# Patient Record
Sex: MALE | Race: WHITE | NOT HISPANIC OR LATINO | Employment: OTHER | URBAN - METROPOLITAN AREA
[De-identification: names, ages, dates, MRNs, and addresses within clinical notes are randomized per-mention and may not be internally consistent; named-entity substitution may affect disease eponyms.]

---

## 2018-03-19 ENCOUNTER — APPOINTMENT (INPATIENT)
Dept: ULTRASOUND IMAGING | Facility: HOSPITAL | Age: 53
DRG: 440 | End: 2018-03-19
Payer: COMMERCIAL

## 2018-03-19 ENCOUNTER — APPOINTMENT (EMERGENCY)
Dept: CT IMAGING | Facility: HOSPITAL | Age: 53
DRG: 440 | End: 2018-03-19
Payer: COMMERCIAL

## 2018-03-19 ENCOUNTER — HOSPITAL ENCOUNTER (INPATIENT)
Facility: HOSPITAL | Age: 53
LOS: 4 days | Discharge: HOME | DRG: 440 | End: 2018-03-23
Attending: EMERGENCY MEDICINE | Admitting: HOSPITALIST
Payer: COMMERCIAL

## 2018-03-19 DIAGNOSIS — Z72.0 TOBACCO ABUSE: ICD-10-CM

## 2018-03-19 DIAGNOSIS — K85.90 ACUTE PANCREATITIS: Primary | ICD-10-CM

## 2018-03-19 LAB
ALBUMIN SERPL BCP-MCNC: 3.8 G/DL (ref 3.5–5)
ALP SERPL-CCNC: 172 U/L (ref 46–116)
ALT SERPL W P-5'-P-CCNC: 106 U/L (ref 12–78)
ANION GAP SERPL CALCULATED.3IONS-SCNC: 12 MMOL/L (ref 4–13)
AST SERPL W P-5'-P-CCNC: 40 U/L (ref 5–45)
BACTERIA UR QL AUTO: ABNORMAL /HPF
BASOPHILS # BLD AUTO: 0.07 THOUSANDS/ΜL (ref 0–0.1)
BASOPHILS NFR BLD AUTO: 1 % (ref 0–1)
BILIRUB SERPL-MCNC: 1 MG/DL (ref 0.2–1)
BILIRUB UR QL STRIP: ABNORMAL
BUN SERPL-MCNC: 12 MG/DL (ref 5–25)
CALCIUM SERPL-MCNC: 10 MG/DL (ref 8.3–10.1)
CHLORIDE SERPL-SCNC: 98 MMOL/L (ref 100–108)
CHOLEST SERPL-MCNC: 203 MG/DL (ref 50–200)
CLARITY UR: CLEAR
CO2 SERPL-SCNC: 26 MMOL/L (ref 21–32)
COLOR UR: ABNORMAL
CREAT SERPL-MCNC: 0.97 MG/DL (ref 0.6–1.3)
EOSINOPHIL # BLD AUTO: 0.11 THOUSAND/ΜL (ref 0–0.61)
EOSINOPHIL NFR BLD AUTO: 1 % (ref 0–6)
ERYTHROCYTE [DISTWIDTH] IN BLOOD BY AUTOMATED COUNT: 11.6 % (ref 11.6–15.1)
GFR SERPL CREATININE-BSD FRML MDRD: 88 ML/MIN/1.73SQ M
GLUCOSE SERPL-MCNC: 255 MG/DL (ref 65–140)
GLUCOSE UR STRIP-MCNC: ABNORMAL MG/DL
HCT VFR BLD AUTO: 46.7 % (ref 36.5–49.3)
HDLC SERPL-MCNC: 46 MG/DL (ref 40–60)
HGB BLD-MCNC: 16.7 G/DL (ref 12–17)
HGB UR QL STRIP.AUTO: ABNORMAL
HYALINE CASTS #/AREA URNS LPF: ABNORMAL /LPF
KETONES UR STRIP-MCNC: ABNORMAL MG/DL
LDLC SERPL CALC-MCNC: 116 MG/DL (ref 0–100)
LEUKOCYTE ESTERASE UR QL STRIP: NEGATIVE
LIPASE SERPL-CCNC: 678 U/L (ref 73–393)
LYMPHOCYTES # BLD AUTO: 1.62 THOUSANDS/ΜL (ref 0.6–4.47)
LYMPHOCYTES NFR BLD AUTO: 14 % (ref 14–44)
MCH RBC QN AUTO: 31.6 PG (ref 26.8–34.3)
MCHC RBC AUTO-ENTMCNC: 35.8 G/DL (ref 31.4–37.4)
MCV RBC AUTO: 88 FL (ref 82–98)
MONOCYTES # BLD AUTO: 0.89 THOUSAND/ΜL (ref 0.17–1.22)
MONOCYTES NFR BLD AUTO: 8 % (ref 4–12)
NEUTROPHILS # BLD AUTO: 8.99 THOUSANDS/ΜL (ref 1.85–7.62)
NEUTS SEG NFR BLD AUTO: 76 % (ref 43–75)
NITRITE UR QL STRIP: NEGATIVE
NON-SQ EPI CELLS URNS QL MICRO: ABNORMAL /HPF
PH UR STRIP.AUTO: 6 [PH] (ref 4.5–8)
PLATELET # BLD AUTO: 243 THOUSANDS/UL (ref 149–390)
PMV BLD AUTO: 9.8 FL (ref 8.9–12.7)
POTASSIUM SERPL-SCNC: 4 MMOL/L (ref 3.5–5.3)
PROT SERPL-MCNC: 8.2 G/DL (ref 6.4–8.2)
PROT UR STRIP-MCNC: ABNORMAL MG/DL
RBC # BLD AUTO: 5.29 MILLION/UL (ref 3.88–5.62)
RBC #/AREA URNS AUTO: ABNORMAL /HPF
SODIUM SERPL-SCNC: 136 MMOL/L (ref 136–145)
SP GR UR STRIP.AUTO: >=1.03 (ref 1–1.03)
TRIGL SERPL-MCNC: 205 MG/DL
TROPONIN I SERPL-MCNC: <0.02 NG/ML
UROBILINOGEN UR QL STRIP.AUTO: 1 E.U./DL
WBC # BLD AUTO: 11.68 THOUSAND/UL (ref 4.31–10.16)
WBC #/AREA URNS AUTO: ABNORMAL /HPF

## 2018-03-19 PROCEDURE — 96361 HYDRATE IV INFUSION ADD-ON: CPT

## 2018-03-19 PROCEDURE — 80053 COMPREHEN METABOLIC PANEL: CPT | Performed by: EMERGENCY MEDICINE

## 2018-03-19 PROCEDURE — 36415 COLL VENOUS BLD VENIPUNCTURE: CPT | Performed by: EMERGENCY MEDICINE

## 2018-03-19 PROCEDURE — 83690 ASSAY OF LIPASE: CPT | Performed by: EMERGENCY MEDICINE

## 2018-03-19 PROCEDURE — 96374 THER/PROPH/DIAG INJ IV PUSH: CPT

## 2018-03-19 PROCEDURE — 81001 URINALYSIS AUTO W/SCOPE: CPT

## 2018-03-19 PROCEDURE — 96375 TX/PRO/DX INJ NEW DRUG ADDON: CPT

## 2018-03-19 PROCEDURE — 80061 LIPID PANEL: CPT | Performed by: PHYSICIAN ASSISTANT

## 2018-03-19 PROCEDURE — 84484 ASSAY OF TROPONIN QUANT: CPT | Performed by: EMERGENCY MEDICINE

## 2018-03-19 PROCEDURE — 71275 CT ANGIOGRAPHY CHEST: CPT

## 2018-03-19 PROCEDURE — 93005 ELECTROCARDIOGRAM TRACING: CPT

## 2018-03-19 PROCEDURE — 85025 COMPLETE CBC W/AUTO DIFF WBC: CPT | Performed by: EMERGENCY MEDICINE

## 2018-03-19 PROCEDURE — 99285 EMERGENCY DEPT VISIT HI MDM: CPT

## 2018-03-19 PROCEDURE — 99222 1ST HOSP IP/OBS MODERATE 55: CPT | Performed by: PHYSICIAN ASSISTANT

## 2018-03-19 PROCEDURE — 76705 ECHO EXAM OF ABDOMEN: CPT

## 2018-03-19 PROCEDURE — 74177 CT ABD & PELVIS W/CONTRAST: CPT

## 2018-03-19 RX ORDER — ONDANSETRON 2 MG/ML
4 INJECTION INTRAMUSCULAR; INTRAVENOUS ONCE
Status: COMPLETED | OUTPATIENT
Start: 2018-03-19 | End: 2018-03-19

## 2018-03-19 RX ORDER — CALCIUM CARBONATE 200(500)MG
1000 TABLET,CHEWABLE ORAL DAILY PRN
Status: DISCONTINUED | OUTPATIENT
Start: 2018-03-19 | End: 2018-03-23 | Stop reason: HOSPADM

## 2018-03-19 RX ORDER — KETOROLAC TROMETHAMINE 30 MG/ML
15 INJECTION, SOLUTION INTRAMUSCULAR; INTRAVENOUS EVERY 6 HOURS SCHEDULED
Status: COMPLETED | OUTPATIENT
Start: 2018-03-20 | End: 2018-03-22

## 2018-03-19 RX ORDER — KETOROLAC TROMETHAMINE 30 MG/ML
15 INJECTION, SOLUTION INTRAMUSCULAR; INTRAVENOUS ONCE
Status: COMPLETED | OUTPATIENT
Start: 2018-03-19 | End: 2018-03-19

## 2018-03-19 RX ORDER — ONDANSETRON 2 MG/ML
4 INJECTION INTRAMUSCULAR; INTRAVENOUS EVERY 6 HOURS PRN
Status: DISCONTINUED | OUTPATIENT
Start: 2018-03-19 | End: 2018-03-23 | Stop reason: HOSPADM

## 2018-03-19 RX ORDER — SENNOSIDES 8.6 MG
1 TABLET ORAL DAILY
Status: DISCONTINUED | OUTPATIENT
Start: 2018-03-20 | End: 2018-03-23 | Stop reason: HOSPADM

## 2018-03-19 RX ORDER — ONDANSETRON 2 MG/ML
4 INJECTION INTRAMUSCULAR; INTRAVENOUS EVERY 6 HOURS PRN
Status: DISCONTINUED | OUTPATIENT
Start: 2018-03-19 | End: 2018-03-19

## 2018-03-19 RX ORDER — KETOROLAC TROMETHAMINE 30 MG/ML
15 INJECTION, SOLUTION INTRAMUSCULAR; INTRAVENOUS EVERY 6 HOURS SCHEDULED
Status: DISPENSED | OUTPATIENT
Start: 2018-03-19 | End: 2018-03-20

## 2018-03-19 RX ORDER — SODIUM CHLORIDE 9 MG/ML
100 INJECTION, SOLUTION INTRAVENOUS CONTINUOUS
Status: DISCONTINUED | OUTPATIENT
Start: 2018-03-19 | End: 2018-03-20

## 2018-03-19 RX ORDER — NICOTINE 21 MG/24HR
1 PATCH, TRANSDERMAL 24 HOURS TRANSDERMAL DAILY
Status: DISCONTINUED | OUTPATIENT
Start: 2018-03-19 | End: 2018-03-23 | Stop reason: HOSPADM

## 2018-03-19 RX ADMIN — NICOTINE 1 PATCH: 21 PATCH, EXTENDED RELEASE TRANSDERMAL at 21:16

## 2018-03-19 RX ADMIN — IOHEXOL 100 ML: 350 INJECTION, SOLUTION INTRAVENOUS at 19:03

## 2018-03-19 RX ADMIN — KETOROLAC TROMETHAMINE 15 MG: 30 INJECTION, SOLUTION INTRAMUSCULAR at 18:05

## 2018-03-19 RX ADMIN — ONDANSETRON 4 MG: 2 INJECTION INTRAMUSCULAR; INTRAVENOUS at 18:01

## 2018-03-19 RX ADMIN — SODIUM CHLORIDE 1000 ML: 0.9 INJECTION, SOLUTION INTRAVENOUS at 18:00

## 2018-03-19 RX ADMIN — HYDROMORPHONE HYDROCHLORIDE 0.5 MG: 1 INJECTION, SOLUTION INTRAMUSCULAR; INTRAVENOUS; SUBCUTANEOUS at 23:45

## 2018-03-19 RX ADMIN — HYDROMORPHONE HYDROCHLORIDE 1 MG: 1 INJECTION, SOLUTION INTRAMUSCULAR; INTRAVENOUS; SUBCUTANEOUS at 20:04

## 2018-03-19 RX ADMIN — SODIUM CHLORIDE 100 ML/HR: 0.9 INJECTION, SOLUTION INTRAVENOUS at 21:11

## 2018-03-19 RX ADMIN — KETOROLAC TROMETHAMINE 15 MG: 30 INJECTION, SOLUTION INTRAMUSCULAR at 21:16

## 2018-03-19 NOTE — ED PROVIDER NOTES
History  Chief Complaint   Patient presents with    Epigastric Pain     Pt c/o epigastric pain and mid back pain starting a week ago, worsening today  Denies SOB/N/V/D       47-year-old gentleman comes in complaining of abdominal pain  Patient states he has midepigastric pain that radiates directly into his back  Patient states his pain started a week ago however has gotten increasingly worse over the last 24 hours  Patient has some nausea but no vomiting  Patient also denies chest pain shortness of breath  Patient also did previous episodes of the same  Patient denies being a drinker but admits to smoking marijuana  Patient has a history of hypertension and hyperlipidemia  History provided by:  Patient   used: No    Abdominal Pain   Pain location:  Epigastric  Pain quality: gnawing, sharp and stabbing    Pain radiates to:  Back  Pain severity:  Severe  Onset quality:  Gradual  Duration:  1 week  Timing:  Constant  Progression:  Worsening  Chronicity:  New  Context: not alcohol use, not previous surgeries and not trauma    Ineffective treatments:  None tried  Associated symptoms: anorexia    Associated symptoms: no chest pain, no cough, no fever, no hematuria and no vomiting    Risk factors: no alcohol abuse, no NSAID use and no recent hospitalization        Prior to Admission Medications   Prescriptions Last Dose Informant Patient Reported? Taking?   hydrochlorothiazide (HYDRODIURIL) 25 mg tablet   Yes Yes   Sig: Take 25 mg by mouth daily   ramipril (ALTACE) 10 MG capsule   Yes Yes   Sig: Take 10 mg by mouth daily   sertraline (ZOLOFT) 100 mg tablet   Yes No   Sig: Take 150 mg by mouth daily   simvastatin (ZOCOR) 20 mg tablet   Yes Yes   Sig: Take 20 mg by mouth daily      Facility-Administered Medications: None       History reviewed  No pertinent past medical history  Past Surgical History:   Procedure Laterality Date    LEG SURGERY         History reviewed   No pertinent family history  I have reviewed and agree with the history as documented  Social History   Substance Use Topics    Smoking status: Current Every Day Smoker    Smokeless tobacco: Not on file    Alcohol use Yes      Comment: socially         Review of Systems   Constitutional: Negative for activity change, appetite change and fever  HENT: Negative for congestion and facial swelling  Eyes: Negative for discharge and redness  Respiratory: Negative for cough and wheezing  Cardiovascular: Negative for chest pain and leg swelling  Gastrointestinal: Positive for abdominal pain and anorexia  Negative for abdominal distention, blood in stool and vomiting  Endocrine: Negative for cold intolerance and polydipsia  Genitourinary: Negative for difficulty urinating and hematuria  Musculoskeletal: Negative for arthralgias and gait problem  Skin: Negative for color change and rash  Allergic/Immunologic: Negative for food allergies and immunocompromised state  Neurological: Negative for dizziness, seizures and headaches  Hematological: Negative for adenopathy  Does not bruise/bleed easily  Psychiatric/Behavioral: Negative for agitation, confusion and decreased concentration  All other systems reviewed and are negative  Physical Exam  ED Triage Vitals [03/19/18 1644]   Temperature Pulse Respirations Blood Pressure SpO2   97 8 °F (36 6 °C) (!) 118 20 142/77 97 %      Temp Source Heart Rate Source Patient Position - Orthostatic VS BP Location FiO2 (%)   Oral Monitor Sitting Right arm --      Pain Score       Worst Possible Pain           Orthostatic Vital Signs  Vitals:    03/19/18 2015 03/19/18 2057 03/19/18 2100 03/20/18 0700   BP: 106/69 120/64 122/73 143/80   Pulse: 92 94 82 84   Patient Position - Orthostatic VS:  Lying Lying Lying       Physical Exam   Constitutional: He is oriented to person, place, and time  He appears well-developed and well-nourished  Non-toxic appearance   He appears distressed  HENT:   Head: Normocephalic and atraumatic  Right Ear: Tympanic membrane normal    Left Ear: Tympanic membrane normal    Nose: Nose normal    Mouth/Throat: Oropharynx is clear and moist    Eyes: Conjunctivae, EOM and lids are normal  Pupils are equal, round, and reactive to light  Neck: Trachea normal and normal range of motion  Neck supple  No JVD present  Carotid bruit is not present  Cardiovascular: Normal rate, regular rhythm, normal heart sounds and intact distal pulses  No extrasystoles are present  Pulmonary/Chest: Effort normal  He has no decreased breath sounds  He has no wheezes  He has no rhonchi  He has no rales  He exhibits no tenderness and no deformity  Abdominal: Soft  Bowel sounds are normal  There is tenderness in the epigastric area  There is no rebound, no guarding and no CVA tenderness  Musculoskeletal:        Right shoulder: He exhibits normal range of motion, no tenderness, no swelling and no deformity  Cervical back: Normal  He exhibits normal range of motion, no tenderness, no bony tenderness and no deformity  Lymphadenopathy:     He has no cervical adenopathy  He has no axillary adenopathy  Neurological: He is alert and oriented to person, place, and time  He has normal strength and normal reflexes  No cranial nerve deficit or sensory deficit  He displays a negative Romberg sign  Skin: Skin is warm and dry  Psychiatric: He has a normal mood and affect  His speech is normal and behavior is normal  Judgment and thought content normal  Cognition and memory are normal    Nursing note and vitals reviewed        ED Medications  Medications   ondansetron (ZOFRAN) injection 4 mg (not administered)   ketorolac (TORADOL) injection 15 mg (15 mg Intravenous Not Given 3/20/18 0131)   senna (SENOKOT) tablet 8 6 mg (8 6 mg Oral Given 3/20/18 0909)   calcium carbonate (TUMS) chewable tablet 1,000 mg (not administered)   nicotine (NICODERM CQ) 21 mg/24 hr TD 24 hr patch 1 patch (1 patch Transdermal Medication Applied 3/20/18 0910)   enoxaparin (LOVENOX) subcutaneous injection 40 mg (40 mg Subcutaneous Given 3/20/18 0909)   ketorolac (TORADOL) injection 15 mg (15 mg Intravenous Given 3/20/18 1244)   lactated ringers infusion (200 mL/hr Intravenous New Bag 3/20/18 1244)   pantoprazole (PROTONIX) EC tablet 40 mg (40 mg Oral Given 3/20/18 0909)   HYDROmorphone (DILAUDID) injection 1 mg (1 mg Intravenous Given 3/20/18 1114)   sertraline (ZOLOFT) tablet 150 mg (150 mg Oral Given 3/20/18 1353)   ondansetron (ZOFRAN) injection 4 mg (4 mg Intravenous Given 3/19/18 1801)   ketorolac (TORADOL) injection 15 mg (15 mg Intravenous Given 3/19/18 1805)   sodium chloride 0 9 % bolus 1,000 mL (0 mL Intravenous Stopped 3/19/18 2000)   iohexol (OMNIPAQUE) 350 MG/ML injection (MULTI-DOSE) 100 mL (100 mL Intravenous Given 3/19/18 1903)   HYDROmorphone (DILAUDID) injection 1 mg (1 mg Intravenous Given 3/19/18 2004)       Diagnostic Studies  Results Reviewed     Procedure Component Value Units Date/Time    Lipid Panel with Direct LDL reflex [[id number]]  (Abnormal) Collected:  03/19/18 1806    Lab Status:  Final result Specimen:  Blood from Arm, Right Updated:  03/19/18 2103     Cholesterol 203 (H) mg/dL      Triglycerides 205 (H) mg/dL      HDL, Direct 46 mg/dL      LDL Calculated 116 (H) mg/dL     Narrative:         Triglyceride:        Normal               <150 mg/dl        Borderline High     150-199 mg/dl        High               200-499 mg/dl        Very High           >499 mg/dl      Urine Microscopic [[id number]]  (Abnormal) Collected:  03/19/18 1834    Lab Status:  Final result Specimen:  Urine from Urine, Clean Catch Updated:  03/19/18 1858     RBC, UA 0-1 (A) /hpf      WBC, UA 2-4 (A) /hpf      Epithelial Cells Occasional /hpf      Bacteria, UA Occasional /hpf      Hyaline Casts, UA 20-30 (A) /lpf     ED Urine Macroscopic [[id number]]  (Abnormal) Collected:  03/19/18 7098    Lab Status: Final result Specimen:  Urine Updated:  03/19/18 1835     Color, UA Radha     Clarity, UA Clear     pH, UA 6 0     Leukocytes, UA Negative     Nitrite, UA Negative     Protein,  (2+) (A) mg/dl      Glucose,  (1/4%) (A) mg/dl      Ketones, UA Trace (A) mg/dl      Urobilinogen, UA 1 0 E U /dl      Bilirubin, UA Interference- unable to analyze (A)     Blood, UA Trace (A)     Specific Gravity, UA >=1 030    Narrative:       CLINITEK RESULT    Troponin I [[id number]]  (Normal) Collected:  03/19/18 1806    Lab Status:  Final result Specimen:  Blood from Arm, Right Updated:  03/19/18 1832     Troponin I <0 02 ng/mL     Narrative:         [employer] Chemistry analyzer 99% cutoff is > 0 04 ng/mL in network labs    o cTnI 99% cutoff is useful only when applied to patients in the clinical setting of myocardial ischemia  o cTnI 99% cutoff should be interpreted in the context of clinical history, ECG findings and possibly cardiac imaging to establish correct diagnosis  o cTnI 99% cutoff may be suggestive but clearly not indicative of a coronary event without the clinical setting of myocardial ischemia      Lipase [[id number]]  (Abnormal) Collected:  03/19/18 1806    Lab Status:  Final result Specimen:  Blood from Arm, Right Updated:  03/19/18 1830     Lipase 678 (H) u/L     Comprehensive metabolic panel [[id number]]  (Abnormal) Collected:  03/19/18 1806    Lab Status:  Final result Specimen:  Blood from Arm, Right Updated:  03/19/18 1830     Sodium 136 mmol/L      Potassium 4 0 mmol/L      Chloride 98 (L) mmol/L      CO2 26 mmol/L      Anion Gap 12 mmol/L      BUN 12 mg/dL      Creatinine 0 97 mg/dL      Glucose 255 (H) mg/dL      Calcium 10 0 mg/dL      AST 40 U/L       (H) U/L      Alkaline Phosphatase 172 (H) U/L      Total Protein 8 2 g/dL      Albumin 3 8 g/dL      Total Bilirubin 1 00 mg/dL      eGFR 88 ml/min/1 73sq m     Narrative:         National Kidney Disease Education Program recommendations are as follows:  GFR calculation is accurate only with a steady state creatinine  Chronic Kidney disease less than 60 ml/min/1 73 sq  meters  Kidney failure less than 15 ml/min/1 73 sq  meters  CBC and differential [[id number]]  (Abnormal) Collected:  03/19/18 1806    Lab Status:  Final result Specimen:  Blood from Arm, Right Updated:  03/19/18 1814     WBC 11 68 (H) Thousand/uL      RBC 5 29 Million/uL      Hemoglobin 16 7 g/dL      Hematocrit 46 7 %      MCV 88 fL      MCH 31 6 pg      MCHC 35 8 g/dL      RDW 11 6 %      MPV 9 8 fL      Platelets 904 Thousands/uL      Neutrophils Relative 76 (H) %      Lymphocytes Relative 14 %      Monocytes Relative 8 %      Eosinophils Relative 1 %      Basophils Relative 1 %      Neutrophils Absolute 8 99 (H) Thousands/µL      Lymphocytes Absolute 1 62 Thousands/µL      Monocytes Absolute 0 89 Thousand/µL      Eosinophils Absolute 0 11 Thousand/µL      Basophils Absolute 0 07 Thousands/µL                  US abdomen limited   Final Result by Enis Boxer, MD (03/20 0383)      Moderate hepatomegaly with severe hepatic steatosis as seen on CT  No gallstones  Workstation performed: [id number]         PE Study with CT Abdomen and Pelvis with contrast   Final Result by Braulio Holley MD (03/19 1947)         1  No acute pulmonary embolism  2   Acute pancreatitis  3   Hepatic steatosis  4   Indeterminate lesion arising from the lower pole the left kidney  This could possibly represent a proteinaceous/hemorrhagic cyst   Consider initial evaluation with nonemergent renal ultrasound  If this is inconclusive, further evaluation with MRI    recommended           Workstation performed: [id number]                    Procedures  ECG 12 Lead Documentation  Date/Time: 3/19/2018 6:25 PM  Performed by: Sami Pacheco by: Rolando HUBBARD     Patient location:  ED  Rate:     ECG rate:  90  Rhythm:     Rhythm: sinus rhythm    Ectopy:     Ectopy: none Phone Contacts  ED Phone Contact    ED Course  ED Course                                MDM  Number of Diagnoses or Management Options  Acute pancreatitis: new and requires workup     Amount and/or Complexity of Data Reviewed  Clinical lab tests: ordered and reviewed  Tests in the radiology section of CPT®: ordered and reviewed  Tests in the medicine section of CPT®: reviewed and ordered  Discuss the patient with other providers: yes    Patient Progress  Patient progress: improved    CritCare Time    Disposition  Final diagnoses:   Acute pancreatitis     Time reflects when diagnosis was documented in both MDM as applicable and the Disposition within this note     Time User Action Codes Description Comment    3/19/2018  7:58 PM Alexander Fagan Add [K85 90] Acute pancreatitis       ED Disposition     ED Disposition Condition Comment    Admit  Case was discussed with dr Alexander Fagan and the patient's admission status was agreed to be Admission Status: inpatient status to the service of Dr Alexander Fagan    Follow-up Information    None       Current Discharge Medication List      CONTINUE these medications which have NOT CHANGED    Details   hydrochlorothiazide (HYDRODIURIL) 25 mg tablet Take 25 mg by mouth daily      ramipril (ALTACE) 10 MG capsule Take 10 mg by mouth daily      simvastatin (ZOCOR) 20 mg tablet Take 20 mg by mouth daily      sertraline (ZOLOFT) 100 mg tablet Take 150 mg by mouth daily           No discharge procedures on file      ED Provider  Electronically Signed by           Jhonny Barraza DO  03/20/18 2408

## 2018-03-20 LAB
ALBUMIN SERPL BCP-MCNC: 3.4 G/DL (ref 3.5–5)
ALP SERPL-CCNC: 146 U/L (ref 46–116)
ALT SERPL W P-5'-P-CCNC: 87 U/L (ref 12–78)
ANION GAP SERPL CALCULATED.3IONS-SCNC: 10 MMOL/L (ref 4–13)
AST SERPL W P-5'-P-CCNC: 31 U/L (ref 5–45)
ATRIAL RATE: 93 BPM
BILIRUB SERPL-MCNC: 0.8 MG/DL (ref 0.2–1)
BUN SERPL-MCNC: 15 MG/DL (ref 5–25)
CALCIUM SERPL-MCNC: 9.2 MG/DL (ref 8.3–10.1)
CHLORIDE SERPL-SCNC: 101 MMOL/L (ref 100–108)
CO2 SERPL-SCNC: 27 MMOL/L (ref 21–32)
CREAT SERPL-MCNC: 0.86 MG/DL (ref 0.6–1.3)
ERYTHROCYTE [DISTWIDTH] IN BLOOD BY AUTOMATED COUNT: 11.8 % (ref 11.6–15.1)
GFR SERPL CREATININE-BSD FRML MDRD: 98 ML/MIN/1.73SQ M
GLUCOSE SERPL-MCNC: 156 MG/DL (ref 65–140)
HCT VFR BLD AUTO: 42.5 % (ref 36.5–49.3)
HGB BLD-MCNC: 14.7 G/DL (ref 12–17)
MCH RBC QN AUTO: 31.3 PG (ref 26.8–34.3)
MCHC RBC AUTO-ENTMCNC: 34.6 G/DL (ref 31.4–37.4)
MCV RBC AUTO: 91 FL (ref 82–98)
P AXIS: 39 DEGREES
PLATELET # BLD AUTO: 213 THOUSANDS/UL (ref 149–390)
PMV BLD AUTO: 9.8 FL (ref 8.9–12.7)
POTASSIUM SERPL-SCNC: 3.7 MMOL/L (ref 3.5–5.3)
PR INTERVAL: 186 MS
PROT SERPL-MCNC: 7.5 G/DL (ref 6.4–8.2)
QRS AXIS: 19 DEGREES
QRSD INTERVAL: 88 MS
QT INTERVAL: 338 MS
QTC INTERVAL: 420 MS
RBC # BLD AUTO: 4.69 MILLION/UL (ref 3.88–5.62)
SODIUM SERPL-SCNC: 138 MMOL/L (ref 136–145)
T WAVE AXIS: 38 DEGREES
VENTRICULAR RATE: 93 BPM
WBC # BLD AUTO: 11.47 THOUSAND/UL (ref 4.31–10.16)

## 2018-03-20 PROCEDURE — 99222 1ST HOSP IP/OBS MODERATE 55: CPT | Performed by: PHYSICIAN ASSISTANT

## 2018-03-20 PROCEDURE — 93010 ELECTROCARDIOGRAM REPORT: CPT | Performed by: INTERNAL MEDICINE

## 2018-03-20 PROCEDURE — 80053 COMPREHEN METABOLIC PANEL: CPT | Performed by: PHYSICIAN ASSISTANT

## 2018-03-20 PROCEDURE — 85027 COMPLETE CBC AUTOMATED: CPT | Performed by: PHYSICIAN ASSISTANT

## 2018-03-20 PROCEDURE — 99232 SBSQ HOSP IP/OBS MODERATE 35: CPT | Performed by: INTERNAL MEDICINE

## 2018-03-20 RX ORDER — RAMIPRIL 10 MG/1
10 CAPSULE ORAL DAILY
COMMUNITY
End: 2020-10-14 | Stop reason: ALTCHOICE

## 2018-03-20 RX ORDER — HYDROCHLOROTHIAZIDE 25 MG/1
25 TABLET ORAL DAILY
COMMUNITY
End: 2020-10-14 | Stop reason: ALTCHOICE

## 2018-03-20 RX ORDER — PANTOPRAZOLE SODIUM 40 MG/1
40 TABLET, DELAYED RELEASE ORAL
Status: DISCONTINUED | OUTPATIENT
Start: 2018-03-20 | End: 2018-03-23 | Stop reason: HOSPADM

## 2018-03-20 RX ORDER — SODIUM CHLORIDE, SODIUM LACTATE, POTASSIUM CHLORIDE, CALCIUM CHLORIDE 600; 310; 30; 20 MG/100ML; MG/100ML; MG/100ML; MG/100ML
150 INJECTION, SOLUTION INTRAVENOUS CONTINUOUS
Status: DISCONTINUED | OUTPATIENT
Start: 2018-03-20 | End: 2018-03-23 | Stop reason: HOSPADM

## 2018-03-20 RX ORDER — SIMVASTATIN 20 MG
20 TABLET ORAL DAILY
COMMUNITY
End: 2020-08-19

## 2018-03-20 RX ORDER — SERTRALINE HYDROCHLORIDE 100 MG/1
150 TABLET, FILM COATED ORAL DAILY
COMMUNITY
End: 2020-05-26

## 2018-03-20 RX ADMIN — ENOXAPARIN SODIUM 40 MG: 40 INJECTION SUBCUTANEOUS at 09:09

## 2018-03-20 RX ADMIN — KETOROLAC TROMETHAMINE 15 MG: 30 INJECTION, SOLUTION INTRAMUSCULAR at 23:26

## 2018-03-20 RX ADMIN — SODIUM CHLORIDE, POTASSIUM CHLORIDE, SODIUM LACTATE AND CALCIUM CHLORIDE 200 ML/HR: 600; 310; 30; 20 INJECTION, SOLUTION INTRAVENOUS at 12:44

## 2018-03-20 RX ADMIN — HYDROMORPHONE HYDROCHLORIDE 1 MG: 1 INJECTION, SOLUTION INTRAMUSCULAR; INTRAVENOUS; SUBCUTANEOUS at 15:55

## 2018-03-20 RX ADMIN — PANTOPRAZOLE SODIUM 40 MG: 40 TABLET, DELAYED RELEASE ORAL at 09:09

## 2018-03-20 RX ADMIN — HYDROMORPHONE HYDROCHLORIDE 1 MG: 1 INJECTION, SOLUTION INTRAMUSCULAR; INTRAVENOUS; SUBCUTANEOUS at 09:08

## 2018-03-20 RX ADMIN — HYDROMORPHONE HYDROCHLORIDE 1 MG: 1 INJECTION, SOLUTION INTRAMUSCULAR; INTRAVENOUS; SUBCUTANEOUS at 11:14

## 2018-03-20 RX ADMIN — KETOROLAC TROMETHAMINE 15 MG: 30 INJECTION, SOLUTION INTRAMUSCULAR at 12:44

## 2018-03-20 RX ADMIN — HYDROMORPHONE HYDROCHLORIDE 1 MG: 1 INJECTION, SOLUTION INTRAMUSCULAR; INTRAVENOUS; SUBCUTANEOUS at 20:07

## 2018-03-20 RX ADMIN — NICOTINE 1 PATCH: 21 PATCH, EXTENDED RELEASE TRANSDERMAL at 09:10

## 2018-03-20 RX ADMIN — SODIUM CHLORIDE, POTASSIUM CHLORIDE, SODIUM LACTATE AND CALCIUM CHLORIDE 200 ML/HR: 600; 310; 30; 20 INJECTION, SOLUTION INTRAVENOUS at 09:10

## 2018-03-20 RX ADMIN — HYDROMORPHONE HYDROCHLORIDE 0.5 MG: 1 INJECTION, SOLUTION INTRAMUSCULAR; INTRAVENOUS; SUBCUTANEOUS at 04:20

## 2018-03-20 RX ADMIN — SODIUM CHLORIDE 100 ML/HR: 0.9 INJECTION, SOLUTION INTRAVENOUS at 07:39

## 2018-03-20 RX ADMIN — SERTRALINE HYDROCHLORIDE 150 MG: 100 TABLET ORAL at 13:53

## 2018-03-20 RX ADMIN — SENNOSIDES 8.6 MG: 8.6 TABLET, FILM COATED ORAL at 09:09

## 2018-03-20 RX ADMIN — KETOROLAC TROMETHAMINE 15 MG: 30 INJECTION, SOLUTION INTRAMUSCULAR at 17:25

## 2018-03-20 RX ADMIN — KETOROLAC TROMETHAMINE 15 MG: 30 INJECTION, SOLUTION INTRAMUSCULAR at 07:00

## 2018-03-20 NOTE — ASSESSMENT & PLAN NOTE
· Present on admission, lipase 678, CT abdomen shows peripancreatic stranding within large lymph node consistent with pancreatitis  · Suspect alcohol-induced  Patient reports that he drinks heavily-- 12 pack of beer per night  Denies drinking in the last 3 weeks    No signs of withdrawal   · Check right upper quadrant ultrasound, lipids  · Trend lipase, clear liquid diet, pain control  · GI consult, appreciate input

## 2018-03-20 NOTE — CONSULTS
Consultation - [facility]  Don Briones 45 y o  male MRN: [id number]  Unit/Bed#: -01 Encounter: [id number]        Inpatient consult to gastroenterology  Consult performed by: Timothy Velarde ordered by: Osorio Ma          Reason for Consult / Principal Problem: acute pancreatitis    ASSESSMENT and PLAN:    Principal Problem:    Acute pancreatitis    #1  Acute pancreatitis, likely alcohol induced: Lipase 678 however symptoms have been occurring for a few weeks so it was likely higher previously  CT scan shows evidence of pancreatitis with peripancreatic enlarged lymph node  WBC 11 47, afebrile  US negative for gallstones or biliary dilation    -Continue to monitor abdominal exam, lipase, WBC count  -Consider repeat imaging if any clinical worsening  -Lactated ringers 200 cc/hr  -Clear liquid diet  -Complete alcohol cessation  -Antiemetics and pain control as needed    #2  Severe hepatic steatosis with elevated transaminases, likely alcohol induced: no signs of cirrhosis  Platelets normal  Bilirubin normal  ALT 87, alk phos 144    -Continue to monitor LFTs closely  -Discussed with patient in regards to complete alcohol cessation  -Avoid hepatotoxic meds  -Exercise, lo fat diet, weight loss    #3  Acid reflux: on Nexium at home daily  Never had EGD  -Consider outpatient EGD to rule out Tellez's due to GERD and alcohol use    #4  Colon cancer screening: never had a colonoscopy  -Outpatient colonoscopy    -------------------------------------------------------------------------------------------------------------------    HPI:  This a 26-year-old male with history of alcohol abuse who presented to the emergency room due to worsening epigastric pain that radiates to his back  The patient reports that he started to have symptoms a few weeks ago  Over the past few weeks his pain has progressively gotten worse    He states that his pain was the worst over the last 5 days prompting him to come to the emergency room  He reports that his last alcoholic beverage was several weeks ago prior to getting sick  He reports that he has a history in the past of drinking significantly  He reports being depressed and drinking a lot about 8 years ago  Currently he reports that he only drinks about once a week at which time he will have a few drinks  He denies any alcohol use since feeling ill  Denies any nausea or vomiting  He denies any unexpected weight loss  He reports that his last bowel movement was on Sunday  He is passing gas  He reports that his last bowel movement on Sunday was dark in color but he had taken Pepto-Bismol the day prior  He denies any other dark stools  He denies any bright red blood in the stool  On a regular basis he denies any constipation or diarrhea  He reports he has never had an upper endoscopy or colonoscopy in the past   He does take Nexium once a day for acid reflux  He also uses tobacco daily  REVIEW OF SYSTEMS:    CONSTITUTIONAL: Denies any fever, chills, or rigors  Decreased appetite, and no recent weight loss  HEENT: No earache or tinnitus  Denies hearing loss or visual disturbances  CARDIOVASCULAR: No chest pain or palpitations  RESPIRATORY: Denies any cough, hemoptysis, shortness of breath or dyspnea on exertion  GASTROINTESTINAL: As noted in the History of Present Illness  GENITOURINARY: No problems with urination  Denies any hematuria or dysuria  NEUROLOGIC: No dizziness or vertigo, denies headaches  MUSCULOSKELETAL: Denies any muscle or joint pain  SKIN: Denies skin rashes or itching  ENDOCRINE: Denies excessive thirst  Denies intolerance to heat or cold  PSYCHOSOCIAL: Denies depression or anxiety  Denies any recent memory loss  Historical Information   History reviewed  No pertinent past medical history    Past Surgical History:   Procedure Laterality Date    LEG SURGERY       Social History   History   Alcohol Use  Yes     Comment: socially      History   Drug Use    Types: Marijuana     Comment: daily use     History   Smoking Status    Current Every Day Smoker   Smokeless Tobacco    Not on file     History reviewed  No pertinent family history  Meds/Allergies     No prescriptions prior to admission  Current Facility-Administered Medications   Medication Dose Route Frequency    calcium carbonate (TUMS) chewable tablet 1,000 mg  1,000 mg Oral Daily PRN    enoxaparin (LOVENOX) subcutaneous injection 40 mg  40 mg Subcutaneous Daily    HYDROmorphone (DILAUDID) injection 1 mg  1 mg Intravenous Q3H PRN    ketorolac (TORADOL) injection 15 mg  15 mg Intravenous Q6H [facility]    lactated ringers infusion  200 mL/hr Intravenous Continuous    nicotine (NICODERM CQ) 21 mg/24 hr TD 24 hr patch 1 patch  1 patch Transdermal Daily    ondansetron (ZOFRAN) injection 4 mg  4 mg Intravenous Q6H PRN    pantoprazole (PROTONIX) EC tablet 40 mg  40 mg Oral Early Morning    senna (SENOKOT) tablet 8 6 mg  1 tablet Oral Daily       Allergies   Allergen Reactions    Amoxicillin Swelling           Objective     Blood pressure 143/80, pulse 84, temperature 98 3 °F (36 8 °C), temperature source Oral, resp  rate 18, height 5' 10" (1 778 m), weight 102 kg (225 lb), SpO2 94 %        Intake/Output Summary (Last 24 hours) at 03/20/18 1114  Last data filed at 03/20/18 0739   Gross per 24 hour   Intake             1970 ml   Output                0 ml   Net             1970 ml         PHYSICAL EXAM:      General Appearance:   Alert, cooperative, no distress, appears stated age    HEENT:   Normocephalic, atraumatic, anicteric, no oropharyngeal thrush present      Neck:  Supple, symmetrical, trachea midline, no adenopathy;    thyroid: no enlargement/tenderness/nodules; no carotid  bruit or JVD    Lungs:   Clear to auscultation bilaterally; no rales, rhonchi or wheezing; respirations unlabored    Heart[de-identified]   S1 and S2 normal; regular rate and rhythm; no murmur, rub, or gallop  Abdomen:   Soft, epigastric tenderness to palpation, non-distended; normal bowel sounds; no masses, no organomegaly    Genitalia:   Deferred    Rectal:   Deferred    Extremities:  No cyanosis, clubbing or edema    Pulses:  2+ and symmetric all extremities    Skin:  Skin color, texture, turgor normal, no rashes or lesions    Lymph nodes:  No palpable cervical, axillary or inguinal lymphadenopathy        Lab Results:     Results from last 7 days  Lab Units 03/20/18  0443 03/19/18  1806   WBC Thousand/uL 11 47* 11 68*   HEMOGLOBIN g/dL 14 7 16 7   HEMATOCRIT % 42 5 46 7   PLATELETS Thousands/uL 213 243   NEUTROS PCT %  --  76*   LYMPHS PCT %  --  14   MONOS PCT %  --  8   EOS PCT %  --  1       Results from last 7 days  Lab Units 03/20/18  0443   SODIUM mmol/L 138   POTASSIUM mmol/L 3 7   CHLORIDE mmol/L 101   CO2 mmol/L 27   BUN mg/dL 15   CREATININE mg/dL 0 86   CALCIUM mg/dL 9 2   TOTAL PROTEIN g/dL 7 5   BILIRUBIN TOTAL mg/dL 0 80   ALK PHOS U/L 146*   ALT U/L 87*   AST U/L 31   GLUCOSE RANDOM mg/dL 156*           Results from last 7 days  Lab Units 03/19/18  1806   LIPASE u/L 678*       Imaging Studies: I have personally reviewed pertinent imaging studies  Pe Study With Ct Abdomen And Pelvis With Contrast    Result Date: 3/19/2018  Impression: 1  No acute pulmonary embolism  2   Acute pancreatitis  3   Hepatic steatosis  4   Indeterminate lesion arising from the lower pole the left kidney  This could possibly represent a proteinaceous/hemorrhagic cyst   Consider initial evaluation with nonemergent renal ultrasound  If this is inconclusive, further evaluation with MRI recommended  Workstation performed: [id number]     Us Abdomen Limited    Result Date: 3/20/2018  Impression: Moderate hepatomegaly with severe hepatic steatosis as seen on CT  No gallstones  Workstation performed: [id number]           Patient was seen and examined by Dr Leidy Cyr   All petersen medical decisions were made by Dr José Luis Mendoza  Thank you for allowing us to participate in the care of this present patient  We will follow-up with you closely

## 2018-03-20 NOTE — H&P
H&P- Jyoti Burnett 1965, 45 y o  male MRN: [id number]    Unit/Bed#: -01 Encounter: [id number]    Primary Care Provider: Manasa Guaman MD   Date and time admitted to hospital: 3/19/2018  4:53 PM    * Acute pancreatitis   Assessment & Plan    · Present on admission, lipase 678, CT abdomen shows peripancreatic stranding within large lymph node consistent with pancreatitis  · Suspect alcohol-induced  Patient reports that he drinks heavily-- 12 pack of beer per night  Denies drinking in the last 3 weeks  No signs of withdrawal   · Check right upper quadrant ultrasound, lipids  · Trend lipase, clear liquid diet, pain control  · GI consult, appreciate input            VTE Prophylaxis: Enoxaparin (Lovenox)  / sequential compression device   Code Status: Level 1-- Full Code  POLST: There is no POLST form on file for this patient (pre-hospital)  Discussion with family: family not available    Anticipated Length of Stay:  Patient will be admitted on an Inpatient basis with an anticipated length of stay of  > 2 midnights  Justification for Hospital Stay: pain control, diagnose cause of pancreatitis    Total Time for Visit, including Counseling / Coordination of Care: 30 minutes  Greater than 50% of this total time spent on direct patient counseling and coordination of care  Chief Complaint:   Epigastric pain    History of Present Illness:    Jyoti Burnett is a 45 y o  male with no significant past medical history presents the ED for evaluation of epigastric abdominal pain x1 week  Patient reports pain started spontaneously about 1 week ago  Reports pain was initially located in the center is abdomen, over the last few days it radiate up to the epigastric region and straight through to the back  Denies any associated nausea, vomiting, diarrhea  Does report poor appetite  Denies any fevers or chills    Has not had this pain in the past     Review of Systems:    Review of Systems   Constitutional: Positive for appetite change  HENT: Negative  Eyes: Negative  Respiratory: Negative  Cardiovascular: Negative  Gastrointestinal: Positive for abdominal pain  Genitourinary: Negative  Musculoskeletal: Negative  Skin: Negative  Neurological: Negative  Hematological: Negative  Psychiatric/Behavioral: Negative  Past Medical and Surgical History:     History reviewed  No pertinent past medical history  Past Surgical History:   Procedure Laterality Date    LEG SURGERY         Meds/Allergies:    Prior to Admission medications    Not on File     I have reviewed home medications with patient personally  Allergies: Allergies   Allergen Reactions    Amoxicillin Swelling       Social History:     Marital Status: /Civil Union   Occupation: unemployed  Patient Pre-hospital Living Situation: independent  Patient Pre-hospital Level of Mobility: independent  Patient Pre-hospital Diet Restrictions: none  Substance Use History:   History   Alcohol Use    Yes     Comment: socially      History   Smoking Status    Current Every Day Smoker   Smokeless Tobacco    Not on file     History   Drug Use    Types: Marijuana     Comment: daily use       Family History:    non-contributory    Physical Exam:     Vitals:   Blood Pressure: 122/73 (03/19/18 2100)  Pulse: 82 (03/19/18 2100)  Temperature: 98 1 °F (36 7 °C) (03/19/18 2100)  Temp Source: Oral (03/19/18 2100)  Respirations: 18 (03/19/18 2100)  Height: 5' 10" (177 8 cm) (03/19/18 2057)  Weight - Scale: 102 kg (225 lb) (03/19/18 2057)  SpO2: 92 % (03/19/18 2100)    Physical Exam   Constitutional: He is oriented to person, place, and time  He appears well-developed and well-nourished  No distress  HENT:   Head: Normocephalic and atraumatic  Mouth/Throat: No oropharyngeal exudate  Eyes: Conjunctivae and EOM are normal  Pupils are equal, round, and reactive to light  No scleral icterus  Neck: No JVD present     Cardiovascular: Normal rate and regular rhythm  Exam reveals no gallop and no friction rub  No murmur heard  Pulmonary/Chest: Effort normal and breath sounds normal  No respiratory distress  He has no wheezes  He has no rales  Abdominal: Soft  Bowel sounds are normal  He exhibits no distension  There is tenderness (mild mid epigastrum)  There is no rebound and no guarding  Musculoskeletal: He exhibits no edema or tenderness  Neurological: He is alert and oriented to person, place, and time  He displays normal reflexes  No cranial nerve deficit  He exhibits normal muscle tone  Skin: Skin is warm and dry  No rash noted  He is not diaphoretic  No erythema  Psychiatric: He has a normal mood and affect  His behavior is normal      Additional Data:     Lab Results: I have personally reviewed pertinent reports  Results from last 7 days  Lab Units 03/19/18  1806   WBC Thousand/uL 11 68*   HEMOGLOBIN g/dL 16 7   HEMATOCRIT % 46 7   PLATELETS Thousands/uL 243   NEUTROS PCT % 76*   LYMPHS PCT % 14   MONOS PCT % 8   EOS PCT % 1       Results from last 7 days  Lab Units 03/19/18  1806   SODIUM mmol/L 136   POTASSIUM mmol/L 4 0   CHLORIDE mmol/L 98*   CO2 mmol/L 26   BUN mg/dL 12   CREATININE mg/dL 0 97   CALCIUM mg/dL 10 0   TOTAL PROTEIN g/dL 8 2   BILIRUBIN TOTAL mg/dL 1 00   ALK PHOS U/L 172*   ALT U/L 106*   AST U/L 40   GLUCOSE RANDOM mg/dL 255*           Imaging: I have personally reviewed pertinent reports  PE Study with CT Abdomen and Pelvis with contrast   Final Result by Jeet Martínez MD (03/19 1947)         1  No acute pulmonary embolism  2   Acute pancreatitis  3   Hepatic steatosis  4   Indeterminate lesion arising from the lower pole the left kidney  This could possibly represent a proteinaceous/hemorrhagic cyst   Consider initial evaluation with nonemergent renal ultrasound  If this is inconclusive, further evaluation with MRI    recommended           Workstation performed: [id number]         US abdomen limited    (Results Pending)       EKG, Pathology, and Other Studies Reviewed on Admission:   · EKG: NSR 90    Allscripts / Epic Records Reviewed: No     ** Please Note: This note has been constructed using a voice recognition system   **

## 2018-03-20 NOTE — PLAN OF CARE
INFECTION - ADULT     Absence or prevention of progression during hospitalization Progressing     Absence of fever/infection during neutropenic period Progressing        PAIN - ADULT     Verbalizes/displays adequate comfort level or baseline comfort level Progressing        Potential for Falls     Patient will remain free of falls Progressing        SAFETY ADULT     Patient will remain free of falls Progressing     Maintain or return to baseline ADL function Progressing     Maintain or return mobility status to optimal level Progressing

## 2018-03-20 NOTE — CASE MANAGEMENT
[facility] in the [facility] by Harshal Peña for 2017  Network Utilization Review Department  Phone: [phone]; Fax [phone]  ATTENTION: The Network Utilization Review Department is now centralized for our 7 Facilities  Please call with any questions or concerns to [phone] and carefully follow the prompts so that you are directed to the right person  All voicemails are confidential  Fax any determinations, approvals, denials, and requests for initial or continue stay review clinical to [phone]  Due to HIGH CALL volume, it would be easier if you could please send faxed requests to expedite your requests and in part, help us provide discharge notifications faster   /////////////////////////////////////////////////////////////////////////////////////////////////////////////////      Initial Clinical Review    Admission: Date/Time/Statement: 3/19/18 @ [facility] This Encounter   Procedures    Inpatient Admission (expected length of stay for this patient is greater than two midnights)     Standing Status:   Standing     Number of Occurrences:   1     Order Specific Question:   Admitting Physician     Answer:   Nhi Luque [[id number]]     Order Specific Question:   Level of Care     Answer:   Med Surg [16]     Order Specific Question:   Estimated length of stay     Answer:   More than 2 Midnights     Order Specific Question:   Certification     Answer:   I certify that inpatient services are medically necessary for this patient for a duration of greater than two midnights  See H&P and MD Progress Notes for additional information about the patient's course of treatment           ED: Date/Time/Mode of Arrival:   ED Arrival Information     Expected Arrival Acuity Means of Arrival Escorted By Service Admission Type    - 3/19/2018 16:30 Emergent Walk-In Self General Medicine Emergency    Arrival Complaint    Abdominal Pain          Chief Complaint: Chief Complaint   Patient presents with    Epigastric Pain     Pt c/o epigastric pain and mid back pain starting a week ago, worsening today  Denies SOB/N/V/D  History of Illness:   45 y o  male with no significant past medical history presents the ED for evaluation of epigastric abdominal pain x1 week  Patient reports pain started spontaneously about 1 week ago  Reports pain was initially located in the center is abdomen, over the last few days it radiate up to the epigastric region and straight through to the back  Denies any associated nausea, vomiting, diarrhea  Does report poor appetite      03/19/18 2015 -- 92 -- 106/69 90 % -- LK   03/19/18 2007 -- 95 18 106/69 96 % -- AG   03/19/18 2000 -- 90 -- -- 96 % -- LK   03/19/18 1830 -- 96 20 116/56 92 % -- AG   03/19/18 1827 -- 95 20 116/56 95 % -- SIS   03/19/18 1644 97 8 °F (36 6 °C)  118 20 142/77 97 % 104 kg (230 lb) KAG     Abnormal Labs/Diagnostic Test Results:  Lipase 678   Glucose  255   156  Alt  106   87  ALK PHOS  172   146  WBC  11 68   11 47    Ua:  sg >=1 030,  250 glucose,  Trace ketones/blood,  occ bacteria,  20 - 30 hyaline casts  CT scan shows evidence of pancreatitis with peripancreatic enlarged lymph node  WBC 11 47  US negative for gallstones or biliary dilation       ED Treatment:   Medication Administration from 03/19/2018 1630 to 03/19/2018 2042       Date/Time Order Dose Route Action Action by Comments     03/19/2018 1801 ondansetron (ZOFRAN) injection 4 mg 4 mg Intravenous Given April LASHONDA Rodriguez, MEI      03/19/2018 1805 ketorolac (TORADOL) injection 15 mg 15 mg Intravenous Given April LASHONDA Rodriguez, MEI                 03/19/2018 1800 sodium chloride 0 9 % bolus 1,000 mL 1,000 mL Intravenous New Bag Valerie Mcduffie RN                 03/19/2018 2004 HYDROmorphone (DILAUDID) injection 1 mg 1 mg Intravenous Given Valerie Rodriguez RN           IV dilaudid given 2345    Admitting Diagnosis: Acute pancreatitis [K85 90]  Abdominal pain [R10 9]    Assessment/Plan:  ATTENDING NOTE:  Likely alcoholic pancreatitis  Supportive care and IV fluids/pain control/clear liquids  GI follow-up      * Acute pancreatitis   Assessment & Plan     · Present on admission, lipase 678, CT abdomen shows peripancreatic stranding within large lymph node consistent with pancreatitis  · Suspect alcohol-induced  Patient reports that he drinks heavily-- 12 pack of beer per night  Denies drinking in the last 3 weeks  No signs of withdrawal   · Check right upper quadrant ultrasound, lipids  · Trend lipase, clear liquid diet, pain control  · GI consult, appreciate input             VTE Prophylaxis: Enoxaparin (Lovenox)  / sequential compression device    Anticipated Length of Stay:  Patient will be admitted on an Inpatient basis with an anticipated length of stay of  > 2 midnights  Justification for Hospital Stay: pain control, diagnose cause of pancreatitis      Admission Orders:  Admit medical  Clear liquids  Out of bed as tolerated  IVF @ 200 cc/hr    Scheduled Meds:   Current Facility-Administered Medications:  calcium carbonate 1,000 mg Oral Daily PRN Elidia Alexis PA-C    enoxaparin 40 mg Subcutaneous Daily Elidia Alexis PA-C    HYDROmorphone 1 mg Intravenous Q3H PRN Patrice Madison MD    ketorolac 15 mg Intravenous Q6H [address] Elidia Alexis PA-C    lactated ringers 200 mL/hr Intravenous Continuous Alexandra Rudolph PA-C Last Rate: 200 mL/hr (03/20/18 1244)   nicotine 1 patch Transdermal Daily Elidia Alexis PA-C    ondansetron 4 mg Intravenous Q6H PRN Elidia Alexis PA-C    pantoprazole 40 mg Oral Early Morning Alexandra Rudolph PA-C    senna 1 tablet Oral Daily Elidia Alexis PA-C    sertraline 150 mg Oral Daily Patrice Madison MD      Continuous Infusions:   lactated ringers 200 mL/hr Last Rate: 200 mL/hr (03/20/18 1244)     3/20/2018  IV dilaudid given @ 0420, 0908, 1114          GI CONSULT  #1   Acute pancreatitis, likely alcohol induced: Lipase 678 however symptoms have been occurring for a few weeks so it was likely higher previously  CT scan shows evidence of pancreatitis with peripancreatic enlarged lymph node  WBC 11 47, afebrile  US negative for gallstones or biliary dilation    -Continue to monitor abdominal exam, lipase, WBC count  -Consider repeat imaging if any clinical worsening  -Lactated ringers 200 cc/hr  -Clear liquid diet  -Complete alcohol cessation  -Antiemetics and pain control as needed     #2  Severe hepatic steatosis with elevated transaminases, likely alcohol induced: no signs of cirrhosis  Platelets normal  Bilirubin normal  ALT 87, alk phos 144    -Continue to monitor LFTs closely  -Discussed with patient in regards to complete alcohol cessation  -Avoid hepatotoxic meds  -Exercise, lo fat diet, weight loss     #3  Acid reflux: on Nexium at home daily  Never had EGD  -Consider outpatient EGD to rule out Tellez's due to GERD and alcohol use     #4   Colon cancer screening: never had a colonoscopy  -Outpatient colonoscopy

## 2018-03-21 LAB
ALBUMIN SERPL BCP-MCNC: 3 G/DL (ref 3.5–5)
ALP SERPL-CCNC: 118 U/L (ref 46–116)
ALT SERPL W P-5'-P-CCNC: 66 U/L (ref 12–78)
ANION GAP SERPL CALCULATED.3IONS-SCNC: 8 MMOL/L (ref 4–13)
AST SERPL W P-5'-P-CCNC: 32 U/L (ref 5–45)
BILIRUB DIRECT SERPL-MCNC: 0.3 MG/DL (ref 0–0.2)
BILIRUB SERPL-MCNC: 0.8 MG/DL (ref 0.2–1)
BUN SERPL-MCNC: 11 MG/DL (ref 5–25)
CALCIUM SERPL-MCNC: 9 MG/DL (ref 8.3–10.1)
CHLORIDE SERPL-SCNC: 102 MMOL/L (ref 100–108)
CO2 SERPL-SCNC: 28 MMOL/L (ref 21–32)
CREAT SERPL-MCNC: 0.75 MG/DL (ref 0.6–1.3)
ERYTHROCYTE [DISTWIDTH] IN BLOOD BY AUTOMATED COUNT: 11.6 % (ref 11.6–15.1)
GFR SERPL CREATININE-BSD FRML MDRD: 104 ML/MIN/1.73SQ M
GLUCOSE SERPL-MCNC: 112 MG/DL (ref 65–140)
HCT VFR BLD AUTO: 37.4 % (ref 36.5–49.3)
HGB BLD-MCNC: 13 G/DL (ref 12–17)
LIPASE SERPL-CCNC: 289 U/L (ref 73–393)
MCH RBC QN AUTO: 31.4 PG (ref 26.8–34.3)
MCHC RBC AUTO-ENTMCNC: 34.8 G/DL (ref 31.4–37.4)
MCV RBC AUTO: 90 FL (ref 82–98)
PLATELET # BLD AUTO: 185 THOUSANDS/UL (ref 149–390)
PMV BLD AUTO: 9.3 FL (ref 8.9–12.7)
POTASSIUM SERPL-SCNC: 3.8 MMOL/L (ref 3.5–5.3)
PROT SERPL-MCNC: 6.4 G/DL (ref 6.4–8.2)
RBC # BLD AUTO: 4.14 MILLION/UL (ref 3.88–5.62)
SODIUM SERPL-SCNC: 138 MMOL/L (ref 136–145)
WBC # BLD AUTO: 5.64 THOUSAND/UL (ref 4.31–10.16)

## 2018-03-21 PROCEDURE — 99232 SBSQ HOSP IP/OBS MODERATE 35: CPT | Performed by: INTERNAL MEDICINE

## 2018-03-21 PROCEDURE — 83690 ASSAY OF LIPASE: CPT | Performed by: PHYSICIAN ASSISTANT

## 2018-03-21 PROCEDURE — 80076 HEPATIC FUNCTION PANEL: CPT | Performed by: PHYSICIAN ASSISTANT

## 2018-03-21 PROCEDURE — 80048 BASIC METABOLIC PNL TOTAL CA: CPT | Performed by: INTERNAL MEDICINE

## 2018-03-21 PROCEDURE — 85027 COMPLETE CBC AUTOMATED: CPT | Performed by: INTERNAL MEDICINE

## 2018-03-21 RX ORDER — POLYVINYL ALCOHOL 14 MG/ML
1 SOLUTION/ DROPS OPHTHALMIC
Status: DISCONTINUED | OUTPATIENT
Start: 2018-03-21 | End: 2018-03-23 | Stop reason: HOSPADM

## 2018-03-21 RX ADMIN — KETOROLAC TROMETHAMINE 15 MG: 30 INJECTION, SOLUTION INTRAMUSCULAR at 05:31

## 2018-03-21 RX ADMIN — HYDROMORPHONE HYDROCHLORIDE 1 MG: 1 INJECTION, SOLUTION INTRAMUSCULAR; INTRAVENOUS; SUBCUTANEOUS at 21:46

## 2018-03-21 RX ADMIN — HYDROMORPHONE HYDROCHLORIDE 1 MG: 1 INJECTION, SOLUTION INTRAMUSCULAR; INTRAVENOUS; SUBCUTANEOUS at 00:39

## 2018-03-21 RX ADMIN — SODIUM CHLORIDE, POTASSIUM CHLORIDE, SODIUM LACTATE AND CALCIUM CHLORIDE 200 ML/HR: 600; 310; 30; 20 INJECTION, SOLUTION INTRAVENOUS at 03:51

## 2018-03-21 RX ADMIN — KETOROLAC TROMETHAMINE 15 MG: 30 INJECTION, SOLUTION INTRAMUSCULAR at 11:23

## 2018-03-21 RX ADMIN — HYDROMORPHONE HYDROCHLORIDE 1 MG: 1 INJECTION, SOLUTION INTRAMUSCULAR; INTRAVENOUS; SUBCUTANEOUS at 14:13

## 2018-03-21 RX ADMIN — PANTOPRAZOLE SODIUM 40 MG: 40 TABLET, DELAYED RELEASE ORAL at 05:31

## 2018-03-21 RX ADMIN — ANTACID TABLETS 1000 MG: 500 TABLET, CHEWABLE ORAL at 14:13

## 2018-03-21 RX ADMIN — HYDROMORPHONE HYDROCHLORIDE 1 MG: 1 INJECTION, SOLUTION INTRAMUSCULAR; INTRAVENOUS; SUBCUTANEOUS at 18:42

## 2018-03-21 RX ADMIN — ENOXAPARIN SODIUM 40 MG: 40 INJECTION SUBCUTANEOUS at 08:28

## 2018-03-21 RX ADMIN — POLYVINYL ALCOHOL 1 DROP: 14 SOLUTION/ DROPS OPHTHALMIC at 17:23

## 2018-03-21 RX ADMIN — NICOTINE 1 PATCH: 21 PATCH, EXTENDED RELEASE TRANSDERMAL at 18:51

## 2018-03-21 RX ADMIN — HYDROMORPHONE HYDROCHLORIDE 1 MG: 1 INJECTION, SOLUTION INTRAMUSCULAR; INTRAVENOUS; SUBCUTANEOUS at 03:51

## 2018-03-21 RX ADMIN — SODIUM CHLORIDE, POTASSIUM CHLORIDE, SODIUM LACTATE AND CALCIUM CHLORIDE 200 ML/HR: 600; 310; 30; 20 INJECTION, SOLUTION INTRAVENOUS at 08:47

## 2018-03-21 RX ADMIN — SENNOSIDES 8.6 MG: 8.6 TABLET, FILM COATED ORAL at 08:27

## 2018-03-21 RX ADMIN — HYDROMORPHONE HYDROCHLORIDE 1 MG: 1 INJECTION, SOLUTION INTRAMUSCULAR; INTRAVENOUS; SUBCUTANEOUS at 08:27

## 2018-03-21 RX ADMIN — POLYVINYL ALCOHOL 1 DROP: 14 SOLUTION/ DROPS OPHTHALMIC at 11:30

## 2018-03-21 RX ADMIN — POLYVINYL ALCOHOL 1 DROP: 14 SOLUTION/ DROPS OPHTHALMIC at 21:52

## 2018-03-21 RX ADMIN — SERTRALINE HYDROCHLORIDE 150 MG: 100 TABLET ORAL at 08:27

## 2018-03-21 RX ADMIN — NICOTINE 1 PATCH: 21 PATCH, EXTENDED RELEASE TRANSDERMAL at 08:30

## 2018-03-21 RX ADMIN — KETOROLAC TROMETHAMINE 15 MG: 30 INJECTION, SOLUTION INTRAMUSCULAR at 17:20

## 2018-03-21 NOTE — PROGRESS NOTES
Progress Note - Mayra Hathaway 1965, 45 y o  male MRN: [id number]    Unit/Bed#: -01 Encounter: [id number]    Primary Care Provider: Moni Mario MD   Date and time admitted to hospital: 3/19/2018  4:53 PM        * Acute pancreatitis   Assessment & Plan    · Present on admission, lipase 678, CT abdomen shows peripancreatic stranding within large lymph node consistent with pancreatitis  · Suspect alcohol-induced  Patient reports that he drinks heavily-- 12 pack of beer per night  Denies drinking in the last 3 weeks  No signs of withdrawal   · No gallstones on ultrasound   · Diet advanced  · Outpatient GI follow-up          VTE Pharmacologic Prophylaxis:   Pharmacologic: Enoxaparin (Lovenox)  Mechanical VTE Prophylaxis in Place: Yes    Patient Centered Rounds: I have performed bedside rounds with nursing staff today  Discussions with Specialists or Other Care Team Provider:     Education and Discussions with Family / Patient:  Patient    Time Spent for Care: 20 minutes  More than 50% of total time spent on counseling and coordination of care as described above  Current Length of Stay: 2 day(s)    Current Patient Status: Inpatient   Certification Statement: The patient will continue to require additional inpatient hospital stay due to Acute pancreatitis    Discharge Plan / Estimated Discharge Date:  Likely discharge tomorrow      Code Status: Level 1 - Full Code      Subjective:   Reports pain epigastric region, which is improved compared to yesterday  No nausea or vomiting    Objective:     Vitals:   Temp (24hrs), Av 2 °F (36 8 °C), Min:98 1 °F (36 7 °C), Max:98 2 °F (36 8 °C)    HR:  [75-79] 75  Resp:  [18-20] 20  BP: (147-155)/(83-91) 155/85  SpO2:  [91 %-93 %] 91 %  Body mass index is 32 28 kg/m²  Input and Output Summary (last 24 hours):        Intake/Output Summary (Last 24 hours) at 18 1149  Last data filed at 18 0847   Gross per 24 hour   Intake              490 ml Output                0 ml   Net              490 ml       Physical Exam:     Physical Exam    Gen -Patient comfortable in bed  Neck- Supple  No thyromegaly or lymphadenopathy  Lungs-Clear bilaterally without any wheeze or rales   Heart S1-S2, regular rate and rhythm, no murmurs  Abdomen-mild epigastric tenderness, no organomegaly  Bowel sounds present  Extremities-no cyanosi,  clubbing or edema  Skin- no rash  Neuro-nonfocal       Additional Data:     Labs:      Results from last 7 days  Lab Units 03/21/18  0440  03/19/18  1806   WBC Thousand/uL 5 64  < > 11 68*   HEMOGLOBIN g/dL 13 0  < > 16 7   HEMATOCRIT % 37 4  < > 46 7   PLATELETS Thousands/uL 185  < > 243   NEUTROS PCT %  --   --  76*   LYMPHS PCT %  --   --  14   MONOS PCT %  --   --  8   EOS PCT %  --   --  1   < > = values in this interval not displayed  Results from last 7 days  Lab Units 03/21/18  0440   SODIUM mmol/L 138   POTASSIUM mmol/L 3 8   CHLORIDE mmol/L 102   CO2 mmol/L 28   BUN mg/dL 11   CREATININE mg/dL 0 75   CALCIUM mg/dL 9 0   TOTAL PROTEIN g/dL 6 4   BILIRUBIN TOTAL mg/dL 0 80   ALK PHOS U/L 118*   ALT U/L 66   AST U/L 32   GLUCOSE RANDOM mg/dL 112           * I Have Reviewed All Lab Data Listed Above  * Additional Pertinent Lab Tests Reviewed:  All Labs Within Last 24 Hours Reviewed    Imaging:    Imaging Reports Reviewed Today Include:   Imaging Personally Reviewed by Myself Includes:        Recent Cultures (last 7 days):           Last 24 Hours Medication List:     Current Facility-Administered Medications:  calcium carbonate 1,000 mg Oral Daily PRN Eldiia Alexis PA-C    enoxaparin 40 mg Subcutaneous Daily Elidia Alexis PA-C    HYDROmorphone 1 mg Intravenous Q3H PRN Patrice Madison MD    ketorolac 15 mg Intravenous Q6H [address] Elidia Alexis PA-C    lactated ringers 150 mL/hr Intravenous Continuous Alexandra Rudolph PA-C Last Rate: 150 mL/hr (03/21/18 0953)   nicotine 1 patch Transdermal Daily Elidia Alexis ALIN    ondansetron 4 mg Intravenous Q6H PRN Elidia Alexis PA-C    pantoprazole 40 mg Oral Early Morning Alexandra Rudolph PA-C    polyvinyl alcohol 1 drop Both Eyes Q3H PRN Patrice Madison MD    senna 1 tablet Oral Daily Elidia Alexis PA-C    sertraline 150 mg Oral Daily Patrice Madison MD         Today, Patient Was Seen By: Iman Kitchen MD    ** Please Note: Dragon 360 Dictation voice to text software may have been used in the creation of this document   **

## 2018-03-21 NOTE — PROGRESS NOTES
Progress Note - Patrick Culver 45 y o  male MRN: [id number]    Unit/Bed#: -01 Encounter: [id number]    Assessment and Plan:   Principal Problem:    Acute pancreatitis    #1  Acute pancreatitis, likely alcohol induced:  could also be secondary to passed gallstone however no stones or sludge on ultrasound  Lipase has improved  Abdominal pain is improving   -Continue to monitor abdominal exam, lipase, WBC count  -Lactated ringers 150 cc/hr  -advance to low-fat diet  -Complete alcohol cessation  -Antiemetics and pain control as needed  -outpatient follow-up in about 4 weeks  At that time likely need an MRI/MRCP due to the junito pancreatic enlarged lymph nodes     #2  Severe hepatic steatosis with elevated transaminases, likely alcohol induced  -Continue to monitor LFTs closely  -complete alcohol cessation  -Avoid hepatotoxic meds  -check chronic hepatitis panel  -Exercise, lo fat diet, weight loss  -outpatient follow-up     #3  Acid reflux  -continue PPI daily  -outpatient EGD to rule out Tellez's due to GERD and alcohol use     #4  Colon cancer screening: never had a colonoscopy  -Outpatient colonoscopy    ----------------------------------------------------------------------------------------------------------------    Subjective:     Patient reports he slightly improved today  Pain is improving but he is still taking IV pain meds  Denies any nausea or vomiting  He is passing gas but has not had a bowel movement  Objective:     Vitals: Blood pressure 155/85, pulse 75, temperature 98 2 °F (36 8 °C), resp  rate 20, height 5' 10" (1 778 m), weight 102 kg (225 lb), SpO2 91 %  ,Body mass index is 32 28 kg/m²        Intake/Output Summary (Last 24 hours) at 03/21/18 0925  Last data filed at 03/21/18 0847   Gross per 24 hour   Intake              490 ml   Output                0 ml   Net              490 ml       Physical Exam:     General Appearance: Alert, appears stated age and cooperative  Lungs: Clear to auscultation bilaterally, no rales or rhonchi, no labored breathing/accessory muscle use  Heart: Regular rate and rhythm, S1, S2 normal, no murmur, click, rub or gallop  Abdomen: Soft, non-tender, non-distended; bowel sounds normal; no masses or no organomegaly  Extremities: No cyanosis, clubbing, or edema    Invasive Devices     Peripheral Intravenous Line            Peripheral IV 03/19/18 Right Antecubital 1 day                Lab Results:    Results from last 7 days  Lab Units 03/21/18  0440  03/19/18  1806   WBC Thousand/uL 5 64  < > 11 68*   HEMOGLOBIN g/dL 13 0  < > 16 7   HEMATOCRIT % 37 4  < > 46 7   PLATELETS Thousands/uL 185  < > 243   NEUTROS PCT %  --   --  76*   LYMPHS PCT %  --   --  14   MONOS PCT %  --   --  8   EOS PCT %  --   --  1   < > = values in this interval not displayed  Results from last 7 days  Lab Units 03/21/18  0440   SODIUM mmol/L 138   POTASSIUM mmol/L 3 8   CHLORIDE mmol/L 102   CO2 mmol/L 28   BUN mg/dL 11   CREATININE mg/dL 0 75   CALCIUM mg/dL 9 0   TOTAL PROTEIN g/dL 6 4   BILIRUBIN TOTAL mg/dL 0 80   ALK PHOS U/L 118*   ALT U/L 66   AST U/L 32   GLUCOSE RANDOM mg/dL 112           Results from last 7 days  Lab Units 03/21/18  0440   LIPASE u/L 289       Imaging Studies: I have personally reviewed pertinent imaging studies  Pe Study With Ct Abdomen And Pelvis With Contrast    Result Date: 3/19/2018  Impression: 1  No acute pulmonary embolism  2   Acute pancreatitis  3   Hepatic steatosis  4   Indeterminate lesion arising from the lower pole the left kidney  This could possibly represent a proteinaceous/hemorrhagic cyst   Consider initial evaluation with nonemergent renal ultrasound  If this is inconclusive, further evaluation with MRI recommended  Workstation performed: [id number]     Us Abdomen Limited    Result Date: 3/20/2018  Impression: Moderate hepatomegaly with severe hepatic steatosis as seen on CT  No gallstones   Workstation performed: [id number]

## 2018-03-21 NOTE — ASSESSMENT & PLAN NOTE
· Present on admission, lipase 678, CT abdomen shows peripancreatic stranding within large lymph node consistent with pancreatitis  · Suspect alcohol-induced  Patient reports that he drinks heavily-- 12 pack of beer per night  Denies drinking in the last 3 weeks    No signs of withdrawal   · No gallstones on ultrasound   · Diet advanced  · Outpatient GI follow-up

## 2018-03-21 NOTE — PLAN OF CARE
Problem: PAIN - ADULT  Goal: Verbalizes/displays adequate comfort level or baseline comfort level  Interventions:  - Encourage patient to monitor pain and request assistance  - Assess pain using appropriate pain scale  - Administer analgesics based on type and severity of pain and evaluate response  - Implement non-pharmacological measures as appropriate and evaluate response  - Consider cultural and social influences on pain and pain management  - Notify physician/advanced practitioner if interventions unsuccessful or patient reports new pain   Outcome: Progressing      Problem: INFECTION - ADULT  Goal: Absence or prevention of progression during hospitalization  INTERVENTIONS:  - Assess and monitor for signs and symptoms of infection  - Monitor lab/diagnostic results  - Monitor all insertion sites, i e  indwelling lines, tubes, and drains  - Monitor endotracheal (as able) and nasal secretions for changes in amount and color  - [place] appropriate cooling/warming therapies per order  - Administer medications as ordered  - Instruct and encourage patient and family to use good hand hygiene technique  - Identify and instruct in appropriate isolation precautions for identified infection/condition   Outcome: Progressing    Goal: Absence of fever/infection during neutropenic period  INTERVENTIONS:  - Monitor WBC  - Implement neutropenic guidelines   Outcome: Progressing      Problem: SAFETY ADULT  Goal: Patient will remain free of falls  INTERVENTIONS:  - Assess patient frequently for physical needs  -  Identify cognitive and physical deficits and behaviors that affect risk of falls    -  [place] fall precautions as indicated by assessment   - Educate patient/family on patient safety including physical limitations  - Instruct patient to call for assistance with activity based on assessment  - Modify environment to reduce risk of injury  - Consider OT/PT consult to assist with strengthening/mobility    Outcome: Progressing    Goal: Maintain or return to baseline ADL function  INTERVENTIONS:  -  Assess patient's ability to carry out ADLs; assess patient's baseline for ADL function and identify physical deficits which impact ability to perform ADLs (bathing, care of mouth/teeth, toileting, grooming, dressing, etc )  - Assess/evaluate cause of self-care deficits   - Assess range of motion  - Assess patient's mobility; develop plan if impaired  - Assess patient's need for assistive devices and provide as appropriate  - Encourage maximum independence but intervene and supervise when necessary  ¯ Involve family in performance of ADLs  ¯ Assess for home care needs following discharge   ¯ Request OT consult to assist with ADL evaluation and planning for discharge  ¯ Provide patient education as appropriate   Outcome: Progressing    Goal: Maintain or return mobility status to optimal level  INTERVENTIONS:  - Assess patient's baseline mobility status (ambulation, transfers, stairs, etc )    - Identify cognitive and physical deficits and behaviors that affect mobility  - Identify mobility aids required to assist with transfers and/or ambulation (gait belt, sit-to-stand, lift, walker, cane, etc )  - [place] fall precautions as indicated by assessment  - Record patient progress and toleration of activity level on Mobility SBAR; progress patient to next Phase/Stage  - Instruct patient to call for assistance with activity based on assessment  - Request Rehabilitation consult to assist with strengthening/weightbearing, etc    Outcome: Progressing      Problem: Potential for Falls  Goal: Patient will remain free of falls  INTERVENTIONS:  - Assess patient frequently for physical needs  -  Identify cognitive and physical deficits and behaviors that affect risk of falls    -  [place] fall precautions as indicated by assessment   - Educate patient/family on patient safety including physical limitations  - Instruct patient to call for assistance with activity based on assessment  - Modify environment to reduce risk of injury  - Consider OT/PT consult to assist with strengthening/mobility    Outcome: Progressing

## 2018-03-22 PROBLEM — K76.0 HEPATIC STEATOSIS: Status: ACTIVE | Noted: 2018-03-22

## 2018-03-22 PROBLEM — N28.89 LEFT KIDNEY MASS: Status: ACTIVE | Noted: 2018-03-22

## 2018-03-22 PROBLEM — E78.5 HLD (HYPERLIPIDEMIA): Status: ACTIVE | Noted: 2018-03-22

## 2018-03-22 PROBLEM — N28.9 LESION OF LEFT NATIVE KIDNEY: Status: ACTIVE | Noted: 2018-03-22

## 2018-03-22 PROBLEM — I10 HTN (HYPERTENSION), BENIGN: Status: ACTIVE | Noted: 2018-03-22

## 2018-03-22 LAB
ALBUMIN SERPL BCP-MCNC: 3.2 G/DL (ref 3.5–5)
ALP SERPL-CCNC: 141 U/L (ref 46–116)
ALT SERPL W P-5'-P-CCNC: 81 U/L (ref 12–78)
ANION GAP SERPL CALCULATED.3IONS-SCNC: 11 MMOL/L (ref 4–13)
AST SERPL W P-5'-P-CCNC: 59 U/L (ref 5–45)
BILIRUB SERPL-MCNC: 1.1 MG/DL (ref 0.2–1)
BUN SERPL-MCNC: 9 MG/DL (ref 5–25)
CALCIUM SERPL-MCNC: 8.7 MG/DL (ref 8.3–10.1)
CHLORIDE SERPL-SCNC: 103 MMOL/L (ref 100–108)
CO2 SERPL-SCNC: 26 MMOL/L (ref 21–32)
CREAT SERPL-MCNC: 0.76 MG/DL (ref 0.6–1.3)
ERYTHROCYTE [DISTWIDTH] IN BLOOD BY AUTOMATED COUNT: 11.5 % (ref 11.6–15.1)
GFR SERPL CREATININE-BSD FRML MDRD: 103 ML/MIN/1.73SQ M
GLUCOSE SERPL-MCNC: 106 MG/DL (ref 65–140)
HBV CORE AB SER QL: NORMAL
HBV CORE IGM SER QL: NORMAL
HBV SURFACE AG SER QL: NORMAL
HCT VFR BLD AUTO: 37.5 % (ref 36.5–49.3)
HCV AB SER QL: NORMAL
HGB BLD-MCNC: 13.4 G/DL (ref 12–17)
MAGNESIUM SERPL-MCNC: 1.6 MG/DL (ref 1.6–2.6)
MCH RBC QN AUTO: 31.8 PG (ref 26.8–34.3)
MCHC RBC AUTO-ENTMCNC: 35.7 G/DL (ref 31.4–37.4)
MCV RBC AUTO: 89 FL (ref 82–98)
PLATELET # BLD AUTO: 206 THOUSANDS/UL (ref 149–390)
PMV BLD AUTO: 9.4 FL (ref 8.9–12.7)
POTASSIUM SERPL-SCNC: 3.7 MMOL/L (ref 3.5–5.3)
PROT SERPL-MCNC: 6.8 G/DL (ref 6.4–8.2)
RBC # BLD AUTO: 4.22 MILLION/UL (ref 3.88–5.62)
SODIUM SERPL-SCNC: 140 MMOL/L (ref 136–145)
WBC # BLD AUTO: 5.78 THOUSAND/UL (ref 4.31–10.16)

## 2018-03-22 PROCEDURE — 83735 ASSAY OF MAGNESIUM: CPT | Performed by: PHYSICIAN ASSISTANT

## 2018-03-22 PROCEDURE — 99232 SBSQ HOSP IP/OBS MODERATE 35: CPT | Performed by: INTERNAL MEDICINE

## 2018-03-22 PROCEDURE — 99232 SBSQ HOSP IP/OBS MODERATE 35: CPT | Performed by: PHYSICIAN ASSISTANT

## 2018-03-22 PROCEDURE — 87340 HEPATITIS B SURFACE AG IA: CPT | Performed by: PHYSICIAN ASSISTANT

## 2018-03-22 PROCEDURE — 85027 COMPLETE CBC AUTOMATED: CPT | Performed by: PHYSICIAN ASSISTANT

## 2018-03-22 PROCEDURE — 86705 HEP B CORE ANTIBODY IGM: CPT | Performed by: PHYSICIAN ASSISTANT

## 2018-03-22 PROCEDURE — 86803 HEPATITIS C AB TEST: CPT | Performed by: PHYSICIAN ASSISTANT

## 2018-03-22 PROCEDURE — 86704 HEP B CORE ANTIBODY TOTAL: CPT | Performed by: PHYSICIAN ASSISTANT

## 2018-03-22 PROCEDURE — 80053 COMPREHEN METABOLIC PANEL: CPT | Performed by: PHYSICIAN ASSISTANT

## 2018-03-22 RX ORDER — OXYCODONE HYDROCHLORIDE 10 MG/1
10 TABLET ORAL EVERY 6 HOURS PRN
Status: DISCONTINUED | OUTPATIENT
Start: 2018-03-22 | End: 2018-03-23 | Stop reason: HOSPADM

## 2018-03-22 RX ORDER — RAMIPRIL 5 MG/1
10 CAPSULE ORAL DAILY
Status: DISCONTINUED | OUTPATIENT
Start: 2018-03-22 | End: 2018-03-23 | Stop reason: HOSPADM

## 2018-03-22 RX ORDER — OMEPRAZOLE 40 MG/1
40 CAPSULE, DELAYED RELEASE ORAL DAILY
COMMUNITY
End: 2020-05-20

## 2018-03-22 RX ORDER — PRAVASTATIN SODIUM 40 MG
40 TABLET ORAL
Status: DISCONTINUED | OUTPATIENT
Start: 2018-03-22 | End: 2018-03-23 | Stop reason: HOSPADM

## 2018-03-22 RX ORDER — OXYCODONE HYDROCHLORIDE 5 MG/1
5 TABLET ORAL EVERY 6 HOURS PRN
Status: DISCONTINUED | OUTPATIENT
Start: 2018-03-22 | End: 2018-03-23 | Stop reason: HOSPADM

## 2018-03-22 RX ADMIN — RAMIPRIL 10 MG: 5 CAPSULE ORAL at 17:22

## 2018-03-22 RX ADMIN — PRAVASTATIN SODIUM 40 MG: 40 TABLET ORAL at 17:22

## 2018-03-22 RX ADMIN — PANTOPRAZOLE SODIUM 40 MG: 40 TABLET, DELAYED RELEASE ORAL at 04:53

## 2018-03-22 RX ADMIN — KETOROLAC TROMETHAMINE 15 MG: 30 INJECTION, SOLUTION INTRAMUSCULAR at 00:17

## 2018-03-22 RX ADMIN — NICOTINE 1 PATCH: 21 PATCH, EXTENDED RELEASE TRANSDERMAL at 08:38

## 2018-03-22 RX ADMIN — HYDROMORPHONE HYDROCHLORIDE 1 MG: 1 INJECTION, SOLUTION INTRAMUSCULAR; INTRAVENOUS; SUBCUTANEOUS at 02:10

## 2018-03-22 RX ADMIN — OXYCODONE HYDROCHLORIDE 5 MG: 5 TABLET ORAL at 13:59

## 2018-03-22 RX ADMIN — ENOXAPARIN SODIUM 40 MG: 40 INJECTION SUBCUTANEOUS at 08:37

## 2018-03-22 RX ADMIN — HYDROMORPHONE HYDROCHLORIDE 1 MG: 1 INJECTION, SOLUTION INTRAMUSCULAR; INTRAVENOUS; SUBCUTANEOUS at 09:54

## 2018-03-22 RX ADMIN — SERTRALINE HYDROCHLORIDE 150 MG: 100 TABLET ORAL at 08:36

## 2018-03-22 RX ADMIN — SODIUM CHLORIDE, POTASSIUM CHLORIDE, SODIUM LACTATE AND CALCIUM CHLORIDE 150 ML/HR: 600; 310; 30; 20 INJECTION, SOLUTION INTRAVENOUS at 11:57

## 2018-03-22 RX ADMIN — SENNOSIDES 8.6 MG: 8.6 TABLET, FILM COATED ORAL at 08:36

## 2018-03-22 RX ADMIN — ANTACID TABLETS 1000 MG: 500 TABLET, CHEWABLE ORAL at 09:53

## 2018-03-22 RX ADMIN — HYDROMORPHONE HYDROCHLORIDE 1 MG: 1 INJECTION, SOLUTION INTRAMUSCULAR; INTRAVENOUS; SUBCUTANEOUS at 06:19

## 2018-03-22 NOTE — PROGRESS NOTES
Patient watching tv comfortably in bed  VSS and no complaints of pain at this time  Will continue to monitor

## 2018-03-22 NOTE — PLAN OF CARE
Problem: PAIN - ADULT  Goal: Verbalizes/displays adequate comfort level or baseline comfort level  Interventions:  - Encourage patient to monitor pain and request assistance  - Assess pain using appropriate pain scale  - Administer analgesics based on type and severity of pain and evaluate response  - Implement non-pharmacological measures as appropriate and evaluate response  - Consider cultural and social influences on pain and pain management  - Notify physician/advanced practitioner if interventions unsuccessful or patient reports new pain   Outcome: Progressing      Problem: INFECTION - ADULT  Goal: Absence or prevention of progression during hospitalization  INTERVENTIONS:  - Assess and monitor for signs and symptoms of infection  - Monitor lab/diagnostic results  - Monitor all insertion sites, i e  indwelling lines, tubes, and drains  - Monitor endotracheal (as able) and nasal secretions for changes in amount and color  - [place] appropriate cooling/warming therapies per order  - Administer medications as ordered  - Instruct and encourage patient and family to use good hand hygiene technique  - Identify and instruct in appropriate isolation precautions for identified infection/condition   Outcome: Progressing    Goal: Absence of fever/infection during neutropenic period  INTERVENTIONS:  - Monitor WBC  - Implement neutropenic guidelines   Outcome: Progressing      Problem: SAFETY ADULT  Goal: Patient will remain free of falls  INTERVENTIONS:  - Assess patient frequently for physical needs  -  Identify cognitive and physical deficits and behaviors that affect risk of falls    -  [place] fall precautions as indicated by assessment   - Educate patient/family on patient safety including physical limitations  - Instruct patient to call for assistance with activity based on assessment  - Modify environment to reduce risk of injury  - Consider OT/PT consult to assist with strengthening/mobility    Outcome: Progressing    Goal: Maintain or return to baseline ADL function  INTERVENTIONS:  -  Assess patient's ability to carry out ADLs; assess patient's baseline for ADL function and identify physical deficits which impact ability to perform ADLs (bathing, care of mouth/teeth, toileting, grooming, dressing, etc )  - Assess/evaluate cause of self-care deficits   - Assess range of motion  - Assess patient's mobility; develop plan if impaired  - Assess patient's need for assistive devices and provide as appropriate  - Encourage maximum independence but intervene and supervise when necessary  ¯ Involve family in performance of ADLs  ¯ Assess for home care needs following discharge   ¯ Request OT consult to assist with ADL evaluation and planning for discharge  ¯ Provide patient education as appropriate   Outcome: Progressing    Goal: Maintain or return mobility status to optimal level  INTERVENTIONS:  - Assess patient's baseline mobility status (ambulation, transfers, stairs, etc )    - Identify cognitive and physical deficits and behaviors that affect mobility  - Identify mobility aids required to assist with transfers and/or ambulation (gait belt, sit-to-stand, lift, walker, cane, etc )  - [place] fall precautions as indicated by assessment  - Record patient progress and toleration of activity level on Mobility SBAR; progress patient to next Phase/Stage  - Instruct patient to call for assistance with activity based on assessment  - Request Rehabilitation consult to assist with strengthening/weightbearing, etc    Outcome: Progressing      Problem: Potential for Falls  Goal: Patient will remain free of falls  INTERVENTIONS:  - Assess patient frequently for physical needs  -  Identify cognitive and physical deficits and behaviors that affect risk of falls    -  [place] fall precautions as indicated by assessment   - Educate patient/family on patient safety including physical limitations  - Instruct patient to call for assistance with activity based on assessment  - Modify environment to reduce risk of injury  - Consider OT/PT consult to assist with strengthening/mobility    Outcome: Progressing

## 2018-03-22 NOTE — PROGRESS NOTES
[facility]  Progress Note - Brian Blair 1965, 45 y o  male MRN: [id number]  Unit/Bed#: -01 Encounter: [id number]  Primary Care Provider: Liset Deleon MD   Date and time admitted to hospital: 3/19/2018  4:53 PM    * Acute pancreatitis   Assessment & Plan    · Present on admission, lipase 678, CT abdomen shows peripancreatic stranding with large lymph node consistent with pancreatitis  · Suspect alcohol-induced however was not drinking recently  No intake x 3 weeks  · No gallstones on ultrasound   · Triglycerides 205  · Diet advanced to full liquid- failed advancement to low fat yesterday  Advance diet at dinner and if tolerates, d/c in AM  · Outpatient GI follow-up  · Decreased Dilaudid frequency, add oral oxycodone for pain control        Hepatic steatosis   Assessment & Plan    · Chronic hepatitis panel negative  · Complete alcohol cessation  · Continue recommending low-fat diet        Lesion of left native kidney   Assessment & Plan    · Not visualized on ultrasound given was right upper quadrant  · Obtain outpatient renal ultrasound        HTN (hypertension), benign   Assessment & Plan    · Resume Ace        HLD (hyperlipidemia)   Assessment & Plan    · Resume statin          VTE Pharmacologic Prophylaxis:   Pharmacologic: Enoxaparin (Lovenox)  Mechanical VTE Prophylaxis in Place: Yes    Patient Centered Rounds: I have performed bedside rounds with nursing staff today  RN    Discussions with Specialists or Other Care Team Provider: Gi    Education and Discussions with Family / Patient: patient, wife    Time Spent for Care: 30 minutes  More than 50% of total time spent on counseling and coordination of care as described above      Current Length of Stay: 3 day(s)    Current Patient Status: Inpatient   Certification Statement: The patient will continue to require additional inpatient hospital stay due to Advancing diet monitoring response    Discharge Plan:  Pain control, advance diet and monitor response  Likely discharge tomorrow morning    Code Status: Level 1 - Full Code      Subjective:   Patient reports that yesterday after he tibia soup and grilled cheese his pain was noted to significantly worsen  The patient is currently tolerating full liquid diet and wants to trial additional oral intake  He has no abdominal discomfort at this time however does report that intermittently he developed epigastric pain that radiates to the back  He has no shortness of breath  Objective:     Vitals:   Temp (24hrs), Av °F (36 7 °C), Min:97 7 °F (36 5 °C), Max:98 3 °F (36 8 °C)    HR:  [72-79] 76  Resp:  [18-20] 20  BP: (146-150)/(65-93) 146/93  SpO2:  [92 %-94 %] 92 %  Body mass index is 32 28 kg/m²  Input and Output Summary (last 24 hours):     No intake or output data in the 24 hours ending 18 1556    Physical Exam:     Physical Exam   Constitutional: No distress  HENT:   Head: Normocephalic and atraumatic  Eyes: Conjunctivae are normal    Neck: No JVD present  Cardiovascular: Normal rate, regular rhythm, normal heart sounds and intact distal pulses  No murmur heard  Pulmonary/Chest: Effort normal and breath sounds normal  No respiratory distress  He has no wheezes  He has no rales  Abdominal: Soft  Bowel sounds are normal  He exhibits no distension  There is tenderness (Epigastric)  There is no rebound and no guarding  Obese   Musculoskeletal: He exhibits no edema  Neurological: He is alert  Skin: Skin is warm and dry  No rash noted  He is not diaphoretic  No erythema  Psychiatric: He has a normal mood and affect  His behavior is normal    Nursing note and vitals reviewed      Additional Data:     Labs:      Results from last 7 days  Lab Units 18  1003  18  1806   WBC Thousand/uL 5 78  < > 11 68*   HEMOGLOBIN g/dL 13 4  < > 16 7   HEMATOCRIT % 37 5  < > 46 7   PLATELETS Thousands/uL 206  < > 243   NEUTROS PCT %  --   --  76*   LYMPHS PCT % --   --  14   MONOS PCT %  --   --  8   EOS PCT %  --   --  1   < > = values in this interval not displayed  Results from last 7 days  Lab Units 03/22/18  1003   SODIUM mmol/L 140   POTASSIUM mmol/L 3 7   CHLORIDE mmol/L 103   CO2 mmol/L 26   BUN mg/dL 9   CREATININE mg/dL 0 76   CALCIUM mg/dL 8 7   TOTAL PROTEIN g/dL 6 8   BILIRUBIN TOTAL mg/dL 1 10*   ALK PHOS U/L 141*   ALT U/L 81*   AST U/L 59*   GLUCOSE RANDOM mg/dL 106           * I Have Reviewed All Lab Data Listed Above  * Additional Pertinent Lab Tests Reviewed: All Labs Within Last 24 Hours Reviewed    Imaging:    Imaging Reports Reviewed Today Include: US, CT  Imaging Personally Reviewed by Myself Includes:  none    Recent Cultures (last 7 days):           Last 24 Hours Medication List:     Current Facility-Administered Medications:  calcium carbonate 1,000 mg Oral Daily PRN Elidia Alexis PA-C    enoxaparin 40 mg Subcutaneous Daily Elidia Alexis PA-C    HYDROmorphone 1 mg Intravenous Q4H PRN Melva Kitchen PA-C    lactated ringers 150 mL/hr Intravenous Continuous Lorrin ALIN Zhong Last Rate: 150 mL/hr (03/22/18 1157)   nicotine 1 patch Transdermal Daily Eldiia Alexis PA-C    ondansetron 4 mg Intravenous Q6H PRN Elidia Alexis PA-C    oxyCODONE 10 mg Oral Q6H PRN Melva Kitchen PA-C    oxyCODONE 5 mg Oral Q6H PRN Melva Kitchen PA-C    pantoprazole 40 mg Oral Early Morning Alexandra Rudolph PA-C    polyvinyl alcohol 1 drop Both Eyes Q3H PRN Patrice [place] MD    pravastatin 40 mg Oral Daily With Carrier IQ ALIN Kitchen    ramipril 10 mg Oral Daily Melva Kitchen PA-C    senna 1 tablet Oral Daily Elidia Alexis PA-C    sertraline 150 mg Oral Daily Patrice [place] MD         Today, Patient Was Seen By: Abigail Gayle PA-C    ** Please Note: Dictation voice to text software may have been used in the creation of this document   **

## 2018-03-22 NOTE — ASSESSMENT & PLAN NOTE
· Chronic hepatitis panel negative    · Complete alcohol cessation  · Continue recommending low-fat diet

## 2018-03-22 NOTE — ASSESSMENT & PLAN NOTE
· Present on admission, lipase 678, CT abdomen shows peripancreatic stranding with large lymph node consistent with pancreatitis  · Suspect alcohol-induced however was not drinking recently  No intake x 3 weeks  · No gallstones on ultrasound   · Triglycerides 205  · Diet advanced to full liquid- failed advancement to low fat yesterday   Advance diet at dinner and if tolerates, d/c in AM  · Outpatient GI follow-up  · Decreased Dilaudid frequency, add oral oxycodone for pain control

## 2018-03-22 NOTE — PROGRESS NOTES
Progress Note - Yvonne Bush 45 y o  male MRN: [id number]    Unit/Bed#: -01 Encounter: [id number]    Assessment and Plan:   Principal Problem:    Acute pancreatitis    #1  Acute pancreatitis, likely alcohol induced: improving  Yesterday had worsening pain after her low-fat diet  Was downgraded to clear liquids   -Continue to monitor abdominal exam, lipase, WBC count  -Lactated ringers 150 cc/hr  -advance to full liquid diet today   -Complete alcohol cessation  -Antiemetics and pain control as needed  -outpatient follow-up in about 4 weeks  At that time likely need an MRI/MRCP due to the junito pancreatic enlarged lymph nodes     #2  Severe hepatic steatosis with elevated transaminases, likely alcohol induced:  Chronic hepatitis panel negative  -complete alcohol cessation  -Avoid hepatotoxic meds  -check chronic hepatitis panel  -Exercise, lo fat diet, weight loss  -outpatient follow-up     #3  Acid reflux  -continue PPI daily  -outpatient EGD     #4  Colon cancer screening  -Outpatient colonoscopy    ----------------------------------------------------------------------------------------------------------------    Subjective:     Patient reports no abdominal pain today  He is passing gas and having small bowel movements  He reports doing well on clear liquids  He states he feels much improved compared to yesterday  Objective:     Vitals: Blood pressure 150/80, pulse 79, temperature 97 9 °F (36 6 °C), temperature source Oral, resp  rate 18, height 5' 10" (1 778 m), weight 102 kg (225 lb), SpO2 93 %  ,Body mass index is 32 28 kg/m²        Intake/Output Summary (Last 24 hours) at 03/22/18 1101  Last data filed at 03/21/18 1412   Gross per 24 hour   Intake              180 ml   Output                0 ml   Net              180 ml       Physical Exam:     General Appearance: Alert, appears stated age and cooperative  Lungs: Clear to auscultation bilaterally, no rales or rhonchi, no labored breathing/accessory muscle use  Heart: Regular rate and rhythm, S1, S2 normal, no murmur, click, rub or gallop  Abdomen: Soft, non-tender, non-distended; bowel sounds normal; no masses or no organomegaly  Extremities: No cyanosis, clubbing, or edema    Invasive Devices     Peripheral Intravenous Line            Peripheral IV 03/21/18 Left Forearm less than 1 day                Lab Results:    Results from last 7 days  Lab Units 03/22/18  1003  03/19/18  1806   WBC Thousand/uL 5 78  < > 11 68*   HEMOGLOBIN g/dL 13 4  < > 16 7   HEMATOCRIT % 37 5  < > 46 7   PLATELETS Thousands/uL 206  < > 243   NEUTROS PCT %  --   --  76*   LYMPHS PCT %  --   --  14   MONOS PCT %  --   --  8   EOS PCT %  --   --  1   < > = values in this interval not displayed  Results from last 7 days  Lab Units 03/22/18  1003   SODIUM mmol/L 140   POTASSIUM mmol/L 3 7   CHLORIDE mmol/L 103   CO2 mmol/L 26   BUN mg/dL 9   CREATININE mg/dL 0 76   CALCIUM mg/dL 8 7   TOTAL PROTEIN g/dL 6 8   BILIRUBIN TOTAL mg/dL 1 10*   ALK PHOS U/L 141*   ALT U/L 81*   AST U/L 59*   GLUCOSE RANDOM mg/dL 106           Results from last 7 days  Lab Units 03/21/18  0440   LIPASE u/L 289       Imaging Studies: I have personally reviewed pertinent imaging studies  Pe Study With Ct Abdomen And Pelvis With Contrast    Result Date: 3/19/2018  Impression: 1  No acute pulmonary embolism  2   Acute pancreatitis  3   Hepatic steatosis  4   Indeterminate lesion arising from the lower pole the left kidney  This could possibly represent a proteinaceous/hemorrhagic cyst   Consider initial evaluation with nonemergent renal ultrasound  If this is inconclusive, further evaluation with MRI recommended  Workstation performed: [id number]     Us Abdomen Limited    Result Date: 3/20/2018  Impression: Moderate hepatomegaly with severe hepatic steatosis as seen on CT  No gallstones   Workstation performed: [id number]

## 2018-03-23 VITALS
HEIGHT: 70 IN | HEART RATE: 81 BPM | RESPIRATION RATE: 18 BRPM | SYSTOLIC BLOOD PRESSURE: 152 MMHG | BODY MASS INDEX: 32.21 KG/M2 | TEMPERATURE: 98.2 F | OXYGEN SATURATION: 95 % | WEIGHT: 225 LBS | DIASTOLIC BLOOD PRESSURE: 73 MMHG

## 2018-03-23 PROCEDURE — 99231 SBSQ HOSP IP/OBS SF/LOW 25: CPT | Performed by: INTERNAL MEDICINE

## 2018-03-23 PROCEDURE — 99239 HOSP IP/OBS DSCHRG MGMT >30: CPT | Performed by: PHYSICIAN ASSISTANT

## 2018-03-23 RX ORDER — NICOTINE 21 MG/24HR
1 PATCH, TRANSDERMAL 24 HOURS TRANSDERMAL DAILY
Qty: 28 PATCH | Refills: 0 | Status: SHIPPED | OUTPATIENT
Start: 2018-03-24 | End: 2019-03-02 | Stop reason: ALTCHOICE

## 2018-03-23 RX ORDER — OXYCODONE HYDROCHLORIDE 5 MG/1
5 TABLET ORAL EVERY 8 HOURS PRN
Qty: 15 TABLET | Refills: 0 | Status: SHIPPED | OUTPATIENT
Start: 2018-03-23 | End: 2019-03-02 | Stop reason: ALTCHOICE

## 2018-03-23 RX ADMIN — PANTOPRAZOLE SODIUM 40 MG: 40 TABLET, DELAYED RELEASE ORAL at 05:28

## 2018-03-23 RX ADMIN — OXYCODONE HYDROCHLORIDE 5 MG: 5 TABLET ORAL at 11:27

## 2018-03-23 RX ADMIN — OXYCODONE HYDROCHLORIDE 5 MG: 5 TABLET ORAL at 04:05

## 2018-03-23 RX ADMIN — NICOTINE 1 PATCH: 21 PATCH, EXTENDED RELEASE TRANSDERMAL at 09:05

## 2018-03-23 RX ADMIN — ENOXAPARIN SODIUM 40 MG: 40 INJECTION SUBCUTANEOUS at 09:05

## 2018-03-23 RX ADMIN — SODIUM CHLORIDE, POTASSIUM CHLORIDE, SODIUM LACTATE AND CALCIUM CHLORIDE 150 ML/HR: 600; 310; 30; 20 INJECTION, SOLUTION INTRAVENOUS at 09:05

## 2018-03-23 RX ADMIN — RAMIPRIL 10 MG: 5 CAPSULE ORAL at 09:05

## 2018-03-23 RX ADMIN — SODIUM CHLORIDE, POTASSIUM CHLORIDE, SODIUM LACTATE AND CALCIUM CHLORIDE 150 ML/HR: 600; 310; 30; 20 INJECTION, SOLUTION INTRAVENOUS at 01:19

## 2018-03-23 RX ADMIN — SERTRALINE HYDROCHLORIDE 150 MG: 100 TABLET ORAL at 09:05

## 2018-03-23 RX ADMIN — SENNOSIDES 8.6 MG: 8.6 TABLET, FILM COATED ORAL at 09:05

## 2018-03-23 NOTE — PROGRESS NOTES
[facility]  Progress Note - Tosha Foss 45 y o  male MRN: [id number]    Unit/Bed#: -01 Encounter: [id number]    Assessment/Plan:  1  Acute interstitial pancreatitis:  Resolving, was able to tolerate soft low-fat diet yesterday  He denies any abdominal discomfort  Suspect that the etiology was likely alcohol  Lipase had normalized  -will advance to low-fat diet, if able to tolerate this, can be discharged from GI standpoint with follow-up in the office an outpatient MRI/MRCP given peripancreatic lymphadenopathy noted on imaging  Subjective:   Patient seen and examined at bedside, states the pain has resolved  He was able to tolerate low-fat soft diet yesterday  He has remained afebrile overnight  Offers no other complaints  Objective:     Vitals: Blood pressure 152/73, pulse 81, temperature 98 2 °F (36 8 °C), temperature source Oral, resp  rate 18, height 5' 10" (1 778 m), weight 102 kg (225 lb), SpO2 95 %  ,Body mass index is 32 28 kg/m²        Intake/Output Summary (Last 24 hours) at 03/23/18 0912  Last data filed at 03/23/18 0119   Gross per 24 hour   Intake              990 ml   Output                0 ml   Net              990 ml       Physical Exam:    GEN: wn/wd, NAD  HEENT: MMM, anciteric  CV: RRR, no m/r/g  CHEST: CTA b/l, no w/r/r  ABD: +BS, soft, NT/ND  EXT: no c/c/e  SKIN: no rashes  NEURO: aaox3      Invasive Devices     Peripheral Intravenous Line            Peripheral IV 03/21/18 Left Forearm 1 day                        Lab, Imaging and other studies:     Admission on 03/19/2018   Component Date Value    WBC 03/19/2018 11 68*    RBC 03/19/2018 5 29     Hemoglobin 03/19/2018 16 7     Hematocrit 03/19/2018 46 7     MCV 03/19/2018 88     MCH 03/19/2018 31 6     MCHC 03/19/2018 35 8     RDW 03/19/2018 11 6     MPV 03/19/2018 9 8     Platelets 48/55/2034 243     Neutrophils Relative 03/19/2018 76*    Lymphocytes Relative 03/19/2018 14     Monocytes Relative 03/19/2018 8     Eosinophils Relative 03/19/2018 1     Basophils Relative 03/19/2018 1     Neutrophils Absolute 03/19/2018 8 99*    Lymphocytes Absolute 03/19/2018 1 62     Monocytes Absolute 03/19/2018 0 89     Eosinophils Absolute 03/19/2018 0 11     Basophils Absolute 03/19/2018 0 07     Sodium 03/19/2018 136     Potassium 03/19/2018 4 0     Chloride 03/19/2018 98*    CO2 03/19/2018 26     Anion Gap 03/19/2018 12     BUN 03/19/2018 12     Creatinine 03/19/2018 0 97     Glucose 03/19/2018 255*    Calcium 03/19/2018 10 0     AST 03/19/2018 40     ALT 03/19/2018 106*    Alkaline Phosphatase 03/19/2018 172*    Total Protein 03/19/2018 8 2     Albumin 03/19/2018 3 8     Total Bilirubin 03/19/2018 1 00     eGFR 03/19/2018 88     Lipase 03/19/2018 678*    Troponin I 03/19/2018 <0 02     Color, UA 03/19/2018 Radha     Clarity, UA 03/19/2018 Clear     pH, UA 03/19/2018 6 0     Leukocytes, UA 03/19/2018 Negative     Nitrite, UA 03/19/2018 Negative     Protein, UA 03/19/2018 100 (2+)*    Glucose, UA 03/19/2018 250 (1/4%)*    Ketones, UA 03/19/2018 Trace*    Urobilinogen, UA 03/19/2018 1 0     Bilirubin, UA 03/19/2018 Interference- unable to analyze*    Blood, UA 03/19/2018 Trace*    Specific Gravity, UA 03/19/2018 >=1 030     RBC, UA 03/19/2018 0-1*    WBC, UA 03/19/2018 2-4*    Epithelial Cells 03/19/2018 Occasional     Bacteria, UA 03/19/2018 Occasional     Hyaline Casts, UA 03/19/2018 20-30*    Cholesterol 03/19/2018 203*    Triglycerides 03/19/2018 205*    HDL, Direct 03/19/2018 46     LDL Calculated 03/19/2018 116*    Sodium 03/20/2018 138     Potassium 03/20/2018 3 7     Chloride 03/20/2018 101     CO2 03/20/2018 27     Anion Gap 03/20/2018 10     BUN 03/20/2018 15     Creatinine 03/20/2018 0 86     Glucose 03/20/2018 156*    Calcium 03/20/2018 9 2     AST 03/20/2018 31     ALT 03/20/2018 87*    Alkaline Phosphatase 03/20/2018 146*    Total Protein 03/20/2018 7 5     Albumin 03/20/2018 3 4*    Total Bilirubin 03/20/2018 0 80     eGFR 03/20/2018 98     WBC 03/20/2018 11 47*    RBC 03/20/2018 4 69     Hemoglobin 03/20/2018 14 7     Hematocrit 03/20/2018 42 5     MCV 03/20/2018 91     MCH 03/20/2018 31 3     MCHC 03/20/2018 34 6     RDW 03/20/2018 11 8     Platelets 66/33/5211 213     MPV 03/20/2018 9 8     Ventricular Rate 03/19/2018 93     Atrial Rate 03/19/2018 93     WI Interval 03/19/2018 186     QRSD Interval 03/19/2018 88     QT Interval 03/19/2018 338     QTC Interval 03/19/2018 420     P Axis 03/19/2018 39     QRS Axis 03/19/2018 19     T Wave Axis 03/19/2018 38     WBC 03/21/2018 5 64     RBC 03/21/2018 4 14     Hemoglobin 03/21/2018 13 0     Hematocrit 03/21/2018 37 4     MCV 03/21/2018 90     MCH 03/21/2018 31 4     MCHC 03/21/2018 34 8     RDW 03/21/2018 11 6     Platelets 48/90/7489 185     MPV 03/21/2018 9 3     Sodium 03/21/2018 138     Potassium 03/21/2018 3 8     Chloride 03/21/2018 102     CO2 03/21/2018 28     Anion Gap 03/21/2018 8     BUN 03/21/2018 11     Creatinine 03/21/2018 0 75     Glucose 03/21/2018 112     Calcium 03/21/2018 9 0     eGFR 03/21/2018 104     Lipase 03/21/2018 289     Total Bilirubin 03/21/2018 0 80     Bilirubin, Direct 03/21/2018 0 30*    Alkaline Phosphatase 03/21/2018 118*    AST 03/21/2018 32     ALT 03/21/2018 66     Total Protein 03/21/2018 6 4     Albumin 03/21/2018 3 0*    Hepatitis B Surface Ag 03/22/2018 Non-reactive     Hepatitis C Ab 03/22/2018 Non-reactive     Hep B C IgM 03/22/2018 Non-reactive     Hep B Core Total Ab 03/22/2018 Non-reactive     Sodium 03/22/2018 140     Potassium 03/22/2018 3 7     Chloride 03/22/2018 103     CO2 03/22/2018 26     Anion Gap 03/22/2018 11     BUN 03/22/2018 9     Creatinine 03/22/2018 0 76     Glucose 03/22/2018 106     Calcium 03/22/2018 8 7     AST 03/22/2018 59*    ALT 03/22/2018 81*    Alkaline Phosphatase 03/22/2018 141*    Total Protein 03/22/2018 6 8     Albumin 03/22/2018 3 2*    Total Bilirubin 03/22/2018 1 10*    eGFR 03/22/2018 103     WBC 03/22/2018 5 78     RBC 03/22/2018 4 22     Hemoglobin 03/22/2018 13 4     Hematocrit 03/22/2018 37 5     MCV 03/22/2018 89     MCH 03/22/2018 31 8     MCHC 03/22/2018 35 7     RDW 03/22/2018 11 5*    Platelets 03/14/6546 206     MPV 03/22/2018 9 4     Magnesium 03/22/2018 1 6          I have personally reviewed pertinent films in PACS    Current Facility-Administered Medications   Medication Dose Route Frequency    calcium carbonate (TUMS) chewable tablet 1,000 mg  1,000 mg Oral Daily PRN    enoxaparin (LOVENOX) subcutaneous injection 40 mg  40 mg Subcutaneous Daily    HYDROmorphone (DILAUDID) injection 1 mg  1 mg Intravenous Q4H PRN    lactated ringers infusion  150 mL/hr Intravenous Continuous    nicotine (NICODERM CQ) 21 mg/24 hr TD 24 hr patch 1 patch  1 patch Transdermal Daily    ondansetron (ZOFRAN) injection 4 mg  4 mg Intravenous Q6H PRN    oxyCODONE (ROXICODONE) immediate release tablet 10 mg  10 mg Oral Q6H PRN    oxyCODONE (ROXICODONE) IR tablet 5 mg  5 mg Oral Q6H PRN    pantoprazole (PROTONIX) EC tablet 40 mg  40 mg Oral Early Morning    polyvinyl alcohol (LIQUIFILM TEARS) 1 4 % ophthalmic solution 1 drop  1 drop Both Eyes Q3H PRN    pravastatin (PRAVACHOL) tablet 40 mg  40 mg Oral Daily With Dinner    ramipril (ALTACE) capsule 10 mg  10 mg Oral Daily    senna (SENOKOT) tablet 8 6 mg  1 tablet Oral Daily    sertraline (ZOLOFT) tablet 150 mg  150 mg Oral Daily

## 2018-03-23 NOTE — INCIDENTAL FINDINGS
The following findings require follow up:  Radiographic finding   Finding: CT abdomen/ pelvis: 1 5 x 1 4 cm indeterminant exophytic nodule arising from the lower pole of the left kidney  Follow up required: renal US as an outpatient   Follow up should be done within 1 month(s)    Please notify the following clinician to assist with the follow up:   PCP, Dr Dionna Ortiz

## 2018-03-23 NOTE — ASSESSMENT & PLAN NOTE
· Not visualized on ultrasound given that US was of right upper quadrant    · Follow-up with PCP and obtain outpatient renal ultrasound

## 2018-03-23 NOTE — DISCHARGE INSTRUCTIONS
Pancreatitis   WHAT YOU NEED TO KNOW:   Pancreatitis is inflammation of your pancreas  The pancreas is an organ that makes insulin  It also makes enzymes (digestive juices) that help your body digest food  Pancreatitis may be an acute (short-term) problem that happens only once  It may become a chronic (long-term) problem that comes and goes over time  DISCHARGE INSTRUCTIONS:   Seek care immediately if:   · You have severe pain in your abdomen and you are vomiting  Contact your healthcare provider if:   · You have a fever  · You continue to lose weight without trying  · Your skin or the whites of your eyes turn yellow  · You have questions or concerns about your condition or care  Medicines: You may need any of the following:  · Antibiotics  treat a bacterial infection  · Prescription pain medicine  may be given  Ask your healthcare provider how to take this medicine safely  Some prescription pain medicines contain acetaminophen  Do not take other medicines that contain acetaminophen without talking to your healthcare provider  Too much acetaminophen may cause liver damage  Prescription pain medicine may cause constipation  Ask your healthcare provider how to prevent or treat constipation  · Take your medicine as directed  Contact your healthcare provider if you think your medicine is not helping or if you have side effects  Tell him or her if you are allergic to any medicine  Keep a list of the medicines, vitamins, and herbs you take  Include the amounts, and when and why you take them  Bring the list or the pill bottles to follow-up visits  Carry your medicine list with you in case of an emergency  Self-care:   · Rest  when you feel it is needed  Slowly start to do more each day  Return to your usual activities as directed  · Do not drink any alcohol    If you need help to stop drinking, contact the following organization:   ¨ Alcoholics Anonymous  Web Address: http://www everbill/      · Ask your healthcare provider or dietitian about the best foods to eat  You may need to eat foods that are low in fat if you have chronic pancreatitis  Follow up with your healthcare provider as directed:  Write down your questions so you remember to ask them during your visits  © 2017 [facility] Information is for End User's use only and may not be sold, redistributed or otherwise used for commercial purposes  All illustrations and images included in CareNotes® are the copyrighted property of [employer]  or [address]  The above information is an  only  It is not intended as medical advice for individual conditions or treatments  Talk to your doctor, nurse or pharmacist before following any medical regimen to see if it is safe and effective for you

## 2018-03-23 NOTE — ASSESSMENT & PLAN NOTE
· POA with lipase 678, CT abdomen showed peripancreatic stranding with large lymph node consistent with pancreatitis  · Suspect alcohol-induced however was not drinking recently  No alcohol intake for 3 weeks prior to admission  · No gallstones on ultrasound   · Triglycerides 205  · Lipase improved to 289 on 3/21/18  · Diet advanced to low fat this AM and patient tolerated diet without difficulty  · Outpatient follow-up with GI  · Being discharged on oxycodone 5 mg p o  q8h prn x 5 days  · PDMP website reviewed, no recent prescriptions for patient

## 2018-03-23 NOTE — CASE MANAGEMENT
[address] [facility] in the [facility] by Harshal Peña for 2017  Network Utilization Review Department  Phone: [phone]; Fax [phone]  ATTENTION: The Network Utilization Review Department is now centralized for our 7 Facilities  Please call with any questions or concerns to [phone] and carefully follow the prompts so that you are directed to the right person  All voicemails are confidential  Fax any determinations, approvals, denials, and requests for initial or continue stay review clinical to [phone]   Due to HIGH CALL volume, it would be easier if you could please send faxed requests to expedite your requests and in part, help us provide discharge notifications faster   /////////////////////////////////////////////////////////////////////////////////////////////////////////////////  Continued Stay Review  Date:   3/23/2018    Vital Signs: /73 (BP Location: Right arm)   Pulse 81   Temp 98 2 °F (36 8 °C) (Oral)   Resp 18   Ht 5' 10" (1 778 m)   Wt 102 kg (225 lb)   SpO2 95%   BMI 32 28 kg/m²     Medications:   Scheduled Meds:   Current Facility-Administered Medications:  calcium carbonate 1,000 mg Oral Daily PRN Elidia Alexis PA-C    enoxaparin 40 mg Subcutaneous Daily Elidia Alexis PA-C    HYDROmorphone 1 mg Intravenous Q4H PRN Melva Kitchen PA-C    lactated ringers 150 mL/hr Intravenous Continuous Alexandra Rudolph PA-C Last Rate: 150 mL/hr (03/23/18 0905)   nicotine 1 patch Transdermal Daily Elidia Alexis PA-C    ondansetron 4 mg Intravenous Q6H PRN Elidia Alexis PA-C    oxyCODONE 10 mg Oral Q6H PRN Melva Kitchen PA-C    oxyCODONE 5 mg Oral Q6H PRN Melva Kitchen PA-C    pantoprazole 40 mg Oral Early Morning Alexandra Rudolph PA-C    polyvinyl alcohol 1 drop Both Eyes Q3H PRN Patrice Madison MD    pravastatin 40 mg Oral Daily With [email or web address] ALIN Kitchen    ramipril 10 mg Oral Daily Melva Kitchen PA-C    senna 1 tablet Oral Daily Elidia Alexis PA-C    sertraline 150 mg Oral Daily Patrice Madison MD      Continuous Infusions:   lactated ringers 150 mL/hr Last Rate: 150 mL/hr (03/23/18 0905)     Wbc  5 78  ast  59  alt  81  Alk phos  141  Tot bili  1 10  Lipase  289  Direct bili  0 30    3/23  GI NOTE  Assessment/Plan:  1  Acute interstitial pancreatitis:  Resolving, was able to tolerate soft low-fat diet yesterday  He denies any abdominal discomfort  Suspect that the etiology was likely alcohol  Lipase had normalized  -will advance to low-fat diet, if able to tolerate this, can be discharged from GI standpoint with follow-up in the office an outpatient MRI/MRCP given peripancreatic lymphadenopathy noted on imaging       Subjective:   Patient seen and examined at bedside, states the pain has resolved  He was able to tolerate low-fat soft diet yesterday  He has remained afebrile overnight    3/23/3028    INTERNAL MEDICINE  * Acute pancreatitis   Assessment & Plan     · Present on admission, lipase 678, CT abdomen shows peripancreatic stranding with large lymph node consistent with pancreatitis  · Suspect alcohol-induced however was not drinking recently  No intake x 3 weeks  · No gallstones on ultrasound   · Triglycerides 205  · Diet advanced to full liquid- failed advancement to low fat yesterday  Advance diet at dinner and if tolerates, d/c in AM  · Outpatient GI follow-up  · Decreased Dilaudid frequency, add oral oxycodone for pain control          Hepatic steatosis   Assessment & Plan     · Chronic hepatitis panel negative    · Complete alcohol cessation  · Continue recommending low-fat diet          Lesion of left native kidney   Assessment & Plan     · Not visualized on ultrasound given was right upper quadrant  · Obtain outpatient renal ultrasound          HTN (hypertension), benign Assessment & Plan     · Resume Ace          HLD (hyperlipidemia)   Assessment & Plan     · Resume statin               Discharge Plan: tbd

## 2018-03-26 NOTE — CASE MANAGEMENT
Notification of Discharge  This is a Notification of Discharge from our facility [address]  Please be advised that this patient has been discharge from our facility  Below you will find the admission and discharge date and time including the patients disposition  PRESENTATION DATE: 3/19/2018  4:53 PM  IP ADMISSION DATE: 3/19/18 1959  DISCHARGE DATE: 3/23/2018  4:46 PM  DISPOSITION: [address] [place] [address] in the [facility] by Harshal Peña for 2017  Network Utilization Review Department  Phone: [phone]; Fax [phone]  ATTENTION: The Network Utilization Review Department is now centralized for our 7 Facilities  Make a note that we have a new phone and fax numbers for our Department  Please call with any questions or concerns to [phone] and carefully follow the prompts so that you are directed to the right person  All voicemails are confidential  Fax any determinations, approvals, denials, and requests for initial or continue stay review clinical to [phone]  Due to HIGH CALL volume, it would be easier if you could please send faxed requests to expedite your requests and in part, help us provide discharge notifications faster        Reference #[id number]

## 2019-03-02 ENCOUNTER — HOSPITAL ENCOUNTER (INPATIENT)
Facility: HOSPITAL | Age: 54
LOS: 4 days | Discharge: HOME | DRG: 439 | End: 2019-03-06
Attending: EMERGENCY MEDICINE | Admitting: INTERNAL MEDICINE
Payer: COMMERCIAL

## 2019-03-02 ENCOUNTER — APPOINTMENT (EMERGENCY)
Dept: RADIOLOGY | Facility: HOSPITAL | Age: 54
DRG: 439 | End: 2019-03-02
Payer: COMMERCIAL

## 2019-03-02 ENCOUNTER — APPOINTMENT (EMERGENCY)
Dept: CT IMAGING | Facility: HOSPITAL | Age: 54
DRG: 439 | End: 2019-03-02
Payer: COMMERCIAL

## 2019-03-02 DIAGNOSIS — K85.21 ALCOHOL-INDUCED ACUTE PANCREATITIS WITH UNINFECTED NECROSIS: ICD-10-CM

## 2019-03-02 DIAGNOSIS — L02.91 ABSCESS: ICD-10-CM

## 2019-03-02 DIAGNOSIS — F17.200 NICOTINE DEPENDENCE: ICD-10-CM

## 2019-03-02 DIAGNOSIS — K85.20 ALCOHOL-INDUCED ACUTE PANCREATITIS, UNSPECIFIED COMPLICATION STATUS: ICD-10-CM

## 2019-03-02 DIAGNOSIS — K85.90 PANCREATITIS: Primary | ICD-10-CM

## 2019-03-02 PROBLEM — E10.65 TYPE 1 DIABETES MELLITUS WITH HYPERGLYCEMIA (HCC): Status: ACTIVE | Noted: 2019-03-02

## 2019-03-02 PROBLEM — E87.2 LACTIC ACIDOSIS: Status: ACTIVE | Noted: 2019-03-02

## 2019-03-02 PROBLEM — E87.20 LACTIC ACIDOSIS: Status: ACTIVE | Noted: 2019-03-02

## 2019-03-02 PROBLEM — F10.10 ALCOHOL ABUSE: Status: ACTIVE | Noted: 2019-03-02

## 2019-03-02 LAB
ALBUMIN SERPL BCP-MCNC: 3.9 G/DL (ref 3.5–5)
ALP SERPL-CCNC: 172 U/L (ref 46–116)
ALT SERPL W P-5'-P-CCNC: 158 U/L (ref 12–78)
ANION GAP SERPL CALCULATED.3IONS-SCNC: 15 MMOL/L (ref 4–13)
APTT PPP: 32 SECONDS (ref 26–38)
AST SERPL W P-5'-P-CCNC: 99 U/L (ref 5–45)
ATRIAL RATE: 111 BPM
BASOPHILS # BLD AUTO: 0.08 THOUSANDS/ΜL (ref 0–0.1)
BASOPHILS NFR BLD AUTO: 1 % (ref 0–1)
BILIRUB SERPL-MCNC: 1.3 MG/DL (ref 0.2–1)
BUN SERPL-MCNC: 13 MG/DL (ref 5–25)
CALCIUM SERPL-MCNC: 9.2 MG/DL (ref 8.3–10.1)
CHLORIDE SERPL-SCNC: 93 MMOL/L (ref 100–108)
CHOLEST SERPL-MCNC: 198 MG/DL (ref 50–200)
CO2 SERPL-SCNC: 24 MMOL/L (ref 21–32)
CREAT SERPL-MCNC: 0.94 MG/DL (ref 0.6–1.3)
EOSINOPHIL # BLD AUTO: 0 THOUSAND/ΜL (ref 0–0.61)
EOSINOPHIL NFR BLD AUTO: 0 % (ref 0–6)
ERYTHROCYTE [DISTWIDTH] IN BLOOD BY AUTOMATED COUNT: 11.8 % (ref 11.6–15.1)
GFR SERPL CREATININE-BSD FRML MDRD: 91 ML/MIN/1.73SQ M
GLUCOSE SERPL-MCNC: 210 MG/DL (ref 65–140)
HCT VFR BLD AUTO: 43.1 % (ref 36.5–49.3)
HDLC SERPL-MCNC: 30 MG/DL (ref 40–60)
HGB BLD-MCNC: 16 G/DL (ref 12–17)
IMM GRANULOCYTES # BLD AUTO: 0.08 THOUSAND/UL (ref 0–0.2)
IMM GRANULOCYTES NFR BLD AUTO: 1 % (ref 0–2)
INR PPP: 0.97 (ref 0.86–1.17)
LACTATE SERPL-SCNC: 2.1 MMOL/L (ref 0.5–2)
LACTATE SERPL-SCNC: 4.3 MMOL/L (ref 0.5–2)
LIPASE SERPL-CCNC: 602 U/L (ref 73–393)
LYMPHOCYTES # BLD AUTO: 0.68 THOUSANDS/ΜL (ref 0.6–4.47)
LYMPHOCYTES NFR BLD AUTO: 4 % (ref 14–44)
MCH RBC QN AUTO: 33.5 PG (ref 26.8–34.3)
MCHC RBC AUTO-ENTMCNC: 37.1 G/DL (ref 31.4–37.4)
MCV RBC AUTO: 90 FL (ref 82–98)
MONOCYTES # BLD AUTO: 0.83 THOUSAND/ΜL (ref 0.17–1.22)
MONOCYTES NFR BLD AUTO: 5 % (ref 4–12)
NEUTROPHILS # BLD AUTO: 14.42 THOUSANDS/ΜL (ref 1.85–7.62)
NEUTS SEG NFR BLD AUTO: 89 % (ref 43–75)
NONHDLC SERPL-MCNC: 168 MG/DL
NRBC BLD AUTO-RTO: 0 /100 WBCS
P AXIS: 60 DEGREES
PLATELET # BLD AUTO: 216 THOUSANDS/UL (ref 149–390)
PMV BLD AUTO: 9.7 FL (ref 8.9–12.7)
POTASSIUM SERPL-SCNC: 3.6 MMOL/L (ref 3.5–5.3)
PROT SERPL-MCNC: 7.8 G/DL (ref 6.4–8.2)
PROTHROMBIN TIME: 12.6 SECONDS (ref 11.8–14.2)
QRS AXIS: 50 DEGREES
QRSD INTERVAL: 88 MS
QT INTERVAL: 298 MS
QTC INTERVAL: 403 MS
RBC # BLD AUTO: 4.78 MILLION/UL (ref 3.88–5.62)
SODIUM SERPL-SCNC: 132 MMOL/L (ref 136–145)
T WAVE AXIS: 48 DEGREES
TRIGL SERPL-MCNC: 664 MG/DL
TROPONIN I SERPL-MCNC: 0.04 NG/ML
VENTRICULAR RATE: 110 BPM
WBC # BLD AUTO: 16.09 THOUSAND/UL (ref 4.31–10.16)

## 2019-03-02 PROCEDURE — 83690 ASSAY OF LIPASE: CPT

## 2019-03-02 PROCEDURE — 96376 TX/PRO/DX INJ SAME DRUG ADON: CPT

## 2019-03-02 PROCEDURE — 85730 THROMBOPLASTIN TIME PARTIAL: CPT

## 2019-03-02 PROCEDURE — 71045 X-RAY EXAM CHEST 1 VIEW: CPT

## 2019-03-02 PROCEDURE — 80053 COMPREHEN METABOLIC PANEL: CPT

## 2019-03-02 PROCEDURE — 96365 THER/PROPH/DIAG IV INF INIT: CPT

## 2019-03-02 PROCEDURE — 80061 LIPID PANEL: CPT | Performed by: NURSE PRACTITIONER

## 2019-03-02 PROCEDURE — 99285 EMERGENCY DEPT VISIT HI MDM: CPT

## 2019-03-02 PROCEDURE — 99291 CRITICAL CARE FIRST HOUR: CPT | Performed by: PHYSICIAN ASSISTANT

## 2019-03-02 PROCEDURE — 85025 COMPLETE CBC W/AUTO DIFF WBC: CPT

## 2019-03-02 PROCEDURE — 84484 ASSAY OF TROPONIN QUANT: CPT

## 2019-03-02 PROCEDURE — 93010 ELECTROCARDIOGRAM REPORT: CPT | Performed by: INTERNAL MEDICINE

## 2019-03-02 PROCEDURE — 93005 ELECTROCARDIOGRAM TRACING: CPT

## 2019-03-02 PROCEDURE — 74177 CT ABD & PELVIS W/CONTRAST: CPT

## 2019-03-02 PROCEDURE — 96367 TX/PROPH/DG ADDL SEQ IV INF: CPT

## 2019-03-02 PROCEDURE — 85610 PROTHROMBIN TIME: CPT

## 2019-03-02 PROCEDURE — 87040 BLOOD CULTURE FOR BACTERIA: CPT

## 2019-03-02 PROCEDURE — 83605 ASSAY OF LACTIC ACID: CPT

## 2019-03-02 PROCEDURE — 96375 TX/PRO/DX INJ NEW DRUG ADDON: CPT

## 2019-03-02 PROCEDURE — 36415 COLL VENOUS BLD VENIPUNCTURE: CPT

## 2019-03-02 PROCEDURE — 94762 N-INVAS EAR/PLS OXIMTRY CONT: CPT

## 2019-03-02 RX ORDER — LEVOFLOXACIN 5 MG/ML
750 INJECTION, SOLUTION INTRAVENOUS ONCE
Status: COMPLETED | OUTPATIENT
Start: 2019-03-02 | End: 2019-03-02

## 2019-03-02 RX ORDER — ONDANSETRON 2 MG/ML
4 INJECTION INTRAMUSCULAR; INTRAVENOUS ONCE
Status: COMPLETED | OUTPATIENT
Start: 2019-03-02 | End: 2019-03-02

## 2019-03-02 RX ORDER — NICOTINE 21 MG/24HR
21 PATCH, TRANSDERMAL 24 HOURS TRANSDERMAL DAILY
Status: DISCONTINUED | OUTPATIENT
Start: 2019-03-03 | End: 2019-03-06 | Stop reason: HOSPADM

## 2019-03-02 RX ORDER — LORAZEPAM 2 MG/ML
0.5 INJECTION INTRAMUSCULAR ONCE
Status: COMPLETED | OUTPATIENT
Start: 2019-03-02 | End: 2019-03-02

## 2019-03-02 RX ORDER — HEPARIN SODIUM 5000 [USP'U]/ML
5000 INJECTION, SOLUTION INTRAVENOUS; SUBCUTANEOUS EVERY 8 HOURS SCHEDULED
Status: DISCONTINUED | OUTPATIENT
Start: 2019-03-02 | End: 2019-03-06 | Stop reason: HOSPADM

## 2019-03-02 RX ORDER — HYDROMORPHONE HCL/PF 1 MG/ML
1 SYRINGE (ML) INJECTION EVERY 2 HOUR PRN
Status: DISCONTINUED | OUTPATIENT
Start: 2019-03-02 | End: 2019-03-04

## 2019-03-02 RX ORDER — PANTOPRAZOLE SODIUM 40 MG/1
40 INJECTION, POWDER, FOR SOLUTION INTRAVENOUS
Status: DISCONTINUED | OUTPATIENT
Start: 2019-03-03 | End: 2019-03-06 | Stop reason: HOSPADM

## 2019-03-02 RX ORDER — SODIUM CHLORIDE, SODIUM LACTATE, POTASSIUM CHLORIDE, CALCIUM CHLORIDE 600; 310; 30; 20 MG/100ML; MG/100ML; MG/100ML; MG/100ML
250 INJECTION, SOLUTION INTRAVENOUS CONTINUOUS
Status: DISCONTINUED | OUTPATIENT
Start: 2019-03-02 | End: 2019-03-04

## 2019-03-02 RX ORDER — ONDANSETRON 2 MG/ML
4 INJECTION INTRAMUSCULAR; INTRAVENOUS EVERY 4 HOURS PRN
Status: DISCONTINUED | OUTPATIENT
Start: 2019-03-02 | End: 2019-03-06 | Stop reason: HOSPADM

## 2019-03-02 RX ORDER — HYDROMORPHONE HCL/PF 1 MG/ML
1 SYRINGE (ML) INJECTION ONCE
Status: COMPLETED | OUTPATIENT
Start: 2019-03-02 | End: 2019-03-02

## 2019-03-02 RX ORDER — GLIMEPIRIDE 2 MG/1
2 TABLET ORAL
COMMUNITY
End: 2020-05-20

## 2019-03-02 RX ADMIN — THIAMINE HYDROCHLORIDE 100 MG: 100 INJECTION, SOLUTION INTRAMUSCULAR; INTRAVENOUS at 20:29

## 2019-03-02 RX ADMIN — PYRIDOXINE HYDROCHLORIDE 100 MG: 100 INJECTION, SOLUTION INTRAMUSCULAR; INTRAVENOUS at 20:28

## 2019-03-02 RX ADMIN — LORAZEPAM 0.5 MG: 2 INJECTION INTRAMUSCULAR; INTRAVENOUS at 20:29

## 2019-03-02 RX ADMIN — ONDANSETRON 4 MG: 2 INJECTION INTRAMUSCULAR; INTRAVENOUS at 19:38

## 2019-03-02 RX ADMIN — IOHEXOL 100 ML: 350 INJECTION, SOLUTION INTRAVENOUS at 21:24

## 2019-03-02 RX ADMIN — LORAZEPAM 0.5 MG: 2 INJECTION INTRAMUSCULAR; INTRAVENOUS at 21:32

## 2019-03-02 RX ADMIN — HYDROMORPHONE HYDROCHLORIDE 1 MG: 1 INJECTION, SOLUTION INTRAMUSCULAR; INTRAVENOUS; SUBCUTANEOUS at 19:38

## 2019-03-02 RX ADMIN — ONDANSETRON 4 MG: 2 INJECTION INTRAMUSCULAR; INTRAVENOUS at 20:28

## 2019-03-02 RX ADMIN — SODIUM CHLORIDE 3000 ML: 0.9 INJECTION, SOLUTION INTRAVENOUS at 21:32

## 2019-03-02 RX ADMIN — HYDROMORPHONE HYDROCHLORIDE 1 MG: 1 INJECTION, SOLUTION INTRAMUSCULAR; INTRAVENOUS; SUBCUTANEOUS at 21:32

## 2019-03-02 RX ADMIN — FOLIC ACID 1 MG: 5 INJECTION, SOLUTION INTRAMUSCULAR; INTRAVENOUS; SUBCUTANEOUS at 20:29

## 2019-03-02 RX ADMIN — LEVOFLOXACIN 750 MG: 5 INJECTION, SOLUTION INTRAVENOUS at 21:37

## 2019-03-03 ENCOUNTER — APPOINTMENT (INPATIENT)
Dept: ULTRASOUND IMAGING | Facility: HOSPITAL | Age: 54
DRG: 439 | End: 2019-03-03
Payer: COMMERCIAL

## 2019-03-03 PROBLEM — E11.65 TYPE 2 DIABETES MELLITUS WITH HYPERGLYCEMIA (HCC): Status: ACTIVE | Noted: 2019-03-02

## 2019-03-03 LAB
ANION GAP SERPL CALCULATED.3IONS-SCNC: 7 MMOL/L (ref 4–13)
BACTERIA UR QL AUTO: ABNORMAL /HPF
BILIRUB UR QL STRIP: NEGATIVE
BUN SERPL-MCNC: 6 MG/DL (ref 5–25)
CA-I BLD-SCNC: 0.99 MMOL/L (ref 1.12–1.32)
CALCIUM SERPL-MCNC: 8.4 MG/DL (ref 8.3–10.1)
CHLORIDE SERPL-SCNC: 97 MMOL/L (ref 100–108)
CLARITY UR: CLEAR
CO2 SERPL-SCNC: 28 MMOL/L (ref 21–32)
COLOR UR: YELLOW
CREAT SERPL-MCNC: 0.8 MG/DL (ref 0.6–1.3)
ERYTHROCYTE [DISTWIDTH] IN BLOOD BY AUTOMATED COUNT: 11.9 % (ref 11.6–15.1)
GFR SERPL CREATININE-BSD FRML MDRD: 101 ML/MIN/1.73SQ M
GLUCOSE SERPL-MCNC: 166 MG/DL (ref 65–140)
GLUCOSE SERPL-MCNC: 167 MG/DL (ref 65–140)
GLUCOSE SERPL-MCNC: 183 MG/DL (ref 65–140)
GLUCOSE SERPL-MCNC: 186 MG/DL (ref 65–140)
GLUCOSE SERPL-MCNC: 190 MG/DL (ref 65–140)
GLUCOSE SERPL-MCNC: 196 MG/DL (ref 65–140)
GLUCOSE UR STRIP-MCNC: ABNORMAL MG/DL
HCT VFR BLD AUTO: 37.9 % (ref 36.5–49.3)
HGB BLD-MCNC: 14 G/DL (ref 12–17)
HGB UR QL STRIP.AUTO: ABNORMAL
KETONES UR STRIP-MCNC: ABNORMAL MG/DL
LACTATE SERPL-SCNC: 1.3 MMOL/L (ref 0.5–2)
LEUKOCYTE ESTERASE UR QL STRIP: NEGATIVE
LIPASE SERPL-CCNC: 457 U/L (ref 73–393)
MAGNESIUM SERPL-MCNC: 1.3 MG/DL (ref 1.6–2.6)
MCH RBC QN AUTO: 34.1 PG (ref 26.8–34.3)
MCHC RBC AUTO-ENTMCNC: 36.9 G/DL (ref 31.4–37.4)
MCV RBC AUTO: 92 FL (ref 82–98)
NITRITE UR QL STRIP: NEGATIVE
NON-SQ EPI CELLS URNS QL MICRO: ABNORMAL /HPF
PH UR STRIP.AUTO: 6 [PH] (ref 4.5–8)
PLATELET # BLD AUTO: 157 THOUSANDS/UL (ref 149–390)
PMV BLD AUTO: 9.3 FL (ref 8.9–12.7)
POTASSIUM SERPL-SCNC: 3.6 MMOL/L (ref 3.5–5.3)
PROCALCITONIN SERPL-MCNC: 0.06 NG/ML
PROT UR STRIP-MCNC: NEGATIVE MG/DL
RBC # BLD AUTO: 4.1 MILLION/UL (ref 3.88–5.62)
RBC #/AREA URNS AUTO: ABNORMAL /HPF
SODIUM SERPL-SCNC: 132 MMOL/L (ref 136–145)
SP GR UR STRIP.AUTO: 1.02 (ref 1–1.03)
TROPONIN I SERPL-MCNC: 0.04 NG/ML
TROPONIN I SERPL-MCNC: <0.02 NG/ML
UROBILINOGEN UR QL STRIP.AUTO: 0.2 E.U./DL
WBC # BLD AUTO: 14.72 THOUSAND/UL (ref 4.31–10.16)
WBC #/AREA URNS AUTO: ABNORMAL /HPF

## 2019-03-03 PROCEDURE — 82330 ASSAY OF CALCIUM: CPT | Performed by: NURSE PRACTITIONER

## 2019-03-03 PROCEDURE — 84145 PROCALCITONIN (PCT): CPT | Performed by: PHYSICIAN ASSISTANT

## 2019-03-03 PROCEDURE — 94762 N-INVAS EAR/PLS OXIMTRY CONT: CPT

## 2019-03-03 PROCEDURE — 83735 ASSAY OF MAGNESIUM: CPT | Performed by: NURSE PRACTITIONER

## 2019-03-03 PROCEDURE — 82948 REAGENT STRIP/BLOOD GLUCOSE: CPT

## 2019-03-03 PROCEDURE — 81001 URINALYSIS AUTO W/SCOPE: CPT

## 2019-03-03 PROCEDURE — 94760 N-INVAS EAR/PLS OXIMETRY 1: CPT

## 2019-03-03 PROCEDURE — 84484 ASSAY OF TROPONIN QUANT: CPT | Performed by: PHYSICIAN ASSISTANT

## 2019-03-03 PROCEDURE — 85027 COMPLETE CBC AUTOMATED: CPT | Performed by: NURSE PRACTITIONER

## 2019-03-03 PROCEDURE — 80048 BASIC METABOLIC PNL TOTAL CA: CPT | Performed by: INTERNAL MEDICINE

## 2019-03-03 PROCEDURE — 99222 1ST HOSP IP/OBS MODERATE 55: CPT | Performed by: INTERNAL MEDICINE

## 2019-03-03 PROCEDURE — 99232 SBSQ HOSP IP/OBS MODERATE 35: CPT | Performed by: INTERNAL MEDICINE

## 2019-03-03 PROCEDURE — C9113 INJ PANTOPRAZOLE SODIUM, VIA: HCPCS | Performed by: PHYSICIAN ASSISTANT

## 2019-03-03 PROCEDURE — 76705 ECHO EXAM OF ABDOMEN: CPT

## 2019-03-03 PROCEDURE — 90682 RIV4 VACC RECOMBINANT DNA IM: CPT | Performed by: PHYSICIAN ASSISTANT

## 2019-03-03 PROCEDURE — 83690 ASSAY OF LIPASE: CPT | Performed by: NURSE PRACTITIONER

## 2019-03-03 PROCEDURE — 83605 ASSAY OF LACTIC ACID: CPT | Performed by: PHYSICIAN ASSISTANT

## 2019-03-03 RX ORDER — MAGNESIUM SULFATE 1 G/100ML
1 INJECTION INTRAVENOUS ONCE
Status: COMPLETED | OUTPATIENT
Start: 2019-03-03 | End: 2019-03-03

## 2019-03-03 RX ORDER — LORAZEPAM 2 MG/ML
1 INJECTION INTRAMUSCULAR EVERY 4 HOURS PRN
Status: DISCONTINUED | OUTPATIENT
Start: 2019-03-03 | End: 2019-03-06 | Stop reason: HOSPADM

## 2019-03-03 RX ORDER — HYDROMORPHONE HCL/PF 1 MG/ML
0.5 SYRINGE (ML) INJECTION
Status: DISCONTINUED | OUTPATIENT
Start: 2019-03-03 | End: 2019-03-05

## 2019-03-03 RX ADMIN — INFLUENZA A VIRUS A/MICHIGAN/45/2015 (H1N1) RECOMBINANT HEMAGGLUTININ ANTIGEN, INFLUENZA A VIRUS A/SINGAPORE/INFIMH-16-0019/2016 (H3N2) RECOMBINANT HEMAGGLUTININ ANTIGEN, INFLUENZA B VIRUS B/MARYLAND/15/2016 RECOMBINANT HEMAGGLUTININ ANTIGEN, AND INFLUENZA B VIRUS B/PHUKET/3073/2013 RECOMBINANT HEMAGGLUTININ ANTIGEN 0.5 ML: 45; 45; 45; 45 INJECTION INTRAMUSCULAR at 11:33

## 2019-03-03 RX ADMIN — HYDROMORPHONE HYDROCHLORIDE 1 MG: 1 INJECTION, SOLUTION INTRAMUSCULAR; INTRAVENOUS; SUBCUTANEOUS at 07:22

## 2019-03-03 RX ADMIN — HYDROMORPHONE HYDROCHLORIDE 0.5 MG: 1 INJECTION, SOLUTION INTRAMUSCULAR; INTRAVENOUS; SUBCUTANEOUS at 13:33

## 2019-03-03 RX ADMIN — HEPARIN SODIUM 5000 UNITS: 5000 INJECTION, SOLUTION INTRAVENOUS; SUBCUTANEOUS at 00:24

## 2019-03-03 RX ADMIN — HYDROMORPHONE HYDROCHLORIDE 0.5 MG: 1 INJECTION, SOLUTION INTRAMUSCULAR; INTRAVENOUS; SUBCUTANEOUS at 15:26

## 2019-03-03 RX ADMIN — HYDROMORPHONE HYDROCHLORIDE 1 MG: 1 INJECTION, SOLUTION INTRAMUSCULAR; INTRAVENOUS; SUBCUTANEOUS at 00:11

## 2019-03-03 RX ADMIN — HEPARIN SODIUM 5000 UNITS: 5000 INJECTION, SOLUTION INTRAVENOUS; SUBCUTANEOUS at 07:22

## 2019-03-03 RX ADMIN — INSULIN LISPRO 2 UNITS: 100 INJECTION, SOLUTION INTRAVENOUS; SUBCUTANEOUS at 11:48

## 2019-03-03 RX ADMIN — FOLIC ACID 1 MG: 5 INJECTION, SOLUTION INTRAMUSCULAR; INTRAVENOUS; SUBCUTANEOUS at 08:37

## 2019-03-03 RX ADMIN — HYDROMORPHONE HYDROCHLORIDE 0.5 MG: 1 INJECTION, SOLUTION INTRAMUSCULAR; INTRAVENOUS; SUBCUTANEOUS at 23:54

## 2019-03-03 RX ADMIN — SODIUM CHLORIDE, SODIUM LACTATE, POTASSIUM CHLORIDE, AND CALCIUM CHLORIDE 250 ML/HR: .6; .31; .03; .02 INJECTION, SOLUTION INTRAVENOUS at 07:26

## 2019-03-03 RX ADMIN — HYDROMORPHONE HYDROCHLORIDE 1 MG: 1 INJECTION, SOLUTION INTRAMUSCULAR; INTRAVENOUS; SUBCUTANEOUS at 17:00

## 2019-03-03 RX ADMIN — HYDROMORPHONE HYDROCHLORIDE 1 MG: 1 INJECTION, SOLUTION INTRAMUSCULAR; INTRAVENOUS; SUBCUTANEOUS at 09:56

## 2019-03-03 RX ADMIN — SODIUM CHLORIDE, SODIUM LACTATE, POTASSIUM CHLORIDE, AND CALCIUM CHLORIDE 250 ML/HR: .6; .31; .03; .02 INJECTION, SOLUTION INTRAVENOUS at 13:29

## 2019-03-03 RX ADMIN — ONDANSETRON 4 MG: 2 INJECTION INTRAMUSCULAR; INTRAVENOUS at 11:32

## 2019-03-03 RX ADMIN — ONDANSETRON 4 MG: 2 INJECTION INTRAMUSCULAR; INTRAVENOUS at 00:12

## 2019-03-03 RX ADMIN — NICOTINE 21 MG: 21 PATCH, EXTENDED RELEASE TRANSDERMAL at 08:36

## 2019-03-03 RX ADMIN — INSULIN LISPRO 1 UNITS: 100 INJECTION, SOLUTION INTRAVENOUS; SUBCUTANEOUS at 08:36

## 2019-03-03 RX ADMIN — HYDROMORPHONE HYDROCHLORIDE 1 MG: 1 INJECTION, SOLUTION INTRAMUSCULAR; INTRAVENOUS; SUBCUTANEOUS at 19:33

## 2019-03-03 RX ADMIN — THIAMINE HYDROCHLORIDE 100 MG: 100 INJECTION, SOLUTION INTRAMUSCULAR; INTRAVENOUS at 13:29

## 2019-03-03 RX ADMIN — SODIUM CHLORIDE, SODIUM LACTATE, POTASSIUM CHLORIDE, AND CALCIUM CHLORIDE 250 ML/HR: .6; .31; .03; .02 INJECTION, SOLUTION INTRAVENOUS at 03:37

## 2019-03-03 RX ADMIN — INSULIN LISPRO 1 UNITS: 100 INJECTION, SOLUTION INTRAVENOUS; SUBCUTANEOUS at 19:31

## 2019-03-03 RX ADMIN — PANTOPRAZOLE SODIUM 40 MG: 40 INJECTION, POWDER, FOR SOLUTION INTRAVENOUS at 08:35

## 2019-03-03 RX ADMIN — HEPARIN SODIUM 5000 UNITS: 5000 INJECTION, SOLUTION INTRAVENOUS; SUBCUTANEOUS at 22:30

## 2019-03-03 RX ADMIN — MAGNESIUM SULFATE HEPTAHYDRATE 1 G: 1 INJECTION, SOLUTION INTRAVENOUS at 11:34

## 2019-03-03 RX ADMIN — INSULIN LISPRO 1 UNITS: 100 INJECTION, SOLUTION INTRAVENOUS; SUBCUTANEOUS at 23:54

## 2019-03-03 RX ADMIN — HYDROMORPHONE HYDROCHLORIDE 0.5 MG: 1 INJECTION, SOLUTION INTRAMUSCULAR; INTRAVENOUS; SUBCUTANEOUS at 11:33

## 2019-03-03 RX ADMIN — LORAZEPAM 1 MG: 2 INJECTION INTRAMUSCULAR; INTRAVENOUS at 22:23

## 2019-03-03 RX ADMIN — SODIUM CHLORIDE, SODIUM LACTATE, POTASSIUM CHLORIDE, AND CALCIUM CHLORIDE 250 ML/HR: .6; .31; .03; .02 INJECTION, SOLUTION INTRAVENOUS at 00:22

## 2019-03-03 RX ADMIN — HYDROMORPHONE HYDROCHLORIDE 1 MG: 1 INJECTION, SOLUTION INTRAMUSCULAR; INTRAVENOUS; SUBCUTANEOUS at 03:25

## 2019-03-03 RX ADMIN — HYDROMORPHONE HYDROCHLORIDE 0.5 MG: 1 INJECTION, SOLUTION INTRAMUSCULAR; INTRAVENOUS; SUBCUTANEOUS at 22:20

## 2019-03-03 RX ADMIN — ONDANSETRON 4 MG: 2 INJECTION INTRAMUSCULAR; INTRAVENOUS at 07:25

## 2019-03-03 RX ADMIN — SODIUM CHLORIDE, SODIUM LACTATE, POTASSIUM CHLORIDE, AND CALCIUM CHLORIDE 250 ML/HR: .6; .31; .03; .02 INJECTION, SOLUTION INTRAVENOUS at 20:51

## 2019-03-03 NOTE — CONSULTS
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  - prior workup last year showed no biliary stones  -fatty liver on imaging with significant alcohol use likely cause of increased LFTs  -feels somewhat better than yesterday in terms of pain but still endorses epigastric pain and bloating  -has a strong appetite and wants to eat     Plan:    -recommend decreasing IVFs to 150ml/hr  Can titrate upward for low urine output    -ok for clears as patient tolerates  Better outcomes with early enteral nutrition  Patient having an appetite is a good sign and indication to begin enteral feeding   -spoke to patient about complete cessation of alcohol  Currently drinking 30 beers every 2 days  -RUQ ultrasound is pending but results will not  and can be discontinued  Even if has stones there is no indication for cholecystectomy in setting of alcohol pancreatitis unless there is choledocholithiasis   -antibiotics not indicated  would only treat with carbapenem in setting of proven infected necrosis  Leukocytosis and fevers can be expected with sterile pancreatic necrosis  If antibiotics are considered, they should be managed by infectious disease    -recommend CIWA protocol if available at this [place]  Monitor for withdrawal     History of Present Illness   History, ROS and PFSH unobtainable from any source due to   HPI:  Kike Gaspar is a 52 y o  male who presents with   Consults    Review of Systems   Constitutional: Negative for chills, fever and unexpected weight change  Respiratory: Negative for shortness of breath  Cardiovascular: Negative for chest pain  Gastrointestinal: Positive for abdominal distention, abdominal pain and constipation   Negative for diarrhea, nausea and vomiting  Genitourinary: Negative for dysuria  Musculoskeletal: Positive for back pain  Skin: Negative for color change  Neurological: Negative  Hematological: Negative  Psychiatric/Behavioral: The patient is hyperactive  Historical Information   History reviewed  No pertinent past medical history    Past Surgical History:   Procedure Laterality Date    LEG SURGERY       Social History   Social History     Substance and Sexual Activity   Alcohol Use Yes    Alcohol/week: 162 0 oz    Types: 270 Cans of beer per week    Comment: 30 pack a day     Social History     Substance and Sexual Activity   Drug Use Yes    Types: Marijuana    Comment: not for a while     Social History     Tobacco Use   Smoking Status Current Every Day Smoker    Packs/day: 1 00    Types: Cigarettes   Smokeless Tobacco Never Used     Family History: non-contributory    Meds/Allergies   all current active meds have been reviewed  Allergies   Allergen Reactions    Amoxicillin Swelling       Objective   First Vitals:   Blood Pressure: 156/94 (03/02/19 1908)  Pulse: (!) 113 (03/02/19 1908)  Temperature: 97 5 °F (36 4 °C) (03/02/19 1908)  Temp Source: Oral (03/02/19 1908)  Respirations: (!) 25 (03/02/19 1908)  Height: 5' 10" (177 8 cm) (03/02/19 1908)  Weight - Scale: 106 kg (234 lb 5 6 oz) (03/02/19 1908)  SpO2: 99 % (03/02/19 1908)    Current Vitals:   Blood Pressure: 154/80 (03/03/19 0722)  Pulse: 105 (03/03/19 0722)  Temperature: 98 6 °F (37 °C) (03/03/19 0722)  Temp Source: Oral (03/03/19 0722)  Respirations: 22 (03/03/19 0722)  Height: 5' 10" (177 8 cm) (03/02/19 1908)  Weight - Scale: 106 kg (234 lb) (03/03/19 0552)  SpO2: 94 % (03/03/19 0722)      Intake/Output Summary (Last 24 hours) at 3/3/2019 0940  Last data filed at 3/3/2019 0557  Gross per 24 hour   Intake 100 ml   Output 650 ml   Net -550 ml       Invasive Devices     Peripheral Intravenous Line            Peripheral IV 03/02/19 Left Antecubital less than 1 day    Peripheral IV 03/02/19 Right Antecubital less than 1 day                Physical Exam   Constitutional: He is oriented to person, place, and time  He appears well-developed and well-nourished  No distress  HENT:   Head: Normocephalic  Eyes: No scleral icterus  Cardiovascular: Normal rate and regular rhythm  Pulmonary/Chest: Effort normal and breath sounds normal  No stridor  He has no wheezes  Abdominal: Soft  He exhibits distension  There is tenderness  There is no rebound and no guarding  Musculoskeletal: He exhibits no edema  Neurological: He is alert and oriented to person, place, and time  Skin: Skin is warm  He is not diaphoretic  Psychiatric: He has a normal mood and affect  Judgment and thought content normal    Nursing note and vitals reviewed        Lab Results:   CBC:   Lab Results   Component Value Date    WBC 14 72 (H) 03/03/2019    HGB 14 0 03/03/2019    HCT 37 9 03/03/2019    MCV 92 03/03/2019     03/03/2019    MCH 34 1 03/03/2019    MCHC 36 9 03/03/2019    RDW 11 9 03/03/2019    MPV 9 3 03/03/2019    NRBC 0 03/02/2019   , CMP:   Lab Results   Component Value Date    SODIUM 132 (L) 03/02/2019    K 3 6 03/02/2019    CL 93 (L) 03/02/2019    CO2 24 03/02/2019    BUN 13 03/02/2019    CREATININE 0 94 03/02/2019    CALCIUM 9 2 03/02/2019    AST 99 (H) 03/02/2019     (H) 03/02/2019    ALKPHOS 172 (H) 03/02/2019    EGFR 91 03/02/2019   , Coagulation:   Lab Results   Component Value Date    INR 0 97 03/02/2019   , Urinalysis:   Lab Results   Component Value Date    COLORU Yellow 03/03/2019    CLARITYU Clear 03/03/2019    SPECGRAV 1 025 03/03/2019    PHUR 6 0 03/03/2019    LEUKOCYTESUR Negative 03/03/2019    NITRITE Negative 03/03/2019    GLUCOSEU 250 (1/4%) (A) 03/03/2019    KETONESU Trace (A) 03/03/2019    BILIRUBINUR Negative 03/03/2019    BLOODU Trace-Intact (A) 03/03/2019   , Amylase: No results found for: AMYLASE, Lipase:   Lab Results Component Value Date    LIPASE 457 (H) 03/03/2019     Imaging: I have personally reviewed pertinent reports  and I have personally reviewed pertinent films in PACS  EKG, Pathology, and Other Studies: I have personally reviewed pertinent reports     and I have personally reviewed pertinent films in PACS

## 2019-03-03 NOTE — H&P
History and Physical - Critical Care   Yvonne Bush 52 y o  male MRN: [id number]  Unit/Bed#: -01 Encounter: [id number]      Reason for Admission/Chief Complaint   Acute pancreatitis likely secondary to chronic alcohol abuse with possible necrosis      History of Present Illness   Yvonne Bush is a 52 y o  male who presents to the [facility] in [place] ED complaining of abdominal pain x2 days  Patient states pain started 2 days ago and has gradually worsened since then  Pain is aching, constant, and worsened with movement and deep breathing  Originally thought it was a GI bug but pain began to radiate straight through to his back which he says felt similar to the pancreatitis 1 year ago  Developed says he had nausea and bilious vomiting  Denies any diarrhea bloating or constipation  Denies any other major symptoms  Has history of chronic alcohol abuse, but notes that wife has been hospitalized with past weeks that during that time his alcohol intake has increased to four 30 packs of beer per week  Patient met sepsis criteria due to tachycardia, leukocytosis, lactic acidosis and sepsis alert was called  Received 3 liters normal saline and single dose of Levaquin in the ED  Patient went for CT a/P which revealed peripancreatic fat stranding at the tail and pancreas with associated decreased enhancement of the tail of the pancreas concerning for pancreatic necrosis  Was admitted to step-down 2   GI and general surgery consulted  Per chart review, patient was treated for acute pancreatitis March 4271 without complicated course  Discharge from the hospital after 4 days  History obtained from chart review and the patient  Past Medical History   History reviewed  No pertinent past medical history  Past Surgical History     Past Surgical History:   Procedure Laterality Date    LEG SURGERY           Family History   History reviewed  No pertinent family history      Social History Social History     Tobacco Use   Smoking Status Current Every Day Smoker    Packs/day: 1 00    Types: Cigarettes   Smokeless Tobacco Never Used     Social History     Substance and Sexual Activity   Alcohol Use Yes    Alcohol/week: 162 0 oz    Types: 270 Cans of beer per week    Comment: 30 pack a day     Social History     Substance and Sexual Activity   Drug Use Yes    Types: Marijuana    Comment: not for a while     Marital Status: /Civil Union  Exercise History: n/a    Medications:  Current Facility-Administered Medications   Medication Dose Route Frequency    folic acid 1 mg in sodium chloride 0 9 % 50 mL IVPB  1 mg Intravenous Daily    heparin (porcine) subcutaneous injection 5,000 Units  5,000 Units Subcutaneous Q8H [address]    HYDROmorphone (DILAUDID) injection 1 mg  1 mg Intravenous Q2H PRN    insulin lispro (HumaLOG) 100 units/mL subcutaneous injection 1-6 Units  1-6 Units Subcutaneous Q6H [address]    lactated ringers infusion  250 mL/hr Intravenous Continuous    nicotine (NICODERM CQ) 21 mg/24 hr TD 24 hr patch 21 mg  21 mg Transdermal Daily    ondansetron (ZOFRAN) injection 4 mg  4 mg Intravenous Q4H PRN    pantoprazole (PROTONIX) injection 40 mg  40 mg Intravenous Q24H ROSALEE    thiamine (VITAMIN B1) 100 mg in sodium chloride 0 9 % 50 mL IVPB  100 mg Intravenous Daily       Medications Prior to Admission     Prior to Admission medications    Medication Sig Start Date End Date Taking?  Authorizing Provider   glimepiride (AMARYL) 2 mg tablet Take 2 mg by mouth every morning before breakfast   Yes Historical Provider, MD   hydrochlorothiazide (HYDRODIURIL) 25 mg tablet Take 25 mg by mouth daily   Yes Historical Provider, MD   metFORMIN (GLUCOPHAGE) 500 mg tablet Take 500 mg by mouth 2 (two) times a day with meals   Yes Historical Provider, MD   omeprazole (PriLOSEC) 40 MG capsule Take 40 mg by mouth daily   Yes Historical Provider, MD   ramipril (ALTACE) 10 MG capsule Take 10 mg by mouth daily Yes Historical Provider, MD   sertraline (ZOLOFT) 100 mg tablet Take 150 mg by mouth daily   Yes Historical Provider, MD   simvastatin (ZOCOR) 20 mg tablet Take 20 mg by mouth daily   Yes Historical Provider, MD       Allergies     Allergies   Allergen Reactions    Amoxicillin Swelling       Review of Systems   Review of Systems   Constitutional: Negative for chills, diaphoresis, fatigue and fever  HENT: Positive for dental problem (Swelling of right cheek)  Eyes: Negative for photophobia, pain and visual disturbance  Respiratory: Negative for cough, choking, chest tightness, shortness of breath and wheezing  Cardiovascular: Negative for chest pain and palpitations  Gastrointestinal: Positive for abdominal pain (7/10 center of upper stomach), nausea and vomiting (yellow)  Negative for abdominal distention, constipation and diarrhea  Musculoskeletal: Positive for back pain (radiating from front)  Negative for arthralgias, myalgias, neck pain and neck stiffness  Skin: Negative for color change, pallor and rash  Neurological: Negative for dizziness and headaches  All other systems reviewed and are negative  Temperature:   Temp (24hrs), Av 5 °F (36 4 °C), Min:97 5 °F (36 4 °C), Max:97 5 °F (36 4 °C)    Current Temperature: 97 5 °F (36 4 °C)  Vitals:  Vitals:    19 2130 19 2200 19 2300 19 2358   BP: 122/74 135/82 158/80    BP Location: Right arm Right arm Right arm    Pulse: (!) 108 (!) 108 (!) 106 (!) 111   Resp: 22 22 22    Temp:       TempSrc:       SpO2: 93% 96% 97% 93%   Weight:       Height:             Weights:   IBW: 73 kg  Body mass index is 33 63 kg/m²      Hemodynamic Monitoring:  N/A     Non-Invasive/Invasive Ventilation Settings:  Respiratory    Lab Data (Last 4 hours)    None         O2/Vent Data (Last 4 hours)    None              No results found for: PHART, RAX8WAH, PO2ART, IZN0ELM, R5DQTCRM, BEART, SOURCE  SpO2: SpO2: 93 %, SpO2 Activity: SpO2 Activity: At Rest, SpO2 Device: O2 Device: None (Room air)       Physical Exam   Physical Exam   Constitutional: He is oriented to person, place, and time  He appears well-developed and well-nourished  Appears uncomfortable and ill  HENT:   Head: Normocephalic and atraumatic  Nose: Nose normal    Mouth/Throat: Oropharynx is clear and moist    Swelling of right buccal mucoa with mild tenderness to palpation   Eyes: Conjunctivae and EOM are normal    Cardiovascular: Regular rhythm, normal heart sounds and intact distal pulses  Exam reveals no gallop and no friction rub  No murmur heard  Tachycardia   Pulmonary/Chest: Effort normal and breath sounds normal  No respiratory distress  He has no wheezes  He has no rales  He exhibits no tenderness  Abdominal: Soft  Bowel sounds are normal  He exhibits no distension and no mass  There is tenderness (epigastric with palpation)  There is guarding  Musculoskeletal: He exhibits no tenderness or deformity  Neurological: He is alert and oriented to person, place, and time  Skin: Skin is warm and dry  No erythema         Labs:  Results from last 7 days   Lab Units 03/02/19  1934   WBC Thousand/uL 16 09*   HEMOGLOBIN g/dL 16 0   HEMATOCRIT % 43 1   PLATELETS Thousands/uL 216   NEUTROS PCT % 89*   MONOS PCT % 5      Results from last 7 days   Lab Units 03/02/19  1920   POTASSIUM mmol/L 3 6   CHLORIDE mmol/L 93*   CO2 mmol/L 24   BUN mg/dL 13   CREATININE mg/dL 0 94   CALCIUM mg/dL 9 2   ALK PHOS U/L 172*   ALT U/L 158*   AST U/L 99*   ALBUMIN g/dL 3 9              Results from last 7 days   Lab Units 03/02/19  1920   INR  0 97   PTT seconds 32     Results from last 7 days   Lab Units 03/02/19  2225 03/02/19  1920   LACTIC ACID mmol/L 2 1* 4 3*     0   Lab Value Date/Time    TROPONINI 0 04 03/02/2019 1920    TROPONINI <0 02 03/19/2018 1806         Imaging:  CT revealed peripancreatic fat stranding near the tail of the pancreas with associated decreased enhancement at the tail of the pancreas concerning for pancreatic necrosis  I have personally reviewed pertinent reports  and I have personally reviewed pertinent films in PACS   Micro:  No results found for: Myrna Bills, Jf Tan    ______________________________________________________________________    Assessment and Plan     Principal Problem:    Acute pancreatitis  Active Problems:    Hepatic steatosis    HTN (hypertension), benign    HLD (hyperlipidemia)    Alcohol abuse    Lactic acidosis    Type 1 diabetes mellitus with hyperglycemia ([facility] [address] )  Resolved Problems:    * No resolved hospital problems  *        Plan:  Neuro:   Chronic EtOH abuse  · No current signs and symptoms of withdrawal  · Placed on CIWA protocol  · Daily thiamine and folic acid  · follow Neuro Exams  · Analgesia:  Dilaudid 1 milligram q 2 hours p r n  · Delirium monitoring/management, good sleep hygiene    CV:   HTN  · Holding home HCTZ as patient is NPO  HLD  · Holding home statin as patient is NPO    Lung:   Nicotine abuse  · Nicotine patch daily  · Chest x-ray revealed no acute cardiopulmonary process    GI:   Acute pancreatitis likely secondary to chronic alcohol abuse with possible necrosis  · Lipase 602, WBC 16 09, triglycerides 664  · CTA/P:  Peripancreatic fat stranding is noted near the palate pancreas with associated decreased enhancement tail of pancreas concerning for pancreatic necrosis  · Suspect this is secondary to alcohol abuse-states that in the past weeks, he has been drinking four 30 packs of beer per week  · LR at 250 mL/hour  · Pain regimen as above  · P r n   Zofran  · Trend lipase daily  · Check right upper quadrant ultrasound to rule out biliary causes  · GI and General surgery consult    FEN:   · Fluid/Diuretic plan:  LR at 250 mL/hour with goal urine output greater than 0 5 ml/kg/hr  · Nutrition/diet plan:  Keep NPO with ice chips for now  · Replete electrolytes with goals: K >4 0, Mag >2 0, and Phos >3 0    :   · Indwelling [place] present: no   · Trend UOP and BUN/creat  · Strict I and O    ID:   Lactic acidosis/leukocytosis  · Lactic acid:  4 3  · WBC:  16 09  · In setting of acute pancreatitis and dehydration  · Received a single dose of Levaquin sepsis protocol  · Hold off on antibiotics for current time, will check which general surgery/GI  · Trend lactic acid until downtrending  · Trend temps and WBC count    Heme:   · Trend hgb and plts  · Transfuse as needed for goal hgb >7 0    Endo:   Type 2 diabetes  · Initiate SSI    MSK/Skin:  · Early Mobility/Exercise  · Frequent turning and pressure offloading    VTE Prophylaxis:  · Pharmacologic Prophylaxis: Heparin  · Mechanical Prophylaxis: sequential compression device    Disposition: Continue on SD2      Counseling / Coordination of Care  Total Critical Care time spent 34 minutes excluding procedures, teaching and family updates  ______________________________________________________________________    Invasive lines and devices: Invasive Devices     Peripheral Intravenous Line            Peripheral IV 03/02/19 Left Antecubital less than 1 day    Peripheral IV 03/02/19 Right Antecubital less than 1 day                Code Status: Level 1 - Full Code  POA:    POLST:      Given critical illness, patient length of stay will require greater than two midnights  Portions of the record may have been created with voice recognition software  Occasional wrong word or "sound a like" substitutions may have occurred due to the inherent limitations of voice recognition software  Read the chart carefully and recognize, using context, where substitutions have occurred        Braden Garay PA-C

## 2019-03-03 NOTE — ASSESSMENT & PLAN NOTE
With suspected necrosis of pancreatic tail ands associated surrounding fat stranding  Still severe pain and some guarding lt middle abdomen, BS+  Pain control  Continue IV lactated ringers  Rpt BMP later tonight and then tomorrow  Check blood cultures  Monitor lactate  Maintain level 2 stepdown  NPO c sips, if any deterioration, may need ICU

## 2019-03-03 NOTE — PROGRESS NOTES
Progress Note - Jyoti Burnett 1965, 52 y o  male MRN: [id number]    Unit/Bed#: -01 Encounter: [id number]    Primary Care Provider: Manasa Guaman MD   Date and time admitted to hospital: 3/2/2019  7:07 PM    Type 2 diabetes mellitus with hyperglycemia [facility])  Assessment & Plan  No results found for: HGBA1C    Recent Labs     19  0029 19  0729 19  1113   POCGLU 166* 186* 196*       Blood Sugar Average: Last 72 hrs:  (P) 076 [id number]   SSI and monitor BS  Hold home meds -glimeperide and metformin      Alcohol abuse  Assessment & Plan  Suspected cause for pancreatitis  Once improved, may have to consider rehab  CIWA protocol    HTN (hypertension), benign  Assessment & Plan  Monitor BP  Hold diuresis and ramipril for now    * Acute pancreatitis  Assessment & Plan  With suspected necrosis of pancreatic tail ands associated surrounding fat stranding  Still severe pain and some guarding lt middle abdomen, BS+  Pain control  Continue IV lactated ringers  Rpt BMP later tonight and then tomorrow  Check blood cultures  Monitor lactate  Maintain level 2 stepdown  NPO c sips, if any deterioration, may need ICU        VTE Pharmacologic Prophylaxis: Heparin  Mechanical: Mechanical VTE prophylaxis in place  Patient Centered Rounds: I have performed bedside rounds with nursing staff today  Discussions with Specialists or Other Care Team Provider:     Education and Discussions with Family / Patient:     Time Spent for Care: More than 50% of total time spent on counseling and coordination of care as described above      Current Length of Stay: 1 day(s)    Current Patient Status: Inpatient   Certification Statement: The patient will continue to require additional inpatient hospital stay due to as above    Discharge Plan:    Code Status: Level 1 - Full Code      Subjective: nil acute    Objective:     Vitals:   Temp (24hrs), Av 5 °F (36 9 °C), Min:97 5 °F (36 4 °C), Max:99 5 °F (37 5 °C)    Temp:  [97 5 °F (36 4 °C)-99 5 °F (37 5 °C)] 99 2 °F (37 3 °C)  HR:  [105-113] 107  Resp:  [18-26] 20  BP: (122-158)/(68-94) 142/76  SpO2:  [92 %-99 %] 92 %  Body mass index is 33 58 kg/m²  Input and Output Summary (last 24 hours): Intake/Output Summary (Last 24 hours) at 3/3/2019 1513  Last data filed at 3/3/2019 1300  Gross per 24 hour   Intake 100 ml   Output 1250 ml   Net -1150 ml       Physical Exam   Constitutional: He is oriented to person, place, and time  HENT:   Head: Normocephalic  Eyes: Pupils are equal, round, and reactive to light  Cardiovascular: Normal rate and normal heart sounds  Pulmonary/Chest: Effort normal and breath sounds normal    Abdominal: Soft  Bowel sounds are normal    Neurological: He is alert and oriented to person, place, and time  Skin: There is pallor  Additional Data:     Labs:    Results from last 7 days   Lab Units 03/03/19  0340 03/02/19  1934   WBC Thousand/uL 14 72* 16 09*   HEMOGLOBIN g/dL 14 0 16 0   HEMATOCRIT % 37 9 43 1   PLATELETS Thousands/uL 157 216   NEUTROS PCT %  --  89*   LYMPHS PCT %  --  4*   MONOS PCT %  --  5   EOS PCT %  --  0     Results from last 7 days   Lab Units 03/02/19  1920   POTASSIUM mmol/L 3 6   CHLORIDE mmol/L 93*   CO2 mmol/L 24   BUN mg/dL 13   CREATININE mg/dL 0 94   CALCIUM mg/dL 9 2   ALK PHOS U/L 172*   ALT U/L 158*   AST U/L 99*     Results from last 7 days   Lab Units 03/02/19  1920   INR  0 97       * I Have Reviewed All Lab Data Listed Above      Imaging:  Imaging Personally Reviewed by Myself Includes:     Cultures:   Blood Culture: No results found for: BLOODCX  Urine Culture: No results found for: URINECX  Sputum Culture: No components found for: SPUTUMCX  Wound Culture: No results found for: WOUNDCULT    Last 24 Hours Medication List:     Current Facility-Administered Medications:  folic acid IVPB 1 mg Intravenous Daily KRISTOFER Obregon Last Rate: 1 mg (03/03/19 0837)   heparin (porcine) 5,000 Units Subcutaneous [facility] Arlester Gitelman, CRNP    HYDROmorphone 0 5 mg Intravenous Q1H PRN Magalis Butterfield MD    HYDROmorphone 1 mg Intravenous Q2H PRN Raulito Durham PA-C    insulin lispro 1-6 Units Subcutaneous Q6H [address] Arlester Gitelman, CRNP    lactated ringers 250 mL/hr Intravenous Continuous Arlester Gitelman, CRNP Last Rate: 250 mL/hr (03/03/19 1329)   LORazepam 1 mg Intravenous Q4H PRN Magalis Butterfield MD    nicotine 21 mg Transdermal Daily Arlester Gitelman, CRNP    ondansetron 4 mg Intravenous Q4H PRN Raulito Durham PA-C    pantoprazole 40 mg Intravenous Q24H [address] Denzel Durham PA-C    thiamine 100 mg Intravenous Daily Arlester Gitelman, CRNP Last Rate: 100 mg (03/03/19 1329)        Today, Patient Was Seen By: Magalis Butterfield MD    ** Please Note: Dragon 360 Dictation voice to text software may have been used in the creation of this document   **

## 2019-03-03 NOTE — ED PROVIDER NOTES
History  Chief Complaint   Patient presents with    Chest Pain     Pt presents to ED from home w/ cp starting yesterday  Pt (+) abd pain, in moderate distress  Pt (+) hx pancreatitis, states feels the same as before  History provided by:  Patient  Epigastric Pain   Pain location:  Epigastric  Pain quality: aching    Pain radiates to:  Mid back  Pain radiates to the back: yes    Pain severity:  Severe  Onset quality:  Gradual  Duration:  2 days  Timing:  Constant  Progression:  Worsening  Chronicity:  Recurrent  Context: breathing, movement and at rest    Context: no drug use, not eating, not lifting, not raising an arm, no stress and no trauma    Relieved by:  None tried  Worsened by: Movement and deep breathing  Ineffective treatments:  None tried  Associated symptoms: abdominal pain, back pain, diaphoresis, nausea and vomiting    Associated symptoms: no AICD problem, no altered mental status, no anorexia, no anxiety, no claudication, no cough, no dizziness, no dysphagia, no fatigue, no fever, no headache, no heartburn, no lower extremity edema, no near-syncope, no numbness, no orthopnea, no palpitations, no PND, no shortness of breath, no syncope and no weakness    Risk factors: hypertension, male sex, obesity and smoking    Risk factors: no aortic disease, no coronary artery disease, no diabetes mellitus, no Molina-Danlos syndrome, no high cholesterol, no immobilization, no Marfan's syndrome, no prior DVT/PE and no surgery        Prior to Admission Medications   Prescriptions Last Dose Informant Patient Reported?  Taking?   hydrochlorothiazide (HYDRODIURIL) 25 mg tablet   Yes No   Sig: Take 25 mg by mouth daily   nicotine (NICODERM CQ) 21 mg/24 hr TD 24 hr patch   No No   Sig: Place 1 patch on the skin daily   omeprazole (PriLOSEC) 40 MG capsule   Yes No   Sig: Take 40 mg by mouth daily   oxyCODONE (ROXICODONE) 5 mg immediate release tablet   No No   Sig: Take 1 tablet (5 mg total) by mouth every 8 (eight) hours as needed for moderate pain Max Daily Amount: 15 mg   ramipril (ALTACE) 10 MG capsule   Yes No   Sig: Take 10 mg by mouth daily   sertraline (ZOLOFT) 100 mg tablet   Yes No   Sig: Take 150 mg by mouth daily   simvastatin (ZOCOR) 20 mg tablet   Yes No   Sig: Take 20 mg by mouth daily      Facility-Administered Medications: None       History reviewed  No pertinent past medical history  Past Surgical History:   Procedure Laterality Date    LEG SURGERY         History reviewed  No pertinent family history  I have reviewed and agree with the history as documented  Social History     Tobacco Use    Smoking status: Current Every Day Smoker     Packs/day: 1 00     Types: Cigarettes    Smokeless tobacco: Never Used   Substance Use Topics    Alcohol use: Yes     Alcohol/week: 162 0 oz     Types: 270 Cans of beer per week     Comment: 30 pack a day    Drug use: Yes     Types: Marijuana     Comment: not for a while        Review of Systems   Constitutional: Positive for activity change, appetite change and diaphoresis  Negative for chills, fatigue and fever  HENT: Negative for congestion, dental problem, ear discharge, ear pain, postnasal drip, rhinorrhea, sore throat and trouble swallowing  Eyes: Negative for pain, discharge, redness and itching  Respiratory: Negative for cough, chest tightness, shortness of breath and wheezing  Cardiovascular: Negative for chest pain, palpitations, orthopnea, claudication, leg swelling, syncope, PND and near-syncope  Gastrointestinal: Positive for abdominal pain, nausea and vomiting  Negative for anal bleeding, anorexia, blood in stool and heartburn  Genitourinary: Negative for frequency and hematuria  Musculoskeletal: Positive for back pain  Skin: Positive for pallor  Negative for rash and wound  Neurological: Negative for dizziness, weakness, numbness and headaches  Psychiatric/Behavioral: Negative for confusion     All other systems reviewed and are negative  Physical Exam  Physical Exam   Constitutional: He is oriented to person, place, and time  He appears well-developed and well-nourished  He appears toxic  He appears ill  He appears distressed  HENT:   Head: Normocephalic and atraumatic  Oral mucosa abscess right  Neck: No hepatojugular reflux and no JVD present  No thyromegaly present  Cardiovascular: Regular rhythm and normal pulses  Tachycardia present  Exam reveals no S3 and no S4  No murmur heard  Pulses:       Carotid pulses are 2+ on the right side, and 2+ on the left side  Radial pulses are 2+ on the right side, and 2+ on the left side  Pulmonary/Chest: Effort normal and breath sounds normal  No accessory muscle usage  No tachypnea  No respiratory distress  Abdominal: He exhibits distension  There is hepatomegaly  There is tenderness  There is rebound and guarding  Musculoskeletal:        Right lower leg: He exhibits no edema  Left lower leg: He exhibits no edema  Lymphadenopathy:     He has no cervical adenopathy  Neurological: He is alert and oriented to person, place, and time  Skin: Skin is warm  No rash noted  He is diaphoretic  No erythema  There is pallor  Psychiatric: He has a normal mood and affect  His behavior is normal    Nursing note and vitals reviewed        Vital Signs  ED Triage Vitals [03/02/19 1908]   Temperature Pulse Respirations Blood Pressure SpO2   97 5 °F (36 4 °C) (!) 113 (!) 25 156/94 99 %      Temp Source Heart Rate Source Patient Position - Orthostatic VS BP Location FiO2 (%)   Oral Monitor Lying Right arm --      Pain Score       Worst Possible Pain           Vitals:    03/02/19 1908   BP: 156/94   Pulse: (!) 113   Patient Position - Orthostatic VS: Lying       Visual Acuity      ED Medications  Medications - No data to display    Diagnostic Studies  Results Reviewed     None                 X-ray chest 2 views    (Results Pending)              Procedures  ECG 12 Lead Documentation  Date/Time: 3/2/2019 7:42 PM  Performed by: Cristobal Wilson PA-C  Authorized by: Cristobal Wilson PA-C     Indications / Diagnosis:  Epigastric pain  ECG reviewed by me, the ED Provider: yes    Patient location:  ED  Previous ECG:     Previous ECG:  Compared to current    Comparison ECG info:  3/19/18    Similarity:  Changes noted    Comparison to cardiac monitor: Yes    Interpretation:     Interpretation: abnormal    Rate:     ECG rate:  110    ECG rate assessment: tachycardic    Rhythm:     Rhythm: sinus rhythm    Ectopy:     Ectopy: none    QRS:     QRS axis:  Normal    QRS intervals:  Normal  Conduction:     Conduction: normal    ST segments:     ST segments:  Normal  T waves:     T waves: normal    Comments:      Poor R-wave progression, septal infarct cited before March 19, 2018  EKG independently interpreted by me           Phone Contacts  ED Phone Contact    ED Course  ED Course as of Mar 02 2224   Sat Mar 02, 2019   2051 Lactic 4 4, Sepsis alert called  MDM    Disposition  Final diagnoses:   None     ED Disposition     None      Follow-up Information    None         Patient's Medications   Discharge Prescriptions    No medications on file     No discharge procedures on file      ED Provider  Electronically Signed by           Cristobal Wilson PA-C  03/02/19 5430

## 2019-03-03 NOTE — SEPSIS NOTE
Sepsis Note   Brian Blair 52 y o  male MRN: [id number]  Unit/Bed#: ED 28 Encounter: [id number]      Initial Sepsis Screening     Row Name 03/02/19 2048                Is the patient's history suggestive of a new or worsening infection? Yes (Proceed)  (Abnormal)   -JG        Suspected source of infection  acute abdominal infection  -JG        Are two or more of the following signs & symptoms of infection both present and new to the patient?         Indicate SIRS criteria  Leukocytosis (WBC > 58896 IJL); Tachycardia > 90 bpm  -JG        If the answer is yes to both questions, suspicion of sepsis is present          If severe sepsis is present AND tissue hypoperfusion perists in the hour after fluid resuscitation or lactate > 4, the patient meets criteria for SEPTIC SHOCK          Are any of the following organ dysfunction criteria present within 6 hours of suspected infection and SIRS criteria that are NOT considered to be chronic conditions? Yes  (Abnormal)   -JG        Organ dysfunction  Lactate >/equal 4 0 mmol/L  -JG        Date of presentation of severe sepsis          Time of presentation of severe sepsis          Tissue hypoperfusion persists in the hour after crystalloid fluid administration, evidenced, by either:          Was hypotension present within one hour of the conclusion of crystalloid fluid administration?           Date of presentation of septic shock          Time of presentation of septic shock            User Key  (r) = Recorded By, (t) = Taken By, (c) = Cosigned By    Initials Name Provider Type    [address] Gonzalez PA-C Physician Assistant               Default Flowsheet Data (last 5 hours)      Sepsis Reassess     Row Name 03/02/19 2227                   Repeat Volume Status and Tissue Perfusion Assessment Performed    Repeat Volume Status and Tissue Perfusion Assessment Performed  Yes  -JG           Volume Status and Tissue Perfusion Post Fluid Resuscitation * Must Document All *    Vital Signs Reviewed (HR, RR, BP, T)  Yes  -JG        Shock Index Reviewed          Arterial Oxygen Saturation Reviewed (POx, SaO2 or SpO2)          Cardio  Normal S1/S2; Tachycardia  (Abnormal)   -JG        Pulmonary  Normal effort;Clear to auscultation  -JG        Capillary Refill  Brisk  -JG        Peripheral Pulses  Radial;Dorsalis Pedis; Posterior Tibialis  -JG        Peripheral Pulse  +2  -JG        Dorsalis Pedis  +2  -JG        Posterior Tibialis  +2  -JG        Skin  Warm;Pale  -JG        Urine output assessed   Patient hasn't urinatewd  -JG           *OR*   Intensive Monitoring- Must Document One of the Following Four *:    Vital Signs Reviewed          * Central Venous Pressure (CVP or RAP)          * Central Venous Oxygen (SVO2, ScvO2 or Oxygen saturation via central catheter)          * Bedside Cardiovascular US in IVC diameter and % collapse          * Passive Leg Raise OR Crystalloid Challenge            User Key  (r) = Recorded By, (t) = Taken By, (c) = Cosigned By    Initials Name Provider Type    [address] BONITA KrugerC Physician Assistant

## 2019-03-03 NOTE — ASSESSMENT & PLAN NOTE
No results found for: HGBA1C    Recent Labs     03/03/19  0029 03/03/19  0729 03/03/19  1113   POCGLU 166* 186* 196*       Blood Sugar Average: Last 72 hrs:  (P) 621 [id number]   SSI and monitor BS  Hold home meds -glimeperide and metformin

## 2019-03-03 NOTE — UTILIZATION REVIEW
Initial Clinical Review    Admission: Date/Time/Statement: 3/2/19 @ 2242   Orders Placed This Encounter   Procedures    Inpatient Admission (expected length of stay for this patient Order details is greater than two midnights)     Standing Status:   Standing     Number of Occurrences:   1     Order Specific Question:   Admitting Physician     Answer:   Jesusita Urbina     Order Specific Question:   Level of Care     Answer:   Level 2 Stepdown / HOT [14]     Order Specific Question:   Estimated length of stay     Answer:   More than 2 Midnights     Order Specific Question:   Certification     Answer:   I certify that inpatient services are medically necessary for this patient for a duration of greater than two midnights  See H&P and MD Progress Notes for additional information about the patient's course of treatment  ED: Date/Time/Mode of Arrival:   ED Arrival Information     Expected Arrival Acuity Means of Arrival Escorted By Service Admission Type    - 3/2/2019 19:03 Emergent Walk-In Self Hospitalist Emergency    Arrival Complaint    cp, back pain, alcohol problem        Chief Complaint:   Chief Complaint   Patient presents with    Chest Pain     Pt presents to ED from home w/ cp starting yesterday  Pt (+) abd pain, in moderate distress  Pt (+) hx pancreatitis, states feels the same as before  History of Illness:  Patrick Culver is a 52 y o  male who presents to the [address] [place] [address] in [facility] ED complaining of abdominal pain x2 days  Patient states pain started 2 days ago and has gradually worsened since then  Pain is aching, constant, and worsened with movement and deep breathing  Originally thought it was a GI bug but pain began to radiate straight through to his back which he says felt similar to the pancreatitis 1 year ago  Developed says he had nausea and bilious vomiting  Denies any diarrhea bloating or constipation  Denies any other major symptoms    Has history of chronic alcohol abuse, but notes that wife has been hospitalized with past weeks that during that time his alcohol intake has increased to four 30 packs of beer per week      Patient met sepsis criteria due to tachycardia, leukocytosis, lactic acidosis and sepsis alert was called  Received 3 liters normal saline and single dose of Levaquin in the ED  Patient went for CT a/P which revealed peripancreatic fat stranding at the tail and pancreas with associated decreased enhancement of the tail of the pancreas concerning for pancreatic necrosis  Was admitted to step-down 2   GI and general surgery consulted      Per chart review, patient was treated for acute pancreatitis March 4618 without complicated course  Discharge from the hospital after 4 days  ED Vital Signs:   ED Triage Vitals [03/02/19 1908]   Temperature Pulse Respirations Blood Pressure SpO2   97 5 °F (36 4 °C) (!) 113 (!) 25 156/94 99 %      Temp Source Heart Rate Source Patient Position - Orthostatic VS BP Location FiO2 (%)   Oral Monitor Lying Right arm --      Pain Score       Worst Possible Pain        Wt Readings from Last 1 Encounters:   03/03/19 106 kg (234 lb)     Vital Signs (abnormal): Above  CIWA  s cores    5         2              4    Pertinent Labs/Diagnostic Test Results:   U/A    Tr ketone     Tr  Blood     250 glucose  Lactic  Acid    4 3  NA  132  Chloride    93  AG  15  AST   99  ALT    158  Alk phos  172  Total  Bili  1 30  Lipase  602  WBC   16 09  Abs   neutro   14 42  Ct  Abd/pelvis:     Peripancreatic fat stranding is noted near the tail the pancreas with associated decreased enhancement of the tail the pancreas concerning for pancreatic necrosis       1 7 cm exophytic lesion at the inferior pole the left kidney (601:103) likely represents a hyperdense cyst however does not meet criteria for 1   Recommend elective ultrasound or MRI for further evaluation  Ruchi Felix liver  CXR:  NAD    ED Treatment:   Medication Administration from 03/02/2019 1903 to 03/02/2019 2350       Date/Time Order Dose Route Action Action by Comments     03/02/2019 1938 HYDROmorphone (DILAUDID) injection 1 mg 1 mg Intravenous Given Day Kramer RN      03/02/2019 1938 ondansetron (ZOFRAN) injection 4 mg 4 mg Intravenous Given Day Kramer RN      12/97/8059 7958 folic acid 1 mg in sodium chloride 0 9 % 50 mL IVPB 0 mg Intravenous Stopped Day Kramer RN      70/80/7977 3048 folic acid 1 mg in sodium chloride 0 9 % 50 mL IVPB 1 mg Intravenous New Bag Day Kramer RN      03/02/2019 2028 pyridoxine (VITAMIN B6) injection 100 mg 100 mg Intravenous Given Day Kramer RN      03/02/2019 2108 thiamine (VITAMIN B1) 100 mg in sodium chloride 0 9 % 50 mL IVPB 0 mg Intravenous Stopped Day Kramer RN      03/02/2019 2029 thiamine (VITAMIN B1) 100 mg in sodium chloride 0 9 % 50 mL IVPB 100 mg Intravenous New Bag Day Kramer RN      03/02/2019 2029 LORazepam (ATIVAN) 2 mg/mL injection 0 5 mg 0 5 mg Intravenous Given Day Kramer RN      03/02/2019 2325 levofloxacin (LEVAQUIN) IVPB (premix) 750 mg 0 mg Intravenous Stopped Natalie Ortega, MEI      03/02/2019 2137 levofloxacin (LEVAQUIN) IVPB (premix) 750 mg 750 mg Intravenous New Bag Day Kramer RN      03/02/2019 2028 ondansetron (ZOFRAN) injection 4 mg 4 mg Intravenous Given Day Kramer RN      03/02/2019 2132 HYDROmorphone (DILAUDID) injection 1 mg 1 mg Intravenous Given Dya Kramer RN      03/02/2019 2132 LORazepam (ATIVAN) 2 mg/mL injection 0 5 mg 0 5 mg Intravenous Given Day Kramer RN      03/02/2019 2325 sodium chloride 0 9 % bolus 3,000 mL 0 mL Intravenous Stopped Natalie Ortega RN      03/02/2019 2132 sodium chloride 0 9 % bolus 3,000 mL 3,000 mL Intravenous New Bag Day Kramer RN      03/02/2019 2124 iohexol (OMNIPAQUE) 350 MG/ML injection (MULTI-DOSE) 100 mL 100 mL Intravenous Given Rosa Milder         Past Medical/Surgical History:    Active Ambulatory Problems     Diagnosis Date Noted    Acute pancreatitis 03/19/2018    Hepatic steatosis 03/22/2018    Lesion of left native kidney 03/22/2018    HTN (hypertension), benign 03/22/2018    HLD (hyperlipidemia) 03/22/2018     Resolved Ambulatory Problems     Diagnosis Date Noted    No Resolved Ambulatory Problems     No Additional Past Medical History     Admitting Diagnosis: Abscess [L02 91]  Pancreatitis [K85 90]  Chest pain [R07 9]  Alcohol-induced acute pancreatitis with uninfected necrosis [K85 21]  Age/Sex: 52 y o  male     Assessment/Plan: Principal Problem:    Acute pancreatitis  Active Problems:    Hepatic steatosis    HTN (hypertension), benign    HLD (hyperlipidemia)    Alcohol abuse    Lactic acidosis    Type 1 diabetes mellitus with hyperglycemia ([facility] [address]   Plan:  Neuro:   Chronic EtOH abuse  · No current signs and symptoms of withdrawal  · Placed on CIWA protocol  · Daily thiamine and folic acid  · follow Neuro Exams  · Analgesia:  Dilaudid 1 milligram q 2 hours p r n  · Delirium monitoring/management, good sleep hygiene     CV:   HTN  · Holding home HCTZ as patient is NPO    HLD  · Holding home statin as patient is NPO     Lung:   Nicotine abuse  · Nicotine patch daily  · Chest x-ray revealed no acute cardiopulmonary process     GI:   Acute pancreatitis likely secondary to chronic alcohol abuse with possible necrosis  · Lipase 602, WBC 16 09, triglycerides 664  · CTA/P:  Peripancreatic fat stranding is noted near the palate pancreas with associated decreased enhancement tail of pancreas concerning for pancreatic necrosis  · Suspect this is secondary to alcohol abuse-states that in the past weeks, he has been drinking four 30 packs of beer per week  · LR at 250 mL/hour  · Pain regimen as above  · P r n   Zofran  · Trend lipase daily  · Check right upper quadrant ultrasound to rule out biliary causes  · GI and General surgery consult     FEN:   · Fluid/Diuretic plan:  LR at 250 mL/hour with goal urine output greater than 0 5 ml/kg/hr  · Nutrition/diet plan:  Keep NPO with ice chips for now  · Replete electrolytes with goals: K >4 0, Mag >2 0, and Phos >3 0     :   · Indwelling [place] present: no   · Trend UOP and BUN/creat  · Strict I and O     ID:   Lactic acidosis/leukocytosis  · Lactic acid:  4 3  · WBC:  16 09  · In setting of acute pancreatitis and dehydration  · Received a single dose of Levaquin sepsis protocol  · Hold off on antibiotics for current time, will check which general surgery/GI  · Trend lactic acid until downtrending  · Trend temps and WBC count  Heme:   · Trend hgb and plts  ? Transfuse as needed for goal hgb >7 0     Endo:   Type 2 diabetes  · Initiate SSI     MSK/Skin:  · Early Mobility/Exercise  · Frequent turning and pressure offloading    Given critical illness, patient length of stay will require greater than two midnights                Admission Orders:   IP  3/2  @   5458  Scheduled Meds:   Current Facility-Administered Medications:  folic acid IVPB 1 mg Intravenous Daily KRISTOFER Thomas Last Rate: 1 mg (03/03/19 0837)   heparin (porcine) 5,000 Units Subcutaneous [facility] KRISTOFER Thomas    HYDROmorphone 1 mg Intravenous Q2H PRN Mardy Najjar Cross, PA-C    influenza vaccine 0 5 mL Intramuscular Prior to discharge Floyd Sanchez PA-C    insulin lispro 1-6 Units Subcutaneous Q6H [address] KRISTOFER Thomas    lactated ringers 250 mL/hr Intravenous Continuous KRISTOFER Thomas Last Rate: 250 mL/hr (03/03/19 0726)   nicotine 21 mg Transdermal Daily KRISTOFER Thomas    ondansetron 4 mg Intravenous Q4H PRN Mardy Najjar Cross, PA-C    pantoprazole 40 mg Intravenous Q24H [address] Denzel Durham PA-C    thiamine 100 mg Intravenous Daily KRISTOFER Thomas      Continuous Infusions:   lactated ringers 250 mL/hr Last Rate: 250 mL/hr (03/03/19 0726)     PRN Meds: HYDROmorphone    influenza vaccine    ondansetron     Cl liq  Diet  CIWA  Protocol  Neuro checks  Q 4 hrs  Serial  Troponin  U/S  RUQ  IV Dilaudid  PRN  (  X 4  3/3  Thus far)  IV  zofran Prn ( x2  3/3 thus far)    Per  Surgery  Consult:      45yoM presents with 2nd episode of alcohol pancreatitis  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       - prior workup last year showed no biliary stones  -fatty liver on imaging with significant alcohol use likely cause of increased LFTs  -feels somewhat better than yesterday in terms of pain but still endorses epigastric pain and bloating  -has a strong appetite and wants to eat      Plan:     -recommend decreasing IVFs to 150ml/hr  Can titrate upward for low urine output    -ok for clears as patient tolerates  Better outcomes with early enteral nutrition  Patient having an appetite is a good sign and indication to begin enteral feeding   -spoke to patient about complete cessation of alcohol  Currently drinking 30 beers every 2 days  -RUQ ultrasound is pending but results will not  and can be discontinued  Even if has stones there is no indication for cholecystectomy in setting of alcohol pancreatitis unless there is choledocholithiasis   -antibiotics not indicated  would only treat with carbapenem in setting of proven infected necrosis  Leukocytosis and fevers can be expected with sterile pancreatic necrosis  If antibiotics are considered, they should be managed by infectious disease    -recommend CIWA protocol if available at this [place]  Monitor for withdrawal     Per  Gi Consult:    Acute pancreatitis with necrosis  -likely alcohol induced, he has been drinking 30 beers daily for the last month  -Check RUQ U/S, his LFTs are elevated but is likely be secondary to alcohol, no gallstones on U/S 1 year prior  -recommend continuing aggressive IV hydration, at least 200 cc/hr  -okay to slowly trial clear liquids, NPO if worsened pain with this  -monitor lfts, white count, temp    Network Utilization Review Department  Phone: [phone];  Fax [phone]  [email or web address]  ATTENTION: Please call with any questions or concerns to [phone]  and carefully listen to the prompts so that you are directed to the right person  Send all requests for admission clinical reviews, approved or denied determinations and any other requests to fax [phone]   All voicemails are confidential

## 2019-03-03 NOTE — CONSULTS
Consultation - [facility]  Tosha Foss 52 y o  male MRN: [id number]  Unit/Bed#: -01 Encounter: [id number]    Consults    Reason for Consult / Principal Problem: pancreatitis    ASSESSMENT AND PLAN:      Acute pancreatitis with necrosis  -likely alcohol induced, he has been drinking 30 beers daily for the last month  -Check RUQ U/S, his LFTs are elevated but is likely be secondary to alcohol, no gallstones on U/S 1 year prior  -recommend continuing aggressive IV hydration, at least 200 cc/hr  -okay to slowly trial clear liquids, NPO if worsened pain with this  -monitor lfts, white count, temp  ______________________________________________________________________    HPI:  Maritza Agosto is a 53 y/o male with a history of DM2, alcohol abuse (drink almost 30 beers daily for the last month) who presents with abdominal pain x 2 days, the pain is worst in the epigastric/LUQ region  He has nausea, he had vomiting of bilious material yesterday  He denies fevers or chills  No new medications Today he pain is somewhat better but he still states it is a 7/10  He has been urinating he has not had a bowel movement  He had a lipase of 602, LFTs mildly elevaed AST 99  alkphos 172 tbili 1 3  He had SIRS on admission CT shows peripancreatic fat stranding near the pancreatic tail with possible necrosis  He had an episode of pancreatitis 3/2018 felt to be alcoholic etiology as well  U/S at that time showed severe hepatic steatosis without gallstones    REVIEW OF SYSTEMS:  CONSTITUTIONAL: Denies any fever, chills, rigors, and weight loss  HEENT:  Denies hearing loss   CARDIOVASCULAR: No chest pain or palpitations  RESPIRATORY: +cough  GASTROINTESTINAL: As noted in the History of Present Illness  GENITOURINARY: No problems with urination  NEUROLOGIC: No dizziness or vertigo, denies headaches  MUSCULOSKELETAL: Denies any muscle or joint pain  SKIN: Denies skin rashes or itching  ENDOCRINE:  Denies intolerance to heat or cold  PSYCHOSOCIAL: +depression/anxiety    Historical Information   History reviewed  No pertinent past medical history  Past Surgical History:   Procedure Laterality Date    LEG SURGERY       Social History   Social History     Substance and Sexual Activity   Alcohol Use Yes    Alcohol/week: 162 0 oz    Types: 270 Cans of beer per week    Comment: 30 pack a day     Social History     Substance and Sexual Activity   Drug Use Yes    Types: Marijuana    Comment: not for a while     Social History     Tobacco Use   Smoking Status Current Every Day Smoker    Packs/day: 1 00    Types: Cigarettes   Smokeless Tobacco Never Used     History reviewed  No pertinent family history      Meds/Allergies     Medications Prior to Admission   Medication    glimepiride (AMARYL) 2 mg tablet    hydrochlorothiazide (HYDRODIURIL) 25 mg tablet    metFORMIN (GLUCOPHAGE) 500 mg tablet    omeprazole (PriLOSEC) 40 MG capsule    ramipril (ALTACE) 10 MG capsule    sertraline (ZOLOFT) 100 mg tablet    simvastatin (ZOCOR) 20 mg tablet     Current Facility-Administered Medications   Medication Dose Route Frequency    folic acid 1 mg in sodium chloride 0 9 % 50 mL IVPB  1 mg Intravenous Daily    heparin (porcine) subcutaneous injection 5,000 Units  5,000 Units Subcutaneous Q8H [address]    HYDROmorphone (DILAUDID) injection 1 mg  1 mg Intravenous Q2H PRN    influenza vaccine, recombinant, quadrivalent (FLUBLOK) IM injection 0 5 mL  0 5 mL Intramuscular Prior to discharge    insulin lispro (HumaLOG) 100 units/mL subcutaneous injection 1-6 Units  1-6 Units Subcutaneous Q6H [address]    lactated ringers infusion  250 mL/hr Intravenous Continuous    nicotine (NICODERM CQ) 21 mg/24 hr TD 24 hr patch 21 mg  21 mg Transdermal Daily    ondansetron (ZOFRAN) injection 4 mg  4 mg Intravenous Q4H PRN    pantoprazole (PROTONIX) injection 40 mg  40 mg Intravenous Q24H [facility]    thiamine (VITAMIN B1) 100 mg in sodium chloride 0 9 % 50 mL IVPB  100 mg Intravenous Daily       Allergies   Allergen Reactions    Amoxicillin Swelling           Objective     Blood pressure 154/80, pulse 105, temperature 98 6 °F (37 °C), temperature source Oral, resp  rate 22, height 5' 10" (1 778 m), weight 106 kg (234 lb), SpO2 94 %  Body mass index is 33 58 kg/m²        Intake/Output Summary (Last 24 hours) at 3/3/2019 1012  Last data filed at 3/3/2019 0557  Gross per 24 hour   Intake 100 ml   Output 650 ml   Net -550 ml     PHYSICAL EXAM:    General Appearance:   Alert, cooperative, no distress   HEENT:   Normocephalic, atraumatic, anicteric      Neck:  Supple, symmetrical, trachea midline   Lungs:   Clear to auscultation bilaterally   Heart[de-identified]   Regular rate and rhythm   Abdomen:   Soft, tender in the epigastric/LUQ and periumbilical region without guarding, mildly distended; normal bowel sounds;   Genitalia:   Deferred    Rectal:   Deferred    Extremities:  No edema    Pulses:  2+ and symmetric all extremities    Skin:  No jaundice, rashes, or lesions    Lymph nodes:  No palpable cervical lymphadenopathy        Lab Results:   Admission on 03/02/2019   Component Date Value    Sodium 03/02/2019 132*    Potassium 03/02/2019 3 6     Chloride 03/02/2019 93*    CO2 03/02/2019 24     ANION GAP 03/02/2019 15*    BUN 03/02/2019 13     Creatinine 03/02/2019 0 94     Glucose 03/02/2019 210*    Calcium 03/02/2019 9 2     AST 03/02/2019 99*    ALT 03/02/2019 158*    Alkaline Phosphatase 03/02/2019 172*    Total Protein 03/02/2019 7 8     Albumin 03/02/2019 3 9     Total Bilirubin 03/02/2019 1 30*    eGFR 03/02/2019 91     Lipase 03/02/2019 602*    WBC 03/02/2019 16 09*    RBC 03/02/2019 4 78     Hemoglobin 03/02/2019 16 0     Hematocrit 03/02/2019 43 1     MCV 03/02/2019 90     MCH 03/02/2019 33 5     MCHC 03/02/2019 37 1     RDW 03/02/2019 11 8     MPV 03/02/2019 9 7     Platelets 22/76/3679 216     nRBC 03/02/2019 0     Neutrophils Relative 03/02/2019 89*    Immat GRANS % 03/02/2019 1     Lymphocytes Relative 03/02/2019 4*    Monocytes Relative 03/02/2019 5     Eosinophils Relative 03/02/2019 0     Basophils Relative 03/02/2019 1     Neutrophils Absolute 03/02/2019 14 42*    Immature Grans Absolute 03/02/2019 0 08     Lymphocytes Absolute 03/02/2019 0 68     Monocytes Absolute 03/02/2019 0 83     Eosinophils Absolute 03/02/2019 0 00     Basophils Absolute 03/02/2019 0 08     Troponin I 03/02/2019 0 04     PTT 03/02/2019 32     Protime 03/02/2019 12 6     INR 03/02/2019 0 97     LACTIC ACID 03/02/2019 2 1*    LACTIC ACID 03/02/2019 4 3*    Color, UA 03/03/2019 Yellow     Clarity, UA 03/03/2019 Clear     Specific Gravity, UA 03/03/2019 1 025     pH, UA 03/03/2019 6 0     Leukocytes, UA 03/03/2019 Negative     Nitrite, UA 03/03/2019 Negative     Protein, UA 03/03/2019 Negative     Glucose, UA 03/03/2019 250 (1/4%)*    Ketones, UA 03/03/2019 Trace*    Urobilinogen, UA 03/03/2019 0 2     Bilirubin, UA 03/03/2019 Negative     Blood, UA 03/03/2019 Trace-Intact*    Ventricular Rate 03/02/2019 110     Atrial Rate 03/02/2019 111     QRSD Interval 03/02/2019 88     QT Interval 03/02/2019 298     QTC Interval 03/02/2019 403     P Axis 03/02/2019 60     QRS Axis 03/02/2019 50     T Wave Axis 03/02/2019 48     Cholesterol 03/02/2019 198     Triglycerides 03/02/2019 664*    HDL, Direct 03/02/2019 30*    LDL Calculated 03/02/2019      Non-HDL-Chol (CHOL-HDL) 03/02/2019 168     WBC 03/03/2019 14 72*    RBC 03/03/2019 4 10     Hemoglobin 03/03/2019 14 0     Hematocrit 03/03/2019 37 9     MCV 03/03/2019 92     MCH 03/03/2019 34 1     MCHC 03/03/2019 36 9     RDW 03/03/2019 11 9     Platelets 55/70/1122 157     MPV 03/03/2019 9 3     Calcium, Ionized 03/03/2019 0 99*    Magnesium 03/03/2019 1 3*    Lipase 03/03/2019 457*    POC Glucose 03/03/2019 166*    Troponin I 03/03/2019 0 04     LACTIC ACID 03/03/2019 1 3     RBC, UA 03/03/2019 0-1*    WBC, UA 03/03/2019 None Seen     Epithelial Cells 03/03/2019 Occasional     Bacteria, UA 03/03/2019 None Seen     POC Glucose 03/03/2019 186*     Imaging Studies: I have personally reviewed pertinent imaging studies

## 2019-03-03 NOTE — SEPSIS NOTE
Sepsis Note   Dori Norwood 52 y o  male MRN: [id number]  Unit/Bed#: ED 28 Encounter: [id number]      Initial Sepsis Screening     Row Name 03/02/19 2048                Is the patient's history suggestive of a new or worsening infection? Yes (Proceed)  (Abnormal)   -JG        Suspected source of infection  acute abdominal infection  -JG        Are two or more of the following signs & symptoms of infection both present and new to the patient?         Indicate SIRS criteria  Leukocytosis (WBC > 58373 IJL); Tachycardia > 90 bpm  -JG        If the answer is yes to both questions, suspicion of sepsis is present          If severe sepsis is present AND tissue hypoperfusion perists in the hour after fluid resuscitation or lactate > 4, the patient meets criteria for SEPTIC SHOCK          Are any of the following organ dysfunction criteria present within 6 hours of suspected infection and SIRS criteria that are NOT considered to be chronic conditions? Yes  (Abnormal)   -JG        Organ dysfunction  Lactate >/equal 4 0 mmol/L  -JG        Date of presentation of severe sepsis          Time of presentation of severe sepsis          Tissue hypoperfusion persists in the hour after crystalloid fluid administration, evidenced, by either:          Was hypotension present within one hour of the conclusion of crystalloid fluid administration?           Date of presentation of septic shock          Time of presentation of septic shock            User Key  (r) = Recorded By, (t) = Taken By, (c) = Cosigned By    [address] Name Provider Type    [address] Gonzalez PA-C Physician Assistant

## 2019-03-03 NOTE — ED NOTES
Third liter infusing at this time  Pt provided with ice chips  Resting comfortably on the monitor  No further requests at this time        Cristian Allison, MEI  03/02/19 6197

## 2019-03-03 NOTE — PROGRESS NOTES
History and Physical - Critical Care   Maria M Moya 52 y o  male MRN: [id number]  Unit/Bed#: MORGAN Encounter: [id number]      Reason for Admission/Chief Complaint   Acute pancreatitis likely 2/2 chronic EtOH abuse with possible necrosis      History of Present Illness   Maria M Moya is a 52 y o  male who presents to the [facility] ED complaining of abdominal pain x2 days  Patient states pain started 2 days ago and has gradually worsened since then  Pain is aching, constant and worsened with movement and deep breathing  Originally thought it was a GI bug but pain began to radiate straight through to his back which he says felt similar to when he had pancreatitis 1 year ago  Developed associated nausea and bilious vomiting  Denies any diarrhea, bloating, or constipation  Denies any other major symptoms  Has history of chronic EtOH abuse, but notes that wife has been in the hospital over the past few weeks and that during that time his alcohol intake has increased to four 30-packs of beer per week  Patient met sepsis criteria due to tachycardia, leukocytosis, and lactic acidosis and a sepsis alert was called  Received 3L NS and single dose levaquin in the ED  Patient went for CT A/P which revealed peripancreatic fat stranding near the tail the pancreas with associated decreased enhancement of the tail the pancreas concerning for pancreatic necrosis  Was admitted to [address] [place] [address]  GI and general surgery consulted  Per chart review, patient was treated for acute pancreatitis in March 4331 without a complicated course  Discharged from the hospital after 4 days  History obtained from chart review and the patient  Past Medical History   History reviewed  No pertinent past medical history  Past Surgical History     Past Surgical History:   Procedure Laterality Date    LEG SURGERY           Family History   History reviewed  No pertinent family history      Social History     Social History     Tobacco Use   Smoking Status Current Every Day Smoker    Packs/day: 1 00    Types: Cigarettes   Smokeless Tobacco Never Used     Social History     Substance and Sexual Activity   Alcohol Use Yes    Alcohol/week: 162 0 oz    Types: 270 Cans of beer per week    Comment: 30 pack a day     Social History     Substance and Sexual Activity   Drug Use Yes    Types: Marijuana    Comment: not for a while     Marital Status: /Civil Union  Exercise History: n/a    Medications:  Current Facility-Administered Medications   Medication Dose Route Frequency    [START ON 4/9/3827] folic acid 1 mg in sodium chloride 0 9 % 50 mL IVPB  1 mg Intravenous Daily    heparin (porcine) subcutaneous injection 5,000 Units  5,000 Units Subcutaneous Q8H [address]    [START ON 3/3/2019] insulin lispro (HumaLOG) 100 units/mL subcutaneous injection 1-6 Units  1-6 Units Subcutaneous Q6H [address]    lactated ringers infusion  250 mL/hr Intravenous Continuous    [START ON 3/3/2019] nicotine (NICODERM CQ) 21 mg/24 hr TD 24 hr patch 21 mg  21 mg Transdermal Daily    ondansetron (ZOFRAN) injection 4 mg  4 mg Intravenous Q4H PRN    [START ON 3/3/2019] thiamine (VITAMIN B1) 100 mg in sodium chloride 0 9 % 50 mL IVPB  100 mg Intravenous Daily       Medications Prior to Admission     Prior to Admission medications    Medication Sig Start Date End Date Taking?  Authorizing Provider   glimepiride (AMARYL) 2 mg tablet Take 2 mg by mouth every morning before breakfast   Yes Historical Provider, MD   hydrochlorothiazide (HYDRODIURIL) 25 mg tablet Take 25 mg by mouth daily   Yes Historical Provider, MD   metFORMIN (GLUCOPHAGE) 500 mg tablet Take 500 mg by mouth 2 (two) times a day with meals   Yes Historical Provider, MD   omeprazole (PriLOSEC) 40 MG capsule Take 40 mg by mouth daily   Yes Historical Provider, MD   ramipril (ALTACE) 10 MG capsule Take 10 mg by mouth daily   Yes Historical Provider, MD   sertraline (ZOLOFT) 100 mg tablet Take 150 mg by mouth daily   Yes Historical Provider, MD   simvastatin (ZOCOR) 20 mg tablet Take 20 mg by mouth daily   Yes Historical Provider, MD   nicotine (NICODERM CQ) 21 mg/24 hr TD 24 hr patch Place 1 patch on the skin daily 3/24/18 3/2/19  Celesta Level, PA-C   oxyCODONE (ROXICODONE) 5 mg immediate release tablet Take 1 tablet (5 mg total) by mouth every 8 (eight) hours as needed for moderate pain Max Daily Amount: 15 mg 3/23/18 3/2/19  Celesta Level, PA-C       Allergies     Allergies   Allergen Reactions    Amoxicillin Swelling       Review of Systems   Review of Systems   Constitutional: Negative for chills, fatigue and fever  HENT: Positive for dental problem (Swelling inside left cheek)  Eyes: Negative for photophobia, pain and visual disturbance  Respiratory: Negative for cough, shortness of breath and wheezing  Cardiovascular: Negative for chest pain and palpitations  Gastrointestinal: Positive for abdominal pain (7/10, radiating straight through to back), nausea and vomiting (Prior to admission)  Negative for abdominal distention, constipation and diarrhea  Musculoskeletal: Positive for back pain (radiating from front abdomen)  Negative for myalgias, neck pain and neck stiffness  Skin: Negative for color change, pallor and rash  All other systems reviewed and are negative  Temperature:   Temp (24hrs), Av 5 °F (36 4 °C), Min:97 5 °F (36 4 °C), Max:97 5 °F (36 4 °C)    Current Temperature: 97 5 °F (36 4 °C)  Vitals:  Vitals:    19 2037 19 2130 19 2200 19 2300   BP: 155/70 122/74 135/82 158/80   BP Location: Right arm Right arm Right arm Right arm   Pulse: (!) 110 (!) 108 (!) 108 (!) 106   Resp: (!) 26 22 22 22   Temp:       TempSrc:       SpO2: 95% 93% 96% 97%   Weight:       Height:             Weights:   IBW: 73 kg  Body mass index is 33 63 kg/m²      Hemodynamic Monitoring:  N/A     Non-Invasive/Invasive Ventilation Settings:  Respiratory    Lab Data (Last 4 hours)    None O2/Vent Data (Last 4 hours)    None              No results found for: PHART, KNU0CRA, PO2ART, TSL0DBS, O8ODKSIN, BEART, SOURCE  SpO2: SpO2: 97 %, SpO2 Activity: SpO2 Activity: At Rest, SpO2 Device: O2 Device: None (Room air)       Physical Exam   Physical Exam   Constitutional: He is oriented to person, place, and time  He appears well-developed and well-nourished  Distressed: Appears uncomfortable and ill  HENT:   Head: Normocephalic and atraumatic  Nose: Nose normal    Mouth/Throat: Oropharynx is clear and moist    Soft tissue swelling in right buccal mucosa, mildly tender to palpation   Eyes: Pupils are equal, round, and reactive to light  EOM are normal    Cardiovascular: Regular rhythm and normal heart sounds  Exam reveals no gallop and no friction rub  No murmur heard  Tachycardia   Pulmonary/Chest: Effort normal and breath sounds normal  He has no wheezes  He has no rales  He exhibits no tenderness  Abdominal: Soft  He exhibits no distension and no mass  There is tenderness (epigastric)  There is guarding  Musculoskeletal: He exhibits no tenderness or deformity  Neurological: He is alert and oriented to person, place, and time  Skin: Skin is warm and dry  He is not diaphoretic         Labs:  Results from last 7 days   Lab Units 03/02/19  1934   WBC Thousand/uL 16 09*   HEMOGLOBIN g/dL 16 0   HEMATOCRIT % 43 1   PLATELETS Thousands/uL 216   NEUTROS PCT % 89*   MONOS PCT % 5      Results from last 7 days   Lab Units 03/02/19  1920   POTASSIUM mmol/L 3 6   CHLORIDE mmol/L 93*   CO2 mmol/L 24   BUN mg/dL 13   CREATININE mg/dL 0 94   CALCIUM mg/dL 9 2   ALK PHOS U/L 172*   ALT U/L 158*   AST U/L 99*   ALBUMIN g/dL 3 9              Results from last 7 days   Lab Units 03/02/19  1920   INR  0 97   PTT seconds 32     Results from last 7 days   Lab Units 03/02/19  2225 03/02/19 1920   LACTIC ACID mmol/L 2 1* 4 3*     0   Lab Value Date/Time    TROPONINI 0 04 03/02/2019 1920    TROPONINI <0 02 03/19/2018 1806         Imaging: CT A/P: peripancreatic fat stranding is noted near the tail the pancreas with associated decreased enhancement of the tail the pancreas concerning for pancreatic necrosis  I have personally reviewed pertinent reports  and I have personally reviewed pertinent films in PACS   Micro:  No results found for: Jf Araujo    ______________________________________________________________________    Assessment and Plan     Principal Problem:    Acute pancreatitis  Active Problems:    Hepatic steatosis    HTN (hypertension), benign    HLD (hyperlipidemia)    Alcohol abuse    Lactic acidosis    Type 1 diabetes mellitus with hyperglycemia ([facility] [address] )  Resolved Problems:    * No resolved hospital problems  *        Plan:  Neuro:   Chronic EtOH abuse  · No current signs/symptoms of withdrawal  · Placed on CIWA protocol  · Daily thiamine and folic acid  · Follow neuro exams  · Analgesia: dilaudid 1mg q2hr prn  · Delirium monitoring/management, good sleep hygiene    CV:   HTN  · Holding home HCTZ as pt NPO  HLD  · Holding home statin as pt NPO    Lung:   Nicotine abuse  · Nicotine patch daily  · CXR revealed no acute cardiopulmonary process    GI:   Acute pancreatitis likely 2/2 chronic EtOH abuse with possible necrosis  · Lipase 602, WBC 16 09, Triglycerides 664  · CT A/P: Peripancreatic fat stranding is noted near the tail the pancreas with associated decreased enhancement of the tail the pancreas concerning for pancreatic necrosis     · Suspect this is 2/2 to EtOH abuse- states that in past weeks, he has been drinking four 30-packs of beer per week  · LR at 250ml/hr  · Pain regimen as above  · Prn Zofran  · Trend lipase daily  · Consider RUQ U/S to r/o biliary cause  · GI and General Surgery consult  Hx of GERD  · Continue PPI inpatient  Mild transaminitis  · Follow LFTs daily    FEN:   · Fluid/Diuretic plan: LR @ 250 mL/hr with goal UO>0 5ml/kg/hr  · Nutrition/diet plan: Keep NPO w/ ice chips for now  · Replete electrolytes with goals: K >4 0, Mag >2 0, and Phos >3 0    :   · Indwelling [place] present: no   · Trend UOP and BUN/creat  · Strict I and O    ID:   Lactic Acidosis/Leukocytosis  · Lactic acid: 4 3  · WBC: 16 09  · In setting of acute pancreatitis and dehydration  · Received single dose of levaquin with sepsis protocol  · Hold off on antibiotics for current time, will check with general surgery/ GI  · Trend lactic acid until downtrending  · Trend temps and WBC count    Heme:   · Trend hgb and plts  · Transfuse as needed for goal hgb >7 0    Endo:   Type 2 Diabetes  · Initiate SSI    MSK/Skin:  · Early Mobility/Exercise as tolerated  · Frequent turning and pressure offloading    VTE Prophylaxis:  · Pharmacologic Prophylaxis: Heparin  · Mechanical Prophylaxis: sequential compression device    Disposition: Continue on SD2      Counseling / Coordination of Care  Total Critical Care time spent 34 minutes excluding procedures, teaching and family updates  ______________________________________________________________________    Invasive lines and devices: Invasive Devices     Peripheral Intravenous Line            Peripheral IV 03/02/19 Left Antecubital less than 1 day    Peripheral IV 03/02/19 Right Antecubital less than 1 day                Code Status: Level 1 - Full Code  POA:    POLST:      Given critical illness, patient length of stay will require greater than two midnights  Portions of the record may have been created with voice recognition software  Occasional wrong word or "sound a like" substitutions may have occurred due to the inherent limitations of voice recognition software  Read the chart carefully and recognize, using context, where substitutions have occurred        Alice Larkin PA-C

## 2019-03-03 NOTE — PLAN OF CARE
Problem: Potential for Falls  Goal: Patient will remain free of falls  Description  INTERVENTIONS:  - Assess patient frequently for physical needs  -  Identify cognitive and physical deficits and behaviors that affect risk of falls    -  [place] fall precautions as indicated by assessment   - Educate patient/family on patient safety including physical limitations  - Instruct patient to call for assistance with activity based on assessment  - Modify environment to reduce risk of injury  - Consider OT/PT consult to assist with strengthening/mobility  Outcome: Not Progressing

## 2019-03-04 LAB
ALBUMIN SERPL BCP-MCNC: 2.8 G/DL (ref 3.5–5)
ALP SERPL-CCNC: 118 U/L (ref 46–116)
ALT SERPL W P-5'-P-CCNC: 79 U/L (ref 12–78)
ANION GAP SERPL CALCULATED.3IONS-SCNC: 8 MMOL/L (ref 4–13)
AST SERPL W P-5'-P-CCNC: 40 U/L (ref 5–45)
BASOPHILS # BLD AUTO: 0.07 THOUSANDS/ΜL (ref 0–0.1)
BASOPHILS NFR BLD AUTO: 0 % (ref 0–1)
BILIRUB SERPL-MCNC: 1.8 MG/DL (ref 0.2–1)
BUN SERPL-MCNC: 8 MG/DL (ref 5–25)
CALCIUM SERPL-MCNC: 8.8 MG/DL (ref 8.3–10.1)
CHLORIDE SERPL-SCNC: 97 MMOL/L (ref 100–108)
CO2 SERPL-SCNC: 30 MMOL/L (ref 21–32)
CREAT SERPL-MCNC: 0.8 MG/DL (ref 0.6–1.3)
EOSINOPHIL # BLD AUTO: 0 THOUSAND/ΜL (ref 0–0.61)
EOSINOPHIL NFR BLD AUTO: 0 % (ref 0–6)
ERYTHROCYTE [DISTWIDTH] IN BLOOD BY AUTOMATED COUNT: 12.2 % (ref 11.6–15.1)
GFR SERPL CREATININE-BSD FRML MDRD: 101 ML/MIN/1.73SQ M
GLUCOSE SERPL-MCNC: 152 MG/DL (ref 65–140)
GLUCOSE SERPL-MCNC: 182 MG/DL (ref 65–140)
GLUCOSE SERPL-MCNC: 183 MG/DL (ref 65–140)
GLUCOSE SERPL-MCNC: 195 MG/DL (ref 65–140)
GLUCOSE SERPL-MCNC: 210 MG/DL (ref 65–140)
HCT VFR BLD AUTO: 38.9 % (ref 36.5–49.3)
HGB BLD-MCNC: 13.4 G/DL (ref 12–17)
IMM GRANULOCYTES # BLD AUTO: 0.18 THOUSAND/UL (ref 0–0.2)
IMM GRANULOCYTES NFR BLD AUTO: 1 % (ref 0–2)
LIPASE SERPL-CCNC: 121 U/L (ref 73–393)
LYMPHOCYTES # BLD AUTO: 0.53 THOUSANDS/ΜL (ref 0.6–4.47)
LYMPHOCYTES NFR BLD AUTO: 3 % (ref 14–44)
MCH RBC QN AUTO: 33.2 PG (ref 26.8–34.3)
MCHC RBC AUTO-ENTMCNC: 34.4 G/DL (ref 31.4–37.4)
MCV RBC AUTO: 96 FL (ref 82–98)
MONOCYTES # BLD AUTO: 1.03 THOUSAND/ΜL (ref 0.17–1.22)
MONOCYTES NFR BLD AUTO: 7 % (ref 4–12)
NEUTROPHILS # BLD AUTO: 13.95 THOUSANDS/ΜL (ref 1.85–7.62)
NEUTS SEG NFR BLD AUTO: 89 % (ref 43–75)
NRBC BLD AUTO-RTO: 0 /100 WBCS
PLATELET # BLD AUTO: 137 THOUSANDS/UL (ref 149–390)
PMV BLD AUTO: 9.5 FL (ref 8.9–12.7)
POTASSIUM SERPL-SCNC: 4 MMOL/L (ref 3.5–5.3)
PROT SERPL-MCNC: 6.7 G/DL (ref 6.4–8.2)
RBC # BLD AUTO: 4.04 MILLION/UL (ref 3.88–5.62)
SODIUM SERPL-SCNC: 135 MMOL/L (ref 136–145)
WBC # BLD AUTO: 15.76 THOUSAND/UL (ref 4.31–10.16)

## 2019-03-04 PROCEDURE — 83690 ASSAY OF LIPASE: CPT | Performed by: INTERNAL MEDICINE

## 2019-03-04 PROCEDURE — 94762 N-INVAS EAR/PLS OXIMTRY CONT: CPT

## 2019-03-04 PROCEDURE — 82948 REAGENT STRIP/BLOOD GLUCOSE: CPT

## 2019-03-04 PROCEDURE — 94760 N-INVAS EAR/PLS OXIMETRY 1: CPT

## 2019-03-04 PROCEDURE — C9113 INJ PANTOPRAZOLE SODIUM, VIA: HCPCS | Performed by: PHYSICIAN ASSISTANT

## 2019-03-04 PROCEDURE — 80053 COMPREHEN METABOLIC PANEL: CPT | Performed by: INTERNAL MEDICINE

## 2019-03-04 PROCEDURE — 85025 COMPLETE CBC W/AUTO DIFF WBC: CPT | Performed by: INTERNAL MEDICINE

## 2019-03-04 PROCEDURE — 99232 SBSQ HOSP IP/OBS MODERATE 35: CPT | Performed by: INTERNAL MEDICINE

## 2019-03-04 RX ORDER — SODIUM CHLORIDE, SODIUM LACTATE, POTASSIUM CHLORIDE, CALCIUM CHLORIDE 600; 310; 30; 20 MG/100ML; MG/100ML; MG/100ML; MG/100ML
100 INJECTION, SOLUTION INTRAVENOUS CONTINUOUS
Status: DISCONTINUED | OUTPATIENT
Start: 2019-03-04 | End: 2019-03-06 | Stop reason: HOSPADM

## 2019-03-04 RX ORDER — HYDROMORPHONE HCL/PF 1 MG/ML
1 SYRINGE (ML) INJECTION EVERY 2 HOUR PRN
Status: DISCONTINUED | OUTPATIENT
Start: 2019-03-04 | End: 2019-03-05

## 2019-03-04 RX ADMIN — HYDROMORPHONE HYDROCHLORIDE 0.5 MG: 1 INJECTION, SOLUTION INTRAMUSCULAR; INTRAVENOUS; SUBCUTANEOUS at 21:23

## 2019-03-04 RX ADMIN — INSULIN LISPRO 2 UNITS: 100 INJECTION, SOLUTION INTRAVENOUS; SUBCUTANEOUS at 12:11

## 2019-03-04 RX ADMIN — FOLIC ACID 1 MG: 5 INJECTION, SOLUTION INTRAMUSCULAR; INTRAVENOUS; SUBCUTANEOUS at 08:55

## 2019-03-04 RX ADMIN — HYDROMORPHONE HYDROCHLORIDE 0.5 MG: 1 INJECTION, SOLUTION INTRAMUSCULAR; INTRAVENOUS; SUBCUTANEOUS at 11:25

## 2019-03-04 RX ADMIN — HYDROMORPHONE HYDROCHLORIDE 1 MG: 1 INJECTION, SOLUTION INTRAMUSCULAR; INTRAVENOUS; SUBCUTANEOUS at 18:00

## 2019-03-04 RX ADMIN — SODIUM CHLORIDE, SODIUM LACTATE, POTASSIUM CHLORIDE, AND CALCIUM CHLORIDE 100 ML/HR: .6; .31; .03; .02 INJECTION, SOLUTION INTRAVENOUS at 10:19

## 2019-03-04 RX ADMIN — HYDROMORPHONE HYDROCHLORIDE 0.5 MG: 1 INJECTION, SOLUTION INTRAMUSCULAR; INTRAVENOUS; SUBCUTANEOUS at 07:32

## 2019-03-04 RX ADMIN — INSULIN LISPRO 1 UNITS: 100 INJECTION, SOLUTION INTRAVENOUS; SUBCUTANEOUS at 07:32

## 2019-03-04 RX ADMIN — HYDROMORPHONE HYDROCHLORIDE 0.5 MG: 1 INJECTION, SOLUTION INTRAMUSCULAR; INTRAVENOUS; SUBCUTANEOUS at 12:48

## 2019-03-04 RX ADMIN — PANTOPRAZOLE SODIUM 40 MG: 40 INJECTION, POWDER, FOR SOLUTION INTRAVENOUS at 08:55

## 2019-03-04 RX ADMIN — HYDROMORPHONE HYDROCHLORIDE 0.5 MG: 1 INJECTION, SOLUTION INTRAMUSCULAR; INTRAVENOUS; SUBCUTANEOUS at 15:49

## 2019-03-04 RX ADMIN — THIAMINE HYDROCHLORIDE 100 MG: 100 INJECTION, SOLUTION INTRAMUSCULAR; INTRAVENOUS at 09:01

## 2019-03-04 RX ADMIN — HEPARIN SODIUM 5000 UNITS: 5000 INJECTION, SOLUTION INTRAVENOUS; SUBCUTANEOUS at 05:18

## 2019-03-04 RX ADMIN — HYDROMORPHONE HYDROCHLORIDE 0.5 MG: 1 INJECTION, SOLUTION INTRAMUSCULAR; INTRAVENOUS; SUBCUTANEOUS at 09:20

## 2019-03-04 RX ADMIN — SODIUM CHLORIDE, SODIUM LACTATE, POTASSIUM CHLORIDE, AND CALCIUM CHLORIDE 250 ML/HR: .6; .31; .03; .02 INJECTION, SOLUTION INTRAVENOUS at 07:37

## 2019-03-04 RX ADMIN — SODIUM CHLORIDE, SODIUM LACTATE, POTASSIUM CHLORIDE, AND CALCIUM CHLORIDE 100 ML/HR: .6; .31; .03; .02 INJECTION, SOLUTION INTRAVENOUS at 13:47

## 2019-03-04 RX ADMIN — HYDROMORPHONE HYDROCHLORIDE 0.5 MG: 1 INJECTION, SOLUTION INTRAMUSCULAR; INTRAVENOUS; SUBCUTANEOUS at 03:03

## 2019-03-04 RX ADMIN — HYDROMORPHONE HYDROCHLORIDE 0.5 MG: 1 INJECTION, SOLUTION INTRAMUSCULAR; INTRAVENOUS; SUBCUTANEOUS at 17:33

## 2019-03-04 RX ADMIN — NICOTINE 21 MG: 21 PATCH, EXTENDED RELEASE TRANSDERMAL at 08:56

## 2019-03-04 RX ADMIN — ONDANSETRON 4 MG: 2 INJECTION INTRAMUSCULAR; INTRAVENOUS at 17:57

## 2019-03-04 RX ADMIN — HEPARIN SODIUM 5000 UNITS: 5000 INJECTION, SOLUTION INTRAVENOUS; SUBCUTANEOUS at 21:22

## 2019-03-04 NOTE — ASSESSMENT & PLAN NOTE
With suspected necrosis of pancreatic tail ands associated surrounding fat stranding  Still severe pain but improved abdominal exam  Pain control - IV dilaudid  Continue IV lactated ringers @ 100cc/hr  Rpt cbc/cmp tomorrow  blood cultures negative @ 24hrs  No need for stepdown  Advance diet if able to tolerate clears for lunch

## 2019-03-04 NOTE — PROGRESS NOTES
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      -no gallstones on current and previous workup    -30 beers every 2 days  -epigastric and back pain is stable  -said he was a little nauseated and dry heaved yesterday after trying clears  -has a strong appetite and wants food   -increased LFTS likely due to fatty liver and possibly early cirrhosis  -WBC 15  Fevers and leukocytosis are part of natural course of sterile pancreatic necrosis             Plan:  -needs strict I/Os  Unable to assess his volume status currently   -has been on 250ml/hr of maintenance fluids  Recommend decreasing to 125 and titrating upward based on urine output  -ok for clears from our standpoint    -no indication for antibiotics  -recommend ciwa protocol  Monitor for withdrawal     Subjective/Objective     Chief Complaint:     Subjective:       Objective:     Vitals: Blood pressure 113/64, pulse 103, temperature 98 4 °F (36 9 °C), temperature source Oral, resp  rate 18, height 5' 10" (1 778 m), weight 106 kg (233 lb 4 oz), SpO2 93 %  ,Body mass index is 33 47 kg/m²      I/O       03/02 0701 - 03/03 0700 03/03 0701 - 03/04 0700 03/04 0701 - 03/05 0700    I V  (mL/kg)   7908 3 (74 6)    IV Piggyback 100  50    Total Intake(mL/kg) 100 (0 9)  7958 3 (75 1)    Urine (mL/kg/hr) 650 1900 (0 7) 750 (2 3)    Total Output 650 1900 750    Net -550 -1900 +7208 3                 Physical Exam:     Alert  Distended  Tympanitic   Minimal tenderness in epigastrum   Diaphoretic     Lab, Imaging and other studies:   CBC with diff:   Lab Results   Component Value Date    WBC 15 76 (H) 03/04/2019    HGB 13 4 03/04/2019    HCT 38 9 03/04/2019    MCV 96 03/04/2019     (L) 03/04/2019    MCH 33 2 03/04/2019    MCHC 34 4 03/04/2019    RDW 12 2 03/04/2019    MPV 9 5 03/04/2019    NRBC 0 03/04/2019   , BMP/CMP:   Lab Results   Component Value Date    SODIUM 135 (L) 03/04/2019    K 4 0 03/04/2019    CL 97 (L) 03/04/2019    CO2 30 03/04/2019    BUN 8 03/04/2019    CREATININE 0 80 03/04/2019    CALCIUM 8 8 03/04/2019    AST 40 03/04/2019    ALT 79 (H) 03/04/2019    ALKPHOS 118 (H) 03/04/2019    EGFR 101 03/04/2019   , Magnesium: No components found for: MAG, Coags: No results found for: PT, PTT, INR, Blood Culture: No results found for: BLOODCX, Urine Culture: No results found for: URINECX, Wound Culure: No results found for: WOUNDCULT  VTE Pharmacologic Prophylaxis: Heparin  VTE Mechanical Prophylaxis: sequential compression device

## 2019-03-04 NOTE — ASSESSMENT & PLAN NOTE
No results found for: HGBA1C    Recent Labs     03/03/19  1811 03/03/19  2342 03/04/19  0608 03/04/19  1203   POCGLU 183* 167* 182* 210*       Blood Sugar Average: Last 72 hrs:  (P) 184 [id number]   SSI and monitor BS  Hold home meds -glimeperide and metformin

## 2019-03-04 NOTE — PLAN OF CARE
Problem: Potential for Falls  Goal: Patient will remain free of falls  Description  INTERVENTIONS:  - Assess patient frequently for physical needs  -  Identify cognitive and physical deficits and behaviors that affect risk of falls    -  [place] fall precautions as indicated by assessment   - Educate patient/family on patient safety including physical limitations  - Instruct patient to call for assistance with activity based on assessment  - Modify environment to reduce risk of injury  - Consider OT/PT consult to assist with strengthening/mobility  Outcome: Progressing

## 2019-03-04 NOTE — PROGRESS NOTES
Progress Note - General Surgery   Birtha Meeter Anselmo 52 y o  male MRN: [id number]  Unit/Bed#: -01 Encounter: [id number]        Subjective/Objective     Subjective:  Feeling better compared to admission  Still with upper abdominal pain    Blood pressure 113/64, pulse 103, temperature 98 4 °F (36 9 °C), temperature source Oral, resp  rate 18, height 5' 10" (1 778 m), weight 106 kg (233 lb 4 oz), SpO2 93 %  ,Body mass index is 33 47 kg/m²  Intake/Output Summary (Last 24 hours) at 3/4/2019 1008  Last data filed at 3/4/2019 0920  Gross per 24 hour   Intake 7958 33 ml   Output 2650 ml   Net 5308 33 ml           Physical Exam:   Abdomen round and soft  Epigastric discomfort  No peritonitis noted    Good urine output      Assessment:  Pancreatitis, likely alcohol in nature  Ultrasound negative for gallstones    Plan:  Decreased IV fluids to 100 mL/hour  Clear liquids okay    Continue to monitor    Marcelo gN DO  3/4/2019  10:08 AM

## 2019-03-04 NOTE — PROGRESS NOTES
Progress Note - Destin Mercado 1965, 52 y o  male MRN: [id number]    Unit/Bed#: -01 Encounter: [id number]    Primary Care Provider: Gelnna Owen MD   Date and time admitted to hospital: 3/2/2019  7:07 PM    Type 2 diabetes mellitus with hyperglycemia [facility])  Assessment & Plan  No results found for: HGBA1C    Recent Labs     03/03/19  1811 03/03/19  2342 03/04/19  0608 03/04/19  1203   POCGLU 183* 167* 182* 210*       Blood Sugar Average: Last 72 hrs:  (P) 184 [id number]   SSI and monitor BS  Hold home meds -glimeperide and metformin      Alcohol abuse  Assessment & Plan  Suspected cause for pancreatitis  Once improved, may have to consider rehab  CIWA protocol    HLD (hyperlipidemia)  Assessment & Plan  Mild hypertriglyceridemia bu stable overall    HTN (hypertension), benign  Assessment & Plan  Monitor BP  Hold diuresis and ramipril for now    Hepatic steatosis  Assessment & Plan  Will need outpt GI follow up  counseled on wt loss    * Acute pancreatitis  Assessment & Plan  With suspected necrosis of pancreatic tail ands associated surrounding fat stranding  Still severe pain but improved abdominal exam  Pain control - IV dilaudid  Continue IV lactated ringers @ 100cc/hr  Rpt cbc/cmp tomorrow  blood cultures negative @ 24hrs  No need for stepdown  Advance diet if able to tolerate clears for lunch        VTE Pharmacologic Prophylaxis: Heparin  Mechanical: Mechanical VTE prophylaxis in place  Patient Centered Rounds: I have performed bedside rounds with nursing staff today  Discussions with Specialists or Other Care Team Provider:     Education and Discussions with Family / Patient:     Time Spent for Care: More than 50% of total time spent on counseling and coordination of care as described above      Current Length of Stay: 2 day(s)    Current Patient Status: Inpatient   Certification Statement: The patient will continue to require additional inpatient hospital stay due to as above    Discharge Plan: anticipate 48 hrs    Code Status: Level 1 - Full Code      Subjective: pain controlled    Objective:     Vitals:   Temp (24hrs), Av 7 °F (37 1 °C), Min:97 5 °F (36 4 °C), Max:100 1 °F (37 8 °C)    Temp:  [97 5 °F (36 4 °C)-100 1 °F (37 8 °C)] 97 5 °F (36 4 °C)  HR:  [100-116] 100  Resp:  [18-20] 18  BP: (113-142)/(64-76) 129/73  SpO2:  [90 %-93 %] 92 %  Body mass index is 33 47 kg/m²  Input and Output Summary (last 24 hours): Intake/Output Summary (Last 24 hours) at 3/4/2019 1231  Last data filed at 3/4/2019 1129  Gross per 24 hour   Intake 8617 5 ml   Output 3050 ml   Net 5567 5 ml       Physical Exam   Constitutional: He is oriented to person, place, and time  HENT:   Head: Normocephalic  Eyes: Pupils are equal, round, and reactive to light  Cardiovascular: Normal rate and normal heart sounds  Pulmonary/Chest: Effort normal and breath sounds normal    Abdominal: Soft  Bowel sounds are normal    Neurological: He is alert and oriented to person, place, and time  Skin: There is pallor  Additional Data:     Labs:    Results from last 7 days   Lab Units 19  0525   WBC Thousand/uL 15 76*   HEMOGLOBIN g/dL 13 4   HEMATOCRIT % 38 9   PLATELETS Thousands/uL 137*   NEUTROS PCT % 89*   LYMPHS PCT % 3*   MONOS PCT % 7   EOS PCT % 0     Results from last 7 days   Lab Units 19  0525   POTASSIUM mmol/L 4 0   CHLORIDE mmol/L 97*   CO2 mmol/L 30   BUN mg/dL 8   CREATININE mg/dL 0 80   CALCIUM mg/dL 8 8   ALK PHOS U/L 118*   ALT U/L 79*   AST U/L 40     Results from last 7 days   Lab Units 19  1920   INR  0 97       * I Have Reviewed All Lab Data Listed Above      Imaging:  Imaging Personally Reviewed by Myself Includes:     Cultures:   Blood Culture:   Lab Results   Component Value Date    BLOODCX No Growth at 24 hrs  2019    BLOODCX No Growth at 24 hrs  2019     Urine Culture: No results found for: URINECX  Sputum Culture: No components found for: SPUTUMCX  Wound Culture: No results found for: WOUNDCULT    Last 24 Hours Medication List:     Current Facility-Administered Medications:  folic acid IVPB 1 mg Intravenous Daily KRISTOFER Huerta Last Rate: Stopped (03/04/19 0920)   heparin (porcine) 5,000 Units Subcutaneous [facility] KRISTOFER Huerta    HYDROmorphone 0 5 mg Intravenous Q1H PRN Jerod Higgins MD    HYDROmorphone 1 mg Intravenous Q2H PRN Lissett Durham PA-C    insulin lispro 1-6 Units Subcutaneous Q6H [address] KRISTOFER Huerta    lactated ringers 100 mL/hr Intravenous Continuous Shree Brink DO Last Rate: 100 mL/hr (03/04/19 1019)   LORazepam 1 mg Intravenous Q4H PRN Jerod Higgins MD    nicotine 21 mg Transdermal Daily KRISTOFER Huerta    ondansetron 4 mg Intravenous Q4H PRN Lissett Durham PA-C    pantoprazole 40 mg Intravenous Q24H [address] Denzel Durham PA-C    thiamine 100 mg Intravenous Daily KRISTOFER Huerta Last Rate: Stopped (03/04/19 1019)        Today, Patient Was Seen By: Jerod Higgins MD    ** Please Note: Dragon 360 Dictation voice to text software may have been used in the creation of this document   **

## 2019-03-05 LAB
GLUCOSE SERPL-MCNC: 164 MG/DL (ref 65–140)
GLUCOSE SERPL-MCNC: 175 MG/DL (ref 65–140)
GLUCOSE SERPL-MCNC: 189 MG/DL (ref 65–140)
GLUCOSE SERPL-MCNC: 196 MG/DL (ref 65–140)

## 2019-03-05 PROCEDURE — C9113 INJ PANTOPRAZOLE SODIUM, VIA: HCPCS | Performed by: PHYSICIAN ASSISTANT

## 2019-03-05 PROCEDURE — 99232 SBSQ HOSP IP/OBS MODERATE 35: CPT | Performed by: HOSPITALIST

## 2019-03-05 PROCEDURE — 82948 REAGENT STRIP/BLOOD GLUCOSE: CPT

## 2019-03-05 RX ORDER — HYDROMORPHONE HCL/PF 1 MG/ML
0.5 SYRINGE (ML) INJECTION
Status: DISCONTINUED | OUTPATIENT
Start: 2019-03-05 | End: 2019-03-06 | Stop reason: HOSPADM

## 2019-03-05 RX ORDER — IBUPROFEN 600 MG/1
600 TABLET ORAL EVERY 8 HOURS PRN
Status: DISCONTINUED | OUTPATIENT
Start: 2019-03-05 | End: 2019-03-06 | Stop reason: HOSPADM

## 2019-03-05 RX ADMIN — THIAMINE HYDROCHLORIDE 100 MG: 100 INJECTION, SOLUTION INTRAMUSCULAR; INTRAVENOUS at 11:10

## 2019-03-05 RX ADMIN — HYDROMORPHONE HYDROCHLORIDE 0.5 MG: 1 INJECTION, SOLUTION INTRAMUSCULAR; INTRAVENOUS; SUBCUTANEOUS at 03:52

## 2019-03-05 RX ADMIN — MORPHINE SULFATE 2 MG: 2 INJECTION, SOLUTION INTRAMUSCULAR; INTRAVENOUS at 20:45

## 2019-03-05 RX ADMIN — HYDROMORPHONE HYDROCHLORIDE 0.5 MG: 1 INJECTION, SOLUTION INTRAMUSCULAR; INTRAVENOUS; SUBCUTANEOUS at 00:09

## 2019-03-05 RX ADMIN — HEPARIN SODIUM 5000 UNITS: 5000 INJECTION, SOLUTION INTRAVENOUS; SUBCUTANEOUS at 13:37

## 2019-03-05 RX ADMIN — HYDROMORPHONE HYDROCHLORIDE 0.5 MG: 1 INJECTION, SOLUTION INTRAMUSCULAR; INTRAVENOUS; SUBCUTANEOUS at 10:33

## 2019-03-05 RX ADMIN — NICOTINE 21 MG: 21 PATCH, EXTENDED RELEASE TRANSDERMAL at 10:11

## 2019-03-05 RX ADMIN — PANTOPRAZOLE SODIUM 40 MG: 40 INJECTION, POWDER, FOR SOLUTION INTRAVENOUS at 10:10

## 2019-03-05 RX ADMIN — IBUPROFEN 600 MG: 600 TABLET ORAL at 20:45

## 2019-03-05 RX ADMIN — ONDANSETRON 4 MG: 2 INJECTION INTRAMUSCULAR; INTRAVENOUS at 20:51

## 2019-03-05 RX ADMIN — FOLIC ACID 1 MG: 5 INJECTION, SOLUTION INTRAMUSCULAR; INTRAVENOUS; SUBCUTANEOUS at 10:11

## 2019-03-05 RX ADMIN — IBUPROFEN 600 MG: 600 TABLET ORAL at 11:13

## 2019-03-05 RX ADMIN — SODIUM CHLORIDE, SODIUM LACTATE, POTASSIUM CHLORIDE, AND CALCIUM CHLORIDE 100 ML/HR: .6; .31; .03; .02 INJECTION, SOLUTION INTRAVENOUS at 13:34

## 2019-03-05 RX ADMIN — MORPHINE SULFATE 2 MG: 2 INJECTION, SOLUTION INTRAMUSCULAR; INTRAVENOUS at 15:06

## 2019-03-05 RX ADMIN — HEPARIN SODIUM 5000 UNITS: 5000 INJECTION, SOLUTION INTRAVENOUS; SUBCUTANEOUS at 06:10

## 2019-03-05 RX ADMIN — HEPARIN SODIUM 5000 UNITS: 5000 INJECTION, SOLUTION INTRAVENOUS; SUBCUTANEOUS at 21:27

## 2019-03-05 RX ADMIN — HYDROMORPHONE HYDROCHLORIDE 0.5 MG: 1 INJECTION, SOLUTION INTRAMUSCULAR; INTRAVENOUS; SUBCUTANEOUS at 07:58

## 2019-03-05 NOTE — ASSESSMENT & PLAN NOTE
With suspected necrosis of pancreatic tail ands associated surrounding fat stranding  Abdominal pain much improved; benign abdominal exam  Pain control - Morphine prn  ivf and supportive care  Rpt cbc/cmp tomorrow  blood cultures negative @ 24hrs  Tolerating diet

## 2019-03-05 NOTE — PROGRESS NOTES
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      -no gallstones on current and previous workup    -30 beers every 2 days  -epigastric pain improved from yesterday  Says 3/10 subjectively   -passing flatus  -less distended  -non tender   -no n/v  -tolerated regular diet yesterday               Plan:  -needs strict I/Os  -6L positive last 24 hours  -believe we can come down on maintenance fluid further today and titrate upward if needed for urine output  -recommend backing down to full liquid diet for today  If contineus to improve can have regular diet tomorrow    -no indication for antibiotics  -CIWA protocol ordered by [facility]     Subjective/Objective     Chief Complaint:     Subjective:       Objective:     Vitals: Blood pressure 145/70, pulse 97, temperature 97 6 °F (36 4 °C), temperature source Oral, resp  rate 18, height 5' 10" (1 778 m), weight 114 kg (250 lb 6 4 oz), SpO2 97 %  ,Body mass index is 35 93 kg/m²      I/O       03/02 0701 - 03/03 0700 03/03 0701 - 03/04 0700 03/04 0701 - 03/05 0700    I V  (mL/kg)   7908 3 (74 6)    IV Piggyback 100  50    Total Intake(mL/kg) 100 (0 9)  7958 3 (75 1)    Urine (mL/kg/hr) 650 1900 (0 7) 750 (2 3)    Total Output 650 1900 750    Net -550 -1900 +7208 3                 Physical Exam:     Alert  Mild distension, less compared to yesterday  Sinus rhythm   Tympanitic   Non tender   Appears less agitated and hyperactive today     Lab, Imaging and other studies:   CBC with diff:   No results found for: WBC, HGB, HCT, MCV, PLT, ADJUSTEDWBC, MCH, MCHC, RDW, MPV, NRBC, BMP/CMP:   No results found for: SODIUM, K, CL, CO2, ANIONGAP, BUN, CREATININE, GLUCOSE, CALCIUM, AST, ALT, ALKPHOS, PROT, BILITOT, EGFR, Magnesium: No components found for: MAG, Coags: No results found for: PT, PTT, INR, Blood Culture: No results found for: BLOODCX, Urine Culture: No results found for: URINECX, Wound Culure: No results found for: WOUNDCULT  VTE Pharmacologic Prophylaxis: Heparin  VTE Mechanical Prophylaxis: sequential compression device

## 2019-03-05 NOTE — PLAN OF CARE
Problem: Potential for Falls  Goal: Patient will remain free of falls  Description  INTERVENTIONS:  - Assess patient frequently for physical needs  -  Identify cognitive and physical deficits and behaviors that affect risk of falls    -  [place] fall precautions as indicated by assessment   - Educate patient/family on patient safety including physical limitations  - Instruct patient to call for assistance with activity based on assessment  - Modify environment to reduce risk of injury  - Consider OT/PT consult to assist with strengthening/mobility  Outcome: Progressing

## 2019-03-05 NOTE — PROGRESS NOTES
Progress Note - Joe Cohen 1965, 52 y o  male MRN: [id number]    Unit/Bed#: -01 Encounter: [id number]    Primary Care Provider: Claudia Hendrickson MD   Date and time admitted to hospital: 3/2/2019  7:07 PM        Alcohol abuse  Assessment & Plan  Suspected cause for pancreatitis  Once improved, may have to consider rehab  CIWA protocol    HLD (hyperlipidemia)  Assessment & Plan  hypertriglyceridemia - will need medication on discharge      * Acute pancreatitis  Assessment & Plan  With suspected necrosis of pancreatic tail ands associated surrounding fat stranding  Abdominal pain much improved; benign abdominal exam  Pain control - Morphine prn  ivf and supportive care  Rpt cbc/cmp tomorrow  blood cultures negative @ 24hrs  Tolerating diet         VTE Pharmacologic Prophylaxis:   Pharmacologic: Heparin  Patient Centered Rounds: I have performed bedside rounds with nursing staff today  Education and Discussions with Family / Patient: Plan of care made aware to the patient  Time Spent for Care: 30 minutes  More than 50% of total time spent on counseling and coordination of care as described above  Current Length of Stay: 3 day(s)    Current Patient Status: Inpatient   Certification Statement: The patient will continue to require additional inpatient hospital stay due to continued pain control  Discharge Plan: Home when abdominal pain improves and safe discharge  Code Status: Level 1 - Full Code      Subjective:   Patient seen and examined this am; clinically improving  Reporting less abdominal pain; benign abdominal exam  Headache  Tolerating diet    Objective:   Vitals:   Temp (24hrs), Av 2 °F (36 8 °C), Min:97 6 °F (36 4 °C), Max:99 °F (37 2 °C)    Temp:  [97 6 °F (36 4 °C)-99 °F (37 2 °C)] 97 6 °F (36 4 °C)  HR:  [] 97  Resp:  [18] 18  BP: (121-145)/(63-88) 145/70  SpO2:  [90 %-97 %] 97 %  Body mass index is 35 93 kg/m²       Input and Output Summary (last 24 hours): Intake/Output Summary (Last 24 hours) at 3/5/2019 1102  Last data filed at 3/5/2019 0700  Gross per 24 hour   Intake 1546 67 ml   Output 2600 ml   Net -1053 33 ml       Physical Exam:     Physical Exam  Gen -Patient lying in a dark room  Neck- Supple  No thyromegaly or lymphadenopathy  Lungs-Clear bilaterally without any wheeze or rales   Heart S1-S2, regular rate and rhythm, no murmurs  Abdomen-soft nontender, no organomegaly  Bowel sounds present  Extremities-no cyanosis,  clubbing or edema  Skin- no rash  Neuro-nonfocal     Additional Data:     Labs:    Results from last 7 days   Lab Units 03/04/19  0525   WBC Thousand/uL 15 76*   HEMOGLOBIN g/dL 13 4   HEMATOCRIT % 38 9   PLATELETS Thousands/uL 137*   NEUTROS PCT % 89*   LYMPHS PCT % 3*   MONOS PCT % 7   EOS PCT % 0     Results from last 7 days   Lab Units 03/04/19  0525   SODIUM mmol/L 135*   POTASSIUM mmol/L 4 0   CHLORIDE mmol/L 97*   CO2 mmol/L 30   BUN mg/dL 8   CREATININE mg/dL 0 80   ANION GAP mmol/L 8   CALCIUM mg/dL 8 8   ALBUMIN g/dL 2 8*   TOTAL BILIRUBIN mg/dL 1 80*   ALK PHOS U/L 118*   ALT U/L 79*   AST U/L 40   GLUCOSE RANDOM mg/dL 183*     Results from last 7 days   Lab Units 03/02/19  1920   INR  0 97     Results from last 7 days   Lab Units 03/05/19  0801 03/04/19  2108 03/04/19  1657 03/04/19  1203 03/04/19  0608 03/03/19  2342 03/03/19  1811 03/03/19  1113 03/03/19  0729 03/03/19  0029   POC GLUCOSE mg/dl 189* 195* 152* 210* 182* 167* 183* 196* 186* 166*         Results from last 7 days   Lab Units 03/03/19  0335 03/02/19  2225 03/02/19  1920   LACTIC ACID mmol/L 1 3 2 1* 4 3*   PROCALCITONIN ng/ml 0 06  --   --            * I Have Reviewed All Lab Data Listed Above  * Additional Pertinent Lab Tests Reviewed: Labs Pending At The Time Of This Note    Recent Cultures (last 7 days):     Results from last 7 days   Lab Units 03/02/19  1934 03/02/19  1918   BLOOD CULTURE  No Growth at 48 hrs  No Growth at 48 hrs         Last 24 Hours Medication List:     Current Facility-Administered Medications:  folic acid IVPB 1 mg Intravenous Daily [address] Last Rate: 1 mg (03/05/19 1011)   heparin (porcine) 5,000 Units Subcutaneous Q8H [address] KRISTOF[address] Storey    HYDROmorphone 0 5 mg Intravenous Q1H PRN Rommel Wheeler MD    ibuprofen 600 mg Oral Q8H PRN Janie Purvis MD    insulin lispro 1-6 Units Subcutaneous 4x Daily (AC & HS) Rommel Wheeler MD    lactated ringers 100 mL/hr Intravenous Continuous Shree Birnk DO Last Rate: 100 mL/hr (03/04/19 1347)   LORazepam 1 mg Intravenous Q4H PRN Rommel Wheeler MD    morphine injection 2 mg Intravenous Q4H PRN Janie Purvis MD    nicotine 21 mg Transdermal Daily [address]    ondansetron 4 mg Intravenous Q4H PRN Junito Durham PA-C    pantoprazole 40 mg Intravenous Q24H [address] Denzel Durham PA-C    thiamine 100 mg Intravenous Daily [address] Last Rate: Stopped (03/04/19 1019)        Today, Patient Was Seen By: Janie Purvis MD    ** Please Note: Dictation voice to text software may have been used in the creation of this document   **

## 2019-03-06 VITALS
BODY MASS INDEX: 35.3 KG/M2 | HEIGHT: 70 IN | HEART RATE: 101 BPM | RESPIRATION RATE: 18 BRPM | WEIGHT: 246.6 LBS | TEMPERATURE: 97.8 F | OXYGEN SATURATION: 98 % | SYSTOLIC BLOOD PRESSURE: 163 MMHG | DIASTOLIC BLOOD PRESSURE: 79 MMHG

## 2019-03-06 LAB — GLUCOSE SERPL-MCNC: 129 MG/DL (ref 65–140)

## 2019-03-06 PROCEDURE — 99239 HOSP IP/OBS DSCHRG MGMT >30: CPT | Performed by: HOSPITALIST

## 2019-03-06 PROCEDURE — C9113 INJ PANTOPRAZOLE SODIUM, VIA: HCPCS | Performed by: PHYSICIAN ASSISTANT

## 2019-03-06 PROCEDURE — 82948 REAGENT STRIP/BLOOD GLUCOSE: CPT

## 2019-03-06 RX ORDER — OXYCODONE HYDROCHLORIDE AND ACETAMINOPHEN 5; 325 MG/1; MG/1
1 TABLET ORAL EVERY 4 HOURS PRN
Qty: 20 TABLET | Refills: 0 | Status: SHIPPED | OUTPATIENT
Start: 2019-03-06 | End: 2019-03-16

## 2019-03-06 RX ORDER — FOLIC ACID 1 MG/1
1 TABLET ORAL DAILY
Qty: 30 TABLET | Refills: 0 | Status: SHIPPED | OUTPATIENT
Start: 2019-03-06 | End: 2020-10-14 | Stop reason: ALTCHOICE

## 2019-03-06 RX ORDER — NICOTINE 21 MG/24HR
1 PATCH, TRANSDERMAL 24 HOURS TRANSDERMAL DAILY
Qty: 30 PATCH | Refills: 0 | Status: SHIPPED | OUTPATIENT
Start: 2019-03-07 | End: 2020-10-14 | Stop reason: ALTCHOICE

## 2019-03-06 RX ORDER — LANOLIN ALCOHOL/MO/W.PET/CERES
100 CREAM (GRAM) TOPICAL DAILY
Qty: 30 TABLET | Refills: 0 | Status: SHIPPED | OUTPATIENT
Start: 2019-03-06 | End: 2020-10-14 | Stop reason: ALTCHOICE

## 2019-03-06 RX ADMIN — NICOTINE 21 MG: 21 PATCH, EXTENDED RELEASE TRANSDERMAL at 09:14

## 2019-03-06 RX ADMIN — PANTOPRAZOLE SODIUM 40 MG: 40 INJECTION, POWDER, FOR SOLUTION INTRAVENOUS at 09:13

## 2019-03-06 RX ADMIN — SODIUM CHLORIDE, SODIUM LACTATE, POTASSIUM CHLORIDE, AND CALCIUM CHLORIDE 100 ML/HR: .6; .31; .03; .02 INJECTION, SOLUTION INTRAVENOUS at 01:07

## 2019-03-06 RX ADMIN — FOLIC ACID 1 MG: 5 INJECTION, SOLUTION INTRAMUSCULAR; INTRAVENOUS; SUBCUTANEOUS at 09:22

## 2019-03-06 RX ADMIN — MORPHINE SULFATE 2 MG: 2 INJECTION, SOLUTION INTRAMUSCULAR; INTRAVENOUS at 01:07

## 2019-03-06 RX ADMIN — IBUPROFEN 600 MG: 600 TABLET ORAL at 09:25

## 2019-03-06 RX ADMIN — THIAMINE HYDROCHLORIDE 100 MG: 100 INJECTION, SOLUTION INTRAMUSCULAR; INTRAVENOUS at 10:39

## 2019-03-06 RX ADMIN — HEPARIN SODIUM 5000 UNITS: 5000 INJECTION, SOLUTION INTRAVENOUS; SUBCUTANEOUS at 06:47

## 2019-03-06 NOTE — PLAN OF CARE
Problem: Potential for Falls  Goal: Patient will remain free of falls  Description  INTERVENTIONS:  - Assess patient frequently for physical needs  -  Identify cognitive and physical deficits and behaviors that affect risk of falls    -  [place] fall precautions as indicated by assessment   - Educate patient/family on patient safety including physical limitations  - Instruct patient to call for assistance with activity based on assessment  - Modify environment to reduce risk of injury  - Consider OT/PT consult to assist with strengthening/mobility  Outcome: Progressing

## 2019-03-06 NOTE — NURSING NOTE
Patient discharged home, ambulatory  Discharge instructions and scripts reviewed prior to discharge

## 2019-03-06 NOTE — ASSESSMENT & PLAN NOTE
With suspected necrosis of pancreatic tail ands associated surrounding fat stranding  Abdominal pain much improved; benign abdominal exam  Pain control - Morphine prn  ivf and supportive care  Rpt cbc/cmp tomorrow  blood cultures negative @ 24hrs  Tolerating diet   3/6:  Doing well; tolerating diet   Ambulating with a strong and steady gait  Abdominal pain much improved  Educated on etoh cessation  Discharge home

## 2019-03-06 NOTE — PROGRESS NOTES
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      -doing well  -still moderately distended but soft and having loose stools and gas  -no nausea or vomiting  -poor po intake patient says he does not like the food   -no gallstones on current and previous workup    -minimal epigastric pain, minimal tenderness in epigastrum   -on room air     Plan:    -patient improving daily  -no surgical intervention planned  -will sign off please call with questions or concerns  - no need for surgical follow up     Subjective/Objective     Chief Complaint:     Subjective:       Objective:     Vitals: Blood pressure 163/79, pulse 101, temperature 97 8 °F (36 6 °C), temperature source Oral, resp  rate 18, height 5' 10" (1 778 m), weight 112 kg (246 lb 9 6 oz), SpO2 98 %  ,Body mass index is 35 38 kg/m²      I/O       03/02 0701 - 03/03 0700 03/03 0701 - 03/04 0700 03/04 0701 - 03/05 0700    I V  (mL/kg)   7908 3 (74 6)    IV Piggyback 100  50    Total Intake(mL/kg) 100 (0 9)  7958 3 (75 1)    Urine (mL/kg/hr) 650 1900 (0 7) 750 (2 3)    Total Output 650 1900 750    Net -550 -1900 +7208 3                 Physical Exam:     Alert  Mild distension,  Sinus rhythm     Mild tender epigastrum       Lab, Imaging and other studies:   CBC with diff:   No results found for: WBC, HGB, HCT, MCV, PLT, ADJUSTEDWBC, MCH, MCHC, RDW, MPV, NRBC, BMP/CMP:   No results found for: SODIUM, K, CL, CO2, ANIONGAP, BUN, CREATININE, GLUCOSE, CALCIUM, AST, ALT, ALKPHOS, PROT, BILITOT, EGFR, Magnesium: No components found for: MAG, Coags: No results found for: PT, PTT, INR, Blood Culture: No results found for: BLOODCX, Urine Culture: No results found for: URINECX, Wound Culure: No results found for: WOUNDCULT  VTE Pharmacologic Prophylaxis: Heparin  VTE Mechanical Prophylaxis: sequential compression device

## 2019-03-06 NOTE — DISCHARGE SUMMARY
Discharge- [address] 1965, 52 y o  male MRN: [id number]    Unit/Bed#: -01 Encounter: [id number]    Primary Care Provider: Komal Flores MD   Date and time admitted to hospital: 3/2/2019  7:07 PM        Alcohol abuse  Assessment & Plan  Suspected cause for pancreatitis  Once improved, may have to consider rehab  CIWA protocol    3/6:  No tremors of the outstretched hands  No urge to drink at this time      HLD (hyperlipidemia)  Assessment & Plan  hypertriglyceridemia - educated on diet/exercise and repeat testing in 6-8 weeks  If no improvement, will need medication therapy for elevated triglycerides        Hepatic steatosis  Assessment & Plan  Will need outpt GI follow up  counseled on wt loss    * Acute pancreatitis  Assessment & Plan  With suspected necrosis of pancreatic tail ands associated surrounding fat stranding  Abdominal pain much improved; benign abdominal exam  Pain control - Morphine prn  ivf and supportive care  Rpt cbc/cmp tomorrow  blood cultures negative @ 24hrs  Tolerating diet   3/6:  Doing well; tolerating diet   Ambulating with a strong and steady gait  Abdominal pain much improved  Educated on etoh cessation  Discharge home          Discharging Physician / Practitioner: Danie Kitchen MD  PCP: Komal Flores MD  Admission Date:   Admission Orders (From admission, onward)    Ordered        03/02/19 2242  Inpatient Admission (expected length of stay for this patient Order details is greater than two midnights)  Once     Order ID Start Status   [id number] 03/02/19 2242 Completed              Discharge Date: 03/06/19    Resolved Problems  Date Reviewed: 3/6/2019    None          Consultations During Hospital Stay:  · Surgery       Procedures Performed:   · none    Reason for Admission: etoh induced pancreatitis    Hospital Course:     Gracie Barnett is a 51 y/o male with a history of DM2, alcohol abuse (drink almost 30 beers daily for the last month) who presents with abdominal pain x 2 days, the pain is worst in the epigastric/LUQ region  He has nausea, he had vomiting of bilious material yesterday  He denies fevers or chills  No new medications Today he pain is somewhat better but he still states it is a 7/10  He has been urinating he has not had a bowel movement  He had a lipase of 602, LFTs mildly elevaed AST 99  alkphos 172 tbili 1 3  He had SIRS on admission CT shows peripancreatic fat stranding near the pancreatic tail with possible necrosis  He had an episode of pancreatitis 3/2018 felt to be alcoholic etiology as well  U/S at that time showed severe hepatic steatosis without gallstones  Surgery and GI consulted  IVF and pain medication prn  Tolerated diet  Ambulating with a strong and steady gait       Condition at Discharge: stable     Discharge Day Visit / Exam:     Subjective:  Feels good  No active complaints  Abdominal pain much improved  Vitals: Blood Pressure: 163/79 (03/06/19 0722)  Pulse: 101 (03/06/19 0722)  Temperature: 97 8 °F (36 6 °C) (03/06/19 0722)  Temp Source: Oral (03/06/19 0722)  Respirations: 18 (03/06/19 0722)  Height: 5' 10" (177 8 cm) (03/02/19 1908)  Weight - Scale: 112 kg (246 lb 9 6 oz) (03/06/19 0600)  SpO2: 98 % (03/06/19 0722)    Exam:   Physical Exam  Gen -Patient comfortable   Neck- Supple  No thyromegaly or lymphadenopathy  Lungs-Clear bilaterally without any wheeze or rales   Heart S1-S2, regular rate and rhythm, no murmurs  Abdomen-soft nontender, no organomegaly  Bowel sounds present  Extremities-no cyanosis, clubbing or edema  Skin- no rash  Neuro-nonfocal     Discharge instructions/Information to patient and family:   See after visit summary for information provided to patient and family  Provisions for Follow-Up Care:  See after visit summary for information related to follow-up care and any pertinent home health orders        Disposition:     Home    For Discharges to [facility]:   · Not Applicable to this Patient - Not Applicable to this Patient      Discharge Statement:  I spent 35 minutes discharging the patient  This time was spent on the day of discharge  I had direct contact with the patient on the day of discharge  Greater than 50% of the total time was spent examining patient, answering all patient questions, arranging and discussing plan of care with patient as well as directly providing post-discharge instructions  Additional time then spent on discharge activities  Discharge Medications:  See after visit summary for reconciled discharge medications provided to patient and family        ** Please Note: This note has been constructed using a voice recognition system **

## 2019-03-06 NOTE — ASSESSMENT & PLAN NOTE
hypertriglyceridemia - educated on diet/exercise and repeat testing in 6-8 weeks   If no improvement, will need medication therapy for elevated triglycerides

## 2019-03-06 NOTE — ASSESSMENT & PLAN NOTE
Suspected cause for pancreatitis  Once improved, may have to consider rehab  CIWA protocol    3/6:  No tremors of the outstretched hands  No urge to drink at this time

## 2019-03-07 NOTE — SOCIAL WORK
Patient is medically stable for discharge home today  [facility] contact information has been placed on AVS and reviewed with patient if he is interested in ETOH treatment after discharge  IMM was reviewed with patient at bedside  Patient will have a ride home with family  CM reviewed the availability of treatment team to discuss questions or concerns patient and/or family may have regarding  understanding medications and recognizing signs and symptoms once discharged  CM also encouraged patient to follow up with all recommended appointments after discharge  Patient advised of importance for patient and family to participate in managing patients medical well being

## 2019-03-07 NOTE — PLAN OF CARE
Problem: DISCHARGE PLANNING - CARE MANAGEMENT  Goal: Discharge to post-acute care or home with appropriate resources  Description  INTERVENTIONS:  - Conduct assessment to determine patient/family and health care team treatment goals, and need for post-acute services based on payer coverage, community resources, and patient preferences, and barriers to discharge  - Address psychosocial, clinical, and financial barriers to discharge as identified in assessment in conjunction with the patient/family and health care team  - Arrange appropriate level of post-acute services according to patient?s   needs and preference and payer coverage in collaboration with the physician and health care team  - Communicate with and update the patient/family, physician, and health care team regarding progress on the discharge plan  - Arrange appropriate transportation to post-acute venues  Outcome: Completed  Note:   Patient is medically stable for discharge home today  [facility] contact information has been placed on AVS and reviewed with patient if he is interested in ETOH treatment after discharge  IMM was reviewed with patient at bedside  Patient will have a ride home with family  CM reviewed the availability of treatment team to discuss questions or concerns patient and/or family may have regarding  understanding medications and recognizing signs and symptoms once discharged  CM also encouraged patient to follow up with all recommended appointments after discharge  Patient advised of importance for patient and family to participate in managing patient?s medical well being

## 2019-03-08 LAB
BACTERIA BLD CULT: NORMAL
BACTERIA BLD CULT: NORMAL

## 2020-05-20 DIAGNOSIS — K21.9 GASTROESOPHAGEAL REFLUX DISEASE, ESOPHAGITIS PRESENCE NOT SPECIFIED: ICD-10-CM

## 2020-05-20 DIAGNOSIS — E11.65 TYPE 2 DIABETES MELLITUS WITH HYPERGLYCEMIA, WITHOUT LONG-TERM CURRENT USE OF INSULIN (HCC): Primary | ICD-10-CM

## 2020-05-20 RX ORDER — OMEPRAZOLE 40 MG/1
CAPSULE, DELAYED RELEASE ORAL
Qty: 90 CAPSULE | Refills: 1 | Status: SHIPPED | OUTPATIENT
Start: 2020-05-20 | End: 2020-08-19

## 2020-05-20 RX ORDER — GLIMEPIRIDE 2 MG/1
TABLET ORAL
Qty: 135 TABLET | Refills: 1 | Status: SHIPPED | OUTPATIENT
Start: 2020-05-20 | End: 2020-08-19

## 2020-05-26 DIAGNOSIS — F32.A CHRONIC DEPRESSION: Primary | ICD-10-CM

## 2020-05-26 RX ORDER — SERTRALINE HYDROCHLORIDE 100 MG/1
TABLET, FILM COATED ORAL
Qty: 135 TABLET | Refills: 1 | Status: SHIPPED | OUTPATIENT
Start: 2020-05-26 | End: 2020-10-01 | Stop reason: SDUPTHER

## 2020-05-29 DIAGNOSIS — E11.65 TYPE 2 DIABETES MELLITUS WITH HYPERGLYCEMIA, WITHOUT LONG-TERM CURRENT USE OF INSULIN (HCC): Primary | ICD-10-CM

## 2020-07-29 DIAGNOSIS — E11.65 TYPE 2 DIABETES MELLITUS WITH HYPERGLYCEMIA, WITHOUT LONG-TERM CURRENT USE OF INSULIN (HCC): Primary | ICD-10-CM

## 2020-07-29 RX ORDER — METFORMIN HYDROCHLORIDE 500 MG/1
TABLET, EXTENDED RELEASE ORAL
Qty: 180 TABLET | Refills: 1 | Status: SHIPPED | OUTPATIENT
Start: 2020-07-29 | End: 2020-10-14 | Stop reason: SDUPTHER

## 2020-08-19 DIAGNOSIS — E11.65 TYPE 2 DIABETES MELLITUS WITH HYPERGLYCEMIA, WITHOUT LONG-TERM CURRENT USE OF INSULIN (HCC): ICD-10-CM

## 2020-08-19 DIAGNOSIS — K21.9 GASTROESOPHAGEAL REFLUX DISEASE, ESOPHAGITIS PRESENCE NOT SPECIFIED: ICD-10-CM

## 2020-08-19 DIAGNOSIS — E11.65 TYPE 2 DIABETES MELLITUS WITH HYPERGLYCEMIA, WITHOUT LONG-TERM CURRENT USE OF INSULIN (HCC): Primary | ICD-10-CM

## 2020-08-19 RX ORDER — OMEPRAZOLE 40 MG/1
CAPSULE, DELAYED RELEASE ORAL
Qty: 90 CAPSULE | Refills: 1 | Status: SHIPPED | OUTPATIENT
Start: 2020-08-19 | End: 2021-04-02

## 2020-08-19 RX ORDER — GLIMEPIRIDE 2 MG/1
TABLET ORAL
Qty: 135 TABLET | Refills: 1 | Status: SHIPPED | OUTPATIENT
Start: 2020-08-19 | End: 2020-10-14 | Stop reason: SDUPTHER

## 2020-08-19 RX ORDER — SIMVASTATIN 20 MG
TABLET ORAL
Qty: 90 TABLET | Refills: 1 | Status: SHIPPED | OUTPATIENT
Start: 2020-08-19 | End: 2020-10-14 | Stop reason: SDUPTHER

## 2020-10-01 ENCOUNTER — TELEPHONE (OUTPATIENT)
Dept: FAMILY MEDICINE CLINIC | Facility: CLINIC | Age: 55
End: 2020-10-01

## 2020-10-01 DIAGNOSIS — F32.A CHRONIC DEPRESSION: ICD-10-CM

## 2020-10-01 RX ORDER — SERTRALINE HYDROCHLORIDE 100 MG/1
150 TABLET, FILM COATED ORAL DAILY
Qty: 135 TABLET | Refills: 0 | Status: SHIPPED | OUTPATIENT
Start: 2020-10-01 | End: 2020-10-05 | Stop reason: SDUPTHER

## 2020-10-02 ENCOUNTER — TELEPHONE (OUTPATIENT)
Dept: FAMILY MEDICINE CLINIC | Facility: CLINIC | Age: 55
End: 2020-10-02

## 2020-10-05 DIAGNOSIS — F32.A CHRONIC DEPRESSION: ICD-10-CM

## 2020-10-06 RX ORDER — SERTRALINE HYDROCHLORIDE 100 MG/1
150 TABLET, FILM COATED ORAL DAILY
Qty: 10 TABLET | Refills: 0 | Status: SHIPPED | OUTPATIENT
Start: 2020-10-06 | End: 2020-10-14 | Stop reason: SDUPTHER

## 2020-10-14 ENCOUNTER — OFFICE VISIT (OUTPATIENT)
Dept: FAMILY MEDICINE CLINIC | Facility: CLINIC | Age: 55
End: 2020-10-14
Payer: COMMERCIAL

## 2020-10-14 VITALS
DIASTOLIC BLOOD PRESSURE: 100 MMHG | HEIGHT: 71 IN | SYSTOLIC BLOOD PRESSURE: 140 MMHG | WEIGHT: 235 LBS | HEART RATE: 100 BPM | TEMPERATURE: 97 F | OXYGEN SATURATION: 95 % | BODY MASS INDEX: 32.9 KG/M2

## 2020-10-14 DIAGNOSIS — E11.65 TYPE 2 DIABETES MELLITUS WITH HYPERGLYCEMIA, WITHOUT LONG-TERM CURRENT USE OF INSULIN (HCC): ICD-10-CM

## 2020-10-14 DIAGNOSIS — K21.9 GASTROESOPHAGEAL REFLUX DISEASE WITHOUT ESOPHAGITIS: ICD-10-CM

## 2020-10-14 DIAGNOSIS — Z23 IMMUNIZATION DUE: ICD-10-CM

## 2020-10-14 DIAGNOSIS — I10 ESSENTIAL HYPERTENSION: ICD-10-CM

## 2020-10-14 DIAGNOSIS — E78.1 HIGH TRIGLYCERIDES: Primary | ICD-10-CM

## 2020-10-14 DIAGNOSIS — Z12.5 PROSTATE CANCER SCREENING: ICD-10-CM

## 2020-10-14 DIAGNOSIS — F32.A CHRONIC DEPRESSION: ICD-10-CM

## 2020-10-14 DIAGNOSIS — G57.91 NEUROPATHY OF RIGHT FOOT: ICD-10-CM

## 2020-10-14 DIAGNOSIS — E78.5 DYSLIPIDEMIA: ICD-10-CM

## 2020-10-14 PROBLEM — M54.42 CHRONIC BILATERAL LOW BACK PAIN WITH BILATERAL SCIATICA: Status: ACTIVE | Noted: 2020-10-14

## 2020-10-14 PROBLEM — G89.29 CHRONIC BILATERAL LOW BACK PAIN WITH BILATERAL SCIATICA: Status: ACTIVE | Noted: 2020-10-14

## 2020-10-14 PROBLEM — M54.41 CHRONIC BILATERAL LOW BACK PAIN WITH BILATERAL SCIATICA: Status: ACTIVE | Noted: 2020-10-14

## 2020-10-14 PROCEDURE — 90682 RIV4 VACC RECOMBINANT DNA IM: CPT | Performed by: FAMILY MEDICINE

## 2020-10-14 PROCEDURE — 99214 OFFICE O/P EST MOD 30 MIN: CPT | Performed by: FAMILY MEDICINE

## 2020-10-14 PROCEDURE — G0009 ADMIN PNEUMOCOCCAL VACCINE: HCPCS | Performed by: FAMILY MEDICINE

## 2020-10-14 PROCEDURE — 3080F DIAST BP >= 90 MM HG: CPT | Performed by: FAMILY MEDICINE

## 2020-10-14 PROCEDURE — 4010F ACE/ARB THERAPY RXD/TAKEN: CPT | Performed by: FAMILY MEDICINE

## 2020-10-14 PROCEDURE — 90732 PPSV23 VACC 2 YRS+ SUBQ/IM: CPT | Performed by: FAMILY MEDICINE

## 2020-10-14 PROCEDURE — G0008 ADMIN INFLUENZA VIRUS VAC: HCPCS | Performed by: FAMILY MEDICINE

## 2020-10-14 RX ORDER — OXYCODONE AND ACETAMINOPHEN 7.5; 325 MG/1; MG/1
TABLET ORAL
COMMUNITY
Start: 2020-10-08 | End: 2021-09-22 | Stop reason: HOSPADM

## 2020-10-14 RX ORDER — RAMIPRIL 10 MG/1
10 CAPSULE ORAL DAILY
Qty: 90 CAPSULE | Refills: 3 | Status: SHIPPED | OUTPATIENT
Start: 2020-10-14 | End: 2020-10-30 | Stop reason: SDUPTHER

## 2020-10-14 RX ORDER — RAMIPRIL 10 MG/1
10 CAPSULE ORAL DAILY
COMMUNITY
End: 2020-10-14 | Stop reason: SDUPTHER

## 2020-10-14 RX ORDER — SIMVASTATIN 20 MG
20 TABLET ORAL DAILY
Qty: 90 TABLET | Refills: 1 | Status: SHIPPED | OUTPATIENT
Start: 2020-10-14 | End: 2020-10-30 | Stop reason: SDUPTHER

## 2020-10-14 RX ORDER — PIOGLITAZONEHYDROCHLORIDE 45 MG/1
TABLET ORAL
COMMUNITY
Start: 2020-07-28 | End: 2020-11-20 | Stop reason: SDUPTHER

## 2020-10-14 RX ORDER — METFORMIN HYDROCHLORIDE 500 MG/1
1000 TABLET, EXTENDED RELEASE ORAL DAILY
Qty: 180 TABLET | Refills: 1 | Status: SHIPPED | OUTPATIENT
Start: 2020-10-14 | End: 2020-10-30 | Stop reason: SDUPTHER

## 2020-10-14 RX ORDER — RAMIPRIL 10 MG/1
10 CAPSULE ORAL DAILY
Qty: 30 CAPSULE | Refills: 1 | Status: SHIPPED | OUTPATIENT
Start: 2020-10-14 | End: 2020-10-14 | Stop reason: SDUPTHER

## 2020-10-14 RX ORDER — FENOFIBRATE 160 MG/1
TABLET ORAL
COMMUNITY
Start: 2020-08-06 | End: 2020-10-14 | Stop reason: SDUPTHER

## 2020-10-14 RX ORDER — GLIMEPIRIDE 2 MG/1
TABLET ORAL
Qty: 135 TABLET | Refills: 1 | Status: SHIPPED | OUTPATIENT
Start: 2020-10-14 | End: 2020-10-30 | Stop reason: SDUPTHER

## 2020-10-14 RX ORDER — SERTRALINE HYDROCHLORIDE 100 MG/1
150 TABLET, FILM COATED ORAL DAILY
Qty: 10 TABLET | Refills: 0 | Status: SHIPPED | OUTPATIENT
Start: 2020-10-14 | End: 2020-10-30 | Stop reason: SDUPTHER

## 2020-10-14 RX ORDER — FENOFIBRATE 160 MG/1
160 TABLET ORAL DAILY
Qty: 90 TABLET | Refills: 3 | Status: SHIPPED | OUTPATIENT
Start: 2020-10-14 | End: 2020-10-30 | Stop reason: SDUPTHER

## 2020-10-29 ENCOUNTER — TELEPHONE (OUTPATIENT)
Dept: FAMILY MEDICINE CLINIC | Facility: CLINIC | Age: 55
End: 2020-10-29

## 2020-10-29 DIAGNOSIS — E11.65 TYPE 2 DIABETES MELLITUS WITH HYPERGLYCEMIA, WITHOUT LONG-TERM CURRENT USE OF INSULIN (HCC): ICD-10-CM

## 2020-10-29 DIAGNOSIS — F32.A CHRONIC DEPRESSION: ICD-10-CM

## 2020-10-29 DIAGNOSIS — I10 ESSENTIAL HYPERTENSION: ICD-10-CM

## 2020-10-29 DIAGNOSIS — E78.1 HIGH TRIGLYCERIDES: ICD-10-CM

## 2020-10-29 DIAGNOSIS — K21.9 GASTROESOPHAGEAL REFLUX DISEASE: ICD-10-CM

## 2020-10-30 RX ORDER — GLIMEPIRIDE 2 MG/1
TABLET ORAL
Qty: 135 TABLET | Refills: 3 | Status: SHIPPED | OUTPATIENT
Start: 2020-10-30 | End: 2021-07-26

## 2020-10-30 RX ORDER — FENOFIBRATE 160 MG/1
160 TABLET ORAL DAILY
Qty: 90 TABLET | Refills: 3 | Status: SHIPPED | OUTPATIENT
Start: 2020-10-30 | End: 2021-02-12 | Stop reason: SDUPTHER

## 2020-10-30 RX ORDER — SIMVASTATIN 20 MG
20 TABLET ORAL DAILY
Qty: 90 TABLET | Refills: 3 | Status: SHIPPED | OUTPATIENT
Start: 2020-10-30 | End: 2021-07-18

## 2020-10-30 RX ORDER — SERTRALINE HYDROCHLORIDE 100 MG/1
150 TABLET, FILM COATED ORAL DAILY
Qty: 135 TABLET | Refills: 3 | Status: SHIPPED | OUTPATIENT
Start: 2020-10-30 | End: 2020-12-14 | Stop reason: SDUPTHER

## 2020-10-30 RX ORDER — METFORMIN HYDROCHLORIDE 500 MG/1
1000 TABLET, EXTENDED RELEASE ORAL DAILY
Qty: 180 TABLET | Refills: 3 | Status: SHIPPED | OUTPATIENT
Start: 2020-10-30 | End: 2021-09-22 | Stop reason: HOSPADM

## 2020-10-30 RX ORDER — RAMIPRIL 10 MG/1
10 CAPSULE ORAL DAILY
Qty: 90 CAPSULE | Refills: 3 | Status: SHIPPED | OUTPATIENT
Start: 2020-10-30 | End: 2022-01-06

## 2020-11-13 ENCOUNTER — OFFICE VISIT (OUTPATIENT)
Dept: FAMILY MEDICINE CLINIC | Facility: CLINIC | Age: 55
End: 2020-11-13
Payer: COMMERCIAL

## 2020-11-13 VITALS
BODY MASS INDEX: 31.5 KG/M2 | TEMPERATURE: 97 F | WEIGHT: 225 LBS | HEART RATE: 88 BPM | OXYGEN SATURATION: 96 % | HEIGHT: 71 IN | SYSTOLIC BLOOD PRESSURE: 132 MMHG | DIASTOLIC BLOOD PRESSURE: 78 MMHG

## 2020-11-13 DIAGNOSIS — L98.9 SKIN LESION: Primary | ICD-10-CM

## 2020-11-13 DIAGNOSIS — D49.2 ABNORMAL SKIN GROWTH: ICD-10-CM

## 2020-11-13 PROCEDURE — 88305 TISSUE EXAM BY PATHOLOGIST: CPT | Performed by: PATHOLOGY

## 2020-11-13 PROCEDURE — 11302 SHAVE SKIN LESION 1.1-2.0 CM: CPT | Performed by: FAMILY MEDICINE

## 2020-11-13 PROCEDURE — 3008F BODY MASS INDEX DOCD: CPT | Performed by: FAMILY MEDICINE

## 2020-11-20 DIAGNOSIS — E11.65 TYPE 2 DIABETES MELLITUS WITH HYPERGLYCEMIA, WITHOUT LONG-TERM CURRENT USE OF INSULIN (HCC): Primary | ICD-10-CM

## 2020-11-22 RX ORDER — PIOGLITAZONEHYDROCHLORIDE 45 MG/1
45 TABLET ORAL DAILY
Qty: 90 TABLET | Refills: 3 | Status: SHIPPED | OUTPATIENT
Start: 2020-11-22 | End: 2021-02-12 | Stop reason: SDUPTHER

## 2020-12-08 DIAGNOSIS — E11.9 TYPE 2 DIABETES MELLITUS WITHOUT COMPLICATION, WITHOUT LONG-TERM CURRENT USE OF INSULIN (HCC): Primary | ICD-10-CM

## 2020-12-09 RX ORDER — BLOOD SUGAR DIAGNOSTIC
STRIP MISCELLANEOUS
Qty: 100 EACH | Refills: 6 | Status: SHIPPED | OUTPATIENT
Start: 2020-12-09 | End: 2021-05-16

## 2020-12-09 RX ORDER — LANCETS
EACH MISCELLANEOUS
Qty: 306 EACH | Refills: 6 | Status: SHIPPED | OUTPATIENT
Start: 2020-12-09

## 2020-12-09 RX ORDER — BLOOD-GLUCOSE METER
EACH MISCELLANEOUS
Qty: 1 KIT | Refills: 1 | Status: SHIPPED | OUTPATIENT
Start: 2020-12-09

## 2020-12-14 DIAGNOSIS — F32.A CHRONIC DEPRESSION: ICD-10-CM

## 2020-12-14 RX ORDER — SERTRALINE HYDROCHLORIDE 100 MG/1
200 TABLET, FILM COATED ORAL DAILY
Qty: 60 TABLET | Refills: 2 | Status: CANCELLED | OUTPATIENT
Start: 2020-12-14

## 2020-12-14 RX ORDER — SERTRALINE HYDROCHLORIDE 100 MG/1
200 TABLET, FILM COATED ORAL DAILY
Qty: 180 TABLET | Refills: 3 | Status: SHIPPED | OUTPATIENT
Start: 2020-12-14 | End: 2021-09-22 | Stop reason: HOSPADM

## 2021-02-05 ENCOUNTER — APPOINTMENT (INPATIENT)
Dept: ULTRASOUND IMAGING | Facility: HOSPITAL | Age: 56
DRG: 440 | End: 2021-02-05
Payer: COMMERCIAL

## 2021-02-05 ENCOUNTER — APPOINTMENT (EMERGENCY)
Dept: CT IMAGING | Facility: HOSPITAL | Age: 56
DRG: 440 | End: 2021-02-05
Payer: COMMERCIAL

## 2021-02-05 ENCOUNTER — HOSPITAL ENCOUNTER (INPATIENT)
Facility: HOSPITAL | Age: 56
LOS: 3 days | Discharge: HOME | DRG: 440 | End: 2021-02-08
Admitting: INTERNAL MEDICINE
Payer: COMMERCIAL

## 2021-02-05 DIAGNOSIS — K85.90 ACUTE PANCREATITIS, UNSPECIFIED COMPLICATION STATUS, UNSPECIFIED PANCREATITIS TYPE: ICD-10-CM

## 2021-02-05 DIAGNOSIS — K85.20 ALCOHOL-INDUCED ACUTE PANCREATITIS, UNSPECIFIED COMPLICATION STATUS: ICD-10-CM

## 2021-02-05 DIAGNOSIS — K76.0 HEPATIC STEATOSIS: ICD-10-CM

## 2021-02-05 DIAGNOSIS — K85.90 ACUTE PANCREATITIS: Primary | ICD-10-CM

## 2021-02-05 PROBLEM — D72.829 LEUCOCYTOSIS: Status: ACTIVE | Noted: 2021-02-05

## 2021-02-05 LAB
ALBUMIN SERPL BCP-MCNC: 4.5 G/DL (ref 3.4–4.8)
ALP SERPL-CCNC: 91.8 U/L (ref 10–129)
ALT SERPL W P-5'-P-CCNC: 29 U/L (ref 5–63)
ANION GAP SERPL CALCULATED.3IONS-SCNC: 10 MMOL/L (ref 4–13)
APTT PPP: 30 SECONDS (ref 23–31)
AST SERPL W P-5'-P-CCNC: 20 U/L (ref 15–41)
ATRIAL RATE: 103 BPM
BACTERIA UR QL AUTO: ABNORMAL /HPF
BASOPHILS # BLD AUTO: 0.12 THOUSANDS/ΜL (ref 0–0.1)
BASOPHILS NFR BLD AUTO: 1 % (ref 0–1)
BILIRUB SERPL-MCNC: 1.03 MG/DL (ref 0.3–1.2)
BILIRUB UR QL STRIP: ABNORMAL
BUN SERPL-MCNC: 14 MG/DL (ref 6–20)
CALCIUM SERPL-MCNC: 10.1 MG/DL (ref 8.4–10.2)
CHLORIDE SERPL-SCNC: 95 MMOL/L (ref 96–108)
CLARITY UR: CLEAR
CO2 SERPL-SCNC: 26 MMOL/L (ref 22–33)
COLOR UR: YELLOW
CREAT SERPL-MCNC: 1 MG/DL (ref 0.5–1.2)
EOSINOPHIL # BLD AUTO: 0.12 THOUSAND/ΜL (ref 0–0.61)
EOSINOPHIL NFR BLD AUTO: 1 % (ref 0–6)
ERYTHROCYTE [DISTWIDTH] IN BLOOD BY AUTOMATED COUNT: 11.9 % (ref 11.6–15.1)
ETHANOL SERPL-MCNC: <10 MG/DL
FINE GRAN CASTS URNS QL MICRO: ABNORMAL /LPF
GFR SERPL CREATININE-BSD FRML MDRD: 83 ML/MIN/1.73SQ M
GLUCOSE SERPL-MCNC: 173 MG/DL (ref 65–140)
GLUCOSE SERPL-MCNC: 230 MG/DL (ref 65–140)
GLUCOSE SERPL-MCNC: 306 MG/DL (ref 65–140)
GLUCOSE UR STRIP-MCNC: ABNORMAL MG/DL
HCT VFR BLD AUTO: 45.7 % (ref 36.5–49.3)
HGB BLD-MCNC: 16.6 G/DL (ref 12–17)
HGB UR QL STRIP.AUTO: NEGATIVE
IMM GRANULOCYTES # BLD AUTO: 0.02 THOUSAND/UL (ref 0–0.2)
IMM GRANULOCYTES NFR BLD AUTO: 0 % (ref 0–2)
INR PPP: 0.97 (ref 0.9–1.1)
KETONES UR STRIP-MCNC: ABNORMAL MG/DL
LEUKOCYTE ESTERASE UR QL STRIP: NEGATIVE
LIPASE SERPL-CCNC: 861 U/L (ref 13–60)
LYMPHOCYTES # BLD AUTO: 2.69 THOUSANDS/ΜL (ref 0.6–4.47)
LYMPHOCYTES NFR BLD AUTO: 22 % (ref 14–44)
MCH RBC QN AUTO: 31.3 PG (ref 26.8–34.3)
MCHC RBC AUTO-ENTMCNC: 36.3 G/DL (ref 31.4–37.4)
MCV RBC AUTO: 86 FL (ref 82–98)
MONOCYTES # BLD AUTO: 1.08 THOUSAND/ΜL (ref 0.17–1.22)
MONOCYTES NFR BLD AUTO: 9 % (ref 4–12)
MUCOUS THREADS UR QL AUTO: ABNORMAL
NEUTROPHILS # BLD AUTO: 8.42 THOUSANDS/ΜL (ref 1.85–7.62)
NEUTS SEG NFR BLD AUTO: 67 % (ref 43–75)
NITRITE UR QL STRIP: NEGATIVE
NON-SQ EPI CELLS URNS QL MICRO: ABNORMAL /HPF
P AXIS: 68 DEGREES
PH UR STRIP.AUTO: 6 [PH]
PLATELET # BLD AUTO: 313 THOUSANDS/UL (ref 149–390)
PMV BLD AUTO: 10 FL (ref 8.9–12.7)
POTASSIUM SERPL-SCNC: 4 MMOL/L (ref 3.5–5)
PR INTERVAL: 165 MS
PROT SERPL-MCNC: 8.1 G/DL (ref 6.4–8.3)
PROT UR STRIP-MCNC: ABNORMAL MG/DL
PROTHROMBIN TIME: 11 SECONDS (ref 9.5–12.1)
QRS AXIS: 47 DEGREES
QRSD INTERVAL: 92 MS
QT INTERVAL: 344 MS
QTC INTERVAL: 451 MS
RBC # BLD AUTO: 5.31 MILLION/UL (ref 3.88–5.62)
RBC #/AREA URNS AUTO: ABNORMAL /HPF
SODIUM SERPL-SCNC: 131 MMOL/L (ref 133–145)
SP GR UR STRIP.AUTO: 1.02 (ref 1–1.03)
T WAVE AXIS: 11 DEGREES
TROPONIN I SERPL-MCNC: <0.03 NG/ML (ref 0–0.07)
TROPONIN I SERPL-MCNC: <0.03 NG/ML (ref 0–0.07)
UROBILINOGEN UR QL STRIP.AUTO: 2 E.U./DL
VENTRICULAR RATE: 103 BPM
WBC # BLD AUTO: 12.45 THOUSAND/UL (ref 4.31–10.16)
WBC #/AREA URNS AUTO: ABNORMAL /HPF

## 2021-02-05 PROCEDURE — 85610 PROTHROMBIN TIME: CPT

## 2021-02-05 PROCEDURE — 96374 THER/PROPH/DIAG INJ IV PUSH: CPT

## 2021-02-05 PROCEDURE — 80053 COMPREHEN METABOLIC PANEL: CPT

## 2021-02-05 PROCEDURE — 81001 URINALYSIS AUTO W/SCOPE: CPT

## 2021-02-05 PROCEDURE — 83690 ASSAY OF LIPASE: CPT

## 2021-02-05 PROCEDURE — 96375 TX/PRO/DX INJ NEW DRUG ADDON: CPT

## 2021-02-05 PROCEDURE — G1004 CDSM NDSC: HCPCS

## 2021-02-05 PROCEDURE — 84484 ASSAY OF TROPONIN QUANT: CPT

## 2021-02-05 PROCEDURE — 82077 ASSAY SPEC XCP UR&BREATH IA: CPT

## 2021-02-05 PROCEDURE — 82948 REAGENT STRIP/BLOOD GLUCOSE: CPT

## 2021-02-05 PROCEDURE — 85730 THROMBOPLASTIN TIME PARTIAL: CPT

## 2021-02-05 PROCEDURE — 84484 ASSAY OF TROPONIN QUANT: CPT | Performed by: INTERNAL MEDICINE

## 2021-02-05 PROCEDURE — 99285 EMERGENCY DEPT VISIT HI MDM: CPT

## 2021-02-05 PROCEDURE — 96361 HYDRATE IV INFUSION ADD-ON: CPT

## 2021-02-05 PROCEDURE — 93005 ELECTROCARDIOGRAM TRACING: CPT

## 2021-02-05 PROCEDURE — 36415 COLL VENOUS BLD VENIPUNCTURE: CPT

## 2021-02-05 PROCEDURE — 76705 ECHO EXAM OF ABDOMEN: CPT

## 2021-02-05 PROCEDURE — 85025 COMPLETE CBC W/AUTO DIFF WBC: CPT

## 2021-02-05 PROCEDURE — 99223 1ST HOSP IP/OBS HIGH 75: CPT | Performed by: INTERNAL MEDICINE

## 2021-02-05 PROCEDURE — 93010 ELECTROCARDIOGRAM REPORT: CPT | Performed by: INTERNAL MEDICINE

## 2021-02-05 PROCEDURE — 74177 CT ABD & PELVIS W/CONTRAST: CPT

## 2021-02-05 RX ORDER — ACETAMINOPHEN 325 MG/1
650 TABLET ORAL EVERY 6 HOURS PRN
Status: DISCONTINUED | OUTPATIENT
Start: 2021-02-05 | End: 2021-02-08 | Stop reason: HOSPADM

## 2021-02-05 RX ORDER — HYDROMORPHONE HCL/PF 1 MG/ML
1 SYRINGE (ML) INJECTION EVERY 4 HOURS PRN
Status: DISCONTINUED | OUTPATIENT
Start: 2021-02-05 | End: 2021-02-06

## 2021-02-05 RX ORDER — SODIUM CHLORIDE, SODIUM LACTATE, POTASSIUM CHLORIDE, CALCIUM CHLORIDE 600; 310; 30; 20 MG/100ML; MG/100ML; MG/100ML; MG/100ML
100 INJECTION, SOLUTION INTRAVENOUS CONTINUOUS
Status: DISCONTINUED | OUTPATIENT
Start: 2021-02-05 | End: 2021-02-08 | Stop reason: HOSPADM

## 2021-02-05 RX ORDER — LANOLIN ALCOHOL/MO/W.PET/CERES
100 CREAM (GRAM) TOPICAL DAILY
Status: DISCONTINUED | OUTPATIENT
Start: 2021-02-06 | End: 2021-02-08 | Stop reason: HOSPADM

## 2021-02-05 RX ORDER — ONDANSETRON 2 MG/ML
4 INJECTION INTRAMUSCULAR; INTRAVENOUS ONCE
Status: COMPLETED | OUTPATIENT
Start: 2021-02-05 | End: 2021-02-05

## 2021-02-05 RX ORDER — POLYETHYLENE GLYCOL 3350 17 G/17G
17 POWDER, FOR SOLUTION ORAL DAILY
Status: DISCONTINUED | OUTPATIENT
Start: 2021-02-06 | End: 2021-02-08 | Stop reason: HOSPADM

## 2021-02-05 RX ORDER — DOCUSATE SODIUM 100 MG/1
100 CAPSULE, LIQUID FILLED ORAL 2 TIMES DAILY
Status: DISCONTINUED | OUTPATIENT
Start: 2021-02-05 | End: 2021-02-08 | Stop reason: HOSPADM

## 2021-02-05 RX ORDER — FOLIC ACID 1 MG/1
1 TABLET ORAL DAILY
Status: DISCONTINUED | OUTPATIENT
Start: 2021-02-06 | End: 2021-02-08 | Stop reason: HOSPADM

## 2021-02-05 RX ORDER — ONDANSETRON 2 MG/ML
4 INJECTION INTRAMUSCULAR; INTRAVENOUS EVERY 6 HOURS PRN
Status: DISCONTINUED | OUTPATIENT
Start: 2021-02-05 | End: 2021-02-08 | Stop reason: HOSPADM

## 2021-02-05 RX ORDER — PANTOPRAZOLE SODIUM 40 MG/1
40 TABLET, DELAYED RELEASE ORAL
Status: DISCONTINUED | OUTPATIENT
Start: 2021-02-06 | End: 2021-02-08 | Stop reason: HOSPADM

## 2021-02-05 RX ORDER — SODIUM CHLORIDE 9 MG/ML
500 INJECTION, SOLUTION INTRAVENOUS CONTINUOUS
Status: DISCONTINUED | OUTPATIENT
Start: 2021-02-05 | End: 2021-02-06

## 2021-02-05 RX ORDER — LISINOPRIL 10 MG/1
10 TABLET ORAL DAILY
Status: DISCONTINUED | OUTPATIENT
Start: 2021-02-06 | End: 2021-02-08 | Stop reason: HOSPADM

## 2021-02-05 RX ORDER — SENNA PLUS 8.6 MG/1
1 TABLET ORAL DAILY
Status: DISCONTINUED | OUTPATIENT
Start: 2021-02-06 | End: 2021-02-08 | Stop reason: HOSPADM

## 2021-02-05 RX ORDER — HYDROMORPHONE HCL/PF 1 MG/ML
1 SYRINGE (ML) INJECTION ONCE
Status: COMPLETED | OUTPATIENT
Start: 2021-02-05 | End: 2021-02-05

## 2021-02-05 RX ADMIN — HYDROMORPHONE HYDROCHLORIDE 1 MG: 1 INJECTION, SOLUTION INTRAMUSCULAR; INTRAVENOUS; SUBCUTANEOUS at 20:31

## 2021-02-05 RX ADMIN — HYDROMORPHONE HYDROCHLORIDE 1 MG: 1 INJECTION, SOLUTION INTRAMUSCULAR; INTRAVENOUS; SUBCUTANEOUS at 14:01

## 2021-02-05 RX ADMIN — SODIUM CHLORIDE 500 ML/HR: 0.9 INJECTION, SOLUTION INTRAVENOUS at 14:06

## 2021-02-05 RX ADMIN — DOCUSATE SODIUM 100 MG: 100 CAPSULE, LIQUID FILLED ORAL at 18:43

## 2021-02-05 RX ADMIN — IOHEXOL 100 ML: 350 INJECTION, SOLUTION INTRAVENOUS at 15:12

## 2021-02-05 RX ADMIN — SODIUM CHLORIDE, SODIUM LACTATE, POTASSIUM CHLORIDE, AND CALCIUM CHLORIDE 200 ML/HR: .6; .31; .03; .02 INJECTION, SOLUTION INTRAVENOUS at 18:43

## 2021-02-05 RX ADMIN — HYDROMORPHONE HYDROCHLORIDE 1 MG: 1 INJECTION, SOLUTION INTRAMUSCULAR; INTRAVENOUS; SUBCUTANEOUS at 16:46

## 2021-02-05 RX ADMIN — SODIUM CHLORIDE, SODIUM LACTATE, POTASSIUM CHLORIDE, AND CALCIUM CHLORIDE 1000 ML: .6; .31; .03; .02 INJECTION, SOLUTION INTRAVENOUS at 17:11

## 2021-02-05 RX ADMIN — ONDANSETRON 4 MG: 2 INJECTION INTRAMUSCULAR; INTRAVENOUS at 14:01

## 2021-02-05 NOTE — H&P
H&P- Yennifer Lundberg 1965, 60 y o  male MRN: [id number]    Unit/Bed#: ED 07 Encounter: [id number]    Primary Care Provider: Anoop Reyes MD   Date and time admitted to hospital: 2/5/2021  1:38 PM        * Pancreatitis, acute  Assessment & Plan  Patient has abdominal pain in the epigastric and periumbilical region radiating to the back  Lipase level of 861  Will trend lipase  Will check lipid panel  CT of the abdomen was reviewed shows acute interstitial pancreatitis with no acute peripancreatic fluid collection noted  Has diffuse hepatic steatosis  Will check ultrasound of the right upper quadrant for gallstones  Will consult GI  Will keep patient on NPO status  Will give aggressive IV hydration and IV pain medication with Dilaudid  Will give Zofran p r n  Leucocytosis  Assessment & Plan  With acute pancreatitis-no evidence of active infection-UA clear  Will hold off on antibiotic and monitor WBC count    Type 2 diabetes mellitus with hyperglycemia (HCC)  Assessment & Plan  No results found for: HGBA1C    No results for input(s): POCGLU in the last 72 hours  Blood Sugar Average: Last 72 hrs:    Patient will be NPO and will give low-dose sliding scale  Will monitor blood glucose closely  Will give IV fluid hydration with Ringer's lactate  Alcohol abuse  Assessment & Plan  Patient states that he stopped drinking about a year ago  Dyslipidemia  Assessment & Plan  On statin    Hepatic steatosis  Assessment & Plan  Patient advised about weight reduction and lifestyle modification and also patient is on statin      VTE Prophylaxis: Enoxaparin (Lovenox)  / sequential compression device and foot pump applied   Code Status:  Full code  POLST: There is no POLST form on file for this patient (pre-hospital)  Discussion with family:  Yes    Anticipated Length of Stay:  Patient will be admitted on an Inpatient basis with an anticipated length of stay of 2 midnights     Justification for Hospital Stay: Acute pancreatitis  Total Time for Visit, including Counseling / Coordination of Care: 45 minutes  Greater than 50% of this total time spent on direct patient counseling and coordination of care  Chief Complaint:   Abdominal pain    History of Present Illness:    Micaela Rodriguez is a 60 y o  male who presents with hypertension, diabetes type 2, hyperlipidemia, presents with complaints of abdominal pain in the epigastric region since last Sunday  Patient states that it was mild in nature in the epigastric region intensity was 5/10 radiating to both sides of the ribcage to the back  He states that the pain has been getting worse since the past 2-3 days  And it is in around the umbilicus intensity is like 7 to 8/10  Associated with nausea and poor appetite and has been not able to eat since Sunday  Patient denies of chest pain  No cough or fever or chills  Patient has not had a bowel movement since Sunday  Patient denies of any dizziness or lightheadedness  Patient has no vomiting or hematemesis  Patient denies of drinking any alcohol and has quit alcohol for the past 1 year  No new medication changes recently  However he started to take diclofenac for back pain recently  Review of Systems:    Review of Systems   Constitutional: Positive for appetite change  HENT: Negative  Eyes: Negative  Respiratory: Negative  Cardiovascular: Negative  Gastrointestinal: Positive for abdominal pain and nausea  Endocrine: Negative  Genitourinary: Negative  Musculoskeletal: Negative  Skin: Negative  Allergic/Immunologic: Negative  Neurological: Negative  Hematological: Negative  Psychiatric/Behavioral: Negative  Past Medical and Surgical History:     History reviewed  No pertinent past medical history      Past Surgical History:   Procedure Laterality Date    ANKLE SURGERY Right     KNEE SURGERY Right        Meds/Allergies:    Prior to Admission medications    Medication Sig Start Date End Date Taking? Authorizing Provider   Accu-Chek FastClix Lancets MISC USE AS DIRECTED 12/9/20   Layla June MD   Accu-Chek SmartView test strip TEST THREE TIMES DAILY 12/9/20   Layla June MD   Blood Glucose Monitoring Suppl (Accu-Chek Stfeanie Plus) w/Device KIT USE AS DIRECTED 12/9/20   Layla June MD   fenofibrate (TRIGLIDE) 160 MG tablet Take 1 tablet (160 mg total) by mouth daily 10/30/20   Flor Martínez MD   glimepiride (AMARYL) 2 mg tablet Take 1 tablet every day in the morning and take 1/2 tablet every evening 10/30/20   Flor Martínez MD   metFORMIN (GLUCOPHAGE-XR) 500 mg 24 hr tablet Take 2 tablets (1,000 mg total) by mouth daily 10/30/20   Flor Martínez MD   omeprazole (PriLOSEC) 40 MG capsule TAKE 1 CAPSULE EVERY DAY 8/19/20   Flor Martínez MD   oxyCODONE-acetaminophen (PERCOCET) 7 5-325 MG per tablet TAKE 1 TABLET BID AS NEEDED FOR PAIN  MDD 2 10/8/20   Historical Provider, MD   pioglitazone (ACTOS) 45 mg tablet Take 1 tablet (45 mg total) by mouth daily 11/22/20   Layla June MD   ramipril (ALTACE) 10 MG capsule Take 1 capsule (10 mg total) by mouth daily 10/30/20   Flor Martínez MD   sertraline (ZOLOFT) 100 mg tablet Take 2 tablets (200 mg total) by mouth daily 12/14/20   Layla June MD   simvastatin (ZOCOR) 20 mg tablet Take 1 tablet (20 mg total) by mouth daily 10/30/20   Flor Martínez MD     I have reviewed home medications with patient personally  Allergies:    Allergies   Allergen Reactions    Amoxicillin Swelling       Social History:     Marital Status: /Civil Union   Occupation: disability  Patient Pre-hospital Living Situation:  Lives at home  Patient Pre-hospital Level of Mobility:  Independent  Patient Pre-hospital Diet Restrictions:  Diabetic   Substance Use History:   Social History     Substance and Sexual Activity   Alcohol Use Yes    Alcohol/week: 270 0 standard drinks    Types: 270 Cans of beer per week    Frequency: Monthly or less    Drinks per session: 1 or 2    Comment: 30 pack a day     Social History     Tobacco Use   Smoking Status Current Every Day Smoker    Packs/day: 2 00    Years: 40 00    Pack years: 80 00    Types: Cigarettes    Start date: 1978   Smokeless Tobacco Never Used   Tobacco Comment    per [employer]     Social History     Substance and Sexual Activity   Drug Use Yes    Types: Marijuana    Comment: not for a while       Family History:    non-contributory    Physical Exam:     Vitals:   Blood Pressure: 101/58 (02/05/21 1505)  Pulse: 95 (02/05/21 1505)  Temperature: 98 6 °F (37 °C) (02/05/21 1344)  Respirations: 17 (02/05/21 1505)  SpO2: 95 % (02/05/21 1505)    Physical Exam      HEENT-PERRLA, moist oral mucosa  Neck-supple, no JVD elevation   Respiratory-equal air entry bilaterally, no rales or rhonchi  Cardiovascular system-S1, S2 heard, no murmur or gallops or rubs  Abdomen-soft, epigastric tenderness present  No guarding or rigidity noted  Has tenderness in the periumbilical region and also right upper quadrant  no guarding or rigidity, bowel sounds heard  Extremities-no pedal edema  Peripheral pulses palpable  Musculoskeletal-no contractures  Central nervous system-no acute focal neurological deficit ,no sensory or motor deficit noted  Skin-no rash noted          Additional Data:     Lab Results: I have personally reviewed pertinent reports        Results from last 7 days   Lab Units 02/05/21  1351   WBC Thousand/uL 12 45*   HEMOGLOBIN g/dL 16 6   HEMATOCRIT % 45 7   PLATELETS Thousands/uL 313   NEUTROS PCT % 67   LYMPHS PCT % 22   MONOS PCT % 9   EOS PCT % 1     Results from last 7 days   Lab Units 02/05/21  1351   SODIUM mmol/L 131*   POTASSIUM mmol/L 4 0   CHLORIDE mmol/L 95*   CO2 mmol/L 26   BUN mg/dL 14   CREATININE mg/dL 1 00   ANION GAP mmol/L 10   CALCIUM mg/dL 10 1   ALBUMIN g/dL 4 5   TOTAL BILIRUBIN mg/dL 1 03   ALK PHOS U/L 91 8   ALT U/L 29   AST U/L 20   GLUCOSE RANDOM mg/dL 306*     Results from last 7 days   Lab Units 02/05/21  1401   INR  0 97                   Imaging: I have personally reviewed pertinent reports  CT abdomen pelvis with contrast   Final Result by Godfrey Chaves MD (02/05 9124)      Findings consistent with acute interstitial pancreatitis  No acute peripancreatic fluid collection  Diffuse hepatic steatosis  Workstation performed: [id number]         US gallbladder    (Results Pending)       EKG, Pathology, and Other Studies Reviewed on Admission:   · EKG:  Sinus rhythm no acute ST-T changes noted  Initial set of troponin negative  Allscripts / Epic Records Reviewed: Yes     ** Please Note: This note has been constructed using a voice recognition system   **

## 2021-02-05 NOTE — ASSESSMENT & PLAN NOTE
With acute pancreatitis-no evidence of active infection-UA clear  Will hold off on antibiotic and monitor WBC count

## 2021-02-05 NOTE — PLAN OF CARE
Problem: PAIN - ADULT  Goal: Verbalizes/displays adequate comfort level or baseline comfort level  Description: Interventions:  - Encourage patient to monitor pain and request assistance  - Assess pain using appropriate pain scale  - Administer analgesics based on type and severity of pain and evaluate response  - Implement non-pharmacological measures as appropriate and evaluate response  - Consider cultural and social influences on pain and pain management  - Notify physician/advanced practitioner if interventions unsuccessful or patient reports new pain  Outcome: Progressing     Problem: DISCHARGE PLANNING  Goal: Discharge to home or other facility with appropriate resources  Description: INTERVENTIONS:  - Identify barriers to discharge w/patient and caregiver  - Arrange for needed discharge resources and transportation as appropriate  - Identify discharge learning needs (meds, wound care, etc )  - Arrange for interpretive services to assist at discharge as needed  - Refer to Case Management Department for coordinating discharge planning if the patient needs post-hospital services based on physician/advanced practitioner order or complex needs related to functional status, cognitive ability, or social support system  Outcome: Progressing     Problem: GASTROINTESTINAL - ADULT  Goal: Minimal or absence of nausea and/or vomiting  Description: INTERVENTIONS:  - Administer IV fluids if ordered to ensure adequate hydration  - Maintain NPO status until nausea and vomiting are resolved  - Nasogastric tube if ordered  - Administer ordered antiemetic medications as needed  - Provide nonpharmacologic comfort measures as appropriate  - Advance diet as tolerated, if ordered  - Consider nutrition services referral to assist patient with adequate nutrition and appropriate food choices  Outcome: Progressing  Goal: Maintains adequate nutritional intake  Description: INTERVENTIONS:  - Monitor percentage of each meal consumed  - Identify factors contributing to decreased intake, treat as appropriate  - Assist with meals as needed  - Monitor I&O, weight, and lab values if indicated  - Obtain nutrition services referral as needed  Outcome: Progressing

## 2021-02-05 NOTE — ASSESSMENT & PLAN NOTE
No results found for: HGBA1C    No results for input(s): POCGLU in the last 72 hours  Blood Sugar Average: Last 72 hrs:    Patient will be NPO and will give low-dose sliding scale  Will monitor blood glucose closely  Will give IV fluid hydration with Ringer's lactate

## 2021-02-05 NOTE — ED PROVIDER NOTES
History  Chief Complaint   Patient presents with    Abdominal Pain     pt presents to ed for abd pain that started a week ago along with constipation  hx of pancreatitis     49-year-old male history adult onset diabetes hypertension history of remote pancreatitis presents sex very to acute onset epigastric pain 3 days ago  Patient states that the pain has been getting worse the past 24 hours  Patient has nausea denies any vomiting patient denies any melena hematochezia patient denies any genitourinary symptoms  Patient denies any prior GI surgical history  Patient adamantly denies drinking any alcohol in the past week  Patient states that the pain is 7/10 and constant  Prior to Admission Medications   Prescriptions Last Dose Informant Patient Reported? Taking? Accu-Chek FastClix Lancets MISC   No No   Sig: USE AS DIRECTED   Accu-Chek SmartView test strip   No No   Sig: TEST THREE TIMES DAILY   Blood Glucose Monitoring Suppl (Accu-Chek Stefanie Plus) w/Device KIT   No No   Sig: USE AS DIRECTED   fenofibrate (TRIGLIDE) 160 MG tablet   No No   Sig: Take 1 tablet (160 mg total) by mouth daily   glimepiride (AMARYL) 2 mg tablet   No No   Sig: Take 1 tablet every day in the morning and take 1/2 tablet every evening   metFORMIN (GLUCOPHAGE-XR) 500 mg 24 hr tablet   No No   Sig: Take 2 tablets (1,000 mg total) by mouth daily   omeprazole (PriLOSEC) 40 MG capsule   No No   Sig: TAKE 1 CAPSULE EVERY DAY   oxyCODONE-acetaminophen (PERCOCET) 7 5-325 MG per tablet   Yes No   Sig: TAKE 1 TABLET BID AS NEEDED FOR PAIN   MDD 2   pioglitazone (ACTOS) 45 mg tablet   No No   Sig: Take 1 tablet (45 mg total) by mouth daily   ramipril (ALTACE) 10 MG capsule   No No   Sig: Take 1 capsule (10 mg total) by mouth daily   sertraline (ZOLOFT) 100 mg tablet   No No   Sig: Take 2 tablets (200 mg total) by mouth daily   simvastatin (ZOCOR) 20 mg tablet   No No   Sig: Take 1 tablet (20 mg total) by mouth daily Facility-Administered Medications: None       History reviewed  No pertinent past medical history  Past Surgical History:   Procedure Laterality Date    ANKLE SURGERY Right     KNEE SURGERY Right        Family History   Problem Relation Age of Onset    No Known Problems Mother     No Known Problems Father      I have reviewed and agree with the history as documented  E-Cigarette/Vaping     E-Cigarette/Vaping Substances     Social History     Tobacco Use    Smoking status: Current Every Day Smoker     Packs/day: 2 00     Years: 40 00     Pack years: 80 00     Types: Cigarettes     Start date: 1978    Smokeless tobacco: Never Used    Tobacco comment: per [facility]ervat-quit   Substance Use Topics    Alcohol use: Yes     Alcohol/week: 270 0 standard drinks     Types: 270 Cans of beer per week     Frequency: Monthly or less     Drinks per session: 1 or 2     Comment: 30 pack a day    Drug use: Yes     Types: Marijuana     Comment: not for a while       Review of Systems   Constitutional: Negative for chills and fever  HENT: Negative for congestion  Eyes: Negative for visual disturbance  Respiratory: Negative for shortness of breath  Cardiovascular: Negative for chest pain  Gastrointestinal: Positive for abdominal pain and nausea  Endocrine: Negative for cold intolerance  Genitourinary: Negative for frequency  Musculoskeletal: Negative for gait problem  Skin: Negative for rash  Neurological: Negative for dizziness  Psychiatric/Behavioral: Negative for behavioral problems and confusion  Physical Exam  Physical Exam  Vitals signs and nursing note reviewed  Constitutional:       Appearance: He is well-developed  HENT:      Head: Normocephalic and atraumatic  Eyes:      Conjunctiva/sclera: Conjunctivae normal       Pupils: Pupils are equal, round, and reactive to light  Neck:      Musculoskeletal: Normal range of motion and neck supple     Cardiovascular:      Rate and Rhythm: Normal rate and regular rhythm  Heart sounds: Normal heart sounds  Pulmonary:      Effort: Pulmonary effort is normal       Breath sounds: Normal breath sounds  Abdominal:      General: Bowel sounds are normal       Palpations: Abdomen is soft  Tenderness: There is abdominal tenderness in the epigastric area  Musculoskeletal: Normal range of motion  Skin:     General: Skin is warm and dry  Capillary Refill: Capillary refill takes less than 2 seconds  Neurological:      Mental Status: He is alert and oriented to person, place, and time     Psychiatric:         Behavior: Behavior normal          Vital Signs  ED Triage Vitals   Temperature Pulse Respirations Blood Pressure SpO2   02/05/21 1344 02/05/21 1344 02/05/21 1344 02/05/21 1344 02/05/21 1344   98 6 °F (37 °C) (!) 115 21 133/99 98 %      Temp src Heart Rate Source Patient Position - Orthostatic VS BP Location FiO2 (%)   -- 02/05/21 1344 02/05/21 1505 02/05/21 1344 --    Monitor Lying Right arm       Pain Score       02/05/21 1344       9           Vitals:    02/05/21 1344 02/05/21 1406 02/05/21 1505   BP: 133/99 117/74 101/58   Pulse: (!) 115 (!) 111 95   Patient Position - Orthostatic VS:   Lying         Visual Acuity      ED Medications  Medications   sodium chloride 0 9 % infusion (500 mL/hr Intravenous New Bag 2/5/21 1406)   ondansetron (ZOFRAN) injection 4 mg (4 mg Intravenous Given 2/5/21 1401)   HYDROmorphone (DILAUDID) injection 1 mg (1 mg Intravenous Given 2/5/21 1401)   iohexol (OMNIPAQUE) 350 MG/ML injection (SINGLE-DOSE) 100 mL (100 mL Intravenous Given 2/5/21 1512)       Diagnostic Studies  Results Reviewed     Procedure Component Value Units Date/Time    Urine Microscopic [[id number]]  (Abnormal) Collected: 02/05/21 1509    Lab Status: Final result Specimen: Urine, Clean Catch Updated: 02/05/21 1538     RBC, UA 0-1 /hpf      WBC, UA 0-1 /hpf      Epithelial Cells Occasional /hpf      Bacteria, UA Occasional /hpf      Fine granular casts 2-3 /lpf      MUCUS THREADS Occasional    UA w Reflex to Microscopic w Reflex to Culture [[id number]]  (Abnormal) Collected: 02/05/21 1509    Lab Status: Final result Specimen: Urine, Clean Catch Updated: 02/05/21 1516     Color, UA Yellow     Clarity, UA Clear     Specific [place], UA 1 020     pH, UA 6 0     Leukocytes, UA Negative     Nitrite, UA Negative     Protein, UA Trace mg/dl      Glucose, UA 3+ mg/dl      Ketones, UA Trace mg/dl      Urobilinogen, UA 2 0 E U /dl      Bilirubin, UA 1+     Blood, UA Negative    Lipase [[id number]]  (Abnormal) Collected: 02/05/21 1351    Lab Status: Final result Specimen: Blood from Arm, Left Updated: 02/05/21 1459     Lipase 861 u/L     Troponin I [[id number]]  (Normal) Collected: 02/05/21 1401    Lab Status: Final result Specimen: Blood from Arm, Left Updated: 02/05/21 1431     Troponin I <0 03 ng/mL     APTT [[id number]]  (Normal) Collected: 02/05/21 1401    Lab Status: Final result Specimen: Blood from Arm, Left Updated: 02/05/21 1431     PTT 30 seconds     Protime-INR [[id number]]  (Normal) Collected: 02/05/21 1401    Lab Status: Final result Specimen: Blood from Arm, Left Updated: 02/05/21 1431     Protime 11 0 seconds      INR 0 97    Narrative:      INR Reference Ranges:  No Anticoagulant, Normal:           0 9-1 1  Standard Dose, Oral Anticoagulant:  2 0-3 0  High Dose, Oral Anticoagulant:      2 5-3 5    Ethanol [[id number]]  (Normal) Collected: 02/05/21 1401    Lab Status: Final result Specimen: Blood from Arm, Left Updated: 02/05/21 1429     Ethanol Lvl <10 mg/dL     Comprehensive metabolic panel [[id number]]  (Abnormal) Collected: 02/05/21 1351    Lab Status: Final result Specimen: Blood from Arm, Left Updated: 02/05/21 1417     Sodium 131 mmol/L      Potassium 4 0 mmol/L      Chloride 95 mmol/L      CO2 26 mmol/L      ANION GAP 10 mmol/L      BUN 14 mg/dL      Creatinine 1 00 mg/dL      Glucose 306 mg/dL      Calcium 10 1 mg/dL      AST 20 U/L      ALT 29 U/L      Alkaline Phosphatase 91 8 U/L      Total Protein 8 1 g/dL      Albumin 4 5 g/dL      Total Bilirubin 1 03 mg/dL      eGFR 83 ml/min/1 73sq m     Narrative:      National Kid[employer] Disease Foundation guidelines for Chronic Kidney Disease (CKD):     Stage 1 with normal or high GFR (GFR > 90 mL/min/1 73 square meters)    Stage 2 Mild CKD (GFR = 60-89 mL/min/1 73 square meters)    Stage 3A Moderate CKD (GFR = 45-59 mL/min/1 73 square meters)    Stage 3B Moderate CKD (GFR = 30-44 mL/min/1 73 square meters)    Stage 4 Severe CKD (GFR = 15-29 mL/min/1 73 square meters)    Stage 5 End Stage CKD (GFR <15 mL/min/1 73 square meters)  Note: GFR calculation is accurate only with a steady state creatinine    CBC and differential [[id number]]  (Abnormal) Collected: 02/05/21 1351    Lab Status: Final result Specimen: Blood from Arm, Left Updated: 02/05/21 1355     WBC 12 45 Thousand/uL      RBC 5 31 Million/uL      Hemoglobin 16 6 g/dL      Hematocrit 45 7 %      MCV 86 fL      MCH 31 3 pg      MCHC 36 3 g/dL      RDW 11 9 %      MPV 10 0 fL      Platelets 806 Thousands/uL      Neutrophils Relative 67 %      Immat GRANS % 0 %      Lymphocytes Relative 22 %      Monocytes Relative 9 %      Eosinophils Relative 1 %      Basophils Relative 1 %      Neutrophils Absolute 8 42 Thousands/µL      Immature Grans Absolute 0 02 Thousand/uL      Lymphocytes Absolute 2 69 Thousands/µL      Monocytes Absolute 1 08 Thousand/µL      Eosinophils Absolute 0 12 Thousand/µL      Basophils Absolute 0 12 Thousands/µL                  CT abdomen pelvis with contrast   Final Result by Kari Barger MD (02/05 1731)      Findings consistent with acute interstitial pancreatitis  No acute peripancreatic fluid collection  Diffuse hepatic steatosis        Workstation performed: [id number]                    Procedures  Procedures         ED Course                             SBIRT 20yo+      Most Recent Value   SBIRT (23 yo +)   In order to provide better care to our patients, we are screening all of our patients for alcohol and drug use  Would it be okay to ask you these screening questions? No Filed at: 02/05/2021 1348                    [facility]  Number of Diagnoses or Management Options  Diagnosis management comments: Patient was monitored in the emergency department received IV hydration 500 cc normal saline bolus patient received Dilaudid 1 mg IV Zofran 4 mg IV with some improvement of his pain but still had ongoing pain patient's lipase was over 800 patient's CT scan of the abdomen and pelvis demonstrated findings consistent acute pancreatitis otherwise uncomplicated are no other significant findings  Plan will be to admit the patient hospital for IV hydration and p o  Treatment of pancreatitis  Disposition  Final diagnoses:   Acute pancreatitis     Time reflects when diagnosis was documented in both MDM as applicable and the Disposition within this note     Time User Action Codes Description Comment    2/5/2021  4:24 PM Zach Cruz [[id number]] Acute pancreatitis       ED Disposition     ED Disposition Condition Date/Time Comment    Admit Stable Fri Feb 5, 2021  4:01 PM Case was discussed with Hospitalist  and the patient's admission status was agreed to be Admission Status: inpatient status to the service of Dr Fish Senior  Follow-up Information    None         Patient's Medications   Discharge Prescriptions    No medications on file     No discharge procedures on file      PDMP Review     None          ED Provider  Electronically Signed by           Harshal Stearns MD  02/05/21 [address], MD  02/05/21 [address], MD  02/05/21 5829

## 2021-02-05 NOTE — ASSESSMENT & PLAN NOTE
Patient has abdominal pain in the epigastric and periumbilical region radiating to the back  Lipase level of 861  Will trend lipase  Will check lipid panel  CT of the abdomen was reviewed shows acute interstitial pancreatitis with no acute peripancreatic fluid collection noted  Has diffuse hepatic steatosis  Will check ultrasound of the right upper quadrant for gallstones  Will consult GI  Will keep patient on NPO status  Will give aggressive IV hydration and IV pain medication with Dilaudid  Will give Zofran p r n

## 2021-02-06 LAB
ALBUMIN SERPL BCP-MCNC: 3.8 G/DL (ref 3.4–4.8)
ALP SERPL-CCNC: 81.9 U/L (ref 10–129)
ALT SERPL W P-5'-P-CCNC: 24 U/L (ref 5–63)
ANION GAP SERPL CALCULATED.3IONS-SCNC: 7 MMOL/L (ref 4–13)
AST SERPL W P-5'-P-CCNC: 16 U/L (ref 15–41)
ATRIAL RATE: 103 BPM
ATRIAL RATE: 82 BPM
BILIRUB SERPL-MCNC: 0.84 MG/DL (ref 0.3–1.2)
BUN SERPL-MCNC: 9 MG/DL (ref 6–20)
CALCIUM SERPL-MCNC: 9.1 MG/DL (ref 8.4–10.2)
CHLORIDE SERPL-SCNC: 100 MMOL/L (ref 96–108)
CHOLEST SERPL-MCNC: 170 MG/DL
CO2 SERPL-SCNC: 29 MMOL/L (ref 22–33)
CREAT SERPL-MCNC: 0.62 MG/DL (ref 0.5–1.2)
ERYTHROCYTE [DISTWIDTH] IN BLOOD BY AUTOMATED COUNT: 12 % (ref 11.6–15.1)
GFR SERPL CREATININE-BSD FRML MDRD: 110 ML/MIN/1.73SQ M
GLUCOSE SERPL-MCNC: 133 MG/DL (ref 65–140)
GLUCOSE SERPL-MCNC: 136 MG/DL (ref 65–140)
GLUCOSE SERPL-MCNC: 140 MG/DL (ref 65–140)
GLUCOSE SERPL-MCNC: 143 MG/DL (ref 65–140)
GLUCOSE SERPL-MCNC: 154 MG/DL (ref 65–140)
HCT VFR BLD AUTO: 41 % (ref 36.5–49.3)
HDLC SERPL-MCNC: 30 MG/DL
HGB BLD-MCNC: 14.6 G/DL (ref 12–17)
LDLC SERPL CALC-MCNC: 108 MG/DL (ref 0–100)
LIPASE SERPL-CCNC: 676 U/L (ref 13–60)
MCH RBC QN AUTO: 31.3 PG (ref 26.8–34.3)
MCHC RBC AUTO-ENTMCNC: 35.6 G/DL (ref 31.4–37.4)
MCV RBC AUTO: 88 FL (ref 82–98)
NONHDLC SERPL-MCNC: 140 MG/DL
P AXIS: -19 DEGREES
P AXIS: 68 DEGREES
PLATELET # BLD AUTO: 222 THOUSANDS/UL (ref 149–390)
PMV BLD AUTO: 9.9 FL (ref 8.9–12.7)
POTASSIUM SERPL-SCNC: 3.7 MMOL/L (ref 3.5–5)
PR INTERVAL: 164 MS
PR INTERVAL: 165 MS
PROT SERPL-MCNC: 6.8 G/DL (ref 6.4–8.3)
QRS AXIS: -3 DEGREES
QRS AXIS: 47 DEGREES
QRSD INTERVAL: 111 MS
QRSD INTERVAL: 92 MS
QT INTERVAL: 344 MS
QT INTERVAL: 380 MS
QTC INTERVAL: 444 MS
QTC INTERVAL: 451 MS
RBC # BLD AUTO: 4.66 MILLION/UL (ref 3.88–5.62)
SODIUM SERPL-SCNC: 136 MMOL/L (ref 133–145)
T WAVE AXIS: -4 DEGREES
T WAVE AXIS: 11 DEGREES
TRIGL SERPL-MCNC: 160.2 MG/DL
VENTRICULAR RATE: 103 BPM
VENTRICULAR RATE: 82 BPM
WBC # BLD AUTO: 6.84 THOUSAND/UL (ref 4.31–10.16)

## 2021-02-06 PROCEDURE — 85027 COMPLETE CBC AUTOMATED: CPT | Performed by: INTERNAL MEDICINE

## 2021-02-06 PROCEDURE — 93005 ELECTROCARDIOGRAM TRACING: CPT

## 2021-02-06 PROCEDURE — 82948 REAGENT STRIP/BLOOD GLUCOSE: CPT

## 2021-02-06 PROCEDURE — 80053 COMPREHEN METABOLIC PANEL: CPT | Performed by: INTERNAL MEDICINE

## 2021-02-06 PROCEDURE — 99233 SBSQ HOSP IP/OBS HIGH 50: CPT | Performed by: INTERNAL MEDICINE

## 2021-02-06 PROCEDURE — 80061 LIPID PANEL: CPT | Performed by: INTERNAL MEDICINE

## 2021-02-06 PROCEDURE — 93010 ELECTROCARDIOGRAM REPORT: CPT | Performed by: INTERNAL MEDICINE

## 2021-02-06 PROCEDURE — 83690 ASSAY OF LIPASE: CPT | Performed by: INTERNAL MEDICINE

## 2021-02-06 RX ORDER — LANOLIN ALCOHOL/MO/W.PET/CERES
100 CREAM (GRAM) TOPICAL DAILY
Status: DISCONTINUED | OUTPATIENT
Start: 2021-02-06 | End: 2021-02-06

## 2021-02-06 RX ORDER — HYDROMORPHONE HCL 110MG/55ML
2 PATIENT CONTROLLED ANALGESIA SYRINGE INTRAVENOUS
Status: DISCONTINUED | OUTPATIENT
Start: 2021-02-06 | End: 2021-02-08 | Stop reason: HOSPADM

## 2021-02-06 RX ADMIN — HYDROMORPHONE HYDROCHLORIDE 2 MG: 2 INJECTION, SOLUTION INTRAMUSCULAR; INTRAVENOUS; SUBCUTANEOUS at 21:32

## 2021-02-06 RX ADMIN — HYDROMORPHONE HYDROCHLORIDE 2 MG: 2 INJECTION, SOLUTION INTRAMUSCULAR; INTRAVENOUS; SUBCUTANEOUS at 15:38

## 2021-02-06 RX ADMIN — ENOXAPARIN SODIUM 40 MG: 40 INJECTION SUBCUTANEOUS at 08:49

## 2021-02-06 RX ADMIN — SODIUM CHLORIDE, SODIUM LACTATE, POTASSIUM CHLORIDE, AND CALCIUM CHLORIDE 200 ML/HR: .6; .31; .03; .02 INJECTION, SOLUTION INTRAVENOUS at 17:34

## 2021-02-06 RX ADMIN — DOCUSATE SODIUM 100 MG: 100 CAPSULE, LIQUID FILLED ORAL at 17:29

## 2021-02-06 RX ADMIN — HYDROMORPHONE HYDROCHLORIDE 1 MG: 1 INJECTION, SOLUTION INTRAMUSCULAR; INTRAVENOUS; SUBCUTANEOUS at 04:19

## 2021-02-06 RX ADMIN — POLYETHYLENE GLYCOL 3350 17 G: 17 POWDER, FOR SOLUTION ORAL at 08:45

## 2021-02-06 RX ADMIN — NICOTINE 1 PATCH: 7 PATCH, EXTENDED RELEASE TRANSDERMAL at 08:45

## 2021-02-06 RX ADMIN — LISINOPRIL 10 MG: 10 TABLET ORAL at 08:45

## 2021-02-06 RX ADMIN — HYDROMORPHONE HYDROCHLORIDE 2 MG: 2 INJECTION, SOLUTION INTRAMUSCULAR; INTRAVENOUS; SUBCUTANEOUS at 11:52

## 2021-02-06 RX ADMIN — HYDROMORPHONE HYDROCHLORIDE 1 MG: 1 INJECTION, SOLUTION INTRAMUSCULAR; INTRAVENOUS; SUBCUTANEOUS at 08:39

## 2021-02-06 RX ADMIN — FOLIC ACID 1 MG: 1 TABLET ORAL at 08:45

## 2021-02-06 RX ADMIN — SODIUM CHLORIDE, SODIUM LACTATE, POTASSIUM CHLORIDE, AND CALCIUM CHLORIDE 200 ML/HR: .6; .31; .03; .02 INJECTION, SOLUTION INTRAVENOUS at 01:06

## 2021-02-06 RX ADMIN — SODIUM CHLORIDE, SODIUM LACTATE, POTASSIUM CHLORIDE, AND CALCIUM CHLORIDE 200 ML/HR: .6; .31; .03; .02 INJECTION, SOLUTION INTRAVENOUS at 11:55

## 2021-02-06 RX ADMIN — HYDROMORPHONE HYDROCHLORIDE 1 MG: 1 INJECTION, SOLUTION INTRAMUSCULAR; INTRAVENOUS; SUBCUTANEOUS at 00:35

## 2021-02-06 RX ADMIN — THIAMINE HCL TAB 100 MG 100 MG: 100 TAB at 08:45

## 2021-02-06 RX ADMIN — SODIUM CHLORIDE, SODIUM LACTATE, POTASSIUM CHLORIDE, AND CALCIUM CHLORIDE 200 ML/HR: .6; .31; .03; .02 INJECTION, SOLUTION INTRAVENOUS at 06:13

## 2021-02-06 RX ADMIN — PANTOPRAZOLE SODIUM 40 MG: 40 TABLET, DELAYED RELEASE ORAL at 06:09

## 2021-02-06 RX ADMIN — HYDROMORPHONE HYDROCHLORIDE 2 MG: 2 INJECTION, SOLUTION INTRAMUSCULAR; INTRAVENOUS; SUBCUTANEOUS at 18:50

## 2021-02-06 NOTE — PLAN OF CARE
Problem: PAIN - ADULT  Goal: Verbalizes/displays adequate comfort level or baseline comfort level  Description: Interventions:  - Encourage patient to monitor pain and request assistance  - Assess pain using appropriate pain scale  - Administer analgesics based on type and severity of pain and evaluate response  - Implement non-pharmacological measures as appropriate and evaluate response  - Consider cultural and social influences on pain and pain management  - Notify physician/advanced practitioner if interventions unsuccessful or patient reports new pain  Outcome: Progressing     Problem: DISCHARGE PLANNING  Goal: Discharge to home or other facility with appropriate resources  Description: INTERVENTIONS:  - Identify barriers to discharge w/patient and caregiver  - Arrange for needed discharge resources and transportation as appropriate  - Identify discharge learning needs (meds, wound care, etc )  - Arrange for interpretive services to assist at discharge as needed  - Refer to Case Management Department for coordinating discharge planning if the patient needs post-hospital services based on physician/advanced practitioner order or complex needs related to functional status, cognitive ability, or social support system  Outcome: Progressing     Problem: GASTROINTESTINAL - ADULT  Goal: Minimal or absence of nausea and/or vomiting  Description: INTERVENTIONS:  - Administer IV fluids if ordered to ensure adequate hydration  - Maintain NPO status until nausea and vomiting are resolved  - Administer ordered antiemetic medications as needed  - Provide nonpharmacologic comfort measures as appropriate  - Advance diet as tolerated, if ordered  - Consider nutrition services referral to assist patient with adequate nutrition and appropriate food choices  Outcome: Progressing     Problem: Nutrition/Hydration-ADULT  Goal: Nutrient/Hydration intake appropriate for improving, restoring or maintaining nutritional needs  Description: Monitor and assess patient's nutrition/hydration status for malnutrition  Collaborate with interdisciplinary team and initiate plan and interventions as ordered  Monitor patient's weight and dietary intake as ordered or per policy  Utilize nutrition screening tool and intervene as necessary  Determine patient's food preferences and provide high-protein, high-caloric foods as appropriate       INTERVENTIONS:  - Monitor oral intake, urinary output, labs, and treatment plans  - Assess nutrition and hydration status and recommend course of action  - Evaluate amount of meals eaten  - Assist patient with eating if necessary   - Allow adequate time for meals  - Recommend/ encourage appropriate diets, oral nutritional supplements, and vitamin/mineral supplements  - Order, calculate, and assess calorie counts as needed  - Recommend, monitor, and adjust tube feedings and TPN/PPN based on assessed needs  - Assess need for intravenous fluids  - Provide specific nutrition/hydration education as appropriate  - Include patient/family/caregiver in decisions related to nutrition  Outcome: Progressing

## 2021-02-06 NOTE — ASSESSMENT & PLAN NOTE
Patient has abdominal pain in the epigastric and periumbilical region radiating to the back  Lipase level of 600s  CT of the abdomen was reviewed shows acute interstitial pancreatitis with no acute peripancreatic fluid collection noted  Has diffuse hepatic steatosis  Right upper quadrant ultrasound shows severe hepatomegaly and hepatic steatosis  Normal gallbladder and biliary tree noted  Lipase level is trending down  Lipid panel within normal limits  Will give IV hydration and start on clear liquid diet  Also give pain medication with Dilaudid and p r n  Zofran  GI input highly appreciated plan was discussed with GI  Patient will need EGD which will be scheduled on Monday as patient has been taking diclofenac with possibly have peptic ulcer disease

## 2021-02-06 NOTE — PROGRESS NOTES
Progress Note - Felicia McClave 1965, 60 y o  male MRN: [id number]    Unit/Bed#: -01 Encounter: [id number]    Primary Care Provider: Jyoti Antoine MD   Date and time admitted to hospital: 2/5/2021  1:38 PM        * Pancreatitis, acute  Assessment & Plan  Patient has abdominal pain in the epigastric and periumbilical region radiating to the back  Lipase level of 600s  CT of the abdomen was reviewed shows acute interstitial pancreatitis with no acute peripancreatic fluid collection noted  Has diffuse hepatic steatosis  Right upper quadrant ultrasound shows severe hepatomegaly and hepatic steatosis  Normal gallbladder and biliary tree noted  Lipase level is trending down  Lipid panel within normal limits  Will give IV hydration and start on clear liquid diet  Also give pain medication with Dilaudid and p r n  Zofran  GI input highly appreciated plan was discussed with GI  Patient will need EGD which will be scheduled on Monday as patient has been taking diclofenac with possibly have peptic ulcer disease  Leucocytosis  Assessment & Plan  With acute pancreatitis-no evidence of active infection-UA clear  Will hold off on antibiotic and monitor WBC count    Type 2 diabetes mellitus with hyperglycemia [facility])  Assessment & Plan  No results found for: HGBA1C    Recent Labs     02/05/21  1714 02/05/21  2050 02/06/21  0716 02/06/21  1119   POCGLU 230* 173* 136 154*       Blood Sugar Average: Last 72 hrs:  (P) 173 25  Patient will be NPO and will give low-dose sliding scale  Will monitor blood glucose closely  Will give IV fluid hydration with Ringer's lactate  Will hold metformin as patient received contrast dye and hold Amaryl as patient has decreased p o  Intake  Alcohol abuse  Assessment & Plan  Patient states that he stopped drinking about a year ago  Will give folate and thiamine      Dyslipidemia  Assessment & Plan  On statin    HTN (hypertension)  Assessment & Plan  Continue ramipril  Hepatic steatosis  Assessment & Plan  Patient advised about weight reduction and lifestyle modification and also patient is on statin            Subjective/Objective     Subjective:   Patient feeling slightly better  Epigastric pain as improved but still has intermittent pain intensity of 6/10 radiating to bilateral ribcage  Denies of any nausea or vomiting  However states that he is hungry and has not eaten since Sunday  Objective:  Vitals: Blood pressure 146/96, pulse 77, temperature 98 2 °F (36 8 °C), temperature source Tympanic, resp  rate 16, height 5' 11" (1 803 m), weight 102 kg (224 lb 13 9 oz), SpO2 93 %  ,Body mass index is 31 36 kg/m²  Intake/Output Summary (Last 24 hours) at 2/6/2021 1436  Last data filed at 2/6/2021 2974  Gross per 24 hour   Intake 1276 67 ml   Output 750 ml   Net 526 67 ml       Invasive Devices     Peripheral Intravenous Line            Peripheral IV 02/05/21 Left Antecubital 1 day                Physical Exam:   HEENT-PERRLA, moist oral mucosa  Neck-supple, no JVD elevation   Respiratory-equal air entry bilaterally, no rales or rhonchi  Cardiovascular system-S1, S2 heard, no murmur or gallops or rubs  Abdomen-soft, tenderness present in epigastric region no guarding or rigidity noted  no guarding or rigidity, bowel sounds heard  Extremities-no pedal edema  Peripheral pulses palpable  Musculoskeletal-no contractures  Central nervous system-no acute focal neurological deficit ,no sensory or motor deficit noted  Skin-no rash noted        Lab, Imaging and other studies: I have personally reviewed pertinent reports      VTE Pharmacologic Prophylaxis: Enoxaparin (Lovenox)  VTE Mechanical Prophylaxis: sequential compression device

## 2021-02-06 NOTE — UTILIZATION REVIEW
Initial Clinical Review    Admission: Date/Time/Statement:   Admission Orders (From admission, onward)     Ordered        02/05/21 1604  Inpatient Admission  Once                   Orders Placed This Encounter   Procedures    Inpatient Admission     Standing Status:   Standing     Number of Occurrences:   1     Order Specific Question:   Level of Care     Answer:   Med Surg [16]     Order Specific Question:   Estimated length of stay     Answer:   More than 2 Midnights     Order Specific Question:   Certification     Answer:   I certify that inpatient services are medically necessary for this patient for a duration of greater than two midnights  See H&P and MD Progress Notes for additional information about the patient's course of treatment  ED Arrival Information     Expected Arrival Acuity Means of Arrival Escorted By Service Admission Type    - 2/5/2021 13:29 Urgent Walk-In Self Hospitalist Urgent    Arrival Complaint    abdominal pain        Chief Complaint   Patient presents with    Abdominal Pain     pt presents to ed for abd pain that started a week ago along with constipation  hx of pancreatitis     Assessment/Plan: 60 y o  male who presents with hypertension, diabetes type 2, hyperlipidemia, presents with complaints of abdominal pain in the epigastric region since last Sunday  Pain around umbilicus intensity 2/86 associated with N/V poor appetite  No BM since Sunday  Denies etoh, stated stopped 1 yr ago  Recently started diclofenac for back pain  There is abdominal tenderness in the epigastric area  Lipase 861,na 131, glucose 306, wbc 12 45  CT abdomen Findings consistent with acute interstitial pancreatitis   No acute peripancreatic fluid collection  Diffuse hepatic steatosis  Admitted Inpatient Acute Pancreatitis trend lipase consult GI NPO status IVF hydration pain mgt  Iv zofran    Leukocytosis with a cute pancreatitis no evidence of active infection,hold off abx monitor wbc  ED Triage Vitals Temperature Pulse Respirations Blood Pressure SpO2   02/05/21 1344 02/05/21 1344 02/05/21 1344 02/05/21 1344 02/05/21 1344   98 6 °F (37 °C) (!) 115 21 133/99 98 %      Temp Source Heart Rate Source Patient Position - Orthostatic VS BP Location FiO2 (%)   02/06/21 0035 02/05/21 1344 02/05/21 1505 02/05/21 1344 --   Tympanic Monitor Lying Right arm       Pain Score       02/05/21 1344       9          Wt Readings from Last 1 Encounters:   02/06/21 102 kg (224 lb 13 9 oz)     Additional Vital Signs:   02/06/21 0712  98 2 °F (36 8 °C)  77  16  146/96  93 %  None (Room air)  Lying   02/06/21 0035  98 4 °F (36 9 °C)  83  18  141/85  96 %  None (Room air)  Lying   02/05/21 2100  --  --  --  --  96 %  None (Room air)  --   02/05/21 1849  --  --  --  --  --  None (Room air)  --   02/05/21 1505  --  95  17  101/58  95 %  None (Room air)  Lying   02/05/21 1406  --  111Abnormal   22  117/74  99 %         Pertinent Labs/Diagnostic Test Results:   2/5  CT abdomen Findings consistent with acute interstitial pancreatitis   No acute peripancreatic fluid collection  Diffuse hepatic steatosis  2/5 us gallbladder   1   Severe hepatomegaly and hepatic steatosis  2   Normal gallbladder and biliary tree         Results from last 7 days   Lab Units 02/06/21  0436 02/05/21  1351   WBC Thousand/uL 6 84 12 45*   HEMOGLOBIN g/dL 14 6 16 6   HEMATOCRIT % 41 0 45 7   PLATELETS Thousands/uL 222 313   NEUTROS ABS Thousands/µL  --  8 42*     Results from last 7 days   Lab Units 02/06/21  0436 02/05/21  1351   SODIUM mmol/L 136 131*   POTASSIUM mmol/L 3 7 4 0   CHLORIDE mmol/L 100 95*   CO2 mmol/L 29 26   ANION GAP mmol/L 7 10   BUN mg/dL 9 14   CREATININE mg/dL 0 62 1 00   EGFR ml/min/1 73sq m 110 83   CALCIUM mg/dL 9 1 10 1     Results from last 7 days   Lab Units 02/06/21  0436 02/05/21  1351   AST U/L 16 20   ALT U/L 24 29   ALK PHOS U/L 81 9 91 8   TOTAL PROTEIN g/dL 6 8 8 1   ALBUMIN g/dL 3 8 4 5   TOTAL BILIRUBIN mg/dL 0 84 1 03 Results from last 7 days   Lab Units 02/06/21  0716 02/05/21 2050 02/05/21  1714   POC GLUCOSE mg/dl 136 173* 230*     Results from last 7 days   Lab Units 02/06/21  0436 02/05/21  1351   GLUCOSE RANDOM mg/dL 140 306*     Results from last 7 days   Lab Units 02/05/21  1710 02/05/21  1401   TROPONIN I ng/mL <0 03 <0 03     Results from last 7 days   Lab Units 02/05/21  1401   PROTIME seconds 11 0   INR  0 97   PTT seconds 30     Results from last 7 days   Lab Units 02/06/21  0436 02/05/21  1351   LIPASE u/L 676* 861*     Results from last 7 days   Lab Units 02/05/21  1509   CLARITY UA  Clear   COLOR UA  Yellow   SPEC GRAV UA  1 020   PH UA  6 0   GLUCOSE UA mg/dl 3+*   KETONES UA mg/dl Trace*   BLOOD UA  Negative   PROTEIN UA mg/dl Trace*   NITRITE UA  Negative   BILIRUBIN UA  1+*   UROBILINOGEN UA E U /dl 2 0   LEUKOCYTES UA  Negative   WBC UA /hpf 0-1   RBC UA /hpf 0-1   BACTERIA UA /hpf Occasional   EPITHELIAL CELLS WET PREP /hpf Occasional   MUCUS THREADS  Occasional*     Results from last 7 days   Lab Units 02/05/21  1401   ETHANOL LVL mg/dL <10     ED Treatment:   Medication Administration from 02/05/2021 1329 to 02/05/2021 1757       Date/Time Order Dose Route Action Action by Comments     02/05/2021 1723 sodium chloride 0 9 % infusion 0 mL/hr Intravenous Stopped Alma Guerrero RN      02/05/2021 1406 sodium chloride 0 9 % infusion 500 mL/hr Intravenous [address] Dougie Chamberlain RN      02/05/2021 1401 ondansetron (ZOFRAN) injection 4 mg 4 mg Intravenous Given Dougie Chamberlain RN      02/05/2021 1401 HYDROmorphone (DILAUDID) injection 1 mg 1 mg Intravenous Given Dougie Chamberlain RN      02/05/2021 1512 iohexol (OMNIPAQUE) 350 MG/ML injection (SINGLE-DOSE) 100 mL 100 mL Intravenous Given Cortney Lees      02/05/2021 1714 insulin lispro (HumaLOG) 100 units/mL subcutaneous injection 1-5 Units 1 Units Subcutaneous Not Given Alma Guerrero RN      02/05/2021 1711 lactated ringers bolus 1,000 mL 1,000 mL Intravenous [address] [facility]      02/05/2021 1646 HYDROmorphone (DILAUDID) injection 1 mg 1 mg Intravenous Given Flor Doan RN         History reviewed  No pertinent past medical history  Present on Admission:   Type 2 diabetes mellitus with hyperglycemia (HCC)   Alcohol abuse   Hepatic steatosis   Dyslipidemia      Admitting Diagnosis: Acute pancreatitis [K85 90]  Abdominal pain [R10 9]  Hepatic steatosis [K76 0]  Age/Sex: 60 y o  male  Admission Orders:  Clear liquid  Serial troponin  Scheduled Medications:  docusate sodium, 100 mg, Oral, BID  enoxaparin, 40 mg, Subcutaneous, Daily  folic acid, 1 mg, Oral, Daily  insulin lispro, 1-5 Units, Subcutaneous, TID AC  insulin lispro, 1-5 Units, Subcutaneous, HS  lisinopril, 10 mg, Oral, Daily  nicotine, 1 patch, Transdermal, Daily  pantoprazole, 40 mg, Oral, Early Morning  polyethylene glycol, 17 g, Oral, Daily  senna, 1 tablet, Oral, Daily  thiamine, 100 mg, Oral, Daily      Continuous IV Infusions:  lactated ringers, 200 mL/hr, Intravenous, Continuous  sodium chloride, 500 mL/hr, Intravenous, Continuous      PRN Meds:  acetaminophen, 650 mg, Oral, Q6H PRN  HYDROmorphone, 2 mg, Intravenous, Q3H PRN  ondansetron, 4 mg, Intravenous, Q6H PRN        IP CONSULT TO GASTROENTEROLOGY    Network Utilization Review Department  ATTENTION: Please call with any questions or concerns to [phone] and carefully listen to the prompts so that you are directed to the right person  All voicemails are confidential   Ernie De all requests for admission clinical reviews, approved or denied determinations and any other requests to dedicated fax number below belonging to the campus where the patient is receiving treatment   List of dedicated fax numbers for the Facilities:  [address] DENIALS (Administrative/Medical Necessity) [phone]   [address] (Maternity/NICU/Pediatrics) [phone] [address] [address] [phone] [facility] [address]) [address] [place] [address] Abhinav [address] [place] [address] [place] [address] [phone]

## 2021-02-06 NOTE — ASSESSMENT & PLAN NOTE
No results found for: HGBA1C    Recent Labs     02/05/21  1714 02/05/21  2050 02/06/21  0716 02/06/21  1119   POCGLU 230* 173* 136 154*       Blood Sugar Average: Last 72 hrs:  (P) 173 25  Patient will be NPO and will give low-dose sliding scale  Will monitor blood glucose closely  Will give IV fluid hydration with Ringer's lactate  Will hold metformin as patient received contrast dye and hold Amaryl as patient has decreased p o  Intake

## 2021-02-06 NOTE — PLAN OF CARE
Behavioral Health Consultation  Candis Hines, Ph.D.  Psychologist  2020  9:12 AM EDT      Time spent with Patient: 20 minutes  This is patient's thirtieth  [facility] appointment. Reason for Consult:    Chief Complaint   Patient presents with    Anxiety    Depression     Referring Provider: Emilia Hamman, APRN - CNP  [address], [address]    Feedback given to PCP. S:  Patient noted that she enjoyed her friend's wedding, in spite of it being held on the anniversary of her best friend's passing. However, she noted experiencing some significant grief later that week, on the anniversary of her friend's . Normalized patient's experience. She continues to express concern about her sleep schedule. For example, she stays awake all night in advance of morning appointments. Often stays awake for 36 hours. Notes that, if she sleeps on nights before appointments, she has difficulty waking up. Patient is frustrated with recent weight gain. Admits that she has been binge eating more in the past few weeks. She has also been shoplifting, which she has not done since she was a teenager. She has been leaving her purse in the car so she is less tempted to steal small items. Noted that she has been more likely to engage in this behavior on days where she has been awake for more than 24 hours. She is less likely to do so if she is with anyone else. Other impulsive behaviors include lying, coloring her hair, increased caffeine use. Patient has reduced pop intake, but is having more caffeine through energy drinks. Notes that she talks \"really fast\" after taking Vyvanse in the morning. Admits that she also has 3 cups of coffee, a pop, and an energy drink before taking her Vyvanse. Encouraged reducing caffeine use, and discussed impulsivity in light of potential rj. Patient is taking all medications regularly. [facility] will discuss with PCP.      O:  MSE:    Appearance: good hygiene   Attitude: cooperative Problem: PAIN - ADULT  Goal: Verbalizes/displays adequate comfort level or baseline comfort level  Description: Interventions:  - Encourage patient to monitor pain and request assistance  - Assess pain using appropriate pain scale  - Administer analgesics based on type and severity of pain and evaluate response  - Implement non-pharmacological measures as appropriate and evaluate response  - Consider cultural and social influences on pain and pain management  - Notify physician/advanced practitioner if interventions unsuccessful or patient reports new pain  Outcome: Progressing     Problem: DISCHARGE PLANNING  Goal: Discharge to home or other facility with appropriate resources  Description: INTERVENTIONS:  - Identify barriers to discharge w/patient and caregiver  - Arrange for needed discharge resources and transportation as appropriate  - Identify discharge learning needs (meds, wound care, etc )  - Arrange for interpretive services to assist at discharge as needed  - Refer to Case Management Department for coordinating discharge planning if the patient needs post-hospital services based on physician/advanced practitioner order or complex needs related to functional status, cognitive ability, or social support system  Outcome: Progressing     Problem: GASTROINTESTINAL - ADULT  Goal: Minimal or absence of nausea and/or vomiting  Description: INTERVENTIONS:  - Administer IV fluids if ordered to ensure adequate hydration  - Maintain NPO status until nausea and vomiting are resolved  - Nasogastric tube if ordered  - Administer ordered antiemetic medications as needed  - Provide nonpharmacologic comfort measures as appropriate  - Advance diet as tolerated, if ordered  - Consider nutrition services referral to assist patient with adequate nutrition and appropriate food choices  Outcome: Progressing  Goal: Maintains adequate nutritional intake  Description: INTERVENTIONS:  - Monitor percentage of each meal consumed  - Identify factors contributing to decreased intake, treat as appropriate  - Assist with meals as needed  - Monitor I&O, weight, and lab values if indicated  - Obtain nutrition services referral as needed  Outcome: Progressing     Problem: Nutrition/Hydration-ADULT  Goal: Nutrient/Hydration intake appropriate for improving, restoring or maintaining nutritional needs  Description: Monitor and assess patient's nutrition/hydration status for malnutrition  Collaborate with interdisciplinary team and initiate plan and interventions as ordered  Monitor patient's weight and dietary intake as ordered or per policy  Utilize nutrition screening tool and intervene as necessary  Determine patient's food preferences and provide high-protein, high-caloric foods as appropriate       INTERVENTIONS:  - Monitor oral intake, urinary output, labs, and treatment plans  - Assess nutrition and hydration status and recommend course of action  - Evaluate amount of meals eaten  - Assist patient with eating if necessary   - Allow adequate time for meals  - Recommend/ encourage appropriate diets, oral nutritional supplements, and vitamin/mineral supplements  - Order, calculate, and assess calorie counts as needed  - Recommend, monitor, and adjust tube feedings and TPN/PPN based on assessed needs  - Assess need for intravenous fluids  - Provide specific nutrition/hydration education as appropriate  - Include patient/family/caregiver in decisions related to nutrition  Outcome: Progressing and friendly  Consciousness: alert  Orientation: oriented to person, place, time, general circumstance  Memory: recent and remote memory intact  Attention/Concentration: intact during session  Psychomotor Activity:normal  Eye Contact: normal  Speech: normal rate and volume, well-articulated  Mood: anxious  Affect: euthymic  Perception: within normal limits  Thought Content: all-or-none thinking and intrusive thoughts  Thought Process: logical, coherent and goal-directed  Insight: good  Judgment: intact  Ability to understand instructions: Yes  Ability to respond meaningfully: Yes  Morbid Ideation: no   Suicide Assessment: no suicidal ideation, plan, or intent  Homicidal Ideation: no     History:    Medications:   Current Outpatient Medications   Medication Sig Dispense Refill    ACCU-CHEK GUIDE strip TEST SUGAR FIRST THING IN THE MORNING AND TWO HOURS AFTER LUNCH AND DINNER 300 strip 5    Accu-Chek FastClix Lancets MISC TEST SUGAR FIRST THING IN THE MORNING AND TWO HOURS AFTER LUNCH AND DINNER 306 each 5    lamoTRIgine (LAMICTAL) 150 MG tablet TAKE ONE TABLET BY MOUTH DAILY 90 tablet 1    omeprazole (PRILOSEC) 40 MG delayed release capsule TAKE 1 CAPSULE EVERY DAY 90 capsule 3    DULoxetine (CYMBALTA) 60 MG extended release capsule TAKE 1 CAPSULE TWICE DAILY 180 capsule 1    metFORMIN (GLUCOPHAGE) 500 MG tablet TAKE 2 TABLETS EVERY NIGHT  AND EVERY MORNING 360 tablet 1    lisdexamfetamine (VYVANSE) 70 MG capsule Take 1 capsule by mouth every morning for 30 days.  30 capsule 0    gabapentin (NEURONTIN) 300 MG capsule TAKE 1 CAPSULE TWICE DAILY 180 capsule 0    diclofenac (VOLTAREN) 75 MG EC tablet TAKE 1 TABLET TWICE DAILY 180 tablet 0    diclofenac sodium (VOLTAREN) 1 % GEL Apply 2 g topically 4 times daily 1 Tube 5    lisinopril (PRINIVIL;ZESTRIL) 10 MG tablet Take 1 tablet by mouth daily 90 tablet 1    glimepiride (AMARYL) 4 MG tablet Take 1 tablet by mouth daily (with breakfast) 180 tablet 1    Blood Glucose Monitoring Suppl (ACCU-CHEK GUIDE) w/Device KIT 1 kit by Does not apply route 2 times daily 1 kit 0    Naproxen Sodium (ALEVE) 220 MG CAPS Take by mouth       No current facility-administered medications for this visit. Social History:   Social History     Socioeconomic History    Marital status:      Spouse name: Not on file    Number of children: Not on file    Years of education: Not on file    Highest education level: Not on file   Occupational History    Not on file   Social Needs    Financial resource strain: Not on file    Food insecurity     Worry: Not on file     Inability: Not on file    Transportation needs     Medical: Not on file     Non-medical: Not on file   Tobacco Use    Smoking status: Current Every Day Smoker     Packs/day: 1.50     Years: 31.00     Pack years: 46.50     Types: Cigarettes    Smokeless tobacco: Never Used   Substance and Sexual Activity    Alcohol use: No    Drug use: Yes     Types: Marijuana    Sexual activity: Yes     Partners: Male   Lifestyle    Physical activity     Days per week: Not on file     Minutes per session: Not on file    Stress: Not on file   Relationships    Social connections     Talks on phone: Not on file     Gets together: Not on file     Attends Yarsani service: Not on file     Active member of club or organization: Not on file     Attends meetings of clubs or organizations: Not on file     Relationship status: Not on file    Intimate partner violence     Fear of current or ex partner: Not on file     Emotionally abused: Not on file     Physically abused: Not on file     Forced sexual activity: Not on file   Other Topics Concern    Not on file   Social History Narrative    Not on file     TOBACCO:   reports that she has been smoking cigarettes. She has a 46.50 pack-year smoking history. She has never used smokeless tobacco.  ETOH:   reports no history of alcohol use.   Family History:   Family History   Problem Relation Age of Onset    Cancer Mother         colon    High Cholesterol Mother     Diabetes Maternal Grandmother     Cancer Maternal Grandfather         colon    No Known Problems Father         doesn't know much    Lung Cancer Paternal Grandmother     No Known Problems Paternal Grandfather      A:  Re-administered PHQ-9 and DEIDRA-7 (see below). Patient endorses moderate symptoms of depression and moderate symptoms of anxiety. Denies SI. No change in symptoms of anxiety since previous administration of DEIDRA-7 and no change in symptoms of depression since previous administration of PHQ-9. Insight and motivation are good. PHQ Scores 9/30/2020 9/16/2020 9/14/2020 8/18/2020 3/13/2020 2/21/2020 2/5/2020   PHQ2 Score 3 4 3 5 5 6 4   PHQ9 Score 11 13 15 16 13 14 11     Interpretation of Total Score Depression Severity: 1-4 = Minimal depression, 5-9 = Mild depression, 10-14 = Moderate depression, 15-19 = Moderately severe depression, 20-27 = Severe depression    DEIDRA 7 SCORE 9/30/2020 9/16/2020 8/18/2020 3/13/2020 2/21/2020 2/5/2020 1/22/2020   DEIDRA-7 Total Score 12 11 12 11 14 7 13     Interpretation of DEIDRA-7 score: 5-9 = mild anxiety, 10-14 = moderate anxiety, 15+ = severe anxiety. Recommend referral to behavioral health for scores 10 or greater. Diagnosis:    1. Binge eating disorder    2. MDD (major depressive disorder), recurrent episode, moderate (HCC)          Diagnosis Date    Asthma     Bulging lumbar disc     l4/l5    Chronic pain     DDD (degenerative disc disease), lumbar     Depression     Diabetes ([facility] [address]     Hypertension     Reflux esophagitis      Plan:  Pt interventions:  Conducted functional assessment, Leroy-setting to identify pt's primary goals for [facility] visit / overall health, Supportive techniques and Emphasized self-care as important for managing overall health, Discussed impulse control and bipolar disorder, Encouraged patient to reduce caffeine intake.      Pt Behavioral Change Plan:   See Pt Instructions

## 2021-02-06 NOTE — PLAN OF CARE
Problem: PAIN - ADULT  Goal: Verbalizes/displays adequate comfort level or baseline comfort level  Description: Interventions:  - Encourage patient to monitor pain and request assistance  - Assess pain using appropriate pain scale  - Administer analgesics based on type and severity of pain and evaluate response  - Implement non-pharmacological measures as appropriate and evaluate response  - Consider cultural and social influences on pain and pain management  - Notify physician/advanced practitioner if interventions unsuccessful or patient reports new pain  2/6/2021 0141 by Beau Esquivel RN  Outcome: Progressing  2/6/2021 0127 by Beau Esquivel RN  Outcome: Progressing     Problem: DISCHARGE PLANNING  Goal: Discharge to home or other facility with appropriate resources  Description: INTERVENTIONS:  - Identify barriers to discharge w/patient and caregiver  - Arrange for needed discharge resources and transportation as appropriate  - Identify discharge learning needs (meds, wound care, etc )  - Arrange for interpretive services to assist at discharge as needed  - Refer to Case Management Department for coordinating discharge planning if the patient needs post-hospital services based on physician/advanced practitioner order or complex needs related to functional status, cognitive ability, or social support system  2/6/2021 0141 by Beau Esquivel RN  Outcome: Progressing  2/6/2021 0127 by Beau Esquivel RN  Outcome: Progressing     Problem: GASTROINTESTINAL - ADULT  Goal: Minimal or absence of nausea and/or vomiting  Description: INTERVENTIONS:  - Administer IV fluids if ordered to ensure adequate hydration  - Maintain NPO status until nausea and vomiting are resolved  - Administer ordered antiemetic medications as needed  - Provide nonpharmacologic comfort measures as appropriate  - Advance diet as tolerated, if ordered  - Consider nutrition services referral to assist patient with adequate nutrition and appropriate food choices  2/6/2021 0141 by Alexis Brody RN  Outcome: Progressing  2/6/2021 0127 by Alexis Brody RN  Outcome: Progressing     Problem: Nutrition/Hydration-ADULT  Goal: Nutrient/Hydration intake appropriate for improving, restoring or maintaining nutritional needs  Description: Monitor and assess patient's nutrition/hydration status for malnutrition  Collaborate with interdisciplinary team and initiate plan and interventions as ordered  Monitor patient's weight and dietary intake as ordered or per policy  Utilize nutrition screening tool and intervene as necessary  Determine patient's food preferences and provide high-protein, high-caloric foods as appropriate       INTERVENTIONS:  - Monitor oral intake, urinary output, labs, and treatment plans  - Assess nutrition and hydration status and recommend course of action  - Evaluate amount of meals eaten  - Assist patient with eating if necessary   - Allow adequate time for meals  - Recommend/ encourage appropriate diets, oral nutritional supplements, and vitamin/mineral supplements  - Order, calculate, and assess calorie counts as needed  - Recommend, monitor, and adjust tube feedings and TPN/PPN based on assessed needs  - Assess need for intravenous fluids  - Provide specific nutrition/hydration education as appropriate  - Include patient/family/caregiver in decisions related to nutrition  2/6/2021 0141 by Alexsi Brody RN  Outcome: Progressing  2/6/2021 0127 by Alexis Brody RN  Outcome: Progressing

## 2021-02-07 LAB
ANION GAP SERPL CALCULATED.3IONS-SCNC: 9 MMOL/L (ref 4–13)
BUN SERPL-MCNC: 7 MG/DL (ref 6–20)
CALCIUM SERPL-MCNC: 8.9 MG/DL (ref 8.4–10.2)
CHLORIDE SERPL-SCNC: 100 MMOL/L (ref 96–108)
CO2 SERPL-SCNC: 28 MMOL/L (ref 22–33)
CREAT SERPL-MCNC: 0.66 MG/DL (ref 0.5–1.2)
GFR SERPL CREATININE-BSD FRML MDRD: 107 ML/MIN/1.73SQ M
GLUCOSE SERPL-MCNC: 106 MG/DL (ref 65–140)
GLUCOSE SERPL-MCNC: 120 MG/DL (ref 65–140)
GLUCOSE SERPL-MCNC: 93 MG/DL (ref 65–140)
GLUCOSE SERPL-MCNC: 99 MG/DL (ref 65–140)
LIPASE SERPL-CCNC: 118 U/L (ref 13–60)
POTASSIUM SERPL-SCNC: 3.7 MMOL/L (ref 3.5–5)
SODIUM SERPL-SCNC: 137 MMOL/L (ref 133–145)
TROPONIN I SERPL-MCNC: <0.03 NG/ML (ref 0–0.07)

## 2021-02-07 PROCEDURE — 99233 SBSQ HOSP IP/OBS HIGH 50: CPT | Performed by: INTERNAL MEDICINE

## 2021-02-07 PROCEDURE — 84484 ASSAY OF TROPONIN QUANT: CPT | Performed by: INTERNAL MEDICINE

## 2021-02-07 PROCEDURE — 80048 BASIC METABOLIC PNL TOTAL CA: CPT | Performed by: INTERNAL MEDICINE

## 2021-02-07 PROCEDURE — 82948 REAGENT STRIP/BLOOD GLUCOSE: CPT

## 2021-02-07 PROCEDURE — 83690 ASSAY OF LIPASE: CPT | Performed by: INTERNAL MEDICINE

## 2021-02-07 RX ADMIN — THIAMINE HCL TAB 100 MG 100 MG: 100 TAB at 08:30

## 2021-02-07 RX ADMIN — DOCUSATE SODIUM 100 MG: 100 CAPSULE, LIQUID FILLED ORAL at 08:30

## 2021-02-07 RX ADMIN — PANTOPRAZOLE SODIUM 40 MG: 40 TABLET, DELAYED RELEASE ORAL at 04:26

## 2021-02-07 RX ADMIN — HYDROMORPHONE HYDROCHLORIDE 2 MG: 2 INJECTION, SOLUTION INTRAMUSCULAR; INTRAVENOUS; SUBCUTANEOUS at 19:56

## 2021-02-07 RX ADMIN — HYDROMORPHONE HYDROCHLORIDE 2 MG: 2 INJECTION, SOLUTION INTRAMUSCULAR; INTRAVENOUS; SUBCUTANEOUS at 08:29

## 2021-02-07 RX ADMIN — LISINOPRIL 10 MG: 10 TABLET ORAL at 08:30

## 2021-02-07 RX ADMIN — HYDROMORPHONE HYDROCHLORIDE 2 MG: 2 INJECTION, SOLUTION INTRAMUSCULAR; INTRAVENOUS; SUBCUTANEOUS at 04:31

## 2021-02-07 RX ADMIN — HYDROMORPHONE HYDROCHLORIDE 2 MG: 2 INJECTION, SOLUTION INTRAMUSCULAR; INTRAVENOUS; SUBCUTANEOUS at 01:31

## 2021-02-07 RX ADMIN — FOLIC ACID 1 MG: 1 TABLET ORAL at 08:31

## 2021-02-07 RX ADMIN — HYDROMORPHONE HYDROCHLORIDE 2 MG: 2 INJECTION, SOLUTION INTRAMUSCULAR; INTRAVENOUS; SUBCUTANEOUS at 16:21

## 2021-02-07 RX ADMIN — DOCUSATE SODIUM 100 MG: 100 CAPSULE, LIQUID FILLED ORAL at 17:17

## 2021-02-07 RX ADMIN — POLYETHYLENE GLYCOL 3350 17 G: 17 POWDER, FOR SOLUTION ORAL at 08:29

## 2021-02-07 RX ADMIN — HYDROMORPHONE HYDROCHLORIDE 2 MG: 2 INJECTION, SOLUTION INTRAMUSCULAR; INTRAVENOUS; SUBCUTANEOUS at 12:38

## 2021-02-07 RX ADMIN — SODIUM CHLORIDE, SODIUM LACTATE, POTASSIUM CHLORIDE, AND CALCIUM CHLORIDE 200 ML/HR: .6; .31; .03; .02 INJECTION, SOLUTION INTRAVENOUS at 04:27

## 2021-02-07 RX ADMIN — NICOTINE 1 PATCH: 7 PATCH, EXTENDED RELEASE TRANSDERMAL at 08:30

## 2021-02-07 RX ADMIN — ENOXAPARIN SODIUM 40 MG: 40 INJECTION SUBCUTANEOUS at 08:29

## 2021-02-07 NOTE — PROGRESS NOTES
Progress Note - Connor Kapoor 1965, 60 y o  male MRN: [id number]    Unit/Bed#: -01 Encounter: [id number]    Primary Care Provider: Marlene Nuñez MD   Date and time admitted to hospital: 2/5/2021  1:38 PM        * Pancreatitis, acute  Assessment & Plan  Patient has abdominal pain in the epigastric and periumbilical region radiating to the back  Lipase level has improved from 800s and currently in 118    CT of the abdomen was reviewed shows acute interstitial pancreatitis with no acute peripancreatic fluid collection noted  Has diffuse hepatic steatosis  Right upper quadrant ultrasound shows severe hepatomegaly and hepatic steatosis  Normal gallbladder and biliary tree noted  Lipase level is trending down  Lipid panel within normal limits  Will give IV hydration and start on clear liquid diet  Also give pain medication with Dilaudid and p r n  Zofran  GI input highly appreciated plan was discussed with GI  Patient will need EGD which will be scheduled on Monday as patient has been taking diclofenac with possibly have peptic ulcer disease  Leucocytosis  Assessment & Plan  With acute pancreatitis-no evidence of active infection-UA clear  Will hold off on antibiotic and monitor WBC count    Type 2 diabetes mellitus with hyperglycemia [facility])  Assessment & Plan  No results found for: HGBA1C    Recent Labs     02/06/21  1621 02/06/21  2144 02/07/21  0816 02/07/21  1215   POCGLU 143* 133 93 106       Blood Sugar Average: Last 72 hrs:  (P) 146  Patient will be NPO and will give low-dose sliding scale  Will monitor blood glucose closely  Will give IV fluid hydration with Ringer's lactate  Will hold metformin as patient received contrast dye and hold Amaryl as patient has decreased p o  Intake  Alcohol abuse  Assessment & Plan  Patient states that he stopped drinking about a year ago  Will give folate and thiamine      Dyslipidemia  Assessment & Plan  On statin    HTN (hypertension)  Assessment & Plan  Continue ramipril  Hepatic steatosis  Assessment & Plan  Patient advised about weight reduction and lifestyle modification and also patient is on statin            Subjective/Objective     Subjective:   Pt seen and examined by me , patient feels better epigastric pain is improved  No acute overnight events noted  Patient has not had a bowel movement since last week  Denies of any chest pain or shortness of breath  Objective:  Vitals: Blood pressure 124/78, pulse 80, temperature 98 7 °F (37 1 °C), temperature source Tympanic, resp  rate 18, height 5' 11" (1 803 m), weight 102 kg (224 lb 13 9 oz), SpO2 93 %  ,Body mass index is 31 36 kg/m²  Intake/Output Summary (Last 24 hours) at 2/7/2021 1644  Last data filed at 2/7/2021 0900  Gross per 24 hour   Intake 1000 ml   Output 300 ml   Net 700 ml       Invasive Devices     Peripheral Intravenous Line            Peripheral IV 02/07/21 Left Forearm less than 1 day                Physical Exam:  HEENT-PERRLA, moist oral mucosa  Neck-supple, no JVD elevation   Respiratory-equal air entry bilaterally, no rales or rhonchi  Cardiovascular system-S1, S2 heard, no murmur or gallops or rubs  Abdomen-soft,epigastric tenderness, no guarding or rigidity, bowel sounds heard  Extremities-no pedal edema  Peripheral pulses palpable  Musculoskeletal-no contractures  Central nervous system-no acute focal neurological deficit ,no sensory or motor deficit noted  Skin-no rash noted          Lab, Imaging and other studies: I have personally reviewed pertinent reports      VTE Pharmacologic Prophylaxis: Enoxaparin (Lovenox)  VTE Mechanical Prophylaxis: sequential compression device

## 2021-02-07 NOTE — ASSESSMENT & PLAN NOTE
No results found for: HGBA1C    Recent Labs     02/06/21  1621 02/06/21  2144 02/07/21  0816 02/07/21  1215   POCGLU 143* 133 93 106       Blood Sugar Average: Last 72 hrs:  (P) 146  Patient will be NPO and will give low-dose sliding scale  Will monitor blood glucose closely  Will give IV fluid hydration with Ringer's lactate  Will hold metformin as patient received contrast dye and hold Amaryl as patient has decreased p o  Intake

## 2021-02-07 NOTE — ASSESSMENT & PLAN NOTE
Patient has abdominal pain in the epigastric and periumbilical region radiating to the back  Lipase level has improved from 800s and currently in 118    CT of the abdomen was reviewed shows acute interstitial pancreatitis with no acute peripancreatic fluid collection noted  Has diffuse hepatic steatosis  Right upper quadrant ultrasound shows severe hepatomegaly and hepatic steatosis  Normal gallbladder and biliary tree noted  Lipase level is trending down  Lipid panel within normal limits  Will give IV hydration and start on clear liquid diet  Also give pain medication with Dilaudid and p r n  Zofran  GI input highly appreciated plan was discussed with GI  Patient will need EGD which will be scheduled on Monday as patient has been taking diclofenac with possibly have peptic ulcer disease

## 2021-02-07 NOTE — PLAN OF CARE
Problem: PAIN - ADULT  Goal: Verbalizes/displays adequate comfort level or baseline comfort level  Description: Interventions:  - Encourage patient to monitor pain and request assistance  - Assess pain using appropriate pain scale  - Administer analgesics based on type and severity of pain and evaluate response  - Implement non-pharmacological measures as appropriate and evaluate response  - Consider cultural and social influences on pain and pain management  - Notify physician/advanced practitioner if interventions unsuccessful or patient reports new pain  Outcome: Progressing     Problem: DISCHARGE PLANNING  Goal: Discharge to home or other facility with appropriate resources  Description: INTERVENTIONS:  - Identify barriers to discharge w/patient and caregiver  - Arrange for needed discharge resources and transportation as appropriate  - Identify discharge learning needs (meds, wound care, etc )  - Arrange for interpretive services to assist at discharge as needed  - Refer to Case Management Department for coordinating discharge planning if the patient needs post-hospital services based on physician/advanced practitioner order or complex needs related to functional status, cognitive ability, or social support system  Outcome: Progressing     Problem: GASTROINTESTINAL - ADULT  Goal: Minimal or absence of nausea and/or vomiting  Description: INTERVENTIONS:  - Administer IV fluids if ordered to ensure adequate hydration  - Maintain NPO status until nausea and vomiting are resolved  - Nasogastric tube if ordered  - Administer ordered antiemetic medications as needed  - Provide nonpharmacologic comfort measures as appropriate  - Advance diet as tolerated, if ordered  - Consider nutrition services referral to assist patient with adequate nutrition and appropriate food choices  Outcome: Progressing  Goal: Maintains adequate nutritional intake  Description: INTERVENTIONS:  - Monitor percentage of each meal consumed  - Identify factors contributing to decreased intake, treat as appropriate  - Assist with meals as needed  - Monitor I&O, weight, and lab values if indicated  - Obtain nutrition services referral as needed  Outcome: Progressing     Problem: Nutrition/Hydration-ADULT  Goal: Nutrient/Hydration intake appropriate for improving, restoring or maintaining nutritional needs  Description: Monitor and assess patient's nutrition/hydration status for malnutrition  Collaborate with interdisciplinary team and initiate plan and interventions as ordered  Monitor patient's weight and dietary intake as ordered or per policy  Utilize nutrition screening tool and intervene as necessary  Determine patient's food preferences and provide high-protein, high-caloric foods as appropriate       INTERVENTIONS:  - Monitor oral intake, urinary output, labs, and treatment plans  - Assess nutrition and hydration status and recommend course of action  - Evaluate amount of meals eaten  - Assist patient with eating if necessary   - Allow adequate time for meals  - Recommend/ encourage appropriate diets, oral nutritional supplements, and vitamin/mineral supplements  - Order, calculate, and assess calorie counts as needed  - Recommend, monitor, and adjust tube feedings and TPN/PPN based on assessed needs  - Assess need for intravenous fluids  - Provide specific nutrition/hydration education as appropriate  - Include patient/family/caregiver in decisions related to nutrition  Outcome: Progressing

## 2021-02-08 ENCOUNTER — ANESTHESIA (INPATIENT)
Dept: GASTROENTEROLOGY | Facility: HOSPITAL | Age: 56
DRG: 440 | End: 2021-02-08
Payer: COMMERCIAL

## 2021-02-08 ENCOUNTER — ANESTHESIA EVENT (INPATIENT)
Dept: GASTROENTEROLOGY | Facility: HOSPITAL | Age: 56
DRG: 440 | End: 2021-02-08
Payer: COMMERCIAL

## 2021-02-08 ENCOUNTER — APPOINTMENT (INPATIENT)
Dept: GASTROENTEROLOGY | Facility: HOSPITAL | Age: 56
DRG: 440 | End: 2021-02-08
Payer: COMMERCIAL

## 2021-02-08 VITALS
TEMPERATURE: 98.8 F | WEIGHT: 224.87 LBS | OXYGEN SATURATION: 96 % | SYSTOLIC BLOOD PRESSURE: 140 MMHG | HEIGHT: 71 IN | BODY MASS INDEX: 31.48 KG/M2 | DIASTOLIC BLOOD PRESSURE: 84 MMHG | HEART RATE: 97 BPM | RESPIRATION RATE: 20 BRPM

## 2021-02-08 VITALS — HEART RATE: 74 BPM

## 2021-02-08 LAB
GLUCOSE SERPL-MCNC: 109 MG/DL (ref 65–140)
GLUCOSE SERPL-MCNC: 83 MG/DL (ref 65–140)
GLUCOSE SERPL-MCNC: 89 MG/DL (ref 65–140)

## 2021-02-08 PROCEDURE — NC001 PR NO CHARGE: Performed by: INTERNAL MEDICINE

## 2021-02-08 PROCEDURE — 82948 REAGENT STRIP/BLOOD GLUCOSE: CPT

## 2021-02-08 PROCEDURE — 88305 TISSUE EXAM BY PATHOLOGIST: CPT | Performed by: PATHOLOGY

## 2021-02-08 PROCEDURE — 99239 HOSP IP/OBS DSCHRG MGMT >30: CPT | Performed by: INTERNAL MEDICINE

## 2021-02-08 PROCEDURE — 0DB48ZX EXCISION OF ESOPHAGOGASTRIC JUNCTION, VIA NATURAL OR ARTIFICIAL OPENING ENDOSCOPIC, DIAGNOSTIC: ICD-10-PCS | Performed by: INTERNAL MEDICINE

## 2021-02-08 PROCEDURE — 43239 EGD BIOPSY SINGLE/MULTIPLE: CPT | Performed by: INTERNAL MEDICINE

## 2021-02-08 PROCEDURE — 99222 1ST HOSP IP/OBS MODERATE 55: CPT | Performed by: NURSE PRACTITIONER

## 2021-02-08 PROCEDURE — 0DB78ZX EXCISION OF STOMACH, PYLORUS, VIA NATURAL OR ARTIFICIAL OPENING ENDOSCOPIC, DIAGNOSTIC: ICD-10-PCS | Performed by: INTERNAL MEDICINE

## 2021-02-08 RX ORDER — PROPOFOL 10 MG/ML
INJECTION, EMULSION INTRAVENOUS AS NEEDED
Status: DISCONTINUED | OUTPATIENT
Start: 2021-02-08 | End: 2021-02-08

## 2021-02-08 RX ORDER — OXYCODONE HYDROCHLORIDE 5 MG/1
5 TABLET ORAL EVERY 6 HOURS PRN
Status: DISCONTINUED | OUTPATIENT
Start: 2021-02-08 | End: 2021-02-08 | Stop reason: HOSPADM

## 2021-02-08 RX ORDER — LIDOCAINE HYDROCHLORIDE 10 MG/ML
INJECTION, SOLUTION EPIDURAL; INFILTRATION; INTRACAUDAL; PERINEURAL AS NEEDED
Status: DISCONTINUED | OUTPATIENT
Start: 2021-02-08 | End: 2021-02-08

## 2021-02-08 RX ADMIN — PROPOFOL 50 MG: 10 INJECTION, EMULSION INTRAVENOUS at 10:02

## 2021-02-08 RX ADMIN — NICOTINE 1 PATCH: 7 PATCH, EXTENDED RELEASE TRANSDERMAL at 09:18

## 2021-02-08 RX ADMIN — PROPOFOL 50 MG: 10 INJECTION, EMULSION INTRAVENOUS at 10:05

## 2021-02-08 RX ADMIN — HYDROMORPHONE HYDROCHLORIDE 2 MG: 2 INJECTION, SOLUTION INTRAMUSCULAR; INTRAVENOUS; SUBCUTANEOUS at 05:15

## 2021-02-08 RX ADMIN — LIDOCAINE HYDROCHLORIDE 50 MG: 10 INJECTION, SOLUTION EPIDURAL; INFILTRATION; INTRACAUDAL; PERINEURAL at 10:01

## 2021-02-08 RX ADMIN — PANTOPRAZOLE SODIUM 40 MG: 40 TABLET, DELAYED RELEASE ORAL at 05:15

## 2021-02-08 RX ADMIN — OXYCODONE HYDROCHLORIDE 5 MG: 5 TABLET ORAL at 12:24

## 2021-02-08 RX ADMIN — SODIUM CHLORIDE, SODIUM LACTATE, POTASSIUM CHLORIDE, AND CALCIUM CHLORIDE 100 ML/HR: .6; .31; .03; .02 INJECTION, SOLUTION INTRAVENOUS at 00:07

## 2021-02-08 RX ADMIN — PROPOFOL 150 MG: 10 INJECTION, EMULSION INTRAVENOUS at 10:01

## 2021-02-08 RX ADMIN — HYDROMORPHONE HYDROCHLORIDE 2 MG: 2 INJECTION, SOLUTION INTRAMUSCULAR; INTRAVENOUS; SUBCUTANEOUS at 00:08

## 2021-02-08 NOTE — ASSESSMENT & PLAN NOTE
Patient has abdominal pain in the epigastric and periumbilical region radiating to the back and this has improved  Lipase level has improved from 800s and 118 yesterday    CT of the abdomen was reviewed shows acute interstitial pancreatitis with no acute peripancreatic fluid collection noted  Has diffuse hepatic steatosis  Right upper quadrant ultrasound shows severe hepatomegaly and hepatic steatosis  Normal gallbladder and biliary tree noted  Lipase level is trending down  Lipid panel within normal limits      EGD done today and patient advised PPI and told not to use diclofenac

## 2021-02-08 NOTE — CONSULTS
Physician Requesting Consult: Carole Cabello MD    Reason for Consult / Principal Problem:  Pancreatitis    Assessment/Plan: 1  Pancreatitis  Pancreatitis most likely idiopathic  Abdominal pain and nausea may be secondary to pancreatitis  Lipase on admission 861  Lipase 118 on 02/07  Patient is on no medications that make him at risk for pancreatitis  Triglycerides 160 2 and  Elevated on admission  Patient is on fenofibrate at home for history of elevated triglycerides  Patient does not drink alcohol  No recent history of rapid weight loss  CT of abdomen /pelvis with contrast shows mild soft tissue stranding surrounding the head and neck  Findings consistent with acute interstitial pancreatitis  Ultrasound of abdomen showed severe hepatomegaly and hepatic steatosis  Normal gallbladder and biliary tree  Continue clear liquid diet after EGD today  Will consult dietitian to educate patient on low-fat diet  Continue to monitor lipase  Will recheck triglycerides when patient eating  2 Epigastric pain associated with nausea  Epigastric pain which radiates to ribs and back and associated with nausea may be secondary to esophagitis, gastritis, pancreatitis,  Hyperemesis syndrome, or ulceration  Continue pantoprazole 40 mg daily and anemia  Hold Lovenox for EGD  Schedule for EGD today  Prep and procedure explained to patient in detail  Further recommendations pending results of EGD  Will follow  Thank you for the consult  Case discussed with Dr Quynh Saldivar  HPI: Rachel Cain is a 60 y o  male  Pancreatitis, acute   This is a 57-year-old male with past medical history of hypertension, diabetes type 2, hyperlipidemia, who presented to [facility] on 02/05/2020 with complaint of abdominal pain in epigastric region since last Sunday    Patient states that it was mild in nature and had increased in intensity and was radiating to both sides of his ribcage and his back   He states that the pain has progressively been getting worse over the past 2-3 days prior to coming to hospital   Also reported that pain was associated with nausea and decreased appetite  Patient reports he has not had a bowel movement since last Sunday  He does report acid reflux but that he has a history of acid reflux for which he takes omeprazole at home  No fever or chills  Patient states he has had on and off upper abdominal pain for the last 2 years  Lipase 861 on admission  Lipase 118 on 2/7/2021  Triglycerides 160 2 on 02/06/2021  CT of abdomen and pelvis with contrast done on admission showed mild soft tissue stranding surrounding the head and neck  No peripancreatic collection  Findings consistent with acute interstitial pancreatitis  Diffuse hepatic steatosis  Ultrasound of gallbladder showed common bile duct measures 4 mm  No gallbladder findings  No intrahepatic biliary dilation  No choledocholithiasis  Severe hepatomegaly and hepatic steatosis  Normal gallbladder and biliary tree  No new medication  Patient denies alcohol use, quit drinking over 1 year ago  Patient does smoke 1 pack cigarettes a day for the last 25 years  Patient does smoke marijuana  He has never had an EGD or colonoscopy  No family history of gastric or colon cancer  Allergies:    Allergies   Allergen Reactions    Amoxicillin Swelling       Medications:  Current Facility-Administered Medications:     acetaminophen (TYLENOL) tablet 650 mg, 650 mg, Oral, Q6H PRN, Alexandre Bustos MD    docusate sodium (COLACE) capsule 100 mg, 100 mg, Oral, BID, Alexandre Bustos MD, 100 mg at 02/07/21 1717    [START ON 2/9/2021] enoxaparin (LOVENOX) subcutaneous injection 40 mg, 40 mg, Subcutaneous, Daily, Kiah Mahan MD    folic acid (FOLVITE) tablet 1 mg, 1 mg, Oral, Daily, Alexandre Bustos MD, 1 mg at 02/07/21 0831    HYDROmorphone (DILAUDID) injection 2 mg, 2 mg, Intravenous, Q3H PRN, Mcwilliams Bell Aileen Bautista MD, 2 mg at 02/08/21 0515    insulin lispro (HumaLOG) 100 units/mL subcutaneous injection 1-5 Units, 1-5 Units, Subcutaneous, TID AC **AND** Fingerstick Glucose (POCT), , , TID AC, Cecy Wilson MD    insulin lispro (HumaLOG) 100 units/mL subcutaneous injection 1-5 Units, 1-5 Units, Subcutaneous, HS, Cecy Wilson MD    lactated ringers infusion, 100 mL/hr, Intravenous, Continuous, Cecy Wilson MD, Last Rate: 100 mL/hr at 02/08/21 0007, 100 mL/hr at 02/08/21 0007    lisinopril (ZESTRIL) tablet 10 mg, 10 mg, Oral, Daily, Cecy Wilson MD, 10 mg at 02/07/21 0830    nicotine (NICODERM CQ) 7 mg/24hr TD 24 hr patch 1 patch, 1 patch, Transdermal, Daily, Cecy Wilson MD, 1 patch at 02/08/21 0918    ondansetron (ZOFRAN) injection 4 mg, 4 mg, Intravenous, Q6H PRN, Cecy Wilson MD    pantoprazole (PROTONIX) EC tablet 40 mg, 40 mg, Oral, Early Morning, Cecy Wilson MD, 40 mg at 02/08/21 0515    polyethylene glycol (MIRALAX) packet 17 g, 17 g, Oral, Daily, Cecy Wilson MD, 17 g at 02/07/21 6899    senna (SENOKOT) tablet 8 6 mg, 1 tablet, Oral, Daily, Cecy Wilson MD    thiamine tablet 100 mg, 100 mg, Oral, Daily, Cecy Wilson MD, 100 mg at 02/07/21 0830    Past Medical history:History reviewed  No pertinent past medical history  Past Surgical History:   Past Surgical History:   Procedure Laterality Date    ANKLE SURGERY Right     KNEE SURGERY Right        Social history:   Social History     Tobacco Use    Smoking status: Current Every Day Smoker     Packs/day: 1 00     Years: 40 00     Pack years: 40 00     Types: Cigarettes     Start date: 1978    Smokeless tobacco: Never Used    Tobacco comment: per Mervat-quit   Substance Use Topics    Alcohol use:  Yes     Alcohol/week: 270 0 standard drinks     Types: 270 Cans of beer per week     Frequency: Monthly or less     Drinks per session: 1 or 2     Comment: 30 pack a day    Drug use: Yes     Types: Marijuana     Comment: not for a while       Review of Systems: Review of Systems   Gastrointestinal: Positive for abdominal pain and nausea  All other systems reviewed and are negative  Physical Exam: Physical Exam  Vitals signs and nursing note reviewed  Constitutional:       General: He is not in acute distress  Appearance: Normal appearance  HENT:      Head: Normocephalic and atraumatic  Neck:      Musculoskeletal: Normal range of motion and neck supple  Cardiovascular:      Rate and Rhythm: Normal rate and regular rhythm  Pulses: Normal pulses  Heart sounds: Normal heart sounds  No murmur  No gallop  Pulmonary:      Effort: Pulmonary effort is normal  No respiratory distress  Breath sounds: Normal breath sounds  No stridor  No wheezing, rhonchi or rales  Abdominal:      General: Bowel sounds are normal  There is no distension  Palpations: Abdomen is soft  There is no mass  Tenderness: There is abdominal tenderness  There is no guarding or rebound  Hernia: No hernia is present  Comments: Abdomen tender to palpation in upper quadrants, greatest in epigastric area and right upper quadrant and mild tenderness to palpation in lower abdomen  Skin:     General: Skin is warm and dry  Capillary Refill: Capillary refill takes less than 2 seconds  Coloration: Skin is not jaundiced or pale  Neurological:      Mental Status: He is alert and oriented to person, place, and time     Psychiatric:         Mood and Affect: Mood normal            Lab Results:   Recent Results (from the past 24 hour(s))   Fingerstick Glucose (POCT)    Collection Time: 02/07/21 12:15 PM   Result Value Ref Range    POC Glucose 106 65 - 140 mg/dl   Fingerstick Glucose (POCT)    Collection Time: 02/07/21  4:18 PM   Result Value Ref Range    POC Glucose 99 65 - 140 mg/dl   Fingerstick Glucose (POCT)    Collection Time: 02/08/21 12:08 AM   Result Value Ref Range POC Glucose 109 65 - 140 mg/dl   Fingerstick Glucose (POCT)    Collection Time: 02/08/21  7:05 AM   Result Value Ref Range    POC Glucose 89 65 - 140 mg/dl           Imaging Studies: Us Gallbladder    Result Date: 2/5/2021  Narrative: RIGHT UPPER QUADRANT ULTRASOUND INDICATION:     Right upper quadrant pain, pancreatitis  COMPARISON:  3/3/2019, 2/5/2021 TECHNIQUE:   Real-time ultrasound of the right upper quadrant was performed with a curvilinear transducer with both volumetric sweeps and still imaging techniques  FINDINGS: PANCREAS:  Portions of the pancreas are obscured by bowel gas  Visualized portions of the pancreas are unremarkable  AORTA AND IVC:  Visualized portions are normal for patient age  LIVER: Size:  Within normal range  The liver measures 22 cm in the midclavicular line  Contour:  Surface contour is smooth  Parenchyma: There is marked diffuse increased echogenicity with smooth echotexture and significant beam attenuation with loss of periportal echogenicity  Most consistent with severe hepatic steatosis  There is focal fatty sparing adjacent to the gallbladder  No evidence of suspicious mass  Limited imaging of the main portal vein shows it to be patent and hepatopetal   BILIARY: No gallbladder findings  No intrahepatic biliary dilatation  CBD measures 4 mm  No choledocholithiasis  KIDNEY: Right kidney measures 12 9 x 6 3 x 6 0 cm  Within normal limits  ASCITES:   None  Impression: 1  Severe hepatomegaly and hepatic steatosis  2   Normal gallbladder and biliary tree  Workstation performed: [id number]     Ct Abdomen Pelvis With Contrast    Result Date: 2/5/2021  Narrative: CT ABDOMEN AND PELVIS WITH IV CONTRAST INDICATION:   Abdominal pain, acute, nonlocalized evaluation pancreatitis, rule out psuedocyst or abscess  , moderate to severe epigastric pain    COMPARISON:  3/2/2019 TECHNIQUE:  CT examination of the abdomen and pelvis was performed   Axial, sagittal, and coronal 2D reformatted images were created from the source data and submitted for interpretation  Radiation dose length product (DLP) for this visit:  722 mGy-cm   This examination, like all CT scans performed in the [facility], was performed utilizing techniques to minimize radiation dose exposure, including the use of iterative reconstruction and automated exposure control  IV Contrast:  100 mL of iohexol (OMNIPAQUE) Enteric Contrast:  Enteric contrast was not administered  FINDINGS: ABDOMEN LOWER CHEST:  No clinically significant abnormality identified in the visualized lower chest  LIVER/BILIARY TREE:  Liver is diffusely decreased in density consistent with fatty change  No CT evidence of suspicious hepatic mass  Normal hepatic contours  No biliary dilatation  GALLBLADDER:  No calcified gallstones  No pericholecystic inflammatory change  SPLEEN:  Unremarkable  PANCREAS:  Mild soft tissue stranding surrounding the head and neck  No peripancreatic collection  ADRENAL GLANDS:  Unremarkable  KIDNEYS/URETERS:  One or more stable simple renal cyst(s) is noted  Otherwise unremarkable kidneys  No hydronephrosis  STOMACH AND BOWEL:  Unremarkable  APPENDIX:  Normal  ABDOMINOPELVIC CAVITY:  No ascites  No pneumoperitoneum  No lymphadenopathy  Prominent subcentimeter upper retroperitoneal and periportal lymph nodes, likely reactive  VESSELS:  Atherosclerotic changes are present  No evidence of aneurysm  PELVIS REPRODUCTIVE ORGANS:  Unremarkable for patient's age  URINARY BLADDER:  Unremarkable  ABDOMINAL WALL/INGUINAL REGIONS:  Unchanged fat-containing right inguinal hernia  OSSEOUS STRUCTURES:  No acute fracture or destructive osseous lesion  Impression: Findings consistent with acute interstitial pancreatitis  No acute peripancreatic fluid collection  Diffuse hepatic steatosis   Workstation performed: [id number]       Problem List:   Patient Active Problem List   Diagnosis    Pancreatitis, acute    Hepatic steatosis    Lesion of left native kidney    HTN (hypertension)    Dyslipidemia    Alcohol abuse    Lactic acidosis    Type 2 diabetes mellitus with hyperglycemia (HCC)    Chronic bilateral low back pain with bilateral sciatica    Gastroesophageal reflux disease without esophagitis    Neuropathy of right foot    Abnormal skin growth    Leucocytosis         KRISTOFER Mckeon

## 2021-02-08 NOTE — ANESTHESIA PREPROCEDURE EVALUATION
Procedure:  EGD    Relevant Problems   CARDIO   (+) HTN (hypertension)      ENDO   (+) Type 2 diabetes mellitus with hyperglycemia (HCC)      GI/HEPATIC   (+) Gastroesophageal reflux disease without esophagitis   (+) Hepatic steatosis   (+) Pancreatitis, acute      /RENAL   (+) Lesion of left native kidney      MUSCULOSKELETAL   (+) Chronic bilateral low back pain with bilateral sciatica        Physical Exam    Airway    Mallampati score: III  TM Distance: >3 FB  Neck ROM: full     Dental       Cardiovascular  Rhythm: regular, Rate: normal,     Pulmonary  Breath sounds clear to auscultation,     Other Findings        Anesthesia Plan  ASA Score- 2     Anesthesia Type- IV sedation with anesthesia with ASA Monitors  Additional Monitors:   Airway Plan:           Plan Factors-Exercise tolerance (METS): >4 METS  Chart reviewed  Patient is a current smoker  Patient instructed to abstain from smoking on day of procedure  Patient did not smoke on day of surgery  Induction- intravenous  Postoperative Plan-     Informed Consent- Anesthetic plan and risks discussed with patient  I personally reviewed this patient with the CRNA  Discussed and agreed on the Anesthesia Plan with the CRNA  Kathy Naploes

## 2021-02-08 NOTE — UTILIZATION REVIEW
Notification of Inpatient Admission/Inpatient Authorization Request   This is a Notification of Inpatient Admission for [address]  Be advised that this patient was admitted to our facility under Inpatient Status  Contact Jaida Zuniga at [phone] for additional admission information  [address] [facility]  DEDICATED [phone]  Patient Name:   Mallory Anderson   YOB: 1965       [address]   [address]:   [address] [facility]  Tax ID: [id number]  NPI: [id number] Attending Provider/NPI:  Phone:  Address: Zurdo Marmolejo [[id number]]  [phone]  Same as the facility   Place of Service Code: 24 Place of Service Name:  [facility]   Start Date: 2/5/21 1604 Discharge Date & Time: No discharge date for patient encounter  Type of Admission: Inpatient Status Discharge Disposition   (if discharged): Home/Self Care   Patient Diagnoses: Acute pancreatitis [K85 90]  Abdominal pain [R10 9]  Hepatic steatosis [K76 0]   Orders: Admission Orders (From admission, onward)     Ordered        02/05/21 1604  Inpatient Admission  Once                    Assigned Utilization Review Contact: Lorra Eastern  Utilization   Network Utilization Review Department  Phone: [phone]; Fax [phone]  Email: [email or web address]   ATTENTION PAYERS: Please call the assigned Utilization  directly with any questions or concerns ALL voicemails in the department are confidential  Send all requests for admission clinical reviews, approved or denied determinations and any other requests to dedicated fax number belonging to the campus where the patient is receiving treatment        Initial Clinical Review    Admission: Date/Time/Statement:   Admission Orders (From admission, onward)     Ordered        02/05/21 1604  Inpatient Admission  Once                   Orders Placed This Encounter   Procedures    Inpatient Admission     Standing Status:   Standing     Number of Occurrences:   1     Order Specific Question:   Level of Care     Answer:   Med Surg [16]     Order Specific Question:   Estimated length of stay     Answer:   More than 2 Midnights     Order Specific Question:   Certification     Answer:   I certify that inpatient services are medically necessary for this patient for a duration of greater than two midnights  See H&P and MD Progress Notes for additional information about the patient's course of treatment  ED Arrival Information     Expected Arrival Acuity Means of Arrival Escorted By Service Admission Type    - 2/5/2021 13:29 Urgent Walk-In Self Hospitalist Urgent    Arrival Complaint    abdominal pain        Chief Complaint   Patient presents with    Abdominal Pain     pt presents to ed for abd pain that started a week ago along with constipation  hx of pancreatitis     Assessment/Plan: 60 y o  male who presents with hypertension, diabetes type 2, hyperlipidemia, presents with complaints of abdominal pain in the epigastric region since last Sunday  Pain around umbilicus intensity 2/29 associated with N/V poor appetite  No BM since Sunday  Denies etoh, stated stopped 1 yr ago  Recently started diclofenac for back pain  There is abdominal tenderness in the epigastric area  Lipase 861,na 131, glucose 306, wbc 12 45  CT abdomen Findings consistent with acute interstitial pancreatitis   No acute peripancreatic fluid collection  Diffuse hepatic steatosis  Admitted Inpatient Acute Pancreatitis trend lipase consult GI NPO status IVF hydration pain mgt  Iv zofran    Leukocytosis with a cute pancreatitis no evidence of active infection,hold off abx monitor wbc  ED Triage Vitals   Temperature Pulse Respirations Blood Pressure SpO2   02/05/21 1344 02/05/21 1344 02/05/21 1344 02/05/21 1344 02/05/21 1344   98 6 °F (37 °C) (!) 115 21 133/99 98 %      Temp Source Heart Rate Source Patient Position - Orthostatic VS BP Location FiO2 (%)   02/06/21 0035 02/05/21 1344 02/05/21 1505 02/05/21 1344 --   Tympanic Monitor Lying Right arm       Pain Score       02/05/21 1344       9          Wt Readings from Last 1 Encounters:   02/06/21 102 kg (224 lb 13 9 oz)     Additional Vital Signs:   02/06/21 0712  98 2 °F (36 8 °C)  77  16  146/96  93 %  None (Room air)  Lying   02/06/21 0035  98 4 °F (36 9 °C)  83  18  141/85  96 %  None (Room air)  Lying   02/05/21 2100  --  --  --  --  96 %  None (Room air)  --   02/05/21 1849  --  --  --  --  --  None (Room air)  --   02/05/21 1505  --  95  17  101/58  95 %  None (Room air)  Lying   02/05/21 1406  --  111Abnormal   22  117/74  99 %         Pertinent Labs/Diagnostic Test Results:   2/5  CT abdomen Findings consistent with acute interstitial pancreatitis   No acute peripancreatic fluid collection  Diffuse hepatic steatosis  2/5 us gallbladder   1   Severe hepatomegaly and hepatic steatosis  2   Normal gallbladder and biliary tree         Results from last 7 days   Lab Units 02/06/21  0436 02/05/21  1351   WBC Thousand/uL 6 84 12 45*   HEMOGLOBIN g/dL 14 6 16 6   HEMATOCRIT % 41 0 45 7   PLATELETS Thousands/uL 222 313   NEUTROS ABS Thousands/µL  --  8 42*     Results from last 7 days   Lab Units 02/07/21  0419 02/06/21  0436 02/05/21  1351   SODIUM mmol/L 137 136 131*   POTASSIUM mmol/L 3 7 3 7 4 0   CHLORIDE mmol/L 100 100 95*   CO2 mmol/L 28 29 26   ANION GAP mmol/L 9 7 10   BUN mg/dL 7 9 14   CREATININE mg/dL 0 66 0 62 1 00   EGFR ml/min/1 73sq m 107 110 83   CALCIUM mg/dL 8 9 9 1 10 1     Results from last 7 days   Lab Units 02/06/21  0436 02/05/21  1351   AST U/L 16 20   ALT U/L 24 29   ALK PHOS U/L 81 9 91 8   TOTAL PROTEIN g/dL 6 8 8 1   ALBUMIN g/dL 3 8 4 5   TOTAL BILIRUBIN mg/dL 0 84 1 03     Results from last 7 days   Lab Units 02/08/21  0705 02/08/21  0008 02/07/21  1618 02/07/21  1215 02/07/21  0816 02/06/21  2144 02/06/21  1621 02/06/21  1119 02/06/21  0716 02/05/21 2050 02/05/21  1714   POC GLUCOSE mg/dl 89 109 99 106 93 133 143* 154* 136 173* 230*     Results from last 7 days   Lab Units 02/07/21  0419 02/06/21  0436 02/05/21  1351   GLUCOSE RANDOM mg/dL 120 140 306*     Results from last 7 days   Lab Units 02/07/21  0419 02/05/21  1710 02/05/21  1401   TROPONIN I ng/mL <0 03 <0 03 <0 03     Results from last 7 days   Lab Units 02/05/21  1401   PROTIME seconds 11 0   INR  0 97   PTT seconds 30     Results from last 7 days   Lab Units 02/07/21  0419 02/06/21  0436 02/05/21  1351   LIPASE u/L 118* 676* 861*     Results from last 7 days   Lab Units 02/05/21  1509   CLARITY UA  Clear   COLOR UA  Yellow   SPEC GRAV UA  1 020   PH UA  6 0   GLUCOSE UA mg/dl 3+*   KETONES UA mg/dl Trace*   BLOOD UA  Negative   PROTEIN UA mg/dl Trace*   NITRITE UA  Negative   BILIRUBIN UA  1+*   UROBILINOGEN UA E U /dl 2 0   LEUKOCYTES UA  Negative   WBC UA /hpf 0-1   RBC UA /hpf 0-1   BACTERIA UA /hpf Occasional   EPITHELIAL CELLS WET PREP /hpf Occasional   MUCUS THREADS  Occasional*     Results from last 7 days   Lab Units 02/05/21  1401   ETHANOL LVL mg/dL <10     ED Treatment:   Medication Administration from 02/05/2021 1329 to 02/05/2021 1757       Date/Time Order Dose Route Action Action by Comments     02/05/2021 1723 sodium chloride 0 9 % infusion 0 mL/hr Intravenous Stopped Jonah Cardenas RN      02/05/2021 1406 sodium chloride 0 9 % infusion 500 mL/hr Intravenous [address] Miah Mejía RN      02/05/2021 1401 ondansetron (ZOFRAN) injection 4 mg 4 mg Intravenous Given Miah Mejía RN      02/05/2021 1401 HYDROmorphone (DILAUDID) injection 1 mg 1 mg Intravenous Given Miah Mejía RN      02/05/2021 1512 iohexol (OMNIPAQUE) 350 MG/ML injection (SINGLE-DOSE) 100 mL 100 mL Intravenous Given Cayetano Schaumann      02/05/2021 1714 insulin lispro (HumaLOG) 100 units/mL subcutaneous injection 1-5 Units 1 Units Subcutaneous Not Given Ken GALLEGO MEI Foreman      02/05/2021 1711 lactated ringers bolus 1,000 mL 1,000 mL Intravenous [address] Obinna Carmona RN      02/05/2021 1646 HYDROmorphone (DILAUDID) injection 1 mg 1 mg Intravenous Given Lelo Pearce RN         History reviewed  No pertinent past medical history  Present on Admission:   Type 2 diabetes mellitus with hyperglycemia (HCC)   Alcohol abuse   Hepatic steatosis   Dyslipidemia      Admitting Diagnosis: Acute pancreatitis [K85 90]  Abdominal pain [R10 9]  Hepatic steatosis [K76 0]  Age/Sex: 60 y o  male  Admission Orders:  Clear liquid  Serial troponin  Scheduled Medications:  docusate sodium, 100 mg, Oral, BID  enoxaparin, 40 mg, Subcutaneous, Daily  folic acid, 1 mg, Oral, Daily  insulin lispro, 1-5 Units, Subcutaneous, TID AC  insulin lispro, 1-5 Units, Subcutaneous, HS  lisinopril, 10 mg, Oral, Daily  nicotine, 1 patch, Transdermal, Daily  pantoprazole, 40 mg, Oral, Early Morning  polyethylene glycol, 17 g, Oral, Daily  senna, 1 tablet, Oral, Daily  thiamine, 100 mg, Oral, Daily      Continuous IV Infusions:  lactated ringers, 100 mL/hr, Intravenous, Continuous      PRN Meds:  acetaminophen, 650 mg, Oral, Q6H PRN  HYDROmorphone, 2 mg, Intravenous, Q3H PRN  ondansetron, 4 mg, Intravenous, Q6H PRN        IP CONSULT TO GASTROENTEROLOGY    Network Utilization Review Department  ATTENTION: Please call with any questions or concerns to [phone] and carefully listen to the prompts so that you are directed to the right person  All voicemails are confidential   Louise Geremias all requests for admission clinical reviews, approved or denied determinations and any other requests to dedicated fax number below belonging to the campus where the patient is receiving treatment   List of dedicated fax numbers for the Facilities:  [address] DENIALS (Administrative/Medical Necessity) [phone]   [address] (Maternity/NICU/Pediatrics) [phone] 5000 [facility] [address] [place] [address]  [phone]   [address]) [address] [place] [address] Abhinav Layne [facility] [phone]   [place] [address] [phone]

## 2021-02-08 NOTE — ASSESSMENT & PLAN NOTE
Patient states that he stopped drinking about a year ago    Advised not to take alcohol again in the future

## 2021-02-08 NOTE — DISCHARGE SUMMARY
Discharge- [address] 1965, 60 y o  male MRN: [id number]    Unit/Bed#: -01 Encounter: [id number]    Primary Care Provider: Clyde Gustafson MD   Date and time admitted to hospital: 2/5/2021  1:38 PM        Admitting Provider:  Jag Navas MD  Discharge Provider:  Frank Kruse MD  Admission Date: 2/5/2021       Discharge Date: 02/08/21   LOS: 3  Primary Care Physician at Discharge: Clyde Gustafson MD [facility]:  [address] is a 60 y o  male who presented significant abdominal pain in the epigastric and periumbilical area radiating to back  Patient was found to have acute pancreatitis  His lipase was elevated and then subsequently this improved and the lipase came down  Patient did have an ultrasound that showed no gallstones  Patient does not have a history of active alcohol use  Lipid panel was negative  EGD did not show any major ulcers  Patient is okay to be discharged home on diabetic diet and patient told to avoid diclofenac    REASON FOR ADMISSION/ ADMISSION DIAGNOSES    Acute pancreatitis    DISCHARGE DIAGNOSES  Leucocytosis  Assessment & Plan  With acute pancreatitis-no evidence of active infection-UA clear  No antibiotic needed    Type 2 diabetes mellitus with hyperglycemia [facility])  Assessment & Plan  No results found for: HGBA1C    Recent Labs     02/07/21  1618 02/08/21  0008 02/08/21  0705 02/08/21  1059   POCGLU 99 109 89 83       Blood Sugar Average: Last 72 hrs:  (P) 129  Patient being discharged on his home medications  He tolerated diet    Alcohol abuse  Assessment & Plan  Patient states that he stopped drinking about a year ago  Advised not to take alcohol again in the future    Dyslipidemia  Assessment & Plan  On statin    HTN (hypertension)  Assessment & Plan  Continue ramipril      Hepatic steatosis  Assessment & Plan  Patient advised about weight reduction and lifestyle modification and also patient is on statin    * Pancreatitis, acute  Assessment & Plan  Patient has abdominal pain in the epigastric and periumbilical region radiating to the back and this has improved  Lipase level has improved from 800s and 118 yesterday    CT of the abdomen was reviewed shows acute interstitial pancreatitis with no acute peripancreatic fluid collection noted  Has diffuse hepatic steatosis  Right upper quadrant ultrasound shows severe hepatomegaly and hepatic steatosis  Normal gallbladder and biliary tree noted  Lipase level is trending down  Lipid panel within normal limits  EGD done today and patient advised PPI and told not to use diclofenac      CONSULTING PROVIDERS   IP CONSULT TO GASTROENTEROLOGY  IP CONSULT TO NUTRITION SERVICES    PROCEDURES PERFORMED  * No surgery found *    RADIOLOGY RESULTS  Us Gallbladder    Result Date: 2/5/2021  Impression: 1  Severe hepatomegaly and hepatic steatosis  2   Normal gallbladder and biliary tree  Workstation performed: [id number]     Ct Abdomen Pelvis With Contrast    Result Date: 2/5/2021  Impression: Findings consistent with acute interstitial pancreatitis  No acute peripancreatic fluid collection  Diffuse hepatic steatosis   Workstation performed: [id number]       LABS  Results from last 7 days   Lab Units 02/06/21  0436 02/05/21  1401 02/05/21  1351   WBC Thousand/uL 6 84  --  12 45*   HEMOGLOBIN g/dL 14 6  --  16 6   HEMATOCRIT % 41 0  --  45 7   MCV fL 88  --  86   PLATELETS Thousands/uL 222  --  313   INR   --  0 97  --      Results from last 7 days   Lab Units 02/07/21  0419 02/06/21  0436 02/05/21  1351   SODIUM mmol/L 137 136 131*   POTASSIUM mmol/L 3 7 3 7 4 0   CHLORIDE mmol/L 100 100 95*   CO2 mmol/L 28 29 26   BUN mg/dL 7 9 14   CREATININE mg/dL 0 66 0 62 1 00   CALCIUM mg/dL 8 9 9 1 10 1   ALBUMIN g/dL  --  3 8 4 5   TOTAL BILIRUBIN mg/dL  --  0 84 1 03   ALK PHOS U/L  --  81 9 91 8   ALT U/L  --  24 29   AST U/L  --  16 20   EGFR ml/min/1 73sq m 107 110 83   GLUCOSE RANDOM mg/dL 120 140 306*     Results from last 7 days   Lab Units 02/07/21  0419 02/05/21  1710 02/05/21  1401   TROPONIN I ng/mL <0 03 <0 03 <0 03              Results from last 7 days   Lab Units 02/08/21  1059 02/08/21  0705 02/08/21  0008 02/07/21  1618 02/07/21  1215 02/07/21  0816 02/06/21  2144 02/06/21  1621 02/06/21  1119 02/06/21  0716   POC GLUCOSE mg/dl 83 89 109 99 106 93 133 143* 154* 136                 Results from last 7 days   Lab Units 02/06/21  0436   TRIGLYCERIDES mg/dL 160 2*   CHOLESTEROL mg/dL 170   LDL CALC mg/dL 108*   HDL mg/dL 30*       Cultures:   Results from last 7 days   Lab Units 02/05/21  1509   COLOR UA  Yellow   CLARITY UA  Clear   SPEC GRAV UA  1 020   PH UA  6 0   LEUKOCYTES UA  Negative   NITRITE UA  Negative   GLUCOSE UA mg/dl 3+*   KETONES UA mg/dl Trace*   BILIRUBIN UA  1+*   BLOOD UA  Negative      Results from last 7 days   Lab Units 02/05/21  1509   RBC UA /hpf 0-1   WBC UA /hpf 0-1   EPITHELIAL CELLS WET PREP /hpf Occasional   BACTERIA UA /hpf Occasional            PHYSICAL EXAM:  Vitals:   Blood Pressure: 140/84 (02/08/21 1319)  Pulse: 97 (02/08/21 1319)  Temperature: 98 8 °F (37 1 °C) (02/08/21 1319)  Temp Source: Tympanic (02/08/21 1319)  Respirations: 20 (02/08/21 1319)  Height: 5' 11" (180 3 cm) (02/08/21 0936)  Weight - Scale: 102 kg (224 lb 13 9 oz) (02/08/21 0936)  SpO2: 96 % (02/08/21 1319)    General appearance: alert, appears stated age and cooperative  HEENT - atraumatic and normocephalic  Neck- supple  Skin - no fresh rash  Extremities no fresh focal deformities  Cardiovascular- S1-S2 heard  Respiratory- bilateral air entry present, no crackles or rhonchi  Skin - no fresh rash  Abdomen - normal bowel sounds present, no rebound tenderness  CNS- No fresh focal deficits  Psych- no acute psychosis     Planned Re-admission:  No  Discharge Disposition: Home/Self Care      Test Results Pending at Discharge:   Pending Labs     Order Current Status    Tissue Exam In process      Incidental findings:  Acute pancreatitis    Medications   · Summary of Medication Adjustments made as a result of this hospitalization:  No new medication  · Medication Dosing Tapers - Please refer to Discharge Medication List for details on any medication dosing tapers (if applicable to patient)  · Discharge Medication List: See after visit summary for reconciled discharge medications  Diet restrictions:  Diabetic diet        Diet Orders   (From admission, onward)             Start     Ordered    02/08/21 1152  Diet GI; Lo Fiber/Lo Residue  Diet effective now     Comments: Soft diet   Question Answer Comment   Diet Type GI    GI Lo Fiber/Lo Residue    RD to adjust diet per protocol? Yes        02/08/21 1152              Activity restrictions: No strenuous activity  Discharge Condition: good    Outpatient Follow-Up and Discharge Instructions  See after visit summary section titled Discharge Instructions for information provided to patient and family  Code Status: Level 1 - Full Code  Discharge Statement   I spent 35 minutes discharging the patient  This time was spent on the day of discharge  Greater than 50% of total time was spent with the patient and / or family counseling and / or coordination of care  MD Nahomi Brooke  Internal Medicine    ** Please Note: This note has been constructed using a voice recognition system   **

## 2021-02-08 NOTE — ASSESSMENT & PLAN NOTE
No results found for: HGBA1C    Recent Labs     02/07/21  1618 02/08/21  0008 02/08/21  0705 02/08/21  1059   POCGLU 99 109 89 83       Blood Sugar Average: Last 72 hrs:  (P) 129  Patient being discharged on his home medications    He tolerated diet

## 2021-02-08 NOTE — INTERVAL H&P NOTE
H&P reviewed  After examining the patient I find no changes in the patients condition since the H&P had been written      Vitals:    02/08/21 0936   BP: (!) 181/87   Pulse: 85   Resp: 18   Temp: 98 6 °F (37 °C)   SpO2: 94%

## 2021-02-08 NOTE — ANESTHESIA POSTPROCEDURE EVALUATION
Post-Op Assessment Note    CV Status:  Stable  Pain Score: 0    Pain management: adequate     Mental Status:  Alert and awake   Hydration Status:  Euvolemic   PONV Controlled:  Controlled   Airway Patency:  Patent      Post Op Vitals Reviewed: Yes      Staff: CRNA         No complications documented      BP  129/92   Temp      Pulse  85   Resp   14   SpO2 94% ra

## 2021-02-08 NOTE — H&P
History and Physical - [facility]  Amanda Briones 60 y o  male MRN: [id number]                  HPI: Henry Queen is a 60y o  year old male who presents for upper endoscopy due to epigastric pain  REVIEW OF SYSTEMS: Per the HPI, and otherwise unremarkable  Historical Information   History reviewed  No pertinent past medical history    Past Surgical History:   Procedure Laterality Date    ANKLE SURGERY Right     KNEE SURGERY Right      Social History   Social History     Substance and Sexual Activity   Alcohol Use Yes    Alcohol/week: 270 0 standard drinks    Types: 270 Cans of beer per week    Frequency: Monthly or less    Drinks per session: 1 or 2    Comment: 30 pack a day     Social History     Substance and Sexual Activity   Drug Use Yes    Types: Marijuana    Comment: not for a while     Social History     Tobacco Use   Smoking Status Current Every Day Smoker    Packs/day: 1 00    Years: 40 00    Pack years: 40 00    Types: Cigarettes    Start date: 1978   Smokeless Tobacco Never Used   Tobacco Comment    per [place]-quit     Family History   Problem Relation Age of Onset    No Known Problems Mother     No Known Problems Father        Meds/Allergies       Current Facility-Administered Medications:     acetaminophen (TYLENOL) tablet 650 mg, 650 mg, Oral, Q6H PRN    docusate sodium (COLACE) capsule 100 mg, 100 mg, Oral, BID, 100 mg at 02/07/21 1717    [START ON 2/9/2021] enoxaparin (LOVENOX) subcutaneous injection 40 mg, 40 mg, Subcutaneous, Daily    folic acid (FOLVITE) tablet 1 mg, 1 mg, Oral, Daily, 1 mg at 02/07/21 0831    HYDROmorphone (DILAUDID) injection 2 mg, 2 mg, Intravenous, Q3H PRN, 2 mg at 02/08/21 0515    insulin lispro (HumaLOG) 100 units/mL subcutaneous injection 1-5 Units, 1-5 Units, Subcutaneous, TID AC **AND** Fingerstick Glucose (POCT), , , TID AC    insulin lispro (HumaLOG) 100 units/mL subcutaneous injection 1-5 Units, 1-5 Units, Subcutaneous, HS    lactated ringers infusion, 100 mL/hr, Intravenous, Continuous, 100 mL/hr at 02/08/21 0007    lisinopril (ZESTRIL) tablet 10 mg, 10 mg, Oral, Daily, 10 mg at 02/07/21 0830    nicotine (NICODERM CQ) 7 mg/24hr TD 24 hr patch 1 patch, 1 patch, Transdermal, Daily, 1 patch at 02/08/21 0918    ondansetron (ZOFRAN) injection 4 mg, 4 mg, Intravenous, Q6H PRN    pantoprazole (PROTONIX) EC tablet 40 mg, 40 mg, Oral, Early Morning, 40 mg at 02/08/21 0515    polyethylene glycol (MIRALAX) packet 17 g, 17 g, Oral, Daily, 17 g at 02/07/21 1518    senna (SENOKOT) tablet 8 6 mg, 1 tablet, Oral, Daily    thiamine tablet 100 mg, 100 mg, Oral, Daily, 100 mg at 02/07/21 0830    Allergies   Allergen Reactions    Amoxicillin Swelling       Objective     BP (!) 181/87   Pulse 85   Temp 98 6 °F (37 °C) (Temporal)   Resp 18   Ht 5' 11" (1 803 m)   Wt 102 kg (224 lb 13 9 oz)   SpO2 94%   BMI 31 36 kg/m²       PHYSICAL EXAM    Gen: NAD  CV: RRR  CHEST: Clear  ABD: soft, NT/ND  EXT: no edema      ASSESSMENT/PLAN:  This is a 60y o  year old male here for upper endoscopy, and he is stable and optimized for his procedure

## 2021-02-11 ENCOUNTER — TELEPHONE (OUTPATIENT)
Dept: FAMILY MEDICINE CLINIC | Facility: CLINIC | Age: 56
End: 2021-02-11

## 2021-02-11 DIAGNOSIS — E78.1 HIGH TRIGLYCERIDES: ICD-10-CM

## 2021-02-11 DIAGNOSIS — E11.65 TYPE 2 DIABETES MELLITUS WITH HYPERGLYCEMIA, WITHOUT LONG-TERM CURRENT USE OF INSULIN (HCC): ICD-10-CM

## 2021-02-11 NOTE — UTILIZATION REVIEW
Please Note that our Facility does not have Availity Access  Please send an Updated Determination to our fax [phone]    Notification of Discharge  This is a Notification of Discharge from our facility [facility]  Please be advised that this patient has been discharge from our facility  Below you will find the admission and discharge date and time including the patients disposition  PRESENTATION DATE: 2/5/2021  1:38 PM  OBS ADMISSION DATE:   IP ADMISSION DATE: 2/5/21 1604   DISCHARGE DATE: 2/8/2021  2:57 PM  DISPOSITION: Home/Self Care Home/Self Care   Admission Orders listed below:  Admission Orders (From admission, onward)     Ordered        02/05/21 1604  Inpatient Admission  Once                   Please contact the UR Department if additional information is required to close this patient's authorization/case  [address] [facility] Utilization Review Department  Main: [phone] x carefully listen to the prompts  All voicemails are confidential   [email or web address]  Send all requests for admission clinical reviews, approved or denied determinations and any other requests to dedicated fax number below belonging to the campus where the patient is receiving treatment   List of dedicated fax numbers:  [address] [place] [address] DENIALS (Administrative/Medical Necessity) [phone]   [address] (Maternity/NICU/Pediatrics) [phone]   Lelo Camacho [phone]     [address] [phone]   [facility] [phone]   [facility] [address] [facility] [phone]   [address]  [phone] [address] [facility] [address] [facility] [address]  [address] [facility] [address] [facility] [phone]

## 2021-02-11 NOTE — TELEPHONE ENCOUNTER
Patients spouse called to request a call back from Parish Wilson in regards to ordering medication, I did call patients spouse requesting a call back  for more detailed information on medication  ND

## 2021-02-12 RX ORDER — FENOFIBRATE 160 MG/1
160 TABLET ORAL DAILY
Qty: 90 TABLET | Refills: 3 | Status: SHIPPED | OUTPATIENT
Start: 2021-02-12 | End: 2022-01-06

## 2021-02-12 RX ORDER — PIOGLITAZONEHYDROCHLORIDE 45 MG/1
45 TABLET ORAL DAILY
Qty: 90 TABLET | Refills: 3 | Status: SHIPPED | OUTPATIENT
Start: 2021-02-12 | End: 2021-11-10

## 2021-02-12 NOTE — TELEPHONE ENCOUNTER
pioglitazone (ACTOS) 45 mg tablet takes 1 daily and fenofibrate (TRIGLIDE) 160 MG tablet takes 1 daily both with a 90 day supply [place] order   ND

## 2021-02-25 NOTE — UTILIZATION REVIEW
Please send us an updated determination on this case as we do not have acess to the payer portal for this campus  Notification of Inpatient Admission/Inpatient Authorization Request   This is a Notification of Inpatient Admission for [address]  Be advised that this patient was admitted to our facility under Inpatient Status  Contact Daniel Plummer at [phone] for additional admission information  [address] [place] DEPT  DEDICATED -[phone]  Patient Name:   Irene Fountain   YOB: 1965       [address]   [address]:   [address] [facility]  Tax ID: [id number]  NPI: [id number] Attending Provider/NPI:  Phone:  Address: Karma Leyva, [address] [facility]  [phone]  Same as the facility   Place of Service Code: 24 Place of Service Name:  [facility]   Start Date: 2/5/21 1604 Discharge Date & Time: 2/8/2021  2:57 PM    Type of Admission: Inpatient Status Discharge Disposition   (if discharged): Home/Self Care   Patient Diagnoses: Acute pancreatitis [K85 90]  Abdominal pain [R10 9]  Hepatic steatosis [K76 0]   Orders: Admission Orders (From admission, onward)     Ordered        02/05/21 1604  Inpatient Admission  Once                    Assigned Utilization Review Contact: Daniel Plummer  Utilization   Network Utilization Review Department  Phone: [phone]; Fax [phone]  Email: [email or web address]   ATTENTION PAYERS: Please call the assigned Utilization  directly with any questions or concerns ALL voicemails in the department are confidential  Send all requests for admission clinical reviews, approved or denied determinations and any other requests to dedicated fax number belonging to the campus where the patient is receiving treatment  Notification of Discharge  This is a Notification of Discharge from our facility [address]   Please be advised that this patient has been discharge from our facility  Below you will find the admission and discharge date and time including the patients disposition  PRESENTATION DATE: 2/5/2021  1:38 PM  OBS ADMISSION DATE:   IP ADMISSION DATE: 2/5/21 1604   DISCHARGE DATE: 2/8/2021  2:57 PM  DISPOSITION: Home/Self Care Home/Self Care   Admission Orders listed below:  Admission Orders (From admission, onward)     Ordered        02/05/21 1604  Inpatient Admission  Once                   Please contact the UR Department if additional information is required to close this patient's authorization/case  [address] More[address] Utilization Review Department  Main: [phone] x carefully listen to the prompts  All voicemails are confidential   [email or web address]  Send all requests for admission clinical reviews, approved or denied determinations and any other requests to dedicated fax number below belonging to the campus where the patient is receiving treatment   List of dedicated fax numbers:  [address] DENIALS (Administrative/Medical Necessity) [phone]   [address] (Maternity/NICU/Pediatrics) [phone]   Jose Miguel Courser [phone]   Lalo Lugo [phone]   Juan Antonio Grit [phone]   [facility] [address] [place] [address] [phone]   [facility]  [phone]   [address] [facility]  [address] [place] [address] [address] [facility] [phone]

## 2021-04-01 DIAGNOSIS — K21.9 GASTROESOPHAGEAL REFLUX DISEASE: ICD-10-CM

## 2021-04-02 RX ORDER — OMEPRAZOLE 40 MG/1
CAPSULE, DELAYED RELEASE ORAL
Qty: 90 CAPSULE | Refills: 1 | Status: SHIPPED | OUTPATIENT
Start: 2021-04-02 | End: 2021-06-18

## 2021-04-06 NOTE — UTILIZATION REVIEW
Emily Wilson does not have access to the portal  Please fax over this updated determination to our dedicated number [phone]    Notification of Inpatient Admission/Inpatient Authorization Request   This is a Notification of Inpatient Admission for [address]  Be advised that this patient was admitted to our facility under Inpatient Status  Contact Yoana Gore at [phone] for additional admission information  [address] [place] [facility]PT  DEDICATED -[phone]  Patient Name:   Salomon Martínez   YOB: 1965       [address]   [address]:   [address] [facility]  Tax ID: [id number]  NPI: [id number] Attending Provider/NPI:  Phone:  Address: Riri Parker [address] [facility]  [phone]  Same as the facility   Place of Service Code: 24 [facility]   Start Date: 2/5/21 1604 Discharge Date & Time: 2/8/2021  2:57 PM    Type of Admission: Inpatient Status Discharge Disposition   (if discharged): Home/Self Care   Patient Diagnoses: Acute pancreatitis [K85 90]  Abdominal pain [R10 9]  Hepatic steatosis [K76 0]   Orders: Admission Orders (From admission, onward)     Ordered        02/05/21 1604  Inpatient Admission  Once                    Assigned Utilization Review Contact: Yoana Gore  Utilization   Network Utilization Review Department  Phone: [phone]; Fax [phone]  Email: [email or web address]   ATTENTION PAYERS: Please call the assigned Utilization  directly with any questions or concerns ALL voicemails in the department are confidential  Send all requests for admission clinical reviews, approved or denied determinations and any other requests to dedicated fax number belonging to the campus where the patient is receiving treatment  Notification of Discharge  This is a Notification of Discharge from our facility [address]  Please be advised that this patient has been discharge from our facility  Below you will find the admission and discharge date and time including the patients disposition  PRESENTATION DATE: 2/5/2021  1:38 PM  OBS ADMISSION DATE:   IP ADMISSION DATE: 2/5/21 1604   DISCHARGE DATE: 2/8/2021  2:57 PM  DISPOSITION: Home/Self Care Home/Self Care   Admission Orders listed below:  Admission Orders (From admission, onward)     Ordered        02/05/21 1604  Inpatient Admission  Once                   Please contact the UR Department if additional information is required to close this patient's authorization/case  [address] Dustin Ordonez [facility] Utilization Review Department  Main: [phone] x carefully listen to the prompts  All voicemails are confidential   [email or web address]  Send all requests for admission clinical reviews, approved or denied determinations and any other requests to dedicated fax number below belonging to the campus where the patient is receiving treatment   List of dedicated fax numbers:  [address] DENIALS (Administrative/Medical Necessity) [phone]   [address] (Maternity/NICU/Pediatrics) [phone]   [facility] [phone]   Jose Lowery [phone]   [facility] [phone]   [address] [phone]   [facility]  [phone]   [address] [facility] [address]  [address] [place] [address] [address] [facility] [phone]

## 2021-05-16 DIAGNOSIS — E11.9 TYPE 2 DIABETES MELLITUS WITHOUT COMPLICATION, WITHOUT LONG-TERM CURRENT USE OF INSULIN (HCC): ICD-10-CM

## 2021-05-16 RX ORDER — BLOOD SUGAR DIAGNOSTIC
STRIP MISCELLANEOUS
Qty: 300 STRIP | Refills: 0 | Status: SHIPPED | OUTPATIENT
Start: 2021-05-16 | End: 2022-03-13 | Stop reason: HOSPADM

## 2021-06-21 ENCOUNTER — RA CDI HCC (OUTPATIENT)
Dept: OTHER | Facility: HOSPITAL | Age: 56
End: 2021-06-21

## 2021-06-21 NOTE — PROGRESS NOTES
[facility] 75  coding opportunities             Chart reviewed, (number of) suggestions sent to provider: 1                  Patients insurance company: [facility] (Medicare Advantage only)     Can the depression be further specified as:     F32 0 major depressive disorder, single episode, mild  OR   F32 1 major depressive disorder, single episode, moderate  OR   F32 2 major depressive disorder, single episode, severe without psychotic features OR   F32 4 major depressive disorder, single episode, in partial remission OR   F32 5 major depressive disorder, single episode, in full remission        Provider never responded to [place] [address]  coding request, and pt canceled the appt

## 2021-07-18 DIAGNOSIS — E11.65 TYPE 2 DIABETES MELLITUS WITH HYPERGLYCEMIA, WITHOUT LONG-TERM CURRENT USE OF INSULIN (HCC): ICD-10-CM

## 2021-07-18 RX ORDER — SIMVASTATIN 20 MG
20 TABLET ORAL DAILY
Qty: 90 TABLET | Refills: 3 | Status: SHIPPED | OUTPATIENT
Start: 2021-07-18 | End: 2022-07-12

## 2021-07-23 ENCOUNTER — RA CDI HCC (OUTPATIENT)
Dept: OTHER | Facility: HOSPITAL | Age: 56
End: 2021-07-23

## 2021-07-23 NOTE — PROGRESS NOTES
[facility] 75  coding opportunities             Chart reviewed, (number of) suggestions sent to provider: 1      Can the depression be further specified as:      F32 0 major depressive disorder, single episode, mild  OR   F32 1 major depressive disorder, single episode, moderate  OR   F32 2 major depressive disorder, single episode, severe without psychotic features OR   F32 4 major depressive disorder, single episode, in partial remission OR   F32 5 major depressive disorder, single episode, in full remission            Patients insurance company: [facility] (Medicare Advantage only)           Provider never responded to [place] [address]  coding request, and type of depression was not specified

## 2021-07-26 DIAGNOSIS — E11.65 TYPE 2 DIABETES MELLITUS WITH HYPERGLYCEMIA, WITHOUT LONG-TERM CURRENT USE OF INSULIN (HCC): ICD-10-CM

## 2021-07-26 RX ORDER — GLIMEPIRIDE 2 MG/1
TABLET ORAL
Qty: 135 TABLET | Refills: 3 | Status: SHIPPED | OUTPATIENT
Start: 2021-07-26 | End: 2021-09-22 | Stop reason: HOSPADM

## 2021-07-29 NOTE — PROGRESS NOTES
Assessment/Plan:         Problem List Items Addressed This Visit        Digestive    Hepatic steatosis     Pt does drink alcohol         Gastroesophageal reflux disease without esophagitis - Primary     On meds ok            Endocrine    Type 2 diabetes mellitus with hyperglycemia (HCC)       No results found for: HGBA1C sees endocrinologist         Relevant Orders    Comprehensive metabolic panel    Lipid panel    Microalbumin / creatinine urine ratio    Hemoglobin A1C       Cardiovascular and Mediastinum    Essential hypertension     Well controlled            Nervous and Auditory    Chronic bilateral low back pain with bilateral sciatica       Other    Dyslipidemia     On meds         Other fatigue     Check labs         Relevant Orders    CBC and differential    TSH, 3rd generation    Testosterone, free, total    Vitamin D 25 hydroxy    Vasculogenic erectile dysfunction     Trial of cialis         Chronic depression     Pt has been on sertraline a long time has no motivation will try adding wellbutrin 150xl 1 po qd and follow up 1 mo         Relevant Medications    buPROPion (WELLBUTRIN XL) 150 mg 24 hr tablet      Other Visit Diagnoses     Class 1 obesity due to excess calories with serious comorbidity and body mass index (BMI) of 31 0 to 31 9 in adult        Colon cancer screening        Relevant Orders    Ambulatory referral for colonoscopy    Smoker        Relevant Orders    CT lung screening program    Prostate cancer screening        Relevant Orders    PSA, Total Screen    Erectile dysfunction, unspecified erectile dysfunction type        Relevant Medications    tadalafil (CIALIS) 5 MG tablet            Subjective: pt here for interval visit multiple medical problems review of meds labs  pt declines colonoscopy or cologuard pt is on antidepressants  But still feels tired and a little down not suicidal but no motivation pt aslo     Patient ID: Yaron Marcus is a 60 y o  male      HPI    The following portions of the patient's history were reviewed and updated as appropriate:   Past Medical History:  He has a past medical history of Back pain, Diabetes mellitus ([address] ), GERD (gastroesophageal reflux disease), Hyperlipidemia, Hypertension, and Neuropathy  ,  _______________________________________________________________________  Medical Problems:  does not have any pertinent problems on file ,  _______________________________________________________________________  Past Surgical History:   has a past surgical history that includes Ankle surgery (Right) and Knee surgery (Right)  ,  _______________________________________________________________________  Family History:  family history includes Lymphoma in his mother; No Known Problems in his father ,  _______________________________________________________________________  Social History:   reports that he has been smoking cigarettes  He started smoking about 43 years ago  He has a 40 00 pack-year smoking history  He has never used smokeless tobacco  He reports current alcohol use of about 270 0 standard drinks of alcohol per week  He reports current drug use  Drug: Marijuana  ,  _______________________________________________________________________  Allergies:  is allergic to amoxicillin     _______________________________________________________________________  Current Outpatient Medications   Medication Sig Dispense Refill    Accu-Chek Stefanie Plus test strip TEST THREE TIMES DAILY 300 strip 0    Accu-Chek FastClix Lancets MISC USE AS DIRECTED 306 each 6    Blood Glucose Monitoring Suppl (Accu-Chek Stefanie Plus) w/Device KIT USE AS DIRECTED 1 kit 1    fenofibrate (TRIGLIDE) 160 MG tablet Take 1 tablet (160 mg total) by mouth daily 90 tablet 3    gabapentin (NEURONTIN) 300 mg capsule Take 300 mg by mouth Three times a day      glimepiride (AMARYL) 2 mg tablet TAKE 1 TABLET EVERY DAY IN THE MORNING AND TAKE 1/2 TABLET EVERY EVENING 135 tablet 3    metFORMIN (GLUCOPHAGE-XR) 500 mg 24 hr tablet Take 2 tablets (1,000 mg total) by mouth daily 180 tablet 3    omeprazole (PriLOSEC) 40 MG capsule TAKE 1 CAPSULE EVERY DAY 90 capsule 1    oxyCODONE-acetaminophen (PERCOCET) 7 5-325 MG per tablet TAKE 1 TABLET BID AS NEEDED FOR PAIN  MDD 2      pioglitazone (ACTOS) 45 mg tablet Take 1 tablet (45 mg total) by mouth daily 90 tablet 3    ramipril (ALTACE) 10 MG capsule Take 1 capsule (10 mg total) by mouth daily 90 capsule 3    sertraline (ZOLOFT) 100 mg tablet Take 2 tablets (200 mg total) by mouth daily 180 tablet 3    simvastatin (ZOCOR) 20 mg tablet TAKE 1 TABLET (20 MG TOTAL) BY MOUTH DAILY 90 tablet 3    buPROPion (WELLBUTRIN XL) 150 mg 24 hr tablet Take 1 tablet (150 mg total) by mouth every morning 30 tablet 5    tadalafil (CIALIS) 5 MG tablet Take 1 tablet (5 mg total) by mouth daily as needed for erectile dysfunction 10 tablet 0     No current facility-administered medications for this visit      _______________________________________________________________________  Review of Systems   Constitutional: Negative for appetite change, chills, fatigue and fever  Respiratory: Negative for cough, chest tightness and shortness of breath  Cardiovascular: Negative for chest pain, palpitations and leg swelling  Gastrointestinal: Negative for abdominal pain, constipation, diarrhea, nausea and vomiting  Genitourinary: Negative for difficulty urinating and frequency  Musculoskeletal: Positive for arthralgias (ankle) and back pain  Negative for neck pain  Skin: Negative for rash  Neurological: Negative for dizziness, weakness, light-headedness, numbness and headaches  Hematological: Does not bruise/bleed easily  Psychiatric/Behavioral: Negative for dysphoric mood and sleep disturbance  The patient is not nervous/anxious            Objective:  Vitals:    07/30/21 1441 07/30/21 1504   BP: 144/90 124/80   BP Location: Left arm    Patient Position: Sitting Pulse: 84    Resp: 18    Temp: 97 8 °F (36 6 °C)    SpO2: 97%    Weight: 107 kg (235 lb)    Height: 5' 11" (1 803 m)      Body mass index is 32 78 kg/m²  Physical Exam  Vitals reviewed  Constitutional:       General: He is not in acute distress  Appearance: Normal appearance  He is well-developed  He is not ill-appearing  HENT:      Mouth/Throat:      Mouth: Mucous membranes are moist    Eyes:      Extraocular Movements: Extraocular movements intact  Conjunctiva/sclera: Conjunctivae normal       Pupils: Pupils are equal, round, and reactive to light  Neck:      Thyroid: No thyromegaly  Vascular: No carotid bruit  Cardiovascular:      Rate and Rhythm: Normal rate and regular rhythm  Pulses: Normal pulses  Heart sounds: Normal heart sounds  No murmur heard  Pulmonary:      Effort: Pulmonary effort is normal       Breath sounds: Normal breath sounds  Chest:      Chest wall: No tenderness  Abdominal:      General: Bowel sounds are normal  There is no distension  Palpations: Abdomen is soft  Tenderness: There is no abdominal tenderness  Musculoskeletal:      Cervical back: Normal range of motion and neck supple  Lymphadenopathy:      Cervical: No cervical adenopathy  Skin:     General: Skin is warm and dry  Neurological:      General: No focal deficit present  Mental Status: He is alert and oriented to person, place, and time  Mental status is at baseline  Cranial Nerves: No cranial nerve deficit     Psychiatric:         Mood and Affect: Mood normal          Behavior: Behavior normal

## 2021-07-30 ENCOUNTER — OFFICE VISIT (OUTPATIENT)
Dept: FAMILY MEDICINE CLINIC | Facility: CLINIC | Age: 56
End: 2021-07-30
Payer: COMMERCIAL

## 2021-07-30 VITALS
BODY MASS INDEX: 32.9 KG/M2 | SYSTOLIC BLOOD PRESSURE: 124 MMHG | HEIGHT: 71 IN | TEMPERATURE: 97.8 F | WEIGHT: 235 LBS | OXYGEN SATURATION: 97 % | DIASTOLIC BLOOD PRESSURE: 80 MMHG | RESPIRATION RATE: 18 BRPM | HEART RATE: 84 BPM

## 2021-07-30 DIAGNOSIS — E78.5 DYSLIPIDEMIA: ICD-10-CM

## 2021-07-30 DIAGNOSIS — E11.65 TYPE 2 DIABETES MELLITUS WITH HYPERGLYCEMIA, WITHOUT LONG-TERM CURRENT USE OF INSULIN (HCC): ICD-10-CM

## 2021-07-30 DIAGNOSIS — I10 ESSENTIAL HYPERTENSION: ICD-10-CM

## 2021-07-30 DIAGNOSIS — M54.41 CHRONIC BILATERAL LOW BACK PAIN WITH BILATERAL SCIATICA: ICD-10-CM

## 2021-07-30 DIAGNOSIS — F17.200 SMOKER: ICD-10-CM

## 2021-07-30 DIAGNOSIS — R53.83 OTHER FATIGUE: ICD-10-CM

## 2021-07-30 DIAGNOSIS — F32.A CHRONIC DEPRESSION: ICD-10-CM

## 2021-07-30 DIAGNOSIS — G89.29 CHRONIC BILATERAL LOW BACK PAIN WITH BILATERAL SCIATICA: ICD-10-CM

## 2021-07-30 DIAGNOSIS — E66.09 CLASS 1 OBESITY DUE TO EXCESS CALORIES WITH SERIOUS COMORBIDITY AND BODY MASS INDEX (BMI) OF 31.0 TO 31.9 IN ADULT: ICD-10-CM

## 2021-07-30 DIAGNOSIS — N52.9 ERECTILE DYSFUNCTION, UNSPECIFIED ERECTILE DYSFUNCTION TYPE: ICD-10-CM

## 2021-07-30 DIAGNOSIS — Z12.5 PROSTATE CANCER SCREENING: ICD-10-CM

## 2021-07-30 DIAGNOSIS — K76.0 HEPATIC STEATOSIS: ICD-10-CM

## 2021-07-30 DIAGNOSIS — N52.9 VASCULOGENIC ERECTILE DYSFUNCTION, UNSPECIFIED VASCULOGENIC ERECTILE DYSFUNCTION TYPE: ICD-10-CM

## 2021-07-30 DIAGNOSIS — K21.9 GASTROESOPHAGEAL REFLUX DISEASE WITHOUT ESOPHAGITIS: Primary | ICD-10-CM

## 2021-07-30 DIAGNOSIS — Z12.11 COLON CANCER SCREENING: ICD-10-CM

## 2021-07-30 DIAGNOSIS — M54.42 CHRONIC BILATERAL LOW BACK PAIN WITH BILATERAL SCIATICA: ICD-10-CM

## 2021-07-30 PROCEDURE — 99214 OFFICE O/P EST MOD 30 MIN: CPT | Performed by: FAMILY MEDICINE

## 2021-07-30 PROCEDURE — G0438 PPPS, INITIAL VISIT: HCPCS | Performed by: FAMILY MEDICINE

## 2021-07-30 PROCEDURE — 3725F SCREEN DEPRESSION PERFORMED: CPT | Performed by: FAMILY MEDICINE

## 2021-07-30 RX ORDER — FENOFIBRATE 134 MG/1
CAPSULE ORAL
COMMUNITY
End: 2021-07-30 | Stop reason: ALTCHOICE

## 2021-07-30 RX ORDER — GABAPENTIN 300 MG/1
300 CAPSULE ORAL 3 TIMES DAILY
COMMUNITY
Start: 2021-05-18 | End: 2022-05-18

## 2021-07-30 RX ORDER — BUPROPION HYDROCHLORIDE 150 MG/1
150 TABLET ORAL EVERY MORNING
Qty: 30 TABLET | Refills: 5 | Status: SHIPPED | OUTPATIENT
Start: 2021-07-30 | End: 2021-07-30 | Stop reason: CLARIF

## 2021-07-30 RX ORDER — BUPROPION HYDROCHLORIDE 150 MG/1
150 TABLET ORAL EVERY MORNING
Qty: 30 TABLET | Refills: 5 | Status: SHIPPED | OUTPATIENT
Start: 2021-07-30 | End: 2021-09-22 | Stop reason: HOSPADM

## 2021-07-30 RX ORDER — TADALAFIL 5 MG/1
TABLET ORAL
Qty: 10 TABLET | Refills: 0 | Status: SHIPPED | OUTPATIENT
Start: 2021-07-30

## 2021-07-30 RX ORDER — TADALAFIL 5 MG/1
5 TABLET ORAL DAILY PRN
Qty: 10 TABLET | Refills: 0 | Status: SHIPPED | OUTPATIENT
Start: 2021-07-30 | End: 2021-07-30

## 2021-07-30 NOTE — PROGRESS NOTES
Assessment and Plan:     Problem List Items Addressed This Visit        Digestive    Hepatic steatosis     Pt does drink alcohol         Gastroesophageal reflux disease without esophagitis - Primary     On meds ok            Endocrine    Type 2 diabetes mellitus with hyperglycemia (HCC)       No results found for: HGBA1C sees endocrinologist         Relevant Orders    Comprehensive metabolic panel    Lipid panel    Microalbumin / creatinine urine ratio    Hemoglobin A1C       Cardiovascular and Mediastinum    Essential hypertension     Well controlled            Nervous and Auditory    Chronic bilateral low back pain with bilateral sciatica       Other    Dyslipidemia     On meds         Other fatigue     Check labs         Relevant Orders    CBC and differential    TSH, 3rd generation    Testosterone, free, total    Vitamin D 25 hydroxy    Vasculogenic erectile dysfunction     Trial of cialis         Chronic depression     Pt has been on sertraline a long time has no motivation will try adding wellbutrin 150xl 1 po qd and follow up 1 mo         Relevant Medications    buPROPion (WELLBUTRIN XL) 150 mg 24 hr tablet      Other Visit Diagnoses     Class 1 obesity due to excess calories with serious comorbidity and body mass index (BMI) of 31 0 to 31 9 in adult        Colon cancer screening        Relevant Orders    Ambulatory referral for colonoscopy    Smoker        Relevant Orders    CT lung screening program    Prostate cancer screening        Relevant Orders    PSA, Total Screen    Erectile dysfunction, unspecified erectile dysfunction type        Relevant Medications    tadalafil (CIALIS) 5 MG tablet           Preventive health issues were discussed with patient, and age appropriate screening tests were ordered as noted in patient's After Visit Summary  Personalized health advice and appropriate referrals for health education or preventive services given if needed, as noted in patient's After Visit Summary  History of Present Illness:     Patient presents for Medicare Annual Wellness visit    Patient Care Team:  Jorge L Gaitan MD as PCP - General     Problem List:     Patient Active Problem List   Diagnosis    Pancreatitis, acute    Hepatic steatosis    Lesion of left native kidney    Essential hypertension    Dyslipidemia    Alcohol abuse    Lactic acidosis    Type 2 diabetes mellitus with hyperglycemia (HCC)    Chronic bilateral low back pain with bilateral sciatica    Gastroesophageal reflux disease without esophagitis    Neuropathy of right foot    Abnormal skin growth    Leucocytosis    Other fatigue    Vasculogenic erectile dysfunction    Chronic depression      Past Medical and Surgical History:     Past Medical History:   Diagnosis Date    Back pain     Diabetes mellitus ([address] )     GERD (gastroesophageal reflux disease)     Hyperlipidemia     Hypertension     Neuropathy      Past Surgical History:   Procedure Laterality Date    ANKLE SURGERY Right     KNEE SURGERY Right       Family History:     Family History   Problem Relation Age of Onset    Lymphoma Mother     No Known Problems Father       Social History:     Social History     Socioeconomic History    Marital status: /Civil Union     Spouse name: None    Number of children: None    Years of education: None    Highest education level: None   Occupational History    None   Tobacco Use    Smoking status: Current Every Day Smoker     Packs/day: 1 00     Years: 40 00     Pack years: 40 00     Types: Cigarettes     Start date: 1978    Smokeless tobacco: Never Used    Tobacco comment: per Mervat-quit   Vaping Use    Vaping Use: Never used   Substance and Sexual Activity    Alcohol use:  Yes     Alcohol/week: 270 0 standard drinks     Types: 270 Cans of beer per week     Comment: 30 pack a day    Drug use: Yes     Types: Marijuana     Comment: not for a while    Sexual activity: None   Other Topics Concern    None Social History Narrative    · Most recent tobacco use screenin2019      · Alcohol intake:   Occasional      · Legally blind in one or both eyes:   No      · 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      Social Determinants of Health     Financial Resource Strain:     Difficulty of Paying Living Expenses:    Food Insecurity:     Worried About [address] in the Last Year:     [address] in the Last Year:    Transportation Needs:     Lack of Transportation (Medical):      Lack of Transportation (Non-Medical):    Physical Activity:     Days of Exercise per Week:     Minutes of Exercise per Session:    Stress:     Feeling of Stress :    Social Connections:     Frequency of Communication with Friends and Family:     Frequency of Social Gatherings with Friends and Family:     Attends [employer] Services:     Active Member of Clubs or Organizations:     Attends Club or Organization Meetings:     Marital Status:    Intimate Partner Violence:     Fear of Current or Ex-Partner:     Emotionally Abused:     Physically Abused:     Sexually Abused:       Medications and Allergies:     Current Outpatient Medications   Medication Sig Dispense Refill    Accu-Chek Stefanie Plus test strip TEST THREE TIMES DAILY 300 strip 0    Accu-Chek FastClix Lancets MISC USE AS DIRECTED 306 each 6    Blood Glucose Monitoring Suppl (Accu-Chek Stefanie Plus) w/Device KIT USE AS DIRECTED 1 kit 1    fenofibrate (TRIGLIDE) 160 MG tablet Take 1 tablet (160 mg total) by mouth daily 90 tablet 3    gabapentin (NEURONTIN) 300 mg capsule Take 300 mg by mouth Three times a day      glimepiride (AMARYL) 2 mg tablet TAKE 1 TABLET EVERY DAY IN THE MORNING AND TAKE 1/2 TABLET EVERY EVENING 135 tablet 3    metFORMIN (GLUCOPHAGE-XR) 500 mg 24 hr tablet Take 2 tablets (1,000 mg total) by mouth daily 180 tablet 3    omeprazole (PriLOSEC) 40 MG capsule TAKE 1 CAPSULE EVERY DAY 90 capsule 1    oxyCODONE-acetaminophen (PERCOCET) 7 5-325 MG per tablet TAKE 1 TABLET BID AS NEEDED FOR PAIN  MDD 2      pioglitazone (ACTOS) 45 mg tablet Take 1 tablet (45 mg total) by mouth daily 90 tablet 3    ramipril (ALTACE) 10 MG capsule Take 1 capsule (10 mg total) by mouth daily 90 capsule 3    sertraline (ZOLOFT) 100 mg tablet Take 2 tablets (200 mg total) by mouth daily 180 tablet 3    simvastatin (ZOCOR) 20 mg tablet TAKE 1 TABLET (20 MG TOTAL) BY MOUTH DAILY 90 tablet 3    buPROPion (WELLBUTRIN XL) 150 mg 24 hr tablet Take 1 tablet (150 mg total) by mouth every morning 30 tablet 5    tadalafil (CIALIS) 5 MG tablet Take 1 tablet (5 mg total) by mouth daily as needed for erectile dysfunction 10 tablet 0     No current facility-administered medications for this visit  Allergies   Allergen Reactions    Amoxicillin Swelling      Immunizations:     Immunization History   Administered Date(s) Administered    Influenza, recombinant, quadrivalent,injectable, preservative free 03/03/2019, 10/14/2020    Pneumococcal Polysaccharide PPV23 10/14/2020    SARS-CoV-2 / COVID-19 mRNA IM (Ruthe Moritz) 03/27/2021, 04/24/2021    Td (adult), adsorbed 10/27/2015      Health Maintenance:         Topic Date Due    HIV Screening  Never done    Colorectal Cancer Screening  Never done    Lung Cancer Screening  03/19/2019    Hepatitis C Screening  Completed         Topic Date Due    Influenza Vaccine (1) 09/01/2021      Medicare Health Risk Assessment:     /80   Pulse 84   Temp 97 8 °F (36 6 °C)   Resp 18   Ht 5' 11" (1 803 m)   Wt 107 kg (235 lb)   SpO2 97%   BMI 32 78 kg/m²      [address] [place] [address] is here for his Initial Wellness visit  Health Risk Assessment:   Patient rates overall health as fair  Patient feels that their physical health rating is same  Patient is dissatisfied with their life  Eyesight was rated as same  Hearing was rated as much worse  Patient feels that their emotional and mental health rating is same  Patients states they are sometimes angry  Patient states they are sometimes unusually tired/fatigued  Pain experienced in the last 7 days has been a lot  Patient's pain rating has been 8/10  Patient states that he has experienced no weight loss or gain in last 6 months  Back     Depression Screening:   PHQ-2 Score: 0      Fall Risk Screening: In the past year, patient has experienced: no history of falling in past year      Home Safety:  Patient has trouble with stairs inside or outside of their home  Patient has working smoke alarms and has no working carbon monoxide detector  Home safety hazards include: none  Nutrition:   Current diet is Regular  Medications:   Patient is not currently taking any over-the-counter supplements  Patient is able to manage medications  Activities of Daily Living (ADLs)/Instrumental Activities of Daily Living (IADLs):   Walk and transfer into and out of bed and chair?: Yes  Dress and groom yourself?: Yes    Bathe or shower yourself?: Yes    Feed yourself?  Yes  Do your laundry/housekeeping?: Yes  Manage your money, pay your bills and track your expenses?: Yes  Make your own meals?: Yes    Do your own shopping?: Yes    Previous Hospitalizations:   Any hospitalizations or ED visits within the last 12 months?: No      Advance Care Planning:   Living will: No    Durable POA for healthcare: No    Advanced directive: No    Advanced directive counseling given: Yes    Five wishes given: No    Patient declined ACP directive: Yes      Cognitive Screening:   Provider or family/friend/caregiver concerned regarding cognition?: No    PREVENTIVE SCREENINGS      Cardiovascular Screening:    General: Screening Not Indicated and History Lipid Disorder      Diabetes Screening:     General: Screening Not Indicated and History Diabetes      Colorectal Cancer Screening:     General: Patient Declines      Prostate Cancer Screening:      Due for: PSA      Osteoporosis Screening:    General: Screening Not Indicated      Abdominal Aortic Aneurysm (AAA) Screening:    Risk factors include: tobacco use        Lung Cancer Screening:       Due for: Low Dose CT (LDCT)      Hepatitis C Screening:    General: Screening Current    Screening, Brief Intervention, and Referral to Treatment (SBIRT)    Screening    Typical number of drinks in a week: 2    Single Item Drug Screening:  How often have you used an illegal drug (including marijuana) or a prescription medication for non-medical reasons in the past year? never    Single Item Drug Screen Score: 0  Interpretation: Negative screen for possible drug use disorder    Review of Current Opioid Use    Opioid Risk Tool (ORT) Interpretation: Complete Opioid Risk Tool (ORT)      Agustina Leija MD

## 2021-07-30 NOTE — ASSESSMENT & PLAN NOTE
Pt has been on sertraline a long time has no motivation will try adding wellbutrin 150xl 1 po qd and follow up 1 mo

## 2021-07-30 NOTE — PATIENT INSTRUCTIONS
Medicare Preventive Visit Patient Instructions  Thank you for completing your Welcome to Medicare Visit or Medicare Annual Wellness Visit today  Your next wellness visit will be due in one year (7/31/2022)  The screening/preventive services that you may require over the next 5-10 years are detailed below  Some tests may not apply to you based off risk factors and/or age  Screening tests ordered at today's visit but not completed yet may show as past due  Also, please note that scanned in results may not display below  Preventive Screenings:  Service Recommendations Previous Testing/Comments   Colorectal Cancer Screening  · Colonoscopy    · Fecal Occult Blood Test (FOBT)/Fecal Immunochemical Test (FIT)  · Fecal DNA/Cologuard Test  · Flexible Sigmoidoscopy Age: 54-65 years old   Colonoscopy: every 10 years (May be performed more frequently if at higher risk)  OR  FOBT/FIT: every 1 year  OR  Cologuard: every 3 years  OR  Sigmoidoscopy: every 5 years  Screening may be recommended earlier than age 48 if at higher risk for colorectal cancer  Also, an individualized decision between you and your healthcare provider will decide whether screening between the ages of 74-80 would be appropriate   Colonoscopy: Not on file  FOBT/FIT: Not on file  Cologuard: Not on file  Sigmoidoscopy: Not on file    Patient Declines     Prostate Cancer Screening Individualized decision between patient and health care provider in men between ages of 53-78   Medicare will cover every 12 months beginning on the day after your 50th birthday PSA: No results in last 5 years     Due for PSA     Hepatitis C Screening Once for adults born between [facility]  More frequently in patients at high risk for Hepatitis C Hep C Antibody: Not on file    Screening Current   Diabetes Screening 1-2 times per year if you're at risk for diabetes or have pre-diabetes Fasting glucose: No results in last 5 years   A1C: No results in last 5 years    Screening Not Indicated  History Diabetes   Cholesterol Screening Once every 5 years if you don't have a lipid disorder  May order more often based on risk factors  Lipid panel: 02/06/2021    Screening Not Indicated  History Lipid Disorder      Other Preventive Screenings Covered by Medicare:  1  Abdominal Aortic Aneurysm (AAA) Screening: covered once if your at risk  You're considered to be at risk if you have a family history of AAA or a male between the age of 73-68 who smoking at least 100 cigarettes in your lifetime  2  Lung Cancer Screening: covers low dose CT scan once per year if you meet all of the following conditions: (1) Age 50-69; (2) No signs or symptoms of lung cancer; (3) Current smoker or have quit smoking within the last 15 years; (4) You have a tobacco smoking history of at least 30 pack years (packs per day x number of years you smoked); (5) You get a written order from a healthcare provider  3  Glaucoma Screening: covered annually if you're considered high risk: (1) You have diabetes OR (2) Family history of glaucoma OR (3)  aged 48 and older OR (3)  American aged 72 and older  3  Osteoporosis Screening: covered every 2 years if you meet one of the following conditions: (1) Have a vertebral abnormality; (2) On glucocorticoid therapy for more than 3 months; (3) Have primary hyperparathyroidism; (4) On osteoporosis medications and need to assess response to drug therapy  5  HIV Screening: covered annually if you're between the age of 12-76  Also covered annually if you are younger than 13 and older than 72 with risk factors for HIV infection  For pregnant patients, it is covered up to 3 times per pregnancy      Immunizations:  Immunization Recommendations   Influenza Vaccine Annual influenza vaccination during flu season is recommended for all persons aged >= 6 months who do not have contraindications   Pneumococcal Vaccine (Prevnar and Pneumovax)  * Prevnar = PCV13  * Pneumovax = PPSV23 Adults 25-60 years old: 1-3 doses may be recommended based on certain risk factors  Adults 72 years old: Prevnar (PCV13) vaccine recommended followed by Pneumovax (PPSV23) vaccine  If already received PPSV23 since turning 65, then PCV13 recommended at least one year after PPSV23 dose  Hepatitis B Vaccine 3 dose series if at intermediate or high risk (ex: diabetes, end stage renal disease, liver disease)   Tetanus (Td) Vaccine - COST NOT COVERED BY MEDICARE PART B Following completion of primary series, a booster dose should be given every 10 years to maintain immunity against tetanus  Td may also be given as tetanus wound prophylaxis  Tdap Vaccine - COST NOT COVERED BY MEDICARE PART B Recommended at least once for all adults  For pregnant patients, recommended with each pregnancy  Shingles Vaccine (Shingrix) - COST NOT COVERED BY MEDICARE PART B  2 shot series recommended in those aged 48 and above     Health Maintenance Due:      Topic Date Due    HIV Screening  Never done    Colorectal Cancer Screening  Never done    Lung Cancer Screening  03/19/2019    Hepatitis C Screening  Completed     Immunizations Due:      Topic Date Due    Influenza Vaccine (1) 09/01/2021     Advance Directives   What are advance directives? Advance directives are legal documents that state your wishes and plans for medical care  These plans are made ahead of time in case you lose your ability to make decisions for yourself  Advance directives can apply to any medical decision, such as the treatments you want, and if you want to donate organs  What are the types of advance directives? There are many types of advance directives, and each state has rules about how to use them  You may choose a combination of any of the following:  · Living will: This is a written record of the treatment you want  You can also choose which treatments you do not want, which to limit, and which to stop at a certain time   This includes surgery, medicine, IV fluid, and tube feedings  · Durable power of  for healthcare [facility]): This is a written record that states who you want to make healthcare choices for you when you are unable to make them for yourself  This person, called a proxy, is usually a family member or a friend  You may choose more than 1 proxy  · Do not resuscitate (DNR) order:  A DNR order is used in case your heart stops beating or you stop breathing  It is a request not to have certain forms of treatment, such as CPR  A DNR order may be included in other types of advance directives  · Medical directive: This covers the care that you want if you are in a coma, near death, or unable to make decisions for yourself  You can list the treatments you want for each condition  Treatment may include pain medicine, surgery, blood transfusions, dialysis, IV or tube feedings, and a ventilator (breathing machine)  · Values history: This document has questions about your views, beliefs, and how you feel and think about life  This information can help others choose the care that you would choose  Why are advance directives important? An advance directive helps you control your care  Although spoken wishes may be used, it is better to have your wishes written down  Spoken wishes can be misunderstood, or not followed  Treatments may be given even if you do not want them  An advance directive may make it easier for your family to make difficult choices about your care  Cigarette Smoking and Your Health   Risks to your health if you smoke:  Nicotine and other chemicals found in tobacco damage every cell in your body  Even if you are a light smoker, you have an increased risk for cancer, heart disease, and lung disease  If you are pregnant or have diabetes, smoking increases your risk for complications     Benefits to your health if you stop smoking:   · You decrease respiratory symptoms such as coughing, wheezing, and shortness of breath  · You reduce your risk for cancers of the lung, mouth, throat, kidney, bladder, pancreas, stomach, and cervix  If you already have cancer, you increase the benefits of chemotherapy  You also reduce your risk for cancer returning or a second cancer from developing  · You reduce your risk for heart disease, blood clots, heart attack, and stroke  · You reduce your risk for lung infections, and diseases such as pneumonia, asthma, chronic bronchitis, and emphysema  · Your circulation improves  More oxygen can be delivered to your body  If you have diabetes, you lower your risk for complications, such as kidney, artery, and eye diseases  You also lower your risk for nerve damage  Nerve damage can lead to amputations, poor vision, and blindness  · You improve your body's ability to heal and to fight infections  For more information and support to stop smoking:   · [employer]  Phone: 3- 618 - 214-8650  Web Address: [employer]  Weight Management   Why it is important to manage your weight:  Being overweight increases your risk of health conditions such as heart disease, high blood pressure, type 2 diabetes, and certain types of cancer  It can also increase your risk for osteoarthritis, sleep apnea, and other respiratory problems  Aim for a slow, steady weight loss  Even a small amount of weight loss can lower your risk of health problems  How to lose weight safely:  A safe and healthy way to lose weight is to eat fewer calories and get regular exercise  You can lose up about 1 pound a week by decreasing the number of calories you eat by 500 calories each day  Healthy meal plan for weight management:  A healthy meal plan includes a variety of foods, contains fewer calories, and helps you stay healthy  A healthy meal plan includes the following:  · Eat whole-grain foods more often  A healthy meal plan should contain fiber   Fiber is the part of grains, fruits, and vegetables that is not broken down by your body  Whole-grain foods are healthy and provide extra fiber in your diet  Some examples of whole-grain foods are whole-wheat breads and pastas, oatmeal, brown rice, and bulgur  · Eat a variety of vegetables every day  Include dark, leafy greens such as spinach, kale, kevin greens, and mustard greens  Eat yellow and orange vegetables such as carrots, sweet potatoes, and winter squash  · Eat a variety of fruits every day  Choose fresh or canned fruit (canned in its own juice or light syrup) instead of juice  Fruit juice has very little or no fiber  · Eat low-fat dairy foods  Drink fat-free (skim) milk or 1% milk  Eat fat-free yogurt and low-fat cottage cheese  Try low-fat cheeses such as mozzarella and other reduced-fat cheeses  · Choose meat and other protein foods that are low in fat  Choose beans or other legumes such as split peas or lentils  Choose fish, skinless poultry (chicken or turkey), or lean cuts of red meat (beef or pork)  Before you cook meat or poultry, cut off any visible fat  · Use less fat and oil  Try baking foods instead of frying them  Add less fat, such as margarine, sour cream, regular salad dressing and mayonnaise to foods  Eat fewer high-fat foods  Some examples of high-fat foods include french fries, doughnuts, ice cream, and cakes  · Eat fewer sweets  Limit foods and drinks that are high in sugar  This includes candy, cookies, regular soda, and sweetened drinks  Exercise:  Exercise at least 30 minutes per day on most days of the week  Some examples of exercise include walking, biking, dancing, and swimming  You can also fit in more physical activity by taking the stairs instead of the elevator or parking farther away from stores  Ask your healthcare provider about the best exercise plan for you     Narcotic (Opioid) Safety    Use narcotics safely:  · Take prescribed narcotics exactly as directed  · Do not give narcotics to others or take narcotics that belong to someone else  · Do not mix narcotics without medicines or alcohol  · Do not drive or operate heavy machinery after you take the narcotic  · Monitor for side effects and notify your healthcare provider if you experienced side effects such as nausea, sleepiness, itching, or trouble thinking clearly  Manage constipation:    Constipation is the most common side effect of narcotic medicine  Constipation is when you have hard, dry bowel movements, or you go longer than usual between bowel movements  Tell your healthcare provider about all changes in your bowel movements while you are taking narcotics  He or she may recommend laxative medicine to help you have a bowel movement  He or she may also change the kind of narcotic you are taking, or change when you take it  The following are more ways you can prevent or relieve constipation:    · Drink liquids as directed  You may need to drink extra liquids to help soften and move your bowels  Ask how much liquid to drink each day and which liquids are best for you  · Eat high-fiber foods  This may help decrease constipation by adding bulk to your bowel movements  High-fiber foods include fruits, vegetables, whole-grain breads and cereals, and beans  Your healthcare provider or dietitian can help you create a high-fiber meal plan  Your provider may also recommend a fiber supplement if you cannot get enough fiber from food  · Exercise regularly  Regular physical activity can help stimulate your intestines  Walking is a good exercise to prevent or relieve constipation  Ask which exercises are best for you  · Schedule a time each day to have a bowel movement  This may help train your body to have regular bowel movements  Bend forward while you are on the toilet to help move the bowel movement out  Sit on the toilet for at least 10 minutes, even if you do not have a bowel movement  Store narcotics safely:   · Store narcotics where others cannot easily get them    Keep them in a locked cabinet or secure area  Do not  keep them in a purse or other bag you carry with you  A person may be looking for something else and find the narcotics  · Make sure narcotics are stored out of the reach of children  A child can easily overdose on narcotics  Narcotics may look like candy to a small child  The best way to dispose of narcotics: The laws vary by country and area  In the [place], the best way is to return the narcotics through a take-back program  This program is offered by the [employer]  The following are options for using the program:  · Take the narcotics to a FRANK collection site  The site is often a law enforcement center  Call your local law enforcement center for scheduled take-back days in your area  You will be given information on where to go if the collection site is in a different location  · Take the narcotics to an approved pharmacy or hospital   A pharmacy or hospital may be set up as a collection site  You will need to ask if it is a FRANK collection site if you were not directed there  A pharmacy or doctor's office may not be able to take back narcotics unless it is a FRANK site  · Use a mail-back system  This means you are given containers to put the narcotics into  You will then mail them in the containers  · Use a take-back drop box  This is a place to leave the narcotics at any time  People and animals will not be able to get into the box  Your local law enforcement agency can tell you where to find a drop box in your area  Other ways to manage pain:   · Ask your healthcare provider about non-narcotic medicines to control pain  Nonprescription medicines include NSAIDs (such as ibuprofen) and acetaminophen  Prescription medicines include muscle relaxers, antidepressants, and steroids  · Pain may be managed without any medicines  Some ways to relieve pain include massage, aromatherapy, or meditation   Physical or occupational therapy may also help  For more information:   · Drug Enforcement Administration  [address] , [address]  Phone: 7- 558 - 740-5770  Web Address: [facility]/drug_disposal/    · [address] and Drug Administration  [place] , [address], [address]  Phone: 8- 011 - [phone]  Web Address: http://[employer]/     © Copyright [employer] 2018 Information is for End User's use only and may not be sold, redistributed or otherwise used for commercial purposes   All illustrations and images included in CareNotes® are the copyrighted property of A D A M , Inc  or [employer] [place]

## 2021-07-31 ENCOUNTER — APPOINTMENT (EMERGENCY)
Dept: RADIOLOGY | Facility: HOSPITAL | Age: 56
End: 2021-07-31
Payer: COMMERCIAL

## 2021-07-31 ENCOUNTER — HOSPITAL ENCOUNTER (EMERGENCY)
Facility: HOSPITAL | Age: 56
Discharge: HOME | End: 2021-07-31
Attending: SURGERY | Admitting: SURGERY
Payer: COMMERCIAL

## 2021-07-31 ENCOUNTER — TELEPHONE (OUTPATIENT)
Dept: OBGYN CLINIC | Facility: HOSPITAL | Age: 56
End: 2021-07-31

## 2021-07-31 ENCOUNTER — APPOINTMENT (EMERGENCY)
Dept: CT IMAGING | Facility: HOSPITAL | Age: 56
End: 2021-07-31
Payer: COMMERCIAL

## 2021-07-31 VITALS
OXYGEN SATURATION: 95 % | HEART RATE: 90 BPM | RESPIRATION RATE: 16 BRPM | DIASTOLIC BLOOD PRESSURE: 82 MMHG | SYSTOLIC BLOOD PRESSURE: 132 MMHG | TEMPERATURE: 97.8 F | WEIGHT: 246.47 LBS

## 2021-07-31 DIAGNOSIS — S42.292A OTHER CLOSED DISPLACED FRACTURE OF PROXIMAL END OF LEFT HUMERUS, INITIAL ENCOUNTER: ICD-10-CM
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
## 2021-07-31 PROBLEM — G89.11 ACUTE PAIN DUE TO TRAUMA: Status: ACTIVE | Noted: 2021-07-31

## 2021-07-31 PROBLEM — S42.202A CLOSED FRACTURE OF PROXIMAL END OF LEFT HUMERUS: Status: ACTIVE | Noted: 2021-07-31

## 2021-07-31 PROBLEM — S99.911A INJURY OF RIGHT ANKLE: Status: ACTIVE | Noted: 2021-07-31

## 2021-07-31 LAB
BASE EXCESS BLDA CALC-SCNC: -2 MMOL/L (ref -2–3)
GLUCOSE SERPL-MCNC: 189 MG/DL (ref 65–140)
HCO3 BLDA-SCNC: 21.4 MMOL/L (ref 24–30)
HCT VFR BLD CALC: 43 % (ref 36.5–49.3)
HGB BLDA-MCNC: 14.6 G/DL (ref 12–17)
PCO2 BLD: 22 MMOL/L (ref 21–32)
PCO2 BLD: 31.9 MM HG (ref 42–50)
PH BLD: 7.43 [PH] (ref 7.3–7.4)
PO2 BLD: 35 MM HG (ref 35–45)
POTASSIUM BLD-SCNC: 3.5 MMOL/L (ref 3.5–5.3)
SAO2 % BLD FROM PO2: 69 % (ref 60–85)
SODIUM BLD-SCNC: 139 MMOL/L (ref 136–145)
SPECIMEN SOURCE: ABNORMAL

## 2021-07-31 PROCEDURE — 85014 HEMATOCRIT: CPT

## 2021-07-31 PROCEDURE — 93308 TTE F-UP OR LMTD: CPT | Performed by: PHYSICIAN ASSISTANT

## 2021-07-31 PROCEDURE — 82803 BLOOD GASES ANY COMBINATION: CPT

## 2021-07-31 PROCEDURE — NC001 PR NO CHARGE: Performed by: EMERGENCY MEDICINE

## 2021-07-31 PROCEDURE — 71260 CT THORAX DX C+: CPT

## 2021-07-31 PROCEDURE — 73030 X-RAY EXAM OF SHOULDER: CPT

## 2021-07-31 PROCEDURE — 96375 TX/PRO/DX INJ NEW DRUG ADDON: CPT

## 2021-07-31 PROCEDURE — 76604 US EXAM CHEST: CPT | Performed by: PHYSICIAN ASSISTANT

## 2021-07-31 PROCEDURE — 72125 CT NECK SPINE W/O DYE: CPT

## 2021-07-31 PROCEDURE — 99285 EMERGENCY DEPT VISIT HI MDM: CPT

## 2021-07-31 PROCEDURE — 96374 THER/PROPH/DIAG INJ IV PUSH: CPT

## 2021-07-31 PROCEDURE — NC001 PR NO CHARGE: Performed by: SURGERY

## 2021-07-31 PROCEDURE — 70450 CT HEAD/BRAIN W/O DYE: CPT

## 2021-07-31 PROCEDURE — 76705 ECHO EXAM OF ABDOMEN: CPT | Performed by: PHYSICIAN ASSISTANT

## 2021-07-31 PROCEDURE — 84132 ASSAY OF SERUM POTASSIUM: CPT

## 2021-07-31 PROCEDURE — 99284 EMERGENCY DEPT VISIT MOD MDM: CPT | Performed by: PHYSICIAN ASSISTANT

## 2021-07-31 PROCEDURE — 84295 ASSAY OF SERUM SODIUM: CPT

## 2021-07-31 PROCEDURE — 82947 ASSAY GLUCOSE BLOOD QUANT: CPT

## 2021-07-31 RX ORDER — ONDANSETRON 2 MG/ML
4 INJECTION INTRAMUSCULAR; INTRAVENOUS EVERY 4 HOURS PRN
Status: DISCONTINUED | OUTPATIENT
Start: 2021-07-31 | End: 2021-07-31 | Stop reason: HOSPADM

## 2021-07-31 RX ORDER — METHOCARBAMOL 500 MG/1
750 TABLET, FILM COATED ORAL EVERY 8 HOURS PRN
Qty: 30 TABLET | Refills: 0 | Status: SHIPPED | OUTPATIENT
Start: 2021-07-31 | End: 2021-09-22 | Stop reason: HOSPADM

## 2021-07-31 RX ORDER — ACETAMINOPHEN 325 MG/1
650 TABLET ORAL EVERY 6 HOURS PRN
Qty: 30 TABLET | Refills: 0
Start: 2021-07-31 | End: 2021-08-03

## 2021-07-31 RX ORDER — OXYCODONE HYDROCHLORIDE 10 MG/1
TABLET ORAL
Qty: 21 TABLET | Refills: 0 | Status: CANCELLED | OUTPATIENT
Start: 2021-07-31

## 2021-07-31 RX ORDER — ACETAMINOPHEN 500 MG
1000 TABLET ORAL EVERY 8 HOURS
Refills: 0 | Status: CANCELLED
Start: 2021-07-31 | End: 2021-08-05

## 2021-07-31 RX ORDER — LIDOCAINE 50 MG/G
1 PATCH TOPICAL DAILY
Status: DISCONTINUED | OUTPATIENT
Start: 2021-08-01 | End: 2021-07-31 | Stop reason: HOSPADM

## 2021-07-31 RX ORDER — AMOXICILLIN 250 MG
2 CAPSULE ORAL DAILY
Status: DISCONTINUED | OUTPATIENT
Start: 2021-08-01 | End: 2021-07-31 | Stop reason: HOSPADM

## 2021-07-31 RX ORDER — METHOCARBAMOL 500 MG/1
750 TABLET, FILM COATED ORAL EVERY 6 HOURS SCHEDULED
Status: DISCONTINUED | OUTPATIENT
Start: 2021-07-31 | End: 2021-07-31 | Stop reason: HOSPADM

## 2021-07-31 RX ORDER — OXYCODONE HYDROCHLORIDE 10 MG/1
10 TABLET ORAL EVERY 4 HOURS PRN
Status: DISCONTINUED | OUTPATIENT
Start: 2021-07-31 | End: 2021-07-31 | Stop reason: HOSPADM

## 2021-07-31 RX ORDER — OXYCODONE HYDROCHLORIDE 5 MG/1
5 TABLET ORAL EVERY 4 HOURS PRN
Status: DISCONTINUED | OUTPATIENT
Start: 2021-07-31 | End: 2021-07-31 | Stop reason: HOSPADM

## 2021-07-31 RX ORDER — HYDROMORPHONE HCL/PF 1 MG/ML
0.5 SYRINGE (ML) INJECTION
Status: DISCONTINUED | OUTPATIENT
Start: 2021-07-31 | End: 2021-07-31 | Stop reason: HOSPADM

## 2021-07-31 RX ORDER — FENTANYL CITRATE 50 UG/ML
50 INJECTION, SOLUTION INTRAMUSCULAR; INTRAVENOUS ONCE
Status: COMPLETED | OUTPATIENT
Start: 2021-07-31 | End: 2021-07-31

## 2021-07-31 RX ORDER — ACETAMINOPHEN 325 MG/1
975 TABLET ORAL EVERY 8 HOURS SCHEDULED
Status: DISCONTINUED | OUTPATIENT
Start: 2021-07-31 | End: 2021-07-31 | Stop reason: HOSPADM

## 2021-07-31 RX ORDER — GABAPENTIN 100 MG/1
100 CAPSULE ORAL
Status: DISCONTINUED | OUTPATIENT
Start: 2021-07-31 | End: 2021-07-31 | Stop reason: HOSPADM

## 2021-07-31 RX ORDER — GABAPENTIN 100 MG/1
100 CAPSULE ORAL
Qty: 14 CAPSULE | Refills: 0 | Status: CANCELLED | OUTPATIENT
Start: 2021-07-31

## 2021-07-31 RX ORDER — SENNA AND DOCUSATE SODIUM 50; 8.6 MG/1; MG/1
2 TABLET, FILM COATED ORAL DAILY
Qty: 28 TABLET | Refills: 0 | Status: CANCELLED | OUTPATIENT
Start: 2021-07-31 | End: 2021-08-14

## 2021-07-31 RX ORDER — FENTANYL CITRATE 50 UG/ML
1 INJECTION, SOLUTION INTRAMUSCULAR; INTRAVENOUS ONCE
Status: COMPLETED | OUTPATIENT
Start: 2021-07-31 | End: 2021-07-31

## 2021-07-31 RX ORDER — POLYETHYLENE GLYCOL 3350 17 G/17G
17 POWDER, FOR SOLUTION ORAL DAILY PRN
Status: DISCONTINUED | OUTPATIENT
Start: 2021-07-31 | End: 2021-07-31 | Stop reason: HOSPADM

## 2021-07-31 RX ADMIN — METHOCARBAMOL 750 MG: 500 TABLET ORAL at 17:00

## 2021-07-31 RX ADMIN — HYDROMORPHONE HYDROCHLORIDE 0.5 MG: 1 INJECTION, SOLUTION INTRAMUSCULAR; INTRAVENOUS; SUBCUTANEOUS at 16:58

## 2021-07-31 RX ADMIN — IOHEXOL 100 ML: 350 INJECTION, SOLUTION INTRAVENOUS at 16:08

## 2021-07-31 RX ADMIN — OXYCODONE HYDROCHLORIDE 5 MG: 5 TABLET ORAL at 18:59

## 2021-07-31 RX ADMIN — FENTANYL CITRATE 50 MCG: 50 INJECTION, SOLUTION INTRAMUSCULAR; INTRAVENOUS at 15:47

## 2021-07-31 RX ADMIN — ACETAMINOPHEN 975 MG: 325 TABLET, FILM COATED ORAL at 17:00

## 2021-07-31 NOTE — ASSESSMENT & PLAN NOTE
- Closed left proximal humerus fracture, present on presentation  - neurovascularly intact  -  multiple XR is were obtained and reviewed by Dr Sheila Hickman  Per his recommendations, patient is to remain nonweightbearing and in left upper extremity sling   - patient was offered observational admission for pain management, but he elected to be discharged home  - patient confirms that he has Percocet (7 5/325) and gabapentin 100 mg that he takes for chronic back pain  Will additionally add Robaxin  Discussed the use of lidocaine cream/ice  Patient confirms feeling comfortable with this multimodal pain regimen  - Per Dr Sheila Hickman, patient is to call and schedule an appointment within this week      - strict return precautions were discussed with patient that he verbally confirmed understanding

## 2021-07-31 NOTE — DISCHARGE SUMMARY
[facility]  Discharge- [facility] 1965, 60 y o  male MRN: [id number]  Unit/Bed#: ED 23 Encounter: [id number]  Primary Care Provider: Roberta Rodríguez MD   Date and time admitted to hospital: 7/31/2021  3:24 PM    Fall  Assessment & Plan  - Status post fall with the below noted injuries  - Fall precautions  * Closed fracture of proximal end of left humerus  Assessment & Plan  - Closed left proximal humerus fracture, present on presentation  - neurovascularly intact  -  multiple XR is were obtained and reviewed by Dr Lenny Fernando  Per his recommendations, patient is to remain nonweightbearing and in left upper extremity sling   - patient was offered observational admission for pain management, but he elected to be discharged home  - patient confirms that he has Percocet (7 5/325) and gabapentin 100 mg that he takes for chronic back pain  Will additionally add Robaxin  Discussed the use of lidocaine cream/ice  Patient confirms feeling comfortable with this multimodal pain regimen  - Per Dr Lenny Fernando, patient is to call and schedule an appointment within this week      - strict return precautions were discussed with patient that he verbally confirmed understanding  Injury of right ankle  Assessment & Plan  - Right ankle injury, present on presentation  - right ankle XR shows no acute fracture  - Orthopedic surgery to be consulted  Discussed patient with Dr Lenny Fernando who reviewed XR is  No acute fracture appreciated  Patient was placed in ankle stirrup for increased ability  - patient demonstrated the ability to ambulate with a steady gait    - cane was provided for increased stability   - observational admission was discussed, but patient elected to be discharged home with multimodal pain regimen   - multimodal pain regimen was discussed and patient verbally confirmed understanding   - Ace bandage in ice as needed for assistance with swelling  - follow-up with orthopedics within 1 week  Acute pain due to trauma  Assessment & Plan  - patient confirms that he has Percocet (7 5/325) and gabapentin 100 mg that he takes for chronic back pain  Will additionally add Robaxin 750 mg q 8 PRN and Tylenol 650mg q8 PRN  Discussed the use of lidocaine cream/ice  Patient confirms feeling comfortable with this multimodal pain regimen  Medical Problems     Resolved Problems  Date Reviewed: 7/31/2021    None                Admission Date:     Admitting Diagnosis: Head injury [S09 90XA]    HPI: "Andie Pope is a 60 y o  male who presents with significant left shoulder pain and right ankle pain following fall  The patient notes that he was at a friend's house and believed he was opening the door to the bathroom, but instead opened the door to the basement and proceeded to fall approximately 8-9 stairs  He does not believe he hit his head or lost consciousness, but was not certain of this detail  Following the fall, he developed immediate and significant left shoulder pain as well as some right ankle pain and swelling  He offered no other complaints at this time  "   - Per Kelly Tavarez PA-C, H&P    Procedures Performed:   Orders Placed This Encounter   Procedures    Fast Ultrasound       Summary of Hospital Course:  Patient was found to have a left proximal humerus fracture and contusion to right ankle  Ankle and shoulder x-rays were reviewed with Dr Wesley Hickman, per his recommendations, patient's left shoulder was placed in arm sling and right ankle was placed in Aircast   Multimodal pain regimen was initiated  Patient demonstrated the ability to ambulate without assistance  Shared decision making was used and patient elected to be discharged home with oral pain management  Dr Wesley Hickman agrees that this plan of care is appropriate  Patient is to follow-up with Dr Wesley Hickman, within 1 week  Return precautions were discussed with patient   Patient denies having any questions regarding discharge instructions, required follow-up, return precautions  Significant Findings, Care, Treatment and Services Provided:   TRAUMA - CT head wo contrast    Result Date: 7/31/2021  Impression: No acute intracranial abnormality  I personally discussed this study with Shawnaparna Radha on 7/31/2021 at 4:34 PM  Workstation performed: [id number]     CT chest with contrast    Result Date: 7/31/2021  Impression: No evidence of acute traumatic injury to the chest  I personally discussed this study with Shawnaparna Radha on 7/31/2021 at 4:34 PM  Workstation performed: [id number]     TRAUMA - CT spine cervical wo contrast    Result Date: 7/31/2021  Impression: No cervical spine fracture or traumatic malalignment  I personally discussed this study with Shawnaparna Radha on 7/31/2021 at 4:34 PM   Workstation performed: [id number]     XR Trauma multiple (SLB/SLRA trauma bay ONLY)    Result Date: 7/31/2021  Impression: No acute cardiopulmonary disease within limitations of supine imaging  In the left shoulder, there is an acute nondisplaced fracture of the left proximal humeral greater tuberosity  No acute injury to the right ankle  There is prior tibiotalar joint fusion  Findings concur with the preliminary report by the referring clinician already in PACS and/or our electronic record EPIC  Workstation performed: [id number]       Complications:  None    Condition at Discharge: good         Discharge instructions/Information to patient and family:   See after visit summary for information provided to patient and family  Provisions for Follow-Up Care:  See after visit summary for information related to follow-up care and any pertinent home health orders  PCP: Donna Abbott MD    Disposition: Home    Planned Readmission: No    Discharge Statement   I spent 26 minutes discharging the patient  This time was spent on the day of discharge  I had direct contact with the patient on the day of discharge   Additional documentation is required if more than 30 minutes were spent on discharge  Discharge Medications:  See after visit summary for reconciled discharge medications provided to patient and family

## 2021-07-31 NOTE — H&P
[address] [facility] 1965, 60 y o  male MRN: [id number]  Unit/Bed#: ED 23 Encounter: [id number]  Primary Care Provider: Deirdre Gardner MD   Date and time admitted to hospital: 7/31/2021  3:24 PM    Fall  Assessment & Plan  - Status post fall with the below noted injuries  - Fall precautions  * Closed fracture of proximal end of left humerus  Assessment & Plan  - Closed left proximal humerus fracture, present on presentation   - [facility] evaluation and recommendations  - Maintain non-weightbearing status on the left upper extremity in sling for support and immobilization   - Monitor left upper extremity neurovascular exam   - Initiate multimodal analgesic regimen   - PT and OT evaluation and treatment as indicated when appropriate  - Outpatient follow up with Orthopedic surgery for re-evaluation  Injury of right ankle  Assessment & Plan  - Right ankle injury, present on presentation   - Await final right ankle x-ray reads; possible acute fracture versus chronic changes from prior operative intervention with hardware in place on wet read  - Orthopedic surgery to be consulted if acute fracture identified  - Initiate multimodal analgesic regimen  Acute pain due to trauma  Assessment & Plan  - Acute pain secondary to traumatic injuries  - Initiate multimodal analgesic regimen  - Bowel regimen while on opioid therapy  H&P Exam - Trauma   Bambi Lowry 60 y o  male MRN: [id number]  Unit/Bed#: ED 23 Encounter: [id number]    Assessment/Plan   Trauma Alert: Level B  Model of Arrival: Ambulance  Trauma Team: Attending Dr Ayla Neil and ISHA Figueroa PA-C  Consultants: Orthopedic Surgery contacted via [facility] [facility] at     Trauma Active Problems and Trauma Plan: See above      Chief Complaint: "My shoulder hurts "    History of Present Illness   HPI:  Bambi Lowry is a 60 y o  male who presents with significant left shoulder pain and right ankle pain following fall  The patient notes that he was at a friend's house and believed he was opening the door to the bathroom, but instead opened the door to the basement and proceeded to fall approximately 8-9 stairs  He does not believe he hit his head or lost consciousness, but was not certain of this detail  Following the fall, he developed immediate and significant left shoulder pain as well as some right ankle pain and swelling  He offered no other complaints at this time  Mechanism:Fall    Review of Systems   Constitutional: Negative for activity change, appetite change, chills, fatigue, fever and unexpected weight change  HENT: Negative for congestion, ear pain, facial swelling, hearing loss and sore throat  Eyes: Negative  Negative for photophobia, pain and visual disturbance  Respiratory: Negative  Negative for cough, chest tightness, shortness of breath and wheezing  Cardiovascular: Negative  Negative for chest pain and leg swelling  Gastrointestinal: Negative  Negative for abdominal distention, abdominal pain, constipation, diarrhea, nausea and vomiting  Endocrine: Negative  Genitourinary: Negative  Negative for difficulty urinating, dysuria, flank pain, frequency and hematuria  Musculoskeletal: Positive for arthralgias (Left shoulder/ upper arm pain and right ankle pain) and joint swelling (Right ankle swollen)  Negative for back pain and neck pain  Skin: Positive for color change (Right ankle bruising)  Negative for pallor, rash and wound  Allergic/Immunologic: Negative  Neurological: Negative  Negative for dizziness, syncope, weakness, light-headedness, numbness and headaches  Hematological: Negative  Psychiatric/Behavioral: Negative  Negative for agitation, confusion and self-injury  The patient is not nervous/anxious  All other systems reviewed and are negative        12-point, complete review of systems was reviewed and negative except as stated above        Historical Information   Efforts to obtain history included the following sources: family member, other medical personnel, obtained from other records    Past Medical History:   Diagnosis Date    Alcohol abuse     Chronic low back pain     Depression     Diabetes mellitus ([facility] )     DM2 (diabetes mellitus, type 2) ([facility] )     Erectile dysfunction     GERD (gastroesophageal reflux disease)     Hepatic steatosis     Hyperlipidemia     Hypertension     Psychiatric disorder     Tobacco abuse      Past Surgical History:   Procedure Laterality Date    ANKLE FRACTURE SURGERY Right     INGUINAL HERNIA REPAIR Left     KNEE SURGERY Right     UMBILICAL HERNIA REPAIR       Social History   Social History     Substance and Sexual Activity   Alcohol Use Yes    Comment: 1-2 drinks per month; former heavy drinker, but not for last 2 years     Social History     Substance and Sexual Activity   Drug Use Yes    Types: Marijuana     Social History     Tobacco Use   Smoking Status Current Every Day Smoker    Packs/day: 1 00    Years: 44 00    Pack years: 44 00    Types: Cigarettes   Smokeless Tobacco Never Used     E-Cigarette/Vaping    E-Cigarette Use Never User      E-Cigarette/Vaping Substances       There is no immunization history on file for this patient  Last Tetanus: Within the last 2 years per patient  Family History: Non-contributory  Unable to obtain/limited by N/A      Meds/Allergies   all current active meds have been reviewed, current meds:   No current facility-administered medications for this encounter  and PTA meds:   None   See admission med rec for home medication details      Not on File      PHYSICAL EXAM    PE limited by: N/A    Objective   Vitals:   First set: Temperature: 97 8 °F (36 6 °C) (07/31/21 1526)  Pulse: 100 (07/31/21 1526)  Respirations: 18 (07/31/21 1526)  Blood Pressure: 112/78 (07/31/21 1526)    Primary Survey:   (A) Airway:  Patent and intact  (B) Breathing: Clear and equal bilaterally  (C) Circulation: Pulses:   normal, carotid  2/4, pedal  2/4, radial  2/4 and femoral  2/4  (D) Disabliity:  GCS Total:  15, Eye Opening:   Spontaneous = 4, Motor Response: Obeys commands = 6 and Verbal Response:  Oriented = 5  (E) Expose:  Completed    Secondary Survey: (Click on Physical Exam tab above)  Physical Exam  Vitals and nursing note reviewed  Exam conducted with a chaperone present  Constitutional:       General: He is awake  He is in acute distress (Mild acute distress secondary to pain)  Appearance: Normal appearance  He is obese  He is not ill-appearing, toxic-appearing or diaphoretic  Interventions: He is not intubated  Cervical collar in place  HENT:      Head: Normocephalic and atraumatic  No abrasion, contusion or laceration  Right Ear: Hearing, tympanic membrane, ear canal and external ear normal       Left Ear: Hearing, tympanic membrane, ear canal and external ear normal       Nose: Nose normal  No nasal deformity, septal deviation, signs of injury, laceration or nasal tenderness  Mouth/Throat:      Mouth: Mucous membranes are moist       Pharynx: Oropharynx is clear  Eyes:      General: Lids are normal  Vision grossly intact  Extraocular Movements: Extraocular movements intact  Right eye: Normal extraocular motion  Left eye: Normal extraocular motion  Conjunctiva/sclera: Conjunctivae normal       Pupils: Pupils are equal, round, and reactive to light  Neck:      Comments:   Cervical collar in place during initial trauma evaluation  Cardiovascular:      Rate and Rhythm: Normal rate and regular rhythm  Pulses: Normal pulses  Carotid pulses are 2+ on the right side and 2+ on the left side  Radial pulses are 2+ on the right side and 2+ on the left side  Femoral pulses are 2+ on the right side and 2+ on the left side  Dorsalis pedis pulses are 2+ on the right side and 2+ on the left side  Heart sounds: Normal heart sounds  No murmur heard  No friction rub  No gallop  Pulmonary:      Effort: Pulmonary effort is normal  No tachypnea, bradypnea, accessory muscle usage, prolonged expiration, respiratory distress or retractions  He is not intubated  Breath sounds: Normal breath sounds and air entry  No stridor or decreased air movement  No decreased breath sounds, wheezing, rhonchi or rales  Chest:      Chest wall: No lacerations, deformity, swelling, tenderness or crepitus  Abdominal:      General: Bowel sounds are normal  There is no distension  Palpations: Abdomen is soft  Tenderness: There is no abdominal tenderness  There is no guarding or rebound  Musculoskeletal:         General: Swelling (Right medial ankle swelling) and tenderness (Tenderness over the left shoulder and proximal left upper arm; tenderness over the right medial ankle) present  Right shoulder: Normal       Left shoulder: Swelling and tenderness (Tenderness over the shoulder/proximal left upper arm) present  No deformity or laceration  Decreased range of motion ( significantly decrease/ limited range of motion secondary to pain)  Normal strength  Normal pulse  Left upper arm: Swelling (Swelling over the proximal left upper arm) and tenderness (Tenderness over the proximal left upper arm) present  No deformity or lacerations  Cervical back: Normal and neck supple  No deformity, signs of trauma, lacerations or tenderness  No spinous process tenderness or muscular tenderness  Thoracic back: Normal  No swelling, deformity, signs of trauma, lacerations or tenderness  Lumbar back: Normal  No swelling, deformity, signs of trauma, lacerations or tenderness  Right lower leg: No edema  Left lower leg: No edema  Right ankle: Swelling (Swelling of the right medial ankle) and ecchymosis (Ecchymosis developing over the right medial ankle) present  No deformity   Tenderness (Tenderness of the right medial ankle) present  Decreased range of motion (Minimal decrease in range of motion of the right ankle secondary to pain)  Comments:  Normal range of motion throughout the right upper extremity and left lower extremity  Aside from the left shoulder, there was normal range of motion in the left upper extremity as well as an intact neurovascular exam throughout the left upper extremity  There was intact/ normal range of motion throughout the right lower extremity with the exception of the right ankle as noted; there was an intact neurovascular exam throughout the right lower extremity  Skin:     General: Skin is warm and dry  Capillary Refill: Capillary refill takes less than 2 seconds  Coloration: Skin is not jaundiced or pale  Findings: Ecchymosis (Ecchymosis over right medial ankle) present  No abrasion, erythema, lesion, rash or wound  Neurological:      General: No focal deficit present  Mental Status: He is alert and oriented to person, place, and time  Mental status is at baseline  GCS: GCS eye subscore is 4  GCS verbal subscore is 5  GCS motor subscore is 6  Sensory: Sensation is intact  No sensory deficit  Motor: Motor function is intact  No weakness  Psychiatric:         Mood and Affect: Mood normal          Behavior: Behavior is cooperative  Invasive Devices     Peripheral Intravenous Line            Peripheral IV 07/31/21 Right;Ventral (anterior) Antecubital <1 day                Lab Results:   Results: I have personally reviewed pertinent reports   , BMP/CMP:   Lab Results   Component Value Date    CO2 22 07/31/2021    GLUCOSE 189 (H) 07/31/2021   , CBC:   Lab Results   Component Value Date    HGB 14 6 07/31/2021    HCT 43 07/31/2021   , Coagulation: No results found for: PT, INR, APTT and ISTAT: No components found for: VBG  Imaging/EKG Studies: Results: I have personally reviewed pertinent reports     and I have personally reviewed pertinent films in PACS, FAST: eFAST was negative  Other Studies: N/A    Code Status: No Order  Advance Directive and Living Will:      Power of :    POLST:

## 2021-07-31 NOTE — DISCHARGE INSTRUCTIONS
YOU ARE TO BE NONWEIGHTBEARING IN LEFT UPPER EXTREMITY  WEAR ARM SLING!  - do not drive, operate heavy machinery, do any activity that involves quick coordination or decision making while taking oxycodone/Robaxin/gabapentin    Proximal Humerus Fracture   WHAT YOU NEED TO KNOW:   A proximal humerus fracture is a crack or break in the top of your upper arm bone  The proximal humerus is one of the bones in your shoulder joint  DISCHARGE INSTRUCTIONS:   Return to the emergency department if:   · Your pain does not get better or gets worse, even after you rest and take medicine  · Your arm, hand, or fingers feel numb  · The skin over your fracture is swollen, cold, or pale  · You cannot move your arm, hand, or fingers  Call your doctor or orthopedist if:   · You have a fever  · Your sling gets wet, damaged, or falls off  · You have questions or concerns about your condition or care  Medicines: You may need any of the following:  · Prescription pain medicine  may be given  Ask your healthcare provider how to take this medicine safely  Some prescription pain medicines contain acetaminophen  Do not take other medicines that contain acetaminophen without talking to your healthcare provider  Too much acetaminophen may cause liver damage  Prescription pain medicine may cause constipation  Ask your healthcare provider how to prevent or treat constipation  · NSAIDs , such as ibuprofen, help decrease swelling, pain, and fever  This medicine is available with or without a doctor's order  NSAIDs can cause stomach bleeding or kidney problems in certain people  If you take blood thinner medicine, always ask your healthcare provider if NSAIDs are safe for you  Always read the medicine label and follow directions  · Acetaminophen  decreases pain and fever  It is available without a doctor's order  Ask how much to take and how often to take it  Follow directions   Read the labels of all other medicines you are using to see if they also contain acetaminophen, or ask your doctor or pharmacist  Acetaminophen can cause liver damage if not taken correctly  Do not use more than 4 grams (4,000 milligrams) total of acetaminophen in one day  · Take your medicine as directed  Contact your healthcare provider if you think your medicine is not helping or if you have side effects  Tell him of her if you are allergic to any medicine  Keep a list of the medicines, vitamins, and herbs you take  Include the amounts, and when and why you take them  Bring the list or the pill bottles to follow-up visits  Carry your medicine list with you in case of an emergency  A sling  may be needed to hold your broken bones in place  It will decrease your arm movement and allow the bones to heal      Manage your symptoms:   · Rest  your arm as much as possible  Ask your healthcare provider when you can move your arm  Also ask when you can return to sports or vigorous exercises  · Apply ice  on your arm for 15 to 20 minutes every hour or as directed  Use an ice pack, or put crushed ice in a plastic bag  Cover it with a towel before you put it on your arm  Ice helps prevent tissue damage and decreases swelling and pain  · Go to physical therapy as directed  A physical therapist teaches you exercises to help improve movement and strength, and to decrease pain  Follow up with your doctor or orthopedist as directed:  Write down your questions so you remember to ask them during your visits  © Copyright [employer] 2021 Information is for End User's use only and may not be sold, redistributed or otherwise used for commercial purposes  All illustrations and images included in CareNotes® are the copyrighted property of A D A M , Inc  or Jefferson Sarkar  The above information is an  only  It is not intended as medical advice for individual conditions or treatments   Talk to your doctor, nurse or pharmacist before following any medical regimen to see if it is safe and effective for you

## 2021-07-31 NOTE — PROCEDURES
POC FAST US    Date/Time: 7/31/2021 4:15 PM  Performed by: Don Wolfe PA-C  Authorized by: Don Wolfe PA-C     Patient location:  Trauma  Other Assisting Provider: No    Procedure details:     Exam Type:  Diagnostic    Indications: blunt abdominal trauma and blunt chest trauma      Indications comment:  Fall    Assess for:  Intra-abdominal fluid, pericardial effusion and pneumothorax    Technique: extended FAST      Views obtained:  Heart - Pericardial sac, Left thorax, Right thorax, RUQ - Ruiz's Pouch, LUQ - Splenorenal space and Suprapubic - Pouch of Escobar    Image quality: diagnostic      Image availability:  Images available in PACS, still images obtained and video obtained  FAST Findings:     RUQ (Hepatorenal) free fluid: absent      LUQ (Splenorenal) free fluid: absent      Suprapubic free fluid: absent      Cardiac wall motion: identified      Pericardial effusion: absent    extended FAST (Pulmonary) findings:     Left lung sliding: Present      Right lung sliding: Present    Interpretation:     Impressions: negative    Comments:      Cardiac views were limited and incomplete without visualization of the posterior pericardium  eFAST was otherwise negative

## 2021-07-31 NOTE — ASSESSMENT & PLAN NOTE
- Right ankle injury, present on presentation  - right ankle XR shows no acute fracture  - Orthopedic surgery to be consulted  Discussed patient with Dr Louisa Santizo who reviewed XR is  No acute fracture appreciated  Patient was placed in ankle stirrup for increased ability  - patient demonstrated the ability to ambulate with a steady gait  - cane was provided for increased stability   - observational admission was discussed, but patient elected to be discharged home with multimodal pain regimen   - multimodal pain regimen was discussed and patient verbally confirmed understanding   - Ace bandage in ice as needed for assistance with swelling  - follow-up with orthopedics within 1 week

## 2021-07-31 NOTE — ASSESSMENT & PLAN NOTE
- Closed left proximal humerus fracture, present on presentation  - neurovascularly intact  -  multiple XR is were obtained and reviewed by Dr Tayler Simmons  Per his recommendations, patient is to remain nonweightbearing and in left upper extremity sling   - patient was offered observational admission for pain management, but he elected to be discharged home  - Per Dr Tayler Simmons, patient is to call and schedule an appointment within this week      - strict return precautions were discussed with patient that he verbally confirmed understanding

## 2021-07-31 NOTE — ED PROVIDER NOTES
Emergency Department Airway Evaluation and Management Form    History  Obtained from: patient, EMS  Patient has no allergy information on record  No chief complaint on file  Patient is a 59-year-old male who fell approximately 9 stairs  He is on clear as to whether there was loss of consciousness, but states he does not recall fall  He complains of pain in his left upper extremity and right ankle  Patient denies any anticoagulants or antiplatelets  No past medical history on file  No past surgical history on file  No family history on file  Social History     Tobacco Use    Smoking status: Not on file   Substance Use Topics    Alcohol use: Not on file    Drug use: Not on file     I have reviewed and agree with the history as documented  Review of Systems    Physical Exam  /63   Pulse 96   Temp 97 8 °F (36 6 °C) (Oral)   Resp 18   Wt 112 kg (246 lb 7 6 oz)   SpO2 95%     Physical Exam    ED Medications  Medications - No data to display    Intubation  Procedures    Notes  I was present at Trauma B for airway evaluation  Airway patent and protected  Breath sounds normal bilaterally  Patient does not require any acute airway interventions    Further management of this patient her trauma team     Final Diagnosis  Final diagnoses:   None       ED Provider  Electronically Signed by     Jose Inman DO  08/01/21 3927

## 2021-07-31 NOTE — ASSESSMENT & PLAN NOTE
- Right ankle injury, present on presentation   - Await final right ankle x-ray reads; no acute fracture identified on wet read, but patient did have significant chronic changes from prior operative intervention with hardware in place on wet read  Awaiting final radiology interpretation and Orthopedic surgery opinion   - Orthopedic surgery to be consulted if acute fracture identified  - Initiate multimodal analgesic regimen  - Will plan to apply Ace bandage for compression and support  - Ice to be applied for pain and swelling     - Patient may be weight-bearing as tolerated if final radiology interpretation negative for acute fracture

## 2021-07-31 NOTE — ASSESSMENT & PLAN NOTE
- Right ankle injury, present on presentation  - right ankle XR shows no acute fracture  - Orthopedic surgery to be consulted  Discussed patient with Dr Aleksandr Bassett who reviewed XR is  No acute fracture appreciated  Patient was placed in ankle stirrup for increased ability  - patient demonstrated the ability to ambulate with a steady gait  - cane was provided for increased stability   - observational admission was discussed, but patient elected to be discharged home with multimodal pain regimen   - multimodal pain regimen was discussed and patient verbally confirmed understanding   - Ace bandage in ice as needed for assistance with swelling  - follow-up with orthopedics within 1 week

## 2021-07-31 NOTE — ASSESSMENT & PLAN NOTE
- Acute pain secondary to traumatic injuries  - Initiate multimodal analgesic regimen  - Bowel regimen while on opioid therapy

## 2021-07-31 NOTE — ASSESSMENT & PLAN NOTE
- patient confirms that he has Percocet (7 5/325) and gabapentin 100 mg that he takes for chronic back pain  Will additionally add Robaxin 750 mg q 8 PRN and Tylenol 650mg q8 PRN  Discussed the use of lidocaine cream/ice  Patient confirms feeling comfortable with this multimodal pain regimen

## 2021-07-31 NOTE — ASSESSMENT & PLAN NOTE
- Closed left proximal humerus fracture, present on presentation   - [facility] evaluation and recommendations  - Maintain non-weightbearing status on the left upper extremity in sling for support and immobilization   - Monitor left upper extremity neurovascular exam   - Initiate multimodal analgesic regimen   - PT and OT evaluation and treatment as indicated when appropriate  - Outpatient follow up with Orthopedic surgery for re-evaluation

## 2021-07-31 NOTE — QUICK NOTE
Cervical Collar Clearance: The patient had a CT scan of the cervical spine demonstrating no acute injury  On exam, the patient had no midline point tenderness or paresthesias/numbness/weakness in the extremities  The patient had full range of motion (was then able to flex, extend, and rotate head laterally) without pain  There were no distracting injuries and the patient was not intoxicated  The patient's cervical spine was cleared radiologically and clinically  Cervical collar removed at this time       Anita Newell PA-C  7/31/2021 05:05 PM

## 2021-08-02 ENCOUNTER — TELEPHONE (OUTPATIENT)
Dept: OBGYN CLINIC | Facility: HOSPITAL | Age: 56
End: 2021-08-02

## 2021-08-02 ENCOUNTER — TELEPHONE (OUTPATIENT)
Dept: OBGYN CLINIC | Facility: CLINIC | Age: 56
End: 2021-08-02

## 2021-08-02 NOTE — TELEPHONE ENCOUNTER
email to Dmitriy Blood I was contacted by patient Kelsea Noriega ([phone]) who was seen at [facility] on 07 31 for a closed displaced shoulder fracture  Patient only wants to go to [facility]  Can you help accommodate appropriate appt    # [phone]

## 2021-08-02 NOTE — TELEPHONE ENCOUNTER
Patient's wife called back to find out te status of this appointment  Patient's wife was advised we'd be giving a call as soon as we have the appointment available for the patient

## 2021-08-03 ENCOUNTER — OFFICE VISIT (OUTPATIENT)
Dept: OBGYN CLINIC | Facility: CLINIC | Age: 56
End: 2021-08-03
Payer: COMMERCIAL

## 2021-08-03 VITALS
WEIGHT: 235 LBS | BODY MASS INDEX: 32.9 KG/M2 | HEIGHT: 71 IN | DIASTOLIC BLOOD PRESSURE: 83 MMHG | HEART RATE: 105 BPM | SYSTOLIC BLOOD PRESSURE: 133 MMHG

## 2021-08-03 DIAGNOSIS — S42.202A CLOSED FRACTURE OF PROXIMAL END OF LEFT HUMERUS, UNSPECIFIED FRACTURE MORPHOLOGY, INITIAL ENCOUNTER: Primary | ICD-10-CM

## 2021-08-03 DIAGNOSIS — S93.401A SPRAIN OF UNSPECIFIED LIGAMENT OF RIGHT ANKLE, INITIAL ENCOUNTER: ICD-10-CM

## 2021-08-03 PROCEDURE — 3079F DIAST BP 80-89 MM HG: CPT | Performed by: ORTHOPAEDIC SURGERY

## 2021-08-03 PROCEDURE — 3075F SYST BP GE 130 - 139MM HG: CPT | Performed by: ORTHOPAEDIC SURGERY

## 2021-08-03 PROCEDURE — 99203 OFFICE O/P NEW LOW 30 MIN: CPT | Performed by: ORTHOPAEDIC SURGERY

## 2021-08-03 PROCEDURE — 23620 CLTX GR HMRL TBRS FX WO MNPJ: CPT | Performed by: ORTHOPAEDIC SURGERY

## 2021-08-03 PROCEDURE — 3008F BODY MASS INDEX DOCD: CPT | Performed by: ORTHOPAEDIC SURGERY

## 2021-08-03 RX ORDER — HYDROCODONE BITARTRATE AND ACETAMINOPHEN 10; 325 MG/1; MG/1
1 TABLET ORAL EVERY 4 HOURS PRN
Qty: 30 TABLET | Refills: 0 | Status: SHIPPED | OUTPATIENT
Start: 2021-08-03 | End: 2021-09-22 | Stop reason: HOSPADM

## 2021-08-03 NOTE — PROGRESS NOTES
Patient Name:  Linda Ospina  MRN:  [id number]    Assessment & Plan     Left proximal humerus fracture and right ankle sprain 7/31/21  1  Nonweightbearing left upper extremity in sling  2  Cam walking boot provided in the office today for the right ankle  3  Prescription for Norco   Patient does take oxycodone chronically  4  Follow-up in two weeks for repeat evaluation with repeat x-rays of the left shoulder  Consider initiating physical therapy at that time as indicated    Chief Complaint     Left shoulder injury, right ankle injury    History of the Present Illness     Linda Ospina is a 60 y o  male who reports to the office today for initial evaluation of his left shoulder  Patient fell down nine steps on 7/31/21  After the injury occurred he noted severe pain in the left shoulder and right ankle  He reported to the emergency department where x-rays were performed revealing a proximal wrist fracture  He does have a history of right ankle fusion performed many years ago  He still notes persistent pain in the left shoulder and right ankle  Pain is worse with movement  He notes associated swelling and bruising  He takes oxycodone chronically and has had to increase his dosage of this to control his pain  Physical Exam     /83   Pulse 105   Ht 5' 11" (1 803 m)   Wt 107 kg (235 lb)   BMI 32 78 kg/m²     Left shoulder:  Skin intact  No gross deformity  Soft tissue swelling and ecchymosis are present  Significant tenderness to palpation proximal humerus  Shoulder range of motion is deferred  Wrist and digital range of motion intact  Sensation intact axillary, median, ulnar and radial nerves  2+ radial pulse    Right ankle: Skin intact  Soft tissue swelling and ecchymosis are present medially  Diffuse tenderness to palpation  Ankle range of motion is absent due to history of prior fusion  Toes warm and mobile  Eyes:  Anicteric sclerae    ENT:  Trachea midline  Lungs:  Normal respiratory effort  Cardiovascular:  Capillary refill is less than 2 seconds  Lymphatic:  No palpable lymphadenopathy  Skin:  Intact without erythema  Neurologic:  Sensation grossly intact to light touch  Psychiatric:  Mood and affect are appropriate  Data Review     I have personally reviewed pertinent films in PACS, and my interpretation follows:    X-rays left shoulder 7/31/21:  Greater tuberosity fracture with minimal displacement  X-rays right ankle 7/31/21:  Prior orthopedic hardware from ankle fusion in stable alignment  No acute osseous abnormalities  No fractures noted  Past Medical History:   Diagnosis Date    Alcohol abuse     Chronic low back pain     Depression     Diabetes mellitus (HCC)     DM2 (diabetes mellitus, type 2) (HCC)     Erectile dysfunction     GERD (gastroesophageal reflux disease)     Hepatic steatosis     Hyperlipidemia     Hypertension     Psychiatric disorder     Tobacco abuse        Past Surgical History:   Procedure Laterality Date    ANKLE FRACTURE SURGERY Right     INGUINAL HERNIA REPAIR Left     KNEE SURGERY Right     UMBILICAL HERNIA REPAIR         Allergies   Allergen Reactions    Amoxil [Amoxicillin] Hives       Current Outpatient Medications on File Prior to Visit   Medication Sig Dispense Refill    methocarbamol (ROBAXIN) 500 mg tablet Take 1 5 tablets (750 mg total) by mouth every 8 (eight) hours as needed for muscle spasms 30 tablet 0    [DISCONTINUED] acetaminophen (TYLENOL) 325 mg tablet Take 2 tablets (650 mg total) by mouth every 6 (six) hours as needed for mild pain 30 tablet 0     No current facility-administered medications on file prior to visit         Social History     Tobacco Use    Smoking status: Current Every Day Smoker     Packs/day: 1 00     Years: 44 00     Pack years: 44 00     Types: Cigarettes    Smokeless tobacco: Never Used   Vaping Use    Vaping Use: Never used   Substance Use Topics    Alcohol use: Yes     Comment: 1-2 drinks per month; former heavy drinker, but not for last 2 years    Drug use: Yes     Types: Marijuana       History reviewed  No pertinent family history  Review of Systems     As stated in the HPI  All other systems were reviewed and are negative        Fracture / Dislocation Treatment    Date/Time: 8/3/2021 9:09 AM  Performed by: Brandon Frausto MD  Authorized by: Brandon Frausto MD     Injury location:  Shoulder  Location details:  Left shoulder  Injury type:  Fracture  Fracture type: greater humeral tuberosity          Scribe Attestation    I,:  Yordan Bañuelos PA-C am acting as a scribe while in the presence of the attending physician :       I,:  Brandon Frausto MD personally performed the services described in this documentation    as scribed in my presence :

## 2021-08-26 ENCOUNTER — RA CDI HCC (OUTPATIENT)
Dept: OTHER | Facility: HOSPITAL | Age: 56
End: 2021-08-26

## 2021-08-26 NOTE — PROGRESS NOTES
[facility]  coding opportunities             Chart Reviewed * (Number of) Inbasket suggestions sent to Provider: 1     DX: F33 9 Major depressive disorder, recurrent, unspecified            Patients insurance company: [employer] (Medicare Advantage only)           Provider never responded to [place] [address]  coding request, and pt canceled the appt

## 2021-09-19 ENCOUNTER — HOSPITAL ENCOUNTER (INPATIENT)
Facility: HOSPITAL | Age: 56
LOS: 3 days | Discharge: LEFT BEFORE DISCHARGE | DRG: 092 | End: 2021-09-22
Attending: EMERGENCY MEDICINE | Admitting: INTERNAL MEDICINE
Payer: COMMERCIAL

## 2021-09-19 ENCOUNTER — APPOINTMENT (EMERGENCY)
Dept: RADIOLOGY | Facility: HOSPITAL | Age: 56
DRG: 092 | End: 2021-09-19
Payer: COMMERCIAL

## 2021-09-19 ENCOUNTER — APPOINTMENT (EMERGENCY)
Dept: CT IMAGING | Facility: HOSPITAL | Age: 56
DRG: 092 | End: 2021-09-19
Payer: COMMERCIAL

## 2021-09-19 DIAGNOSIS — G93.40 ACUTE ENCEPHALOPATHY: ICD-10-CM

## 2021-09-19 DIAGNOSIS — R51.9 HEADACHE: ICD-10-CM

## 2021-09-19 DIAGNOSIS — I10 ESSENTIAL HYPERTENSION: ICD-10-CM

## 2021-09-19 DIAGNOSIS — R41.82 ALTERED MENTAL STATE: Primary | ICD-10-CM

## 2021-09-19 DIAGNOSIS — H53.149 PHOTOPHOBIA: ICD-10-CM

## 2021-09-19 PROBLEM — R94.31 PROLONGED Q-T INTERVAL ON ECG: Status: ACTIVE | Noted: 2021-09-19

## 2021-09-19 PROBLEM — N17.9 AKI (ACUTE KIDNEY INJURY) (HCC): Status: ACTIVE | Noted: 2021-09-19

## 2021-09-19 LAB
ALBUMIN SERPL BCP-MCNC: 3.9 G/DL (ref 3.5–5)
ALP SERPL-CCNC: 131 U/L (ref 46–116)
ALT SERPL W P-5'-P-CCNC: 32 U/L (ref 12–78)
AMPHETAMINES SERPL QL SCN: NEGATIVE
ANION GAP SERPL CALCULATED.3IONS-SCNC: 11 MMOL/L (ref 4–13)
ANION GAP SERPL CALCULATED.3IONS-SCNC: 16 MMOL/L (ref 4–13)
APAP SERPL-MCNC: <2 UG/ML (ref 10–20)
APTT PPP: 29 SECONDS (ref 23–37)
AST SERPL W P-5'-P-CCNC: 41 U/L (ref 5–45)
BACTERIA UR QL AUTO: ABNORMAL /HPF
BARBITURATES UR QL: NEGATIVE
BASE EX.OXY STD BLDV CALC-SCNC: 91.3 % (ref 60–80)
BASE EXCESS BLDV CALC-SCNC: -6.1 MMOL/L
BASOPHILS # BLD AUTO: 0.11 THOUSANDS/ΜL (ref 0–0.1)
BASOPHILS NFR BLD AUTO: 1 % (ref 0–1)
BENZODIAZ UR QL: NEGATIVE
BILIRUB SERPL-MCNC: 0.88 MG/DL (ref 0.2–1)
BILIRUB UR QL STRIP: ABNORMAL
BUN SERPL-MCNC: 12 MG/DL (ref 5–25)
BUN SERPL-MCNC: 14 MG/DL (ref 5–25)
CALCIUM SERPL-MCNC: 8.3 MG/DL (ref 8.3–10.1)
CALCIUM SERPL-MCNC: 9.3 MG/DL (ref 8.3–10.1)
CHLORIDE SERPL-SCNC: 102 MMOL/L (ref 100–108)
CHLORIDE SERPL-SCNC: 109 MMOL/L (ref 100–108)
CK MB SERPL-MCNC: 1.3 NG/ML (ref 0–5)
CK MB SERPL-MCNC: <1 % (ref 0–2.5)
CK SERPL-CCNC: 268 U/L (ref 39–308)
CLARITY UR: CLEAR
CO2 SERPL-SCNC: 20 MMOL/L (ref 21–32)
CO2 SERPL-SCNC: 22 MMOL/L (ref 21–32)
COCAINE UR QL: NEGATIVE
COLOR UR: ABNORMAL
CREAT SERPL-MCNC: 1.16 MG/DL (ref 0.6–1.3)
CREAT SERPL-MCNC: 1.34 MG/DL (ref 0.6–1.3)
EOSINOPHIL # BLD AUTO: 0.03 THOUSAND/ΜL (ref 0–0.61)
EOSINOPHIL NFR BLD AUTO: 0 % (ref 0–6)
ERYTHROCYTE [DISTWIDTH] IN BLOOD BY AUTOMATED COUNT: 12 % (ref 11.6–15.1)
ETHANOL SERPL-MCNC: <3 MG/DL (ref 0–3)
FINE GRAN CASTS URNS QL MICRO: ABNORMAL /LPF
GFR SERPL CREATININE-BSD FRML MDRD: 58 ML/MIN/1.73SQ M
GFR SERPL CREATININE-BSD FRML MDRD: 69 ML/MIN/1.73SQ M
GLUCOSE SERPL-MCNC: 201 MG/DL (ref 65–140)
GLUCOSE SERPL-MCNC: 224 MG/DL (ref 65–140)
GLUCOSE SERPL-MCNC: 226 MG/DL (ref 65–140)
GLUCOSE UR STRIP-MCNC: ABNORMAL MG/DL
HCO3 BLDV-SCNC: 18 MMOL/L (ref 24–30)
HCT VFR BLD AUTO: 45.1 % (ref 36.5–49.3)
HGB BLD-MCNC: 16.1 G/DL (ref 12–17)
HGB UR QL STRIP.AUTO: NEGATIVE
HYALINE CASTS #/AREA URNS LPF: ABNORMAL /LPF
IMM GRANULOCYTES # BLD AUTO: 0.05 THOUSAND/UL (ref 0–0.2)
IMM GRANULOCYTES NFR BLD AUTO: 0 % (ref 0–2)
INR PPP: 1.02 (ref 0.84–1.19)
KETONES UR STRIP-MCNC: ABNORMAL MG/DL
LACTATE SERPL-SCNC: 2.7 MMOL/L (ref 0.5–2)
LACTATE SERPL-SCNC: 3.8 MMOL/L (ref 0.5–2)
LEUKOCYTE ESTERASE UR QL STRIP: NEGATIVE
LIPASE SERPL-CCNC: 165 U/L (ref 73–393)
LYMPHOCYTES # BLD AUTO: 2.04 THOUSANDS/ΜL (ref 0.6–4.47)
LYMPHOCYTES NFR BLD AUTO: 18 % (ref 14–44)
MCH RBC QN AUTO: 31.5 PG (ref 26.8–34.3)
MCHC RBC AUTO-ENTMCNC: 35.7 G/DL (ref 31.4–37.4)
MCV RBC AUTO: 88 FL (ref 82–98)
METHADONE UR QL: NEGATIVE
MONOCYTES # BLD AUTO: 0.97 THOUSAND/ΜL (ref 0.17–1.22)
MONOCYTES NFR BLD AUTO: 9 % (ref 4–12)
MUCOUS THREADS UR QL AUTO: ABNORMAL
NEUTROPHILS # BLD AUTO: 8.01 THOUSANDS/ΜL (ref 1.85–7.62)
NEUTS SEG NFR BLD AUTO: 72 % (ref 43–75)
NITRITE UR QL STRIP: NEGATIVE
NON-SQ EPI CELLS URNS QL MICRO: ABNORMAL /HPF
NRBC BLD AUTO-RTO: 0 /100 WBCS
O2 CT BLDV-SCNC: 20.8 ML/DL
OPIATES UR QL SCN: NEGATIVE
OXYCODONE+OXYMORPHONE UR QL SCN: NEGATIVE
PCO2 BLDV: 32.5 MM HG (ref 42–50)
PCP UR QL: NEGATIVE
PH BLDV: 7.36 [PH] (ref 7.3–7.4)
PH UR STRIP.AUTO: 6 [PH]
PLATELET # BLD AUTO: 300 THOUSANDS/UL (ref 149–390)
PMV BLD AUTO: 10 FL (ref 8.9–12.7)
PO2 BLDV: 75.3 MM HG (ref 35–45)
POTASSIUM SERPL-SCNC: 3.6 MMOL/L (ref 3.5–5.3)
POTASSIUM SERPL-SCNC: 4 MMOL/L (ref 3.5–5.3)
PROT SERPL-MCNC: 7.5 G/DL (ref 6.4–8.2)
PROT UR STRIP-MCNC: ABNORMAL MG/DL
PROTHROMBIN TIME: 13.4 SECONDS (ref 11.6–14.5)
RBC # BLD AUTO: 5.11 MILLION/UL (ref 3.88–5.62)
RBC #/AREA URNS AUTO: ABNORMAL /HPF
SALICYLATES SERPL-MCNC: 8.9 MG/DL (ref 3–20)
SALICYLATES SERPL-MCNC: 9.3 MG/DL (ref 3–20)
SARS-COV-2 RNA RESP QL NAA+PROBE: NEGATIVE
SODIUM SERPL-SCNC: 138 MMOL/L (ref 136–145)
SODIUM SERPL-SCNC: 142 MMOL/L (ref 136–145)
SP GR UR STRIP.AUTO: 1.02 (ref 1–1.03)
THC UR QL: POSITIVE
TSH SERPL DL<=0.05 MIU/L-ACNC: 1.61 UIU/ML (ref 0.36–3.74)
UROBILINOGEN UR QL STRIP.AUTO: 4 E.U./DL
WBC # BLD AUTO: 11.21 THOUSAND/UL (ref 4.31–10.16)
WBC #/AREA URNS AUTO: ABNORMAL /HPF
WBC CASTS URNS QL MICRO: ABNORMAL /LPF

## 2021-09-19 PROCEDURE — 83036 HEMOGLOBIN GLYCOSYLATED A1C: CPT | Performed by: NURSE PRACTITIONER

## 2021-09-19 PROCEDURE — 96365 THER/PROPH/DIAG IV INF INIT: CPT

## 2021-09-19 PROCEDURE — 80048 BASIC METABOLIC PNL TOTAL CA: CPT | Performed by: NURSE PRACTITIONER

## 2021-09-19 PROCEDURE — 94760 N-INVAS EAR/PLS OXIMETRY 1: CPT

## 2021-09-19 PROCEDURE — 85730 THROMBOPLASTIN TIME PARTIAL: CPT | Performed by: EMERGENCY MEDICINE

## 2021-09-19 PROCEDURE — 84145 PROCALCITONIN (PCT): CPT | Performed by: EMERGENCY MEDICINE

## 2021-09-19 PROCEDURE — 74174 CTA ABD&PLVS W/CONTRAST: CPT

## 2021-09-19 PROCEDURE — U0005 INFEC AGEN DETEC AMPLI PROBE: HCPCS | Performed by: EMERGENCY MEDICINE

## 2021-09-19 PROCEDURE — 84443 ASSAY THYROID STIM HORMONE: CPT | Performed by: EMERGENCY MEDICINE

## 2021-09-19 PROCEDURE — 82553 CREATINE MB FRACTION: CPT | Performed by: EMERGENCY MEDICINE

## 2021-09-19 PROCEDURE — 82077 ASSAY SPEC XCP UR&BREATH IA: CPT | Performed by: EMERGENCY MEDICINE

## 2021-09-19 PROCEDURE — 96375 TX/PRO/DX INJ NEW DRUG ADDON: CPT

## 2021-09-19 PROCEDURE — 36415 COLL VENOUS BLD VENIPUNCTURE: CPT | Performed by: EMERGENCY MEDICINE

## 2021-09-19 PROCEDURE — 80307 DRUG TEST PRSMV CHEM ANLYZR: CPT | Performed by: EMERGENCY MEDICINE

## 2021-09-19 PROCEDURE — 82550 ASSAY OF CK (CPK): CPT | Performed by: EMERGENCY MEDICINE

## 2021-09-19 PROCEDURE — 99284 EMERGENCY DEPT VISIT MOD MDM: CPT | Performed by: EMERGENCY MEDICINE

## 2021-09-19 PROCEDURE — 82805 BLOOD GASES W/O2 SATURATION: CPT | Performed by: EMERGENCY MEDICINE

## 2021-09-19 PROCEDURE — 80053 COMPREHEN METABOLIC PANEL: CPT | Performed by: EMERGENCY MEDICINE

## 2021-09-19 PROCEDURE — 87040 BLOOD CULTURE FOR BACTERIA: CPT | Performed by: EMERGENCY MEDICINE

## 2021-09-19 PROCEDURE — 99291 CRITICAL CARE FIRST HOUR: CPT | Performed by: NURSE PRACTITIONER

## 2021-09-19 PROCEDURE — 85610 PROTHROMBIN TIME: CPT | Performed by: EMERGENCY MEDICINE

## 2021-09-19 PROCEDURE — 99291 CRITICAL CARE FIRST HOUR: CPT | Performed by: EMERGENCY MEDICINE

## 2021-09-19 PROCEDURE — 94762 N-INVAS EAR/PLS OXIMTRY CONT: CPT

## 2021-09-19 PROCEDURE — 93005 ELECTROCARDIOGRAM TRACING: CPT

## 2021-09-19 PROCEDURE — 70496 CT ANGIOGRAPHY HEAD: CPT

## 2021-09-19 PROCEDURE — 83605 ASSAY OF LACTIC ACID: CPT | Performed by: EMERGENCY MEDICINE

## 2021-09-19 PROCEDURE — 82948 REAGENT STRIP/BLOOD GLUCOSE: CPT

## 2021-09-19 PROCEDURE — G1004 CDSM NDSC: HCPCS

## 2021-09-19 PROCEDURE — 70498 CT ANGIOGRAPHY NECK: CPT

## 2021-09-19 PROCEDURE — 96360 HYDRATION IV INFUSION INIT: CPT

## 2021-09-19 PROCEDURE — 81001 URINALYSIS AUTO W/SCOPE: CPT | Performed by: EMERGENCY MEDICINE

## 2021-09-19 PROCEDURE — 99285 EMERGENCY DEPT VISIT HI MDM: CPT

## 2021-09-19 PROCEDURE — 80179 DRUG ASSAY SALICYLATE: CPT | Performed by: EMERGENCY MEDICINE

## 2021-09-19 PROCEDURE — U0003 INFECTIOUS AGENT DETECTION BY NUCLEIC ACID (DNA OR RNA); SEVERE ACUTE RESPIRATORY SYNDROME CORONAVIRUS 2 (SARS-COV-2) (CORONAVIRUS DISEASE [COVID-19]), AMPLIFIED PROBE TECHNIQUE, MAKING USE OF HIGH THROUGHPUT TECHNOLOGIES AS DESCRIBED BY CMS-2020-01-R: HCPCS | Performed by: EMERGENCY MEDICINE

## 2021-09-19 PROCEDURE — 85025 COMPLETE CBC W/AUTO DIFF WBC: CPT | Performed by: EMERGENCY MEDICINE

## 2021-09-19 PROCEDURE — 71045 X-RAY EXAM CHEST 1 VIEW: CPT

## 2021-09-19 PROCEDURE — 83690 ASSAY OF LIPASE: CPT | Performed by: EMERGENCY MEDICINE

## 2021-09-19 PROCEDURE — 80143 DRUG ASSAY ACETAMINOPHEN: CPT | Performed by: EMERGENCY MEDICINE

## 2021-09-19 PROCEDURE — 71275 CT ANGIOGRAPHY CHEST: CPT

## 2021-09-19 RX ORDER — MIDAZOLAM HYDROCHLORIDE 2 MG/2ML
5 INJECTION, SOLUTION INTRAMUSCULAR; INTRAVENOUS ONCE
Status: COMPLETED | OUTPATIENT
Start: 2021-09-19 | End: 2021-09-19

## 2021-09-19 RX ORDER — MAGNESIUM SULFATE HEPTAHYDRATE 40 MG/ML
2 INJECTION, SOLUTION INTRAVENOUS ONCE
Status: COMPLETED | OUTPATIENT
Start: 2021-09-19 | End: 2021-09-20

## 2021-09-19 RX ORDER — ACETAMINOPHEN 325 MG/1
650 TABLET ORAL ONCE
Status: DISCONTINUED | OUTPATIENT
Start: 2021-09-19 | End: 2021-09-19

## 2021-09-19 RX ORDER — LIDOCAINE 50 MG/G
1 PATCH TOPICAL
Status: DISCONTINUED | OUTPATIENT
Start: 2021-09-19 | End: 2021-09-19

## 2021-09-19 RX ORDER — LORAZEPAM 2 MG/ML
1 INJECTION INTRAMUSCULAR ONCE
Status: COMPLETED | OUTPATIENT
Start: 2021-09-19 | End: 2021-09-19

## 2021-09-19 RX ORDER — MIDAZOLAM HYDROCHLORIDE 2 MG/2ML
INJECTION, SOLUTION INTRAMUSCULAR; INTRAVENOUS
Status: COMPLETED
Start: 2021-09-19 | End: 2021-09-19

## 2021-09-19 RX ORDER — DIAZEPAM 5 MG/ML
5 INJECTION, SOLUTION INTRAMUSCULAR; INTRAVENOUS ONCE
Status: COMPLETED | OUTPATIENT
Start: 2021-09-19 | End: 2021-09-19

## 2021-09-19 RX ORDER — DIAZEPAM 5 MG/ML
INJECTION, SOLUTION INTRAMUSCULAR; INTRAVENOUS
Status: COMPLETED
Start: 2021-09-19 | End: 2021-09-19

## 2021-09-19 RX ORDER — PANTOPRAZOLE SODIUM 40 MG/1
40 INJECTION, POWDER, FOR SOLUTION INTRAVENOUS
Status: DISCONTINUED | OUTPATIENT
Start: 2021-09-20 | End: 2021-09-20

## 2021-09-19 RX ORDER — POTASSIUM CHLORIDE 14.9 MG/ML
20 INJECTION INTRAVENOUS
Status: COMPLETED | OUTPATIENT
Start: 2021-09-20 | End: 2021-09-20

## 2021-09-19 RX ORDER — SODIUM CHLORIDE, SODIUM GLUCONATE, SODIUM ACETATE, POTASSIUM CHLORIDE, MAGNESIUM CHLORIDE, SODIUM PHOSPHATE, DIBASIC, AND POTASSIUM PHOSPHATE .53; .5; .37; .037; .03; .012; .00082 G/100ML; G/100ML; G/100ML; G/100ML; G/100ML; G/100ML; G/100ML
125 INJECTION, SOLUTION INTRAVENOUS CONTINUOUS
Status: DISCONTINUED | OUTPATIENT
Start: 2021-09-20 | End: 2021-09-20

## 2021-09-19 RX ORDER — HEPARIN SODIUM 5000 [USP'U]/ML
5000 INJECTION, SOLUTION INTRAVENOUS; SUBCUTANEOUS EVERY 8 HOURS SCHEDULED
Status: DISCONTINUED | OUTPATIENT
Start: 2021-09-19 | End: 2021-09-20

## 2021-09-19 RX ORDER — KETAMINE HCL IN NACL, ISO-OSM 100MG/10ML
400 SYRINGE (ML) INJECTION ONCE
Status: DISCONTINUED | OUTPATIENT
Start: 2021-09-19 | End: 2021-09-19

## 2021-09-19 RX ORDER — ACETAMINOPHEN 650 MG/1
650 SUPPOSITORY RECTAL ONCE
Status: COMPLETED | OUTPATIENT
Start: 2021-09-19 | End: 2021-09-19

## 2021-09-19 RX ORDER — FENTANYL CITRATE 50 UG/ML
25 INJECTION, SOLUTION INTRAMUSCULAR; INTRAVENOUS ONCE
Status: DISCONTINUED | OUTPATIENT
Start: 2021-09-19 | End: 2021-09-19

## 2021-09-19 RX ORDER — SODIUM CHLORIDE 9 MG/ML
3 INJECTION INTRAVENOUS
Status: DISCONTINUED | OUTPATIENT
Start: 2021-09-19 | End: 2021-09-19

## 2021-09-19 RX ORDER — FENTANYL CITRATE 50 UG/ML
50 INJECTION, SOLUTION INTRAMUSCULAR; INTRAVENOUS ONCE
Status: COMPLETED | OUTPATIENT
Start: 2021-09-19 | End: 2021-09-19

## 2021-09-19 RX ADMIN — MIDAZOLAM HYDROCHLORIDE 5 MG: 2 INJECTION, SOLUTION INTRAMUSCULAR; INTRAVENOUS at 19:10

## 2021-09-19 RX ADMIN — DIAZEPAM 10 MG: 5 INJECTION, SOLUTION INTRAMUSCULAR; INTRAVENOUS at 21:07

## 2021-09-19 RX ADMIN — MIDAZOLAM 5 MG: 1 INJECTION INTRAMUSCULAR; INTRAVENOUS at 19:10

## 2021-09-19 RX ADMIN — MIDAZOLAM HYDROCHLORIDE 5 MG: 2 INJECTION, SOLUTION INTRAMUSCULAR; INTRAVENOUS at 19:30

## 2021-09-19 RX ADMIN — SODIUM CHLORIDE 1000 ML: 0.9 INJECTION, SOLUTION INTRAVENOUS at 20:56

## 2021-09-19 RX ADMIN — SODIUM CHLORIDE 1000 ML: 0.9 INJECTION, SOLUTION INTRAVENOUS at 19:45

## 2021-09-19 RX ADMIN — LORAZEPAM 1 MG: 2 INJECTION INTRAMUSCULAR; INTRAVENOUS at 18:13

## 2021-09-19 RX ADMIN — MAGNESIUM SULFATE HEPTAHYDRATE 2 G: 40 INJECTION, SOLUTION INTRAVENOUS at 23:41

## 2021-09-19 RX ADMIN — SODIUM CHLORIDE 1000 ML: 0.9 INJECTION, SOLUTION INTRAVENOUS at 22:28

## 2021-09-19 RX ADMIN — SODIUM CHLORIDE 1000 ML: 0.9 INJECTION, SOLUTION INTRAVENOUS at 19:44

## 2021-09-19 RX ADMIN — HEPARIN SODIUM 5000 UNITS: 5000 INJECTION INTRAVENOUS; SUBCUTANEOUS at 23:42

## 2021-09-19 RX ADMIN — IOHEXOL 100 ML: 350 INJECTION, SOLUTION INTRAVENOUS at 19:30

## 2021-09-19 RX ADMIN — SODIUM CHLORIDE 1000 ML: 0.9 INJECTION, SOLUTION INTRAVENOUS at 21:07

## 2021-09-19 RX ADMIN — THIAMINE HYDROCHLORIDE 500 MG: 100 INJECTION, SOLUTION INTRAMUSCULAR; INTRAVENOUS at 23:09

## 2021-09-19 RX ADMIN — DIAZEPAM 5 MG: 10 INJECTION, SOLUTION INTRAMUSCULAR; INTRAVENOUS at 23:01

## 2021-09-19 RX ADMIN — CEFTRIAXONE SODIUM 1000 MG: 10 INJECTION, POWDER, FOR SOLUTION INTRAVENOUS at 19:48

## 2021-09-19 RX ADMIN — SODIUM CHLORIDE 0.4 MCG/KG/HR: 9 INJECTION, SOLUTION INTRAVENOUS at 21:19

## 2021-09-19 RX ADMIN — SODIUM BICARBONATE 50 MEQ: 84 INJECTION, SOLUTION INTRAVENOUS at 23:43

## 2021-09-19 RX ADMIN — FENTANYL CITRATE 50 MCG: 50 INJECTION INTRAMUSCULAR; INTRAVENOUS at 18:44

## 2021-09-19 RX ADMIN — LORAZEPAM 1 MG: 2 INJECTION INTRAMUSCULAR; INTRAVENOUS at 18:29

## 2021-09-19 RX ADMIN — DIAZEPAM 10 MG: 10 INJECTION, SOLUTION INTRAMUSCULAR; INTRAVENOUS at 21:07

## 2021-09-19 RX ADMIN — ACETAMINOPHEN 650 MG: 650 SUPPOSITORY RECTAL at 19:44

## 2021-09-19 RX ADMIN — MIDAZOLAM 5 MG: 1 INJECTION INTRAMUSCULAR; INTRAVENOUS at 19:30

## 2021-09-19 NOTE — LETTER
Thank you for allowing us to participate in the care of your patient, Casie Hilton, who was hospitalized from 9/19/2021 through 9/22/2021 with the admitting diagnosis of Toxic encephalopathy  Mr China Green presented on admission violent and agitated, he needed sedation and restraints  Later history revealed that he abruptly stopped his sertraline 1 week prior, and also he revealed that he had taken 50 pills of Excedrin the night prior  On the third hospital day, patient was oriented x3 and was verbal  He expressed that he wanted to leave as soon as possible  AMA was signed, risks were discussed with the patient and he said he understood them  He was advised close follow-up with you  Thanks in advance  If you have any additional questions or would like to discuss further, please feel free to contact me      Kady Joshua MD  [place] [id number] Internal Medicine, Hospitalist  [phone]

## 2021-09-19 NOTE — ED PROVIDER NOTES
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 [I10] Essential hypertension     9/21/2021  9:37 AM [place] Add [T66 152] Photophobia     9/21/2021  9:37 AM M[place]il [place] Add [R51 9] Headache       ED Disposition     ED Disposition Condition Date/Time Comment    Admit Stable Sun Sep 19, 2021  8:49 PM Case was discussed with Salomon Horner and the patient's admission status was agreed to be Admission Status: inpatient status to the service of Dr Hazel Knight           Follow-up Information    None         Current Discharge Medication List      CONTINUE these medications which have NOT CHANGED    Details   Accu-Chek Stefanie Plus test strip TEST THREE TIMES DAILY  Qty: 300 strip, Refills: 0    Associated Diagnoses: Type 2 diabetes mellitus without complication, without long-term current use of insulin ([facility])      Accu-Chek FastClix Lancets MISC USE AS DIRECTED  Qty: 306 each, Refills: 6    Associated Diagnoses: Type 2 diabetes mellitus without complication, without long-term current use of insulin ([facility])      Blood Glucose Monitoring Suppl (Accu-Chek Stefanie Plus) w/Device KIT USE AS DIRECTED  Qty: 1 kit, tablet Take 1 tablet (45 mg total) by mouth daily  Qty: 90 tablet, Refills: 3    Associated Diagnoses: Type 2 diabetes mellitus with hyperglycemia, without long-term current use of insulin ([facility])      ramipril (ALTACE) 10 MG capsule Take 1 capsule (10 mg total) by mouth daily  Qty: 90 capsule, Refills: 3    Associated Diagnoses: Essential hypertension      sertraline (ZOLOFT) 100 mg tablet Take 2 tablets (200 mg total) by mouth daily  Qty: 180 tablet, Refills: 3    Associated Diagnoses: Chronic depression      simvastatin (ZOCOR) 20 mg tablet TAKE 1 TABLET (20 MG TOTAL) BY MOUTH DAILY  Qty: 90 tablet, Refills: 3    Associated Diagnoses: Type 2 diabetes mellitus with hyperglycemia, without long-term current use of insulin ([facility])      tadalafil (CIALIS) 5 MG tablet TAKE 1 TABLET BY MOUTH DAILY AS NEEDED FOR ERECTILE DYSFUNCTION  Qty: 10 tablet, Refills: 0    Associated Diagnoses: Erectile dysfunction, unspecified erectile dysfunction type           No discharge procedures on file      PDMP Review       Value Time User    PDMP Reviewed  Yes 8/3/2021  9:05 AM Greyson Bautista PA-C          ED Provider  Electronically Signed by           Dennis Carrasquillo DO  09/21/21 [id number]

## 2021-09-20 PROBLEM — M62.82 NON-TRAUMATIC RHABDOMYOLYSIS: Status: ACTIVE | Noted: 2021-09-20

## 2021-09-20 LAB
ALBUMIN SERPL BCP-MCNC: 3 G/DL (ref 3.5–5)
ALP SERPL-CCNC: 96 U/L (ref 46–116)
ALT SERPL W P-5'-P-CCNC: 22 U/L (ref 12–78)
AMMONIA PLAS-SCNC: <10 UMOL/L (ref 11–35)
ANION GAP SERPL CALCULATED.3IONS-SCNC: 11 MMOL/L (ref 4–13)
ANION GAP SERPL CALCULATED.3IONS-SCNC: 12 MMOL/L (ref 4–13)
ANION GAP SERPL CALCULATED.3IONS-SCNC: 12 MMOL/L (ref 4–13)
ANION GAP SERPL CALCULATED.3IONS-SCNC: 7 MMOL/L (ref 4–13)
AST SERPL W P-5'-P-CCNC: 25 U/L (ref 5–45)
ATRIAL RATE: 60 BPM
ATRIAL RATE: 63 BPM
ATRIAL RATE: 67 BPM
ATRIAL RATE: 71 BPM
ATRIAL RATE: 75 BPM
BASOPHILS # BLD AUTO: 0.05 THOUSANDS/ΜL (ref 0–0.1)
BASOPHILS NFR BLD AUTO: 1 % (ref 0–1)
BETA-HYDROXYBUTYRATE: 0.5 MMOL/L
BILIRUB SERPL-MCNC: 0.78 MG/DL (ref 0.2–1)
BUN SERPL-MCNC: 12 MG/DL (ref 5–25)
BUN SERPL-MCNC: 13 MG/DL (ref 5–25)
BUN SERPL-MCNC: 13 MG/DL (ref 5–25)
BUN SERPL-MCNC: 14 MG/DL (ref 5–25)
CALCIUM ALBUM COR SERPL-MCNC: 9 MG/DL (ref 8.3–10.1)
CALCIUM SERPL-MCNC: 7.8 MG/DL (ref 8.3–10.1)
CALCIUM SERPL-MCNC: 8 MG/DL (ref 8.3–10.1)
CALCIUM SERPL-MCNC: 8.2 MG/DL (ref 8.3–10.1)
CALCIUM SERPL-MCNC: 8.3 MG/DL (ref 8.3–10.1)
CHLORIDE SERPL-SCNC: 110 MMOL/L (ref 100–108)
CHLORIDE SERPL-SCNC: 111 MMOL/L (ref 100–108)
CK MB SERPL-MCNC: 2.1 NG/ML (ref 0–5)
CK MB SERPL-MCNC: 2.3 NG/ML (ref 0–5)
CK MB SERPL-MCNC: <1 % (ref 0–2.5)
CK MB SERPL-MCNC: <1 % (ref 0–2.5)
CK SERPL-CCNC: 424 U/L (ref 39–308)
CK SERPL-CCNC: 559 U/L (ref 39–308)
CO2 SERPL-SCNC: 20 MMOL/L (ref 21–32)
CO2 SERPL-SCNC: 22 MMOL/L (ref 21–32)
CREAT SERPL-MCNC: 0.66 MG/DL (ref 0.6–1.3)
CREAT SERPL-MCNC: 0.78 MG/DL (ref 0.6–1.3)
CREAT SERPL-MCNC: 0.79 MG/DL (ref 0.6–1.3)
CREAT SERPL-MCNC: 0.8 MG/DL (ref 0.6–1.3)
EOSINOPHIL # BLD AUTO: 0 THOUSAND/ΜL (ref 0–0.61)
EOSINOPHIL NFR BLD AUTO: 0 % (ref 0–6)
ERYTHROCYTE [DISTWIDTH] IN BLOOD BY AUTOMATED COUNT: 12.5 % (ref 11.6–15.1)
EST. AVERAGE GLUCOSE BLD GHB EST-MCNC: 194 MG/DL
GFR SERPL CREATININE-BSD FRML MDRD: 107 ML/MIN/1.73SQ M
GFR SERPL CREATININE-BSD FRML MDRD: 98 ML/MIN/1.73SQ M
GFR SERPL CREATININE-BSD FRML MDRD: 99 ML/MIN/1.73SQ M
GFR SERPL CREATININE-BSD FRML MDRD: 99 ML/MIN/1.73SQ M
GLUCOSE SERPL-MCNC: 163 MG/DL (ref 65–140)
GLUCOSE SERPL-MCNC: 168 MG/DL (ref 65–140)
GLUCOSE SERPL-MCNC: 187 MG/DL (ref 65–140)
GLUCOSE SERPL-MCNC: 203 MG/DL (ref 65–140)
GLUCOSE SERPL-MCNC: 211 MG/DL (ref 65–140)
GLUCOSE SERPL-MCNC: 215 MG/DL (ref 65–140)
GLUCOSE SERPL-MCNC: 219 MG/DL (ref 65–140)
HBA1C MFR BLD: 8.4 %
HCT VFR BLD AUTO: 36.9 % (ref 36.5–49.3)
HGB BLD-MCNC: 12.6 G/DL (ref 12–17)
IMM GRANULOCYTES # BLD AUTO: 0.05 THOUSAND/UL (ref 0–0.2)
IMM GRANULOCYTES NFR BLD AUTO: 1 % (ref 0–2)
INR PPP: 1.04 (ref 0.84–1.19)
LACTATE SERPL-SCNC: 0.9 MMOL/L (ref 0.5–2)
LACTATE SERPL-SCNC: 1.1 MMOL/L (ref 0.5–2)
LYMPHOCYTES # BLD AUTO: 1.36 THOUSANDS/ΜL (ref 0.6–4.47)
LYMPHOCYTES NFR BLD AUTO: 14 % (ref 14–44)
MAGNESIUM SERPL-MCNC: 1.8 MG/DL (ref 1.6–2.6)
MCH RBC QN AUTO: 31.5 PG (ref 26.8–34.3)
MCHC RBC AUTO-ENTMCNC: 34.1 G/DL (ref 31.4–37.4)
MCV RBC AUTO: 92 FL (ref 82–98)
MONOCYTES # BLD AUTO: 0.74 THOUSAND/ΜL (ref 0.17–1.22)
MONOCYTES NFR BLD AUTO: 8 % (ref 4–12)
NEUTROPHILS # BLD AUTO: 7.34 THOUSANDS/ΜL (ref 1.85–7.62)
NEUTS SEG NFR BLD AUTO: 76 % (ref 43–75)
NRBC BLD AUTO-RTO: 0 /100 WBCS
P AXIS: -1 DEGREES
P AXIS: 3 DEGREES
P AXIS: 5 DEGREES
P AXIS: 8 DEGREES
P AXIS: 9 DEGREES
PHOSPHATE SERPL-MCNC: 2.9 MG/DL (ref 2.7–4.5)
PLATELET # BLD AUTO: 180 THOUSANDS/UL (ref 149–390)
PLATELET # BLD AUTO: 183 THOUSANDS/UL (ref 149–390)
PMV BLD AUTO: 10.2 FL (ref 8.9–12.7)
PMV BLD AUTO: 10.2 FL (ref 8.9–12.7)
POTASSIUM SERPL-SCNC: 4 MMOL/L (ref 3.5–5.3)
POTASSIUM SERPL-SCNC: 4.1 MMOL/L (ref 3.5–5.3)
POTASSIUM SERPL-SCNC: 4.5 MMOL/L (ref 3.5–5.3)
POTASSIUM SERPL-SCNC: 4.5 MMOL/L (ref 3.5–5.3)
PR INTERVAL: 150 MS
PR INTERVAL: 164 MS
PR INTERVAL: 170 MS
PR INTERVAL: 176 MS
PR INTERVAL: 176 MS
PROCALCITONIN SERPL-MCNC: <0.05 NG/ML
PROCALCITONIN SERPL-MCNC: <0.05 NG/ML
PROT SERPL-MCNC: 6.3 G/DL (ref 6.4–8.2)
PROTHROMBIN TIME: 13.6 SECONDS (ref 11.6–14.5)
QRS AXIS: 14 DEGREES
QRS AXIS: 15 DEGREES
QRS AXIS: 16 DEGREES
QRS AXIS: 17 DEGREES
QRS AXIS: 18 DEGREES
QRSD INTERVAL: 102 MS
QRSD INTERVAL: 108 MS
QRSD INTERVAL: 108 MS
QRSD INTERVAL: 92 MS
QRSD INTERVAL: 94 MS
QT INTERVAL: 424 MS
QT INTERVAL: 426 MS
QT INTERVAL: 450 MS
QT INTERVAL: 472 MS
QT INTERVAL: 486 MS
QTC INTERVAL: 460 MS
QTC INTERVAL: 475 MS
QTC INTERVAL: 475 MS
QTC INTERVAL: 483 MS
QTC INTERVAL: 486 MS
RBC # BLD AUTO: 4 MILLION/UL (ref 3.88–5.62)
SALICYLATES SERPL-MCNC: 5.2 MG/DL (ref 3–20)
SODIUM SERPL-SCNC: 139 MMOL/L (ref 136–145)
SODIUM SERPL-SCNC: 142 MMOL/L (ref 136–145)
SODIUM SERPL-SCNC: 144 MMOL/L (ref 136–145)
SODIUM SERPL-SCNC: 144 MMOL/L (ref 136–145)
T WAVE AXIS: -19 DEGREES
T WAVE AXIS: -8 DEGREES
T WAVE AXIS: -8 DEGREES
T WAVE AXIS: 9 DEGREES
T WAVE AXIS: 9 DEGREES
VENTRICULAR RATE: 60 BPM
VENTRICULAR RATE: 63 BPM
VENTRICULAR RATE: 67 BPM
VENTRICULAR RATE: 71 BPM
VENTRICULAR RATE: 75 BPM
WBC # BLD AUTO: 9.54 THOUSAND/UL (ref 4.31–10.16)

## 2021-09-20 PROCEDURE — 80048 BASIC METABOLIC PNL TOTAL CA: CPT | Performed by: NURSE PRACTITIONER

## 2021-09-20 PROCEDURE — 83735 ASSAY OF MAGNESIUM: CPT | Performed by: NURSE PRACTITIONER

## 2021-09-20 PROCEDURE — 83605 ASSAY OF LACTIC ACID: CPT | Performed by: NURSE PRACTITIONER

## 2021-09-20 PROCEDURE — C9113 INJ PANTOPRAZOLE SODIUM, VIA: HCPCS | Performed by: NURSE PRACTITIONER

## 2021-09-20 PROCEDURE — 85049 AUTOMATED PLATELET COUNT: CPT | Performed by: NURSE PRACTITIONER

## 2021-09-20 PROCEDURE — 82550 ASSAY OF CK (CPK): CPT | Performed by: NURSE PRACTITIONER

## 2021-09-20 PROCEDURE — 82553 CREATINE MB FRACTION: CPT | Performed by: NURSE PRACTITIONER

## 2021-09-20 PROCEDURE — 82010 KETONE BODYS QUAN: CPT | Performed by: PHYSICIAN ASSISTANT

## 2021-09-20 PROCEDURE — 94760 N-INVAS EAR/PLS OXIMETRY 1: CPT

## 2021-09-20 PROCEDURE — 83605 ASSAY OF LACTIC ACID: CPT | Performed by: PHYSICIAN ASSISTANT

## 2021-09-20 PROCEDURE — 82140 ASSAY OF AMMONIA: CPT | Performed by: INTERNAL MEDICINE

## 2021-09-20 PROCEDURE — 82948 REAGENT STRIP/BLOOD GLUCOSE: CPT

## 2021-09-20 PROCEDURE — 99291 CRITICAL CARE FIRST HOUR: CPT | Performed by: NURSE PRACTITIONER

## 2021-09-20 PROCEDURE — 84100 ASSAY OF PHOSPHORUS: CPT | Performed by: NURSE PRACTITIONER

## 2021-09-20 PROCEDURE — 94762 N-INVAS EAR/PLS OXIMTRY CONT: CPT

## 2021-09-20 PROCEDURE — 85610 PROTHROMBIN TIME: CPT

## 2021-09-20 PROCEDURE — 93005 ELECTROCARDIOGRAM TRACING: CPT

## 2021-09-20 PROCEDURE — 80053 COMPREHEN METABOLIC PANEL: CPT | Performed by: NURSE PRACTITIONER

## 2021-09-20 PROCEDURE — 93010 ELECTROCARDIOGRAM REPORT: CPT | Performed by: INTERNAL MEDICINE

## 2021-09-20 PROCEDURE — 84145 PROCALCITONIN (PCT): CPT | Performed by: EMERGENCY MEDICINE

## 2021-09-20 PROCEDURE — 85025 COMPLETE CBC W/AUTO DIFF WBC: CPT | Performed by: NURSE PRACTITIONER

## 2021-09-20 PROCEDURE — 80179 DRUG ASSAY SALICYLATE: CPT | Performed by: NURSE PRACTITIONER

## 2021-09-20 PROCEDURE — 3052F HG A1C>EQUAL 8.0%<EQUAL 9.0%: CPT | Performed by: ORTHOPAEDIC SURGERY

## 2021-09-20 RX ORDER — ACETAMINOPHEN 325 MG/1
650 TABLET ORAL EVERY 6 HOURS PRN
Status: DISCONTINUED | OUTPATIENT
Start: 2021-09-20 | End: 2021-09-22 | Stop reason: HOSPADM

## 2021-09-20 RX ORDER — NICOTINE 21 MG/24HR
21 PATCH, TRANSDERMAL 24 HOURS TRANSDERMAL DAILY
Status: DISCONTINUED | OUTPATIENT
Start: 2021-09-20 | End: 2021-09-22 | Stop reason: HOSPADM

## 2021-09-20 RX ORDER — DEXMEDETOMIDINE 100 UG/ML
.1-.7 INJECTION, SOLUTION, CONCENTRATE INTRAVENOUS
Status: DISCONTINUED | OUTPATIENT
Start: 2021-09-20 | End: 2021-09-20

## 2021-09-20 RX ORDER — MAGNESIUM SULFATE HEPTAHYDRATE 40 MG/ML
2 INJECTION, SOLUTION INTRAVENOUS ONCE
Status: COMPLETED | OUTPATIENT
Start: 2021-09-20 | End: 2021-09-20

## 2021-09-20 RX ORDER — PANTOPRAZOLE SODIUM 20 MG/1
20 TABLET, DELAYED RELEASE ORAL
Status: DISCONTINUED | OUTPATIENT
Start: 2021-09-21 | End: 2021-09-22 | Stop reason: HOSPADM

## 2021-09-20 RX ORDER — NICOTINE 21 MG/24HR
21 PATCH, TRANSDERMAL 24 HOURS TRANSDERMAL DAILY
Status: DISCONTINUED | OUTPATIENT
Start: 2021-09-21 | End: 2021-09-20

## 2021-09-20 RX ORDER — OXYCODONE HYDROCHLORIDE 10 MG/1
10 TABLET ORAL EVERY 4 HOURS PRN
Status: DISCONTINUED | OUTPATIENT
Start: 2021-09-20 | End: 2021-09-22 | Stop reason: HOSPADM

## 2021-09-20 RX ORDER — POLYVINYL ALCOHOL 14 MG/ML
2 SOLUTION/ DROPS OPHTHALMIC
Status: DISCONTINUED | OUTPATIENT
Start: 2021-09-20 | End: 2021-09-20

## 2021-09-20 RX ORDER — ACETAMINOPHEN 650 MG/1
325 SUPPOSITORY RECTAL EVERY 4 HOURS PRN
Status: DISCONTINUED | OUTPATIENT
Start: 2021-09-20 | End: 2021-09-20

## 2021-09-20 RX ORDER — OXYCODONE HYDROCHLORIDE 5 MG/1
5 TABLET ORAL EVERY 4 HOURS PRN
Status: DISCONTINUED | OUTPATIENT
Start: 2021-09-20 | End: 2021-09-22 | Stop reason: HOSPADM

## 2021-09-20 RX ORDER — KETOROLAC TROMETHAMINE 30 MG/ML
15 INJECTION, SOLUTION INTRAMUSCULAR; INTRAVENOUS ONCE
Status: COMPLETED | OUTPATIENT
Start: 2021-09-20 | End: 2021-09-20

## 2021-09-20 RX ADMIN — NICOTINE 21 MG: 21 PATCH, EXTENDED RELEASE TRANSDERMAL at 17:53

## 2021-09-20 RX ADMIN — OXYCODONE HYDROCHLORIDE 10 MG: 10 TABLET ORAL at 16:24

## 2021-09-20 RX ADMIN — DICLOFENAC SODIUM 2 G: 10 GEL TOPICAL at 18:57

## 2021-09-20 RX ADMIN — OXYCODONE HYDROCHLORIDE 5 MG: 5 TABLET ORAL at 14:55

## 2021-09-20 RX ADMIN — OXYCODONE HYDROCHLORIDE 10 MG: 10 TABLET ORAL at 21:41

## 2021-09-20 RX ADMIN — SODIUM CHLORIDE, SODIUM GLUCONATE, SODIUM ACETATE, POTASSIUM CHLORIDE, MAGNESIUM CHLORIDE, SODIUM PHOSPHATE, DIBASIC, AND POTASSIUM PHOSPHATE 125 ML/HR: .53; .5; .37; .037; .03; .012; .00082 INJECTION, SOLUTION INTRAVENOUS at 01:27

## 2021-09-20 RX ADMIN — INSULIN LISPRO 2 UNITS: 100 INJECTION, SOLUTION INTRAVENOUS; SUBCUTANEOUS at 01:58

## 2021-09-20 RX ADMIN — SODIUM CHLORIDE 0.5 MCG/KG/HR: 9 INJECTION, SOLUTION INTRAVENOUS at 08:56

## 2021-09-20 RX ADMIN — THIAMINE HYDROCHLORIDE 500 MG: 100 INJECTION, SOLUTION INTRAMUSCULAR; INTRAVENOUS at 13:07

## 2021-09-20 RX ADMIN — POTASSIUM CHLORIDE 20 MEQ: 14.9 INJECTION, SOLUTION INTRAVENOUS at 00:37

## 2021-09-20 RX ADMIN — DICLOFENAC SODIUM 2 G: 10 GEL TOPICAL at 21:41

## 2021-09-20 RX ADMIN — GLYCERIN 2 DROP: .002; .002; .01 SOLUTION/ DROPS OPHTHALMIC at 19:55

## 2021-09-20 RX ADMIN — KETOROLAC TROMETHAMINE 15 MG: 30 INJECTION, SOLUTION INTRAMUSCULAR at 20:10

## 2021-09-20 RX ADMIN — ACETAMINOPHEN 650 MG: 325 TABLET, FILM COATED ORAL at 13:31

## 2021-09-20 RX ADMIN — THIAMINE HYDROCHLORIDE 500 MG: 100 INJECTION, SOLUTION INTRAMUSCULAR; INTRAVENOUS at 05:41

## 2021-09-20 RX ADMIN — FOLIC ACID 1 MG: 5 INJECTION, SOLUTION INTRAMUSCULAR; INTRAVENOUS; SUBCUTANEOUS at 01:59

## 2021-09-20 RX ADMIN — MAGNESIUM SULFATE HEPTAHYDRATE 2 G: 40 INJECTION, SOLUTION INTRAVENOUS at 08:57

## 2021-09-20 RX ADMIN — INSULIN LISPRO 2 UNITS: 100 INJECTION, SOLUTION INTRAVENOUS; SUBCUTANEOUS at 05:41

## 2021-09-20 RX ADMIN — POTASSIUM CHLORIDE 20 MEQ: 14.9 INJECTION, SOLUTION INTRAVENOUS at 02:24

## 2021-09-20 RX ADMIN — SODIUM CHLORIDE 0.7 MCG/KG/HR: 9 INJECTION, SOLUTION INTRAVENOUS at 02:49

## 2021-09-20 RX ADMIN — FOLIC ACID 1 MG: 5 INJECTION, SOLUTION INTRAMUSCULAR; INTRAVENOUS; SUBCUTANEOUS at 08:48

## 2021-09-20 RX ADMIN — PANTOPRAZOLE SODIUM 40 MG: 40 INJECTION, POWDER, FOR SOLUTION INTRAVENOUS at 08:53

## 2021-09-20 RX ADMIN — OXYCODONE HYDROCHLORIDE 5 MG: 5 TABLET ORAL at 19:11

## 2021-09-20 RX ADMIN — INSULIN LISPRO 1 UNITS: 100 INJECTION, SOLUTION INTRAVENOUS; SUBCUTANEOUS at 12:32

## 2021-09-20 RX ADMIN — HEPARIN SODIUM 5000 UNITS: 5000 INJECTION INTRAVENOUS; SUBCUTANEOUS at 05:41

## 2021-09-20 NOTE — RESTRAINT FACE TO FACE
Restraint Face to Face   Courtney Briones 60 y o  male MRN: [id number]  Unit/Bed#: ICU 03 Encounter: [id number]      Physical Evaluation:Physical Exam  Vitals reviewed  Exam conducted with a chaperone present  Constitutional:       Interventions: He is sedated and restrained  Nasal cannula in place  HENT:      Head: Normocephalic  Eyes:      Extraocular Movements:      Right eye: No nystagmus  Left eye: No nystagmus  Pupils: Pupils are equal, round, and reactive to light  Comments: Pupils 3mm b/l   Pulmonary:      Effort: Pulmonary effort is normal  No accessory muscle usage  Breath sounds: Normal breath sounds  Abdominal:      Palpations: Abdomen is soft  Tenderness: There is no abdominal tenderness  Genitourinary:     Comments: + perez  Skin:     General: Skin is warm and dry  Capillary Refill: Capillary refill takes less than 2 seconds  Neurological:      Mental Status: He is easily aroused  GCS: GCS eye subscore is 3  GCS verbal subscore is 1  GCS motor subscore is 5  Purpose for Restraints/ Seclusion High risk for self harm  Patient's reaction to the intervention patient currently sedated  Not following commands  Patient's medical condition encephalopathy  Patient's Behavioral condition sedated  Restraints to be Continued  Anticipate discontinuation if remains sedated

## 2021-09-20 NOTE — ASSESSMENT & PLAN NOTE
· Present on admission , QTc 508  · EKGs q2h due to toxodrome clinical picture  · Toxicology placing recommendations for magnesium and sodium bicarbonate   · Magnesium Sulfate IVPB 2 g x1 on arrival to ICU

## 2021-09-20 NOTE — ASSESSMENT & PLAN NOTE
· Concern for serotonin syndrome vs acute opioid withdrawal vs toxic ingestion vs Wernicke's encephalopathy vs Wellbutrin overdose  · Given afebrile on arrival and WBC 12 less concern for infectious process -- will monitor off antibiotics  · Received dose of ceftriaxone in ER  · Patient reportedly at home with agitation/combative and acute delirium  Police and EMS called to scene  He received Ketamine  mg en route to the ER    · On arrival to the ER was noted to be tachycardic with severe agitation  · Received Ativan IV 2 mg for concern of acute alcohol withdrawal  · Received Fentanyl IV 50 mcg for concern of acute opioid withdrawal  · Remained combative and agitated requiring Versed IV 5 mg to transport to CT scan and an additional 5 mg in order to obtain images  · CT head/neck -- no acute abnormality  · CT c/a/p -- no acute abnormality  · Avoid atypical antipsycohtics, fentanyl per toxicology  · EKG on arrival to ICU and q2h for QRS, QTc abnormality  · UDS + THC  · AGMA anion gap 16, serum CO2 20 -- gap now closed  · BMP q4h  · Toxicology recommendations  · Avoid serotonin agents, metformin, anticholinergics, atypical antipsychotics  · For agitation  · Valium IV 10-20 mg q2h as needed   · Haldol IV 5 mg q4h as needed   · Avoid atypical antipsychotics  · Lumbar puncture if neurologic status worsens   · Regulate sleep/wake cycle

## 2021-09-20 NOTE — ASSESSMENT & PLAN NOTE
No results found for: HGBA1C    Recent Labs     09/19/21  1712   POCGLU 224*       Blood Sugar Average: Last 72 hrs:  (P) 224     · Holding home metformin, glimepiride, and pioglitazone  · Start AccuChecks with SSI q6h while NPO  · Check A1c

## 2021-09-20 NOTE — RESTRAINT FACE TO FACE
Restraint Face to Face   Iman Briones 60 y o  male MRN: [id number]  Unit/Bed#: ICU 03 Encounter: [id number]      Physical Evaluation: Combative  Disoriented  Peripheral pulses 2+ in all four extremities  Cap refill < 2 sec in all four extremities  Skin is warm, pink, and intact throughout  Purpose for Restraints/ Seclusion High risk for causing significant disruption of treatment environment  and High risk for harm to others  Patient's reaction to the intervention: Remains disoriented  Patient's medical condition: Acute encephalopathy of unclear etiology  Vitals stable  Airway patent  Patient's Behavioral condition: Agitated and combative  Disoriented  Restraints to be Continued    Patient was placed in locked restraints in the ER  Upon admission, I re-evaluated patient and re-ordered restraints to be continued upon admission

## 2021-09-20 NOTE — ASSESSMENT & PLAN NOTE
· Normotensive in ER, except when agitated  · Holding home ramipril in setting of GUSTAVO and NPO status  · Monitor BP closely, can consider labetalol or hydralazine if remains hypertensive when no longer agitated

## 2021-09-20 NOTE — PLAN OF CARE
Problem: MOBILITY - ADULT  Goal: Maintain or return to baseline ADL function  Description: INTERVENTIONS:  -  Assess patient's ability to carry out ADLs; assess patient's baseline for ADL function and identify physical deficits which impact ability to perform ADLs (bathing, care of mouth/teeth, toileting, grooming, dressing, etc )  - Assess/evaluate cause of self-care deficits   - Assess range of motion  - Assess patient's mobility; develop plan if impaired  - Assess patient's need for assistive devices and provide as appropriate  - Encourage maximum independence but intervene and supervise when necessary  - Involve family in performance of ADLs  - Assess for home care needs following discharge   - Consider OT consult to assist with ADL evaluation and planning for discharge  - Provide patient education as appropriate  Outcome: Progressing  Goal: Maintains/Returns to pre admission functional level  Description: INTERVENTIONS:  - Perform BMAT or MOVE assessment daily    - Set and communicate daily mobility goal to care team and patient/family/caregiver  - Collaborate with rehabilitation services on mobility goals if consulted  - Perform Range of Motion  times a day  - Reposition patient every  hours  - Dangle patient  times a day  - Stand patient  times a day  - Ambulate patient  times a day  - Out of bed to chair  times a day   - Out of bed for meal times a day  - Out of bed for toileting  - Record patient progress and toleration of activity level   Outcome: Progressing     Problem: Nutrition/Hydration-ADULT  Goal: Nutrient/Hydration intake appropriate for improving, restoring or maintaining nutritional needs  Description: Monitor and assess patient's nutrition/hydration status for malnutrition  Collaborate with interdisciplinary team and initiate plan and interventions as ordered  Monitor patient's weight and dietary intake as ordered or per policy  Utilize nutrition screening tool and intervene as necessary  Determine patient's food preferences and provide high-protein, high-caloric foods as appropriate       INTERVENTIONS:  - Monitor oral intake, urinary output, labs, and treatment plans  - Assess nutrition and hydration status and recommend course of action  - Evaluate amount of meals eaten  - Assist patient with eating if necessary   - Allow adequate time for meals  - Recommend/ encourage appropriate diets, oral nutritional supplements, and vitamin/mineral supplements  - Order, calculate, and assess calorie counts as needed  - Recommend, monitor, and adjust tube feedings and TPN/PPN based on assessed needs  - Assess need for intravenous fluids  - Provide specific nutrition/hydration education as appropriate  - Include patient/family/caregiver in decisions related to nutrition  Outcome: Progressing     Problem: Potential for Falls  Goal: Patient will remain free of falls  Description: INTERVENTIONS:  - Educate patient/family on patient safety including physical limitations  - Instruct patient to call for assistance with activity   - Consult OT/PT to assist with strengthening/mobility   - Keep Call bell within reach  - Keep bed low and locked with side rails adjusted as appropriate  - Keep care items and personal belongings within reach  - Initiate and maintain comfort rounds  - Make Fall Risk Sign visible to staff  - Offer Toileting every  Hours, in advance of need  - Initiate/Maintain alarm  - Obtain necessary fall risk management equipment:   - Apply yellow socks and bracelet for high fall risk patients  - Consider moving patient to room near nurses station  Outcome: Progressing

## 2021-09-20 NOTE — RESTRAINT FACE TO FACE
Restraint Face to Face   Stefanie Rudy Briones 60 y o  male MRN: [id number]  Unit/Bed#: ICU 03 Encounter: [id number]      Physical Evaluation: Resting quietly with intermittent outbursts  Skin warm, pink, CDI  Pulses 2+ in all four extremities  No edema  Cap refill < 2 seconds in all four extremities  Purpose for Restraints/ Seclusion High risk for causing significant disruption of treatment environment  and High risk for harm to others  Patient's reaction to the intervention Tolerating well  Patient's medical condition Remains acutely encephalopathic  Patient's Behavioral condition Disoriented  Currently resting quietly but becomes very agitated with slight stimulation     Restraints to be Continued

## 2021-09-20 NOTE — PROGRESS NOTES
[facility]  Progress Note - Ayah Odonnellr 1965, 60 y o  male MRN: [id number]  Unit/Bed#: ICU 03 Encounter: [id number]  Primary Care Provider: Hira Ponce MD   Date and time admitted to hospital: 9/19/2021  5:03 PM    * Acute toxic encephalopathy  Assessment & Plan  · Concern for serotonin syndrome vs acute alcohol withdrawal vs acute opioid withdrawal vs toxic ingestion vs Wernicke's encephalopathy vs Wellbutrin overdose  · Given afebrile on arrival and WBC 12 less concern for infectious process -- will monitor off antibiotics  · Received dose of ceftriaxone in ER  · Patient reportedly at home with agitation/combative and acute delirium  Police and EMS called to scene  He received Ketamine  mg en route to the ER  · On arrival to the ER was noted to be tachycardic with severe agitation  · Received Ativan IV 2 mg for concern of acute alcohol withdrawal  · Received Fentanyl IV 50 mcg for concern of acute opioid withdrawal  · Remained combative and agitated requiring Versed IV 5 mg to transport to CT scan and an additional 5 mg in order to obtain images  · CT head/neck -- no acute abnormality  · CT c/a/p -- no acute abnormality  · Avoid atypical antipsycohtics, fentanyl per toxicology  · EKG on arrival to ICU and q2h for QRS, QTc abnormality  · UDS + THC  · AGMA anion gap 16, serum CO2 20 -- gap now closed    · BMP q4h  · Lactic acid 2 7, 3 8, 0 9 -- likely in the setting of acute agitation / combativeness  · No further need to trend  · , will trend  · Continue IVF with isolyte  · Capnography  · Continue restraints as needed  · 1:1 continuous observation   · Provide quiet/dark environment   · Seizure precautions  · Continue supportive care  · Continue precedex infusion and titrate for goal RASS 0    · Toxicology recommendations  · QTc > 500 - magnesium sulfate IVPB  · QRS > 120 - sodium bicarbonate bolus  mEq and infusion   · For agitation  · Valium IV 10-20 mg q2h as needed   · Haldol IV 5 mg q4h as needed   · Avoid atypical antipsychotics      GUSTAVO (acute kidney injury) ([facility] [address] )  Assessment & Plan  · Likely pre-renal as family reports that he has not been eating or drinking well for a week  · Baseline creatinine 0 7, on admission 1 34, currently 1 16  · AGMA present on admission  · Initial salicylate level 9 3  · Repeat salicylate level 8 9  · Given NaHCO3 50 mEq, salicylate level pending  · Continue IVF as above  · Check BMP q4h    Prolonged Q-T interval on ECG  Assessment & Plan  · Present on admission , QTc 508  · EKGs q2h due to toxodrome clinical picture  · Toxicology placing recommendations for magnesium and sodium bicarbonate   · Magnesium Sulfate IVPB 2 g x1 on arrival to ICU    [facility]  Assessment & Plan  No results found for: HGBA1C    Recent Labs     09/19/21  1712 09/20/21  0150   POCGLU 224* 219*       Blood Sugar Average: Last 72 hrs:  (P) 221 5     · Holding home metformin, glimepiride, and pioglitazone  · Start AccuChecks with SSI q6h while NPO  · Goal blood glucose 140-180    Alcohol abuse  Assessment & Plan  · Reportedly abstinent    Of note did have admission for pancreatitis Feb 2021  · Lipase 165  · EtOH negative  · Did receive lorazepam 2 mg IV in the ER for concern of EtOH withdrawal   · Continue high dose thiamine  mg q 8 hours in the setting of possible Wernicke's encephalopathy  · Continue folic acid IV daily    Essential hypertension  Assessment & Plan  · Remains normotensive  · Holding home ramipril in setting of GUSTAVO and NPO status  · Monitor BP closely, can consider labetalol or hydralazine if remains hypertensive when no longer agitated    Chronic bilateral low back pain with bilateral sciatica  Assessment & Plan  · Reportedly has been on opioids for prolonged period of time  · UDS negative for opioids  · Received Fentanyl IV 50 mcg in ER for concern of acute opioid withdrawal  · Watch for s/s of acute withdrawal    Chronic depression  Assessment & Plan  · Holding home sertraline and Wellbutrin in the setting of acute encephalopathy/toxodrome picture    Non-traumatic rhabdomyolysis  Assessment & Plan  · In the setting of dehydration, GUSTAVO, and severe agitation  · Continue to trend  · Continue IVF    Gastroesophageal reflux disease without esophagitis  Assessment & Plan  · Hold home omeprazole due to NPO  · Start Protonix IV 40 mg daily    Dyslipidemia  Assessment & Plan  · Holding home fenofibrate and simvastatin until able to take PO safely    Hepatic steatosis  Assessment & Plan  · Likely due to history of alcohol abuse  · LFTs at baseline  · ABD exam benign      ----------------------------------------------------------------------------------------  HPI/24hr events:   · Admitted last evening for acute cephalopathy/toxidrome syndrome  · Received Valium IV 10 mg overnight    Patient appropriate for transfer out of the ICU today?: No  Disposition: Continue Stepdown Level 1 level of care   Code Status: Level 1 - Full Code  ---------------------------------------------------------------------------------------  SUBJECTIVE  Unable to elicit secondary to encephalopathy/delirium    Review of Systems   Unable to perform ROS: Mental status change     Review of systems was unable to be performed secondary to Encephalopathy/delirium  ---------------------------------------------------------------------------------------  OBJECTIVE    Vitals   Vitals:    21 2245 21 0200 21 0400 21 0503   BP:  142/73 146/70    BP Location:       Pulse:  71 68    Resp:   20    Temp:       TempSrc:       SpO2: 98% 98% 99% 98%   Weight:       Height:         Temp (24hrs), Av 9 °F (37 2 °C), Min:97 1 °F (36 2 °C), Max:100 7 °F (38 2 °C)  Current: Temperature: (!) 100 7 °F (38 2 °C)          Respiratory:  SpO2: SpO2: 98 %, SpO2 Device: O2 Device: EtCO2 nasal cannula       Invasive/non-invasive ventilation settings   Respiratory    Lab Data (Last 4 hours)    None         O2/Vent Data (Last 4 hours)    None                Physical Exam  Vitals reviewed  Constitutional:       General: He is not in acute distress  Appearance: He is not ill-appearing or toxic-appearing  HENT:      Head: Normocephalic and atraumatic  Right Ear: External ear normal       Left Ear: External ear normal       Nose: Nose normal  No congestion or rhinorrhea  Mouth/Throat:      Mouth: Mucous membranes are moist       Pharynx: Oropharynx is clear  No oropharyngeal exudate or posterior oropharyngeal erythema  Eyes:      General: No scleral icterus  Extraocular Movements: Extraocular movements intact  Conjunctiva/sclera: Conjunctivae normal       Pupils: Pupils are equal, round, and reactive to light  Cardiovascular:      Rate and Rhythm: Normal rate and regular rhythm  Pulses: Normal pulses  Heart sounds: Normal heart sounds  No murmur heard  No friction rub  No gallop  Pulmonary:      Effort: Pulmonary effort is normal  No respiratory distress  Breath sounds: Normal breath sounds  No wheezing, rhonchi or rales  Abdominal:      General: Abdomen is flat  Bowel sounds are normal  There is no distension  Tenderness: There is no abdominal tenderness  Musculoskeletal:         General: No swelling or tenderness  Normal range of motion  Cervical back: Normal range of motion and neck supple  No rigidity  Right lower leg: No edema  Left lower leg: No edema  Skin:     General: Skin is warm  Capillary Refill: Capillary refill takes less than 2 seconds  Coloration: Skin is not pale  Findings: No bruising, erythema or rash  Neurological:      Mental Status: He is disoriented  GCS: GCS eye subscore is 3  GCS verbal subscore is 3  GCS motor subscore is 5  Cranial Nerves: No dysarthria or facial asymmetry  Motor: No seizure activity        Comments: Gag, cough, and corneal reflexes intact  Psychiatric:         Behavior: Behavior is agitated and combative  Cognition and Memory: Cognition is impaired  Judgment: Judgment is impulsive and inappropriate  Comments: Patient waxing and waning between common sedate verses agitated and combative         Laboratory and Diagnostics:  Results from last 7 days   Lab Units 09/20/21  0153 09/19/21  1717   WBC Thousand/uL  --  11 21*   HEMOGLOBIN g/dL  --  16 1   HEMATOCRIT %  --  45 1   PLATELETS Thousands/uL 180 300   NEUTROS PCT %  --  72   MONOS PCT %  --  9     Results from last 7 days   Lab Units 09/20/21  0153 09/19/21  2234 09/19/21  1718   SODIUM mmol/L 144 142 138   POTASSIUM mmol/L 4 5 3 6 4 0   CHLORIDE mmol/L 110* 109* 102   CO2 mmol/L 22 22 20*   ANION GAP mmol/L 12 11 16*   BUN mg/dL 12 12 14   CREATININE mg/dL 0 78 1 16 1 34*   CALCIUM mg/dL 7 8* 8 3 9 3   GLUCOSE RANDOM mg/dL 203* 201* 226*   ALT U/L  --   --  32   AST U/L  --   --  41   ALK PHOS U/L  --   --  131*   ALBUMIN g/dL  --   --  3 9   TOTAL BILIRUBIN mg/dL  --   --  0 88          Results from last 7 days   Lab Units 09/19/21  1718   INR  1 02   PTT seconds 29          Results from last 7 days   Lab Units 09/20/21  0306 09/19/21  2106 09/19/21  1717   LACTIC ACID mmol/L 0 9 3 8* 2 7*     ABG:    VBG:  Results from last 7 days   Lab Units 09/19/21  1852   PH SYDNEY  7 362   PCO2 SYDNEY mm Hg 32 5*   PO2 SYDNEY mm Hg 75 3*   HCO3 SYDNEY mmol/L 18 0*   BASE EXC SYDNEY mmol/L -6 1     Results from last 7 days   Lab Units 09/19/21  1718   PROCALCITONIN ng/ml <0 05       Micro  Results from last 7 days   Lab Units 09/19/21  1740 09/19/21  1718   BLOOD CULTURE  Received in Microbiology Lab  Culture in Progress  Received in Microbiology Lab  Culture in Progress  EKG:  Sinus rhythm to sinus tachycardia on telemetry  Imaging: I have personally reviewed pertinent reports     and I have personally reviewed pertinent films in PACS    Intake and Output  I/O 09/18 0701 - 09/19 0700 09/19 0701 - 09/20 0700    IV Piggyback  3050    Total Intake(mL/kg)  3050 (30 2)    Net  +3050                Height and Weights   Height: 5' 11" (180 3 cm)  IBW (Ideal Body Weight): 75 3 kg  Body mass index is 31 21 kg/m²  Weight (last 2 days)     Date/Time   Weight    09/19/21 1713   101 (223 77)                Nutrition       Diet Orders   (From admission, onward)             Start     Ordered    09/19/21 2206  Room Service  Once     Question:  Type of Service  Answer:  Room Service - Appropriate with Assistance    09/19/21 2205 09/19/21 2158  Diet NPO  Diet effective now     Question Answer Comment   Diet Type NPO    RD to adjust diet per protocol?  Yes        09/19/21 2157                  Active Medications  Scheduled Meds:  Current Facility-Administered Medications   Medication Dose Route Frequency Provider Last Rate    dexmedetomidine  0 1-0 7 mcg/kg/hr Intravenous Titrated [place] Jewels, CRNP 0 7 mcg/kg/hr (24/64/71 1326)    folic acid IVPB  1 mg Intravenous Daily Gold Jewels, CRNP 1 mg (09/20/21 0159)    heparin (porcine)  5,000 Units Subcutaneous [facility] Barnie Crate [place], [address]      insulin lispro  1-6 Units Subcutaneous Q6H [address] Gold Jewels, [address]      multi-electrolyte  125 mL/hr Intravenous Continuous Barnie Crate [place], [address] 125 mL/hr (09/20/21 0127)    pantoprazole  40 mg Intravenous Q24H [address] Barnie Crate [place], [address]      thiamine  500 mg Intravenous Q8H Barnie Crate [place], [address] 500 mg (09/19/21 2309)     Continuous Infusions:  dexmedetomidine, 0 1-0 7 mcg/kg/hr, Last Rate: 0 7 mcg/kg/hr (09/20/21 0249)  multi-electrolyte, 125 mL/hr, Last Rate: 125 mL/hr (09/20/21 0127)      PRN Meds:        Invasive Devices Review  Invasive Devices     Peripheral Intravenous Line            Peripheral IV 09/19/21 Left Hand <1 day    Peripheral IV 09/19/21 Left;Upper Arm <1 day          Drain            Urethral Catheter Straight-tip 16 Fr  <1 day                Rationale for remaining devices:   9/19 PIV: IV access/medications  9/19 [facility] catheter: Accurate urine output, will attempt to discontinue today  ---------------------------------------------------------------------------------------  Advance Directive and Living Will:      Power of :    POLST:    ---------------------------------------------------------------------------------------  Care Time Delivered:   Upon my evaluation, this patient had a high probability of imminent or life-threatening deterioration due to Acute encephalopathy, acute kidney injury, suspected toxidrome, which required my direct attention, intervention, and personal management  I have personally provided 40 minutes ([phone] 0522) of critical care time, exclusive of procedures, teaching, family meetings, and any prior time recorded by providers other than myself  KRISTOFER Naylor      Portions of the record may have been created with voice recognition software  Occasional wrong word or "sound a like" substitutions may have occurred due to the inherent limitations of voice recognition software    Read the chart carefully and recognize, using context, where substitutions have occurred

## 2021-09-20 NOTE — ASSESSMENT & PLAN NOTE
· Likely pre-renal as family reports that he has not been eating or drinking well for a week  · Baseline creatinine 0 7, on admission 1 34  · There is also a gapped metabolic acidosis  · Initial salicylate level 9 3  · Repeat salicylate level 8 9  · Will give sodium bicarb now and recheck level with 0200 labs  · Received NSS 4 L in ER  · Check BMP now and q4h

## 2021-09-20 NOTE — ASSESSMENT & PLAN NOTE
· Concern for serotonin syndrome vs acute alcohol withdrawal vs acute opioid withdrawal vs toxic ingestion vs   · Given afebrile on arrival and WBC 12 less concern for infectious process -- will monitor off antibiotics  · Received dose of ceftriaxone in ER  · Patient reportedly at home with agitation/combative and acute delirium  Police and EMS called to scene  He received Ketamine  mg en route to the ER    · On arrival to the ER was noted to be tachycardic with severe agitation  · Received Ativan IV 2 mg for concern of acute alcohol withdrawal  · Received Fentanyl IV 50 mcg for concern of acute opioid withdrawal  · Remained combative and agitated requiring Versed IV 5 mg to transport to CT scan and an additional 5 mg in order to obtain images  · CT head/neck -- no acute abnormality  · CT c/a/p -- no acute abnormality  · Avoid atypical antipsycohtics, fentanyl per toxicology  · EKG on arrival to ICU and q2h for QRS, QTc abnormality  · UDS + THC  · AGMA anion gap 16, serum CO2 20  · BMP q4h  · Lactic acid 2 7 -- likely in the setting of acute agitation / combativeness  · , will trend   · Received NSS 4L in ER  · Will check BMP on arrival to ICU prior to adding maintenance IVF  · Capnography  · Continue restraints as needed  · 1:1 continuous observation   · Provide quiet/dark environment   · Seizure precautions  · Continue supportive care  · Start precedex infusion and titrate for goal RASS 0    · Toxicology recommendations  · QTc > 500 - magnesium sulfate IVPB  · QRS > 120 - sodium bicarbonate bolus  mEq and infusion   · For agitation  · Valium IV 10-20 mg q2h as needed   · Haldol IV 5 mg q4h as needed   · Avoid atypical antipsychotics

## 2021-09-20 NOTE — CONSULTS
Consultation - Medical Toxicology  Rosalina Martin 60 y o  male MRN: [id number]  Unit/Bed#: ED 24 Encounter: [id number]      Reason for Consult / Principal Problem: Encephalopathy  Inpatient consult to Toxicology  Consult performed by: Stacy Coates MD  Consult ordered by: Adamaris Maldonado DO        09/19/21      ASSESSMENT:  59yoM presenting for evaluation of agitation with concern for toxic encephalopathy    RECOMMENDATIONS:  1  Toxic encephalopathy: Patient's presentation is concerning for toxic encephalopathy  Differential diagnosis includes serotonin syndrome, sympathomimetic or anticholinergic toxicity, drug ingestion, JULIANNA, toxic alcohol ingestion, opioid or alcohol withdrawal     Patient had a detectable salicylate level--please repeat and ensure it is down-trending or negative  At high concentrations, salicylates can cause agitation, seizures, acidosis, tachypnea, and multi-organ dysfunction  The patient is agitated, tachycardic, and hypertensive  He does not have evidence of hyperreflexia or clonus, so my suspicion for serotonin syndrome is low at this time  However, recommend holding all serotonergic agents, including fentanyl, until mental status improves  He does not appear to be floridly anticholinergic; would avoid anticholinergic medications and treat as recommended below with benzodiazepines  Lactate is 2 7; JULIANNA, though a consideration, would usually present with a preceding insult causing GUSTAVO with significant lactate elevations  Hold metformin and repeat lactate after IVF hydration  The patient reportedly has stopped heavy alcohol use and my concern for opioid or alcohol withdrawal is low at this time  Delirium tremens is a consideration given his history, autonomic instability, and agitation  Treatment with benzodiazepines should assist in treatment if this is the ultimate cause  High dose thiamine (500 mg IV every 8 hours) can be considered if Wernicke's is a concern  Opioid withdrawal usually does not cause this degree of agitation  Family denies patient has access to toxic alcohols, and I would expect more significant metabolic derangements and/or acidosis if he had ingested a toxic alcohol over the last several days  My concern from a toxicology standpoint is bupropion or other sympathomimetic ingestion given his degree of agitation, tachycardia, diaphoresis, and hypertension  Recommend benzodiazepines (diazepam 10-20 mg IV every 2 hours) as needed for agitation with haloperidol 5 mg IV every 4 hours as needed for agitation as adjunctive therapy  Additionally, dexmedetomidine infusion, a central alpha-2 agonist and sympatholytic, is an excellent choice for sedation in bupropion and other sympathomimetic toxicities  Would avoid atypical antipsychotics given multiple receptor effects and potential for worsening of his encephalopathy  Repeat EKG in 2 hours to ensure intervals are not widening; recommend magnesium repletion for QTc > 500 ms to prevent early after depolarizations  If QRS > 120 ms, give 2-3 amps of bicarbonate and initiate bicarbonate infusion at 1 5-2x mIVF  Monitor patient for seizures and treat with benzodiazepines as needed  TEACHING STATEMENT:  Bupropion as an antidepressant has an unclear mechanism of action but weakly inhibits norepinephrine and dopamine reuptake  In overdose, it can have sympathomimetic, serotonergic, and/or antimuscarinic properties  It is also thought to be an inhibitor of gap junctions, causing QRS prolongation sometimes refractory to bicarbonate administration  Bupropion also has a propensity to cause seizures in overdose, which can be delayed up to 24 hours with extended release formulations  For further questions, please call [facility]  Service or Patient Access Center to reach the medical  on call       Please see additional teaching note below (if available)    Hx and PE limited by: mental status and agitation    HPI: Lisa Sanchez is a 60y o  year old male with a history of alcohol use, diabetes, chronic pain on oxycodone, HTN, HLD presenting with his family for evaluation of progressively worsening altered mental status  Family reports over the last 4 days, the patient has become more agitated with violent outbursts that is not within normal behavior for him  They state he is not eating or drinking well  He has not taken his oxycodone for several days; his last alcohol use was earlier this year  They do not feel he is experiencing a withdrawal state at this time  EMS administered ketamine to facilitate transfer for evaluation, and the patient has required re-medication with benzodiazepines during his ED stay  Initial laboratory studies notable for detectable salicylate level, mild GUSTAVO, mildly increased anion gap, and UDS + THC  Ethanol and acetaminophen levels are negative  CT scan reads are pending  Patient required hard restraints to prevent harm to himself and others  Penn catheter was placed  EKG shows  ms and QTc 508 ms with poor baseline and no evidence of ischemia or malignant arrhythmia  He has been persistently tachycardic and hypertensive  Home medications include metformin, sertraline, oxycodone, and bupropion  Toxicology is consulted for evaluation of encephalopathy  On my evaluation, patient is in restraints and extremely agitated  He is unable to contribute to history or review of systems      Review of Systems   Unable to perform ROS: Mental status change       Historical Information   Past Medical History:   Diagnosis Date    Alcohol abuse     Back pain     Chronic low back pain     Depression     Diabetes mellitus ([facility] [address] )     DM2 (diabetes mellitus, type 2) (HCC)     Erectile dysfunction     GERD (gastroesophageal reflux disease)     Hepatic steatosis     Hyperlipidemia     Hypertension     Neuropathy     Psychiatric disorder     Tobacco abuse      Past Surgical History:   Procedure Laterality Date    ANKLE FRACTURE SURGERY Right     ANKLE SURGERY Right     INGUINAL HERNIA REPAIR Left     KNEE SURGERY Right     UMBILICAL HERNIA REPAIR       Social History   Social History     Substance and Sexual Activity   Alcohol Use Yes    Comment: 1-2 drinks per month; former heavy drinker, but not for last 2 years     Social History     Substance and Sexual Activity   Drug Use Yes    Types: Marijuana    Comment: not for a while     Social History     Tobacco Use   Smoking Status Current Every Day Smoker    Packs/day: 1 00    Years: 44 00    Pack years: 44 00    Types: Cigarettes    Start date: 1978   Smokeless Tobacco Never Used   Tobacco Comment    per Mervat-quit     Family History   Problem Relation Age of Onset    Lymphoma Mother     No Known Problems Father         Prior to Admission medications    Medication Sig Start Date End Date Taking?  Authorizing Provider   Accu-Chek Stefanie Plus test strip TEST THREE TIMES DAILY 5/16/21   Luana Dumont MD   Accu-Chek FastClix Lancets MISC USE AS DIRECTED 12/9/20   Zi Akers MD   Blood Glucose Monitoring Suppl (Accu-Chek Stefanie Plus) w/Device KIT USE AS DIRECTED 12/9/20   Zi Akers MD   buPROPion (WELLBUTRIN XL) 150 mg 24 hr tablet Take 1 tablet (150 mg total) by mouth every morning 7/30/21   Zi Akers MD   fenofibrate (TRIGLIDE) 160 MG tablet Take 1 tablet (160 mg total) by mouth daily 2/12/21   Zi Akers MD   gabapentin (NEURONTIN) 300 mg capsule Take 300 mg by mouth Three times a day 5/18/21 5/18/22  Historical Provider, MD   glimepiride (AMARYL) 2 mg tablet TAKE 1 TABLET EVERY DAY IN THE MORNING AND TAKE 1/2 TABLET EVERY EVENING 7/26/21   Luana Dumont MD   HYDRO[place]one-acetaminophen [facility]  mg per tablet Take 1 tablet by mouth every 4 (four) hours as needed for moderate painMax Daily Amount: 6 tablets 8/3/21   Hannah Cifuentes PA-C   metFORMIN (GLUCOPHAGE-XR) 500 mg 24 hr tablet Take 2 tablets (1,000 mg total) by mouth daily 10/30/20   Arnulfo Frank MD   methocarbamol (ROBAXIN) 500 mg tablet Take 1 5 tablets (750 mg total) by mouth every 8 (eight) hours as needed for muscle spasms 7/31/21   KRISTOFER Carreon   omeprazole (PriLOSEC) 40 MG capsule TAKE 1 CAPSULE EVERY DAY 6/18/21   Fanta Rayo MD   oxyCODONE-acetaminophen (PERCOCET) 7 5-325 MG per tablet TAKE 1 TABLET BID AS NEEDED FOR PAIN   MDD 2 10/8/20   Historical Provider, MD   pioglitazone (ACTOS) 45 mg tablet Take 1 tablet (45 mg total) by mouth daily 2/12/21   Fanta Rayo MD   ramipril (ALTACE) 10 MG capsule Take 1 capsule (10 mg total) by mouth daily 10/30/20   Arnulfo Frank MD   sertraline (ZOLOFT) 100 mg tablet Take 2 tablets (200 mg total) by mouth daily 12/14/20   Fanta Rayo MD   simvastatin (ZOCOR) 20 mg tablet TAKE 1 TABLET (20 MG TOTAL) BY MOUTH DAILY 7/18/21   Fanta Rayo MD   tadalafil (CIALIS) 5 MG tablet TAKE 1 TABLET BY MOUTH DAILY AS NEEDED FOR ERECTILE DYSFUNCTION 7/30/21   Fanta Rayo MD       Current Facility-Administered Medications   Medication Dose Route Frequency    dexmedetomidine (PRECEDEX) 400 mcg in sodium chloride 0 9 % 100 mL infusion  0 1-0 7 mcg/kg/hr Intravenous Titrated    [START ON 9/20/2021] insulin lispro (HumaLOG) 100 units/mL subcutaneous injection 1-6 Units  1-6 Units Subcutaneous Q6H [address]    sodium chloride (PF) 0 9 % injection 3 mL  3 mL Intravenous Q1H PRN    sodium chloride 0 9 % bolus 1,000 mL  1,000 mL Intravenous Once    thiamine (VITAMIN B1) 500 mg in sodium chloride 0 9 % 50 mL IVPB  500 mg Intravenous Q8H       Allergies   Allergen Reactions    Amoxicillin Swelling    Amoxil [Amoxicillin] Hives       Objective       Intake/Output Summary (Last 24 hours) at 9/19/2021 2139  Last data filed at 9/19/2021 2125  Gross per 24 hour   Intake 3050 ml   Output --   Net 3050 ml       Invasive Devices:   Peripheral IV 09/19/21 Left;Upper Arm (Active)   Site Assessment WDL;Clean;Dry; Intact 09/19/21 1740   Dressing Type Transparent 09/19/21 1740   Line Status Blood return noted; Flushed 09/19/21 1740   Dressing Status Clean;Dry; Intact 09/19/21 1740       Peripheral IV 09/19/21 Left Hand (Active)   Site Assessment WDL; Clean;Dry; Intact 09/19/21 1741   Dressing Type Transparent 09/19/21 1741   Line Status Blood return noted; Flushed 09/19/21 1741   Dressing Status Clean;Dry; Intact 09/19/21 1741       Peripheral IV 09/19/21 Right Antecubital (Active)       Peripheral IV 09/19/21 Dorsal (posterior); Right Hand (Active)       Urethral Catheter Straight-tip 16 Fr  (Active)   Reasons to continue Urinary Catheter  Accurate I&O assessment in critically ill patients (48 hr  max) 09/19/21 1734   Site Assessment Clean;Skin intact 09/19/21 1734       Vitals   Vitals:    09/19/21 1945 09/19/21 2045 09/19/21 2100 09/19/21 2130   BP: 147/81 155/91 (!) 176/92 144/70   TempSrc:       Pulse: (!) 119 (!) 122 (!) 128 (!) 128   Resp: 22 20 21 22   Patient Position - Orthostatic VS: Lying Lying     Temp:           Physical Exam  Vitals and nursing note reviewed  Constitutional:       Comments: Agitated, in restraints, writhing around in bed   HENT:      Head: Normocephalic and atraumatic  Nose: Nose normal       Mouth/Throat:      Mouth: Mucous membranes are moist    Eyes:      Pupils: Pupils are equal, round, and reactive to light  Comments: Dilated pupils bilaterally, subconjunctival hemorrhages   Cardiovascular:      Rate and Rhythm: Regular rhythm  Tachycardia present  Pulses: Normal pulses  Pulmonary:      Effort: Pulmonary effort is normal       Breath sounds: Normal breath sounds  Abdominal:      General: There is no distension  Palpations: Abdomen is soft  Genitourinary:     Comments: Penn in place  Musculoskeletal:         General: No swelling or deformity  Cervical back: Neck supple        Comments: R ankle scar with decreased range of motion noted   Skin: General: Skin is warm  Comments: Diaphoretic, small amount of axillary and forehead sweating   Neurological:      Comments: Agitated, no focal deficits  No hyperreflexia  No clonus of LLE; RLE clonus evaluation limited by decreased range of motion secondary to prior surgery   Psychiatric:      Comments: Agitated with abnormal behavior, unable to assess SI/HI/thought/judgement           EKG, Pathology, and Other Studies: I have personally reviewed pertinent reports  Lab Results: I have personally reviewed pertinent reports  Labs:  Results from last 7 days   Lab Units 09/19/21  1717   WBC Thousand/uL 11 21*   HEMOGLOBIN g/dL 16 1   HEMATOCRIT % 45 1   PLATELETS Thousands/uL 300   NEUTROS PCT % 72   LYMPHS PCT % 18   MONOS PCT % 9      Results from last 7 days   Lab Units 09/19/21  1718   POTASSIUM mmol/L 4 0   CHLORIDE mmol/L 102   CO2 mmol/L 20*   BUN mg/dL 14   CREATININE mg/dL 1 34*   CALCIUM mg/dL 9 3   ALK PHOS U/L 131*   ALT U/L 32   AST U/L 41      Results from last 7 days   Lab Units 09/19/21  1718   INR  1 02   PTT seconds 29     Results from last 7 days   Lab Units 09/19/21  1717   LACTIC ACID mmol/L 2 7*     0   Lab Value Date/Time    TROPONINI <0 03 02/07/2021 0419    TROPONINI <0 03 02/05/2021 1710    TROPONINI <0 03 02/05/2021 1401    TROPONINI <0 02 03/03/2019 1313    TROPONINI 0 04 03/03/2019 0335    TROPONINI 0 04 03/02/2019 1920    TROPONINI <0 02 03/19/2018 1806     Results from last 7 days   Lab Units 09/19/21  1852   PH SYDNEY  7 362   PCO2 SYDNEY mm Hg 32 5*   PO2 SYDNEY mm Hg 75 3*   HCO3 SYDNEY mmol/L 18 0*   O2 CONTENT SYDNEY ml/dL 20 8   O2 HGB, VENOUS % 91 3*     Results from last 7 days   Lab Units 09/19/21  2106 09/19/21  1718   ACETAMINOPHEN LVL ug/mL  --  <2*   ETHANOL LVL mg/dL  --  <3   SALICYLATE LVL mg/dL 8 9 9 3     Invalid input(s): EXTPREGUR    Imaging Studies: I have personally reviewed pertinent reports        Counseling / Coordination of Care  Total floor / unit time spent today 35 minutes  Greater than 50% of total time was spent with the patient and / or family counseling and / or coordination of care

## 2021-09-20 NOTE — ASSESSMENT & PLAN NOTE
No results found for: HGBA1C    Recent Labs     09/19/21  1712 09/20/21  0150   POCGLU 224* 219*       Blood Sugar Average: Last 72 hrs:  (P) 221 5     · Holding home metformin, glimepiride, and pioglitazone  · Start AccuChecks with SSI q6h while NPO  · Goal blood glucose 140-180

## 2021-09-20 NOTE — ASSESSMENT & PLAN NOTE
· Reportedly has been on opioids for prolonged period of time  · UDS negative for opioids  · Received Fentanyl IV 50 mcg in ER for concern of acute opioid withdrawal  · Watch for s/s of acute withdrawal

## 2021-09-20 NOTE — QUICK NOTE
Spoke with [facility] via phone for patient update:    Patient's agitation has improved significantly, and he is out of hard restraints  He is somnolent but arousable without focal deficits  No clonus or nystagmus  He follows simple commands but is not oriented  Family reports he has not filled bupropion and is not taking it at home  No other ingestion concerns  Patient has been taking Excedrin at home for headache and neck pain that was worsening over the last several days; LP was ordered but is being held at this time  Patient is being closely monitored for meningitis/encephalitis  I suspect the Excedrin caused his detectable salicylate level  Patient's QRS and QTc intervals have improved  Patient appears to be improving from a toxicology standpoint  Continue supportive care and treatment of toxic encephalopathy  Agree with continuing to evaluate other metabolic causes  Thank you for the consult  Toxicology will sign off  Please contact the provider on call with questions or concerns

## 2021-09-20 NOTE — ASSESSMENT & PLAN NOTE
No results found for: HGBA1C    Recent Labs     09/19/21  1712 09/20/21  0150   POCGLU 224* 219*       Blood Sugar Average: Last 72 hrs:  (P) 221 5     · Holding home metformin, glimepiride, and pioglitazone  · Continue SSI  · Goal blood glucose 140-180

## 2021-09-20 NOTE — ASSESSMENT & PLAN NOTE
· Remains normotensive  · Holding home ramipril in setting of GUSTAVO and NPO status  · Monitor BP closely, can consider labetalol or hydralazine if remains hypertensive when no longer agitated

## 2021-09-20 NOTE — H&P
[address] [place] 1965, 60 y o  male MRN: [id number]  Unit/Bed#: ICU 03 Encounter: [id number]  Primary Care Provider: Abdi Srinivasan MD   Date and time admitted to hospital: 9/19/2021  5:03 PM    * Acute toxic encephalopathy  Assessment & Plan  · Concern for serotonin syndrome vs acute alcohol withdrawal vs acute opioid withdrawal vs toxic ingestion vs Wernicke's encephalopathy  · Given afebrile on arrival and WBC 12 less concern for infectious process -- will monitor off antibiotics  · Received dose of ceftriaxone in ER  · Patient reportedly at home with agitation/combative and acute delirium  Police and EMS called to scene  He received Ketamine  mg en route to the ER    · On arrival to the ER was noted to be tachycardic with severe agitation  · Received Ativan IV 2 mg for concern of acute alcohol withdrawal  · Received Fentanyl IV 50 mcg for concern of acute opioid withdrawal  · Remained combative and agitated requiring Versed IV 5 mg to transport to CT scan and an additional 5 mg in order to obtain images  · CT head/neck -- no acute abnormality  · CT c/a/p -- no acute abnormality  · Avoid atypical antipsycohtics, fentanyl per toxicology  · EKG on arrival to ICU and q2h for QRS, QTc abnormality  · UDS + THC  · AGMA anion gap 16, serum CO2 20  · BMP q4h  · Lactic acid 2 7 -- likely in the setting of acute agitation / combativeness  · , will trend   · Received NSS 4L in ER  · Will check BMP on arrival to ICU prior to adding maintenance IVF  · Start high dose Thiamine  mg F5N  · Start folic acid daily  · Capnography  · Continue restraints as needed  · 1:1 continuous observation   · Provide quiet/dark environment   · Seizure precautions  · Continue supportive care  · Start precedex infusion and titrate for goal RASS 0    · Toxicology recommendations  · QTc > 500 - magnesium sulfate IVPB  · QRS > 120 - sodium bicarbonate bolus  mEq and infusion · For agitation  · Valium IV 10-20 mg q2h as needed   · Haldol IV 5 mg q4h as needed   · Avoid atypical antipsychotics      GUSTAVO (acute kidney injury) ([facility] [address] )  Assessment & Plan  · Likely pre-renal as family reports that he has not been eating or drinking well for a week  · Baseline creatinine 0 7, on admission 1 34  · There is also a gapped metabolic acidosis  · Initial salicylate level 9 3  · Repeat salicylate level 8 9  · Will give sodium bicarb now and recheck level with 0200 labs  · Received NSS 4 L in ER  · Check BMP now and q4h    Prolonged Q-T interval on ECG  Assessment & Plan  · Present on admission , QTc 508  · EKGs q2h due to toxodrome clinical picture  · Toxicology placing recommendations for magnesium and sodium bicarbonate   · Give Magnesium Sulfate IVPB 2 g x1 on arrival to ICU    [facility]  Assessment & Plan  No results found for: HGBA1C    Recent Labs     09/19/21  1712   POCGLU 224*       Blood Sugar Average: Last 72 hrs:  (P) 224     · Holding home metformin, glimepiride, and pioglitazone  · Start AccuChecks with SSI q6h while NPO  · Check A1c    Alcohol abuse  Assessment & Plan  · Reportedly abstinent    Of note did have admission for pancreatitis Feb 2021  · Lipase pending  · EtOH negative  · Did receive lorazepam 2 mg IV in the ER for concern of EtOH withdrawal   · Start high dose thiamine  mg q 8 hours in the setting of possible Wernicke's encephalopathy  · Start folic acid IV daily    Essential hypertension  Assessment & Plan  · Normotensive in ER, except when agitated  · Holding home ramipril in setting of GUSTAVO and NPO status  · Monitor BP closely, can consider labetalol or hydralazine if remains hypertensive when no longer agitated    Chronic bilateral low back pain with bilateral sciatica  Assessment & Plan  · Reportedly has been on opioids for prolonged period of time  · UDS negative for opioids  · Received Fentanyl IV 50 mcg in ER for concern of acute opioid withdrawal  · Watch for s/s of acute withdrawal    Chronic depression  Assessment & Plan  · Holding home sertraline and Wellbutrin in the setting of acute encephalopathy/toxodrome picture    Gastroesophageal reflux disease without esophagitis  Assessment & Plan  · Hold home omeprazole due to NPO  · Start Protonix IV 40 mg daily    Dyslipidemia  Assessment & Plan  · Holding home fenofibrate and simvastatin until able to take PO safely    Hepatic steatosis  Assessment & Plan  · Likely due to history of alcohol abuse  · LFTs at baseline  · ABD exam benign      -------------------------------------------------------------------------------------------------------------  Chief Complaint: agitation / combativeness    History of Present Illness   HX and PE limited by: acute encephalopathy  Alysia Brewer is a 60 y o  male who presents with PMHx significant for GERD, alcohol abuse, chronic back pain with chronic opioid abuse, depression, HTN, HLD, and Type 2 DM  Family at bedside states that he started acting "not himself" on Thursday but today appeared to have a "psychotic break"  Police and EMS were called due to severe agitation  He received ketamine  mg en route to the ER  On arrival he was found to he tachycardic and severely agitated / combative  There was question for acute alcohol withdrawal and acute opioid withdrawal for which he received Ativan IV 2 mg and Fentanyl IV 50 mcg  Following Fentanyl he became more agitated  He was given Versed IV 5 mg for CT and an additional 5 mg in order to obtain images  UDS + THC, salicylate level 9 8, lactic acid 2 7, WBC 12   Labs also revealed GUSTAVO, anion gap metabolic acidosis, and hyperglycemia  Per chart records patient is on sertraline was recently started on Wellbutrin  Family states he has not filled the Wellbutrin prescription  Toxicology was consulted while the patient was in the ER    He will be admitted to step-down level 1 given the amount of sedation that he has received a need for further monitoring  History obtained from spouse and child, chart review and unobtainable from patient due to mental status and lack of cooperation   -------------------------------------------------------------------------------------------------------------  Dispo: Admit to Stepdown Level 1    Code Status: Level 1 - Full Code  --------------------------------------------------------------------------------------------------------------  Review of Systems   Unable to perform ROS: Mental status change       Review of systems was unable to be performed secondary to acute encephalopathy/delirium    Physical Exam  Vitals reviewed  Constitutional:       General: He is not in acute distress  Appearance: He is overweight  He is diaphoretic  He is not ill-appearing or toxic-appearing  Interventions: Nasal cannula in place  HENT:      Head: Normocephalic and atraumatic  Right Ear: External ear normal       Left Ear: External ear normal       Nose: Nose normal  No congestion or rhinorrhea  Mouth/Throat:      Mouth: Mucous membranes are moist       Pharynx: Oropharynx is clear  No oropharyngeal exudate or posterior oropharyngeal erythema  Eyes:      General: No scleral icterus  Conjunctiva/sclera: Conjunctivae normal       Pupils: Pupils are equal, round, and reactive to light  Neck:      Vascular: No carotid bruit  Cardiovascular:      Rate and Rhythm: Regular rhythm  Tachycardia present  Pulses:           Radial pulses are 2+ on the right side and 2+ on the left side  Dorsalis pedis pulses are 2+ on the right side and 2+ on the left side  Heart sounds: S1 normal and S2 normal  No murmur heard  No friction rub  No gallop  Pulmonary:      Effort: Tachypnea present  No accessory muscle usage or respiratory distress  Breath sounds: No decreased breath sounds, wheezing, rhonchi or rales     Abdominal: General: Abdomen is flat  Bowel sounds are normal  There is no distension  Tenderness: There is no abdominal tenderness  Musculoskeletal:         General: No swelling or tenderness  Normal range of motion  Cervical back: Normal range of motion and neck supple  No rigidity  Right lower leg: No edema  Left lower leg: No edema  Lymphadenopathy:      Cervical: No cervical adenopathy  Skin:     General: Skin is warm  Capillary Refill: Capillary refill takes less than 2 seconds  Coloration: Skin is not pale  Findings: No bruising, erythema or rash  Neurological:      Mental Status: He is alert  He is disoriented  GCS: GCS eye subscore is 4  GCS verbal subscore is 3  GCS motor subscore is 5  Cranial Nerves: No facial asymmetry  Motor: No tremor, abnormal muscle tone or seizure activity  Psychiatric:         Behavior: Behavior is agitated and combative  Thought Content: Thought content is delusional          Cognition and Memory: Cognition is impaired  Judgment: Judgment is impulsive and inappropriate        --------------------------------------------------------------------------------------------------------------  Vitals:   Vitals:    09/19/21 2005 09/19/21 2045 09/19/21 2100 09/19/21 2130   BP:  155/91 (!) 176/92 144/70   BP Location:  Right arm     Pulse:  (!) 122 (!) 128 (!) 128   Resp:  20 21 22   Temp:       TempSrc:       SpO2: 98% 99% 96% 95%   Weight:       Height:         Temp  Min: 97 1 °F (36 2 °C)  Max: 100 7 °F (38 2 °C)  IBW (Ideal Body Weight): 75 3 kg  Height: 5' 11" (180 3 cm)  Body mass index is 31 21 kg/m²      Laboratory and Diagnostics:  Results from last 7 days   Lab Units 09/19/21  1717   WBC Thousand/uL 11 21*   HEMOGLOBIN g/dL 16 1   HEMATOCRIT % 45 1   PLATELETS Thousands/uL 300   NEUTROS PCT % 72   MONOS PCT % 9     Results from last 7 days   Lab Units 09/19/21  1718   SODIUM mmol/L 138   POTASSIUM mmol/L 4 0   CHLORIDE mmol/L 102   CO2 mmol/L 20*   ANION GAP mmol/L 16*   BUN mg/dL 14   CREATININE mg/dL 1 34*   CALCIUM mg/dL 9 3   GLUCOSE RANDOM mg/dL 226*   ALT U/L 32   AST U/L 41   ALK PHOS U/L 131*   ALBUMIN g/dL 3 9   TOTAL BILIRUBIN mg/dL 0 88          Results from last 7 days   Lab Units 09/19/21  1718   INR  1 02   PTT seconds 29          Results from last 7 days   Lab Units 09/19/21  2106 09/19/21  1717   LACTIC ACID mmol/L 3 8* 2 7*     ABG:    VBG:  Results from last 7 days   Lab Units 09/19/21  1852   PH SYDNEY  7 362   PCO2 SYDNEY mm Hg 32 5*   PO2 SYDNEY mm Hg 75 3*   HCO3 SYDNEY mmol/L 18 0*   BASE EXC SYDNEY mmol/L -6 1             EKG: Sinus tachycardia with prolonged QTc  Imaging: I have personally reviewed pertinent reports     and I have personally reviewed pertinent films in PACS  9/19 CT head and neck: negative  9/19 CT c/a/p: negative    Historical Information   Past Medical History:   Diagnosis Date    Alcohol abuse     Back pain     Chronic low back pain     Depression     Diabetes mellitus ([facility] [address] )     DM2 (diabetes mellitus, type 2) (HCC)     Erectile dysfunction     GERD (gastroesophageal reflux disease)     Hepatic steatosis     Hyperlipidemia     Hypertension     Neuropathy     Psychiatric disorder     Tobacco abuse      Past Surgical History:   Procedure Laterality Date    ANKLE FRACTURE SURGERY Right     ANKLE SURGERY Right     INGUINAL HERNIA REPAIR Left     KNEE SURGERY Right     UMBILICAL HERNIA REPAIR       Social History   Social History     Substance and Sexual Activity   Alcohol Use Yes    Comment: 1-2 drinks per month; former heavy drinker, but not for last 2 years     Social History     Substance and Sexual Activity   Drug Use Yes    Types: Marijuana    Comment: not for a while     Social History     Tobacco Use   Smoking Status Current Every Day Smoker    Packs/day: 1 00    Years: 44 00    Pack years: 44 00    Types: Cigarettes    Start date: 1978   Smokeless Tobacco Never Used Tobacco Comment    per Mervat-quit     Exercise History: independent  Family History:   Family History   Problem Relation Age of Onset    Lymphoma Mother     No Known Problems Father      I have reviewed this patient's family history and commented on sigificant items within the HPI      Medications:  Current Facility-Administered Medications   Medication Dose Route Frequency    dexmedetomidine (PRECEDEX) 400 mcg in sodium chloride 0 9 % 100 mL infusion  0 1-0 7 mcg/kg/hr Intravenous Titrated    folic acid 1 mg in sodium chloride 0 9 % 50 mL IVPB  1 mg Intravenous Daily    heparin (porcine) subcutaneous injection 5,000 Units  5,000 Units Subcutaneous Q8H [address]    [START ON 9/20/2021] insulin lispro (HumaLOG) 100 units/mL subcutaneous injection 1-6 Units  1-6 Units Subcutaneous Q6H [address]    magnesium sulfate 2 g/50 mL IVPB (premix) 2 g  2 g Intravenous Once    [START ON 9/20/2021] pantoprazole (PROTONIX) injection 40 mg  40 mg Intravenous Q24H ROSALEE    sodium bicarbonate 8 4 % injection 50 mEq  50 mEq Intravenous Once    thiamine (VITAMIN B1) 500 mg in sodium chloride 0 9 % 50 mL IVPB  500 mg Intravenous Q8H     Home medications:  Prior to Admission Medications   Prescriptions Last Dose Informant Patient Reported? Taking?    Accu-Chek Stefanie Plus test strip   No No   Sig: TEST THREE TIMES DAILY   Accu-Chek FastClix Lancets MISC   No No   Sig: USE AS DIRECTED   Blood Glucose Monitoring Suppl (Accu-Chek Stefanie Plus) w/Device KIT   No No   Sig: USE AS DIRECTED   HYDROcodone-acetaminophen (NORCO)  mg per tablet   No No   Sig: Take 1 tablet by mouth every 4 (four) hours as needed for moderate painMax Daily Amount: 6 tablets   buPROPion (WELLBUTRIN XL) 150 mg 24 hr tablet   No No   Sig: Take 1 tablet (150 mg total) by mouth every morning   fenofibrate (TRIGLIDE) 160 MG tablet   No No   Sig: Take 1 tablet (160 mg total) by mouth daily   gabapentin (NEURONTIN) 300 mg capsule   Yes No   Sig: Take 300 mg by mouth Three times a day   glimepiride (AMARYL) 2 mg tablet   No No   Sig: TAKE 1 TABLET EVERY DAY IN THE MORNING AND TAKE 1/2 TABLET EVERY EVENING   metFORMIN (GLUCOPHAGE-XR) 500 mg 24 hr tablet   No No   Sig: Take 2 tablets (1,000 mg total) by mouth daily   methocarbamol (ROBAXIN) 500 mg tablet   No No   Sig: Take 1 5 tablets (750 mg total) by mouth every 8 (eight) hours as needed for muscle spasms   omeprazole (PriLOSEC) 40 MG capsule   No No   Sig: TAKE 1 CAPSULE EVERY DAY   oxyCODONE-acetaminophen (PERCOCET) 7 5-325 MG per tablet   Yes No   Sig: TAKE 1 TABLET BID AS NEEDED FOR PAIN  MDD 2   pioglitazone (ACTOS) 45 mg tablet   No No   Sig: Take 1 tablet (45 mg total) by mouth daily   ramipril (ALTACE) 10 MG capsule   No No   Sig: Take 1 capsule (10 mg total) by mouth daily   sertraline (ZOLOFT) 100 mg tablet   No No   Sig: Take 2 tablets (200 mg total) by mouth daily   simvastatin (ZOCOR) 20 mg tablet   No No   Sig: TAKE 1 TABLET (20 MG TOTAL) BY MOUTH DAILY   tadalafil (CIALIS) 5 MG tablet   No No   Sig: TAKE 1 TABLET BY MOUTH DAILY AS NEEDED FOR ERECTILE DYSFUNCTION      Facility-Administered Medications: None     Allergies: Allergies   Allergen Reactions    Amoxicillin Swelling    Amoxil [Amoxicillin] Hives       ------------------------------------------------------------------------------------------------------------  Advance Directive and Living Will:      Power of :    POLST:    ------------------------------------------------------------------------------------------------------------  Anticipated Length of Stay is > 2 midnights    Care Time Delivered:   Upon my evaluation, this patient had a high probability of imminent or life-threatening deterioration due to Acute toxic encephalopathy, acute kidney injury, which required my direct attention, intervention, and personal management    I have personally provided 60 minutes (2100 to 2200) of critical care time, exclusive of procedures, teaching, family meetings, and any prior time recorded by providers other than myself  KRISTOFER Waterman        Portions of the record may have been created with voice recognition software  Occasional wrong word or "sound a like" substitutions may have occurred due to the inherent limitations of voice recognition software    Read the chart carefully and recognize, using context, where substitutions have occurred

## 2021-09-20 NOTE — ASSESSMENT & PLAN NOTE
· Concern for serotonin syndrome vs acute alcohol withdrawal vs acute opioid withdrawal vs toxic ingestion vs Wernicke's encephalopathy vs Wellbutrin overdose  · Given afebrile on arrival and WBC 12 less concern for infectious process -- will monitor off antibiotics  · Received dose of ceftriaxone in ER  · Patient reportedly at home with agitation/combative and acute delirium  Police and EMS called to scene  He received Ketamine  mg en route to the ER  · On arrival to the ER was noted to be tachycardic with severe agitation  · Received Ativan IV 2 mg for concern of acute alcohol withdrawal  · Received Fentanyl IV 50 mcg for concern of acute opioid withdrawal  · Remained combative and agitated requiring Versed IV 5 mg to transport to CT scan and an additional 5 mg in order to obtain images  · CT head/neck -- no acute abnormality  · CT c/a/p -- no acute abnormality  · Avoid atypical antipsycohtics, fentanyl per toxicology  · EKG on arrival to ICU and q2h for QRS, QTc abnormality  · UDS + THC  · AGMA anion gap 16, serum CO2 20 -- gap now closed    · BMP q4h  · Lactic acid 2 7, 3 8, 0 9 -- likely in the setting of acute agitation / combativeness  · No further need to trend  · , will trend  · Continue IVF with isolyte  · Capnography  · Continue restraints as needed  · 1:1 continuous observation   · Provide quiet/dark environment   · Seizure precautions  · Continue supportive care  · Continue precedex infusion and titrate for goal RASS 0    · Toxicology recommendations  · QTc > 500 - magnesium sulfate IVPB  · QRS > 120 - sodium bicarbonate bolus  mEq and infusion   · For agitation  · Valium IV 10-20 mg q2h as needed   · Haldol IV 5 mg q4h as needed   · Avoid atypical antipsychotics

## 2021-09-20 NOTE — ASSESSMENT & PLAN NOTE
· Reportedly abstinent    Of note did have admission for pancreatitis Feb 2021  · Lipase pending  · EtOH negative  · Did receive lorazepam 2 mg IV in the ER for concern of EtOH withdrawal   · Start high dose thiamine  mg q 8 hours in the setting of possible Wernicke's encephalopathy  · Start folic acid IV daily

## 2021-09-20 NOTE — ASSESSMENT & PLAN NOTE
· Reportedly abstinent    Of note did have admission for pancreatitis Feb 2021  · Lipase 165  · EtOH negative  · Did receive lorazepam 2 mg IV in the ER for concern of EtOH withdrawal   · Continue high dose thiamine  mg q 8 hours in the setting of possible Wernicke's encephalopathy  · Continue folic acid IV daily

## 2021-09-20 NOTE — UTILIZATION REVIEW
Initial Clinical Review    Admission: Date/Time/Statement:   Admission Orders (From admission, onward)     Ordered        09/19/21 2050  Inpatient Admission  Once                   Orders Placed This Encounter   Procedures    Inpatient Admission     Standing Status:   Standing     Number of Occurrences:   1     Order Specific Question:   Level of Care     Answer:   Level 1 Stepdown [13]     Order Specific Question:   Estimated length of stay     Answer:   More than 2 Midnights     Order Specific Question:   Certification     Answer:   I certify that inpatient services are medically necessary for this patient for a duration of greater than two midnights  See H&P and MD Progress Notes for additional information about the patient's course of treatment  ED Arrival Information     Expected Arrival Acuity    - 9/19/2021 17:03 Emergent         Means of arrival Escorted by Service Admission type    Ambulance [facility]/ICU Emergency         Arrival complaint    COMBATIVE        Chief Complaint   Patient presents with    Altered Mental Status     Pt presents via EMS, coming from home  Wife reports pt is "not acting right"  pt was recently discharged 4-6 with a left shoulder separation & head injury  on scene, pt was combative towards staff, swinging at EMS & police, 081 ketamine was given PTA  Initial Presentation: 60  Y O male  Presents to ED  Via  EMS from home after inappropriate behavior  Family states he has not been himself for past  Few days  The day of admission had severe agitation and police called  Received IM ketamine  By  EMS  On ED arrival,  Was severely agitated, combative and tachycardic  Concern for  Acute alcohol/opioid withdrawal  And given IV ativan and fentanyl,  Became more agitated after fentanyl  UDS + THC, salicylate level 9 8, lactic acid 2 7, WBC 12  BAL  Negative  Labs also showed hyperglycemia, GUSTAVO and anion gap metabolic acidosis    CT head/neck/abdomen unremarkable  PMH  Is  GERD, alcohol abuse, chronic pain, chronic  Opioid abuse, HTN and DM2  Admit  Ip Stepdown LOC  With Acute toxic metabolic encephalopathy, GUSTAVO  And prolonged Q-T  Interval and plan is S/P  4 L  NSS in ED, monitor labs, IV  Valium/ativan as needed, 1 dose  IV  MG, seizure precautions, precedex infusion, 1:1 observation,  Restraints as  Needed and   IV  MVI  Toxicology  Consult  ( 9/19)    My concern from a toxicology standpoint is bupropion or other sympathomimetic ingestion given his degree of agitation, tachycardia, diaphoresis, and hypertension  Recommend benzodiazepines (diazepam 10-20 mg IV every 2 hours) as needed for agitation with haloperidol 5 mg IV every 4 hours as needed for agitation as adjunctive therapy  Additionally, dexmedetomidine infusion, a central alpha-2 agonist and sympatholytic, is an excellent choice for sedation in bupropion and other sympathomimetic toxicities  Would avoid atypical antipsychotics given multiple receptor effects and potential for worsening of his encephalopathy  Continue to monitor for seizures  Date:  9/20        Day 2:   Continue  EKG  Q 2 hrs due to toxodrome clinical picture  Continue  IVF, monitor labs  Q 4 hrs  Remains  On  precedex infusion  Continue  Supportive care, seizure precautions, 1:1  Observation  Remains  Encephalopathic       ED Triage Vitals   Temperature Pulse Respirations Blood Pressure SpO2   09/19/21 1747 09/19/21 1713 09/19/21 1713 09/19/21 1713 09/19/21 1713   (!) 97 1 °F (36 2 °C) 100 22 107/76 98 %      Temp Source Heart Rate Source Patient Position - Orthostatic VS BP Location FiO2 (%)   09/19/21 1747 09/19/21 1713 09/19/21 1713 09/19/21 1713 --   Axillary Monitor Lying Left arm       Pain Score       --                 Wt Readings from Last 1 Encounters:   09/20/21 107 kg (235 lb 14 3 oz)     Additional Vital Signs:   09/20/21 0503  --  --  --  --  --  98 %  2 L/min  EtCO2 nasal cannula  -- 09/20/21 0400  --  68  20  146/70  100  99 %  2 L/min  EtCO2 nasal cannula  --   09/20/21 0200  --  71  --  142/73  101  98 %  --  --  --   09/19/21 2245  --  --  --  --  --  98 %  2 L/min  EtCO2 nasal cannula  --   09/19/21 2130  --  128Abnormal   22  144/70  100  95 %  --  None (Room air)  --   09/19/21 2100  --  128Abnormal   21  176/92Abnormal   116  96 %  --  --  --   09/19/21 2045  --  122Abnormal   20  155/91  117  99 %  --  --  Lying   09/19/21 2005  --  --  --  --  --  98 %  --  --  --   09/19/21 1945  --  119Abnormal   22  147/81  108  93 %  --  None (Room air)  Lying   09/19/21 1944  100 7 °F (38 2 °C)Abnormal   --  --  --  --  --  --  --  --   09/19/21 1900  --  128Abnormal   22  130/87  103  96 %  --  None (Room air)  Lying   09/19/21 1823  --  116Abnormal   22  150/87  --  96 %  --  None (Room air)  Lying   09/19/21 1747  97 1 °F (36 2 °C)Abnormal   --  --  --  --  --  --  --  --   09/19/21 1743  --  107Abnormal   18  134/89  --  96 %  --  None (Room air)  Lying   09/19/21 1713  --  100  22  107/76  --  98 %  --  None (Room air)  Lying       Pertinent Labs/Diagnostic Test Results:   CTA  Chest/abd/pelvis  ( 9/19)    No thoracic or abdominal aortic aneurysm or dissection  2   No evidence of acute process in the chest, abdomen or pelvis  CTA  Head/neck ( 9/19)     No evidence of acute vascular territorial infarction, intracranial hemorrhage or mass effect  2   No hemodynamically significant stenosis, dissection or occlusion of the carotid or vertebral arteries or major vessels of the [place]     CXR  ( 9/20)  NAD  EKG    ST   Prolonged  QT c  Results from last 7 days   Lab Units 09/19/21  1718   SARS-COV-2  Negative     Results from last 7 days   Lab Units 09/20/21  0528 09/20/21  0153 09/19/21  1717   WBC Thousand/uL 9 54  --  11 21*   HEMOGLOBIN g/dL 12 6  --  16 1   HEMATOCRIT % 36 9  --  45 1   PLATELETS Thousands/uL 183 180 300   NEUTROS ABS Thousands/µL 7 34  --  8 01* Results from last 7 days   Lab Units 09/20/21  0834 09/20/21  0528 09/20/21  0153 09/19/21  2234 09/19/21  1718   SODIUM mmol/L 144 142 144 142 138   POTASSIUM mmol/L 4 1 4 5 4 5 3 6 4 0   CHLORIDE mmol/L 111* 110* 110* 109* 102   CO2 mmol/L 22 20* 22 22 20*   ANION GAP mmol/L 11 12 12 11 16*   BUN mg/dL 13 13 12 12 14   CREATININE mg/dL 0 80 0 79 0 78 1 16 1 34*   EGFR ml/min/1 73sq m 98 99 99 69 58   CALCIUM mg/dL 8 3 8 2* 7 8* 8 3 9 3   MAGNESIUM mg/dL  --  1 8  --   --   --    PHOSPHORUS mg/dL  --  2 9  --   --   --      Results from last 7 days   Lab Units 09/20/21  0528 09/19/21  1718   AST U/L 25 41   ALT U/L 22 32   ALK PHOS U/L 96 131*   TOTAL PROTEIN g/dL 6 3* 7 5   ALBUMIN g/dL 3 0* 3 9   TOTAL BILIRUBIN mg/dL 0 78 0 88     Results from last 7 days   Lab Units 09/20/21  0540 09/20/21  0150 09/19/21  1712   POC GLUCOSE mg/dl 211* 219* 224*     Results from last 7 days   Lab Units 09/20/21  0834 09/20/21  0528 09/20/21  0153 09/19/21  2234 09/19/21  1718   GLUCOSE RANDOM mg/dL 187* 215* 203* 201* 226*         Results from last 7 days   Lab Units 09/19/21  1717   HEMOGLOBIN A1C % 8 4*   EAG mg/dl 194     BETA-HYDROXYBUTYRATE   Date Value Ref Range Status   09/20/2021 0 5 <0 6 mmol/L Final          Results from last 7 days   Lab Units 09/19/21  1852   PH SYDNEY  7 362   PCO2 SYDNEY mm Hg 32 5*   PO2 SYDNEY mm Hg 75 3*   HCO3 SYDNEY mmol/L 18 0*   BASE EXC SYDNEY mmol/L -6 1   O2 CONTENT SYDNEY ml/dL 20 8   O2 HGB, VENOUS % 91 3*         Results from last 7 days   Lab Units 09/20/21  0834 09/20/21  0153 09/19/21  1718   CK TOTAL U/L 424* 559* 268   CK MB INDEX %  --  <1 0 <1 0   CK MB ng/mL  --  2 1 1 3             Results from last 7 days   Lab Units 09/19/21  1718   PROTIME seconds 13 4   INR  1 02   PTT seconds 29     Results from last 7 days   Lab Units 09/19/21  1718   TSH 3RD GENERATON uIU/mL 1 608     Results from last 7 days   Lab Units 09/20/21  0528 09/19/21  1718   PROCALCITONIN ng/ml <0 05 <0 05     Results from last 7 days   Lab Units 09/20/21  0834 09/20/21  0306 09/19/21  2106 09/19/21  1717   LACTIC ACID mmol/L 1 1 0 9 3 8* 2 7*                         Results from last 7 days   Lab Units 09/19/21  1718   LIPASE u/L 165             Results from last 7 days   Lab Units 09/19/21  1735   CLARITY UA  Clear   COLOR UA  Lyman   SPEC GRAV UA  1 025   PH UA  6 0   GLUCOSE UA mg/dl 100 (1/10%)*   KETONES UA mg/dl Trace*   BLOOD UA  Negative   PROTEIN UA mg/dl 100 (2+)*   NITRITE UA  Negative   BILIRUBIN UA  Moderate*   UROBILINOGEN UA E U /dl 4 0*   LEUKOCYTES UA  Negative   WBC UA /hpf 0-1   RBC UA /hpf 0-1   BACTERIA UA /hpf Occasional   EPITHELIAL CELLS WET PREP /hpf Occasional   MUCUS THREADS  Innumerable*             Results from last 7 days   Lab Units 09/19/21  1735   AMPH/METH  Negative   BARBITURATE UR  Negative   BENZODIAZEPINE UR  Negative   COCAINE UR  Negative   METHADONE URINE  Negative   OPIATE UR  Negative   PCP UR  Negative   THC UR  Positive*     Results from last 7 days   Lab Units 09/20/21  0153 09/19/21  2106 09/19/21  1718   ETHANOL LVL mg/dL  --   --  <3   ACETAMINOPHEN LVL ug/mL  --   --  <2*   SALICYLATE LVL mg/dL 5 2 8 9 9 3                 Results from last 7 days   Lab Units 09/19/21  1740 09/19/21  1718   BLOOD CULTURE  Received in Microbiology Lab  Culture in Progress  Received in Microbiology Lab  Culture in Progress                 ED Treatment:   Medication Administration from 09/19/2021 1702 to 09/19/2021 2155       Date/Time Order Dose Route Action Comments     09/19/2021 1945 Ketamine HCl 400 mg   Intravenous Canceled Entry      09/19/2021 1813 LORazepam (ATIVAN) injection 1 mg 1 mg Intravenous Given      09/19/2021 1829 LORazepam (ATIVAN) injection 1 mg 1 mg Intravenous Given      09/19/2021 2049 sodium chloride 0 9 % bolus 1,000 mL 0 mL Intravenous Stopped      09/19/2021 1944 sodium chloride 0 9 % bolus 1,000 mL 1,000 mL Intravenous New Bag      09/19/2021 1844 fentanyl citrate (PF) 100 MCG/2ML 50 mcg 50 mcg Intravenous Given      09/19/2021 1930 iohexol (OMNIPAQUE) 350 MG/ML injection (SINGLE-DOSE) 100 mL 100 mL Intravenous Given      09/19/2021 2049 sodium chloride 0 9 % bolus 1,000 mL 0 mL Intravenous Stopped      09/19/2021 1945 sodium chloride 0 9 % bolus 1,000 mL 1,000 mL Intravenous New Bag      09/19/2021 2056 sodium chloride 0 9 % bolus 1,000 mL 1,000 mL Intravenous New Bag      09/19/2021 2125 sodium chloride 0 9 % bolus 1,000 mL 0 mL Intravenous Stopped      09/19/2021 2107 sodium chloride 0 9 % bolus 1,000 mL 1,000 mL Intravenous New Bag      09/19/2021 2049 ceftriaxone (ROCEPHIN) 1 g/50 mL in dextrose IVPB 0 mg Intravenous Stopped      09/19/2021 1948 ceftriaxone (ROCEPHIN) 1 g/50 mL in dextrose IVPB 1,000 mg Intravenous New Bag      09/19/2021 1944 acetaminophen (TYLENOL) rectal suppository 650 mg 650 mg Rectal Given      09/19/2021 1930 midazolam (VERSED) injection 5 mg 5 mg Intravenous Given      09/19/2021 1910 midazolam (VERSED) injection 5 mg 5 mg Intravenous Given      55/13/2856 8225 folic acid 1 mg in sodium chloride 0 9 % 50 mL IVPB 1 mg Intravenous Not Given cancelled entry     29/67/2492 0333 folic acid 1 mg, thiamine (VITAMIN B1) 100 mg in sodium chloride 0 9 % 100 mL IV piggyback   Intravenous Not Given      09/19/2021 2119 dexmedetomidine (PRECEDEX) 400 mcg in sodium chloride 0 9 % 100 mL infusion 0 4 mcg/kg/hr Intravenous New Bag      09/19/2021 2107 diazepam (VALIUM) injection 5 mg 10 mg Intravenous Given           Present on Admission:   Acute toxic encephalopathy   Hepatic steatosis   Essential hypertension   Chronic bilateral low back pain with bilateral sciatica   Type 2 diabetes mellitus with hyperglycemia (HCC)   Chronic depression   Alcohol abuse   Dyslipidemia   GUSTAVO (acute kidney injury) ([facility] [address] )   Gastroesophageal reflux disease without esophagitis   Prolonged Q-T interval on ECG      Admitting Diagnosis: Altered mental state [R41 82]  Encounter for psychological evaluation [Z00 8]  Age/Sex: 60 y o  male  Admission Orders:  Scheduled Medications:  folic acid IVPB, 1 mg, Intravenous, Daily  insulin lispro, 1-6 Units, Subcutaneous, Q6H ROSALEE  magnesium sulfate, 2 g, Intravenous, Once  pantoprazole, 40 mg, Intravenous, Q24H [facility]  thiamine, 500 mg, Intravenous, Q8H      Continuous IV Infusions:  dexmedetomidine, 0 1-0 7 mcg/kg/hr, Intravenous, Titrated  multi-electrolyte, 125 mL/hr, Intravenous, Continuous      PRN Meds:  acetaminophen, 325 mg, Rectal, Q4H PRN        IP CONSULT TO TOXICOLOGY  IP CONSULT TO CASE MANAGEMENT     Neuro checks  Q 4 hrs  Fall precautions  1:1  Observation  X  24 hrs  Dysphagia  eval      Network Utilization Review Department  ATTENTION: Please call with any questions or concerns to [phone] and carefully listen to the prompts so that you are directed to the right person  All voicemails are confidential   Chance [employer]ples all requests for admission clinical reviews, approved or denied determinations and any other requests to dedicated fax number below belonging to the campus where the patient is receiving treatment   List of dedicated fax numbers for the Facilities:  [address] DENIALS (Administrative/Medical Necessity) [phone]   [address] (Maternity/NICU/Pediatrics) [phone]   [address] [place] [id number] [place] [address] [address] [facility] [address] [facility] [phone]

## 2021-09-20 NOTE — ASSESSMENT & PLAN NOTE
· Likely pre-renal as family reports that he has not been eating or drinking well for a week  · Baseline creatinine 0 7, on admission 1 34, currently 1 16  · AGMA present on admission  · Initial salicylate level 9 3  · Repeat salicylate level 8 9  · Given NaHCO3 50 mEq, salicylate level pending  · Continue IVF as above  · Check BMP q4h

## 2021-09-21 PROBLEM — M62.82 NON-TRAUMATIC RHABDOMYOLYSIS: Status: RESOLVED | Noted: 2021-09-20 | Resolved: 2021-09-21

## 2021-09-21 PROBLEM — M25.512 CHRONIC LEFT SHOULDER PAIN: Status: ACTIVE | Noted: 2021-09-21

## 2021-09-21 PROBLEM — G89.29 CHRONIC LEFT SHOULDER PAIN: Status: ACTIVE | Noted: 2021-09-21

## 2021-09-21 PROBLEM — R94.31 PROLONGED Q-T INTERVAL ON ECG: Status: RESOLVED | Noted: 2021-09-19 | Resolved: 2021-09-21

## 2021-09-21 PROBLEM — N17.9 AKI (ACUTE KIDNEY INJURY) (HCC): Status: RESOLVED | Noted: 2021-09-19 | Resolved: 2021-09-21

## 2021-09-21 LAB
ALBUMIN SERPL BCP-MCNC: 3.1 G/DL (ref 3.5–5)
ALP SERPL-CCNC: 100 U/L (ref 46–116)
ALT SERPL W P-5'-P-CCNC: 29 U/L (ref 12–78)
ANION GAP SERPL CALCULATED.3IONS-SCNC: 11 MMOL/L (ref 4–13)
AST SERPL W P-5'-P-CCNC: 49 U/L (ref 5–45)
ATRIAL RATE: 78 BPM
ATRIAL RATE: 94 BPM
ATRIAL RATE: 96 BPM
ATRIAL RATE: 97 BPM
BASOPHILS # BLD AUTO: 0.09 THOUSANDS/ΜL (ref 0–0.1)
BASOPHILS NFR BLD AUTO: 2 % (ref 0–1)
BILIRUB SERPL-MCNC: 0.8 MG/DL (ref 0.2–1)
BUN SERPL-MCNC: 13 MG/DL (ref 5–25)
CALCIUM ALBUM COR SERPL-MCNC: 9.3 MG/DL (ref 8.3–10.1)
CALCIUM SERPL-MCNC: 8.6 MG/DL (ref 8.3–10.1)
CHLORIDE SERPL-SCNC: 105 MMOL/L (ref 100–108)
CK MB SERPL-MCNC: 2.2 NG/ML (ref 0–5)
CK MB SERPL-MCNC: <1 % (ref 0–2.5)
CK SERPL-CCNC: 682 U/L (ref 39–308)
CO2 SERPL-SCNC: 23 MMOL/L (ref 21–32)
CREAT SERPL-MCNC: 0.65 MG/DL (ref 0.6–1.3)
EOSINOPHIL # BLD AUTO: 0.16 THOUSAND/ΜL (ref 0–0.61)
EOSINOPHIL NFR BLD AUTO: 3 % (ref 0–6)
ERYTHROCYTE [DISTWIDTH] IN BLOOD BY AUTOMATED COUNT: 12.5 % (ref 11.6–15.1)
GFR SERPL CREATININE-BSD FRML MDRD: 107 ML/MIN/1.73SQ M
GLUCOSE SERPL-MCNC: 154 MG/DL (ref 65–140)
GLUCOSE SERPL-MCNC: 160 MG/DL (ref 65–140)
GLUCOSE SERPL-MCNC: 195 MG/DL (ref 65–140)
GLUCOSE SERPL-MCNC: 204 MG/DL (ref 65–140)
HCT VFR BLD AUTO: 36.2 % (ref 36.5–49.3)
HGB BLD-MCNC: 12.6 G/DL (ref 12–17)
IMM GRANULOCYTES # BLD AUTO: 0.02 THOUSAND/UL (ref 0–0.2)
IMM GRANULOCYTES NFR BLD AUTO: 0 % (ref 0–2)
LYMPHOCYTES # BLD AUTO: 2.6 THOUSANDS/ΜL (ref 0.6–4.47)
LYMPHOCYTES NFR BLD AUTO: 42 % (ref 14–44)
MAGNESIUM SERPL-MCNC: 1.6 MG/DL (ref 1.6–2.6)
MCH RBC QN AUTO: 32.1 PG (ref 26.8–34.3)
MCHC RBC AUTO-ENTMCNC: 34.8 G/DL (ref 31.4–37.4)
MCV RBC AUTO: 92 FL (ref 82–98)
MONOCYTES # BLD AUTO: 0.55 THOUSAND/ΜL (ref 0.17–1.22)
MONOCYTES NFR BLD AUTO: 9 % (ref 4–12)
NEUTROPHILS # BLD AUTO: 2.71 THOUSANDS/ΜL (ref 1.85–7.62)
NEUTS SEG NFR BLD AUTO: 44 % (ref 43–75)
NRBC BLD AUTO-RTO: 0 /100 WBCS
P AXIS: 46 DEGREES
P AXIS: 47 DEGREES
P AXIS: 91 DEGREES
PHOSPHATE SERPL-MCNC: 3.3 MG/DL (ref 2.7–4.5)
PLATELET # BLD AUTO: 169 THOUSANDS/UL (ref 149–390)
PMV BLD AUTO: 10.2 FL (ref 8.9–12.7)
POTASSIUM SERPL-SCNC: 3.3 MMOL/L (ref 3.5–5.3)
PR INTERVAL: 174 MS
PR INTERVAL: 178 MS
PROT SERPL-MCNC: 6.3 G/DL (ref 6.4–8.2)
QRS AXIS: 29 DEGREES
QRS AXIS: 31 DEGREES
QRS AXIS: 37 DEGREES
QRS AXIS: 7 DEGREES
QRSD INTERVAL: 104 MS
QRSD INTERVAL: 94 MS
QRSD INTERVAL: 96 MS
QRSD INTERVAL: 98 MS
QT INTERVAL: 356 MS
QT INTERVAL: 396 MS
QT INTERVAL: 434 MS
QT INTERVAL: 476 MS
QTC INTERVAL: 449 MS
QTC INTERVAL: 494 MS
QTC INTERVAL: 508 MS
QTC INTERVAL: 641 MS
RBC # BLD AUTO: 3.92 MILLION/UL (ref 3.88–5.62)
SODIUM SERPL-SCNC: 139 MMOL/L (ref 136–145)
T WAVE AXIS: -10 DEGREES
T WAVE AXIS: 117 DEGREES
T WAVE AXIS: 24 DEGREES
T WAVE AXIS: 37 DEGREES
VENTRICULAR RATE: 109 BPM
VENTRICULAR RATE: 78 BPM
VENTRICULAR RATE: 96 BPM
VENTRICULAR RATE: 99 BPM
WBC # BLD AUTO: 6.13 THOUSAND/UL (ref 4.31–10.16)

## 2021-09-21 PROCEDURE — 97163 PT EVAL HIGH COMPLEX 45 MIN: CPT

## 2021-09-21 PROCEDURE — 82948 REAGENT STRIP/BLOOD GLUCOSE: CPT

## 2021-09-21 PROCEDURE — 80053 COMPREHEN METABOLIC PANEL: CPT | Performed by: PHYSICIAN ASSISTANT

## 2021-09-21 PROCEDURE — 99232 SBSQ HOSP IP/OBS MODERATE 35: CPT | Performed by: INTERNAL MEDICINE

## 2021-09-21 PROCEDURE — 85025 COMPLETE CBC W/AUTO DIFF WBC: CPT | Performed by: PHYSICIAN ASSISTANT

## 2021-09-21 PROCEDURE — 82550 ASSAY OF CK (CPK): CPT | Performed by: PHYSICIAN ASSISTANT

## 2021-09-21 PROCEDURE — 83735 ASSAY OF MAGNESIUM: CPT | Performed by: PHYSICIAN ASSISTANT

## 2021-09-21 PROCEDURE — 93010 ELECTROCARDIOGRAM REPORT: CPT | Performed by: INTERNAL MEDICINE

## 2021-09-21 PROCEDURE — 82553 CREATINE MB FRACTION: CPT | Performed by: PHYSICIAN ASSISTANT

## 2021-09-21 PROCEDURE — 84100 ASSAY OF PHOSPHORUS: CPT | Performed by: PHYSICIAN ASSISTANT

## 2021-09-21 PROCEDURE — 99223 1ST HOSP IP/OBS HIGH 75: CPT | Performed by: PSYCHIATRY & NEUROLOGY

## 2021-09-21 PROCEDURE — 97166 OT EVAL MOD COMPLEX 45 MIN: CPT

## 2021-09-21 RX ORDER — KETOROLAC TROMETHAMINE 30 MG/ML
30 INJECTION, SOLUTION INTRAMUSCULAR; INTRAVENOUS EVERY 8 HOURS
Status: DISCONTINUED | OUTPATIENT
Start: 2021-09-21 | End: 2021-09-22 | Stop reason: HOSPADM

## 2021-09-21 RX ORDER — HYDRALAZINE HYDROCHLORIDE 20 MG/ML
10 INJECTION INTRAMUSCULAR; INTRAVENOUS ONCE
Status: COMPLETED | OUTPATIENT
Start: 2021-09-21 | End: 2021-09-21

## 2021-09-21 RX ORDER — LISINOPRIL 10 MG/1
10 TABLET ORAL DAILY
Status: DISCONTINUED | OUTPATIENT
Start: 2021-09-21 | End: 2021-09-22 | Stop reason: HOSPADM

## 2021-09-21 RX ORDER — METOCLOPRAMIDE HYDROCHLORIDE 5 MG/ML
10 INJECTION INTRAMUSCULAR; INTRAVENOUS EVERY 8 HOURS SCHEDULED
Status: DISCONTINUED | OUTPATIENT
Start: 2021-09-21 | End: 2021-09-22 | Stop reason: HOSPADM

## 2021-09-21 RX ORDER — MAGNESIUM SULFATE HEPTAHYDRATE 40 MG/ML
2 INJECTION, SOLUTION INTRAVENOUS ONCE
Status: COMPLETED | OUTPATIENT
Start: 2021-09-21 | End: 2021-09-21

## 2021-09-21 RX ORDER — ONDANSETRON 2 MG/ML
4 INJECTION INTRAMUSCULAR; INTRAVENOUS EVERY 6 HOURS PRN
Status: DISCONTINUED | OUTPATIENT
Start: 2021-09-21 | End: 2021-09-22 | Stop reason: HOSPADM

## 2021-09-21 RX ORDER — PRAVASTATIN SODIUM 20 MG
20 TABLET ORAL
Status: DISCONTINUED | OUTPATIENT
Start: 2021-09-21 | End: 2021-09-21

## 2021-09-21 RX ORDER — DIHYDROERGOTAMINE MESYLATE 1 MG/ML
1 INJECTION, SOLUTION INTRAMUSCULAR; INTRAVENOUS; SUBCUTANEOUS EVERY 8 HOURS SCHEDULED
Status: DISCONTINUED | OUTPATIENT
Start: 2021-09-21 | End: 2021-09-22 | Stop reason: HOSPADM

## 2021-09-21 RX ORDER — SODIUM CHLORIDE, SODIUM GLUCONATE, SODIUM ACETATE, POTASSIUM CHLORIDE, MAGNESIUM CHLORIDE, SODIUM PHOSPHATE, DIBASIC, AND POTASSIUM PHOSPHATE .53; .5; .37; .037; .03; .012; .00082 G/100ML; G/100ML; G/100ML; G/100ML; G/100ML; G/100ML; G/100ML
100 INJECTION, SOLUTION INTRAVENOUS CONTINUOUS
Status: DISCONTINUED | OUTPATIENT
Start: 2021-09-21 | End: 2021-09-22 | Stop reason: HOSPADM

## 2021-09-21 RX ORDER — POTASSIUM CHLORIDE 20 MEQ/1
40 TABLET, EXTENDED RELEASE ORAL ONCE
Status: COMPLETED | OUTPATIENT
Start: 2021-09-21 | End: 2021-09-21

## 2021-09-21 RX ORDER — GABAPENTIN 300 MG/1
300 CAPSULE ORAL 3 TIMES DAILY
Status: DISCONTINUED | OUTPATIENT
Start: 2021-09-21 | End: 2021-09-22 | Stop reason: HOSPADM

## 2021-09-21 RX ORDER — PRAVASTATIN SODIUM 40 MG
40 TABLET ORAL
Status: DISCONTINUED | OUTPATIENT
Start: 2021-09-21 | End: 2021-09-22 | Stop reason: HOSPADM

## 2021-09-21 RX ORDER — METOPROLOL TARTRATE 5 MG/5ML
5 INJECTION INTRAVENOUS EVERY 6 HOURS PRN
Status: DISCONTINUED | OUTPATIENT
Start: 2021-09-21 | End: 2021-09-22 | Stop reason: HOSPADM

## 2021-09-21 RX ORDER — FENOFIBRATE 145 MG/1
145 TABLET, COATED ORAL DAILY
Status: DISCONTINUED | OUTPATIENT
Start: 2021-09-21 | End: 2021-09-22 | Stop reason: HOSPADM

## 2021-09-21 RX ORDER — DIPHENHYDRAMINE HYDROCHLORIDE 50 MG/ML
25 INJECTION INTRAMUSCULAR; INTRAVENOUS EVERY 8 HOURS PRN
Status: DISCONTINUED | OUTPATIENT
Start: 2021-09-21 | End: 2021-09-22 | Stop reason: HOSPADM

## 2021-09-21 RX ORDER — SERTRALINE HYDROCHLORIDE 100 MG/1
200 TABLET, FILM COATED ORAL DAILY
Status: DISCONTINUED | OUTPATIENT
Start: 2021-09-22 | End: 2021-09-22 | Stop reason: HOSPADM

## 2021-09-21 RX ORDER — POTASSIUM CHLORIDE 20 MEQ/1
20 TABLET, EXTENDED RELEASE ORAL ONCE
Status: COMPLETED | OUTPATIENT
Start: 2021-09-21 | End: 2021-09-21

## 2021-09-21 RX ADMIN — GLYCERIN 2 DROP: .002; .002; .01 SOLUTION/ DROPS OPHTHALMIC at 13:39

## 2021-09-21 RX ADMIN — METOCLOPRAMIDE HYDROCHLORIDE 10 MG: 5 INJECTION INTRAMUSCULAR; INTRAVENOUS at 09:42

## 2021-09-21 RX ADMIN — LISINOPRIL 10 MG: 10 TABLET ORAL at 08:09

## 2021-09-21 RX ADMIN — OXYCODONE HYDROCHLORIDE 5 MG: 5 TABLET ORAL at 19:48

## 2021-09-21 RX ADMIN — PANTOPRAZOLE SODIUM 20 MG: 20 TABLET, DELAYED RELEASE ORAL at 05:16

## 2021-09-21 RX ADMIN — ACETAMINOPHEN 650 MG: 325 TABLET, FILM COATED ORAL at 07:15

## 2021-09-21 RX ADMIN — METOPROLOL TARTRATE 25 MG: 25 TABLET, FILM COATED ORAL at 08:09

## 2021-09-21 RX ADMIN — SODIUM CHLORIDE, SODIUM GLUCONATE, SODIUM ACETATE, POTASSIUM CHLORIDE, MAGNESIUM CHLORIDE, SODIUM PHOSPHATE, DIBASIC, AND POTASSIUM PHOSPHATE 100 ML/HR: .53; .5; .37; .037; .03; .012; .00082 INJECTION, SOLUTION INTRAVENOUS at 12:30

## 2021-09-21 RX ADMIN — SODIUM CHLORIDE, SODIUM GLUCONATE, SODIUM ACETATE, POTASSIUM CHLORIDE, MAGNESIUM CHLORIDE, SODIUM PHOSPHATE, DIBASIC, AND POTASSIUM PHOSPHATE 100 ML/HR: .53; .5; .37; .037; .03; .012; .00082 INJECTION, SOLUTION INTRAVENOUS at 17:40

## 2021-09-21 RX ADMIN — OXYCODONE HYDROCHLORIDE 10 MG: 10 TABLET ORAL at 05:16

## 2021-09-21 RX ADMIN — INSULIN LISPRO 1 UNITS: 100 INJECTION, SOLUTION INTRAVENOUS; SUBCUTANEOUS at 08:06

## 2021-09-21 RX ADMIN — HYDRALAZINE HYDROCHLORIDE 10 MG: 20 INJECTION, SOLUTION INTRAMUSCULAR; INTRAVENOUS at 09:37

## 2021-09-21 RX ADMIN — DICLOFENAC SODIUM 2 G: 10 GEL TOPICAL at 17:35

## 2021-09-21 RX ADMIN — METOPROLOL TARTRATE 25 MG: 25 TABLET, FILM COATED ORAL at 21:24

## 2021-09-21 RX ADMIN — ACETAMINOPHEN 650 MG: 325 TABLET, FILM COATED ORAL at 01:14

## 2021-09-21 RX ADMIN — DICLOFENAC SODIUM 2 G: 10 GEL TOPICAL at 12:30

## 2021-09-21 RX ADMIN — METOROPROLOL TARTRATE 5 MG: 5 INJECTION, SOLUTION INTRAVENOUS at 07:15

## 2021-09-21 RX ADMIN — PRAVASTATIN SODIUM 40 MG: 40 TABLET ORAL at 17:35

## 2021-09-21 RX ADMIN — KETOROLAC TROMETHAMINE 30 MG: 30 INJECTION, SOLUTION INTRAMUSCULAR; INTRAVENOUS at 17:35

## 2021-09-21 RX ADMIN — OXYCODONE HYDROCHLORIDE 10 MG: 10 TABLET ORAL at 23:51

## 2021-09-21 RX ADMIN — KETOROLAC TROMETHAMINE 30 MG: 30 INJECTION, SOLUTION INTRAMUSCULAR; INTRAVENOUS at 09:41

## 2021-09-21 RX ADMIN — GLYCERIN 2 DROP: .002; .002; .01 SOLUTION/ DROPS OPHTHALMIC at 07:26

## 2021-09-21 RX ADMIN — OXYCODONE HYDROCHLORIDE 10 MG: 10 TABLET ORAL at 01:13

## 2021-09-21 RX ADMIN — POTASSIUM CHLORIDE 20 MEQ: 1500 TABLET, EXTENDED RELEASE ORAL at 12:30

## 2021-09-21 RX ADMIN — OXYCODONE HYDROCHLORIDE 5 MG: 5 TABLET ORAL at 08:09

## 2021-09-21 RX ADMIN — DICLOFENAC SODIUM 2 G: 10 GEL TOPICAL at 21:24

## 2021-09-21 RX ADMIN — DICLOFENAC SODIUM 2 G: 10 GEL TOPICAL at 08:06

## 2021-09-21 RX ADMIN — MAGNESIUM SULFATE HEPTAHYDRATE 2 G: 40 INJECTION, SOLUTION INTRAVENOUS at 07:15

## 2021-09-21 RX ADMIN — INSULIN LISPRO 2 UNITS: 100 INJECTION, SOLUTION INTRAVENOUS; SUBCUTANEOUS at 17:42

## 2021-09-21 RX ADMIN — POTASSIUM CHLORIDE 40 MEQ: 1500 TABLET, EXTENDED RELEASE ORAL at 07:16

## 2021-09-21 RX ADMIN — NICOTINE 21 MG: 21 PATCH, EXTENDED RELEASE TRANSDERMAL at 08:09

## 2021-09-21 RX ADMIN — OXYCODONE HYDROCHLORIDE 10 MG: 10 TABLET ORAL at 12:33

## 2021-09-21 NOTE — CONSULTS
NEUROLOGY RESIDENCY CONSULT NOTE     Name: Mary    Age & Sex: 60 y o  male   MRN: [id number]  Unit/Bed#: ICU 03   Encounter: [id number]  Length of Stay: 2    ASSESSMENT & PLAN     * Acute toxic encephalopathy  Assessment & Plan  59 y/o male with PMHx significant for Migraines, chronic back pain with chronic opioid abuse, depression, HTN, HLD and DM Type 2 was admitted on 09/19/21 for agitation, being combative  Police and EMS were called due to agitation  He received  mg of Ketamine in route to ED then  IV Fentanyl and Ativan  He became more agitated after there Fentanyl then received VERSED IV 5mg for CT and then additional 5 mg  · Patient reports no recollection of the event  · He has been taking Zoloft 200 mg daily, however, stopped taking on Thursday (09/16) due to worsening headache  Never taken Wellbutrin according to the patient  · He reports 30 year hx of migraines and also had a severe migraine starting Thursday, reports taking "a lot" of Aspirin and finished almost the full bottle of Excedrin  · Hx of shoulder injury 6 weeks ago after a fall  Has been taking 750 mg q8 Oxycodone for lower back and shoulder pain  Has been taking oxycodone for over two years  Wife reports a similar episode of confusion and agitation when he was 26years old, with poor recollection of the episode  Described as "the doctor said that it is likely secondary to a chemical imbalance"  Patient had an episode of hypertension with headaches in the ICU today  Pain and HTN resolved after administration of Hydralazine  On exam patient was alert and oriented x 4 with normal mental and cognitive exam, no focal neurological deficits were noted  Impression:  Patient reports clearly a long standing history of migraines with abuse of pain medications  Reports sleepiness, headaches since Thursday and agitation on Sunday likely in the setting of polypharmacy/opioid overuse? /SSRI withdrawal vs stress-induced (patient reports a lot of stress at home currently)  UDS: THC (+)  CTA head and neck w wo contrast :9/19/2021 No evidence of acute vascular territorial infarction, intracranial hemorrhage or mass effect  No apparent stenosis or dissection  CT c/a/p : no abnormalities  On admission anion gap elevated 16 likely secondary to aspirin overuse  The anion Gap has closed since the admission  Plan     · Will trend CK (slightly elevated),   · Continue IV fluid hydration   · I/P routine EEG   · Discussed with toxicology, SSRIs can be restarted, negative for serotonin syndrome  Essential hypertension  Assessment & Plan  Patient with a hx of HTN on Ramipril 10 mg,     Started on Lisinopril 10 mg daily  Had an episode of HTN in ICU with frontal headaches  Received Hydralazine 10 mg injection, currently normotensive  Recommendations for outpatient neurological follow up have yet to be determined  SUBJECTIVE     Reason for Consult / Principal Problem: an episode of confusion, memory loss of 24 hours      HPI    HPI: Herson Greenfield is a 60 y o  male with past medical history pertinent for migraines, chronic back pain with two years use of opioids, HTN, HLD and type 2 DM who presented on 9/19/2021 for  for agitation, being combative  Police and EMS were called due to agitation  He received  mg of Ketamine in route to ED then  IV Fentanyl and Ativan  He became more agitated after there Fentanyl then received VERSED IV 5mg for CT and then additional 5 mg  Wife reports that the patient would not recognize her during the episode and kept calling for help at the same time he would just look through the wife standing in front of him, as if not seeing  Patient reports no recollection any of the above mentioned during the event  He reports taking Zoloft 200 mg daily, however, stopped taking on Thursday (09/16) due to worsening headache   He reports 30 year hx of migraines and also had a severe migraine starting Thursday, reports taking "a lot" of Aspirin and finished almost the full bottle of Excedrin  Hx of shoulder injury 6 weeks ago after a fall  Has been taking 750 mg q8 Oxycodone for lower back and shoulder pain  Has been taking oxycodone for over two years  Wife reports a similar episode of confusion and agitation when he was 26years old, with poor recollection of the episode  Described as "the doctor said that it is likely secondary to a chemical imbalance"  Patient had an episode of hypertension with headaches in the ICU today  Pain and HTN resolved after administration of Hydralazine  On exam patient was alert and oriented x 4, patient recalled 4/5 objects without any cues at 5 minutes, cognitive exam was normal, reverse word recall was normal  Speech comprehension, repeat and production were normal  Muscle bulk, tone normal and strength 5/5 B/L for upper and lower extremities  Reflexes were 2+ throughout  Finger to nose, heal to shin were normal with normal alternating movements  UDS: THC (+)  CTA head and neck w wo contrast :9/19/2021 No evidence of acute vascular territorial infarction, intracranial hemorrhage or mass effect  No apparent stenosis or dissection  CT c/a/p : no abnormalities  On admission anion gap elevated 16 likely secondary to aspirin overdose  Gap has closed since the admission         Vitals  Blood Pressure: 132/61  Temperature: 98 4 °F (36 9 °C)  Temp Source: Oral  Pulse: 89  Respirations: 20  SpO2: 98 %  Height: 5' 11" (180 3 cm)  Weight - Scale: 110 kg (242 lb 15 2 oz)      Inpatient consult to Neurology  Consult performed by: Kang Barry MD  Consult ordered by: Jeremiah Novak PA-C          Historical Information   Past Medical History:   Diagnosis Date    Alcohol abuse     Back pain     Chronic low back pain     Depression     Diabetes mellitus [facility] [address] )     DM2 (diabetes mellitus, type 2) ([facility] [address] [facility]     Erectile dysfunction     GERD (gastroesophageal reflux disease)     Hepatic steatosis     Hyperlipidemia     Hypertension     Neuropathy     Psychiatric disorder     Tobacco abuse      Past Surgical History:   Procedure Laterality Date    ANKLE FRACTURE SURGERY Right     ANKLE SURGERY Right     INGUINAL HERNIA REPAIR Left     KNEE SURGERY Right     UMBILICAL HERNIA REPAIR       Social History   Social History     Substance and Sexual Activity   Alcohol Use Yes    Comment: 1-2 drinks per month; former heavy drinker, but not for last 2 years     Social History     Substance and Sexual Activity   Drug Use Yes    Types: Marijuana    Comment: not for a while     E-Cigarette/Vaping    E-Cigarette Use Never User      E-Cigarette/Vaping Substances     Social History     Tobacco Use   Smoking Status Current Every Day Smoker    Packs/day: 1 00    Years: 44 00    Pack years: 44 00    Types: Cigarettes    Start date: 1978   Smokeless Tobacco Never Used   Tobacco Comment    per [employer]     Family History:   Family History   Problem Relation Age of Onset    Lymphoma Mother     No Known Problems Father      Meds/Allergies   all current active meds have been reviewed  Allergies   Allergen Reactions    Amoxicillin Swelling    Amoxil [Amoxicillin] Hives       Review of previous medical records was completed  Review of Systems   Constitutional: Negative for chills and fever  HENT: Negative for ear pain and sore throat  Eyes: Negative for pain and visual disturbance  Respiratory: Negative for cough and shortness of breath  Cardiovascular: Negative for chest pain and palpitations  Gastrointestinal: Negative for abdominal pain and vomiting  Genitourinary: Negative for dysuria and hematuria  Musculoskeletal: Negative for arthralgias and back pain  Skin: Negative for color change and rash  Neurological: Negative for seizures and syncope  Psychiatric/Behavioral: Positive for sleep disturbance   The patient is nervous/anxious  All other systems reviewed and are negative  OBJECTIVE     Patient ID: Lauren Painting is a 60 y o  male  Vitals:   Vitals:    09/21/21 0903 09/21/21 0937 09/21/21 1003 09/21/21 1230   BP: (!) 183/88 (!) 180/79 111/55 132/61   BP Location:       Pulse: 71  76 89   Resp: (!) 24  (!) 23 20   Temp:    98 4 °F (36 9 °C)   TempSrc:    Oral   SpO2: 95%  98% 98%   Weight:       Height:          Body mass index is 33 88 kg/m²  No intake or output data in the 24 hours ending 09/21/21 1529    Physical Exam  Vitals and nursing note reviewed  Constitutional:       General: He is not in acute distress  Appearance: He is well-developed  He is not diaphoretic  HENT:      Head: Normocephalic and atraumatic  Nose: Nose normal    Eyes:      General: No scleral icterus  Conjunctiva/sclera: Conjunctivae normal       Pupils: Pupils are equal, round, and reactive to light  Neck:      Thyroid: No thyromegaly  Cardiovascular:      Rate and Rhythm: Normal rate and regular rhythm  Heart sounds: S1 normal and S2 normal  No murmur heard  No friction rub  No gallop  Pulmonary:      Effort: Pulmonary effort is normal  No respiratory distress  Breath sounds: Normal breath sounds  No stridor  No wheezing or rales  Chest:      Chest wall: No tenderness  Abdominal:      General: Bowel sounds are normal  There is no distension  Palpations: Abdomen is soft  There is no mass  Tenderness: There is no abdominal tenderness  Musculoskeletal:         General: No tenderness or deformity  Normal range of motion  Cervical back: Normal range of motion and neck supple  Skin:     General: Skin is warm and dry  Findings: No erythema or rash  Neurological:      Mental Status: He is alert  Coordination: Finger-Nose-Finger Test, Heel to [employer] and Romberg Test normal       Gait: Gait is intact        Deep Tendon Reflexes: Strength normal    Psychiatric: Speech: Speech normal          Behavior: Behavior normal          Thought Content: Thought content normal          Judgment: Judgment normal           Neurologic Exam     Mental Status   Disoriented to person  Disoriented to place  Recall at 5 minutes: recalls 3 of 3 objects  Follows 3 step commands  Attention: normal    Speech: speech is normal   Level of consciousness: alert  Knowledge: consistent with education  Able to name object  Able to repeat  Cranial Nerves   Cranial nerves II through XII intact  CN III, IV, VI   Pupils are equal, round, and reactive to light  Motor Exam   Muscle bulk: normal  Overall muscle tone: normal    Strength   Strength 5/5 throughout  Sensory Exam   Light touch normal    Vibration normal    Proprioception normal      Gait, Coordination, and Reflexes     Gait  Gait: normal    Coordination   Romberg: negative  Finger to nose coordination: normal  Heel to shin coordination: normal    Tremor   Resting tremor: absent  Intention tremor: absent  Action tremor: absent    Reflexes   Reflexes 2+ except as noted  LABORATORY DATA     Labs: I have personally reviewed pertinent reports     and I have personally reviewed pertinent films in PACS  Results from last 7 days   Lab Units 09/21/21  0544 09/20/21  0528 09/20/21  0153 09/19/21  1717   WBC Thousand/uL 6 13 9 54  --  11 21*   HEMOGLOBIN g/dL 12 6 12 6  --  16 1   HEMATOCRIT % 36 2* 36 9  --  45 1   PLATELETS Thousands/uL 169 183 180 300   NEUTROS PCT % 44 76*  --  72   MONOS PCT % 9 8  --  9      Results from last 7 days   Lab Units 09/21/21  0544 09/20/21  1226 09/20/21  0834 09/20/21  0528 09/19/21  1718   POTASSIUM mmol/L 3 3* 4 0 4 1 4 5 4 0   CHLORIDE mmol/L 105 110* 111* 110* 102   CO2 mmol/L 23 22 22 20* 20*   BUN mg/dL 13 14 13 13 14   CREATININE mg/dL 0 65 0 66 0 80 0 79 1 34*   CALCIUM mg/dL 8 6 8 0* 8 3 8 2* 9 3   ALK PHOS U/L 100  --   --  96 131*   ALT U/L 29  --   --  22 32   AST U/L 49*  --   -- 25 41     Results from last 7 days   Lab Units 09/21/21  0544 09/20/21  0528   MAGNESIUM mg/dL 1 6 1 8     Results from last 7 days   Lab Units 09/21/21  0544 09/20/21  0528   PHOSPHORUS mg/dL 3 3 2 9      Results from last 7 days   Lab Units 09/20/21  1457 09/19/21  1718   INR  1 04 1 02   PTT seconds  --  29     Results from last 7 days   Lab Units 09/20/21  0834   LACTIC ACID mmol/L 1 1           IMAGING & DIAGNOSTIC TESTING     Radiology Results: I have personally reviewed pertinent reports  CTA dissection protocol chest abdomen pelvis w wo contrast   Final Result by García Rios MD (09/19 2106)         1  No thoracic or abdominal aortic aneurysm or dissection  2   No evidence of acute process in the chest, abdomen or pelvis  Workstation performed: [id number]         CTA head and neck w wo contrast   Final Result by García Rios MD (09/19 2100)         1  No evidence of acute vascular territorial infarction, intracranial hemorrhage or mass effect  2   No hemodynamically significant stenosis, dissection or occlusion of the carotid or vertebral arteries or major vessels of the [place]  Workstation performed: [id number]         XR chest 1 view portable   Final Result by Adelso Carrasquillo MD (09/20 6506)      No acute cardiopulmonary disease  Workstation performed: [id number]             Other Diagnostic Testing: I have personally reviewed pertinent reports        ACTIVE MEDICATIONS     Current Facility-Administered Medications   Medication Dose Route Frequency    acetaminophen (TYLENOL) tablet 650 mg  650 mg Oral Q6H PRN    Diclofenac Sodium (VOLTAREN) 1 % topical gel 2 g  2 g Topical 4x Daily    dihydroergotamine (DHE) injection 1 mg  1 mg Intravenous Q8H [address]    diphenhydrAMINE (BENADRYL) injection 25 mg  25 mg Intravenous Q8H PRN    fenofibrate (TRICOR) tablet 145 mg  145 mg Oral Daily    folic acid 1 mg, thiamine (VITAMIN B1) 100 mg in sodium chloride 0 9 % 100 mL IV piggyback   Intravenous Daily    gabapentin (NEURONTIN) capsule 300 mg  300 mg Oral TID    glycerin-hypromellose- (ARTIFICIAL TEARS) ophthalmic solution 2 drop  2 drop Left Eye Q3H PRN    insulin lispro (HumaLOG) 100 units/mL subcutaneous injection 1-6 Units  1-6 Units Subcutaneous TID AC    ketorolac (TORADOL) injection 30 mg  30 mg Intravenous Q8H    lisinopril (ZESTRIL) tablet 10 mg  10 mg Oral Daily    metoclopramide (REGLAN) injection 10 mg  10 mg Intravenous Q8H [facility]    metoprolol (LOPRESSOR) injection 5 mg  5 mg Intravenous Q6H PRN    metoprolol tartrate (LOPRESSOR) tablet 25 mg  25 mg Oral Q12H [facility]    multi-electrolyte (PLASMALYTE-A/ISOLYTE-S PH 7 4) IV solution  100 mL/hr Intravenous Continuous    nicotine (NICODERM CQ) 21 mg/24 hr TD 24 hr patch 21 mg  21 mg Transdermal Daily    ondansetron (ZOFRAN) injection 4 mg  4 mg Intravenous Q6H PRN    oxyCODONE (ROXICODONE) immediate release tablet 10 mg  10 mg Oral Q4H PRN    oxyCODONE (ROXICODONE) IR tablet 5 mg  5 mg Oral Q4H PRN    pantoprazole (PROTONIX) EC tablet 20 mg  20 mg Oral Early Morning    pravastatin (PRAVACHOL) tablet 40 mg  40 mg Oral Daily With Dinner       Prior to Admission medications    Medication Sig Start Date End Date Taking?  Authorizing Provider   Accu-Chek Stefanie Plus test strip TEST THREE TIMES DAILY 5/16/21   Emily Franklin MD   Accu-Chek FastClix Lancets MISC USE AS DIRECTED 12/9/20   Laila Ta MD   Blood Glucose Monitoring Suppl (Accu-Chek Stefanie Plus) w/Device KIT USE AS DIRECTED 12/9/20   Laila Ta MD   buPROPion (WELLBUTRIN XL) 150 mg 24 hr tablet Take 1 tablet (150 mg total) by mouth every morning 7/30/21   Laila Ta MD   fenofibrate (TRIGLIDE) 160 MG tablet Take 1 tablet (160 mg total) by mouth daily 2/12/21   Laila Ta MD   gabapentin (NEURONTIN) 300 mg capsule Take 300 mg by mouth Three times a day 5/18/21 5/18/22  Historical Provider, MD glimepiride (AMARYL) 2 mg tablet TAKE 1 TABLET EVERY DAY IN THE MORNING AND TAKE 1/2 TABLET EVERY EVENING 7/26/21   Angela Hartman MD   HYDROcodone-acetaminophen [facility])  mg per tablet Take 1 tablet by mouth every 4 (four) hours as needed for moderate painMax Daily Amount: 6 tablets 8/3/21   Melva Costa PA-C   metFORMIN (GLUCOPHAGE-XR) 500 mg 24 hr tablet Take 2 tablets (1,000 mg total) by mouth daily 10/30/20   Angela Hartman MD   methocarbamol (ROBAXIN) 500 mg tablet Take 1 5 tablets (750 mg total) by mouth every 8 (eight) hours as needed for muscle spasms 7/31/21   KRISTOFER Bhat   omeprazole (PriLOSEC) 40 MG capsule TAKE 1 CAPSULE EVERY DAY 6/18/21   Shakila Mccray MD   oxyCODONE-acetaminophen (PERCOCET) 7 5-325 MG per tablet TAKE 1 TABLET BID AS NEEDED FOR PAIN   MDD 2 10/8/20   Leonardo Riddle MD   pioglitazone (ACTOS) 45 mg tablet Take 1 tablet (45 mg total) by mouth daily 2/12/21   Shakila Mccray MD   ramipril (ALTACE) 10 MG capsule Take 1 capsule (10 mg total) by mouth daily 10/30/20   Angela Hartman MD   sertraline (ZOLOFT) 100 mg tablet Take 2 tablets (200 mg total) by mouth daily 12/14/20   Shakila Mccray MD   simvastatin (ZOCOR) 20 mg tablet TAKE 1 TABLET (20 MG TOTAL) BY MOUTH DAILY 7/18/21   Shakila Mccray MD   tadalafil (CIALIS) 5 MG tablet TAKE 1 TABLET BY MOUTH DAILY AS NEEDED FOR ERECTILE DYSFUNCTION 7/30/21   Shakila Mccray MD       CODE STATUS & ADVANCED DIRECTIVES     Code Status: Level 1 - Full Code  Advance Directive and Living Will:      Power of :    POLST:        VTE Pharmacologic Prophylaxis: Currently not anticoagulated management by the ICU team    VTE Mechanical Prophylaxis: sequential compression device and foot pump applied    ==  MD Nahomi Charles 71 PGY1 , [place]

## 2021-09-21 NOTE — QUICK NOTE
Reviewed patient's chart and touched base with care provider, [facility]  Patient continues to improve, mentation is at baseline  No further toxicology concerns or interventions  Toxicology will sign off  Thank you for the consult  Please reach out to the network provider on call with questions or concerns

## 2021-09-21 NOTE — PROGRESS NOTES
[facility]  Progress Note - [facility] 1965, 60 y o  male MRN: [id number]  Unit/Bed#: ICU 03 Encounter: [id number]  Primary Care Provider: Maria Del Carmen Brennan MD   Date and time admitted to hospital: 9/19/2021  5:03 PM    * Acute toxic encephalopathy  Assessment & Plan  · Concern for serotonin syndrome vs acute opioid withdrawal vs toxic ingestion vs Wernicke's encephalopathy vs Wellbutrin overdose  · Given afebrile on arrival and WBC 12 less concern for infectious process -- will monitor off antibiotics  · Received dose of ceftriaxone in ER  · Patient reportedly at home with agitation/combative and acute delirium  Police and EMS called to scene  He received Ketamine  mg en route to the ER    · On arrival to the ER was noted to be tachycardic with severe agitation  · Received Ativan IV 2 mg for concern of acute alcohol withdrawal  · Received Fentanyl IV 50 mcg for concern of acute opioid withdrawal  · Remained combative and agitated requiring Versed IV 5 mg to transport to CT scan and an additional 5 mg in order to obtain images  · CT head/neck -- no acute abnormality  · CT c/a/p -- no acute abnormality  · Avoid atypical antipsycohtics, fentanyl per toxicology  · EKG on arrival to ICU and q2h for QRS, QTc abnormality  · UDS + THC  · AGMA anion gap 16, serum CO2 20 -- gap now closed  · BMP q4h  · Toxicology recommendations  · Avoid serotonin agents, metformin, anticholinergics, atypical antipsychotics  · For agitation  · Valium IV 10-20 mg q2h as needed   · Haldol IV 5 mg q4h as needed   · Avoid atypical antipsychotics  · Lumbar puncture if neurologic status worsens   · Regulate sleep/wake cycle       GUSTAVO (acute kidney injury) (HCC)-resolved as of 9/21/2021  Assessment & Plan  · Likely pre-renal as family reports that he has not been eating or drinking well for a week  · Baseline creatinine 0 7, on admission 1 34, currently 1 16  · AGMA present on admission  · Initial salicylate level 9 3  · Repeat salicylate level 8 9  · Given NaHCO3 50 mEq, salicylate level pending  · Continue IVF as above  · Check BMP q4h    Prolonged Q-T interval on ECG-resolved as of 9/21/2021  Assessment & Plan  · Present on admission , QTc 508  · EKGs q2h due to toxodrome clinical picture  · Toxicology placing recommendations for magnesium and sodium bicarbonate   · Magnesium Sulfate IVPB 2 g x1 on arrival to ICU    Type 2 diabetes mellitus with hyperglycemia [facility])  Assessment & Plan  No results found for: HGBA1C    Recent Labs     09/19/21  1712 09/20/21  0150   POCGLU 224* 219*       Blood Sugar Average: Last 72 hrs:  (P) 221 5     · Holding home metformin, glimepiride, and pioglitazone  · Continue SSI  · Goal blood glucose 140-180    Alcohol abuse  Assessment & Plan  · Reportedly abstinent    Of note did have admission for pancreatitis Feb 2021  · Lipase 165  · EtOH negative  · Did receive lorazepam 2 mg IV in the ER for concern of EtOH withdrawal   · Continue high dose thiamine  mg q 8 hours in the setting of possible Wernicke's encephalopathy  · Continue folic acid IV daily    Essential hypertension  Assessment & Plan  · Remains normotensive  · Home regimen: ramipril (not on formulary)   · Start lisinopril 10mg daily     Chronic bilateral low back pain with bilateral sciatica  Assessment & Plan  · Reportedly has been on opioids for prolonged period of time  · UDS negative for opioids  · Received Fentanyl IV 50 mcg in ER for concern of acute opioid withdrawal  · Watch for s/s of acute withdrawal    Chronic depression  Assessment & Plan  · Holding home sertraline and Wellbutrin in the setting of acute encephalopathy/toxodrome picture    Chronic left shoulder pain  Assessment & Plan  · Patient in severe discomfort  · Toradol given overnight  · Continue Voltaren gel   · Continue Tylenol   · Pending outpatient MRI   · Follows [facility]    Gastroesophageal reflux disease without esophagitis  Assessment & Plan  · Continue protonix    Dyslipidemia  Assessment & Plan  · Restart home fenofibrate and simvastatin     Hepatic steatosis  Assessment & Plan  · Likely due to history of alcohol abuse  · LFTs at baseline  · ABD exam benign    Non-traumatic rhabdomyolysis-resolved as of 9/21/2021  Assessment & Plan  · In the setting of dehydration, GUSTAVO, and severe agitation  · Continue to trend  · Continue IVF      ----------------------------------------------------------------------------------------  HPI/24hr events: 60year old male admitted due to toxic metabolic encephalopathy  Overnight, no acute events  Remains afebrile  Patient appropriate for transfer out of the ICU today?: Patient does not meet criteria for ICU Follow-up Clinic; referral has not been made  Disposition: Transfer to Med-Surg   Code Status: Level 1 - Full Code  ---------------------------------------------------------------------------------------  SUBJECTIVE  Patient complains of persistent left shoulder pain  Also has headaches that waxes and weans  Review of Systems   Eyes: Positive for redness  Negative for photophobia, pain, discharge, itching and visual disturbance  Respiratory: Negative for cough and shortness of breath  Cardiovascular: Negative for chest pain  Gastrointestinal: Negative for abdominal pain, constipation, nausea and vomiting  Genitourinary: Negative for difficulty urinating  Musculoskeletal:        L shoulder pain; decreased ROM   Neurological: Positive for headaches  Negative for dizziness, speech difficulty and light-headedness  Psychiatric/Behavioral: Negative for confusion       Review of systems was reviewed and negative unless stated above in HPI/24-hour events   ---------------------------------------------------------------------------------------  OBJECTIVE    Vitals   Vitals:    09/20/21 1100 09/20/21 1453 09/20/21 1500 09/20/21 1856   BP: 134/63 128/60 128/60 151/68 BP Location:    Right arm   Pulse: 57 66 67 81   Resp: 18 22 18 (!) 27   Temp:  98 °F (36 7 °C)  98 °F (36 7 °C)   TempSrc:  Oral  Oral   SpO2: 98% 100% 98% 96%   Weight:       Height:         Temp (24hrs), Av 1 °F (36 7 °C), Min:97 9 °F (36 6 °C), Max:98 5 °F (36 9 °C)  Current: Temperature: 98 °F (36 7 °C)          Respiratory:  SpO2: SpO2: 96 %, SpO2 Activity: SpO2 Activity: At Rest, SpO2 Device: O2 Device: None (Room air)       Invasive/non-invasive ventilation settings   Respiratory    Lab Data (Last 4 hours)    None         O2/Vent Data (Last 4 hours)    None                Physical Exam  Constitutional:       General: He is not in acute distress  Appearance: He is obese  He is not ill-appearing, toxic-appearing or diaphoretic  HENT:      Head: Normocephalic and atraumatic  Nose: Nose normal       Mouth/Throat:      Pharynx: Oropharynx is clear  Eyes:      General: No scleral icterus  Conjunctiva/sclera: Conjunctivae normal       Comments: L eye redness   Cardiovascular:      Rate and Rhythm: Normal rate and regular rhythm  Heart sounds: Normal heart sounds  Pulmonary:      Effort: Pulmonary effort is normal  No respiratory distress  Breath sounds: Normal breath sounds  Abdominal:      General: There is no distension  Tenderness: There is no abdominal tenderness  Musculoskeletal:      Cervical back: Normal range of motion and neck supple  Right lower leg: No edema  Left lower leg: No edema  Skin:     General: Skin is warm and dry  Capillary Refill: Capillary refill takes less than 2 seconds  Coloration: Skin is not jaundiced  Neurological:      General: No focal deficit present  Mental Status: He is alert and oriented to person, place, and time           Laboratory and Diagnostics:  Results from last 7 days   Lab Units 21  0528 21  0153 21  1717   WBC Thousand/uL 9 54  --  11 21*   HEMOGLOBIN g/dL 12 6  --  16 1 HEMATOCRIT % 36 9  --  45 1   PLATELETS Thousands/uL 183 180 300   NEUTROS PCT % 76*  --  72   MONOS PCT % 8  --  9     Results from last 7 days   Lab Units 09/20/21  1226 09/20/21  0834 09/20/21  0528 09/20/21  0153 09/19/21  2234 09/19/21  1718   SODIUM mmol/L 139 144 142 144 142 138   POTASSIUM mmol/L 4 0 4 1 4 5 4 5 3 6 4 0   CHLORIDE mmol/L 110* 111* 110* 110* 109* 102   CO2 mmol/L 22 22 20* 22 22 20*   ANION GAP mmol/L 7 11 12 12 11 16*   BUN mg/dL 14 13 13 12 12 14   CREATININE mg/dL 0 66 0 80 0 79 0 78 1 16 1 34*   CALCIUM mg/dL 8 0* 8 3 8 2* 7 8* 8 3 9 3   GLUCOSE RANDOM mg/dL 168* 187* 215* 203* 201* 226*   ALT U/L  --   --  22  --   --  32   AST U/L  --   --  25  --   --  41   ALK PHOS U/L  --   --  96  --   --  131*   ALBUMIN g/dL  --   --  3 0*  --   --  3 9   TOTAL BILIRUBIN mg/dL  --   --  0 78  --   --  0 88     Results from last 7 days   Lab Units 09/20/21  0528   MAGNESIUM mg/dL 1 8   PHOSPHORUS mg/dL 2 9      Results from last 7 days   Lab Units 09/20/21  1457 09/19/21  1718   INR  1 04 1 02   PTT seconds  --  29          Results from last 7 days   Lab Units 09/20/21  0834 09/20/21  0306 09/19/21  2106 09/19/21  1717   LACTIC ACID mmol/L 1 1 0 9 3 8* 2 7*     ABG:    VBG:  Results from last 7 days   Lab Units 09/19/21  1852   PH SYDNEY  7 362   PCO2 SYDNEY mm Hg 32 5*   PO2 SYDNEY mm Hg 75 3*   HCO3 SYDNEY mmol/L 18 0*   BASE EXC SYDNEY mmol/L -6 1     Results from last 7 days   Lab Units 09/20/21  0528 09/19/21  1718   PROCALCITONIN ng/ml <0 05 <0 05       Micro  Results from last 7 days   Lab Units 09/19/21  1740 09/19/21  1718   BLOOD CULTURE  No Growth at 24 hrs  No Growth at 24 hrs         EKG: Reviewed       Imaging: I have personally reviewed pertinent films in PACS    Intake and Output  I/O       09/19 0701 - 09/20 0700 09/20 0701 - 09/21 0700    I V  (mL/kg) 570 8 (5 3) 574 9 (5 4)    IV Piggyback 4400 200    Total Intake(mL/kg) 4970 8 (46 5) 774 9 (7 2)    Urine (mL/kg/hr) 850 300 (0 1)    Total Output 850 300    Net +4120 8 +474 9                Height and Weights   Height: 5' 11" (180 3 cm)  IBW (Ideal Body Weight): 75 3 kg  Body mass index is 32 9 kg/m²  Weight (last 2 days)     Date/Time   Weight    09/20/21 0938   107 (235 89)    09/20/21 0549   107 (236 99)    09/19/21 1713   101 (223 77)                Nutrition       Diet Orders   (From admission, onward)             Start     Ordered    09/20/21 1444  Diet Faustino/CHO Controlled; Consistent Carbohydrate Diet Level 2 (5 carb servings/75 grams CHO/meal)  Diet effective now     Question Answer Comment   Diet Type Faustino/CHO Controlled    Faustino/CHO Controlled Consistent Carbohydrate Diet Level 2 (5 carb servings/75 grams CHO/meal)    RD to adjust diet per protocol?  Yes        09/20/21 1449    09/19/21 2206  Room Service  Once     Question:  Type of Service  Answer:  Room Service - Appropriate with Assistance    09/19/21 2205                  Active Medications  Scheduled Meds:  Current Facility-Administered Medications   Medication Dose Route Frequency Provider Last Rate    acetaminophen  650 mg Oral Q6H PRN [place] LifeALIN      Diclofenac Sodium  2 g Topical 4x Daily [place], [place]      fenofibrate  145 mg Oral Daily Linda Matute PA-C      folic acid 1 mg, thiamine 100 mg in 0 9% sodium chloride 100 mL IVPB   Intravenous Daily Jolene Life, [place]      glycerin-hypromellose-  2 drop Left Eye Q3H PRN Chely William DO      insulin lispro  1-6 Units Subcutaneous TID AC Jailene Dozier PA-C      lisinopril  10 mg Oral Daily Linda Matute PA-C      nicotine  21 mg Transdermal Daily [place], [place]      oxyCODONE  10 mg Oral Q4H PRN J[place]ene LifeALIN      oxyCODONE  5 mg Oral Q4H PRN Jailene Dozier PA-C      pantoprazole  20 mg Oral Early Morning [place], [place]      pravastatin  20 mg Oral Daily With [facility]       Continuous Infusions:     PRN Meds:   acetaminophen, 650 mg, Q6H PRN  glycerin-hypromellose-, 2 drop, Q3H PRN  oxyCODONE, 10 mg, Q4H PRN  oxyCODONE, 5 mg, Q4H PRN        Invasive Devices Review  Invasive Devices     Peripheral Intravenous Line            Peripheral IV 09/19/21 Left Hand 1 day    Peripheral IV 09/19/21 Left;Upper Arm 1 day                Rationale for remaining devices: N/a  ---------------------------------------------------------------------------------------  Advance Directive and Living Will:      Power of :    POLST:    ---------------------------------------------------------------------------------------  Care Time Delivered:   No Critical Care time spent       Luis Hernandez PA-C

## 2021-09-21 NOTE — ASSESSMENT & PLAN NOTE
61 y/o male with PMHx significant for Migraines, chronic back pain with chronic opioid abuse, depression, HTN, HLD and DM Type 2 was admitted on 09/19/21 for agitation, being combative  Police and EMS were called due to agitation  He received  mg of Ketamine in route to ED then  IV Fentanyl and Ativan  He became more agitated after there Fentanyl then received VERSED IV 5mg for CT and then additional 5 mg  · Patient reports no recollection of the event  · He has been taking Zoloft 200 mg daily, however, stopped taking on Thursday (09/16) due to worsening headache  · He reports 30 year hx of migraines and also had a severe migraine starting Thursday, reports taking "a lot" of Aspirin and finished almost the full bottle of Excedrin  · Hx of shoulder injury 6 weeks ago after a fall  Has been taking 750 mg q8 Oxycodone for lower back and shoulder pain  Has been taking oxycodone for over two years  Wife reports a similar episode of confusion and agitation when he was 26years old, with poor recollection of the episode  Described as "the doctor said that it is likely secondary to a chemical imbalance"  Patient had an episode of hypertension with headaches in the ICU today  Pain and HTN resolved after administration of Hydralazine  On exam patient was alert and oriented x 4 with normal mental and cognitive exam, no focal neurological deficits were noted  Impression:  The patient reports clearly a long standing history of migraines with abuse of pain medications  Reports sleepiness, headaches since Thursday and agitation on Sunday likely in the setting of polypharmacy/opioid overuse? /SSRI withdrawal vs stress-induced (patient reports a lot of stress at home currently)  UDS: THC (+)  CTA head and neck w wo contrast :9/19/2021 No evidence of acute vascular territorial infarction, intracranial hemorrhage or mass effect  No apparent stenosis or dissection     CT c/a/p : no abnormalities  On admission anion gap elevated 16 likely secondary to aspirin overdose  Gap has closed since the admission  Plan     · Will trend CK (slightly elevated),   · Continue IV fluid hydration   · I/P routine EEG   · Contact toxicology regarding SSRI lower dose restart

## 2021-09-21 NOTE — ASSESSMENT & PLAN NOTE
Patient with a hx of HTN on Ramipril 10 mg,     Started on Lisinopril 10 mg daily  Had an episode of HTN in ICU with frontal headaches  Received Hydralazine 10 mg injection, currently normotensive

## 2021-09-21 NOTE — ASSESSMENT & PLAN NOTE
· Patient in severe discomfort  · Toradol given overnight  · Continue Voltaren gel   · Continue Tylenol   · Pending outpatient MRI   · Follows with outpatient [facility]

## 2021-09-21 NOTE — ASSESSMENT & PLAN NOTE
59 y/o male with PMHx significant for Migraines, chronic back pain with chronic opioid abuse, depression, HTN, HLD and DM Type 2 was admitted on 09/19/21 for agitation, being combative  Police and EMS were called due to agitation  He received  mg of Ketamine in route to ED then  IV Fentanyl and Ativan  He became more agitated after there Fentanyl then received VERSED IV 5mg for CT and then additional 5 mg  · Patient reports no recollection of the event  · He has been taking Zoloft 200 mg daily, however, stopped taking on Thursday (09/16) due to worsening headache  Never taken Wellbutrin according to the patient  · He reports 30 year hx of migraines and also had a severe migraine starting Thursday, reports taking "a lot" of Aspirin and finished almost the full bottle of Excedrin  · Hx of shoulder injury 6 weeks ago after a fall  Has been taking 750 mg q8 Oxycodone for lower back and shoulder pain  Has been taking oxycodone for over two years  Wife reports a similar episode of confusion and agitation when he was 26years old, with poor recollection of the episode  Described as "the doctor said that it is likely secondary to a chemical imbalance"  Patient had an episode of hypertension with headaches in the ICU today  Pain and HTN resolved after administration of Hydralazine  On exam patient was alert and oriented x 4 with normal mental and cognitive exam, no focal neurological deficits were noted  Impression:  Patient reports clearly a long standing history of migraines with abuse of pain medications  Reports sleepiness, headaches since Thursday and agitation on Sunday likely in the setting of polypharmacy/opioid overuse? /SSRI withdrawal vs stress-induced (patient reports a lot of stress at home currently)  UDS: THC (+)  CTA head and neck w wo contrast :9/19/2021 No evidence of acute vascular territorial infarction, intracranial hemorrhage or mass effect   No apparent stenosis or dissection  CT c/a/p : no abnormalities  On admission anion gap elevated 16 likely secondary to aspirin overuse  The anion Gap has closed since the admission  Plan     · Will trend CK (slightly elevated),   · Continue IV fluid hydration   · I/P routine EEG   · Discussed with toxicology, SSRIs can be restarted, negative for serotonin syndrome

## 2021-09-21 NOTE — PHYSICAL THERAPY NOTE
PHYSICAL THERAPY EVALUATION  NAME: Jo Briones  AGE:   60 y o  MRN:  [id number]  ADMIT DX: Altered mental state [R41 82]  Encounter for psychological evaluation [Z00 8]    PMH:   Past Medical History:   Diagnosis Date    Alcohol abuse     Back pain     Chronic low back pain     Depression     Diabetes mellitus ([address] )     DM2 (diabetes mellitus, type 2) (HCC)     Erectile dysfunction     GERD (gastroesophageal reflux disease)     Hepatic steatosis     Hyperlipidemia     Hypertension     Neuropathy     Psychiatric disorder     Tobacco abuse      LENGTH OF STAY: 2       09/21/21 0945   PT Last Visit   PT Visit Date 09/21/21   Note Type   Note type Evaluation   Pain Assessment   Pain Assessment Tool 0-10   Pain Score Worst Possible Pain   Pain Location/Orientation Orientation: Left; Location: Shoulder;Orientation: Right;Location: Foot   Hospital Pain Intervention(s) Repositioned; Ambulation/increased activity   Home Living   Type of [address] [place] [address] Two level;Stairs to enter with rails  (2 ISSAC)   Additional Comments Ambulates independently without AD at baseline  Prior Function   Level of [place] Independent with ADLs and functional mobility   Lives With Spouse   ADL Assistance Independent   IADLs Independent   Falls in the last 6 months 0   Vocational On disability   Restrictions/Precautions   Weight Bearing Precautions Per Order Yes  (No formal orders; LUE NWB since last admission)   LLE Weight Bearing Per Order NWB  (assumed NWB)   Braces or Orthoses Other (Comment)  (pt reports CAMboot RLE; not present in room; no WBS)   Other Precautions Impulsive;WBS;Multiple lines;Telemetry; Fall Risk;Pain  (agitated/combative upon arrival; since resolved)   General   Family/Caregiver Present Yes   Cognition   Overall Cognitive Status WFL   Arousal/Participation Cooperative   Orientation Level Oriented to person;Oriented to place;Oriented to time;Oriented to situation   Memory Decreased recall of recent events;Decreased recall of precautions   Following Commands Follows all commands and directions without difficulty   Comments impulsive; Pt identified by name and   RLE Assessment   RLE Assessment X   Strength RLE   RLE Overall Strength 4/5  (functionally)   LLE Assessment   LLE Assessment X   Strength LLE   LLE Overall Strength 4/5  (functionally)   Coordination   Movements are Fluid and Coordinated 0   Coordination and Movement Description antalgic/disjointed   Bed Mobility   Supine to Sit 6  Modified independent   Additional items HOB elevated; Bedrails; Increased time required   Sit to Supine Unable to assess  (left OOB in chair post session with all lines intact)   Transfers   Sit to Stand 6  Modified independent   Additional items Increased time required; Impulsive   Stand to Sit 6  Modified independent   Additional items Impulsive   Ambulation/Elevation   Gait pattern Improper Weight shift; Antalgic;Decreased foot clearance; Short stride   Gait Assistance 5  Supervision  (due to line management and impulsivity)   Additional items Verbal cues; Assist x 1   Assistive Device None   Distance 20` x2   Stair Management Assistance Not tested  (pt reports no concerns about stair negotiation )   Balance   Static Sitting Good   Dynamic Sitting Fair +   Static Standing Fair +   Dynamic Standing Fair   Ambulatory Fair -   Endurance Deficit   Endurance Deficit Yes   Endurance Deficit Description limited due to pain   Activity Tolerance   Activity Tolerance Patient limited by pain; Patient tolerated treatment well   Nurse Made Aware Per RN, pt appropriate to evaluate   Recommendation   PT Discharge Recommendation No rehabilitation needs  (OPPT for L shoulder when ortho clears pt)   AM-PAC Basic Mobility Inpatient   Turning in Bed Without Bedrails 4   Lying on Back to Sitting on Edge of Flat Bed 4   Moving Bed to Chair 4   Standing Up From Chair 4   Walk in Room 3   Climb 3-5 Stairs 3   Basic Mobility Inpatient Raw Score 22   Basic Mobility Standardized Score 47 4   Barthel Index   Feeding 10   Bathing 5   Grooming Score 5   Dressing Score 10   Bladder Score 10   Bowels Score 10   Toilet Use Score 5   Transfers (Bed/Chair) Score 10   Mobility (Level Surface) Score 0   Stairs Score 0   Barthel Index Score 65   The patient's AM-PAC Basic Mobility Inpatient Short Form Raw Score is 22  , Standardized Score is 47 4    A standardized score greater than 42 9 suggests the patient may benefit from discharge to home  Please also refer to the recommendation of the Physical Therapist for safe discharge planning  Pt was seen for a co-eval with OT due to potential need for significant physical assist, poor pain control, impaired mental status, limiting behaviors, and poor adherence to precautions  Assessment: Pt is a 60 y o  male seen for PT evaluation s/p admit to [address] [place] [address] on 9/19/2021 w/ Acute encephalopathy  Order placed for PT  Comorbidities affecting pt's physical performance at time of assessment include: DM and HTN  Personal factors affecting pt at time of IE include: steps to enter environment, multi-level environment and inability to perform IADLs  Prior to admission, pt was was independent w/ all functional mobility w/ out AD, lived in multi-level home, lived with wife and was on disability  Upon evaluation: Pt requires mod I for bed mobility, mod I for sit to stand, and supervision for ambulation without AD  (Please find full objective findings from PT assessment regarding body systems outlined above)  Impairments and limitations also listed above, especially due to  weakness, impaired balance, decreased endurance, gait deviations, pain, decreased activity tolerance, decreased safety awareness, fall risk and orthopedic restrictions  The following objective measures performed on IE also reveal limitations: Barthel Index 65/100   Pt's clinical presentation is currently unstable/unpredictable seen in pt's presentation of continuous monitoring in the ICU, fall risk, and limited insight into deficits  No acute skilled PT needs at this time as pt is close to functional baseline  From PT/mobility standpoint, recommendation at time of d/c would be home with family support and OPPT once medically cleared by ortho  Will discharge pt from PT caseload at this time        José Luis English, PT,DPT
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
## 2021-09-22 ENCOUNTER — APPOINTMENT (INPATIENT)
Dept: NEUROLOGY | Facility: HOSPITAL | Age: 56
DRG: 092 | End: 2021-09-22
Payer: COMMERCIAL

## 2021-09-22 ENCOUNTER — TRANSITIONAL CARE MANAGEMENT (OUTPATIENT)
Dept: FAMILY MEDICINE CLINIC | Facility: CLINIC | Age: 56
End: 2021-09-22

## 2021-09-22 VITALS
BODY MASS INDEX: 34.01 KG/M2 | RESPIRATION RATE: 18 BRPM | HEART RATE: 89 BPM | TEMPERATURE: 98.7 F | WEIGHT: 242.95 LBS | HEIGHT: 71 IN | DIASTOLIC BLOOD PRESSURE: 92 MMHG | SYSTOLIC BLOOD PRESSURE: 169 MMHG | OXYGEN SATURATION: 95 %

## 2021-09-22 LAB
ANION GAP SERPL CALCULATED.3IONS-SCNC: 13 MMOL/L (ref 4–13)
BASOPHILS # BLD AUTO: 0.07 THOUSANDS/ΜL (ref 0–0.1)
BASOPHILS NFR BLD AUTO: 1 % (ref 0–1)
BUN SERPL-MCNC: 5 MG/DL (ref 5–25)
CALCIUM SERPL-MCNC: 8.3 MG/DL (ref 8.3–10.1)
CHLORIDE SERPL-SCNC: 106 MMOL/L (ref 100–108)
CK MB SERPL-MCNC: 1.5 NG/ML (ref 0–5)
CK MB SERPL-MCNC: <1 % (ref 0–2.5)
CK SERPL-CCNC: 735 U/L (ref 39–308)
CO2 SERPL-SCNC: 24 MMOL/L (ref 21–32)
CREAT SERPL-MCNC: 0.69 MG/DL (ref 0.6–1.3)
EOSINOPHIL # BLD AUTO: 0.13 THOUSAND/ΜL (ref 0–0.61)
EOSINOPHIL NFR BLD AUTO: 2 % (ref 0–6)
ERYTHROCYTE [DISTWIDTH] IN BLOOD BY AUTOMATED COUNT: 12.4 % (ref 11.6–15.1)
GFR SERPL CREATININE-BSD FRML MDRD: 105 ML/MIN/1.73SQ M
GLUCOSE SERPL-MCNC: 168 MG/DL (ref 65–140)
GLUCOSE SERPL-MCNC: 231 MG/DL (ref 65–140)
HCT VFR BLD AUTO: 38.4 % (ref 36.5–49.3)
HGB BLD-MCNC: 13.3 G/DL (ref 12–17)
IMM GRANULOCYTES # BLD AUTO: 0.02 THOUSAND/UL (ref 0–0.2)
IMM GRANULOCYTES NFR BLD AUTO: 0 % (ref 0–2)
LYMPHOCYTES # BLD AUTO: 1.21 THOUSANDS/ΜL (ref 0.6–4.47)
LYMPHOCYTES NFR BLD AUTO: 22 % (ref 14–44)
MCH RBC QN AUTO: 32 PG (ref 26.8–34.3)
MCHC RBC AUTO-ENTMCNC: 34.6 G/DL (ref 31.4–37.4)
MCV RBC AUTO: 93 FL (ref 82–98)
MONOCYTES # BLD AUTO: 0.5 THOUSAND/ΜL (ref 0.17–1.22)
MONOCYTES NFR BLD AUTO: 9 % (ref 4–12)
NEUTROPHILS # BLD AUTO: 3.62 THOUSANDS/ΜL (ref 1.85–7.62)
NEUTS SEG NFR BLD AUTO: 66 % (ref 43–75)
NRBC BLD AUTO-RTO: 0 /100 WBCS
PLATELET # BLD AUTO: 175 THOUSANDS/UL (ref 149–390)
PMV BLD AUTO: 9.7 FL (ref 8.9–12.7)
POTASSIUM SERPL-SCNC: 3.4 MMOL/L (ref 3.5–5.3)
RBC # BLD AUTO: 4.15 MILLION/UL (ref 3.88–5.62)
SODIUM SERPL-SCNC: 143 MMOL/L (ref 136–145)
WBC # BLD AUTO: 5.55 THOUSAND/UL (ref 4.31–10.16)

## 2021-09-22 PROCEDURE — 99238 HOSP IP/OBS DSCHRG MGMT 30/<: CPT | Performed by: GENERAL PRACTICE

## 2021-09-22 PROCEDURE — 82553 CREATINE MB FRACTION: CPT | Performed by: PHYSICIAN ASSISTANT

## 2021-09-22 PROCEDURE — 82550 ASSAY OF CK (CPK): CPT | Performed by: PHYSICIAN ASSISTANT

## 2021-09-22 PROCEDURE — 85025 COMPLETE CBC W/AUTO DIFF WBC: CPT | Performed by: PHYSICIAN ASSISTANT

## 2021-09-22 PROCEDURE — 80048 BASIC METABOLIC PNL TOTAL CA: CPT | Performed by: PHYSICIAN ASSISTANT

## 2021-09-22 PROCEDURE — 82948 REAGENT STRIP/BLOOD GLUCOSE: CPT

## 2021-09-22 RX ADMIN — DICLOFENAC SODIUM 2 G: 10 GEL TOPICAL at 08:44

## 2021-09-22 RX ADMIN — OXYCODONE HYDROCHLORIDE 10 MG: 10 TABLET ORAL at 04:31

## 2021-09-22 RX ADMIN — GLYCERIN 2 DROP: .002; .002; .01 SOLUTION/ DROPS OPHTHALMIC at 08:44

## 2021-09-22 RX ADMIN — INSULIN LISPRO 1 UNITS: 100 INJECTION, SOLUTION INTRAVENOUS; SUBCUTANEOUS at 08:40

## 2021-09-22 RX ADMIN — NICOTINE 21 MG: 21 PATCH, EXTENDED RELEASE TRANSDERMAL at 08:45

## 2021-09-22 RX ADMIN — PANTOPRAZOLE SODIUM 20 MG: 20 TABLET, DELAYED RELEASE ORAL at 05:43

## 2021-09-22 RX ADMIN — FENOFIBRATE 145 MG: 145 TABLET, FILM COATED ORAL at 08:39

## 2021-09-22 RX ADMIN — SODIUM CHLORIDE, SODIUM GLUCONATE, SODIUM ACETATE, POTASSIUM CHLORIDE, MAGNESIUM CHLORIDE, SODIUM PHOSPHATE, DIBASIC, AND POTASSIUM PHOSPHATE 100 ML/HR: .53; .5; .37; .037; .03; .012; .00082 INJECTION, SOLUTION INTRAVENOUS at 04:31

## 2021-09-22 RX ADMIN — KETOROLAC TROMETHAMINE 30 MG: 30 INJECTION, SOLUTION INTRAMUSCULAR; INTRAVENOUS at 08:45

## 2021-09-22 RX ADMIN — METOCLOPRAMIDE HYDROCHLORIDE 10 MG: 5 INJECTION INTRAMUSCULAR; INTRAVENOUS at 05:45

## 2021-09-22 RX ADMIN — LISINOPRIL 10 MG: 10 TABLET ORAL at 08:39

## 2021-09-22 RX ADMIN — METOPROLOL TARTRATE 25 MG: 25 TABLET, FILM COATED ORAL at 08:39

## 2021-09-22 RX ADMIN — DIHYDROERGOTAMINE MESYLATE 1 MG: 1 INJECTION, SOLUTION INTRAMUSCULAR; INTRAVENOUS; SUBCUTANEOUS at 06:10

## 2021-09-22 NOTE — ASSESSMENT & PLAN NOTE
Patient had noted to have taken half a bottle (approximately 50) of excedrin due to headache  Claims to have not drank alcohol in over a year and claims to not do drugs  Also has hisotorgy of chronic depression on sertraline, and buproprion was added recently  Patient abruptly stopped sertraline 1 week prior  Lab Results   Component Value Date    SALICYLATE 5 2 48/91/3788    SALICYLATE 8 9 57/36/8389    SALICYLATE 9 3 74/03/1115     Positive marijuana on drug screen  Patient had been in the ICU for 2 days for sedation due to aggravation and violent tendency  Wife was contacted prior, claimed he was calm in baseline  On the first day of the Medicine wards, patient requested AMA, claiming he needs to go see his   Risks were explained to the patient, which include death and recurrence of encephalopathy  It was also explained that workup was not yet completed  However he was adamant in wanting to be discharged quickly  He was coherent, lucid, and oriented x3  We had the patient sign the [facility] waiver  Discharge medications were not able to be discussed with the paitent, because he left very quickly  Advised close followup with PCP  PCP was informed via epic communications

## 2021-09-22 NOTE — PLAN OF CARE
Problem: MOBILITY - ADULT  Goal: Maintain or return to baseline ADL function  Description: INTERVENTIONS:  -  Assess patient's ability to carry out ADLs; assess patient's baseline for ADL function and identify physical deficits which impact ability to perform ADLs (bathing, care of mouth/teeth, toileting, grooming, dressing, etc )  - Assess/evaluate cause of self-care deficits   - Assess range of motion  - Assess patient's mobility; develop plan if impaired  - Assess patient's need for assistive devices and provide as appropriate  - Encourage maximum independence but intervene and supervise when necessary  - Involve family in performance of ADLs  - Assess for home care needs following discharge   - Consider OT consult to assist with ADL evaluation and planning for discharge  - Provide patient education as appropriate  Outcome: Progressing     Problem: Nutrition/Hydration-ADULT  Goal: Nutrient/Hydration intake appropriate for improving, restoring or maintaining nutritional needs  Description: Monitor and assess patient's nutrition/hydration status for malnutrition  Collaborate with interdisciplinary team and initiate plan and interventions as ordered  Monitor patient's weight and dietary intake as ordered or per policy  Utilize nutrition screening tool and intervene as necessary  Determine patient's food preferences and provide high-protein, high-caloric foods as appropriate       INTERVENTIONS:  - Monitor oral intake, urinary output, labs, and treatment plans  - Assess nutrition and hydration status and recommend course of action  - Evaluate amount of meals eaten  - Assist patient with eating if necessary   - Allow adequate time for meals  - Recommend/ encourage appropriate diets, oral nutritional supplements, and vitamin/mineral supplements  - Order, calculate, and assess calorie counts as needed  - Recommend, monitor, and adjust tube feedings and TPN/PPN based on assessed needs  - Assess need for intravenous fluids  - Provide specific nutrition/hydration education as appropriate  - Include patient/family/caregiver in decisions related to nutrition  Outcome: Progressing     Problem: COPING  Goal: Pt/Family able to verbalize concerns and demonstrate effective coping strategies  Description: INTERVENTIONS:  - Assist patient/family to identify coping skills, available support systems and cultural and spiritual values  - Provide emotional support, including active listening and acknowledgement of concerns of patient and caregivers  - Reduce environmental stimuli, as able  - Provide patient education  - Assess for spiritual pain/suffering and initiate spiritual care, including notification of Pastoral Care or dav based community as needed  - Assess effectiveness of coping strategies  Outcome: Progressing  Goal: Will report anxiety at manageable levels  Description: INTERVENTIONS:  - Administer medication as ordered  - Teach and encourage coping skills  - Provide emotional support  - Assess patient/family for anxiety and ability to cope  Outcome: Progressing

## 2021-09-22 NOTE — ASSESSMENT & PLAN NOTE
On sertraline and Buproprion   Was placed on hold in the hospital  Toxicology had serotonin syndrome as one of their differentials

## 2021-09-22 NOTE — ASSESSMENT & PLAN NOTE
Lab Results   Component Value Date    HGBA1C 8 4 (H) 09/19/2021       Recent Labs     09/21/21  0655 09/21/21  1742 09/21/21 2109 09/22/21  0725   POCGLU 154* 204* 195* 168*       Blood Sugar Average: Last 72 hrs:  (P) 192 25   Patiet was on sliding scale

## 2021-09-22 NOTE — DISCHARGE SUMMARY
[facility]  Discharge- [address] 1965, 60 y o  male MRN: [id number]  Unit/Bed#: S -01 Encounter: [id number]  Primary Care Provider: Romy Gallegos MD   Date and time admitted to hospital: 9/19/2021  5:03 PM    * Acute toxic encephalopathy  Assessment & Plan  Patient had noted to have taken half a bottle (approximately 50) of excedrin due to headache  Claims to have not drank alcohol in over a year and claims to not do drugs  Also has hisotorgy of chronic depression on sertraline, and buproprion was added recently  Patient abruptly stopped sertraline 1 week prior  Lab Results   Component Value Date    SALICYLATE 5 2 24/38/3256    SALICYLATE 8 9 00/36/9765    SALICYLATE 9 3 78/05/4336     Positive marijuana on drug screen  Patient had been in the ICU for 2 days for sedation due to aggravation and violent tendency  Wife was contacted prior, claimed he was calm in baseline  On the first day of the Medicine wards, patient requested AMA, claiming he needs to go see his   Risks were explained to the patient, which include death and recurrence of encephalopathy  It was also explained that workup was not yet completed  However he was adamant in wanting to be discharged quickly  He was coherent, lucid, and oriented x3  We had the patient sign the [facility] waiver  Discharge medications were not able to be discussed with the paitent, because he left very quickly  Advised close followup with PCP  PCP was informed via epic communications  Chronic depression  Assessment & Plan  On sertraline and Buproprion   Was placed on hold in the hospital  Toxicology had serotonin syndrome as one of their differentials      Type 2 diabetes mellitus with hyperglycemia [facility])  Assessment & Plan  Lab Results   Component Value Date    HGBA1C 8 4 (H) 09/19/2021       Recent Labs     09/21/21  0655 09/21/21  1742 09/21/21  2109 09/22/21  0725   POCGLU 154* 204* 195* 168*       Blood Sugar Average: Last 72 hrs:  (P) 192 25   Patiet was on sliding scale    Alcohol abuse  Assessment & Plan  Patient claims to not have drank in >1 year  Dyslipidemia  Assessment & Plan  Continue Statin    Essential hypertension  Assessment & Plan  Continue hypertensive medications          Medical Problems     Resolved Problems  Date Reviewed: 9/20/2021        Resolved    GUSTAVO (acute kidney injury) ([facility] [address] ) 9/21/2021     Resolved by  Jaqueline Lamar PA-C    Prolonged Q-T interval on ECG 9/21/2021     Resolved by  Jaqueline Lamar PA-C    Non-traumatic rhabdomyolysis 9/21/2021     Resolved by  Jaqueline Lamar PA-C              Discharging Resident: Josesito Pulido MD  Discharging Attending: No att  providers found  PCP: Jane Carias MD  Admission Date:   Admission Orders (From admission, onward)     Ordered        09/19/21 2050  Inpatient Admission  Once                   Discharge Date: 09/22/21    Consultations During Hospital Stay:  · Toxicology, Critical Care    Procedures Performed:   · None    Significant Findings / Test Results:   · None     Incidental Findings:   · None     Test Results Pending at Discharge (will require follow up): · None     Outpatient Tests Requested:  · None    Complications:  None    Reason for Admission: Altered mental status    Hospital Course:   Natty Toney is a 60 y o  male patient who originally presented to the hospital on 9/19/2021 due to Altered mental status  Patient presented in the ED as aggressive, violent  Needed restraints and sedation  He received Ativan IV 2 mg for concern of acute alcohol withdrawal  Received Fentanyl IV 50 mcg for concern of acute opioid withdrawal  Remained combative and agitated requiring Versed IV 5 mg to transport to CT scan was unremarkable  Toxicology, neurology was onboard , THC positive  salicylate was 9 on admission  Patient also presented with elevated anion gap at 14, GUSTAVO with creatinine of 1 36, and QTC prolonged >500   Patient was placed in the ICU  By the third hospital day, patient was verbal, calm  Anion gap closed, creatinine went back to baslline and qtc was no longer prolonged  Patient was moved to medical wards, however immediately did AMA  Patient was educated about the risks of discharge  Told that symptoms can worsen and he could die, and that workup was not finalized yet  He said he understood, however wanted to leave  Patient looked competent to make decisions, and was oriented x3  AMA was signed and he was discharged  PCP was notified  Condition at Discharge: good    Discharge Day Visit / Exam:   Subjective:  Patient said he felt fine  He was not violent and aggressive as in previous days  Expressed the need to leave ASAP  Vitals: Blood Pressure: 169/92 (09/22/21 0839)  Pulse: 89 (09/22/21 0839)  Temperature: 98 7 °F (37 1 °C) (09/22/21 0700)  Temp Source: Oral (09/22/21 0700)  Respirations: 18 (09/22/21 0700)  Height: 5' 11" (180 3 cm) (09/20/21 0939)  Weight - Scale: 110 kg (242 lb 15 2 oz) (09/21/21 0217)  SpO2: 95 % (09/22/21 0700)  Exam:   Physical Exam  Vitals and nursing note reviewed  Constitutional:       Appearance: He is well-developed  HENT:      Head: Normocephalic and atraumatic  Eyes:      Conjunctiva/sclera: Conjunctivae normal    Cardiovascular:      Rate and Rhythm: Normal rate and regular rhythm  Heart sounds: No murmur heard  Pulmonary:      Effort: Pulmonary effort is normal  No respiratory distress  Breath sounds: Normal breath sounds  Abdominal:      Palpations: Abdomen is soft  Tenderness: There is no abdominal tenderness  Musculoskeletal:      Cervical back: Neck supple  Skin:     General: Skin is warm and dry  Neurological:      Mental Status: He is alert and oriented to person, place, and time  Discussion with Family: Patient declined call to        Discharge instructions/Information to patient and family:   See after visit summary for information provided to patient and family  Provisions for Follow-Up Care:  See after visit summary for information related to follow-up care and any pertinent home health orders  Disposition:   Home    Planned Readmission: None    Discharge Medications:  See after visit summary for reconciled discharge medications provided to patient and/or family        **Please Note: This note may have been constructed using a voice recognition system**

## 2021-09-23 NOTE — UTILIZATION REVIEW
Notification of Discharge   This is a Notification of Discharge from our facility [address]  Please be advised that this patient has been discharge from our facility  Below you will find the admission and discharge date and time including the patients disposition  UTILIZATION REVIEW CONTACT:  Sharon Nguyen  Utilization   Network Utilization Review Department  Phone: [phone] x carefully listen to the prompts  All voicemails are confidential   Email: [email or web address]     PHYSICIAN ADVISORY SERVICES:  FOR [id number] REVIEW - MEDICAL NECESSITY DENIAL  Phone: [phone]  Fax: [phone]  Email: [email or web address]     PRESENTATION DATE: 9/19/2021  5:03 PM  OBERVATION ADMISSION DATE:   INPATIENT ADMISSION DATE: 9/19/21  8:50 PM   DISCHARGE DATE: 9/22/2021 12:10 PM  DISPOSITION: Left against medical advice or discontinued care Left against medical advice or discontinued care      IMPORTANT INFORMATION:  Send all requests for admission clinical reviews, approved or denied determinations and any other requests to dedicated fax number below belonging to the campus where the patient is receiving treatment   List of dedicated fax numbers:  [address] DENIALS (Administrative/Medical Necessity) [phone]   [address] [place] [address] (Maternity/NICU/Pediatrics) [phone]   Laith Baxter [phone]   Xochitl Roberts [phone]   Nahid Frank [phone]   [address] [phone]   [facility]  [phone]   [address] [place] [address], [address]  [address] [facility] [address] [facility] [phone]

## 2021-09-25 LAB
BACTERIA BLD CULT: NORMAL
BACTERIA BLD CULT: NORMAL

## 2021-11-03 DIAGNOSIS — E55.9 VITAMIN D DEFICIENCY: Primary | ICD-10-CM

## 2021-11-09 ENCOUNTER — RA CDI HCC (OUTPATIENT)
Dept: OTHER | Facility: HOSPITAL | Age: 56
End: 2021-11-09

## 2021-11-16 ENCOUNTER — TELEPHONE (OUTPATIENT)
Dept: FAMILY MEDICINE CLINIC | Facility: CLINIC | Age: 56
End: 2021-11-16

## 2021-11-17 DIAGNOSIS — E11.65 TYPE 2 DIABETES MELLITUS WITH HYPERGLYCEMIA, WITHOUT LONG-TERM CURRENT USE OF INSULIN (HCC): Primary | ICD-10-CM

## 2021-11-17 RX ORDER — GLIMEPIRIDE 2 MG/1
TABLET ORAL
Qty: 45 TABLET | Refills: 1 | Status: SHIPPED | OUTPATIENT
Start: 2021-11-17 | End: 2021-12-10

## 2022-01-05 DIAGNOSIS — E78.1 HIGH TRIGLYCERIDES: ICD-10-CM

## 2022-01-05 DIAGNOSIS — I10 ESSENTIAL HYPERTENSION: ICD-10-CM

## 2022-01-06 DIAGNOSIS — F32.A CHRONIC DEPRESSION: Primary | ICD-10-CM

## 2022-01-06 DIAGNOSIS — E11.65 TYPE 2 DIABETES MELLITUS WITH HYPERGLYCEMIA, WITHOUT LONG-TERM CURRENT USE OF INSULIN (HCC): ICD-10-CM

## 2022-01-06 RX ORDER — SERTRALINE HYDROCHLORIDE 100 MG/1
100 TABLET, FILM COATED ORAL DAILY
Qty: 90 TABLET | Refills: 3 | Status: SHIPPED | OUTPATIENT
Start: 2022-01-06 | End: 2022-01-17

## 2022-01-06 RX ORDER — FENOFIBRATE 160 MG/1
160 TABLET ORAL DAILY
Qty: 90 TABLET | Refills: 3 | Status: SHIPPED | OUTPATIENT
Start: 2022-01-06 | End: 2022-03-23

## 2022-01-06 RX ORDER — RAMIPRIL 10 MG/1
CAPSULE ORAL
Qty: 90 CAPSULE | Refills: 3 | Status: SHIPPED | OUTPATIENT
Start: 2022-01-06

## 2022-01-06 RX ORDER — CELECOXIB 200 MG/1
200 CAPSULE ORAL 2 TIMES DAILY
COMMUNITY
Start: 2021-12-01 | End: 2022-03-13 | Stop reason: HOSPADM

## 2022-01-06 RX ORDER — GLIMEPIRIDE 2 MG/1
TABLET ORAL
Qty: 135 TABLET | Refills: 3 | Status: SHIPPED | OUTPATIENT
Start: 2022-01-06 | End: 2022-02-16 | Stop reason: SDUPTHER

## 2022-01-06 RX ORDER — SERTRALINE HYDROCHLORIDE 100 MG/1
1 TABLET, FILM COATED ORAL 2 TIMES DAILY
COMMUNITY
Start: 2021-10-10 | End: 2022-01-06 | Stop reason: SDUPTHER

## 2022-01-17 DIAGNOSIS — F32.A CHRONIC DEPRESSION: ICD-10-CM

## 2022-01-17 RX ORDER — SERTRALINE HYDROCHLORIDE 100 MG/1
TABLET, FILM COATED ORAL
Qty: 180 TABLET | Refills: 3 | Status: SHIPPED | OUTPATIENT
Start: 2022-01-17 | End: 2022-02-16 | Stop reason: SDUPTHER

## 2022-01-20 DIAGNOSIS — E55.9 VITAMIN D DEFICIENCY, UNSPECIFIED: ICD-10-CM

## 2022-01-20 RX ORDER — ERGOCALCIFEROL 1.25 MG/1
CAPSULE ORAL
Qty: 12 CAPSULE | Refills: 0 | Status: SHIPPED | OUTPATIENT
Start: 2022-01-20 | End: 2022-03-13 | Stop reason: HOSPADM

## 2022-02-16 DIAGNOSIS — E11.65 TYPE 2 DIABETES MELLITUS WITH HYPERGLYCEMIA, WITHOUT LONG-TERM CURRENT USE OF INSULIN (HCC): ICD-10-CM

## 2022-02-16 DIAGNOSIS — F32.A CHRONIC DEPRESSION: ICD-10-CM

## 2022-02-16 RX ORDER — GLIMEPIRIDE 2 MG/1
TABLET ORAL
Qty: 135 TABLET | Refills: 0 | Status: SHIPPED | OUTPATIENT
Start: 2022-02-16 | End: 2022-03-23

## 2022-02-16 RX ORDER — SERTRALINE HYDROCHLORIDE 100 MG/1
200 TABLET, FILM COATED ORAL DAILY
Qty: 180 TABLET | Refills: 0 | Status: SHIPPED | OUTPATIENT
Start: 2022-02-16 | End: 2022-04-08 | Stop reason: SDUPTHER

## 2022-03-10 ENCOUNTER — APPOINTMENT (EMERGENCY)
Dept: RADIOLOGY | Facility: HOSPITAL | Age: 57
DRG: 440 | End: 2022-03-10
Payer: COMMERCIAL

## 2022-03-10 ENCOUNTER — APPOINTMENT (EMERGENCY)
Dept: CT IMAGING | Facility: HOSPITAL | Age: 57
DRG: 440 | End: 2022-03-10
Payer: COMMERCIAL

## 2022-03-10 ENCOUNTER — APPOINTMENT (INPATIENT)
Dept: ULTRASOUND IMAGING | Facility: HOSPITAL | Age: 57
DRG: 440 | End: 2022-03-10
Payer: COMMERCIAL

## 2022-03-10 ENCOUNTER — HOSPITAL ENCOUNTER (INPATIENT)
Facility: HOSPITAL | Age: 57
LOS: 3 days | Discharge: HOME | DRG: 440 | End: 2022-03-13
Attending: INTERNAL MEDICINE | Admitting: INTERNAL MEDICINE
Payer: COMMERCIAL

## 2022-03-10 DIAGNOSIS — K85.90 PANCREATITIS, ACUTE: ICD-10-CM

## 2022-03-10 DIAGNOSIS — F17.210 CIGARETTE NICOTINE DEPENDENCE WITHOUT COMPLICATION: ICD-10-CM

## 2022-03-10 DIAGNOSIS — E83.51 HYPOCALCEMIA: ICD-10-CM

## 2022-03-10 DIAGNOSIS — K21.9 GASTROESOPHAGEAL REFLUX DISEASE: ICD-10-CM

## 2022-03-10 DIAGNOSIS — E87.6 HYPOKALEMIA: ICD-10-CM

## 2022-03-10 DIAGNOSIS — K85.90 ACUTE PANCREATITIS, UNSPECIFIED COMPLICATION STATUS, UNSPECIFIED PANCREATITIS TYPE: Primary | ICD-10-CM

## 2022-03-10 DIAGNOSIS — E83.42 HYPOMAGNESEMIA: ICD-10-CM

## 2022-03-10 DIAGNOSIS — K21.9 GASTROESOPHAGEAL REFLUX DISEASE WITHOUT ESOPHAGITIS: ICD-10-CM

## 2022-03-10 PROBLEM — E87.8 ELECTROLYTE ABNORMALITY: Status: ACTIVE | Noted: 2022-03-10

## 2022-03-10 LAB
2HR DELTA HS TROPONIN: 2 NG/L
4HR DELTA HS TROPONIN: 1 NG/L
ALBUMIN SERPL BCP-MCNC: 2.7 G/DL (ref 3.5–5)
ALP SERPL-CCNC: 55 U/L (ref 34–104)
ALT SERPL W P-5'-P-CCNC: 18 U/L (ref 7–52)
ANION GAP SERPL CALCULATED.3IONS-SCNC: 12 MMOL/L (ref 4–13)
ANION GAP SERPL CALCULATED.3IONS-SCNC: 8 MMOL/L (ref 4–13)
AST SERPL W P-5'-P-CCNC: 18 U/L (ref 13–39)
BASOPHILS # BLD AUTO: 0.12 THOUSANDS/ΜL (ref 0–0.1)
BASOPHILS NFR BLD AUTO: 1 % (ref 0–1)
BILIRUB SERPL-MCNC: 0.65 MG/DL (ref 0.2–1)
BUN SERPL-MCNC: 10 MG/DL (ref 5–25)
BUN SERPL-MCNC: 15 MG/DL (ref 5–25)
CA-I BLD-SCNC: 1.16 MMOL/L (ref 1.12–1.32)
CALCIUM ALBUM COR SERPL-MCNC: 6.9 MG/DL (ref 8.3–10.1)
CALCIUM SERPL-MCNC: 5.9 MG/DL (ref 8.4–10.2)
CALCIUM SERPL-MCNC: 9.3 MG/DL (ref 8.4–10.2)
CARDIAC TROPONIN I PNL SERPL HS: 10 NG/L
CARDIAC TROPONIN I PNL SERPL HS: 8 NG/L
CARDIAC TROPONIN I PNL SERPL HS: 9 NG/L
CHLORIDE SERPL-SCNC: 112 MMOL/L (ref 96–108)
CHLORIDE SERPL-SCNC: 97 MMOL/L (ref 96–108)
CK SERPL-CCNC: 47 U/L (ref 39–308)
CO2 SERPL-SCNC: 15 MMOL/L (ref 21–32)
CO2 SERPL-SCNC: 25 MMOL/L (ref 21–32)
CREAT SERPL-MCNC: 0.5 MG/DL (ref 0.6–1.3)
CREAT SERPL-MCNC: 0.9 MG/DL (ref 0.6–1.3)
EOSINOPHIL # BLD AUTO: 0.12 THOUSAND/ΜL (ref 0–0.61)
EOSINOPHIL NFR BLD AUTO: 1 % (ref 0–6)
ERYTHROCYTE [DISTWIDTH] IN BLOOD BY AUTOMATED COUNT: 11.8 % (ref 11.6–15.1)
GFR SERPL CREATININE-BSD FRML MDRD: 119 ML/MIN/1.73SQ M
GFR SERPL CREATININE-BSD FRML MDRD: 93 ML/MIN/1.73SQ M
GLUCOSE SERPL-MCNC: 213 MG/DL (ref 65–140)
GLUCOSE SERPL-MCNC: 229 MG/DL (ref 65–140)
GLUCOSE SERPL-MCNC: 250 MG/DL (ref 65–140)
GLUCOSE SERPL-MCNC: 272 MG/DL (ref 65–140)
HCT VFR BLD AUTO: 48.9 % (ref 36.5–49.3)
HGB BLD-MCNC: 17.7 G/DL (ref 12–17)
IMM GRANULOCYTES # BLD AUTO: 0.04 THOUSAND/UL (ref 0–0.2)
IMM GRANULOCYTES NFR BLD AUTO: 0 % (ref 0–2)
LACTATE SERPL-SCNC: 1.6 MMOL/L (ref 0.5–2)
LIPASE SERPL-CCNC: 46 U/L (ref 11–82)
LYMPHOCYTES # BLD AUTO: 2.58 THOUSANDS/ΜL (ref 0.6–4.47)
LYMPHOCYTES NFR BLD AUTO: 23 % (ref 14–44)
MAGNESIUM SERPL-MCNC: 0.9 MG/DL (ref 1.9–2.7)
MAGNESIUM SERPL-MCNC: 1.8 MG/DL (ref 1.9–2.7)
MCH RBC QN AUTO: 30.9 PG (ref 26.8–34.3)
MCHC RBC AUTO-ENTMCNC: 36.2 G/DL (ref 31.4–37.4)
MCV RBC AUTO: 85 FL (ref 82–98)
MONOCYTES # BLD AUTO: 0.91 THOUSAND/ΜL (ref 0.17–1.22)
MONOCYTES NFR BLD AUTO: 8 % (ref 4–12)
NEUTROPHILS # BLD AUTO: 7.51 THOUSANDS/ΜL (ref 1.85–7.62)
NEUTS SEG NFR BLD AUTO: 67 % (ref 43–75)
NRBC BLD AUTO-RTO: 0 /100 WBCS
PHOSPHATE SERPL-MCNC: 2.2 MG/DL (ref 2.7–4.5)
PLATELET # BLD AUTO: 253 THOUSANDS/UL (ref 149–390)
PMV BLD AUTO: 9.9 FL (ref 8.9–12.7)
POTASSIUM SERPL-SCNC: 2.5 MMOL/L (ref 3.5–5.3)
POTASSIUM SERPL-SCNC: 4.3 MMOL/L (ref 3.5–5.3)
PROT SERPL-MCNC: 4.9 G/DL (ref 6.4–8.4)
RBC # BLD AUTO: 5.73 MILLION/UL (ref 3.88–5.62)
SODIUM SERPL-SCNC: 130 MMOL/L (ref 135–147)
SODIUM SERPL-SCNC: 139 MMOL/L (ref 135–147)
WBC # BLD AUTO: 11.28 THOUSAND/UL (ref 4.31–10.16)

## 2022-03-10 PROCEDURE — 99223 1ST HOSP IP/OBS HIGH 75: CPT | Performed by: INTERNAL MEDICINE

## 2022-03-10 PROCEDURE — 84484 ASSAY OF TROPONIN QUANT: CPT | Performed by: PHYSICIAN ASSISTANT

## 2022-03-10 PROCEDURE — 99285 EMERGENCY DEPT VISIT HI MDM: CPT

## 2022-03-10 PROCEDURE — 82330 ASSAY OF CALCIUM: CPT | Performed by: INTERNAL MEDICINE

## 2022-03-10 PROCEDURE — 82948 REAGENT STRIP/BLOOD GLUCOSE: CPT

## 2022-03-10 PROCEDURE — 83605 ASSAY OF LACTIC ACID: CPT | Performed by: PHYSICIAN ASSISTANT

## 2022-03-10 PROCEDURE — 96361 HYDRATE IV INFUSION ADD-ON: CPT

## 2022-03-10 PROCEDURE — 85025 COMPLETE CBC W/AUTO DIFF WBC: CPT | Performed by: PHYSICIAN ASSISTANT

## 2022-03-10 PROCEDURE — G1004 CDSM NDSC: HCPCS

## 2022-03-10 PROCEDURE — 76705 ECHO EXAM OF ABDOMEN: CPT

## 2022-03-10 PROCEDURE — 83930 ASSAY OF BLOOD OSMOLALITY: CPT

## 2022-03-10 PROCEDURE — 71045 X-RAY EXAM CHEST 1 VIEW: CPT

## 2022-03-10 PROCEDURE — 96365 THER/PROPH/DIAG IV INF INIT: CPT

## 2022-03-10 PROCEDURE — 80048 BASIC METABOLIC PNL TOTAL CA: CPT | Performed by: INTERNAL MEDICINE

## 2022-03-10 PROCEDURE — 82550 ASSAY OF CK (CPK): CPT | Performed by: PHYSICIAN ASSISTANT

## 2022-03-10 PROCEDURE — 74177 CT ABD & PELVIS W/CONTRAST: CPT

## 2022-03-10 PROCEDURE — 83735 ASSAY OF MAGNESIUM: CPT | Performed by: INTERNAL MEDICINE

## 2022-03-10 PROCEDURE — 90682 RIV4 VACC RECOMBINANT DNA IM: CPT | Performed by: INTERNAL MEDICINE

## 2022-03-10 PROCEDURE — 93005 ELECTROCARDIOGRAM TRACING: CPT

## 2022-03-10 PROCEDURE — 99285 EMERGENCY DEPT VISIT HI MDM: CPT | Performed by: PHYSICIAN ASSISTANT

## 2022-03-10 PROCEDURE — G0008 ADMIN INFLUENZA VIRUS VAC: HCPCS | Performed by: INTERNAL MEDICINE

## 2022-03-10 PROCEDURE — 83735 ASSAY OF MAGNESIUM: CPT | Performed by: PHYSICIAN ASSISTANT

## 2022-03-10 PROCEDURE — 36415 COLL VENOUS BLD VENIPUNCTURE: CPT | Performed by: PHYSICIAN ASSISTANT

## 2022-03-10 PROCEDURE — 80053 COMPREHEN METABOLIC PANEL: CPT | Performed by: PHYSICIAN ASSISTANT

## 2022-03-10 PROCEDURE — 96375 TX/PRO/DX INJ NEW DRUG ADDON: CPT

## 2022-03-10 PROCEDURE — 83690 ASSAY OF LIPASE: CPT | Performed by: PHYSICIAN ASSISTANT

## 2022-03-10 PROCEDURE — 84100 ASSAY OF PHOSPHORUS: CPT | Performed by: PHYSICIAN ASSISTANT

## 2022-03-10 RX ORDER — SODIUM CHLORIDE, SODIUM LACTATE, POTASSIUM CHLORIDE, CALCIUM CHLORIDE 600; 310; 30; 20 MG/100ML; MG/100ML; MG/100ML; MG/100ML
100 INJECTION, SOLUTION INTRAVENOUS CONTINUOUS
Status: DISCONTINUED | OUTPATIENT
Start: 2022-03-10 | End: 2022-03-13 | Stop reason: HOSPADM

## 2022-03-10 RX ORDER — PANTOPRAZOLE SODIUM 40 MG/1
40 TABLET, DELAYED RELEASE ORAL
Status: DISCONTINUED | OUTPATIENT
Start: 2022-03-11 | End: 2022-03-11

## 2022-03-10 RX ORDER — NICOTINE 21 MG/24HR
1 PATCH, TRANSDERMAL 24 HOURS TRANSDERMAL DAILY
Status: DISCONTINUED | OUTPATIENT
Start: 2022-03-11 | End: 2022-03-10

## 2022-03-10 RX ORDER — HYDROMORPHONE HCL/PF 1 MG/ML
1 SYRINGE (ML) INJECTION ONCE
Status: COMPLETED | OUTPATIENT
Start: 2022-03-10 | End: 2022-03-10

## 2022-03-10 RX ORDER — SERTRALINE HYDROCHLORIDE 100 MG/1
200 TABLET, FILM COATED ORAL DAILY
Status: DISCONTINUED | OUTPATIENT
Start: 2022-03-11 | End: 2022-03-13 | Stop reason: HOSPADM

## 2022-03-10 RX ORDER — ACETAMINOPHEN 325 MG/1
650 TABLET ORAL EVERY 6 HOURS PRN
Status: DISCONTINUED | OUTPATIENT
Start: 2022-03-10 | End: 2022-03-11

## 2022-03-10 RX ORDER — NICOTINE 21 MG/24HR
1 PATCH, TRANSDERMAL 24 HOURS TRANSDERMAL DAILY
Status: DISCONTINUED | OUTPATIENT
Start: 2022-03-10 | End: 2022-03-13 | Stop reason: HOSPADM

## 2022-03-10 RX ORDER — POTASSIUM CHLORIDE 14.9 MG/ML
20 INJECTION INTRAVENOUS ONCE
Status: COMPLETED | OUTPATIENT
Start: 2022-03-10 | End: 2022-03-10

## 2022-03-10 RX ORDER — CALCIUM GLUCONATE 20 MG/ML
1 INJECTION, SOLUTION INTRAVENOUS ONCE
Status: COMPLETED | OUTPATIENT
Start: 2022-03-10 | End: 2022-03-10

## 2022-03-10 RX ORDER — ONDANSETRON 2 MG/ML
4 INJECTION INTRAMUSCULAR; INTRAVENOUS EVERY 6 HOURS PRN
Status: DISCONTINUED | OUTPATIENT
Start: 2022-03-10 | End: 2022-03-13 | Stop reason: HOSPADM

## 2022-03-10 RX ORDER — HYDROMORPHONE HCL/PF 1 MG/ML
0.5 SYRINGE (ML) INJECTION
Status: DISCONTINUED | OUTPATIENT
Start: 2022-03-10 | End: 2022-03-13 | Stop reason: HOSPADM

## 2022-03-10 RX ORDER — MORPHINE SULFATE 4 MG/ML
4 INJECTION, SOLUTION INTRAMUSCULAR; INTRAVENOUS ONCE
Status: COMPLETED | OUTPATIENT
Start: 2022-03-10 | End: 2022-03-10

## 2022-03-10 RX ORDER — LEVOFLOXACIN 5 MG/ML
750 INJECTION, SOLUTION INTRAVENOUS EVERY 24 HOURS
Status: DISCONTINUED | OUTPATIENT
Start: 2022-03-10 | End: 2022-03-11

## 2022-03-10 RX ORDER — POTASSIUM CHLORIDE 20 MEQ/1
40 TABLET, EXTENDED RELEASE ORAL ONCE
Status: COMPLETED | OUTPATIENT
Start: 2022-03-10 | End: 2022-03-10

## 2022-03-10 RX ORDER — MAGNESIUM SULFATE HEPTAHYDRATE 40 MG/ML
2 INJECTION, SOLUTION INTRAVENOUS ONCE
Status: COMPLETED | OUTPATIENT
Start: 2022-03-10 | End: 2022-03-10

## 2022-03-10 RX ADMIN — SODIUM CHLORIDE 1000 ML: 0.9 INJECTION, SOLUTION INTRAVENOUS at 14:49

## 2022-03-10 RX ADMIN — POTASSIUM CHLORIDE 20 MEQ: 14.9 INJECTION, SOLUTION INTRAVENOUS at 18:31

## 2022-03-10 RX ADMIN — NICOTINE 1 PATCH: 21 PATCH, EXTENDED RELEASE TRANSDERMAL at 19:54

## 2022-03-10 RX ADMIN — HYDROMORPHONE HYDROCHLORIDE 0.5 MG: 1 INJECTION, SOLUTION INTRAMUSCULAR; INTRAVENOUS; SUBCUTANEOUS at 21:45

## 2022-03-10 RX ADMIN — MORPHINE SULFATE 4 MG: 4 INJECTION, SOLUTION INTRAMUSCULAR; INTRAVENOUS at 14:44

## 2022-03-10 RX ADMIN — MAGNESIUM SULFATE HEPTAHYDRATE 2 G: 40 INJECTION, SOLUTION INTRAVENOUS at 16:57

## 2022-03-10 RX ADMIN — IOHEXOL 100 ML: 350 INJECTION, SOLUTION INTRAVENOUS at 16:08

## 2022-03-10 RX ADMIN — POTASSIUM CHLORIDE 40 MEQ: 1500 TABLET, EXTENDED RELEASE ORAL at 15:46

## 2022-03-10 RX ADMIN — LEVOFLOXACIN 750 MG: 5 INJECTION, SOLUTION INTRAVENOUS at 18:34

## 2022-03-10 RX ADMIN — CALCIUM GLUCONATE 1 G: 20 INJECTION, SOLUTION INTRAVENOUS at 15:51

## 2022-03-10 RX ADMIN — HYDROMORPHONE HYDROCHLORIDE 1 MG: 1 INJECTION, SOLUTION INTRAMUSCULAR; INTRAVENOUS; SUBCUTANEOUS at 15:48

## 2022-03-10 RX ADMIN — INSULIN LISPRO 2 UNITS: 100 INJECTION, SOLUTION INTRAVENOUS; SUBCUTANEOUS at 21:43

## 2022-03-10 RX ADMIN — SODIUM CHLORIDE, SODIUM LACTATE, POTASSIUM CHLORIDE, AND CALCIUM CHLORIDE 100 ML/HR: .6; .31; .03; .02 INJECTION, SOLUTION INTRAVENOUS at 18:31

## 2022-03-10 RX ADMIN — HYDROMORPHONE HYDROCHLORIDE 0.5 MG: 1 INJECTION, SOLUTION INTRAMUSCULAR; INTRAVENOUS; SUBCUTANEOUS at 18:40

## 2022-03-10 RX ADMIN — INFLUENZA A VIRUS A/WISCONSIN/588/2019 (H1N1) RECOMBINANT HEMAGGLUTININ ANTIGEN, INFLUENZA A VIRUS A/TASMANIA/503/2020 (H3N2) RECOMBINANT HEMAGGLUTININ ANTIGEN, INFLUENZA B VIRUS B/WASHINGTON/02/2019 RECOMBINANT HEMAGGLUTININ ANTIGEN, AND INFLUENZA B VIRUS B/PHUKET/3073/2013 RECOMBINANT HEMAGGLUTININ ANTIGEN 0.5 ML: 45; 45; 45; 45 INJECTION INTRAMUSCULAR at 21:41

## 2022-03-10 NOTE — ASSESSMENT & PLAN NOTE
Lab Results   Component Value Date    HGBA1C 9 1 (H) 10/28/2021       No results for input(s): POCGLU in the last 72 hours  Blood Sugar Average: Last 72 hrs:   patient takes metformin at home, will hold it, give sliding scale insulin

## 2022-03-10 NOTE — ASSESSMENT & PLAN NOTE
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

## 2022-03-10 NOTE — H&P
[facility] 1965, 60 y o  male MRN: [id number]  Unit/Bed#: MORGAN Encounter: [id number]  Primary Care Provider: Trish Chaves MD   Date and time admitted to hospital: 3/10/2022  2:13 PM    Pancreatitis, acute  Assessment & Plan  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  Patient states he had quit alcohol in March 2021  CT scan suggests distended gallbladder as well, no obvious stone on CT scan, will order ultrasound of the right upper quadrant  Will keep patient NPO, will consult General surgery  Will give IV fluids, pain medications  Check lipid panel  Electrolyte abnormality  Assessment & Plan  Patient has hypokalemia, hypomagnesemia, hypocalcemia  This will be supplemented, check electrolytes again at 2100 and in a m         Cigarette nicotine dependence without complication  Assessment & Plan  Consult to quit smoking, explained risks and benefits, we will order a nicotine patch  Gastroesophageal reflux disease without esophagitis  Assessment & Plan  Continue PPI  Type 2 diabetes mellitus with hyperglycemia (HCC)  Assessment & Plan  Lab Results   Component Value Date    HGBA1C 9 1 (H) 10/28/2021       No results for input(s): POCGLU in the last 72 hours  Blood Sugar Average: Last 72 hrs:   patient takes metformin at home, will hold it, give sliding scale insulin  Essential hypertension  Assessment & Plan  Patient takes ramipril at home  VTE Prophylaxis: Enoxaparin (Lovenox)  / sequential compression device   Code Status:  Full code  POLST: There is no POLST form on file for this patient (pre-hospital)  Discussion with family:  None    Anticipated Length of Stay:  Patient will be admitted on an Inpatient basis with an anticipated length of stay of  more than 2 midnights  Justification for Hospital Stay:  Acute pancreatitis, recurrent abnormality    Total Time for Visit, including Counseling / Coordination of Care: 70 minutes  Greater than 50% of this total time spent on direct patient counseling and coordination of care  Chief Complaint:   Abdominal pain    History of Present Illness:    Johanna Maciel is a 60 y o  male who presents with past medical history of diabetes, depression, pancreatitis multiple episodes, alcohol abuse currently sober, presents with complaints of epigastric, right upper quadrant pain  As per the patient has been having this pain for the last 3 days, 10/10 in severity, located in right upper quadrant, epigastric region, radiating to back  Complains of some nausea but no vomiting  Denies any diarrhea  Denies any chest pain, shortness of breath but denies any fever, chills  Denies any urinary complaints  Patient states he quit alcohol in March 2021, he has not had any drink since then  Continues to smoke cigarettes       Review of Systems:    More than 10 system review of systems was performed, pertinent positive and negative findings were mentioned as per history of present illness  Past Medical and Surgical History:     Past Medical History:   Diagnosis Date    Alcohol abuse     Back pain     Chronic low back pain     Depression     Diabetes mellitus (HCC)     DM2 (diabetes mellitus, type 2) (HCC)     Erectile dysfunction     GERD (gastroesophageal reflux disease)     Hepatic steatosis     Hyperlipidemia     Hypertension     Neuropathy     Psychiatric disorder     Tobacco abuse        Past Surgical History:   Procedure Laterality Date    ANKLE FRACTURE SURGERY Right     ANKLE SURGERY Right     INGUINAL HERNIA REPAIR Left     KNEE SURGERY Right     UMBILICAL HERNIA REPAIR         Meds/Allergies:    Prior to Admission medications    Medication Sig Start Date End Date Taking?  Authorizing Provider   celecoxib (CeleBREX) 200 mg capsule Take 200 mg by mouth 2 (two) times a day 12/1/21  Yes Historical Provider, MD   fenofibrate 160 MG tablet TAKE 1 TABLET (160 MG TOTAL) BY MOUTH DAILY 1/6/22  Yes Cherelle Zhu MD   gabapentin (NEURONTIN) 300 mg capsule Take 300 mg by mouth Three times a day 5/18/21 5/18/22 Yes Historical Provider, MD   glimepiride (AMARYL) 2 mg tablet TAKE 1 TABLET BY MOUTH EVERY MORNING AND 1/2 TABLET IN THE EVENING 2/16/22  Yes Dolores Michael MD   omeprazole (PriLOSEC) 40 MG capsule TAKE 1 CAPSULE EVERY DAY 11/10/21  Yes Dolores Michael MD   pioglitazone (ACTOS) 45 mg tablet TAKE 1 TABLET EVERY DAY 11/10/21  Yes Dolores Michael MD   ramipril (ALTACE) 10 MG capsule TAKE 1 CAPSULE EVERY DAY 1/6/22  Yes Cherelle Zhu MD   sertraline (ZOLOFT) 100 mg tablet Take 2 tablets (200 mg total) by mouth daily 2/16/22  Yes Dolores Michael MD   simvastatin (ZOCOR) 20 mg tablet TAKE 1 TABLET (20 MG TOTAL) BY MOUTH DAILY 7/18/21  Yes Dolores Michael MD   tadalafil (CIALIS) 5 MG tablet TAKE 1 TABLET BY MOUTH DAILY AS NEEDED FOR ERECTILE DYSFUNCTION 7/30/21  Yes Dolores Michael MD   Accu-Chek Stefanie Plus test strip TEST THREE TIMES DAILY  Patient not taking: Reported on 3/10/2022 5/16/21   Cherelle Zhu MD   Accu-Chek FastClix Lancets MISC USE AS DIRECTED  Patient not taking: Reported on 3/10/2022 12/9/20   Dolores Michael MD   Blood Glucose Monitoring Suppl (Accu-Chek Stefanie Plus) w/Device KIT USE AS DIRECTED  Patient not taking: Reported on 3/10/2022 12/9/20   Dolores Michael MD   Cholecalciferol 1 25 MG (98090 UT) TABS Take 1 tablet (50,000 Units total) by mouth once a week  Patient not taking: Reported on 3/10/2022  11/3/21   Dolores Michael MD   ergocalciferol (VITAMIN D2) 50,000 units TAKE 1 CAPSULE BY MOUTH ONCE A WEEK  Patient not taking: Reported on 3/10/2022 1/20/22   Dolores Michael MD     I have reviewed home medications with patient personally  Allergies:    Allergies   Allergen Reactions    Amoxicillin Swelling    Amoxil [Amoxicillin] Hives       Social History:     Marital Status: /Civil Union     Social History     Substance and Sexual Activity   Alcohol Use Yes    Comment: 1-2 drinks per month; former heavy drinker, but not for last 2 years     Social History     Tobacco Use   Smoking Status Current Every Day Smoker    Packs/day: 1 00    Years: 44 00    Pack years: 44 00    Types: Cigarettes    Start date: 1978   Smokeless Tobacco Never Used   Tobacco Comment    per [employer]     Social History     Substance and Sexual Activity   Drug Use Yes    Types: Marijuana    Comment: not for a while       Family History:    non-contributory    Physical Exam:     Vitals:   Blood Pressure: 109/66 (03/10/22 1700)  Pulse: 104 (03/10/22 1700)  Respirations: 16 (03/10/22 1700)  SpO2: 95 % (03/10/22 1700)    Physical Exam    General appearance:  Patient not in acute distress  Eyes:  Pupils equal reacting to light  ENT:  Moist oral mucous membranes  CVS:  S1-S2 heard, regular rate and rhythm, no pedal edema  Chest:  Bilateral air entry present, clear to auscultation  Abdomen:  Soft, tender in the epigastric region, right upper quadrant, Maldonado sign negative, bowel sounds present  CNS:  No focal neurological deficits  Genitourinary: deferred  Skin:  No acute rash   psychiatric:  No psychosis  Musculoskeletal:  No joint deformities       Additional Data:     Lab Results: I have personally reviewed pertinent reports        Results from last 7 days   Lab Units 03/10/22  1438   WBC Thousand/uL 11 28*   HEMOGLOBIN g/dL 17 7*   HEMATOCRIT % 48 9   PLATELETS Thousands/uL 253   NEUTROS PCT % 67   LYMPHS PCT % 23   MONOS PCT % 8   EOS PCT % 1     Results from last 7 days   Lab Units 03/10/22  1437   SODIUM mmol/L 139   POTASSIUM mmol/L 2 5*   CHLORIDE mmol/L 112*   CO2 mmol/L 15*   BUN mg/dL 10   CREATININE mg/dL 0 50*   ANION GAP mmol/L 12   CALCIUM mg/dL 5 9*   ALBUMIN g/dL 2 7*   TOTAL BILIRUBIN mg/dL 0 65   ALK PHOS U/L 55   ALT U/L 18   AST U/L 18 GLUCOSE RANDOM mg/dL 213*                 Results from last 7 days   Lab Units 03/10/22  1438   LACTIC ACID mmol/L 1 6       Imaging: I have personally reviewed pertinent reports  CT abdomen pelvis with contrast   Final Result by Joseph Michael MD (03/10 9085)      Possible acute pancreatitis  Distended gallbladder, correlate with gallbladder ultrasound  1 7 cm indeterminate left lower pole posterior renal cystic lesion, correlate with nonemergent outpatient renal ultrasound recommended  Hepatic steatosis  The study was marked in EPIC for significant notification  Workstation performed: [id number]         XR chest 1 view portable   Final Result by Arcadio Boast, MD (03/10 1999)      No acute cardiopulmonary disease  Workstation performed: [id number]         US right upper quadrant    (Results Pending)           Allscripts / Epic Records Reviewed: Yes     ** Please Note: This note has been constructed using a voice recognition system   **

## 2022-03-10 NOTE — ASSESSMENT & PLAN NOTE
Patient has hypokalemia, hypomagnesemia, hypocalcemia  This will be supplemented, check electrolytes again at 2100 and in a m  [place] [facility]

## 2022-03-11 LAB
ALBUMIN SERPL BCP-MCNC: 3.7 G/DL (ref 3.5–5)
ALP SERPL-CCNC: 78 U/L (ref 34–104)
ALT SERPL W P-5'-P-CCNC: 25 U/L (ref 7–52)
ANION GAP SERPL CALCULATED.3IONS-SCNC: 10 MMOL/L (ref 4–13)
AST SERPL W P-5'-P-CCNC: 25 U/L (ref 13–39)
BASOPHILS # BLD AUTO: 0.09 THOUSANDS/ΜL (ref 0–0.1)
BASOPHILS NFR BLD AUTO: 1 % (ref 0–1)
BILIRUB SERPL-MCNC: 0.94 MG/DL (ref 0.2–1)
BUN SERPL-MCNC: 13 MG/DL (ref 5–25)
CALCIUM SERPL-MCNC: 8.8 MG/DL (ref 8.4–10.2)
CHLORIDE SERPL-SCNC: 100 MMOL/L (ref 96–108)
CHOLEST SERPL-MCNC: 207 MG/DL
CO2 SERPL-SCNC: 25 MMOL/L (ref 21–32)
CREAT SERPL-MCNC: 0.67 MG/DL (ref 0.6–1.3)
EOSINOPHIL # BLD AUTO: 0.17 THOUSAND/ΜL (ref 0–0.61)
EOSINOPHIL NFR BLD AUTO: 2 % (ref 0–6)
ERYTHROCYTE [DISTWIDTH] IN BLOOD BY AUTOMATED COUNT: 12 % (ref 11.6–15.1)
GFR SERPL CREATININE-BSD FRML MDRD: 105 ML/MIN/1.73SQ M
GLUCOSE SERPL-MCNC: 128 MG/DL (ref 65–140)
GLUCOSE SERPL-MCNC: 155 MG/DL (ref 65–140)
GLUCOSE SERPL-MCNC: 160 MG/DL (ref 65–140)
GLUCOSE SERPL-MCNC: 160 MG/DL (ref 65–140)
GLUCOSE SERPL-MCNC: 174 MG/DL (ref 65–140)
HCT VFR BLD AUTO: 43.8 % (ref 36.5–49.3)
HDLC SERPL-MCNC: 32 MG/DL
HGB BLD-MCNC: 15.6 G/DL (ref 12–17)
IMM GRANULOCYTES # BLD AUTO: 0.02 THOUSAND/UL (ref 0–0.2)
IMM GRANULOCYTES NFR BLD AUTO: 0 % (ref 0–2)
INR PPP: 1.05 (ref 0.84–1.19)
LDLC SERPL CALC-MCNC: 132 MG/DL (ref 0–100)
LIPASE SERPL-CCNC: 42 U/L (ref 11–82)
LYMPHOCYTES # BLD AUTO: 1.86 THOUSANDS/ΜL (ref 0.6–4.47)
LYMPHOCYTES NFR BLD AUTO: 26 % (ref 14–44)
MAGNESIUM SERPL-MCNC: 1.5 MG/DL (ref 1.9–2.7)
MCH RBC QN AUTO: 30.8 PG (ref 26.8–34.3)
MCHC RBC AUTO-ENTMCNC: 35.6 G/DL (ref 31.4–37.4)
MCV RBC AUTO: 87 FL (ref 82–98)
MONOCYTES # BLD AUTO: 0.68 THOUSAND/ΜL (ref 0.17–1.22)
MONOCYTES NFR BLD AUTO: 9 % (ref 4–12)
NEUTROPHILS # BLD AUTO: 4.47 THOUSANDS/ΜL (ref 1.85–7.62)
NEUTS SEG NFR BLD AUTO: 62 % (ref 43–75)
NRBC BLD AUTO-RTO: 0 /100 WBCS
OSMOLALITY UR/SERPL-RTO: 300 MMOL/KG (ref 282–298)
PHOSPHATE SERPL-MCNC: 3.2 MG/DL (ref 2.7–4.5)
PLATELET # BLD AUTO: 182 THOUSANDS/UL (ref 149–390)
PMV BLD AUTO: 9.7 FL (ref 8.9–12.7)
POTASSIUM SERPL-SCNC: 4 MMOL/L (ref 3.5–5.3)
PROT SERPL-MCNC: 6.5 G/DL (ref 6.4–8.4)
PROTHROMBIN TIME: 13.6 SECONDS (ref 11.6–14.5)
RBC # BLD AUTO: 5.06 MILLION/UL (ref 3.88–5.62)
SODIUM SERPL-SCNC: 135 MMOL/L (ref 135–147)
TRIGL SERPL-MCNC: 215 MG/DL
WBC # BLD AUTO: 7.29 THOUSAND/UL (ref 4.31–10.16)

## 2022-03-11 PROCEDURE — 80053 COMPREHEN METABOLIC PANEL: CPT | Performed by: INTERNAL MEDICINE

## 2022-03-11 PROCEDURE — 85025 COMPLETE CBC W/AUTO DIFF WBC: CPT | Performed by: INTERNAL MEDICINE

## 2022-03-11 PROCEDURE — 99222 1ST HOSP IP/OBS MODERATE 55: CPT | Performed by: INTERNAL MEDICINE

## 2022-03-11 PROCEDURE — 83690 ASSAY OF LIPASE: CPT | Performed by: INTERNAL MEDICINE

## 2022-03-11 PROCEDURE — 85610 PROTHROMBIN TIME: CPT | Performed by: INTERNAL MEDICINE

## 2022-03-11 PROCEDURE — 83735 ASSAY OF MAGNESIUM: CPT | Performed by: INTERNAL MEDICINE

## 2022-03-11 PROCEDURE — 99232 SBSQ HOSP IP/OBS MODERATE 35: CPT | Performed by: INTERNAL MEDICINE

## 2022-03-11 PROCEDURE — 82948 REAGENT STRIP/BLOOD GLUCOSE: CPT

## 2022-03-11 PROCEDURE — C9113 INJ PANTOPRAZOLE SODIUM, VIA: HCPCS | Performed by: INTERNAL MEDICINE

## 2022-03-11 PROCEDURE — 84100 ASSAY OF PHOSPHORUS: CPT | Performed by: INTERNAL MEDICINE

## 2022-03-11 PROCEDURE — 80061 LIPID PANEL: CPT | Performed by: INTERNAL MEDICINE

## 2022-03-11 PROCEDURE — 99222 1ST HOSP IP/OBS MODERATE 55: CPT | Performed by: SURGERY

## 2022-03-11 RX ORDER — OXYCODONE HYDROCHLORIDE AND ACETAMINOPHEN 5; 325 MG/1; MG/1
1 TABLET ORAL EVERY 4 HOURS PRN
Status: DISCONTINUED | OUTPATIENT
Start: 2022-03-11 | End: 2022-03-13 | Stop reason: HOSPADM

## 2022-03-11 RX ORDER — MAGNESIUM SULFATE HEPTAHYDRATE 40 MG/ML
2 INJECTION, SOLUTION INTRAVENOUS ONCE
Status: COMPLETED | OUTPATIENT
Start: 2022-03-11 | End: 2022-03-12

## 2022-03-11 RX ORDER — PANTOPRAZOLE SODIUM 40 MG/1
40 INJECTION, POWDER, FOR SOLUTION INTRAVENOUS
Status: DISCONTINUED | OUTPATIENT
Start: 2022-03-11 | End: 2022-03-11

## 2022-03-11 RX ORDER — OXYCODONE HYDROCHLORIDE 5 MG/1
7.5 TABLET ORAL EVERY 8 HOURS
Status: DISCONTINUED | OUTPATIENT
Start: 2022-03-11 | End: 2022-03-13 | Stop reason: HOSPADM

## 2022-03-11 RX ORDER — CALCIUM CARBONATE 200(500)MG
500 TABLET,CHEWABLE ORAL DAILY PRN
Status: DISCONTINUED | OUTPATIENT
Start: 2022-03-11 | End: 2022-03-13 | Stop reason: HOSPADM

## 2022-03-11 RX ORDER — PANTOPRAZOLE SODIUM 40 MG/1
40 TABLET, DELAYED RELEASE ORAL
Status: DISCONTINUED | OUTPATIENT
Start: 2022-03-12 | End: 2022-03-13 | Stop reason: HOSPADM

## 2022-03-11 RX ADMIN — INSULIN LISPRO 1 UNITS: 100 INJECTION, SOLUTION INTRAVENOUS; SUBCUTANEOUS at 16:54

## 2022-03-11 RX ADMIN — HYDROMORPHONE HYDROCHLORIDE 0.5 MG: 1 INJECTION, SOLUTION INTRAMUSCULAR; INTRAVENOUS; SUBCUTANEOUS at 05:45

## 2022-03-11 RX ADMIN — SODIUM CHLORIDE, SODIUM LACTATE, POTASSIUM CHLORIDE, AND CALCIUM CHLORIDE 100 ML/HR: .6; .31; .03; .02 INJECTION, SOLUTION INTRAVENOUS at 05:46

## 2022-03-11 RX ADMIN — SERTRALINE HYDROCHLORIDE 200 MG: 100 TABLET, FILM COATED ORAL at 08:25

## 2022-03-11 RX ADMIN — HYDROMORPHONE HYDROCHLORIDE 0.5 MG: 1 INJECTION, SOLUTION INTRAMUSCULAR; INTRAVENOUS; SUBCUTANEOUS at 19:42

## 2022-03-11 RX ADMIN — OXYCODONE HYDROCHLORIDE 7.5 MG: 5 TABLET ORAL at 16:53

## 2022-03-11 RX ADMIN — LEVOFLOXACIN 750 MG: 500 TABLET, FILM COATED ORAL at 17:11

## 2022-03-11 RX ADMIN — HYDROMORPHONE HYDROCHLORIDE 0.5 MG: 1 INJECTION, SOLUTION INTRAMUSCULAR; INTRAVENOUS; SUBCUTANEOUS at 00:42

## 2022-03-11 RX ADMIN — HYDROMORPHONE HYDROCHLORIDE 0.5 MG: 1 INJECTION, SOLUTION INTRAMUSCULAR; INTRAVENOUS; SUBCUTANEOUS at 15:24

## 2022-03-11 RX ADMIN — PANTOPRAZOLE SODIUM 40 MG: 40 TABLET, DELAYED RELEASE ORAL at 05:45

## 2022-03-11 RX ADMIN — HYDROMORPHONE HYDROCHLORIDE 0.5 MG: 1 INJECTION, SOLUTION INTRAMUSCULAR; INTRAVENOUS; SUBCUTANEOUS at 23:16

## 2022-03-11 RX ADMIN — INSULIN LISPRO 1 UNITS: 100 INJECTION, SOLUTION INTRAVENOUS; SUBCUTANEOUS at 08:25

## 2022-03-11 RX ADMIN — ACETAMINOPHEN 650 MG: 325 TABLET ORAL at 03:34

## 2022-03-11 RX ADMIN — ENOXAPARIN SODIUM 40 MG: 40 INJECTION SUBCUTANEOUS at 08:25

## 2022-03-11 RX ADMIN — OXYCODONE HYDROCHLORIDE 7.5 MG: 5 TABLET ORAL at 09:48

## 2022-03-11 RX ADMIN — SODIUM CHLORIDE, SODIUM LACTATE, POTASSIUM CHLORIDE, AND CALCIUM CHLORIDE 100 ML/HR: .6; .31; .03; .02 INJECTION, SOLUTION INTRAVENOUS at 17:48

## 2022-03-11 RX ADMIN — MAGNESIUM SULFATE HEPTAHYDRATE 2 G: 40 INJECTION, SOLUTION INTRAVENOUS at 08:25

## 2022-03-11 RX ADMIN — INSULIN LISPRO 1 UNITS: 100 INJECTION, SOLUTION INTRAVENOUS; SUBCUTANEOUS at 22:14

## 2022-03-11 RX ADMIN — HYDROMORPHONE HYDROCHLORIDE 0.5 MG: 1 INJECTION, SOLUTION INTRAMUSCULAR; INTRAVENOUS; SUBCUTANEOUS at 08:46

## 2022-03-11 RX ADMIN — HYDROMORPHONE HYDROCHLORIDE 0.5 MG: 1 INJECTION, SOLUTION INTRAMUSCULAR; INTRAVENOUS; SUBCUTANEOUS at 12:07

## 2022-03-11 RX ADMIN — PANTOPRAZOLE SODIUM 40 MG: 40 INJECTION, POWDER, FOR SOLUTION INTRAVENOUS at 09:48

## 2022-03-11 RX ADMIN — NICOTINE 1 PATCH: 21 PATCH, EXTENDED RELEASE TRANSDERMAL at 08:26

## 2022-03-11 NOTE — CONSULTS
Consultation - General Surgery   Renan Briones 60 y o  male MRN: [id number]  Unit/Bed#: -01 Encounter: [id number]    Assessment/Plan     Assessment:  61yo M w/ acute pancreatitis   - patient with history of 3 distinguished episodes of acute pancreatitis which were attributed to alcohol use, but now he has stopped drinking for more than 1 year and continues with episodes of pancreatitis  - AVSS, no leukocytosis  - LFTs within normal limits  - lipase within normal limits  - cholesterol, triglycerides elevated   - RUQ US ordered to r/o biliary etiology of pain, report noting that gallbladder is mildly distended without wall thickening, pericholecystic fluid, no intrahepatic biliary dilation, no choledocho      PMH:  GERD, type 2 diabetes, HTN, nicotine dependence, history of alcohol use    Plan:  - no acute surgical intervention indicated at this time  Patient has increased pain during this hospitalization would recommend transfer to hospital where HIDA scan can be performed  If patient's pain resolves during hospitalization and meets discharge criteria, would recommend outpatient HIDA scan and follow up w/ general surgery  - aggressive IV fluids for management of acute pancreatitis  - monitor daily CBC, CMP  - I/O  - p r n  Analgesia/antiemetics  - GI consulted, appreciate recommendations  - medical management per primary team    History of Present Illness   HPI:  Teresa Carney is a 60 y o  male with past medical history of hypertension, diabetes, hyperlipidemia, GERD who presents with his 4th episode of acute pancreatitis  Most recent episode before this hospitalization was in February 2021  Previous episodes of pancreatitis have been attributed to alcohol use, however patient has been sober for greater than 1 year  Patient originally presented to the emergency room with complaint of right upper quadrant pain x3 days with radiation to epigastric area and back    He also complains of associated nausea, no episodes of emesis  Surgical history significant for hernia repair in childhood  Does not take any blood thinning medications  Continues to smoke 1 pack of cigarettes daily along with use of marijuana for his back pain  Inpatient consult to Acute Care Surgery  Consult performed by: Kwaku Calderon PA-C  Consult ordered by: Shahzad Newell MD          Review of Systems   Constitutional: Negative for chills and fever  HENT: Negative for congestion  Respiratory: Negative for cough and shortness of breath  Cardiovascular: Negative for chest pain, palpitations and leg swelling  Gastrointestinal: Positive for abdominal pain, constipation and nausea  Negative for vomiting  Genitourinary: Negative for difficulty urinating  Musculoskeletal: Positive for back pain  Negative for neck pain  Skin: Negative for color change and wound  Neurological: Negative for dizziness, weakness and headaches  Psychiatric/Behavioral: Negative for confusion  All other ROS negative unless stated above       Historical Information   Past Medical History:   Diagnosis Date    Alcohol abuse     Back pain     Chronic low back pain     Depression     Diabetes mellitus ([facility] [address] )     DM2 (diabetes mellitus, type 2) (HCC)     Erectile dysfunction     GERD (gastroesophageal reflux disease)     Hepatic steatosis     Hyperlipidemia     Hypertension     Neuropathy     Psychiatric disorder     Tobacco abuse      Past Surgical History:   Procedure Laterality Date    ANKLE FRACTURE SURGERY Right     ANKLE SURGERY Right     INGUINAL HERNIA REPAIR Left     KNEE SURGERY Right     UMBILICAL HERNIA REPAIR       Social History   Social History     Substance and Sexual Activity   Alcohol Use Yes    Comment: 1-2 drinks per month; former heavy drinker, but not for last 2 years     Social History     Substance and Sexual Activity   Drug Use Yes    Types: Marijuana    Comment: not for a while     E-Cigarette/Vaping  E-Cigarette Use Never User      E-Cigarette/Vaping Substances     Social History     Tobacco Use   Smoking Status Current Every Day Smoker    Packs/day: 1 00    Years: 44 00    Pack years: 44 00    Types: Cigarettes    Start date: 1978   Smokeless Tobacco Never Used   Tobacco Comment    per [employer]     Family History: non-contributory    Meds/Allergies   all current active meds have been reviewed  Allergies   Allergen Reactions    Amoxicillin Swelling    Amoxil [Amoxicillin] Hives       Objective   First Vitals:   Blood Pressure: 114/62 (03/10/22 1500)  Pulse: 103 (03/10/22 1500)  Temperature: (!) 97 1 °F (36 2 °C) (03/10/22 1759)  Temp Source: Tympanic (03/10/22 1759)  Respirations: 20 (03/10/22 1500)  Height: 5' 11" (180 3 cm) (03/10/22 1759)  Weight - Scale: 101 kg (222 lb 7 1 oz) (03/10/22 1759)  SpO2: 94 % (03/10/22 1500)    Current Vitals:   Blood Pressure: 149/78 (03/11/22 1109)  Pulse: 88 (03/11/22 1109)  Temperature: (!) 96 5 °F (35 8 °C) (03/11/22 1109)  Temp Source: Tympanic (03/11/22 1109)  Respirations: 19 (03/11/22 1109)  Height: 5' 11" (180 3 cm) (03/10/22 1759)  Weight - Scale: 101 kg (222 lb 7 1 oz) (03/10/22 1759)  SpO2: 92 % (03/11/22 1109)      Intake/Output Summary (Last 24 hours) at 3/11/2022 1232  Last data filed at 3/11/2022 0546  Gross per 24 hour   Intake 1088 33 ml   Output --   Net 1088 33 ml       Invasive Devices  Report    Peripheral Intravenous Line            Peripheral IV 03/10/22 Right Antecubital <1 day                Physical Exam  Vitals and nursing note reviewed  Constitutional:       General: He is not in acute distress  Appearance: Normal appearance  He is obese  He is not ill-appearing or toxic-appearing  HENT:      Head: Normocephalic and atraumatic  Nose: Nose normal  No congestion  Mouth/Throat:      Mouth: Mucous membranes are moist       Pharynx: No oropharyngeal exudate  Eyes:      General: No scleral icterus       Extraocular Movements: Extraocular movements intact  Conjunctiva/sclera: Conjunctivae normal       Pupils: Pupils are equal, round, and reactive to light  Cardiovascular:      Rate and Rhythm: Normal rate and regular rhythm  Pulses: Normal pulses  Heart sounds: Normal heart sounds  No murmur heard  Pulmonary:      Effort: Pulmonary effort is normal       Breath sounds: Normal breath sounds  No wheezing, rhonchi or rales  Abdominal:      General: Abdomen is flat  Bowel sounds are normal  There is no distension  Palpations: Abdomen is soft  Tenderness: There is abdominal tenderness (RUQ, epigastric)  There is no guarding or rebound  Comments: Previous surgical scar noted   Musculoskeletal:         General: Normal range of motion  Cervical back: Normal range of motion and neck supple  No tenderness  Right lower leg: No edema  Left lower leg: No edema  Skin:     General: Skin is warm and dry  Capillary Refill: Capillary refill takes less than 2 seconds  Coloration: Skin is not jaundiced  Neurological:      General: No focal deficit present  Mental Status: He is alert and oriented to person, place, and time  Cranial Nerves: No cranial nerve deficit  Motor: No weakness  Psychiatric:         Mood and Affect: Mood normal          Behavior: Behavior normal          Lab Results:   I have personally reviewed pertinent lab results    , CBC:   Lab Results   Component Value Date    WBC 7 29 03/11/2022    HGB 15 6 03/11/2022    HCT 43 8 03/11/2022    MCV 87 03/11/2022     03/11/2022    MCH 30 8 03/11/2022    MCHC 35 6 03/11/2022    RDW 12 0 03/11/2022    MPV 9 7 03/11/2022    NRBC 0 03/11/2022   , CMP:   Lab Results   Component Value Date    SODIUM 135 03/11/2022    K 4 0 03/11/2022     03/11/2022    CO2 25 03/11/2022    BUN 13 03/11/2022    CREATININE 0 67 03/11/2022    CALCIUM 8 8 03/11/2022    AST 25 03/11/2022    ALT 25 03/11/2022    ALKPHOS 78 03/11/2022    EGFR 105 03/11/2022   , Lipase:   Lab Results   Component Value Date    LIPASE 42 03/11/2022     Imaging: I have personally reviewed pertinent reports  CTAP 3/10/22: "  Possible acute pancreatitis  Distended gallbladder, correlate with gallbladder ultrasound  1 7 cm indeterminate left lower pole posterior renal cystic lesion, correlate with nonemergent outpatient renal ultrasound recommended  Hepatic steatosis "    RUQ US 3/10/22: "No evidence of acute cholecystitis  Hepatic fibrofatty infiltration and hepatomegaly "      EKG, Pathology, and Other Studies: I have personally reviewed pertinent reports  Counseling / Coordination of Care  Total floor / unit time spent today 45 minutes  Greater than 50% of total time was spent with the patient and / or family counseling and / or coordination of care        Sylvie Casillas PA-C  03/11/22

## 2022-03-11 NOTE — PLAN OF CARE
Problem: PAIN - ADULT  Goal: Verbalizes/displays adequate comfort level or baseline comfort level  Description: Interventions:  - Encourage patient to monitor pain and request assistance  - Assess pain using appropriate pain scale  - Administer analgesics based on type and severity of pain and evaluate response  - Implement non-pharmacological measures as appropriate and evaluate response  - Consider cultural and social influences on pain and pain management  - Notify physician/advanced practitioner if interventions unsuccessful or patient reports new pain  Outcome: Progressing     Problem: INFECTION - ADULT  Goal: Absence or prevention of progression during hospitalization  Description: INTERVENTIONS:  - Assess and monitor for signs and symptoms of infection  - Monitor lab/diagnostic results  - Administer medications as ordered  - Instruct and encourage patient and family to use good hand hygiene technique  - Identify and instruct in appropriate isolation precautions for identified infection/condition  Outcome: Progressing  Goal: Absence of fever/infection during neutropenic period  Description: INTERVENTIONS:  - Monitor WBC    Outcome: Progressing     Problem: SAFETY ADULT  Goal: Patient will remain free of falls  Description: INTERVENTIONS:  - Educate patient/family on patient safety including physical limitations  - Instruct patient to call for assistance with activity   - Consult OT/PT to assist with strengthening/mobility   - Keep Call bell within reach  - Keep bed low and locked with side rails adjusted as appropriate  - Keep care items and personal belongings within reach  - Initiate and maintain comfort rounds  - Offer Toileting every 2  Hours, in advance of need  Outcome: Progressing  Goal: Maintain or return to baseline ADL function  Description: INTERVENTIONS:  -  Assess patient's ability to carry out ADLs; assess patient's baseline for ADL function and identify physical deficits which impact ability to perform ADLs (bathing, care of mouth/teeth, toileting, grooming, dressing, etc )  - Assess/evaluate cause of self-care deficits   - Assess range of motion  - Assess patient's mobility; develop plan if impaired  - Assess patient's need for assistive devices and provide as appropriate  - Encourage maximum independence but intervene and supervise when necessary  - Involve family in performance of ADLs  - Assess for home care needs following discharge   - Consider OT consult to assist with ADL evaluation and planning for discharge  - Provide patient education as appropriate  Outcome: Progressing  Goal: Maintains/Returns to pre admission functional level  Description: INTERVENTIONS:  - Perform BMAT or MOVE assessment daily    - Set and communicate daily mobility goal to care team and patient/family/caregiver  - Collaborate with rehabilitation services on mobility goals if consulted  - Perform Range of Motion  3  times a day    - Stand patient 3 times a day  - Ambulate patient 3  times a day  - Out of bed to chair  3 times a day   - Out of bed for meals  3  times a day  - Out of bed for toileting  - Record patient progress and toleration of activity level   Outcome: Progressing     Problem: DISCHARGE PLANNING  Goal: Discharge to home or other facility with appropriate resources  Description: INTERVENTIONS:  - Identify barriers to discharge w/patient and caregiver  - Arrange for needed discharge resources and transportation as appropriate  - Identify discharge learning needs (meds, wound care, etc )  - Arrange for interpretive services to assist at discharge as needed  - Refer to Case Management Department for coordinating discharge planning if the patient needs post-hospital services based on physician/advanced practitioner order or complex needs related to functional status, cognitive ability, or social support system  Outcome: Progressing     Problem: Knowledge Deficit  Goal: Patient/family/caregiver demonstrates understanding of disease process, treatment plan, medications, and discharge instructions  Description: Complete learning assessment and assess knowledge base    Interventions:  - Provide teaching at level of understanding  - Provide teaching via preferred learning methods  Outcome: Progressing     Problem: GASTROINTESTINAL - ADULT  Goal: Minimal or absence of nausea and/or vomiting  Description: INTERVENTIONS:  - Administer IV fluids if ordered to ensure adequate hydration  - Maintain NPO status until nausea and vomiting are resolved  - Administer ordered antiemetic medications as needed  - Provide nonpharmacologic comfort measures as appropriate  - Advance diet as tolerated, if ordered  - Consider nutrition services referral to assist patient with adequate nutrition and appropriate food choices  Outcome: Progressing  Goal: Maintains or returns to baseline bowel function  Description: INTERVENTIONS:  - Assess bowel function  - Encourage oral fluids to ensure adequate hydration  - Administer IV fluids if ordered to ensure adequate hydration  - Administer ordered medications as needed  - Encourage mobilization and activity  - Consider nutritional services referral to assist patient with adequate nutrition and appropriate food choices  Outcome: Progressing  Goal: Maintains adequate nutritional intake  Description: INTERVENTIONS:  - Monitor percentage of each meal consumed  - Identify factors contributing to decreased intake, treat as appropriate  - Assist with meals as needed  - Monitor I&O, weight, and lab values if indicated  - Obtain nutrition services referral as needed  Outcome: Progressing

## 2022-03-11 NOTE — ASSESSMENT & PLAN NOTE
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  Patient states he had quit alcohol in March 2021  CT shows signs of acute pancreatitis  Right upper quadrant ultrasound showed no signs of cholecystitis  Currently NPO we will advance diet as tolerated  Will give IV fluids, pain medications  Liver panel showed mildly elevated cholesterol and triglycerides as well as LDL, but no secretions to cause pancreatitis

## 2022-03-11 NOTE — CONSULTS
Consultation - [facility] Gastroenterology   Anthony Briones 60 y o  male MRN: [id number]  Unit/Bed#: -01 Encounter: [id number]        Inpatient consult to gastroenterology  Consult performed by: KRISTOFER Acosta  Consult ordered by: Nadira Castro MD          Reason for Consult / Principal Problem:     GERD, acute pancreatitis      ASSESSMENT AND PLAN:        1  Recurrent pancreatitis  Patient has a history of pancreatitis in 2018 and 2019 were attributed to alcohol use, but now he has stopped drinking more than year ago and continues with pancreatitis with most recent episode February 2021  Calcium and triglycerides were normal   He denies any family history of pancreatitis, but will check IgG4  He still has his gallbladder and microlithiasis is a possible etiology as well as marijuana use, tobacco use, simvastatin  Omeprazole less likely but switch to another PPI on discharge  He continues with severe right upper quadrant/epigastric pain, just had pain medication  -NPO  -continue aggressive IV fluids  -monitor CBC, CMP  -strict I and O  -pain control and antiemetics  -surgical consultation  -Follow up Igg4  -counseled patient regarding marijuana and tobacco cessation  -patient will likely need MRI after acute inflammation has subsided to rule out any pancreatic lesions in 4-6 weeks, will discuss with attending    2  GERD  EGD February 2021 showed antritis and mild esophagitis, biopsies benign  -continue pantoprazole  -would switch omeprazole to pantoprazole on discharge given possible associations    3  Constipation  Pt reports no bowel movements for 4 days, usually moves his bowels twice daily  Denies any vomiting  He has never had a colonoscopy  -start bowel regimen  -recommend outpatient screening colonoscopy    Thank you for the consultation    Case will be discussed with Dr Christianne Allen  ______________________________________________________________________    HPI:  This is a 57-year-old male with past medical history of hypertension, diabetes type 2, hyperlipidemia, GERD, and recurrent pancreatitis with most recent episode in February who presented due to epigastric and right upper quadrant pain x3 days with radiation to the back and associated nausea  He reports that pain originally started in his right upper quadrant, but then became more severe and travel to the epigastric area as well with radiation to his back  Denies any vomiting, diarrhea, chest pain, shortness of breath fevers/chills  Reports he has not moved his bowels for 4 days, usually moves his bowels twice a day  Labs were significant for sodium of 130, magnesium 1 8, glucose 250  LFTs normal as well as lipase  Afebrile without leukocytosis  CT abdomen pelvis with contrast was performed which showed hepatic steatosis and hepatomegaly, distended gallbladder, stranding of the fat around the pancreatic uncinate process-correlate for acute pancreatitis, and 1 7cm indeterminate left lower pole renal cystic lesion-outpatient renal US recommended  Right upper quadrant ultrasound again showed mildly distended gallbladder, but without wall thickening, pericholecystic fluid, or sonographic Maldonado sign  No intrahepatic biliary dilatation  CBD 5 mm  Hepatic fibrofatty infiltration and hepatomegaly  Triglycerides elevated 215, calcium normal     He had previous episodes of pancreatitis in 2018 and again in 2019 which were suspected secondary to alcohol use, and 2019 CT imaging showed possible necrosis in the pancreatic tail  Most recently he was admitted February 2021 with abdominal pain and nausea, lipase 861 and CT showing mild soft tissue stranding surrounding the head and neck of the pancreas consistent with acute interstitial pancreatitis  He reports he quit drinking alcohol 2 years ago, he continues to smoke 1 pack cigarettes a day       He had an EGD performed last admission which showed antritis and mild esophagitis, and biopsies were benign  He has never had colonoscopy  No family history of gastric or colon cancer  Patient does smoke marijuana  REVIEW OF SYSTEMS:    CONSTITUTIONAL: Denies any fever, chills, rigors, and weight loss  HEENT: No earache or tinnitus  Denies hearing loss or visual disturbances  CARDIOVASCULAR: No chest pain or palpitations  RESPIRATORY: Denies any cough, hemoptysis, shortness of breath or dyspnea on exertion  GASTROINTESTINAL: As noted in the History of Present Illness  GENITOURINARY: No problems with urination  Denies any hematuria or dysuria  NEUROLOGIC: No dizziness or vertigo, denies headaches  MUSCULOSKELETAL: Denies any muscle or joint pain  SKIN: Denies skin rashes or itching  ENDOCRINE: Denies excessive thirst  Denies intolerance to heat or cold  PSYCHOSOCIAL: Denies depression or anxiety  Denies any recent memory loss         Historical Information   Past Medical History:   Diagnosis Date    Alcohol abuse     Back pain     Chronic low back pain     Depression     Diabetes mellitus (HCC)     DM2 (diabetes mellitus, type 2) (HCC)     Erectile dysfunction     GERD (gastroesophageal reflux disease)     Hepatic steatosis     Hyperlipidemia     Hypertension     Neuropathy     Psychiatric disorder     Tobacco abuse      Past Surgical History:   Procedure Laterality Date    ANKLE FRACTURE SURGERY Right     ANKLE SURGERY Right     INGUINAL HERNIA REPAIR Left     KNEE SURGERY Right     UMBILICAL HERNIA REPAIR       Social History   Social History     Substance and Sexual Activity   Alcohol Use Yes    Comment: 1-2 drinks per month; former heavy drinker, but not for last 2 years     Social History     Substance and Sexual Activity   Drug Use Yes    Types: Marijuana    Comment: not for a while     Social History     Tobacco Use   Smoking Status Current Every Day Smoker    Packs/day: 1 00    Years: 44 00    Pack years: 44 00    Types: Cigarettes    Start date: 1978   Smokeless Tobacco Never Used   Tobacco Comment    per Mervat-quit     Family History   Problem Relation Age of Onset    Lymphoma Mother     No Known Problems Father        Meds/Allergies     Medications Prior to Admission   Medication    celecoxib (CeleBREX) 200 mg capsule    fenofibrate 160 MG tablet    gabapentin (NEURONTIN) 300 mg capsule    glimepiride (AMARYL) 2 mg tablet    omeprazole (PriLOSEC) 40 MG capsule    pioglitazone (ACTOS) 45 mg tablet    ramipril (ALTACE) 10 MG capsule    sertraline (ZOLOFT) 100 mg tablet    simvastatin (ZOCOR) 20 mg tablet    tadalafil (CIALIS) 5 MG tablet    Accu-Chek Stefanie Plus test strip    Accu-Chek FastClix Lancets MISC    Blood Glucose Monitoring Suppl (Accu-Chek Stefanie Plus) w/Device KIT    Cholecalciferol 1 25 MG (08384 UT) TABS    ergocalciferol (VITAMIN D2) 50,000 units     Current Facility-Administered Medications   Medication Dose Route Frequency    calcium carbonate (TUMS) chewable tablet 500 mg  500 mg Oral Daily PRN    enoxaparin (LOVENOX) subcutaneous injection 40 mg  40 mg Subcutaneous Daily    HYDROmorphone (DILAUDID) injection 0 5 mg  0 5 mg Intravenous Q3H PRN    insulin lispro (HumaLOG) 100 units/mL subcutaneous injection 1-6 Units  1-6 Units Subcutaneous TID AC    insulin lispro (HumaLOG) 100 units/mL subcutaneous injection 1-6 Units  1-6 Units Subcutaneous HS    lactated ringers infusion  100 mL/hr Intravenous Continuous    levofloxacin (LEVAQUIN) IVPB (premix in dextrose) 750 mg 150 mL  750 mg Intravenous Q24H    magnesium sulfate 2 g/50 mL IVPB (premix) 2 g  2 g Intravenous Once    nicotine (NICODERM CQ) 21 mg/24 hr TD 24 hr patch 1 patch  1 patch Transdermal Daily    ondansetron (ZOFRAN) injection 4 mg  4 mg Intravenous Q6H PRN    oxyCODONE (ROXICODONE) IR tablet 7 5 mg  7 5 mg Oral Q8H    oxyCODONE-acetaminophen (PERCOCET) 5-325 mg per tablet 1 tablet  1 tablet Oral Q4H PRN    pantoprazole (PROTONIX) injection 40 mg  40 mg Intravenous Q24H [address]    sertraline (ZOLOFT) tablet 200 mg  200 mg Oral Daily       Allergies   Allergen Reactions    Amoxicillin Swelling    Amoxil [Amoxicillin] Hives           Objective     Blood pressure 132/81, pulse 78, temperature 98 2 °F (36 8 °C), temperature source Tympanic, resp  rate 18, height 5' 11" (1 803 m), weight 101 kg (222 lb 7 1 oz), SpO2 93 %  Body mass index is 31 02 kg/m²  Intake/Output Summary (Last 24 hours) at 3/11/2022 0940  Last data filed at 3/11/2022 0546  Gross per 24 hour   Intake 1088 33 ml   Output --   Net 1088 33 ml         PHYSICAL EXAM:      General Appearance:   Alert, cooperative, no distress   HEENT:   Normocephalic, atraumatic, anicteric  Neck:  Supple, symmetrical, trachea midline   Lungs:   Clear to auscultation bilaterally; no rales, rhonchi or wheezing; respirations unlabored    Heart[de-identified]   Regular rate and rhythm; no murmur, rub, or gallop     Abdomen:   Soft, non-tender, non-distended; normal bowel sounds; no masses, no organomegaly    Genitalia:   Deferred    Rectal:   Deferred    Extremities:  No cyanosis, clubbing or edema    Pulses:  2+ and symmetric all extremities    Skin:  No jaundice, rashes, or lesions    Lymph nodes:  No palpable cervical lymphadenopathy        Lab Results:   Admission on 03/10/2022   Component Date Value    WBC 03/10/2022 11 28*    RBC 03/10/2022 5 73*    Hemoglobin 03/10/2022 17 7*    Hematocrit 03/10/2022 48 9     MCV 03/10/2022 85     MCH 03/10/2022 30 9     MCHC 03/10/2022 36 2     RDW 03/10/2022 11 8     MPV 03/10/2022 9 9     Platelets 82/56/0388 253     nRBC 03/10/2022 0     Neutrophils Relative 03/10/2022 67     Immat GRANS % 03/10/2022 0     Lymphocytes Relative 03/10/2022 23     Monocytes Relative 03/10/2022 8     Eosinophils Relative 03/10/2022 1     Basophils Relative 03/10/2022 1     Neutrophils Absolute 03/10/2022 7 51     Immature Grans Absolute 03/10/2022 0 04     Lymphocytes Absolute 03/10/2022 2 58     Monocytes Absolute 03/10/2022 0 91     Eosinophils Absolute 03/10/2022 0 12     Basophils Absolute 03/10/2022 0 12*    Sodium 03/10/2022 139     Potassium 03/10/2022 2 5*    Chloride 03/10/2022 112*    CO2 03/10/2022 15*    ANION GAP 03/10/2022 12     BUN 03/10/2022 10     Creatinine 03/10/2022 0 50*    Glucose 03/10/2022 213*    Calcium 03/10/2022 5 9*    Corrected Calcium 03/10/2022 6 9*    AST 03/10/2022 18     ALT 03/10/2022 18     Alkaline Phosphatase 03/10/2022 55     Total Protein 03/10/2022 4 9*    Albumin 03/10/2022 2 7*    Total Bilirubin 03/10/2022 0 65     eGFR 03/10/2022 119     Lipase 03/10/2022 46     LACTIC ACID 03/10/2022 1 6     hs TnI 0hr 03/10/2022 8     hs TnI 2hr 03/10/2022 10     Delta 2hr hsTnI 03/10/2022 2     Magnesium 03/10/2022 0 9*    Phosphorus 03/10/2022 2 2*    Total CK 03/10/2022 47     hs TnI 4hr 03/10/2022 9     Delta 4hr hsTnI 03/10/2022 1     POC Glucose 03/10/2022 272*    Sodium 03/10/2022 130*    Potassium 03/10/2022 4 3     Chloride 03/10/2022 97     CO2 03/10/2022 25     ANION GAP 03/10/2022 8     BUN 03/10/2022 15     Creatinine 03/10/2022 0 90     Glucose 03/10/2022 250*    Calcium 03/10/2022 9 3     eGFR 03/10/2022 93     Magnesium 03/10/2022 1 8*    Calcium, Ionized 03/10/2022 1 16     POC Glucose 03/10/2022 229*    Sodium 03/11/2022 135     Potassium 03/11/2022 4 0     Chloride 03/11/2022 100     CO2 03/11/2022 25     ANION GAP 03/11/2022 10     BUN 03/11/2022 13     Creatinine 03/11/2022 0 67     Glucose 03/11/2022 160*    Calcium 03/11/2022 8 8     AST 03/11/2022 25     ALT 03/11/2022 25     Alkaline Phosphatase 03/11/2022 78     Total Protein 03/11/2022 6 5     Albumin 03/11/2022 3 7     Total Bilirubin 03/11/2022 0 94     eGFR 03/11/2022 105     Magnesium 03/11/2022 1 5*    Phosphorus 03/11/2022 3 2     Lipase 03/11/2022 42     WBC 03/11/2022 7 29     RBC 03/11/2022 5 06     Hemoglobin 03/11/2022 15 6  Hematocrit 03/11/2022 43 8     MCV 03/11/2022 87     MCH 03/11/2022 30 8     MCHC 03/11/2022 35 6     RDW 03/11/2022 12 0     MPV 03/11/2022 9 7     Platelets 63/25/5427 182     nRBC 03/11/2022 0     Neutrophils Relative 03/11/2022 62     Immat GRANS % 03/11/2022 0     Lymphocytes Relative 03/11/2022 26     Monocytes Relative 03/11/2022 9     Eosinophils Relative 03/11/2022 2     Basophils Relative 03/11/2022 1     Neutrophils Absolute 03/11/2022 4 47     Immature Grans Absolute 03/11/2022 0 02     Lymphocytes Absolute 03/11/2022 1 86     Monocytes Absolute 03/11/2022 0 68     Eosinophils Absolute 03/11/2022 0 17     Basophils Absolute 03/11/2022 0 09     Protime 03/11/2022 13 6     INR 03/11/2022 1 05     Cholesterol 03/11/2022 207*    Triglycerides 03/11/2022 215*    HDL, Direct 03/11/2022 32*    LDL Calculated 03/11/2022 132*    POC Glucose 03/11/2022 160*       Imaging Studies: I have personally reviewed pertinent imaging studies

## 2022-03-11 NOTE — PROGRESS NOTES
Smair U  64   Progress Note - Logan Otero 1965, 60 y o  male MRN: [id number]  Unit/Bed#: -01 Encounter: [id number]  Primary Care Provider: Gabrielle Gates MD   Date and time admitted to hospital: 3/10/2022  2:13 PM    Electrolyte abnormality  Assessment & Plan  Patient has hypokalemia, hypomagnesemia, hypocalcemia  Replaced accordingly  Continue to follow      Cigarette nicotine dependence without complication  Assessment & Plan  Consult to quit smoking, explained risks and benefits, we will order a nicotine patch  Gastroesophageal reflux disease without esophagitis  Assessment & Plan  Continue PPI  Type 2 diabetes mellitus with hyperglycemia [facility])  Assessment & Plan  Lab Results   Component Value Date    HGBA1C 9 1 (H) 10/28/2021       Recent Labs     03/10/22  1830 03/10/22  2108 03/11/22  0656   POCGLU 272* 229* 160*       Blood Sugar Average: Last 72 hrs:  (P) 946 [id number] patient takes metformin at home, will hold it, give sliding scale insulin  Essential hypertension  Assessment & Plan  Patient takes ramipril at home  * Pancreatitis, acute  Assessment & Plan  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  Patient states he had quit alcohol in March 2021  CT shows signs of acute pancreatitis  Right upper quadrant ultrasound showed no signs of cholecystitis  Currently NPO we will advance diet as tolerated  Will give IV fluids, pain medications  Liver panel showed mildly elevated cholesterol and triglycerides as well as LDL, but no secretions to cause pancreatitis  VTE Pharmacologic Prophylaxis:     Moderate Risk (Score 3-4) - Pharmacological DVT Prophylaxis Contraindicated  Sequential Compression Devices Ordered      Mechanical VTE Prophylaxis in Place: Yes    Patient Centered Rounds: I have performed bedside rounds with nursing staff today  Current Length of Stay: 1 day(s)    Current Patient Status: Inpatient     Discharge Plan / Estimated Discharge Date: Anticipate discharge tomorrow to home  Code Status: Level 1 - Full Code      Subjective:   Still endorsing abdominal pain, no nauseas or vomiting we will advance the diet  Objective:     Vitals:   Temp (24hrs), Av 6 °F (36 4 °C), Min:97 1 °F (36 2 °C), Max:98 2 °F (36 8 °C)    Temp:  [97 1 °F (36 2 °C)-98 2 °F (36 8 °C)] 98 2 °F (36 8 °C)  HR:  [] 78  Resp:  [16-20] 18  BP: (100-132)/(62-81) 132/81  SpO2:  [93 %-97 %] 93 %  Body mass index is 31 02 kg/m²  Input and Output Summary (last 24 hours): Intake/Output Summary (Last 24 hours) at 3/11/2022 0739  Last data filed at 3/11/2022 0546  Gross per 24 hour   Intake 1088 33 ml   Output --   Net 1088 33 ml       Physical Exam:     Physical Exam  Constitutional:       General: He is not in acute distress  HENT:      Head: Normocephalic and atraumatic  Cardiovascular:      Rate and Rhythm: Normal rate and regular rhythm  Pulses: Normal pulses  Heart sounds: Normal heart sounds  Pulmonary:      Effort: Pulmonary effort is normal       Breath sounds: Normal breath sounds  Abdominal:      General: Abdomen is flat  There is no distension  Tenderness: There is abdominal tenderness  There is no guarding or rebound  Musculoskeletal:         General: Normal range of motion  Cervical back: Normal range of motion  Skin:     General: Skin is warm  Capillary Refill: Capillary refill takes less than 2 seconds  Neurological:      General: No focal deficit present  Mental Status: He is alert and oriented to person, place, and time  Mental status is at baseline     Psychiatric:         Mood and Affect: Mood normal           Additional Data:     Labs:  Results from last 7 days   Lab Units 22  0551   WBC Thousand/uL 7 29   HEMOGLOBIN g/dL 15  6   HEMATOCRIT % 43 8   PLATELETS Thousands/uL 182   NEUTROS PCT % 62   LYMPHS PCT % 26   MONOS PCT % 9   EOS PCT % 2     Results from last 7 days   Lab Units 03/11/22  0551   SODIUM mmol/L 135   POTASSIUM mmol/L 4 0   CHLORIDE mmol/L 100   CO2 mmol/L 25   BUN mg/dL 13   CREATININE mg/dL 0 67   ANION GAP mmol/L 10   CALCIUM mg/dL 8 8   ALBUMIN g/dL 3 7   TOTAL BILIRUBIN mg/dL 0 94   ALK PHOS U/L 78   ALT U/L 25   AST U/L 25   GLUCOSE RANDOM mg/dL 160*     Results from last 7 days   Lab Units 03/11/22  0551   INR  1 05     Results from last 7 days   Lab Units 03/11/22  0656 03/10/22  2108 03/10/22  1830   POC GLUCOSE mg/dl 160* 229* 272*         Results from last 7 days   Lab Units 03/10/22  1438   LACTIC ACID mmol/L 1 6       Imaging: Reviewed radiology reports from this admission including: abdominal/pelvic CT scan    Recent Cultures (last 7 days):           Lines/Drains:  Invasive Devices  Report    Peripheral Intravenous Line            Peripheral IV 03/10/22 Right Antecubital <1 day                Telemetry:   Telemetry Orders (From admission, onward)             24 Hour Telemetry Monitoring  Continuous x 24 Hours (Telem)        References:    Telemetry Guidelines   Question:  Reason for 24 Hour Telemetry  Answer:  Metabolic/Electrolyte Disturbance with High Probability of Dysrhythmia (K level <3 or >6, or KCL infusion >=10mEq/hr)                    Last 24 Hours Medication List:   Current Facility-Administered Medications   Medication Dose Route Frequency Provider Last Rate    acetaminophen  650 mg Oral Q6H PRN Leslie Morrow MD      enoxaparin  40 mg Subcutaneous Daily Leslie Morrow MD      HYDROmorphone  0 5 mg Intravenous Q3H PRN Leslie Morrow MD      insulin lispro  1-6 Units Subcutaneous TID AC Deb Cardoza MD      insulin lispro  1-6 Units Subcutaneous HS Leslie Morrow MD      lactated ringers  100 mL/hr Intravenous Continuous Leslie Morrow  mL/hr (03/11/22 0546)    levofloxacin  750 mg Intravenous Q24H Jai Mayers  mg (03/10/22 4944)    nicotine  1 patch Transdermal Daily Jai Mayers MD      ondansetron  4 mg Intravenous Q6H PRN Jai Mayers MD      pantoprazole  40 mg Oral Early Morning Jai Mayers MD      sertraline  200 mg Oral Daily Jai Mayers MD          Today, Patient Was Seen By: Kary Alvares MD    ** Please Note: This note has been constructed using a voice recognition system   **

## 2022-03-11 NOTE — UTILIZATION REVIEW
Initial Clinical Review    Admission: Date/Time/Statement:   Admission Orders (From admission, onward)     Ordered        03/10/22 1704  Inpatient Admission  Once                      Orders Placed This Encounter   Procedures    Inpatient Admission     Standing Status:   Standing     Number of Occurrences:   1     Order Specific Question:   Level of Care     Answer:   Level 2 Stepdown / HOT [14]     Order Specific Question:   Estimated length of stay     Answer:   More than 2 Midnights     Order Specific Question:   Certification     Answer:   I certify that inpatient services are medically necessary for this patient for a duration of greater than two midnights  See H&P and MD Progress Notes for additional information about the patient's course of treatment  ED Arrival Information     Expected Arrival Acuity    - 3/10/2022 14:10 Urgent         Means of arrival Escorted by Service Admission type    Walk-In Family Member Hospitalist Urgent         Arrival complaint    abdominal pain-history pancreatitis        Chief Complaint   Patient presents with    Abdominal Pain     Reports ABD pain and nausea started 3 days ago  Upper R & L side- pain is sharp  Reports no BM x3 days  Initial Presentation: 60 y o male W/PMHX:T2DM hold po home med, SSI, with accu-ck,  depression, nicotine dependence, GERD c/w PPI,  pancreatitis multiple episodes 2/2 alcohol,  alcohol abuse currently sober quit 3/21, to ED from home, admitted as Inpatient due to Acute Pancreatitis  Electrolyte abnormality, Presented with Severe Epigastric and abdominal pain, w/radiating to the back 10/10 in severity  Exam: abdomen soft, tender in epigastric region, RUQ, Maldonado sign negative +BSP     ED work up reveals: leukocytois, hypokalemia, hyperglycemia, Ct scan : distended gallbladder, no stone  Plan includes : Telm, US RUQ, NPO,  IVF hydration, prn pain meds IV, check lipid panel, trend serial labs with repletion as needed, serial abdominal exams, general surgical consult  Pharm and scd dvt prophy  Date: 3/11/22   Day 2: Persistent C/O epigastric and RUQ abdominal pain, n/v subsided, EXAM: acute abdominal, RUQ and epigastric region pain, no gall stone on US, elevated cholesterol, trigilycerides, and LDL  NPO  advance to liquid diet, c/w prn IV/PO pain meds, C/W trending serial labs with repletion as needed, serial abdominal exams  Telm,  Surgical consult pending, c/w dvt prophy , Abdominal tenderness  No guarding  Or rebound  GI Consult: GERD, Acute pancreatitis: pancreatitis: recurrent pancreatitis, h/o pancreatitis 2018/2019 were attributed to alcohol use, stopped  Drinking over 1 yr  Ago, c/w pancreatitis, w/ most recent past episode 2/2021  Check IgG4, microlithiasis of Gallbladder, and marijuana as etiologies, uses tobacco and simvastatin,  Omeprazole less likely but switch to another PPI on discharge  He continues with severe right upper quadrant/epigastric pain, just had pain medication  NPO, c/w aggressive IVF, trend serial CBC, CMP strict I/O, prn pain and antiemetics, pending MrI after acute inflammation has subsided, to r/o any pancreatic lesion in 4-6 weeks  GERD: EGD 2/2021 showed antritis and mild esophagitis,  C/W PPI, Constipation: start bowel regimen, outpatient colon screening for colonoscopy  Inflammatory changes around the uncinate process without typical clinical presentation of acute pancreatitis  This may represent biliary colic and elective cholecystectomy would be a consideration  May also represent an acute exacerbation of chronic pancreatitis, though certainly no definitive evidence of this  At this time would treat as if he has acute pancreatitis/cholecystitis with supportive care and follow clinically  Follow LFTs    If pain persists could consider EGD to evaluate for peptic ulcer disease      General surgery Consult: acute pancreatitis: 3 previous distinct episodes of pancreatitis  Attributed to drinking alcohol, stopped drinking alcohol over 1 year ago, c/w episodes of pancreatitis, cholesterol, triglycerides elevated, RUQ US gallbladder mildly distended w/o wall thickening, pericholecytic fluid, no intrahepatic biliary dilation  Plan: no acute surgical intervention indicated at this time, Pt  Has increased pain during this hospitalization would transfer to facility with ability to do HIDA scan  If pain resolves, during stay, and meets d/c criteria, recommend op HIDA scan and f/u with general surgery  Aggressive IVF for management of acute pancreatitis, trend serial labs, I/O, prn analgesia and antiemetic  EXAM: obese, RUQ, epigastric and abdominal tenderness, pervious surgical scar             Intake/Output Summary (Last 24 hours) at 3/11/2022 0739  Last data filed at 3/11/2022 0546      Gross per 24 hour   Intake 1088 33 ml   Output --   Net 1088 33 ml       ED Triage Vitals   Temperature Pulse Respirations Blood Pressure SpO2   03/10/22 1759 03/10/22 1500 03/10/22 1500 03/10/22 1500 03/10/22 1500   (!) 97 1 °F (36 2 °C) 103 20 114/62 94 %      Temp Source Heart Rate Source Patient Position - Orthostatic VS BP Location FiO2 (%)   03/10/22 1759 03/10/22 1500 03/10/22 1500 03/10/22 1500 --   Tympanic Monitor Lying Right arm       Pain Score       03/10/22 1500       10 - Worst Possible Pain          Wt Readings from Last 1 Encounters:   03/10/22 101 kg (222 lb 7 1 oz)     Additional Vital Signs:  Date/Time Temp Pulse Resp BP SpO2 O2 Device Patient Position - Orthostatic VS   03/11/22 1607 99 °F (37 2 °C) 96 17 124/64 92 % -- Lying   03/11/22 1109 96 5 °F (35 8 °C) Abnormal  88 19 149/78 92 % -- Lying   03/11/22 0726 98 2 °F (36 8 °C) 78 18 132/81 93 % -- Lying   03/11/22 0300 97 6 °F (36 4 °C) 81 18 124/65 97 % None (Room air) Lying   03/10/22 2300 97 6 °F (36 4 °C) 89 17 132/76 94 % None (Room air) Lying   03/10/22 1759 97 1 °F (36 2 °C) Abnormal  101 16 100/65 94 % -- Lying   03/10/22 1700            Pertinent Labs/Diagnostic Test Results:   ZU right upper quadrant   Final Result by Halina Matias DO (03/10 2126)      No evidence of acute cholecystitis  Hepatic fibrofatty infiltration and hepatomegaly  Workstation performed: [id number]         CT abdomen pelvis with contrast   Final Result by Bo Skiff, MD (03/10 1635)      Possible acute pancreatitis  Distended gallbladder, correlate with gallbladder ultrasound  1 7 cm indeterminate left lower pole posterior renal cystic lesion, correlate with nonemergent outpatient renal ultrasound recommended  Hepatic steatosis  The study was marked in EPIC for significant notification  Workstation performed: [id number]         XR chest 1 view portable   Final Result by Gayla Contreras MD (03/10 1501)      No acute cardiopulmonary disease                    Workstation performed: [id number]               Results from last 7 days   Lab Units 03/11/22  0551 03/10/22  1438   WBC Thousand/uL 7 29 11 28*   HEMOGLOBIN g/dL 15 6 17 7*   HEMATOCRIT % 43 8 48 9   PLATELETS Thousands/uL 182 253   NEUTROS ABS Thousands/µL 4 47 7 51         Results from last 7 days   Lab Units 03/11/22  0551 03/10/22  2106 03/10/22  1437   SODIUM mmol/L 135 130* 139   POTASSIUM mmol/L 4 0 4 3 2 5*   CHLORIDE mmol/L 100 97 112*   CO2 mmol/L 25 25 15*   ANION GAP mmol/L 10 8 12   BUN mg/dL 13 15 10   CREATININE mg/dL 0 67 0 90 0 50*   EGFR ml/min/1 73sq m 105 93 119   CALCIUM mg/dL 8 8 9 3 5 9*   CALCIUM, IONIZED mmol/L  --  1 16  --    MAGNESIUM mg/dL 1 5* 1 8* 0 9*   PHOSPHORUS mg/dL 3 2  --  2 2*     Results from last 7 days   Lab Units 03/11/22  0551 03/10/22  1437   AST U/L 25 18   ALT U/L 25 18   ALK PHOS U/L 78 55   TOTAL PROTEIN g/dL 6 5 4 9*   ALBUMIN g/dL 3 7 2 7*   TOTAL BILIRUBIN mg/dL 0 94 0 65     Results from last 7 days   Lab Units 03/11/22  1558 03/11/22  1135 03/11/22  0656 03/10/22  2108 03/10/22  1830   POC GLUCOSE mg/dl 174* 128 160* 229* 272* Results from last 7 days   Lab Units 03/11/22  0551 03/10/22  2106 03/10/22  1437   GLUCOSE RANDOM mg/dL 160* 250* 213*     Results from last 7 days   Lab Units 03/10/22  1831   OSMOLALITY, SERUM mmol/*         BETA-HYDROXYBUTYRATE   Date Value Ref Range Status   09/20/2021 0 5 <0 6 mmol/L Final        Results from last 7 days   Lab Units 03/10/22  1437   CK TOTAL U/L 47     Results from last 7 days   Lab Units 03/10/22  1831 03/10/22  1706 03/10/22  1437   HS TNI 0HR ng/L  --   --  8   HS TNI 2HR ng/L  --  10  --    HSTNI D2 ng/L  --  2  --    HS TNI 4HR ng/L 9  --   --    HSTNI D4 ng/L 1  --   --          Results from last 7 days   Lab Units 03/11/22  0551   PROTIME seconds 13 6   INR  1 05             Results from last 7 days   Lab Units 03/10/22  1438   LACTIC ACID mmol/L 1 6       Results from last 7 days   Lab Units 03/11/22  0551 03/10/22  1437   LIPASE u/L 42 46             Results from last 7 days   Lab Units 03/10/22  1831   OSMOLALITY, SERUM mmol/*         ED Treatment:   Medication Administration from 03/10/2022 1410 to 03/10/2022 1754       Date/Time Order Dose Route Action     03/10/2022 1444 morphine (PF) 4 mg/mL injection 4 mg 4 mg Intravenous Given     03/10/2022 1449 sodium chloride 0 9 % bolus 1,000 mL 1,000 mL Intravenous New Bag     03/10/2022 1546 potassium chloride (K-DUR,KLOR-CON) CR tablet 40 mEq 40 mEq Oral Given     03/10/2022 1548 HYDROmorphone (DILAUDID) injection 1 mg 1 mg Intravenous Given     03/10/2022 1551 calcium gluconate 1 g in sodium chloride 0 9% 50 mL (premix) 1 g Intravenous New Bag     03/10/2022 1608 iohexol (OMNIPAQUE) 350 MG/ML injection (SINGLE-DOSE) 100 mL 100 mL Intravenous Given     03/10/2022 1657 magnesium sulfate 2 g/50 mL IVPB (premix) 2 g 2 g Intravenous New Bag        Past Medical History:   Diagnosis Date    Alcohol abuse     Back pain     Chronic low back pain     Depression     [facility]     DM2 (diabetes mellitus, type 2) [facility])     Erectile dysfunction     GERD (gastroesophageal reflux disease)     Hepatic steatosis     Hyperlipidemia     Hypertension     Neuropathy     Psychiatric disorder     Tobacco abuse      Present on Admission:   Pancreatitis, acute   Essential hypertension   Type 2 diabetes mellitus with hyperglycemia (HCC)   Gastroesophageal reflux disease without esophagitis   Cigarette nicotine dependence without complication   Electrolyte abnormality      Admitting Diagnosis: Hypocalcemia [E83 51]  Hypokalemia [E87 6]  Hypomagnesemia [E83 42]  Abdominal pain [R10 9]  Acute pancreatitis, unspecified complication status, unspecified pancreatitis type [K85 90]  Age/Sex: 60 y o  male  Admission Orders:Tel  Date/Time Temp Pulse Resp BP SpO2 O2 Device Patient Position - Orthostatic VS   03/11/22 1607 99 °F (37 2 °C) 96 17 124/64 92 % -- Lying   03/11/22 1109 96 5 °F (35 8 °C) Abnormal  88 19 149/78 92 % -- Lying   03/11/22 0726 98 2 °F (36 8 °C) 78 18 132/81 93 % -- Lying   03/11/22 0300 97 6 °F (36 4 °C) 81 18 124/65 97 % None (Room air) Lying   03/10/22 2300 97 6 °F (36 4 °C) 89 17 132/76 94 % None (Room air) Lying   03/10/22 1759 97 1 °F (36 2 °C) Abnormal  101 16 100/65 94 % -- Lying   03/10/22 1700 -- 104 16 109/66 95 % None (Room air) Lying       Scheduled Medications:  enoxaparin, 40 mg, Subcutaneous, Daily  insulin lispro, 1-6 Units, Subcutaneous, TID AC  insulin lispro, 1-6 Units, Subcutaneous, HS  levofloxacin, 750 mg, Oral, Q24H  nicotine, 1 patch, Transdermal, Daily  oxyCODONE, 7 5 mg, Oral, Q8H  [START ON 3/12/2022] pantoprazole, 40 mg, Oral, Early Morning  sertraline, 200 mg, Oral, Daily      Continuous IV Infusions:  lactated ringers, 100 mL/hr, Intravenous, Continuous      PRN Meds:  calcium carbonate, 500 mg, Oral, Daily PRN  HYDROmorphone, 0 5 mg, Intravenous, Q3H PRN 3/10 x2, 3/11 x5  ondansetron, 4 mg, Intravenous, Q6H PRN  oxyCODONE-acetaminophen, 1 tablet, Oral, Q4H PRN        IP CONSULT TO GASTROENTEROLOGY  IP CONSULT TO ACUTE CARE SURGERY    Alfredo Seo RN  Network Utilization Review Department  ATTENTION: Please call with any questions or concerns to [phone] and carefully listen to the prompts so that you are directed to the right person  All voicemails are confidential   Armand Lopez all requests for admission clinical reviews, approved or denied determinations and any other requests to dedicated fax number below belonging to the campus where the patient is receiving treatment   List of dedicated fax numbers for the Facilities:  [address] DENIALS (Administrative/Medical Necessity) [phone]   [address] (Maternity/NICU/Pediatrics) [phone]   [address]  [address] [place] [phone] [address] [facility] [place] [address] [phone]

## 2022-03-11 NOTE — PLAN OF CARE
Problem: PAIN - ADULT  Goal: Verbalizes/displays adequate comfort level or baseline comfort level  Description: Interventions:  - Encourage patient to monitor pain and request assistance  - Assess pain using appropriate pain scale  - Administer analgesics based on type and severity of pain and evaluate response  - Implement non-pharmacological measures as appropriate and evaluate response  - Consider cultural and social influences on pain and pain management  - Notify physician/advanced practitioner if interventions unsuccessful or patient reports new pain  Outcome: Progressing     Problem: INFECTION - ADULT  Goal: Absence or prevention of progression during hospitalization  Description: INTERVENTIONS:  - Assess and monitor for signs and symptoms of infection  - Monitor lab/diagnostic results  - Monitor all insertion sites, i e  indwelling lines, tubes, and drains  - Monitor endotracheal if appropriate and nasal secretions for changes in amount and color  - [place] appropriate cooling/warming therapies per order  - Administer medications as ordered  - Instruct and encourage patient and family to use good hand hygiene technique  - Identify and instruct in appropriate isolation precautions for identified infection/condition  Outcome: Progressing  Goal: Absence of fever/infection during neutropenic period  Description: INTERVENTIONS:  - Monitor WBC    Outcome: Progressing     Problem: SAFETY ADULT  Goal: Patient will remain free of falls  Description: INTERVENTIONS:  - Educate patient/family on patient safety including physical limitations  - Instruct patient to call for assistance with activity   - Consult OT/PT to assist with strengthening/mobility   - Keep Call bell within reach  - Keep bed low and locked with side rails adjusted as appropriate  - Keep care items and personal belongings within reach  - Initiate and maintain comfort rounds  - Make Fall Risk Sign visible to staff  - Offer Toileting every 2 Hours, in advance of need  - Initiate/Maintain bed alarm  - Obtain necessary fall risk management equipment:   - Apply yellow socks and bracelet for high fall risk patients  - Consider moving patient to room near nurses station  Outcome: Progressing  Goal: Maintain or return to baseline ADL function  Description: INTERVENTIONS:  -  Assess patient's ability to carry out ADLs; assess patient's baseline for ADL function and identify physical deficits which impact ability to perform ADLs (bathing, care of mouth/teeth, toileting, grooming, dressing, etc )  - Assess/evaluate cause of self-care deficits   - Assess range of motion  - Assess patient's mobility; develop plan if impaired  - Assess patient's need for assistive devices and provide as appropriate  - Encourage maximum independence but intervene and supervise when necessary  - Involve family in performance of ADLs  - Assess for home care needs following discharge   - Consider OT consult to assist with ADL evaluation and planning for discharge  - Provide patient education as appropriate  Outcome: Progressing  Goal: Maintains/Returns to pre admission functional level  Description: INTERVENTIONS:  - Perform BMAT or MOVE assessment daily    - Set and communicate daily mobility goal to care team and patient/family/caregiver  - Collaborate with rehabilitation services on mobility goals if consulted  - Perform Range of Motion 2 times a day  - Reposition patient every 2 hours    - Dangle patient 2 times a day  - Stand patient 2 times a day  - Ambulate patient 2 times a day  - Out of bed to chair 2 times a day   - Out of bed for meals 2 times a day  - Out of bed for toileting  - Record patient progress and toleration of activity level   Outcome: Progressing     Problem: DISCHARGE PLANNING  Goal: Discharge to home or other facility with appropriate resources  Description: INTERVENTIONS:  - Identify barriers to discharge w/patient and caregiver  - Arrange for needed discharge resources and transportation as appropriate  - Identify discharge learning needs (meds, wound care, etc )  - Arrange for interpretive services to assist at discharge as needed  - Refer to Case Management Department for coordinating discharge planning if the patient needs post-hospital services based on physician/advanced practitioner order or complex needs related to functional status, cognitive ability, or social support system  Outcome: Progressing     Problem: Knowledge Deficit  Goal: Patient/family/caregiver demonstrates understanding of disease process, treatment plan, medications, and discharge instructions  Description: Complete learning assessment and assess knowledge base    Interventions:  - Provide teaching at level of understanding  - Provide teaching via preferred learning methods  Outcome: Progressing

## 2022-03-11 NOTE — PROGRESS NOTES
The levofloxacin and pantoprazole have been converted to Oral [facility] for Adults as approved by the Pharmacy and Therapeutics Committee  The patient met all eligible criteria:  3 Age = 25years old   2) Received at least one dose of the IV form   3) Receiving at least one other scheduled oral/enteral medication   4) Tolerating an oral/enteral diet   and did not have any exclusions:   1) Critical care patient   2) Active GI bleed (IF assessing H2RAs or PPIs)   3) Continuous tube feeding (IF assessing cipro, doxycycline, levofloxacin, minocycline, rifampin, or voriconazole)   4) Receiving PO vancomycin (IF assessing metronidazole)   5) Persistent nausea and/or vomiting   6) Ileus or gastrointestinal obstruction   7) Annabelle/nasogastric tube set for continuous suction   8) Specific order not to automatically convert to PO (in the order's comments or if discussed in the most recent Infectious Disease or primary team's progress notes)

## 2022-03-11 NOTE — ASSESSMENT & PLAN NOTE
Lab Results   Component Value Date    HGBA1C 9 1 (H) 10/28/2021       Recent Labs     03/10/22  1830 03/10/22  2108 03/11/22  0656   POCGLU 272* 229* 160*       Blood Sugar Average: Last 72 hrs:  (P) 774 [id number] patient takes metformin at home, will hold it, give sliding scale insulin

## 2022-03-12 LAB
ANION GAP SERPL CALCULATED.3IONS-SCNC: 8 MMOL/L (ref 4–13)
BASOPHILS # BLD AUTO: 0.08 THOUSANDS/ΜL (ref 0–0.1)
BASOPHILS NFR BLD AUTO: 2 % (ref 0–1)
BUN SERPL-MCNC: 7 MG/DL (ref 5–25)
CALCIUM SERPL-MCNC: 8.8 MG/DL (ref 8.4–10.2)
CHLORIDE SERPL-SCNC: 99 MMOL/L (ref 96–108)
CO2 SERPL-SCNC: 29 MMOL/L (ref 21–32)
CREAT SERPL-MCNC: 0.66 MG/DL (ref 0.6–1.3)
EOSINOPHIL # BLD AUTO: 0.13 THOUSAND/ΜL (ref 0–0.61)
EOSINOPHIL NFR BLD AUTO: 3 % (ref 0–6)
ERYTHROCYTE [DISTWIDTH] IN BLOOD BY AUTOMATED COUNT: 11.9 % (ref 11.6–15.1)
GFR SERPL CREATININE-BSD FRML MDRD: 106 ML/MIN/1.73SQ M
GLUCOSE SERPL-MCNC: 127 MG/DL (ref 65–140)
GLUCOSE SERPL-MCNC: 140 MG/DL (ref 65–140)
GLUCOSE SERPL-MCNC: 142 MG/DL (ref 65–140)
GLUCOSE SERPL-MCNC: 145 MG/DL (ref 65–140)
GLUCOSE SERPL-MCNC: 185 MG/DL (ref 65–140)
HCT VFR BLD AUTO: 42.4 % (ref 36.5–49.3)
HGB BLD-MCNC: 14.6 G/DL (ref 12–17)
IMM GRANULOCYTES # BLD AUTO: 0.02 THOUSAND/UL (ref 0–0.2)
IMM GRANULOCYTES NFR BLD AUTO: 1 % (ref 0–2)
LYMPHOCYTES # BLD AUTO: 1.07 THOUSANDS/ΜL (ref 0.6–4.47)
LYMPHOCYTES NFR BLD AUTO: 25 % (ref 14–44)
MCH RBC QN AUTO: 31.7 PG (ref 26.8–34.3)
MCHC RBC AUTO-ENTMCNC: 34.4 G/DL (ref 31.4–37.4)
MCV RBC AUTO: 92 FL (ref 82–98)
MONOCYTES # BLD AUTO: 0.5 THOUSAND/ΜL (ref 0.17–1.22)
MONOCYTES NFR BLD AUTO: 12 % (ref 4–12)
NEUTROPHILS # BLD AUTO: 2.49 THOUSANDS/ΜL (ref 1.85–7.62)
NEUTS SEG NFR BLD AUTO: 57 % (ref 43–75)
NRBC BLD AUTO-RTO: 0 /100 WBCS
PLATELET # BLD AUTO: 147 THOUSANDS/UL (ref 149–390)
PMV BLD AUTO: 10 FL (ref 8.9–12.7)
POTASSIUM SERPL-SCNC: 3.9 MMOL/L (ref 3.5–5.3)
RBC # BLD AUTO: 4.61 MILLION/UL (ref 3.88–5.62)
SODIUM SERPL-SCNC: 136 MMOL/L (ref 135–147)
WBC # BLD AUTO: 4.29 THOUSAND/UL (ref 4.31–10.16)

## 2022-03-12 PROCEDURE — 85025 COMPLETE CBC W/AUTO DIFF WBC: CPT | Performed by: INTERNAL MEDICINE

## 2022-03-12 PROCEDURE — 82948 REAGENT STRIP/BLOOD GLUCOSE: CPT

## 2022-03-12 PROCEDURE — 99232 SBSQ HOSP IP/OBS MODERATE 35: CPT | Performed by: SURGERY

## 2022-03-12 PROCEDURE — 80048 BASIC METABOLIC PNL TOTAL CA: CPT | Performed by: INTERNAL MEDICINE

## 2022-03-12 PROCEDURE — 82787 IGG 1 2 3 OR 4 EACH: CPT | Performed by: NURSE PRACTITIONER

## 2022-03-12 PROCEDURE — 99232 SBSQ HOSP IP/OBS MODERATE 35: CPT | Performed by: INTERNAL MEDICINE

## 2022-03-12 PROCEDURE — 82784 ASSAY IGA/IGD/IGG/IGM EACH: CPT | Performed by: NURSE PRACTITIONER

## 2022-03-12 RX ADMIN — HYDROMORPHONE HYDROCHLORIDE 0.5 MG: 1 INJECTION, SOLUTION INTRAMUSCULAR; INTRAVENOUS; SUBCUTANEOUS at 19:34

## 2022-03-12 RX ADMIN — SODIUM CHLORIDE, SODIUM LACTATE, POTASSIUM CHLORIDE, AND CALCIUM CHLORIDE 100 ML/HR: .6; .31; .03; .02 INJECTION, SOLUTION INTRAVENOUS at 22:34

## 2022-03-12 RX ADMIN — SODIUM CHLORIDE, SODIUM LACTATE, POTASSIUM CHLORIDE, AND CALCIUM CHLORIDE 100 ML/HR: .6; .31; .03; .02 INJECTION, SOLUTION INTRAVENOUS at 05:36

## 2022-03-12 RX ADMIN — ENOXAPARIN SODIUM 40 MG: 40 INJECTION SUBCUTANEOUS at 08:28

## 2022-03-12 RX ADMIN — OXYCODONE AND ACETAMINOPHEN 1 TABLET: 5; 325 TABLET ORAL at 14:28

## 2022-03-12 RX ADMIN — HYDROMORPHONE HYDROCHLORIDE 0.5 MG: 1 INJECTION, SOLUTION INTRAMUSCULAR; INTRAVENOUS; SUBCUTANEOUS at 16:14

## 2022-03-12 RX ADMIN — HYDROMORPHONE HYDROCHLORIDE 0.5 MG: 1 INJECTION, SOLUTION INTRAMUSCULAR; INTRAVENOUS; SUBCUTANEOUS at 22:35

## 2022-03-12 RX ADMIN — OXYCODONE HYDROCHLORIDE 7.5 MG: 5 TABLET ORAL at 18:10

## 2022-03-12 RX ADMIN — LEVOFLOXACIN 750 MG: 500 TABLET, FILM COATED ORAL at 18:10

## 2022-03-12 RX ADMIN — OXYCODONE HYDROCHLORIDE 7.5 MG: 5 TABLET ORAL at 10:13

## 2022-03-12 RX ADMIN — INSULIN LISPRO 1 UNITS: 100 INJECTION, SOLUTION INTRAVENOUS; SUBCUTANEOUS at 16:16

## 2022-03-12 RX ADMIN — HYDROMORPHONE HYDROCHLORIDE 0.5 MG: 1 INJECTION, SOLUTION INTRAMUSCULAR; INTRAVENOUS; SUBCUTANEOUS at 12:04

## 2022-03-12 RX ADMIN — OXYCODONE AND ACETAMINOPHEN 1 TABLET: 5; 325 TABLET ORAL at 21:08

## 2022-03-12 RX ADMIN — OXYCODONE HYDROCHLORIDE 7.5 MG: 5 TABLET ORAL at 00:42

## 2022-03-12 RX ADMIN — HYDROMORPHONE HYDROCHLORIDE 0.5 MG: 1 INJECTION, SOLUTION INTRAMUSCULAR; INTRAVENOUS; SUBCUTANEOUS at 08:27

## 2022-03-12 RX ADMIN — PANTOPRAZOLE SODIUM 40 MG: 40 TABLET, DELAYED RELEASE ORAL at 06:25

## 2022-03-12 RX ADMIN — SERTRALINE HYDROCHLORIDE 200 MG: 100 TABLET, FILM COATED ORAL at 08:27

## 2022-03-12 RX ADMIN — SODIUM CHLORIDE, SODIUM LACTATE, POTASSIUM CHLORIDE, AND CALCIUM CHLORIDE 100 ML/HR: .6; .31; .03; .02 INJECTION, SOLUTION INTRAVENOUS at 14:28

## 2022-03-12 RX ADMIN — NICOTINE 1 PATCH: 21 PATCH, EXTENDED RELEASE TRANSDERMAL at 08:32

## 2022-03-12 RX ADMIN — HYDROMORPHONE HYDROCHLORIDE 0.5 MG: 1 INJECTION, SOLUTION INTRAMUSCULAR; INTRAVENOUS; SUBCUTANEOUS at 04:38

## 2022-03-12 NOTE — PLAN OF CARE
Problem: INFECTION - ADULT  Goal: Absence or prevention of progression during hospitalization  Description: INTERVENTIONS:  - Assess and monitor for signs and symptoms of infection  - Monitor lab/diagnostic results  - Monitor all insertion sites, i e  indwelling lines, tubes, and drains  - Monitor endotracheal if appropriate and nasal secretions for changes in amount and color  - [place] appropriate cooling/warming therapies per order  - Administer medications as ordered  - Instruct and encourage patient and family to use good hand hygiene technique  - Identify and instruct in appropriate isolation precautions for identified infection/condition  Outcome: Progressing     Problem: DISCHARGE PLANNING  Goal: Discharge to home or other facility with appropriate resources  Description: INTERVENTIONS:  - Identify barriers to discharge w/patient and caregiver  - Arrange for needed discharge resources and transportation as appropriate  - Identify discharge learning needs (meds, wound care, etc )  - Arrange for interpretive services to assist at discharge as needed  - Refer to Case Management Department for coordinating discharge planning if the patient needs post-hospital services based on physician/advanced practitioner order or complex needs related to functional status, cognitive ability, or social support system  Outcome: Progressing     Problem: GASTROINTESTINAL - ADULT  Goal: Minimal or absence of nausea and/or vomiting  Description: INTERVENTIONS:  - Administer IV fluids if ordered to ensure adequate hydration  - Maintain NPO status until nausea and vomiting are resolved  - Nasogastric tube if ordered  - Administer ordered antiemetic medications as needed  - Provide nonpharmacologic comfort measures as appropriate  - Advance diet as tolerated, if ordered  - Consider nutrition services referral to assist patient with adequate nutrition and appropriate food choices  Outcome: Progressing

## 2022-03-12 NOTE — ASSESSMENT & PLAN NOTE
Patient has hypokalemia, hypomagnesemia, hypocalcemia  Replaced accordingly  Continue to follow, improving

## 2022-03-12 NOTE — PROGRESS NOTES
Phylicia 43  Progress Note - Tosha Foss 1965, 60 y o  male MRN: [id number]  Unit/Bed#: -01 Encounter: [id number]  Primary Care Provider: Omid Root MD   Date and time admitted to hospital: 3/10/2022  2:13 PM    Electrolyte abnormality  Assessment & Plan  Patient has hypokalemia, hypomagnesemia, hypocalcemia  Replaced accordingly  Continue to follow, improving  Cigarette nicotine dependence without complication  Assessment & Plan  Consult to quit smoking, explained risks and benefits, we will order a nicotine patch  Gastroesophageal reflux disease without esophagitis  Assessment & Plan  Continue PPI  GI on board for possible EGD to rule out peptic ulcer disease    Type 2 diabetes mellitus with hyperglycemia [facility])  Assessment & Plan  Lab Results   Component Value Date    HGBA1C 9 1 (H) 10/28/2021       Recent Labs     03/11/22  0656 03/11/22  1135 03/11/22  1558 03/11/22 2023   POCGLU 160* 128 174* 155*       Blood Sugar Average: Last 72 hrs:  (P) 936 [id number] patient takes metformin at home, will hold it, give sliding scale insulin  Essential hypertension  Assessment & Plan  Patient takes ramipril at home  * Pancreatitis, acute  Assessment & Plan  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  Patient states he had quit alcohol in March 2021  CT shows signs of acute pancreatitis  Right upper quadrant ultrasound showed no signs of cholecystitis  Lipid panel showed elevated cholesterol and triglycerides but clearly not the reason for the pancreatitis   Plan  Currently on clear liquid diet, advance as tolerated  Will give IV fluids, pain medications      As per GI recommendations follow IgG4, if pain continues to be there daily re-evaluate the patient for possible EGD  As per surgery if the patient pain does not improve he will need a HIDA scan and possible cholecystectomy after that  Reinforce of on alcohol abstinence and marijuana          VTE Pharmacologic Prophylaxis:     Low Risk (Score 0-2) - Encourage Ambulation  Mechanical VTE Prophylaxis in Place: Yes    Patient Centered Rounds: I have performed bedside rounds with nursing staff today  Discussions with Specialists or Other Care Team Provider: GI/surgery        Current Length of Stay: 2 day(s)    Current Patient Status: Inpatient     Discharge Plan / Estimated Discharge Date: Anticipate discharge in 48 hrs to home  Code Status: Level 1 - Full Code      Subjective:   Patient endorses abdominal pain, nauseas abut tolerating diet  He stated that the pain is slightly better today and he would like to advance the diet  Objective:     Vitals:   Temp (24hrs), Av 8 °F (36 6 °C), Min:96 5 °F (35 8 °C), Max:99 °F (37 2 °C)    Temp:  [96 5 °F (35 8 °C)-99 °F (37 2 °C)] 98 °F (36 7 °C)  HR:  [83-96] 83  Resp:  [16-19] 17  BP: (124-149)/(64-78) 130/77  SpO2:  [92 %] 92 %  Body mass index is 31 02 kg/m²  Input and Output Summary (last 24 hours): Intake/Output Summary (Last 24 hours) at 3/12/2022 0740  Last data filed at 3/12/2022 0536  Gross per 24 hour   Intake 2383 33 ml   Output --   Net 2383 33 ml       Physical Exam:     Physical Exam  Constitutional:       Appearance: He is obese  Cardiovascular:      Rate and Rhythm: Normal rate and regular rhythm  Pulses: Normal pulses  Heart sounds: Normal heart sounds  Pulmonary:      Effort: Pulmonary effort is normal       Breath sounds: Normal breath sounds  Abdominal:      General: Abdomen is flat  Bowel sounds are normal  There is no distension  Palpations: Abdomen is soft  Tenderness: There is abdominal tenderness  There is no guarding or rebound  Musculoskeletal:         General: Normal range of motion  Cervical back: Normal range of motion  Skin:     General: Skin is warm  Capillary Refill: Capillary refill takes less than 2 seconds  Neurological:      General: No focal deficit present  Mental Status: He is alert and oriented to person, place, and time  Mental status is at baseline  Psychiatric:         Mood and Affect: Mood normal           Additional Data:     Labs:  Results from last 7 days   Lab Units 03/12/22  0503   WBC Thousand/uL 4 29*   HEMOGLOBIN g/dL 14 6   HEMATOCRIT % 42 4   PLATELETS Thousands/uL 147*   NEUTROS PCT % 57   LYMPHS PCT % 25   MONOS PCT % 12   EOS PCT % 3     Results from last 7 days   Lab Units 03/12/22  0503 03/11/22  0551 03/11/22  0551   SODIUM mmol/L 136   < > 135   POTASSIUM mmol/L 3 9   < > 4 0   CHLORIDE mmol/L 99   < > 100   CO2 mmol/L 29   < > 25   BUN mg/dL 7   < > 13   CREATININE mg/dL 0 66   < > 0 67   ANION GAP mmol/L 8   < > 10   CALCIUM mg/dL 8 8   < > 8 8   ALBUMIN g/dL  --   --  3 7   TOTAL BILIRUBIN mg/dL  --   --  0 94   ALK PHOS U/L  --   --  78   ALT U/L  --   --  25   AST U/L  --   --  25   GLUCOSE RANDOM mg/dL 142*   < > 160*    < > = values in this interval not displayed  Results from last 7 days   Lab Units 03/11/22  0551   INR  1 05     Results from last 7 days   Lab Units 03/11/22  2023 03/11/22  1558 03/11/22  1135 03/11/22  0656 03/10/22  2108 03/10/22  1830   POC GLUCOSE mg/dl 155* 174* 128 160* 229* 272*         Results from last 7 days   Lab Units 03/10/22  1438   LACTIC ACID mmol/L 1 6       Imaging: No pertinent imaging reviewed      Recent Cultures (last 7 days):           Lines/Drains:  Invasive Devices  Report    Peripheral Intravenous Line            Peripheral IV 03/10/22 Right Antecubital 1 day                Telemetry:        Last 24 Hours Medication List:   Current Facility-Administered Medications   Medication Dose Route Frequency Provider Last Rate    calcium carbonate  500 mg Oral Daily PRN Oxana Ward MD      enoxaparin  40 mg Subcutaneous Daily Deb HUBBARD Lydia Kumar MD      HYDROmorphone  0 5 mg Intravenous Q3H PRN Mimi Bunn MD      insulin lispro  1-6 Units Subcutaneous TID AC Mimi Bunn MD      insulin lispro  1-6 Units Subcutaneous HS Mimi Bunn MD      lactated ringers  100 mL/hr Intravenous Continuous Mimi Bunn  mL/hr (03/12/22 0536)    levofloxacin  750 mg Oral Q24H Mimi Bunn MD      nicotine  1 patch Transdermal Daily Mimi Bunn MD      ondansetron  4 mg Intravenous Q6H PRN Mimi Bunn MD      oxyCODONE  7 5 mg Oral Q8H Leonila Parsons MD      oxyCODONE-acetaminophen  1 tablet Oral Q4H PRN Leonila Parsons MD      pantoprazole  40 mg Oral Early Morning Mimi Bunn MD      sertraline  200 mg Oral Daily Mimi Bunn MD          Today, Patient Was Seen By: Leonila Parsons MD    ** Please Note: This note has been constructed using a voice recognition system   **

## 2022-03-12 NOTE — PLAN OF CARE
Problem: PAIN - ADULT  Goal: Verbalizes/displays adequate comfort level or baseline comfort level  Description: Interventions:  - Encourage patient to monitor pain and request assistance  - Assess pain using appropriate pain scale  - Administer analgesics based on type and severity of pain and evaluate response  - Implement non-pharmacological measures as appropriate and evaluate response  - Consider cultural and social influences on pain and pain management  - Notify physician/advanced practitioner if interventions unsuccessful or patient reports new pain  -educate patient on pain medication regimen as ordered  Outcome: Not Progressing     Problem: INFECTION - ADULT  Goal: Absence or prevention of progression during hospitalization  Description: INTERVENTIONS:  - Assess and monitor for signs and symptoms of infection  - Monitor lab/diagnostic results  - Administer medications as ordered  - Instruct and encourage patient and family to use good hand hygiene technique  Outcome: Progressing  Goal: Absence of fever/infection during neutropenic period  Description: INTERVENTIONS:  - Monitor WBC    Outcome: Progressing     Problem: GASTROINTESTINAL - ADULT  Goal: Minimal or absence of nausea and/or vomiting  Description: INTERVENTIONS:  - Administer IV fluids if ordered to ensure adequate hydration  - Administer ordered antiemetic medications as needed  - Provide nonpharmacologic comfort measures as appropriate  - Advance diet as tolerated, if ordered  Outcome: Progressing  Goal: Maintains or returns to baseline bowel function  Description: INTERVENTIONS:  - Assess bowel function  - Encourage oral fluids to ensure adequate hydration  - Administer IV fluids if ordered to ensure adequate hydration  - Administer ordered medications as needed  - Encourage mobilization and activity  Outcome: Not Progressing  Goal: Maintains adequate nutritional intake  Description: INTERVENTIONS:  - Identify factors contributing to decreased intake, treat as appropriate  - Assist with meals as needed  - Monitor I&O, weight, and lab values if indicated  - Obtain nutrition services referral as needed  Outcome: Progressing

## 2022-03-12 NOTE — PROGRESS NOTES
Progress Note - General Surgery   Agus Briones 60 y o  male MRN: [id number]  Unit/Bed#: -01 Encounter: [id number]    Assessment:  Epigastric pain and resolving pancreatitis of unclear etiology  CT scan and ultrasound does not reveal any acute gallbladder inflammation  No gallstones have been identified  His distended gallbladder could reflect biliary dyskinesia, this can only be confirmed on a CCK stimulated HIDA scan  There is no sign that his pancreatitis is of biliary origin since no gallstones have been identified  Labs largely unremarkable with the last lipase normal     Plan:  1  Diet may be advanced and pain medicines p r n  2  GI should re-evaluate for possible EGD  3  No need for any acute surgical intervention  Subjective/Objective   Chief Complaint:  I am in a lot of pain    Subjective:  Resting in bed and reporting epigastric pain    Objective:     Blood pressure 128/85, pulse 88, temperature 97 5 °F (36 4 °C), temperature source Tympanic, resp  rate 18, height 5' 11" (1 803 m), weight 101 kg (222 lb 7 1 oz), SpO2 95 %  ,Body mass index is 31 02 kg/m²  Intake/Output Summary (Last 24 hours) at 3/12/2022 0852  Last data filed at 3/12/2022 0536  Gross per 24 hour   Intake 2383 33 ml   Output --   Net 2383 33 ml       Invasive Devices  Report    Peripheral Intravenous Line            Peripheral IV 03/10/22 Right Antecubital 1 day                Physical Exam: /85 (BP Location: Left arm)   Pulse 88   Temp 97 5 °F (36 4 °C) (Tympanic)   Resp 18   Ht 5' 11" (1 803 m)   Wt 101 kg (222 lb 7 1 oz)   SpO2 95%   BMI 31 02 kg/m²   General appearance: alert and oriented, in no acute distress  Abdomen: Soft, decreased distention per patient report, mild tenderness to palpation in the epigastric area but no peritonitis    Lab, Imaging and other studies:  I have personally reviewed pertinent lab results    , CBC:   Lab Results   Component Value Date    WBC 4 29 (L) 03/12/2022    HGB 14 6 03/12/2022    HCT 42 4 03/12/2022    MCV 92 03/12/2022     (L) 03/12/2022    MCH 31 7 03/12/2022    MCHC 34 4 03/12/2022    RDW 11 9 03/12/2022    MPV 10 0 03/12/2022    NRBC 0 03/12/2022   , CMP:   Lab Results   Component Value Date    SODIUM 136 03/12/2022    K 3 9 03/12/2022    CL 99 03/12/2022    CO2 29 03/12/2022    BUN 7 03/12/2022    CREATININE 0 66 03/12/2022    CALCIUM 8 8 03/12/2022    EGFR 106 03/12/2022   , Lipase: No results found for: LIPASE  VTE Pharmacologic Prophylaxis: Enoxaparin (Lovenox)  VTE Mechanical Prophylaxis: sequential compression device

## 2022-03-12 NOTE — ASSESSMENT & PLAN NOTE
Lab Results   Component Value Date    HGBA1C 9 1 (H) 10/28/2021       Recent Labs     03/11/22  0656 03/11/22  1135 03/11/22  1558 03/11/22 2023   POCGLU 160* 128 174* 155*       Blood Sugar Average: Last 72 hrs:  (P) 316 [id number] patient takes metformin at home, will hold it, give sliding scale insulin

## 2022-03-12 NOTE — PROGRESS NOTES
Progress Note - Dori Norwood 60 y o  male MRN: [id number]    Unit/Bed#: -01 Encounter: [id number]    Assessment and Plan:   Principal Problem:    Pancreatitis, acute  Active Problems:    Essential hypertension    Type 2 diabetes mellitus with hyperglycemia (HCC)    Gastroesophageal reflux disease without esophagitis    Cigarette nicotine dependence without complication    Electrolyte abnormality    #1  Recurrent pancreatitis  Patient has a history of pancreatitis in 2018 and 2019 were attributed to alcohol use, but now he has stopped drinking more than year ago and continues with pancreatitis with most recent episode February 2021  Calcium and triglycerides were normal   He denies any family history of pancreatitis, he still has his gallbladder and microlithiasis is a possible etiology as well as marijuana use, tobacco use, simvastatin  Omeprazole less likely but switch to another PPI on discharge  pain improving today,   -trial of lo fat diet today, was advised to inform nurse if pain worsens  -continue IV fluids  -monitor CBC, CMP  -pain control and antiemetics PRN  -appreciate surgical consult  -Follow up Igg4  -marijuana and tobacco cessation  -patient will need MRI after acute inflammation has subsided to rule out any pancreatic lesions in 6 weeks  -if pain worsening or persistently severe, will consider EGD on Monday     #2  GERD  EGD February 2021 showed antritis and mild esophagitis, biopsies benign  -continue pantoprazole  -would switch omeprazole to pantoprazole on discharge given possible associations     #3  Constipation  Pt reports no bowel movements for 4 days, usually moves his bowels twice daily  Denies any vomiting  He has never had a colonoscopy    -start bowel regimen  -recommend outpatient screening colonoscopy    ----------------------------------------------------------------------------------------------------------------    Subjective:   Reports pain is improving, no vomiting, is going to trial a diet for lunch today  Reports passing gas but no bowel movements in the last several days  Objective:     Vitals: Blood pressure 128/85, pulse 88, temperature 97 5 °F (36 4 °C), temperature source Tympanic, resp  rate 18, height 5' 11" (1 803 m), weight 101 kg (222 lb 7 1 oz), SpO2 95 %  ,Body mass index is 31 02 kg/m²  Intake/Output Summary (Last 24 hours) at 3/12/2022 1134  Last data filed at 3/12/2022 1101  Gross per 24 hour   Intake 3285 ml   Output --   Net 3285 ml       Physical Exam:     General Appearance: Alert, appears stated age and cooperative  Lungs: Clear to auscultation bilaterally, no rales or rhonchi, no labored breathing/accessory muscle use  Heart: Regular rate and rhythm, S1, S2 normal, no murmur, click, rub or gallop  Abdomen: Soft, epigastric discomfort to palpation, decreased bowel sounds, not distended no masses or no organomegaly  Extremities: No cyanosis, clubbing, or edema    Invasive Devices  Report    Peripheral Intravenous Line            Peripheral IV 03/10/22 Right Antecubital 1 day                Lab Results:  Results from last 7 days   Lab Units 03/12/22  0503   WBC Thousand/uL 4 29*   HEMOGLOBIN g/dL 14 6   HEMATOCRIT % 42 4   PLATELETS Thousands/uL 147*   NEUTROS PCT % 57   LYMPHS PCT % 25   MONOS PCT % 12   EOS PCT % 3     Results from last 7 days   Lab Units 03/12/22  0503 03/11/22  0551 03/11/22  0551   POTASSIUM mmol/L 3 9   < > 4 0   CHLORIDE mmol/L 99   < > 100   CO2 mmol/L 29   < > 25   BUN mg/dL 7   < > 13   CREATININE mg/dL 0 66   < > 0 67   CALCIUM mg/dL 8 8   < > 8 8   ALK PHOS U/L  --   --  78   ALT U/L  --   --  25   AST U/L  --   --  25    < > = values in this interval not displayed  Invalid input(s): BILI  Results from last 7 days   Lab Units 03/11/22  0551   INR  1 05     Results from last 7 days   Lab Units 03/11/22  0551   LIPASE u/L 42       Imaging Studies: I have personally reviewed pertinent imaging studies      XR chest 1 view portable    Result Date: 3/10/2022  Impression: No acute cardiopulmonary disease  Workstation performed: [id number]     US right upper quadrant    Result Date: 3/10/2022  Impression: No evidence of acute cholecystitis  Hepatic fibrofatty infiltration and hepatomegaly  Workstation performed: [id number]     CT abdomen pelvis with contrast    Result Date: 3/10/2022  Impression: Possible acute pancreatitis  Distended gallbladder, correlate with gallbladder ultrasound  1 7 cm indeterminate left lower pole posterior renal cystic lesion, correlate with nonemergent outpatient renal ultrasound recommended  Hepatic steatosis  The study was marked in EPIC for significant notification   Workstation performed: [id number]

## 2022-03-12 NOTE — ASSESSMENT & PLAN NOTE
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  Patient states he had quit alcohol in March 2021  CT shows signs of acute pancreatitis  Right upper quadrant ultrasound showed no signs of cholecystitis  Lipid panel showed elevated cholesterol and triglycerides but clearly not the reason for the pancreatitis   Plan  Currently on clear liquid diet, advance as tolerated  Will give IV fluids, pain medications      As per GI recommendations follow IgG4, if pain continues to be there daily re-evaluate the patient for possible EGD  As per surgery if the patient pain does not improve he will need a HIDA scan and possible cholecystectomy after that  Reinforce of on alcohol abstinence and marijuana

## 2022-03-13 ENCOUNTER — TELEPHONE (OUTPATIENT)
Dept: GASTROENTEROLOGY | Facility: AMBULARY SURGERY CENTER | Age: 57
End: 2022-03-13

## 2022-03-13 VITALS
RESPIRATION RATE: 17 BRPM | DIASTOLIC BLOOD PRESSURE: 85 MMHG | WEIGHT: 222.44 LBS | TEMPERATURE: 97.5 F | HEIGHT: 71 IN | BODY MASS INDEX: 31.14 KG/M2 | SYSTOLIC BLOOD PRESSURE: 148 MMHG | HEART RATE: 68 BPM | OXYGEN SATURATION: 93 %

## 2022-03-13 LAB
ANION GAP SERPL CALCULATED.3IONS-SCNC: 7 MMOL/L (ref 4–13)
BASOPHILS # BLD AUTO: 0.06 THOUSANDS/ΜL (ref 0–0.1)
BASOPHILS NFR BLD AUTO: 2 % (ref 0–1)
BUN SERPL-MCNC: 9 MG/DL (ref 5–25)
CALCIUM SERPL-MCNC: 8.9 MG/DL (ref 8.4–10.2)
CHLORIDE SERPL-SCNC: 100 MMOL/L (ref 96–108)
CO2 SERPL-SCNC: 28 MMOL/L (ref 21–32)
CREAT SERPL-MCNC: 0.63 MG/DL (ref 0.6–1.3)
EOSINOPHIL # BLD AUTO: 0.23 THOUSAND/ΜL (ref 0–0.61)
EOSINOPHIL NFR BLD AUTO: 7 % (ref 0–6)
ERYTHROCYTE [DISTWIDTH] IN BLOOD BY AUTOMATED COUNT: 11.9 % (ref 11.6–15.1)
GFR SERPL CREATININE-BSD FRML MDRD: 108 ML/MIN/1.73SQ M
GLUCOSE SERPL-MCNC: 163 MG/DL (ref 65–140)
GLUCOSE SERPL-MCNC: 165 MG/DL (ref 65–140)
HCT VFR BLD AUTO: 42.6 % (ref 36.5–49.3)
HGB BLD-MCNC: 14.3 G/DL (ref 12–17)
IMM GRANULOCYTES # BLD AUTO: 0.01 THOUSAND/UL (ref 0–0.2)
IMM GRANULOCYTES NFR BLD AUTO: 0 % (ref 0–2)
LYMPHOCYTES # BLD AUTO: 1.09 THOUSANDS/ΜL (ref 0.6–4.47)
LYMPHOCYTES NFR BLD AUTO: 33 % (ref 14–44)
MCH RBC QN AUTO: 31.1 PG (ref 26.8–34.3)
MCHC RBC AUTO-ENTMCNC: 33.6 G/DL (ref 31.4–37.4)
MCV RBC AUTO: 93 FL (ref 82–98)
MONOCYTES # BLD AUTO: 0.45 THOUSAND/ΜL (ref 0.17–1.22)
MONOCYTES NFR BLD AUTO: 14 % (ref 4–12)
NEUTROPHILS # BLD AUTO: 1.48 THOUSANDS/ΜL (ref 1.85–7.62)
NEUTS SEG NFR BLD AUTO: 44 % (ref 43–75)
NRBC BLD AUTO-RTO: 0 /100 WBCS
PLATELET # BLD AUTO: 148 THOUSANDS/UL (ref 149–390)
PMV BLD AUTO: 9.6 FL (ref 8.9–12.7)
POTASSIUM SERPL-SCNC: 3.8 MMOL/L (ref 3.5–5.3)
RBC # BLD AUTO: 4.6 MILLION/UL (ref 3.88–5.62)
SODIUM SERPL-SCNC: 135 MMOL/L (ref 135–147)
WBC # BLD AUTO: 3.32 THOUSAND/UL (ref 4.31–10.16)

## 2022-03-13 PROCEDURE — 80048 BASIC METABOLIC PNL TOTAL CA: CPT | Performed by: INTERNAL MEDICINE

## 2022-03-13 PROCEDURE — 82948 REAGENT STRIP/BLOOD GLUCOSE: CPT

## 2022-03-13 PROCEDURE — 99239 HOSP IP/OBS DSCHRG MGMT >30: CPT | Performed by: INTERNAL MEDICINE

## 2022-03-13 PROCEDURE — 85025 COMPLETE CBC W/AUTO DIFF WBC: CPT | Performed by: INTERNAL MEDICINE

## 2022-03-13 RX ORDER — OMEPRAZOLE 40 MG/1
40 CAPSULE, DELAYED RELEASE ORAL 2 TIMES DAILY
Qty: 60 CAPSULE | Refills: 0 | Status: SHIPPED | OUTPATIENT
Start: 2022-03-13 | End: 2022-07-13 | Stop reason: SDUPTHER

## 2022-03-13 RX ORDER — NICOTINE 21 MG/24HR
1 PATCH, TRANSDERMAL 24 HOURS TRANSDERMAL DAILY
Qty: 28 PATCH | Refills: 0 | Status: SHIPPED | OUTPATIENT
Start: 2022-03-14

## 2022-03-13 RX ADMIN — ENOXAPARIN SODIUM 40 MG: 40 INJECTION SUBCUTANEOUS at 08:28

## 2022-03-13 RX ADMIN — SERTRALINE HYDROCHLORIDE 200 MG: 100 TABLET, FILM COATED ORAL at 08:22

## 2022-03-13 RX ADMIN — PANTOPRAZOLE SODIUM 40 MG: 40 TABLET, DELAYED RELEASE ORAL at 05:09

## 2022-03-13 RX ADMIN — OXYCODONE AND ACETAMINOPHEN 1 TABLET: 5; 325 TABLET ORAL at 05:09

## 2022-03-13 RX ADMIN — NICOTINE 1 PATCH: 21 PATCH, EXTENDED RELEASE TRANSDERMAL at 08:29

## 2022-03-13 RX ADMIN — OXYCODONE HYDROCHLORIDE 7.5 MG: 5 TABLET ORAL at 08:26

## 2022-03-13 RX ADMIN — HYDROMORPHONE HYDROCHLORIDE 0.5 MG: 1 INJECTION, SOLUTION INTRAMUSCULAR; INTRAVENOUS; SUBCUTANEOUS at 03:36

## 2022-03-13 RX ADMIN — OXYCODONE HYDROCHLORIDE 7.5 MG: 5 TABLET ORAL at 01:35

## 2022-03-13 RX ADMIN — INSULIN LISPRO 1 UNITS: 100 INJECTION, SOLUTION INTRAVENOUS; SUBCUTANEOUS at 08:31

## 2022-03-13 NOTE — DISCHARGE INSTRUCTIONS
Pancreatitis   WHAT YOU NEED TO KNOW:   Pancreatitis is inflammation of your pancreas  The pancreas is an organ that makes insulin  It also makes enzymes (digestive juices) that help your body digest food  Pancreatitis may be an acute (short-term) problem that happens only once  It may become a chronic (long-term) problem that comes and goes over time  DISCHARGE INSTRUCTIONS:   Call your doctor if:   · You have severe pain in your abdomen and you are vomiting  · You have a fever  · You continue to lose weight without trying  · Your skin or the whites of your eyes turn yellow  · You have questions or concerns about your condition or care  Medicines: You may need any of the following:  · Prescription pain medicine  may be given  Ask your healthcare provider how to take this medicine safely  Some prescription pain medicines contain acetaminophen  Do not take other medicines that contain acetaminophen without talking to your healthcare provider  Too much acetaminophen may cause liver damage  Prescription pain medicine may cause constipation  Ask your healthcare provider how to prevent or treat constipation  · Antibiotics  may be given to treat a bacterial infection  · Take your medicine as directed  Contact your healthcare provider if you think your medicine is not helping or if you have side effects  Tell him or her if you are allergic to any medicine  Keep a list of the medicines, vitamins, and herbs you take  Include the amounts, and when and why you take them  Bring the list or the pill bottles to follow-up visits  Carry your medicine list with you in case of an emergency  Self-care:   · Rest  when you feel it is needed  Slowly start to do more each day  Return to your usual activities as directed  · Do not drink any alcohol  If you need help to stop drinking, contact the following organization:   ?  Alcoholics Anonymous  Web Address: http://www sanabria info/      Ask your healthcare provider or dietitian about the best foods to eat  You may need to eat foods that are low in fat if you have chronic pancreatitis  Do not smoke  Nicotine and other chemicals in cigarettes and cigars can cause damage  Ask your healthcare provider for information if you currently smoke and need help to quit  E-cigarettes or smokeless tobacco still contain nicotine  Talk to your healthcare provider before you use these products  Follow up with your doctor as directed:  Write down your questions so you remember to ask them during your visits  © Copyright [employer] 2022 Information is for End User's use only and may not be sold, redistributed or otherwise used for commercial purposes  All illustrations and images included in CareNotes® are the copyrighted property of A D A M , Inc  or [facility]raine [employer]   The above information is an  only  It is not intended as medical advice for individual conditions or treatments  Talk to your doctor, nurse or pharmacist before following any medical regimen to see if it is safe and effective for you  Chronic Hypertension   WHAT YOU NEED TO KNOW:   Hypertension is high blood pressure  Your blood pressure is the force of your blood moving against the walls of your arteries  Hypertension causes your blood pressure to get so high that your heart has to work much harder than normal  This can damage your heart  Even if you have hypertension for years, lifestyle changes, medicines, or both can help bring your blood pressure to normal   DISCHARGE INSTRUCTIONS:   Call your local emergency number (911 in the [address] [place] [address],[address]) or have someone call if:   · You have chest pain  · You have any of the following signs of a heart attack:      ?  Squeezing, pressure, or pain in your chest    ? You may  also have any of the following:     § Discomfort or pain in your back, neck, jaw, stomach, or arm    § Shortness of breath    § Nausea or vomiting    § Lightheadedness or a sudden cold sweat    · You become confused or have difficulty speaking  · You suddenly feel lightheaded or have trouble breathing  Return to the emergency department if:   · You have a severe headache or vision loss  · You have weakness in an arm or leg  Call your doctor or cardiologist if:   · You feel faint, dizzy, confused, or drowsy  · You have been taking your blood pressure medicine but your pressure is higher than your provider says it should be  · You have questions or concerns about your condition or care  Medicines: You may need any of the following:  · Antihypertensives  may be used to help lower your blood pressure  Several kinds of medicines are available  Your healthcare provider may change the medicine or medicines you currently take  This may be needed if your blood pressure is often high when you check it at home or you are having other problems with blood pressure control  · Diuretics  help decrease extra fluid that collects in your body  This will help lower your BP  You may urinate more often while you take this medicine  · Cholesterol medicine  helps lower your cholesterol level  A low cholesterol level helps prevent heart disease and makes it easier to control your blood pressure  · Take your medicine as directed  Contact your healthcare provider if you think your medicine is not helping or if you have side effects  Tell him or her if you are allergic to any medicine  Keep a list of the medicines, vitamins, and herbs you take  Include the amounts, and when and why you take them  Bring the list or the pill bottles to follow-up visits  Carry your medicine list with you in case of an emergency  Stages of hypertension:       · Normal blood pressure is 119/79 or lower   Your healthcare provider may only check your blood pressure each year if it stays at a normal level  · Elevated blood pressure is 120/79 to 129/79    This is sometimes called prehypertension  Your healthcare provider may suggest lifestyle changes to help lower your blood pressure to a normal level  He or she may then check it again in 3 to 6 months  · Stage 1 hypertension is 130/80  to 139/89   Your provider may recommend lifestyle changes, medication, and checks every 3 to 6 months until your blood pressure is controlled  · Stage 2 hypertension is 140/90 or higher   Your provider will recommend lifestyle changes and have you take 2 kinds of hypertension medicines  You will also need to have your blood pressure checked monthly until it is controlled  Manage chronic hypertension:   · Check your blood pressure at home  Avoid smoking, caffeine, and exercise at least 30 minutes before checking your blood pressure  Sit and rest for 5 minutes before you take your blood pressure  Extend your arm and support it on a flat surface  Your arm should be at the same level as your heart  Follow the directions that came with your blood pressure monitor  Check your blood pressure 2 times, 1 minute apart, before you take your medicine in the morning  Also check your blood pressure before your evening meal  Keep a record of your readings and bring it to your follow-up visits  Ask your healthcare provider what your blood pressure should be  · Manage any other health conditions you have  Health conditions such as diabetes can increase your risk for hypertension  Follow your healthcare provider's instructions and take all your medicines as directed  Talk to your healthcare provider about any new health conditions you have recently developed  · Ask about all medicines  Certain medicines can increase your blood pressure  Examples include oral birth control pills, decongestants, herbal supplements, and NSAIDs, such as ibuprofen  Your healthcare provider can tell you which medicines are safe for you to take   This includes prescription and over-the-counter medicines  Lifestyle changes you can make to lower your blood pressure: Your provider may want you to make more lifestyle changes if you are having trouble controlling your blood pressure  This may feel difficult over time, especially if you think you are making good changes but your pressure is still high  It might help to focus on one new change at a time  For example, try to add 1 more day of exercise, or exercise for an extra 10 minutes on 2 days  Small changes can make a big difference  Your healthcare provider can also refer you to specialists such as a dietitian who can help you make small changes  Your family members may be included in helping you learn to create lifestyle changes, such as the following:     · Limit sodium (salt) as directed  Too much sodium can affect your fluid balance  Check labels to find low-sodium or no-salt-added foods  Some low-sodium foods use potassium salts for flavor  Too much potassium can also cause health problems  Your healthcare provider will tell you how much sodium and potassium are safe for you to have in a day  He or she may recommend that you limit sodium to 2,300 mg a day  · Follow the meal plan recommended by your healthcare provider  A dietitian or your provider can give you more information on low-sodium plans or the DASH (Dietary Approaches to Stop Hypertension) eating plan  The DASH plan is low in sodium, processed sugar, unhealthy fats, and total fat  It is high in potassium, calcium, and fiber  These can be found in vegetables, fruit, and whole-grain foods  · Be physically active throughout the day  Physical activity, such as exercise, can help control your blood pressure and your weight  Be physically active for at least 30 minutes per day, on most days of the week  Include aerobic activity, such as walking or riding a bicycle  Also include strength training at least 2 times each week   Your healthcare providers can help you create a physical activity plan  · Decrease stress  This may help lower your blood pressure  Learn ways to relax, such as deep breathing or listening to music  · Limit alcohol as directed  Alcohol can increase your blood pressure  A drink of alcohol is 12 ounces of beer, 5 ounces of wine, or 1½ ounces of liquor  · Do not smoke  Nicotine and other chemicals in cigarettes and cigars can increase your blood pressure and also cause lung damage  Ask your healthcare provider for information if you currently smoke and need help to quit  E-cigarettes or smokeless tobacco still contain nicotine  Talk to your healthcare provider before you use these products  Follow up with your doctor or cardiologist as directed: You will need to return to have your blood pressure checked and to have other lab tests done  Write down your questions so you remember to ask them during your visits  © Copyright [employer] 2022 Information is for End User's use only and may not be sold, redistributed or otherwise used for commercial purposes  All illustrations and images included in CareNotes® are the copyrighted property of A D A M , Inc  or [address] [place] [address]  The above information is an  only  It is not intended as medical advice for individual conditions or treatments  Talk to your doctor, nurse or pharmacist before following any medical regimen to see if it is safe and effective for you  How to Stop Smoking   WHAT YOU NEED TO KNOW:   You will improve your health and the health of others around you if you stop smoking  Your risk for heart and lung disease, cancer, stroke, heart attack, and vision problems will also decrease  Your adolescent can help prevent or stop harm to his or her brain or body  This will help him or her become a healthy adult  You can benefit from quitting no matter how long you have smoked    DISCHARGE INSTRUCTIONS:   Prepare to stop smoking:  Nicotine is a highly addictive drug found in cigarettes  Withdrawal symptoms can happen when you stop smoking and make it hard to quit  These include anxiety, depression, irritability, trouble sleeping, and increased appetite  You increase your chances of success if you prepare to quit  · Set a quit date  William Nim a date that is within the next 2 weeks  Do not pick a day that you think may be stressful or busy  Write down the day or [place] it on your calender  · Tell friends and family that you plan to quit  Explain that you may have withdrawal symptoms when you try to quit  Ask them to support you  They may be able to encourage you and help reduce your stress to make it easier for you to quit  · Make a list of your reasons for quitting  Put the list somewhere you will see it every day, such as your refrigerator  You can look at the list when you have a craving  · Remove all tobacco and nicotine products from your home, car, and workplace  Also, remove anything else that will tempt you to smoke, such as lighters, matches, or ashtrays  Clean your car, home, and places at work that smell like smoke  The smell of smoke can trigger a craving  · Identify triggers that make you want to smoke  This may include activities, feelings, or people  Also write down 1 way you can deal with each of your triggers  For example, if you want to smoke as soon as you wake up, plan another activity during this time, such as exercise  · Make a plan for how you will quit  Learn about the tools that can help you quit, such as medicine, counseling, or nicotine replacement therapy  Choose at least 2 options to help you quit  · Help your adolescent make a plan to quit  The plan will be more successful if your adolescent makes his or her own decisions  Do not try to pressure him or her to quit immediately or in a certain way  Be supportive and offer help if needed      Tools to help you stop smoking:   · Counseling  from a trained healthcare provider can provide you with support and skills to quit smoking  The provider will also teach you to manage your withdrawal symptoms and cravings  You may receive counseling from one counselor, in group therapy, or through phone therapy called a quit line  · Nicotine replacement therapy (NRT)  such as nicotine patches, gum, or lozenges may help reduce your nicotine cravings  You may get these without a doctor's order  Do not use e-cigarettes or smokeless tobacco in place of cigarettes or to help you quit  They still contain nicotine  · Prescription medicines  such as nasal sprays or nicotine inhalers may help reduce your withdrawal symptoms  Other medicines may also be used to reduce your urge to smoke  Ask your healthcare provider about these medicines  You may need to start certain medicines 2 weeks before your quit date for them to work well  · Hypnosis  is a practice that helps guide you through thoughts and feelings  Hypnosis may help decrease your cravings and make you more willing to quit  · Acupuncture therapy  uses very thin needles to balance energy channels in the body  This is thought to help decrease cravings and symptoms of nicotine withdrawal     · Support groups  let you talk to others who are trying to quit or have already quit  It may be helpful to speak with others about how they quit  Manage your cravings:   · Avoid situations, people, and places that tempt you to smoke  Go to nonsmoking places, such as libraries or restaurants  Understand what tempts you and try to avoid these things  · Keep your hands busy  Hold things such as a stress ball or pen  · Put candy or toothpicks in your mouth  Keep lollipops, sugarless gum, or toothpicks with you at all times  · Do not have alcohol or caffeine  These drinks may tempt you to smoke  Drink healthy liquids such as water or juice instead  · Reward yourself when you resist your cravings    Rewards will motivate you and help you stay positive  · Do an activity that distracts you from your craving  Examples include cleaning, creating art, or gardening  Prevent weight gain after you quit:  You may gain a few pounds after you quit smoking  It is healthier for you to gain a few pounds than to continue to smoke  The following can help you prevent weight gain:  · Eat a variety of healthy foods  Healthy foods include fruits, vegetables, whole-grain breads, low-fat dairy products, beans, lean meats, and fish  Eat healthy snacks, such as low-fat yogurt, if you get hungry between meals  · Drink water before, during, and between meals  This will make your stomach feel full and help prevent you from overeating  Ask your healthcare provider how much liquid to drink each day and which liquids are best for you  · Be physically active  Activity may help reduce your cravings and reduce stress  Take a walk or do some kind of physical activity every day  Ask your healthcare provider which activities are right for you  For support and more information:   · [employer]  [address] [place] [address],[address]  [address]  Phone: [facility]35  Phone: 4- 077 - 809-9826  Web Address: Faviola Sajan  org    · Smokefree  gov  Phone: 9- 831 - 332-8581  Web Address: www smokefree  [facility] 21 2022 Information is for End User's use only and may not be sold, redistributed or otherwise used for commercial purposes  All illustrations and images included in CareNotes® are the copyrighted property of [employer]  or [address] [place], [place] [address]   The above information is an  only  It is not intended as medical advice for individual conditions or treatments  Talk to your doctor, nurse or pharmacist before following any medical regimen to see if it is safe and effective for you

## 2022-03-13 NOTE — DISCHARGE SUMMARY
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  Patient states he had quit alcohol in March 2021  CT shows signs of acute pancreatitis  Right upper quadrant ultrasound showed no signs of cholecystitis  Lipid panel showed elevated cholesterol and triglycerides but clearly not the reason for the pancreatitis   Plan  Currently tolerating oral diet, abdominal pain has improved significantly, seen by GI and surgical team, recommended outpatient evaluation for cholecystectomy and EGD  I will continue PPI b i d  At this point, recommended to avoid NSAIDs  Recommended to follow-up with PCP, GI and General surgery in the outpatient setting  Electrolyte abnormality  Assessment & Plan  Improved after supplementation      Cigarette nicotine dependence without complication  Assessment & Plan  Nicotine patch consult quit smoking, explained risks and benefits    Gastroesophageal reflux disease without esophagitis  Assessment & Plan  Continue PPI  Type 2 diabetes mellitus with hyperglycemia [facility])  Assessment & Plan  Lab Results   Component Value Date    HGBA1C 9 1 (H) 10/28/2021       Recent Labs     03/12/22  1150 03/12/22  1559 03/12/22 2017 03/13/22  0733   POCGLU 127 185* 140 165*       Blood Sugar Average: Last 72 hrs:  (P) 170 [id number]   Resume metformin    Essential hypertension  Assessment & Plan  Patient takes ramipril at home          Discharging Physician / Practitioner: Alesha Sotelo MD  PCP: Moni Mario MD  Admission Date:   Admission Orders (From admission, onward)     Ordered        03/10/22 1704  Inpatient Admission  Once                      Discharge Date: 03/13/22    Medical Problems             Resolved Problems  Date Reviewed: 3/13/2022    None                Consultations During Hospital Stay:  · Gastroenterology and General surgery    Procedures Performed:   · CT abdomen and pelvis, right upper quadrant ultrasound-please refer to epic for reports    Significant Findings / Test Results:   · None    Incidental Findings:   · None     Test Results Pending at Discharge (will require follow up): · None     Outpatient Tests Requested:  · None    Complications:  None    Reason for Admission:  Acute pancreatitis, distended gallbladder    Hospital Course:     Leopoldo Corrigan is a 60 y o  male patient who originally presented to the hospital on 3/10/2022 due to severe abdominal pain, epigastric and right upper quadrant  CT abdomen and pelvis suggestive of acute pancreatitis  Patient has prior history of recurrent acute pancreatitis  Gallbladder has no stones, ultrasound was performed, no sign of acute cholecystitis  Patient was empirically treated with Levaquin initially, discontinued at the time of discharge  Patient seen by General surgery, Gastroenterology, General surgery recommended outpatient cholecystectomy, Gastroenterology recommended EGD in the outpatient setting for dyspepsia  I will continue patient on omeprazole b i d  For 1 month  Recommended to follow up  Please see above list of diagnoses and related plan for additional information  Condition at Discharge: good     Discharge Day Visit / Exam:     Subjective: This morning on the day of discharge patient denies any abdominal pain, nausea, vomiting  Denies any shortness of breath  Tolerating oral diet    Vitals: Blood Pressure: 148/85 (03/13/22 0738)  Pulse: 68 (03/13/22 0738)  Temperature: 97 5 °F (36 4 °C) (03/13/22 0800)  Temp Source: Tympanic (03/13/22 0800)  Respirations: 17 (03/13/22 0738)  Height: 5' 11" (180 3 cm) (03/10/22 1759)  Weight - Scale: 101 kg (222 lb 7 1 oz) (03/10/22 1759)  SpO2: 93 % (03/13/22 0800)  Exam:   Physical Exam   General appearance:  Patient not in acute distress  Eyes:  Pupils equal reacting to light  ENT:  Moist oral mucous membranes  CVS:  S1-S2 heard, regular rate and rhythm, no pedal edema  Chest:  Bilateral air entry present, clear to auscultation  Abdomen:  Soft, nontender, bowel sounds present  CNS:  No focal neurological deficits  Genitourinary: deferred  Skin:  No acute rash   psychiatric:  No psychosis  Musculoskeletal:  No joint deformities     Discussion with Family:  None    Discharge instructions/Information to patient and family:   See after visit summary for information provided to patient and family  Provisions for Follow-Up Care:  See after visit summary for information related to follow-up care and any pertinent home health orders  Disposition:     Home         Discharge Statement:  I spent 35 minutes discharging the patient  This time was spent on the day of discharge  I had direct contact with the patient on the day of discharge  Greater than 50% of the total time was spent examining patient, answering all patient questions, arranging and discussing plan of care with patient as well as directly providing post-discharge instructions  Additional time then spent on discharge activities  Discharge Medications:  See after visit summary for reconciled discharge medications provided to patient and family        ** Please Note: This note has been constructed using a voice recognition system **

## 2022-03-13 NOTE — ASSESSMENT & PLAN NOTE
Lab Results   Component Value Date    HGBA1C 9 1 (H) 10/28/2021       Recent Labs     03/12/22  1150 03/12/22  1559 03/12/22 2017 03/13/22  0733   POCGLU 127 185* 140 165*       Blood Sugar Average: Last 72 hrs:  (P) 170 [id number]   Resume metformin

## 2022-03-13 NOTE — DISCHARGE INSTR - AVS FIRST PAGE
Avoid alcohol  Follow-up with general surgery regarding cholecystectomy in the future  Follow-up with gastroenterology regarding upper GI endoscopy  Avoid NSAIDs such as Motrin, Aleve

## 2022-03-13 NOTE — ASSESSMENT & PLAN NOTE
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  Patient states he had quit alcohol in March 2021  CT shows signs of acute pancreatitis  Right upper quadrant ultrasound showed no signs of cholecystitis  Lipid panel showed elevated cholesterol and triglycerides but clearly not the reason for the pancreatitis   Plan  Currently tolerating oral diet, abdominal pain has improved significantly, seen by GI and surgical team, recommended outpatient evaluation for cholecystectomy and EGD  I will continue PPI b i d  At this point, recommended to avoid NSAIDs  Recommended to follow-up with PCP, GI and General surgery in the outpatient setting

## 2022-03-13 NOTE — NURSING NOTE
Patient is discharged home via the private car  All the belongings are with the patient  Medication list is given to the patient and explained the schedule  [place] is given to the patient  Patient was in no distress upon discharge

## 2022-03-13 NOTE — PLAN OF CARE
Problem: PAIN - ADULT  Goal: Verbalizes/displays adequate comfort level or baseline comfort level  Description: Interventions:  - Encourage patient to monitor pain and request assistance  - Assess pain using appropriate pain scale  - Administer analgesics based on type and severity of pain and evaluate response  - Implement non-pharmacological measures as appropriate and evaluate response  - Consider cultural and social influences on pain and pain management  - Notify physician/advanced practitioner if interventions unsuccessful or patient reports new pain  Outcome: Progressing     Problem: INFECTION - ADULT  Goal: Absence or prevention of progression during hospitalization  Description: INTERVENTIONS:  - Assess and monitor for signs and symptoms of infection  - Monitor lab/diagnostic results  - Monitor all insertion sites, i e  indwelling lines, tubes, and drains  - Monitor endotracheal if appropriate and nasal secretions for changes in amount and color  - [place] appropriate cooling/warming therapies per order  - Administer medications as ordered  - Instruct and encourage patient and family to use good hand hygiene technique  - Identify and instruct in appropriate isolation precautions for identified infection/condition  Outcome: Progressing  Goal: Absence of fever/infection during neutropenic period  Description: INTERVENTIONS:  - Monitor WBC    Outcome: Progressing     Problem: SAFETY ADULT  Goal: Patient will remain free of falls  Description: INTERVENTIONS:  - Educate patient/family on patient safety including physical limitations  - Instruct patient to call for assistance with activity   - Consult OT/PT to assist with strengthening/mobility   - Keep Call bell within reach  - Keep bed low and locked with side rails adjusted as appropriate  - Keep care items and personal belongings within reach  - Initiate and maintain comfort rounds  - Make Fall Risk Sign visible to staff  - Offer Toileting every  Hours, in advance of need  - Initiate/Maintain alarm  - Obtain necessary fall risk management equipment:   - Apply yellow socks and bracelet for high fall risk patients  - Consider moving patient to room near nurses station  Outcome: Progressing  Goal: Maintain or return to baseline ADL function  Description: INTERVENTIONS:  -  Assess patient's ability to carry out ADLs; assess patient's baseline for ADL function and identify physical deficits which impact ability to perform ADLs (bathing, care of mouth/teeth, toileting, grooming, dressing, etc )  - Assess/evaluate cause of self-care deficits   - Assess range of motion  - Assess patient's mobility; develop plan if impaired  - Assess patient's need for assistive devices and provide as appropriate  - Encourage maximum independence but intervene and supervise when necessary  - Involve family in performance of ADLs  - Assess for home care needs following discharge   - Consider OT consult to assist with ADL evaluation and planning for discharge  - Provide patient education as appropriate  Outcome: Progressing  Goal: Maintains/Returns to pre admission functional level  Description: INTERVENTIONS:  - Perform BMAT or MOVE assessment daily    - Set and communicate daily mobility goal to care team and patient/family/caregiver  - Collaborate with rehabilitation services on mobility goals if consulted  - Perform Range of Motion  times a day  - Reposition patient every  hours    - Dangle patient  times a day  - Stand patient  times a day  - Ambulate patient  times a day  - Out of bed to chair  times a day   - Out of bed for meals  times a day  - Out of bed for toileting  - Record patient progress and toleration of activity level   Outcome: Progressing     Problem: DISCHARGE PLANNING  Goal: Discharge to home or other facility with appropriate resources  Description: INTERVENTIONS:  - Identify barriers to discharge w/patient and caregiver  - Arrange for needed discharge resources and transportation as appropriate  - Identify discharge learning needs (meds, wound care, etc )  - Arrange for interpretive services to assist at discharge as needed  - Refer to Case Management Department for coordinating discharge planning if the patient needs post-hospital services based on physician/advanced practitioner order or complex needs related to functional status, cognitive ability, or social support system  Outcome: Progressing     Problem: Knowledge Deficit  Goal: Patient/family/caregiver demonstrates understanding of disease process, treatment plan, medications, and discharge instructions  Description: Complete learning assessment and assess knowledge base    Interventions:  - Provide teaching at level of understanding  - Provide teaching via preferred learning methods  Outcome: Progressing     Problem: GASTROINTESTINAL - ADULT  Goal: Minimal or absence of nausea and/or vomiting  Description: INTERVENTIONS:  - Administer IV fluids if ordered to ensure adequate hydration  - Maintain NPO status until nausea and vomiting are resolved  - Nasogastric tube if ordered  - Administer ordered antiemetic medications as needed  - Provide nonpharmacologic comfort measures as appropriate  - Advance diet as tolerated, if ordered  - Consider nutrition services referral to assist patient with adequate nutrition and appropriate food choices  Outcome: Progressing  Goal: Maintains or returns to baseline bowel function  Description: INTERVENTIONS:  - Assess bowel function  - Encourage oral fluids to ensure adequate hydration  - Administer IV fluids if ordered to ensure adequate hydration  - Administer ordered medications as needed  - Encourage mobilization and activity  - Consider nutritional services referral to assist patient with adequate nutrition and appropriate food choices  Outcome: Progressing  Goal: Maintains adequate nutritional intake  Description: INTERVENTIONS:  - Monitor percentage of each meal consumed  - Identify factors contributing to decreased intake, treat as appropriate  - Assist with meals as needed  - Monitor I&O, weight, and lab values if indicated  - Obtain nutrition services referral as needed  Outcome: Progressing  Goal: Establish and maintain optimal ostomy function  Description: INTERVENTIONS:  - Assess bowel function  - Encourage oral fluids to ensure adequate hydration  - Administer IV fluids if ordered to ensure adequate hydration   - Administer ordered medications as needed  - Encourage mobilization and activity  - Nutrition services referral to assist patient with appropriate food choices  - Assess stoma site  - Consider wound care consult   Outcome: Progressing  Goal: Oral mucous membranes remain intact  Description: INTERVENTIONS  - Assess oral mucosa and hygiene practices  - Implement preventative oral hygiene regimen  - Implement oral medicated treatments as ordered  - Initiate Nutrition services referral as needed  Outcome: Progressing

## 2022-03-14 NOTE — ED PROVIDER NOTES
History  Chief Complaint   Patient presents with    Abdominal Pain     Reports ABD pain and nausea started 3 days ago  Upper R & L side- pain is sharp  Reports no BM x3 days  This is a 61-year-old male with past medical history significant for pancreatitis, hypertension, alcohol abuse, and type 2 diabetes mellitus presenting to the emergency department today for epigastric pain that radiates to his back  This is been ongoing and worsening for approximately 3 days  He denies any chest pain but notes his epigastric pain is sharp and does not radiate into his lower abdomen  He denies any vomiting, diarrhea, or constipation  No urinary symptoms or fever  He does note some nausea  The patient notes he used to be a daily drinker but he has laid off the drinking over the past few months  He notes this feels extremely similar to the past few times he has had pancreatitis  The patient denies any chest pain or shortness of breath  The patient denies other complaints at this time  History provided by:  Patient   used: No    Abdominal Pain  Pain location:  Epigastric  Pain quality: sharp    Pain radiates to:  Back  Pain severity:  Severe  Onset quality:  Gradual  Duration:  3 days  Timing:  Constant  Progression:  Worsening  Chronicity:  New  Context: alcohol use    Context: not diet changes, not eating and not sick contacts    Relieved by:  Nothing  Worsened by: Movement  Ineffective treatments:  None tried  Associated symptoms: nausea    Associated symptoms: no anorexia, no chest pain, no chills, no constipation, no cough, no diarrhea, no dysuria, no fatigue, no fever, no hematuria, no melena, no shortness of breath and no vomiting    Risk factors: alcohol abuse and obesity    Risk factors: no NSAID use and not pregnant        Prior to Admission Medications   Prescriptions Last Dose Informant Patient Reported? Taking?    Accu-Chek Stefanie Plus test strip Not Taking at Unknown time  No No Sig: TEST THREE TIMES DAILY   Patient not taking: Reported on 3/10/2022   Accu-Chek FastClix Lancets MISC Not Taking at Unknown time  No No   Sig: USE AS DIRECTED   Patient not taking: Reported on 3/10/2022   Blood Glucose Monitoring Suppl (Accu-Chek Stefanie Plus) w/Device KIT Not Taking at Unknown time  No No   Sig: USE AS DIRECTED   Patient not taking: Reported on 3/10/2022   Cholecalciferol 1 25 MG (93568 UT) TABS Not Taking at Unknown time  No No   Sig: Take 1 tablet (50,000 Units total) by mouth once a week   Patient not taking: Reported on 3/10/2022    celecoxib (CeleBREX) 200 mg capsule 3/10/2022 at Unknown time  Yes Yes   Sig: Take 200 mg by mouth 2 (two) times a day   ergocalciferol (VITAMIN D2) 50,000 units Not Taking at Unknown time  No No   Sig: TAKE 1 CAPSULE BY MOUTH ONCE A WEEK   Patient not taking: Reported on 3/10/2022   fenofibrate 160 MG tablet 3/10/2022 at Unknown time  No Yes   Sig: TAKE 1 TABLET (160 MG TOTAL) BY MOUTH DAILY   gabapentin (NEURONTIN) 300 mg capsule 3/10/2022 at Unknown time  Yes Yes   Sig: Take 300 mg by mouth Three times a day   glimepiride (AMARYL) 2 mg tablet 3/10/2022 at Unknown time  No Yes   Sig: TAKE 1 TABLET BY MOUTH EVERY MORNING AND 1/2 TABLET IN THE EVENING   omeprazole (PriLOSEC) 40 MG capsule 3/10/2022 at Unknown time  No Yes   Sig: TAKE 1 CAPSULE EVERY DAY   pioglitazone (ACTOS) 45 mg tablet 3/10/2022 at Unknown time  No Yes   Sig: TAKE 1 TABLET EVERY DAY   ramipril (ALTACE) 10 MG capsule 3/10/2022 at Unknown time  No Yes   Sig: TAKE 1 CAPSULE EVERY DAY   sertraline (ZOLOFT) 100 mg tablet 3/10/2022 at Unknown time  No Yes   Sig: Take 2 tablets (200 mg total) by mouth daily   simvastatin (ZOCOR) 20 mg tablet 3/10/2022 at Unknown time  No Yes   Sig: TAKE 1 TABLET (20 MG TOTAL) BY MOUTH DAILY   tadalafil (CIALIS) 5 MG tablet 3/10/2022 at Unknown time  No Yes   Sig: TAKE 1 TABLET BY MOUTH DAILY AS NEEDED FOR ERECTILE DYSFUNCTION      Facility-Administered Medications: None       Past Medical History:   Diagnosis Date    Alcohol abuse     Back pain     Chronic low back pain     Depression     Diabetes mellitus (HCC)     DM2 (diabetes mellitus, type 2) (HCC)     Erectile dysfunction     GERD (gastroesophageal reflux disease)     Hepatic steatosis     Hyperlipidemia     Hypertension     Neuropathy     Psychiatric disorder     Tobacco abuse        Past Surgical History:   Procedure Laterality Date    ANKLE FRACTURE SURGERY Right     ANKLE SURGERY Right     INGUINAL HERNIA REPAIR Left     KNEE SURGERY Right     UMBILICAL HERNIA REPAIR         Family History   Problem Relation Age of Onset    Lymphoma Mother     No Known Problems Father      I have reviewed and agree with the history as documented  E-Cigarette/Vaping    E-Cigarette Use Never User      E-Cigarette/Vaping Substances     Social History     Tobacco Use    Smoking status: Current Every Day Smoker     Packs/day: 1 00     Years: 44 00     Pack years: 44 00     Types: Cigarettes     Start date: 1978    Smokeless tobacco: Never Used    Tobacco comment: per [employer] Use    Vaping Use: Never used   Substance Use Topics    Alcohol use: Yes     Comment: 1-2 drinks per month; former heavy drinker, but not for last 2 years    Drug use: Yes     Types: Marijuana     Comment: not for a while       Review of Systems   Constitutional: Negative for appetite change, chills, fatigue and fever  Eyes: Negative for visual disturbance  Respiratory: Negative for cough, chest tightness, shortness of breath and wheezing  Cardiovascular: Negative for chest pain and palpitations  Gastrointestinal: Positive for abdominal pain and nausea  Negative for anorexia, constipation, diarrhea, melena and vomiting  Genitourinary: Negative for dysuria, flank pain and hematuria  Musculoskeletal: Negative for neck pain and neck stiffness  Skin: Negative for rash and wound     Neurological: Negative for dizziness, seizures, syncope, weakness, light-headedness, numbness and headaches  Psychiatric/Behavioral: Negative for confusion  All other systems reviewed and are negative  Physical Exam  Physical Exam  Vitals and nursing note reviewed  Constitutional:       General: He is not in acute distress  Appearance: Normal appearance  He is normal weight  He is not ill-appearing, toxic-appearing or diaphoretic  Comments: Appears uncomfortable   HENT:      Head: Normocephalic and atraumatic  Nose: Nose normal  No congestion or rhinorrhea  Mouth/Throat:      Mouth: Mucous membranes are moist       Pharynx: No oropharyngeal exudate or posterior oropharyngeal erythema  Eyes:      General: No scleral icterus  Right eye: No discharge  Left eye: No discharge  Conjunctiva/sclera: Conjunctivae normal    Cardiovascular:      Rate and Rhythm: Normal rate and regular rhythm  Pulses: Normal pulses  Heart sounds: Normal heart sounds  No murmur heard  No friction rub  No gallop  Pulmonary:      Effort: Pulmonary effort is normal  No respiratory distress  Breath sounds: Normal breath sounds  No stridor  No wheezing, rhonchi or rales  Chest:      Chest wall: No tenderness  Abdominal:      General: Abdomen is flat  There is no distension  Palpations: Abdomen is soft  Tenderness: There is no abdominal tenderness  There is no right CVA tenderness, left CVA tenderness, guarding or rebound  Comments: Significant epigastric tenderness to palpation to both right and left sides  Non peritoneal  Negative rebound  Negative Rovsing  Positive Maldonado signs   Musculoskeletal:         General: Normal range of motion  Cervical back: Normal range of motion  No rigidity  Right lower leg: No edema  Left lower leg: No edema  Skin:     General: Skin is warm and dry  Capillary Refill: Capillary refill takes less than 2 seconds        Coloration: Skin is not jaundiced or pale  Neurological:      General: No focal deficit present  Mental Status: He is alert and oriented to person, place, and time  Mental status is at baseline     Psychiatric:         Mood and Affect: Mood normal          Behavior: Behavior normal          Vital Signs  ED Triage Vitals   Temperature Pulse Respirations Blood Pressure SpO2   03/10/22 1759 03/10/22 1500 03/10/22 1500 03/10/22 1500 03/10/22 1500   (!) 97 1 °F (36 2 °C) 103 20 114/62 94 %      Temp Source Heart Rate Source Patient Position - Orthostatic VS BP Location FiO2 (%)   03/10/22 1759 03/10/22 1500 03/10/22 1500 03/10/22 1500 --   Tympanic Monitor Lying Right arm       Pain Score       03/10/22 1500       10 - Worst Possible Pain           Vitals:    03/12/22 1516 03/12/22 1937 03/12/22 2224 03/13/22 0738   BP: 142/80 130/77 132/81 148/85   Pulse: 85 76 76 68   Patient Position - Orthostatic VS: Lying Lying Lying Lying         Visual Acuity      ED Medications  Medications   morphine (PF) 4 mg/mL injection 4 mg (4 mg Intravenous Given 3/10/22 1444)   sodium chloride 0 9 % bolus 1,000 mL (1,000 mL Intravenous New Bag 3/10/22 1449)   potassium chloride (K-DUR,KLOR-CON) CR tablet 40 mEq (40 mEq Oral Given 3/10/22 1546)   potassium chloride 20 mEq IVPB (premix) (20 mEq Intravenous New Bag 3/10/22 1831)   HYDROmorphone (DILAUDID) injection 1 mg (1 mg Intravenous Given 3/10/22 1548)   calcium gluconate 1 g in sodium chloride 0 9% 50 mL (premix) (0 g Intravenous Stopped 3/10/22 1644)   iohexol (OMNIPAQUE) 350 MG/ML injection (SINGLE-DOSE) 100 mL (100 mL Intravenous Given 3/10/22 1608)   magnesium sulfate 2 g/50 mL IVPB (premix) 2 g (2 g Intravenous New Bag 3/10/22 1657)   influenza vaccine, recombinant, quadrivalent (FLUBLOK) IM injection 0 5 mL (0 5 mL Intramuscular Given 3/10/22 2141)   magnesium sulfate 2 g/50 mL IVPB (premix) 2 g (0 g Intravenous Stopped 3/12/22 4765)       Diagnostic Studies  Results Reviewed     Procedure Component Value Units Date/Time    Comprehensive metabolic panel [[id number]]  (Abnormal) Collected: 03/11/22 0551    Lab Status: Final result Specimen: Blood from Hand, Right Updated: 03/11/22 0725     Sodium 135 mmol/L      Potassium 4 0 mmol/L      Chloride 100 mmol/L      CO2 25 mmol/L      ANION GAP 10 mmol/L      BUN 13 mg/dL      Creatinine 0 67 mg/dL      Glucose 160 mg/dL      Calcium 8 8 mg/dL      AST 25 U/L      ALT 25 U/L      Alkaline Phosphatase 78 U/L      Total Protein 6 5 g/dL      Albumin 3 7 g/dL      Total Bilirubin 0 94 mg/dL      eGFR 105 ml/min/1 73sq m     Narrative:      [place] guidelines for Chronic Kidney Disease (CKD):     Stage 1 with normal or high GFR (GFR > 90 mL/min/1 73 square meters)    Stage 2 Mild CKD (GFR = 60-89 mL/min/1 73 square meters)    Stage 3A Moderate CKD (GFR = 45-59 mL/min/1 73 square meters)    Stage 3B Moderate CKD (GFR = 30-44 mL/min/1 73 square meters)    Stage 4 Severe CKD (GFR = 15-29 mL/min/1 73 square meters)    Stage 5 End Stage CKD (GFR <15 mL/min/1 73 square meters)  Note: GFR calculation is accurate only with a steady state creatinine    Magnesium [[id number]]  (Abnormal) Collected: 03/11/22 0551    Lab Status: Final result Specimen: Blood from Hand, Right Updated: 03/11/22 0725     Magnesium 1 5 mg/dL     Phosphorus [[id number]]  (Normal) Collected: 03/11/22 0551    Lab Status: Final result Specimen: Blood from Hand, Right Updated: 03/11/22 0725     Phosphorus 3 2 mg/dL     Lipase [[id number]]  (Normal) Collected: 03/11/22 0551    Lab Status: Final result Specimen: Blood from Hand, Right Updated: 03/11/22 0725     Lipase 42 u/L     Protime-INR [[id number]]  (Normal) Collected: 03/11/22 0551    Lab Status: Final result Specimen: Blood from Hand, Right Updated: 03/11/22 0658     Protime 13 6 seconds      INR 1 05    CBC and differential [[id number]] Collected: 03/11/22 0551    Lab Status: Final result Specimen: Blood from Hand, Right Updated: 03/11/22 0651     WBC 7 29 Thousand/uL      RBC 5 06 Million/uL      Hemoglobin 15 6 g/dL      Hematocrit 43 8 %      MCV 87 fL      MCH 30 8 pg      MCHC 35 6 g/dL      RDW 12 0 %      MPV 9 7 fL      Platelets 353 Thousands/uL      nRBC 0 /100 WBCs      Neutrophils Relative 62 %      Immat GRANS % 0 %      Lymphocytes Relative 26 %      Monocytes Relative 9 %      Eosinophils Relative 2 %      Basophils Relative 1 %      Neutrophils Absolute 4 47 Thousands/µL      Immature Grans Absolute 0 02 Thousand/uL      Lymphocytes Absolute 1 86 Thousands/µL      Monocytes Absolute 0 68 Thousand/µL      Eosinophils Absolute 0 17 Thousand/µL      Basophils Absolute 0 09 Thousands/µL     Basic metabolic panel [[id number]]  (Abnormal) Collected: 03/10/22 2106    Lab Status: Final result Specimen: Blood from Hand, Left Updated: 03/10/22 2138     Sodium 130 mmol/L      Potassium 4 3 mmol/L      Chloride 97 mmol/L      CO2 25 mmol/L      ANION GAP 8 mmol/L      BUN 15 mg/dL      Creatinine 0 90 mg/dL      Glucose 250 mg/dL      Calcium 9 3 mg/dL      eGFR 93 ml/min/1 73sq m     Narrative:      [place] guidelines for Chronic Kidney Disease (CKD):     Stage 1 with normal or high GFR (GFR > 90 mL/min/1 73 square meters)    Stage 2 Mild CKD (GFR = 60-89 mL/min/1 73 square meters)    Stage 3A Moderate CKD (GFR = 45-59 mL/min/1 73 square meters)    Stage 3B Moderate CKD (GFR = 30-44 mL/min/1 73 square meters)    Stage 4 Severe CKD (GFR = 15-29 mL/min/1 73 square meters)    Stage 5 End Stage CKD (GFR <15 mL/min/1 73 square meters)  Note: GFR calculation is accurate only with a steady state creatinine    Magnesium [[id number]]  (Abnormal) Collected: 03/10/22 2106    Lab Status: Final result Specimen: Blood from Hand, Left Updated: 03/10/22 2138     Magnesium 1 8 mg/dL     Calcium, ionized [[id number]]  (Normal) Collected: 03/10/22 2106    Lab Status: Final result Specimen: Blood from Hand, Left Updated: 03/10/22 2121     Calcium, Ionized 1 16 mmol/L     HS Troponin I 4hr [[id number]]  (Normal) Collected: 03/10/22 1831    Lab Status: Final result Specimen: Blood from Hand, Left Updated: 03/10/22 1904     hs TnI 4hr 9 ng/L      Delta 4hr hsTnI 1 ng/L     HS Troponin I 2hr [[id number]]  (Normal) Collected: 03/10/22 1706    Lab Status: Final result Specimen: Blood from Arm, Left Updated: 03/10/22 1737     hs TnI 2hr 10 ng/L      Delta 2hr hsTnI 2 ng/L     Magnesium [[id number]]  (Abnormal) Collected: 03/10/22 1437    Lab Status: Final result Specimen: Blood from Arm, Right Updated: 03/10/22 1645     Magnesium 0 9 mg/dL     Phosphorus [[id number]]  (Abnormal) Collected: 03/10/22 1437    Lab Status: Final result Specimen: Blood from Arm, Right Updated: 03/10/22 1643     Phosphorus 2 2 mg/dL     CK Total with Reflex CKMB [[id number]]  (Normal) Collected: 03/10/22 1437    Lab Status: Final result Specimen: Blood from Arm, Right Updated: 03/10/22 1643     Total CK 47 U/L     Comprehensive metabolic panel [[id number]]  (Abnormal) Collected: 03/10/22 1437    Lab Status: Final result Specimen: Blood from Arm, Right Updated: 03/10/22 1537     Sodium 139 mmol/L      Potassium 2 5 mmol/L      Chloride 112 mmol/L      CO2 15 mmol/L      ANION GAP 12 mmol/L      BUN 10 mg/dL      Creatinine 0 50 mg/dL      Glucose 213 mg/dL      Calcium 5 9 mg/dL      Corrected Calcium 6 9 mg/dL      AST 18 U/L      ALT 18 U/L      Alkaline Phosphatase 55 U/L      Total Protein 4 9 g/dL      Albumin 2 7 g/dL      Total Bilirubin 0 65 mg/dL      eGFR 119 ml/min/1 73sq m     Narrative:      [place] guidelines for Chronic Kidney Disease (CKD):     Stage 1 with normal or high GFR (GFR > 90 mL/min/1 73 square meters)    Stage 2 Mild CKD (GFR = 60-89 mL/min/1 73 square meters)    Stage 3A Moderate CKD (GFR = 45-59 mL/min/1 73 square meters)    Stage 3B Moderate CKD (GFR = 30-44 mL/min/1 73 square meters)    Stage 4 Severe CKD (GFR = 15-29 mL/min/1 73 square meters)    Stage 5 End Stage CKD (GFR <15 mL/min/1 73 square meters)  Note: GFR calculation is accurate only with a steady state creatinine    Lactic acid [[id number]]  (Normal) Collected: 03/10/22 1438    Lab Status: Final result Specimen: Blood from Arm, Right Updated: 03/10/22 1534     LACTIC ACID 1 6 mmol/L     Narrative:      Result may be elevated if tourniquet was used during collection  Lipase [[id number]]  (Normal) Collected: 03/10/22 1437    Lab Status: Final result Specimen: Blood from Arm, Right Updated: 03/10/22 1534     Lipase 46 u/L     HS Troponin 0hr (reflex protocol) [[id number]]  (Normal) Collected: 03/10/22 1437    Lab Status: Final result Specimen: Blood from Arm, Right Updated: 03/10/22 1528     hs TnI 0hr 8 ng/L     CBC and differential [[id number]]  (Abnormal) Collected: 03/10/22 1438    Lab Status: Final result Specimen: Blood from Arm, Right Updated: 03/10/22 1458     WBC 11 28 Thousand/uL      RBC 5 73 Million/uL      Hemoglobin 17 7 g/dL      Hematocrit 48 9 %      MCV 85 fL      MCH 30 9 pg      MCHC 36 2 g/dL      RDW 11 8 %      MPV 9 9 fL      Platelets 825 Thousands/uL      nRBC 0 /100 WBCs      Neutrophils Relative 67 %      Immat GRANS % 0 %      Lymphocytes Relative 23 %      Monocytes Relative 8 %      Eosinophils Relative 1 %      Basophils Relative 1 %      Neutrophils Absolute 7 51 Thousands/µL      Immature Grans Absolute 0 04 Thousand/uL      Lymphocytes Absolute 2 58 Thousands/µL      Monocytes Absolute 0 91 Thousand/µL      Eosinophils Absolute 0 12 Thousand/µL      Basophils Absolute 0 12 Thousands/µL                  US right upper quadrant   Final Result by Rodrick Stearns DO (03/10 2126)      No evidence of acute cholecystitis  Hepatic fibrofatty infiltration and hepatomegaly        Workstation performed: [id number]         CT abdomen pelvis with contrast   Final Result by Philip Potts MD (03/10 1635)      Possible acute pancreatitis  Distended gallbladder, correlate with gallbladder ultrasound  1 7 cm indeterminate left lower pole posterior renal cystic lesion, correlate with nonemergent outpatient renal ultrasound recommended  Hepatic steatosis  The study was marked in EPIC for significant notification  Workstation performed: [id number]         XR chest 1 view portable   Final Result by Chirag Valadez MD (03/10 1501)      No acute cardiopulmonary disease  Workstation performed: [id number]                    Procedures  Procedures         ED Course  ED Course as of 03/14/22 1223   Thu Mar 10, 2022   1540 Potassium(!!): 2 5  Will replete with 40 mEq of Klor-Con and 20 mEq of IV potassium  1540 Calcium(!!): 5 9  Will give 1g calcium gluconate here in the ED for repletion  Will repeat EKG now to check QT    1646 Magnesium(!!): 0 9  Will replete with 2g of magnesium IV  Will seek admission  MDM  Number of Diagnoses or Management Options  Acute pancreatitis, unspecified complication status, unspecified pancreatitis type: new and requires workup  Hypocalcemia: new and requires workup  Hypokalemia: new and requires workup  Hypomagnesemia: new and requires workup  Diagnosis management comments: This is a 57-year-old male presenting to the emergency department today for epigastric pain that radiates to his back  Ongoing for 3 days  History of alcohol abuse and also pancreatitis  Vital signs are stable  On physical exam, the patient has epigastric tenderness to palpation within normal cardiac and pulmonary examination  EKG is ordered  CXR negative  Critically low magnesium, calcium, and potassium which were all repleted here in the emergency department to some degree  CT abdomen and pelvis shows possible acute pancreatitis in addition to a distended gallbladder  Lipase is within normal limits    Patient given pain relief medications here in the emergency department  Given the patient's acute pancreatitis and critically low magnesium, calcium, and potassium, decision was made to admit the patient inpatient to the service of Dr Kim Nguyen  The patient verifies understanding and agrees to the plan at this time  All questions answered to the patient's satisfaction         Amount and/or Complexity of Data Reviewed  Clinical lab tests: reviewed and ordered  Tests in the radiology section of CPT®: ordered and reviewed  Discuss the patient with other providers: yes (Dr Kim Nguyen)  Independent visualization of images, tracings, or specimens: yes    Patient Progress  Patient progress: stable      Disposition  Final diagnoses:   Acute pancreatitis, unspecified complication status, unspecified pancreatitis type   Hypomagnesemia   Hypocalcemia   Hypokalemia     Time reflects when diagnosis was documented in both MDM as applicable and the Disposition within this note     Time User Action Codes Description Comment    3/10/2022  5:03 PM [place] Paddock Add [K85 90] Acute pancreatitis, unspecified complication status, unspecified pancreatitis type     3/10/2022  5:03 PM Olam Junior [E83 42] Hypomagnesemia     3/10/2022  5:04 PM Olam Junior [E83 51] Hypocalcemia     3/10/2022  5:04 PM [place] Paddock Add [E87 6] Hypokalemia     3/11/2022  9:26 AM Gonsalez Quant Add [K21 9] Gastroesophageal reflux disease without esophagitis     3/11/2022  9:26 AM Gonsalez Quant Add [K85 90] Pancreatitis, acute     3/13/2022  8:56 AM Deb Olea Add [K21 9] Gastroesophageal reflux disease     3/13/2022  8:57 AM Cheryn Sings, Merlinda Fang Add [F17 210] Cigarette nicotine dependence without complication       ED Disposition     ED Disposition Condition Date/Time Comment    Admit Stable Thu Mar 10, 2022  5:03 PM Case was discussed with Dr Kim Nguyen and the patient's admission status was agreed to be Admission Status: inpatient status to the service of Dr Garrison Hsu  Follow-up Information     Follow up With Specialties Details Why Contact Info    Nancy Nagel MD Family Medicine Follow up in 1 week(s)  [phone] [facility] [phone] [facility] MD [place] Surgery Follow up in 1 month(s) Acute pancreatitis, initially patient had distended gallbladder   Tigre Pinedo MD Gastroenterology Follow up in 1 month(s)  [address] [address]  [phone]            Discharge Medication List as of 3/13/2022 10:11 AM      START taking these medications    Details   nicotine (NICODERM CQ) 21 mg/24 hr TD 24 hr patch Place 1 patch on the skin daily, Starting Mon 3/14/2022, Normal         CONTINUE these medications which have CHANGED    Details   omeprazole (PriLOSEC) 40 MG capsule Take 1 capsule (40 mg total) by mouth 2 (two) times a day, Starting Sun 3/13/2022, Until Tue 4/12/2022, Normal         CONTINUE these medications which have NOT CHANGED    Details   fenofibrate 160 MG tablet TAKE 1 TABLET (160 MG TOTAL) BY MOUTH DAILY, Starting Thu 1/6/2022, Normal      gabapentin (NEURONTIN) 300 mg capsule Take 300 mg by mouth Three times a day, Starting Tue 5/18/2021, Until Wed 5/18/2022, Historical Med      glimepiride (AMARYL) 2 mg tablet TAKE 1 TABLET BY MOUTH EVERY MORNING AND 1/2 TABLET IN THE EVENING, Normal      pioglitazone (ACTOS) 45 mg tablet TAKE 1 TABLET EVERY DAY, Normal      ramipril (ALTACE) 10 MG capsule TAKE 1 CAPSULE EVERY DAY, Normal      sertraline (ZOLOFT) 100 mg tablet Take 2 tablets (200 mg total) by mouth daily, Starting Wed 2/16/2022, Normal      simvastatin (ZOCOR) 20 mg tablet TAKE 1 TABLET (20 MG TOTAL) BY MOUTH DAILY, Starting Sun 7/18/2021, Normal      tadalafil (CIALIS) 5 MG tablet TAKE 1 TABLET BY MOUTH DAILY AS NEEDED FOR ERECTILE DYSFUNCTION, Normal      Accu-Chek FastClix Lancets MISC USE AS DIRECTED, Normal Blood Glucose Monitoring Suppl (Accu-Chek Stefanie Plus) w/Device KIT USE AS DIRECTED, Normal         STOP taking these medications       celecoxib (CeleBREX) 200 mg capsule Comments:   Reason for Stopping:         Accu-Chek Stefanie Plus test strip Comments:   Reason for Stopping:         Cholecalciferol 1 25 MG (23022 UT) TABS Comments:   Reason for Stopping:         ergocalciferol (VITAMIN D2) 50,000 units Comments:   Reason for Stopping:               Outpatient Discharge Orders   Discharge Diet     Activity as tolerated       PDMP Review       Value Time User    PDMP Reviewed  Yes 3/11/2022  9:25 AM Hossein Giron MD          ED Provider  Electronically Signed by           Leonard Cisse PA-C  03/14/22 0012

## 2022-03-14 NOTE — UTILIZATION REVIEW
Notification of Discharge   This is a Notification of Discharge from our facility [address]  Please be advised that this patient has been discharge from our facility  Below you will find the admission and discharge date and time including the patients disposition  UTILIZATION REVIEW CONTACT:  [address] [place]  Utilization   Network Utilization Review Department  Phone: [phone] x carefully listen to the prompts  All voicemails are confidential   Email: [email or web address]     PHYSICIAN ADVISORY SERVICES:  FOR [facility] REVIEW - MEDICAL NECESSITY DENIAL  Phone: [phone]  Fax: [phone]  Email: [email or web address]     PRESENTATION DATE: 3/10/2022  2:13 PM  OBERVATION ADMISSION DATE:   INPATIENT ADMISSION DATE: 3/10/22  5:04 PM   DISCHARGE DATE: 3/13/2022 10:19 AM  DISPOSITION: Home/Self Care Home/Self Care      IMPORTANT INFORMATION:  Send all requests for admission clinical reviews, approved or denied determinations and any other requests to dedicated fax number below belonging to the campus where the patient is receiving treatment   List of dedicated fax numbers:  [address] [place] [address] DENIALS (Administrative/Medical Necessity) [phone]   [address]  (Maternity/NICU/Pediatrics) [phone]   Syeda Jack [phone]   Abiel Lucas [phone]   Leti Borrego [phone]   [address] [place] [address],[address] [phone]   [facility]  [phone]   [address] [place], [place] [address] [facility] [address] [facility] [phone]

## 2022-03-15 ENCOUNTER — TRANSITIONAL CARE MANAGEMENT (OUTPATIENT)
Dept: FAMILY MEDICINE CLINIC | Facility: CLINIC | Age: 57
End: 2022-03-15

## 2022-03-15 LAB
ATRIAL RATE: 106 BPM
P AXIS: 66 DEGREES
PR INTERVAL: 181 MS
QRS AXIS: 15 DEGREES
QRSD INTERVAL: 101 MS
QT INTERVAL: 353 MS
QTC INTERVAL: 467 MS
T WAVE AXIS: 57 DEGREES
VENTRICULAR RATE: 105 BPM

## 2022-03-15 PROCEDURE — 93010 ELECTROCARDIOGRAM REPORT: CPT | Performed by: INTERNAL MEDICINE

## 2022-03-16 LAB
IGG SERPL-MCNC: 816 MG/DL (ref 603–1613)
IGG1 SER-MCNC: 408 MG/DL (ref 248–810)
IGG2 SER-MCNC: 271 MG/DL (ref 130–555)
IGG3 SER-MCNC: 54 MG/DL (ref 15–102)
IGG4 SER-MCNC: 22 MG/DL (ref 2–96)

## 2022-03-17 NOTE — TELEPHONE ENCOUNTER
Returned pts call to schedule appt  He did not know he was to have an MRI done  He is asking for a call back ASAP  He is in a LOT of pain and is not sure if he should go to the ER for evaluation  Can you can and assess him

## 2022-03-17 NOTE — TELEPHONE ENCOUNTER
Spoke to patient on phone patient reported he is having severe pain in right upper quadrant and epigastric area  Patient recently hospitalized for acute pancreatitis  Patient instructed to go to the emergency room for further evaluation to see if he would if he is having a recurrence of acute pancreatitis  Explained to patient that if his imaging at the hospital is benign he does need to schedule an MRI for further evaluation as an outpatient    Thank you

## 2022-03-18 ENCOUNTER — APPOINTMENT (EMERGENCY)
Dept: CT IMAGING | Facility: HOSPITAL | Age: 57
DRG: 440 | End: 2022-03-18
Payer: COMMERCIAL

## 2022-03-18 ENCOUNTER — HOSPITAL ENCOUNTER (INPATIENT)
Facility: HOSPITAL | Age: 57
LOS: 3 days | Discharge: HOME | DRG: 440 | End: 2022-03-22
Attending: EMERGENCY MEDICINE | Admitting: INTERNAL MEDICINE
Payer: COMMERCIAL

## 2022-03-18 DIAGNOSIS — K85.90 PANCREATITIS: ICD-10-CM

## 2022-03-18 DIAGNOSIS — I10 ESSENTIAL HYPERTENSION: ICD-10-CM

## 2022-03-18 DIAGNOSIS — E83.42 HYPOMAGNESEMIA: ICD-10-CM

## 2022-03-18 DIAGNOSIS — R10.9 ABDOMINAL PAIN: Primary | ICD-10-CM

## 2022-03-18 DIAGNOSIS — K85.90 ACUTE PANCREATITIS, UNSPECIFIED COMPLICATION STATUS, UNSPECIFIED PANCREATITIS TYPE: ICD-10-CM

## 2022-03-18 LAB
ALBUMIN SERPL BCP-MCNC: 4.4 G/DL (ref 3.5–5)
ALP SERPL-CCNC: 106 U/L (ref 34–104)
ALT SERPL W P-5'-P-CCNC: 43 U/L (ref 7–52)
ANION GAP SERPL CALCULATED.3IONS-SCNC: 11 MMOL/L (ref 4–13)
AST SERPL W P-5'-P-CCNC: 29 U/L (ref 13–39)
BASOPHILS # BLD AUTO: 0.09 THOUSANDS/ΜL (ref 0–0.1)
BASOPHILS NFR BLD AUTO: 1 % (ref 0–1)
BILIRUB SERPL-MCNC: 0.89 MG/DL (ref 0.2–1)
BUN SERPL-MCNC: 10 MG/DL (ref 5–25)
CALCIUM SERPL-MCNC: 9.7 MG/DL (ref 8.4–10.2)
CHLORIDE SERPL-SCNC: 99 MMOL/L (ref 96–108)
CO2 SERPL-SCNC: 26 MMOL/L (ref 21–32)
CREAT SERPL-MCNC: 0.76 MG/DL (ref 0.6–1.3)
EOSINOPHIL # BLD AUTO: 0.17 THOUSAND/ΜL (ref 0–0.61)
EOSINOPHIL NFR BLD AUTO: 3 % (ref 0–6)
ERYTHROCYTE [DISTWIDTH] IN BLOOD BY AUTOMATED COUNT: 11.8 % (ref 11.6–15.1)
ETHANOL SERPL-MCNC: <10 MG/DL
GFR SERPL CREATININE-BSD FRML MDRD: 100 ML/MIN/1.73SQ M
GLUCOSE SERPL-MCNC: 138 MG/DL (ref 65–140)
GLUCOSE SERPL-MCNC: 145 MG/DL (ref 65–140)
GLUCOSE SERPL-MCNC: 196 MG/DL (ref 65–140)
HCT VFR BLD AUTO: 48.1 % (ref 36.5–49.3)
HGB BLD-MCNC: 17.5 G/DL (ref 12–17)
IMM GRANULOCYTES # BLD AUTO: 0.02 THOUSAND/UL (ref 0–0.2)
IMM GRANULOCYTES NFR BLD AUTO: 0 % (ref 0–2)
LIPASE SERPL-CCNC: 33 U/L (ref 11–82)
LYMPHOCYTES # BLD AUTO: 2.08 THOUSANDS/ΜL (ref 0.6–4.47)
LYMPHOCYTES NFR BLD AUTO: 31 % (ref 14–44)
MAGNESIUM SERPL-MCNC: 1.4 MG/DL (ref 1.9–2.7)
MCH RBC QN AUTO: 31.5 PG (ref 26.8–34.3)
MCHC RBC AUTO-ENTMCNC: 36.4 G/DL (ref 31.4–37.4)
MCV RBC AUTO: 87 FL (ref 82–98)
MONOCYTES # BLD AUTO: 0.41 THOUSAND/ΜL (ref 0.17–1.22)
MONOCYTES NFR BLD AUTO: 6 % (ref 4–12)
NEUTROPHILS # BLD AUTO: 3.89 THOUSANDS/ΜL (ref 1.85–7.62)
NEUTS SEG NFR BLD AUTO: 59 % (ref 43–75)
NRBC BLD AUTO-RTO: 0 /100 WBCS
PLATELET # BLD AUTO: 254 THOUSANDS/UL (ref 149–390)
PMV BLD AUTO: 9.5 FL (ref 8.9–12.7)
POTASSIUM SERPL-SCNC: 3.5 MMOL/L (ref 3.5–5.3)
PROT SERPL-MCNC: 7.9 G/DL (ref 6.4–8.4)
RBC # BLD AUTO: 5.55 MILLION/UL (ref 3.88–5.62)
SODIUM SERPL-SCNC: 136 MMOL/L (ref 135–147)
WBC # BLD AUTO: 6.66 THOUSAND/UL (ref 4.31–10.16)

## 2022-03-18 PROCEDURE — 83690 ASSAY OF LIPASE: CPT | Performed by: EMERGENCY MEDICINE

## 2022-03-18 PROCEDURE — 99285 EMERGENCY DEPT VISIT HI MDM: CPT | Performed by: EMERGENCY MEDICINE

## 2022-03-18 PROCEDURE — 85025 COMPLETE CBC W/AUTO DIFF WBC: CPT | Performed by: EMERGENCY MEDICINE

## 2022-03-18 PROCEDURE — 83735 ASSAY OF MAGNESIUM: CPT | Performed by: EMERGENCY MEDICINE

## 2022-03-18 PROCEDURE — 96361 HYDRATE IV INFUSION ADD-ON: CPT

## 2022-03-18 PROCEDURE — 96374 THER/PROPH/DIAG INJ IV PUSH: CPT

## 2022-03-18 PROCEDURE — 36415 COLL VENOUS BLD VENIPUNCTURE: CPT | Performed by: EMERGENCY MEDICINE

## 2022-03-18 PROCEDURE — 74177 CT ABD & PELVIS W/CONTRAST: CPT

## 2022-03-18 PROCEDURE — 96375 TX/PRO/DX INJ NEW DRUG ADDON: CPT

## 2022-03-18 PROCEDURE — 82948 REAGENT STRIP/BLOOD GLUCOSE: CPT

## 2022-03-18 PROCEDURE — 99285 EMERGENCY DEPT VISIT HI MDM: CPT

## 2022-03-18 PROCEDURE — 80053 COMPREHEN METABOLIC PANEL: CPT | Performed by: EMERGENCY MEDICINE

## 2022-03-18 PROCEDURE — 99220 PR INITIAL OBSERVATION CARE/DAY 70 MINUTES: CPT | Performed by: INTERNAL MEDICINE

## 2022-03-18 PROCEDURE — 82077 ASSAY SPEC XCP UR&BREATH IA: CPT | Performed by: EMERGENCY MEDICINE

## 2022-03-18 PROCEDURE — 96376 TX/PRO/DX INJ SAME DRUG ADON: CPT

## 2022-03-18 RX ORDER — MAGNESIUM SULFATE HEPTAHYDRATE 40 MG/ML
2 INJECTION, SOLUTION INTRAVENOUS ONCE
Status: COMPLETED | OUTPATIENT
Start: 2022-03-18 | End: 2022-03-18

## 2022-03-18 RX ORDER — ACETAMINOPHEN 325 MG/1
650 TABLET ORAL EVERY 6 HOURS PRN
Status: DISCONTINUED | OUTPATIENT
Start: 2022-03-18 | End: 2022-03-20

## 2022-03-18 RX ORDER — ONDANSETRON 2 MG/ML
4 INJECTION INTRAMUSCULAR; INTRAVENOUS ONCE
Status: COMPLETED | OUTPATIENT
Start: 2022-03-18 | End: 2022-03-18

## 2022-03-18 RX ORDER — MORPHINE SULFATE 10 MG/ML
6 INJECTION, SOLUTION INTRAMUSCULAR; INTRAVENOUS ONCE
Status: COMPLETED | OUTPATIENT
Start: 2022-03-18 | End: 2022-03-18

## 2022-03-18 RX ORDER — LISINOPRIL 10 MG/1
10 TABLET ORAL DAILY
Refills: 3 | Status: DISCONTINUED | OUTPATIENT
Start: 2022-03-19 | End: 2022-03-22 | Stop reason: HOSPADM

## 2022-03-18 RX ORDER — MORPHINE SULFATE 4 MG/ML
4 INJECTION, SOLUTION INTRAMUSCULAR; INTRAVENOUS ONCE
Status: COMPLETED | OUTPATIENT
Start: 2022-03-18 | End: 2022-03-18

## 2022-03-18 RX ORDER — GABAPENTIN 300 MG/1
300 CAPSULE ORAL 3 TIMES DAILY
Status: DISCONTINUED | OUTPATIENT
Start: 2022-03-18 | End: 2022-03-22 | Stop reason: HOSPADM

## 2022-03-18 RX ORDER — OXYCODONE HYDROCHLORIDE 5 MG/1
2.5 TABLET ORAL EVERY 6 HOURS PRN
Status: DISCONTINUED | OUTPATIENT
Start: 2022-03-18 | End: 2022-03-20

## 2022-03-18 RX ORDER — HYDROMORPHONE HCL/PF 1 MG/ML
0.5 SYRINGE (ML) INJECTION EVERY 4 HOURS PRN
Status: DISCONTINUED | OUTPATIENT
Start: 2022-03-18 | End: 2022-03-21

## 2022-03-18 RX ORDER — DOCUSATE SODIUM 100 MG/1
100 CAPSULE, LIQUID FILLED ORAL 2 TIMES DAILY
Status: DISCONTINUED | OUTPATIENT
Start: 2022-03-18 | End: 2022-03-22 | Stop reason: HOSPADM

## 2022-03-18 RX ORDER — OXYCODONE HYDROCHLORIDE 5 MG/1
5 TABLET ORAL EVERY 6 HOURS PRN
Status: DISCONTINUED | OUTPATIENT
Start: 2022-03-18 | End: 2022-03-20

## 2022-03-18 RX ORDER — SODIUM CHLORIDE, SODIUM LACTATE, POTASSIUM CHLORIDE, CALCIUM CHLORIDE 600; 310; 30; 20 MG/100ML; MG/100ML; MG/100ML; MG/100ML
150 INJECTION, SOLUTION INTRAVENOUS CONTINUOUS
Status: DISCONTINUED | OUTPATIENT
Start: 2022-03-18 | End: 2022-03-19

## 2022-03-18 RX ORDER — PANTOPRAZOLE SODIUM 40 MG/1
40 TABLET, DELAYED RELEASE ORAL
Status: DISCONTINUED | OUTPATIENT
Start: 2022-03-18 | End: 2022-03-22 | Stop reason: HOSPADM

## 2022-03-18 RX ORDER — ONDANSETRON 2 MG/ML
4 INJECTION INTRAMUSCULAR; INTRAVENOUS EVERY 6 HOURS PRN
Status: DISCONTINUED | OUTPATIENT
Start: 2022-03-18 | End: 2022-03-22 | Stop reason: HOSPADM

## 2022-03-18 RX ORDER — PRAVASTATIN SODIUM 20 MG
20 TABLET ORAL
Status: DISCONTINUED | OUTPATIENT
Start: 2022-03-18 | End: 2022-03-22 | Stop reason: HOSPADM

## 2022-03-18 RX ORDER — SERTRALINE HYDROCHLORIDE 100 MG/1
200 TABLET, FILM COATED ORAL DAILY
Status: DISCONTINUED | OUTPATIENT
Start: 2022-03-19 | End: 2022-03-22 | Stop reason: HOSPADM

## 2022-03-18 RX ADMIN — PANTOPRAZOLE SODIUM 40 MG: 40 TABLET, DELAYED RELEASE ORAL at 17:10

## 2022-03-18 RX ADMIN — MORPHINE SULFATE 4 MG: 4 INJECTION INTRAVENOUS at 12:50

## 2022-03-18 RX ADMIN — GABAPENTIN 300 MG: 300 CAPSULE ORAL at 20:44

## 2022-03-18 RX ADMIN — MAGNESIUM SULFATE HEPTAHYDRATE 2 G: 40 INJECTION, SOLUTION INTRAVENOUS at 14:30

## 2022-03-18 RX ADMIN — DOCUSATE SODIUM 100 MG: 100 CAPSULE, LIQUID FILLED ORAL at 16:45

## 2022-03-18 RX ADMIN — GABAPENTIN 300 MG: 300 CAPSULE ORAL at 17:10

## 2022-03-18 RX ADMIN — NICOTINE 1 PATCH: 7 PATCH, EXTENDED RELEASE TRANSDERMAL at 17:10

## 2022-03-18 RX ADMIN — IOHEXOL 100 ML: 350 INJECTION, SOLUTION INTRAVENOUS at 13:32

## 2022-03-18 RX ADMIN — HYDROMORPHONE HYDROCHLORIDE 0.5 MG: 1 INJECTION, SOLUTION INTRAMUSCULAR; INTRAVENOUS; SUBCUTANEOUS at 20:44

## 2022-03-18 RX ADMIN — ONDANSETRON 4 MG: 2 INJECTION INTRAMUSCULAR; INTRAVENOUS at 12:51

## 2022-03-18 RX ADMIN — ACETAMINOPHEN 650 MG: 325 TABLET, FILM COATED ORAL at 16:45

## 2022-03-18 RX ADMIN — MORPHINE SULFATE 6 MG: 10 INJECTION INTRAVENOUS at 14:05

## 2022-03-18 RX ADMIN — PRAVASTATIN SODIUM 20 MG: 20 TABLET ORAL at 17:10

## 2022-03-18 RX ADMIN — OXYCODONE HYDROCHLORIDE 5 MG: 5 TABLET ORAL at 23:29

## 2022-03-18 RX ADMIN — SODIUM CHLORIDE 1000 ML: 0.9 INJECTION, SOLUTION INTRAVENOUS at 12:51

## 2022-03-18 RX ADMIN — HYDROMORPHONE HYDROCHLORIDE 0.5 MG: 1 INJECTION, SOLUTION INTRAMUSCULAR; INTRAVENOUS; SUBCUTANEOUS at 16:38

## 2022-03-18 RX ADMIN — SODIUM CHLORIDE, SODIUM LACTATE, POTASSIUM CHLORIDE, AND CALCIUM CHLORIDE 150 ML/HR: 600; 310; 30; 20 INJECTION, SOLUTION INTRAVENOUS at 16:40

## 2022-03-18 NOTE — H&P
[address] [facility] 1965, 60 y o  male MRN: [id number]  Unit/Bed#: -01 Encounter: [id number]  Primary Care Provider: Gina Russo MD   Date and time admitted to hospital: 3/18/2022 12:29 PM    * Pancreatitis, acute  Assessment & Plan  · Patient presents with right upper quadrant and epigastric pain 10/10 radiating to the back associated with nausea and unable to eat for 4 days  · patient states last alcohol use was 3 months ago  · Recently admitted with acute pancreatitis and discharged less than a week ago  · Lipase is within normal limits, CT of the abdomen was reviewed shows no change from prior study and shows very mild nonspecific fat stranding around the pancreatic head and uncinate process  Present ultrasound of the right upper quadrant showedNo evidence of acute cholecystitis  Hepatic fibrofatty infiltration and hepatomegaly  Medications were reviewed  · Will give IV fluid hydration and clear liquid diet and pain management with Dilaudid  · Consult Gastroenterology     Gastroesophageal reflux disease without esophagitis  Assessment & Plan  Continue PPI    Type 2 diabetes mellitus with hyperglycemia (HCC)  Assessment & Plan  Lab Results   Component Value Date    HGBA1C 9 1 (H) 10/28/2021       No results for input(s): POCGLU in the last 72 hours  Blood Sugar Average: Last 72 hrs:   will hold oral anti diabetics glimepiride and Actos and give low-dose sliding scale insulin with blood    Dyslipidemia  Assessment & Plan  Continue statin    Essential hypertension  Assessment & Plan  Continue ramipril      VTE Pharmacologic Prophylaxis:   Moderate Risk (Score 3-4) - Pharmacological DVT Prophylaxis Ordered: enoxaparin (Lovenox)  Code Status: Level 1 - Full Code   Discussion with family: Patient declined call to        Anticipated Length of Stay: Patient will be admitted on an observation basis with an anticipated length of stay of less than 2 midnights secondary to pancreatitis  Total Time for Visit, including Counseling / Coordination of Care: 45 minutes Greater than 50% of this total time spent on direct patient counseling and coordination of care  Chief Complaint:  Epigastric and abdominal pain    History of Present Illness:  Jyoti Burnett is a 60 y o  male with a PMH of hypertension, diabetes mellitus type 2 presents with complaints of abdominal pain in the epigastric and right upper quadrant since last Thursday  Patient stated that he has been having decreased p o  Intake unable to eat with nausea  Pain is in the epigastric and right upper quadrant intensity of 10/10 radiating to the back  Last bowel movement was 2 days ago  Patient states he consumed alcohol 3 months ago  Patient was recently admitted on 03/10 due to severe abdominal pain and acute pancreatitis  Gallbladder showed no evidence of stone  Patient was evaluated by Gastroenterology and General surgery and recommended outpatient cholecystectomy and MRI of the abdomen  Patient continues to have abdominal pain and nausea  Review of Systems:  Review of Systems   Constitutional: Positive for appetite change  HENT: Negative  Eyes: Negative  Respiratory: Negative  Cardiovascular: Negative  Gastrointestinal: Positive for abdominal pain and nausea  Genitourinary: Negative  Musculoskeletal: Negative  Skin: Negative  Allergic/Immunologic: Negative  Neurological: Negative  Hematological: Negative  Psychiatric/Behavioral: Negative          Past Medical and Surgical History:   Past Medical History:   Diagnosis Date    Alcohol abuse     Back pain     Chronic low back pain     Depression     [facility]     DM2 (diabetes mellitus, type 2) (HCC)     Erectile dysfunction     GERD (gastroesophageal reflux disease)     Hepatic steatosis     Hyperlipidemia     Hypertension     Neuropathy     Psychiatric disorder     Tobacco abuse Past Surgical History:   Procedure Laterality Date    ANKLE FRACTURE SURGERY Right     ANKLE SURGERY Right     INGUINAL HERNIA REPAIR Left     KNEE SURGERY Right     UMBILICAL HERNIA REPAIR         Meds/Allergies:  Prior to Admission medications    Medication Sig Start Date End Date Taking? Authorizing Provider   Accu-Chek FastClix Lancets MISC USE AS DIRECTED  Patient not taking: Reported on 3/10/2022 12/9/20   Moni Mario MD   Blood Glucose Monitoring Suppl (Accu-Chek Stefanie Plus) w/Device KIT USE AS DIRECTED  Patient not taking: Reported on 3/10/2022 12/9/20   Moni Mario MD   fenofibrate 160 MG tablet TAKE 1 TABLET (160 MG TOTAL) BY MOUTH DAILY 1/6/22   Baldo Sales MD   gabapentin (NEURONTIN) 300 mg capsule Take 300 mg by mouth Three times a day 5/18/21 5/18/22  Historical Provider, MD   glimepiride (AMARYL) 2 mg tablet TAKE 1 TABLET BY MOUTH EVERY MORNING AND 1/2 TABLET IN THE EVENING 2/16/22   Moni Mario MD   nicotine (NICODERM CQ) 21 mg/24 hr TD 24 hr patch Place 1 patch on the skin daily 3/14/22   Tera Green MD   omeprazole (PriLOSEC) 40 MG capsule Take 1 capsule (40 mg total) by mouth 2 (two) times a day 3/13/22 4/12/22  Tera Green MD   pioglitazone (ACTOS) 45 mg tablet TAKE 1 TABLET EVERY DAY 11/10/21   Moni Mario MD   ramipril (ALTACE) 10 MG capsule TAKE 1 CAPSULE EVERY DAY 1/6/22   Baldo Sales MD   sertraline (ZOLOFT) 100 mg tablet Take 2 tablets (200 mg total) by mouth daily 2/16/22   Moni Mario MD   simvastatin (ZOCOR) 20 mg tablet TAKE 1 TABLET (20 MG TOTAL) BY MOUTH DAILY 7/18/21   Moni Mario MD   tadalafil (CIALIS) 5 MG tablet TAKE 1 TABLET BY MOUTH DAILY AS NEEDED FOR ERECTILE DYSFUNCTION 7/30/21   Moni Mario MD     I have reviewed home medications with patient personally  Allergies:    Allergies   Allergen Reactions    Amoxicillin Swelling    Amoxil [Amoxicillin] Hives       Social History:  Marital Status: /Civil Union Occupation: retired  Patient Pre-hospital Living Situation: Home  Patient Pre-hospital Level of Mobility: walks  Patient Pre-hospital Diet Restrictions: nil  Substance Use History:   Social History     Substance and Sexual Activity   Alcohol Use Yes    Comment: 1-2 drinks per month; former heavy drinker, but not for last 2 years     Social History     Tobacco Use   Smoking Status Current Every Day Smoker    Packs/day: 1 00    Years: 44 00    Pack years: 44 00    Types: Cigarettes    Start date: 1978   Smokeless Tobacco Never Used   Tobacco Comment    per [employer]     Social History     Substance and Sexual Activity   Drug Use Yes    Types: Marijuana    Comment: not for a while       Family History:  Family History   Problem Relation Age of Onset    Lymphoma Mother     No Known Problems Father        Physical Exam:     Vitals:   Blood Pressure: 132/77 (03/18/22 1509)  Pulse: 84 (03/18/22 1509)  Temperature: (!) 96 7 °F (35 9 °C) (03/18/22 1509)  Temp Source: Tympanic (03/18/22 1509)  Respirations: 19 (03/18/22 1509)  Height: 5' 11" (180 3 cm) (03/18/22 1509)  Weight - Scale: 101 kg (221 lb 9 oz) (03/18/22 1509)  SpO2: 94 % (03/18/22 1509)    Physical Exam   HEENT-PERRLA, moist oral mucosa  Neck-supple, no JVD elevation   Respiratory-equal air entry bilaterally, no rales or rhonchi  Cardiovascular system-S1, S2 heard, no murmur or gallops or rubs  Abdomen-soft,epigastric and ruq tenderness,no guarding or rigidity, bowel sounds heard  Extremities-no pedal edema  Peripheral pulses palpable  Musculoskeletal-no contractures  Central nervous system-no acute focal neurological deficit ,no sensory or motor deficit noted    Skin-no rash noted        Additional Data:     Lab Results:  Results from last 7 days   Lab Units 03/18/22  1246   WBC Thousand/uL 6 66   HEMOGLOBIN g/dL 17 5*   HEMATOCRIT % 48 1   PLATELETS Thousands/uL 254   NEUTROS PCT % 59   LYMPHS PCT % 31   MONOS PCT % 6   EOS PCT % 3     Results from last 7 days   Lab Units 03/18/22  1246   SODIUM mmol/L 136   POTASSIUM mmol/L 3 5   CHLORIDE mmol/L 99   CO2 mmol/L 26   BUN mg/dL 10   CREATININE mg/dL 0 76   ANION GAP mmol/L 11   CALCIUM mg/dL 9 7   ALBUMIN g/dL 4 4   TOTAL BILIRUBIN mg/dL 0 89   ALK PHOS U/L 106*   ALT U/L 43   AST U/L 29   GLUCOSE RANDOM mg/dL 196*         Results from last 7 days   Lab Units 03/13/22  0733 03/12/22 2017 03/12/22  1559 03/12/22  1150 03/12/22  0811 03/11/22 2023 03/11/22  1558   POC GLUCOSE mg/dl 165* 140 185* 127 145* 155* 174*               Imaging: Reviewed radiology reports from this admission including: abdominal/pelvic CT  CT abdomen pelvis with contrast   Final Result by Krissy Pinedo MD (03/18 8215)      No change from 3/10/2022; as seen on the prior study there is very mild nonspecific fat stranding about the pancreatic head and uncinate process  Workstation performed: [id number]             EKG and Other Studies Reviewed on Admission:   · EKG: No EKG obtained  ** Please Note: This note has been constructed using a voice recognition system   **

## 2022-03-18 NOTE — ASSESSMENT & PLAN NOTE
· Patient presents with right upper quadrant and epigastric pain 10/10 radiating to the back associated with nausea and unable to eat for 4 days  · patient states last alcohol use was 3 months ago  · Recently admitted with acute pancreatitis and discharged less than a week ago  · Lipase is within normal limits, CT of the abdomen was reviewed shows no change from prior study and shows very mild nonspecific fat stranding around the pancreatic head and uncinate process  Present ultrasound of the right upper quadrant showedNo evidence of acute cholecystitis  Hepatic fibrofatty infiltration and hepatomegaly  Medications were reviewed  · Will give IV fluid hydration and clear liquid diet and pain management with Dilaudid  · Consult Gastroenterology

## 2022-03-18 NOTE — ASSESSMENT & PLAN NOTE
Lab Results   Component Value Date    HGBA1C 9 1 (H) 10/28/2021       No results for input(s): POCGLU in the last 72 hours      Blood Sugar Average: Last 72 hrs:   will hold oral anti diabetics glimepiride and Actos and give low-dose sliding scale insulin with blood

## 2022-03-19 LAB
ANION GAP SERPL CALCULATED.3IONS-SCNC: 7 MMOL/L (ref 4–13)
BUN SERPL-MCNC: 9 MG/DL (ref 5–25)
CALCIUM SERPL-MCNC: 8.8 MG/DL (ref 8.4–10.2)
CHLORIDE SERPL-SCNC: 100 MMOL/L (ref 96–108)
CO2 SERPL-SCNC: 29 MMOL/L (ref 21–32)
CREAT SERPL-MCNC: 0.65 MG/DL (ref 0.6–1.3)
GFR SERPL CREATININE-BSD FRML MDRD: 107 ML/MIN/1.73SQ M
GLUCOSE SERPL-MCNC: 128 MG/DL (ref 65–140)
GLUCOSE SERPL-MCNC: 151 MG/DL (ref 65–140)
GLUCOSE SERPL-MCNC: 154 MG/DL (ref 65–140)
GLUCOSE SERPL-MCNC: 158 MG/DL (ref 65–140)
GLUCOSE SERPL-MCNC: 209 MG/DL (ref 65–140)
LIPASE SERPL-CCNC: 24 U/L (ref 11–82)
POTASSIUM SERPL-SCNC: 3.8 MMOL/L (ref 3.5–5.3)
SODIUM SERPL-SCNC: 136 MMOL/L (ref 135–147)

## 2022-03-19 PROCEDURE — 99232 SBSQ HOSP IP/OBS MODERATE 35: CPT

## 2022-03-19 PROCEDURE — 82948 REAGENT STRIP/BLOOD GLUCOSE: CPT

## 2022-03-19 PROCEDURE — 80048 BASIC METABOLIC PNL TOTAL CA: CPT | Performed by: INTERNAL MEDICINE

## 2022-03-19 PROCEDURE — 83690 ASSAY OF LIPASE: CPT | Performed by: INTERNAL MEDICINE

## 2022-03-19 RX ADMIN — DOCUSATE SODIUM 100 MG: 100 CAPSULE, LIQUID FILLED ORAL at 08:59

## 2022-03-19 RX ADMIN — HYDROMORPHONE HYDROCHLORIDE 0.5 MG: 1 INJECTION, SOLUTION INTRAMUSCULAR; INTRAVENOUS; SUBCUTANEOUS at 05:17

## 2022-03-19 RX ADMIN — PRAVASTATIN SODIUM 20 MG: 20 TABLET ORAL at 17:00

## 2022-03-19 RX ADMIN — HYDROMORPHONE HYDROCHLORIDE 0.5 MG: 1 INJECTION, SOLUTION INTRAMUSCULAR; INTRAVENOUS; SUBCUTANEOUS at 17:22

## 2022-03-19 RX ADMIN — NICOTINE 1 PATCH: 7 PATCH, EXTENDED RELEASE TRANSDERMAL at 09:00

## 2022-03-19 RX ADMIN — HYDROMORPHONE HYDROCHLORIDE 0.5 MG: 1 INJECTION, SOLUTION INTRAMUSCULAR; INTRAVENOUS; SUBCUTANEOUS at 00:50

## 2022-03-19 RX ADMIN — HYDROMORPHONE HYDROCHLORIDE 0.5 MG: 1 INJECTION, SOLUTION INTRAMUSCULAR; INTRAVENOUS; SUBCUTANEOUS at 13:18

## 2022-03-19 RX ADMIN — PANTOPRAZOLE SODIUM 40 MG: 40 TABLET, DELAYED RELEASE ORAL at 07:42

## 2022-03-19 RX ADMIN — OXYCODONE HYDROCHLORIDE 5 MG: 5 TABLET ORAL at 14:33

## 2022-03-19 RX ADMIN — INSULIN LISPRO 1 UNITS: 100 INJECTION, SOLUTION INTRAVENOUS; SUBCUTANEOUS at 07:43

## 2022-03-19 RX ADMIN — LISINOPRIL 10 MG: 10 TABLET ORAL at 08:58

## 2022-03-19 RX ADMIN — DOCUSATE SODIUM 100 MG: 100 CAPSULE, LIQUID FILLED ORAL at 17:00

## 2022-03-19 RX ADMIN — GABAPENTIN 300 MG: 300 CAPSULE ORAL at 20:05

## 2022-03-19 RX ADMIN — PANTOPRAZOLE SODIUM 40 MG: 40 TABLET, DELAYED RELEASE ORAL at 17:00

## 2022-03-19 RX ADMIN — SERTRALINE HYDROCHLORIDE 200 MG: 100 TABLET ORAL at 08:59

## 2022-03-19 RX ADMIN — OXYCODONE HYDROCHLORIDE 5 MG: 5 TABLET ORAL at 07:42

## 2022-03-19 RX ADMIN — INSULIN LISPRO 1 UNITS: 100 INJECTION, SOLUTION INTRAVENOUS; SUBCUTANEOUS at 11:38

## 2022-03-19 RX ADMIN — INSULIN LISPRO 1 UNITS: 100 INJECTION, SOLUTION INTRAVENOUS; SUBCUTANEOUS at 16:21

## 2022-03-19 RX ADMIN — GABAPENTIN 300 MG: 300 CAPSULE ORAL at 08:59

## 2022-03-19 RX ADMIN — HYDROMORPHONE HYDROCHLORIDE 0.5 MG: 1 INJECTION, SOLUTION INTRAMUSCULAR; INTRAVENOUS; SUBCUTANEOUS at 09:17

## 2022-03-19 RX ADMIN — GABAPENTIN 300 MG: 300 CAPSULE ORAL at 17:00

## 2022-03-19 RX ADMIN — HYDROMORPHONE HYDROCHLORIDE 0.5 MG: 1 INJECTION, SOLUTION INTRAMUSCULAR; INTRAVENOUS; SUBCUTANEOUS at 21:35

## 2022-03-19 RX ADMIN — OXYCODONE HYDROCHLORIDE 5 MG: 5 TABLET ORAL at 20:48

## 2022-03-19 RX ADMIN — ENOXAPARIN SODIUM 40 MG: 40 INJECTION SUBCUTANEOUS at 08:59

## 2022-03-19 NOTE — PLAN OF CARE
Problem: Potential for Falls  Goal: Patient will remain free of falls  Description: INTERVENTIONS:  - Educate patient/family on patient safety including physical limitations  - Instruct patient to call for assistance with activity   - Consult OT/PT to assist with strengthening/mobility   - Keep Call bell within reach  - Keep bed low and locked with side rails adjusted as appropriate  - Keep care items and personal belongings within reach  - Initiate and maintain comfort rounds  - Make Fall Risk Sign visible to staff  - Offer Toileting every 2 Hours, in advance of need  - Initiate/Maintain alarm  - Obtain necessary fall risk management equipment  - Apply yellow socks and bracelet for high fall risk patients  - Consider moving patient to room near nurses station  Outcome: Progressing     Problem: Nutrition/Hydration-ADULT  Goal: Nutrient/Hydration intake appropriate for improving, restoring or maintaining nutritional needs  Description: Monitor and assess patient's nutrition/hydration status for malnutrition  Collaborate with interdisciplinary team and initiate plan and interventions as ordered  Monitor patient's weight and dietary intake as ordered or per policy  Utilize nutrition screening tool and intervene as necessary  Determine patient's food preferences and provide high-protein, high-caloric foods as appropriate       INTERVENTIONS:  - Monitor oral intake, urinary output, labs, and treatment plans  - Assess nutrition and hydration status and recommend course of action  - Evaluate amount of meals eaten  - Assist patient with eating if necessary   - Allow adequate time for meals  - Recommend/ encourage appropriate diets, oral nutritional supplements, and vitamin/mineral supplements  - Order, calculate, and assess calorie counts as needed  - Recommend, monitor, and adjust tube feedings and TPN/PPN based on assessed needs  - Assess need for intravenous fluids  - Provide specific nutrition/hydration education as appropriate  - Include patient/family/caregiver in decisions related to nutrition  Outcome: Progressing

## 2022-03-19 NOTE — ASSESSMENT & PLAN NOTE
· Patient presents with right upper quadrant and epigastric pain 10/10 radiating to the back associated with nausea and unable to eat for 4 days  · Patient states last alcohol use was 3 months ago  · Of Note, patient recently admitted with acute pancreatitis and discharged less than a week ago  · U/S of RUQ (3/10) showed No evidence of acute cholecystitis  Hepatic fibrofatty infiltration and hepatomegaly  · Lipase is within normal limits, CT of the abdomen was reviewed shows no change from prior study and shows very mild nonspecific fat stranding around the pancreatic head and uncinate process  · C/w IV fluid hydration  · C/w clear liquid diet  · If pain improved, will gradually increase diet    · Continue pain management  · Gastroenterology consulted

## 2022-03-19 NOTE — ASSESSMENT & PLAN NOTE
Lab Results   Component Value Date    HGBA1C 9 1 (H) 10/28/2021       Recent Labs     03/18/22  1659 03/18/22  2143 03/19/22  0702   POCGLU 145* 138 154*       Blood Sugar Average: Last 72 hrs:  (P) 596 [id number]     · Will hold oral anti diabetics glimepiride and Actos while admitted  · C/w Accu-checks and SSI AC/HS   · Hypoglycemic protocol

## 2022-03-19 NOTE — UTILIZATION REVIEW
Initial Clinical Review    Observation 3/18/22 @ 1425, converted to inpatient admission 3/19/22 @ 25 [id number] for continued care & tx for pancreatitis  Admission: Date/Time/Statement:   Admission Orders (From admission, onward)     Ordered        03/19/22 1149  Inpatient Admission  Once            03/18/22 1425  Place in Observation  Once                      Orders Placed This Encounter   Procedures    Inpatient Admission     Standing Status:   Standing     Number of Occurrences:   1     Order Specific Question:   Level of Care     Answer:   Med Surg [16]     Order Specific Question:   Estimated length of stay     Answer:   More than 2 Midnights     Order Specific Question:   Certification     Answer:   I certify that inpatient services are medically necessary for this patient for a duration of greater than two midnights  See H&P and MD Progress Notes for additional information about the patient's course of treatment  ED Arrival Information     Expected Arrival Acuity    - 3/18/2022 12:15 Urgent         Means of arrival Escorted by Service Admission type    Walk-In Self Hospitalist Urgent         Arrival complaint    abdominal pain        Chief Complaint   Patient presents with    Abdominal Pain     Patient having continued abd pain since " i was here last thursday"      Initial Presentation:   60year old male who presents to the ED with abdominal pain  Hx hypertension, diabetes mellitus type 2, recent admission last week with acute pancreatitis  States he had no pain on 3/13 when discharged, but the next day began having increased pain, now with progressive pain,unable to eat over the last 3 days 2nd nausea, reports no BM x 3 days as well  Presents tachycardic & tachypneic with RUQ & epigastric tenderness  Admission work-up showing nonspecific fat stranding about the pancreatic head and uncinate process on imaging  Placed in observation status for acute pancreatitis  Started on IVF      Observation to IP admission 3/19/22:  Continued pain & tenderness 2nd acute pancreatitis requiring ongoing IV Dilaudid & oxycodone  Trial advancing diet to clear liquids, IVF maintained  Day 2- 3/20/22:  Acute pancreatitis  ABD: +BS, positive epigastric tenderness palpation, no rebound or guarding no hepatosplenomegaly  C/o persistent abd pain requiring IV Dilaudid & oxycodone  On clears, poor po intake, IVF maintained  EGD tomorrow per GI  Per GI:   Epigastric pain:  Differential including groove pancreatitis, chronic pancreatitis, underlying malignancy, peptic ulcer disease  Especially in light of normal lipase and persistent findings would recommend additional evaluation to exclude more significant primary pancreatic pathology  -recommend proceeding with an upper endoscopy tomorrow to evaluate for luminal pathology    ED Triage Vitals   Temperature Pulse Respirations Blood Pressure SpO2   03/18/22 1227 03/18/22 1227 03/18/22 1227 03/18/22 1227 03/18/22 1227   97 6 °F (36 4 °C) 104 22 131/69 97 %      Temp Source Heart Rate Source Patient Position - Orthostatic VS BP Location FiO2 (%)   03/18/22 1509 03/18/22 2300 03/18/22 1509 03/18/22 1509 --   Tympanic Monitor Lying Right arm       Pain Score       03/18/22 1225       10 - Worst Possible Pain          Wt Readings from Last 1 Encounters:   03/18/22 101 kg (221 lb 9 oz)     Additional Vital Signs:   03/18/22 2300 97 9 °F (36 6 °C) 80 20 128/72 -- -- Lying   03/18/22 2030 -- -- -- -- 95 % None (Room air) --   03/18/22 1645 -- -- -- -- -- None (Room air) --   03/18/22 1509 96 7 °F (35 9 °C) Abnormal  84 19 132/77 94 % None (Room air) Lying   03/18/22 1227 97 6 °F (36 4 °C) 104 22 131/69 97 % -- --       Pertinent Labs/Diagnostic Test Results:   CT abdomen pelvis with contrast   Final Result (03/18 1351)      No change from 3/10/2022; as seen on the prior study there is very mild nonspecific fat stranding about the pancreatic head and uncinate process          Results from last 7 days   Lab Units 03/20/22  0611 03/18/22  1246   WBC Thousand/uL 4 21* 6 66   HEMOGLOBIN g/dL 14 8 17 5*   HEMATOCRIT % 43 0 48 1   PLATELETS Thousands/uL 192 254   NEUTROS ABS Thousands/µL 2 13 3 89     Results from last 7 days   Lab Units 03/20/22  0611 03/19/22  0514 03/18/22  1246   SODIUM mmol/L 137 136 136   POTASSIUM mmol/L 3 7 3 8 3 5   CHLORIDE mmol/L 101 100 99   CO2 mmol/L 28 29 26   ANION GAP mmol/L 8 7 11   BUN mg/dL 7 9 10   CREATININE mg/dL 0 64 0 65 0 76   EGFR ml/min/1 73sq m 107 107 100   CALCIUM mg/dL 8 9 8 8 9 7   MAGNESIUM mg/dL  --   --  1 4*     Results from last 7 days   Lab Units 03/18/22  1246   AST U/L 29   ALT U/L 43   ALK PHOS U/L 106*   TOTAL PROTEIN g/dL 7 9   ALBUMIN g/dL 4 4   TOTAL BILIRUBIN mg/dL 0 89     Results from last 7 days   Lab Units 03/20/22  1136 03/20/22  0806 03/19/22  2149 03/19/22  1618 03/19/22  1111 03/19/22  0702 03/18/22  2143 03/18/22  1659   POC GLUCOSE mg/dl 143* 161* 151* 209* 158* 154* 138 145*     Results from last 7 days   Lab Units 03/20/22  0611 03/19/22  0514 03/18/22  1246   GLUCOSE RANDOM mg/dL 161* 128 196*     BETA-HYDROXYBUTYRATE   Date Value Ref Range Status   09/20/2021 0 5 <0 6 mmol/L Final      Results from last 7 days   Lab Units 03/19/22  0514 03/18/22  1246   LIPASE u/L 24 33     Results from last 7 days   Lab Units 03/18/22  1426   ETHANOL LVL mg/dL <10     ED Treatment:   Medication Administration from 03/18/2022 1215 to 03/18/2022 1502       Date/Time Order Dose Route Action     03/18/2022 1251 ondansetron (ZOFRAN) injection 4 mg 4 mg Intravenous Given     03/18/2022 1250 morphine (PF) 4 mg/mL injection 4 mg 4 mg Intravenous Given     03/18/2022 1251 sodium chloride 0 9 % bolus 1,000 mL 1,000 mL Intravenous New Bag     03/18/2022 1332 iohexol (OMNIPAQUE) 350 MG/ML injection (SINGLE-DOSE) 100 mL 100 mL Intravenous Given     03/18/2022 1405 morphine (PF) 10 mg/mL injection 6 mg 6 mg Intravenous Given     03/18/2022 1430 magnesium sulfate 2 g/50 mL IVPB (premix) 2 g 2 g Intravenous New Bag        Past Medical History:   Diagnosis Date    Alcohol abuse     Back pain     Chronic low back pain     Depression     Diabetes mellitus ([facility] [address] )     DM2 (diabetes mellitus, type 2) (HCC)     Erectile dysfunction     GERD (gastroesophageal reflux disease)     Hepatic steatosis     Hyperlipidemia     Hypertension     Neuropathy     Psychiatric disorder     Tobacco abuse      Present on Admission:   Pancreatitis, acute   Type 2 diabetes mellitus with hyperglycemia (HCC)   Gastroesophageal reflux disease without esophagitis   Essential hypertension   Dyslipidemia    Admitting Diagnosis: Pancreatitis [K85 90]  Abdominal pain [R10 9]  Age/Sex: 60 y o  male  Admission Orders:  Consult GI  Scd/foot pumps  accuchecks    Scheduled Medications:  docusate sodium, 100 mg, Oral, BID  enoxaparin, 40 mg, Subcutaneous, Daily  gabapentin, 300 mg, Oral, TID  insulin lispro, 1-5 Units, Subcutaneous, TID AC  insulin lispro, 1-5 Units, Subcutaneous, HS  lisinopril, 10 mg, Oral, Daily  nicotine, 1 patch, Transdermal, Daily  pantoprazole, 40 mg, Oral, BID AC  pravastatin, 20 mg, Oral, Daily With Dinner  sertraline, 200 mg, Oral, Daily    Continuous IV Infusions:  lactated ringers infusion  Rate: 75 mL/hr Dose: 75 mL/hr  Freq: Continuous Route: IV  Indications of Use: IV Hydration  Last Dose: 75 mL/hr (03/20/22 1419)  Start: 03/20/22 1315  lactated ringers infusion  Rate: 150 mL/hr Dose: 150 mL/hr  Freq: Continuous Route: IV  Last Dose: 150 mL/hr (03/18/22 1640)  Start: 03/18/22 1630 End: 03/19/22 0957    PRN Meds:  acetaminophen, 650 mg, Oral, Q6H PRN  HYDROmorphone, 0 5 mg, Intravenous, Q4H PRN-used x9/24hrs  ondansetron, 4 mg, Intravenous, Q6H PRN  oxyCODONE, 2 5 mg, Oral, Q6H PRN  oxyCODONE, 5 mg, Oral, Q6H PRN-used x5/24hrs    Network Utilization Review Department  ATTENTION: Please call with any questions or concerns to [phone] and carefully listen to the prompts so that you are directed to the right person  All voicemails are confidential   Davon Cheung all requests for admission clinical reviews, approved or denied determinations and any other requests to dedicated fax number below belonging to the campus where the patient is receiving treatment   List of dedicated fax numbers for the Facilities:  [address] DENIALS (Administrative/Medical Necessity) [phone]   1000  [facility] (Maternity/NICU/Pediatrics) [phone]   [address]  [address] [place] [address] [address] [address] [facility] [place] [address] [phone]

## 2022-03-19 NOTE — PROGRESS NOTES
Samir U  64   Progress Note - Estrella Sun 1965, 60 y o  male MRN: [id number]  Unit/Bed#: -01 Encounter: [id number]  Primary Care Provider: Dolores Michael MD   Date and time admitted to hospital: 3/18/2022 12:29 PM    * Pancreatitis, acute  Assessment & Plan  · Patient presents with right upper quadrant and epigastric pain 10/10 radiating to the back associated with nausea and unable to eat for 4 days  · Patient states last alcohol use was 3 months ago  · Of Note, patient recently admitted with acute pancreatitis and discharged less than a week ago  · U/S of RUQ (3/10) showed No evidence of acute cholecystitis  Hepatic fibrofatty infiltration and hepatomegaly  · Lipase is within normal limits, CT of the abdomen was reviewed shows no change from prior study and shows very mild nonspecific fat stranding around the pancreatic head and uncinate process  · C/w IV fluid hydration  · C/w clear liquid diet  · If pain improved, will gradually increase diet  · Continue pain management  · Gastroenterology consulted    Gastroesophageal reflux disease without esophagitis  Assessment & Plan  · Continue PPI    Type 2 diabetes mellitus with hyperglycemia [facility])  Assessment & Plan  Lab Results   Component Value Date    HGBA1C 9 1 (H) 10/28/2021       Recent Labs     03/18/22  1659 03/18/22  2143 03/19/22  0702   POCGLU 145* 138 154*       Blood Sugar Average: Last 72 hrs:  (P) 499 [id number]     · Will hold oral anti diabetics glimepiride and Actos while admitted  · C/w Accu-checks and SSI AC/HS   · Hypoglycemic protocol    Dyslipidemia  Assessment & Plan  · Continue statin therapy    Essential hypertension  Assessment & Plan  · Home Medications:  · Ramipril  · C/w Ace- inhibitor       VTE Pharmacologic Prophylaxis: VTE Score: 2 Moderate Risk (Score 3-4) - Pharmacological DVT Prophylaxis Ordered: enoxaparin (Lovenox)      Patient Centered Rounds: I performed bedside rounds with nursing staff today   Discussions with Specialists or Other Care Team Provider: None    Education and Discussions with Family / Patient: Patient declined call to   Time Spent for Care: 30 minutes  More than 50% of total time spent on counseling and coordination of care as described above  Current Length of Stay: 0 day(s)  Current Patient Status: Observation   Certification Statement: The patient, admitted on an observation basis, will now require > 2 midnight hospital stay due to Poor oral intake requiring IV hydration  Discharge Plan: Anticipate discharge in 24-48 hrs to home  Code Status: Level 1 - Full Code    Subjective:   Patient states he feels all right  Patient still complaining of intermittent abdominal pain  Patient states he is willing to try diet  Patient denies any active chest pain, worsening shortness of breath, nausea, vomiting, or diarrhea  Objective:     Vitals:   Temp (24hrs), Av 1 °F (36 2 °C), Min:96 2 °F (35 7 °C), Max:97 9 °F (36 6 °C)    Temp:  [96 2 °F (35 7 °C)-97 9 °F (36 6 °C)] 96 2 °F (35 7 °C)  HR:  [] 67  Resp:  [19-22] 20  BP: (128-169)/(69-81) 169/81  SpO2:  [93 %-97 %] 93 %  Body mass index is 30 9 kg/m²  Input and Output Summary (last 24 hours): Intake/Output Summary (Last 24 hours) at 3/19/2022 0956  Last data filed at 3/19/2022 0901  Gross per 24 hour   Intake 700 ml   Output 2 ml   Net 698 ml       Physical Exam:   Physical Exam  Vitals and nursing note reviewed  Constitutional:       General: He is not in acute distress  Appearance: He is not ill-appearing  HENT:      Head: Normocephalic  Nose: Nose normal  No congestion  Mouth/Throat:      Mouth: Mucous membranes are moist       Pharynx: Oropharynx is clear  Cardiovascular:      Rate and Rhythm: Normal rate and regular rhythm  Pulses: Normal pulses  Heart sounds: Normal heart sounds  No murmur heard        Pulmonary:      Effort: Pulmonary effort is normal  No respiratory distress  Breath sounds: Normal breath sounds  Abdominal:      General: Bowel sounds are normal  There is no distension  Palpations: Abdomen is soft  Tenderness: There is abdominal tenderness  Musculoskeletal:         General: Normal range of motion  Cervical back: Normal range of motion  Right lower leg: No edema  Left lower leg: No edema  Skin:     General: Skin is warm and dry  Capillary Refill: Capillary refill takes less than 2 seconds  Neurological:      Mental Status: He is alert and oriented to person, place, and time  Mental status is at baseline  Motor: No weakness  Psychiatric:         Speech: Speech normal          Behavior: Behavior is cooperative  Judgment: Judgment normal           Additional Data:     Labs:  Results from last 7 days   Lab Units 03/18/22  1246   WBC Thousand/uL 6 66   HEMOGLOBIN g/dL 17 5*   HEMATOCRIT % 48 1   PLATELETS Thousands/uL 254   NEUTROS PCT % 59   LYMPHS PCT % 31   MONOS PCT % 6   EOS PCT % 3     Results from last 7 days   Lab Units 03/19/22  0514 03/18/22  1246 03/18/22  1246   SODIUM mmol/L 136   < > 136   POTASSIUM mmol/L 3 8   < > 3 5   CHLORIDE mmol/L 100   < > 99   CO2 mmol/L 29   < > 26   BUN mg/dL 9   < > 10   CREATININE mg/dL 0 65   < > 0 76   ANION GAP mmol/L 7   < > 11   CALCIUM mg/dL 8 8   < > 9 7   ALBUMIN g/dL  --   --  4 4   TOTAL BILIRUBIN mg/dL  --   --  0 89   ALK PHOS U/L  --   --  106*   ALT U/L  --   --  43   AST U/L  --   --  29   GLUCOSE RANDOM mg/dL 128   < > 196*    < > = values in this interval not displayed           Results from last 7 days   Lab Units 03/19/22  0702 03/18/22  2143 03/18/22  1659 03/13/22  0733 03/12/22  2017 03/12/22  1559 03/12/22  1150   POC GLUCOSE mg/dl 154* 138 145* 165* 140 185* 127               Lines/Drains:  Invasive Devices  Report    Peripheral Intravenous Line            Peripheral IV 03/18/22 Left Antecubital <1 day                      Imaging: Reviewed radiology reports from this admission including: abdominal/pelvic CT    Recent Cultures (last 7 days):         Last 24 Hours Medication List:   Current Facility-Administered Medications   Medication Dose Route Frequency Provider Last Rate    acetaminophen  650 mg Oral Q6H PRN Jona Herbert MD      docusate sodium  100 mg Oral BID Jona Herbert MD      enoxaparin  40 mg Subcutaneous Daily Jona Herbert MD      gabapentin  300 mg Oral TID Jona Herbert MD      HYDROmorphone  0 5 mg Intravenous Q4H PRN Jona Herbert MD      insulin lispro  1-5 Units Subcutaneous TID AC Jona Herbert MD      insulin lispro  1-5 Units Subcutaneous HS Jona Herbert MD      lactated ringers  150 mL/hr Intravenous Continuous Jona Herbert  mL/hr (03/18/22 1640)    lisinopril  10 mg Oral Daily Jona Herbert MD      nicotine  1 patch Transdermal Daily Jona Herbert MD      ondansetron  4 mg Intravenous Q6H PRN Jona Herbert MD      oxyCODONE  2 5 mg Oral Q6H PRN Laureen Lawrence PA-C      oxyCODONE  5 mg Oral Q6H PRN Laureen Lawrence PA-C      pantoprazole  40 mg Oral BID AC Jona Herbert MD      pravastatin  20 mg Oral Daily With Fannie Kwong MD      sertraline  200 mg Oral Daily Jona Herbert MD          Today, Patient Was Seen By: KRISTOFER Squires    **Please Note: This note may have been constructed using a voice recognition system  **

## 2022-03-19 NOTE — PLAN OF CARE
Problem: Potential for Falls  Goal: Patient will remain free of falls  Description: INTERVENTIONS:  - Educate patient/family on patient safety including physical limitations  - Instruct patient to call for assistance with activity   - Consult OT/PT to assist with strengthening/mobility   - Keep Call bell within reach  - Keep bed low and locked with side rails adjusted as appropriate  - Keep care items and personal belongings within reach  - Initiate and maintain comfort rounds  - Make Fall Risk Sign visible to staff  - Offer Toileting every 3 Hours, in advance of need  - Initiate/Maintain bed alarm  - Obtain necessary fall risk management equipment:   - Apply yellow socks and bracelet for high fall risk patients  - Consider moving patient to room near nurses station  Outcome: Progressing     Problem: Nutrition/Hydration-ADULT  Goal: Nutrient/Hydration intake appropriate for improving, restoring or maintaining nutritional needs  Description: Monitor and assess patient's nutrition/hydration status for malnutrition  Collaborate with interdisciplinary team and initiate plan and interventions as ordered  Monitor patient's weight and dietary intake as ordered or per policy  Utilize nutrition screening tool and intervene as necessary  Determine patient's food preferences and provide high-protein, high-caloric foods as appropriate       INTERVENTIONS:  - Monitor oral intake, urinary output, labs, and treatment plans  - Assess nutrition and hydration status and recommend course of action  - Evaluate amount of meals eaten  - Assist patient with eating if necessary   - Allow adequate time for meals  - Recommend/ encourage appropriate diets, oral nutritional supplements, and vitamin/mineral supplements  - Order, calculate, and assess calorie counts as needed  - Recommend, monitor, and adjust tube feedings and TPN/PPN based on assessed needs  - Assess need for intravenous fluids  - Provide specific nutrition/hydration education as appropriate  - Include patient/family/caregiver in decisions related to nutrition  Outcome: Progressing

## 2022-03-20 LAB
ANION GAP SERPL CALCULATED.3IONS-SCNC: 8 MMOL/L (ref 4–13)
BASOPHILS # BLD AUTO: 0.06 THOUSANDS/ΜL (ref 0–0.1)
BASOPHILS NFR BLD AUTO: 1 % (ref 0–1)
BUN SERPL-MCNC: 7 MG/DL (ref 5–25)
CALCIUM SERPL-MCNC: 8.9 MG/DL (ref 8.4–10.2)
CHLORIDE SERPL-SCNC: 101 MMOL/L (ref 96–108)
CO2 SERPL-SCNC: 28 MMOL/L (ref 21–32)
CREAT SERPL-MCNC: 0.64 MG/DL (ref 0.6–1.3)
EOSINOPHIL # BLD AUTO: 0.25 THOUSAND/ΜL (ref 0–0.61)
EOSINOPHIL NFR BLD AUTO: 6 % (ref 0–6)
ERYTHROCYTE [DISTWIDTH] IN BLOOD BY AUTOMATED COUNT: 11.9 % (ref 11.6–15.1)
GFR SERPL CREATININE-BSD FRML MDRD: 107 ML/MIN/1.73SQ M
GLUCOSE SERPL-MCNC: 130 MG/DL (ref 65–140)
GLUCOSE SERPL-MCNC: 141 MG/DL (ref 65–140)
GLUCOSE SERPL-MCNC: 143 MG/DL (ref 65–140)
GLUCOSE SERPL-MCNC: 161 MG/DL (ref 65–140)
GLUCOSE SERPL-MCNC: 161 MG/DL (ref 65–140)
HCT VFR BLD AUTO: 43 % (ref 36.5–49.3)
HGB BLD-MCNC: 14.8 G/DL (ref 12–17)
IMM GRANULOCYTES # BLD AUTO: 0.01 THOUSAND/UL (ref 0–0.2)
IMM GRANULOCYTES NFR BLD AUTO: 0 % (ref 0–2)
LYMPHOCYTES # BLD AUTO: 1.42 THOUSANDS/ΜL (ref 0.6–4.47)
LYMPHOCYTES NFR BLD AUTO: 34 % (ref 14–44)
MCH RBC QN AUTO: 31.3 PG (ref 26.8–34.3)
MCHC RBC AUTO-ENTMCNC: 34.4 G/DL (ref 31.4–37.4)
MCV RBC AUTO: 91 FL (ref 82–98)
MONOCYTES # BLD AUTO: 0.34 THOUSAND/ΜL (ref 0.17–1.22)
MONOCYTES NFR BLD AUTO: 8 % (ref 4–12)
NEUTROPHILS # BLD AUTO: 2.13 THOUSANDS/ΜL (ref 1.85–7.62)
NEUTS SEG NFR BLD AUTO: 51 % (ref 43–75)
NRBC BLD AUTO-RTO: 0 /100 WBCS
PLATELET # BLD AUTO: 192 THOUSANDS/UL (ref 149–390)
PMV BLD AUTO: 9.8 FL (ref 8.9–12.7)
POTASSIUM SERPL-SCNC: 3.7 MMOL/L (ref 3.5–5.3)
RBC # BLD AUTO: 4.73 MILLION/UL (ref 3.88–5.62)
SODIUM SERPL-SCNC: 137 MMOL/L (ref 135–147)
WBC # BLD AUTO: 4.21 THOUSAND/UL (ref 4.31–10.16)

## 2022-03-20 PROCEDURE — 80048 BASIC METABOLIC PNL TOTAL CA: CPT

## 2022-03-20 PROCEDURE — 99232 SBSQ HOSP IP/OBS MODERATE 35: CPT

## 2022-03-20 PROCEDURE — 85025 COMPLETE CBC W/AUTO DIFF WBC: CPT

## 2022-03-20 PROCEDURE — 82948 REAGENT STRIP/BLOOD GLUCOSE: CPT

## 2022-03-20 PROCEDURE — 99222 1ST HOSP IP/OBS MODERATE 55: CPT | Performed by: INTERNAL MEDICINE

## 2022-03-20 RX ORDER — SODIUM CHLORIDE, SODIUM LACTATE, POTASSIUM CHLORIDE, CALCIUM CHLORIDE 600; 310; 30; 20 MG/100ML; MG/100ML; MG/100ML; MG/100ML
75 INJECTION, SOLUTION INTRAVENOUS CONTINUOUS
Status: DISCONTINUED | OUTPATIENT
Start: 2022-03-20 | End: 2022-03-22

## 2022-03-20 RX ORDER — OXYCODONE HYDROCHLORIDE 10 MG/1
10 TABLET ORAL EVERY 6 HOURS PRN
Status: DISCONTINUED | OUTPATIENT
Start: 2022-03-20 | End: 2022-03-21

## 2022-03-20 RX ORDER — ACETAMINOPHEN 325 MG/1
975 TABLET ORAL EVERY 8 HOURS SCHEDULED
Status: DISCONTINUED | OUTPATIENT
Start: 2022-03-20 | End: 2022-03-22 | Stop reason: HOSPADM

## 2022-03-20 RX ORDER — AMLODIPINE BESYLATE 5 MG/1
5 TABLET ORAL DAILY
Status: DISCONTINUED | OUTPATIENT
Start: 2022-03-20 | End: 2022-03-22 | Stop reason: HOSPADM

## 2022-03-20 RX ORDER — OXYCODONE HYDROCHLORIDE 5 MG/1
5 TABLET ORAL EVERY 4 HOURS PRN
Status: DISCONTINUED | OUTPATIENT
Start: 2022-03-20 | End: 2022-03-21

## 2022-03-20 RX ADMIN — NICOTINE 1 PATCH: 7 PATCH, EXTENDED RELEASE TRANSDERMAL at 09:07

## 2022-03-20 RX ADMIN — SODIUM CHLORIDE, SODIUM LACTATE, POTASSIUM CHLORIDE, AND CALCIUM CHLORIDE 75 ML/HR: 600; 310; 30; 20 INJECTION, SOLUTION INTRAVENOUS at 14:19

## 2022-03-20 RX ADMIN — SERTRALINE HYDROCHLORIDE 200 MG: 100 TABLET ORAL at 09:04

## 2022-03-20 RX ADMIN — HYDROMORPHONE HYDROCHLORIDE 0.5 MG: 1 INJECTION, SOLUTION INTRAMUSCULAR; INTRAVENOUS; SUBCUTANEOUS at 14:14

## 2022-03-20 RX ADMIN — HYDROMORPHONE HYDROCHLORIDE 0.5 MG: 1 INJECTION, SOLUTION INTRAMUSCULAR; INTRAVENOUS; SUBCUTANEOUS at 22:35

## 2022-03-20 RX ADMIN — PRAVASTATIN SODIUM 20 MG: 20 TABLET ORAL at 17:12

## 2022-03-20 RX ADMIN — OXYCODONE HYDROCHLORIDE 5 MG: 5 TABLET ORAL at 11:57

## 2022-03-20 RX ADMIN — HYDROMORPHONE HYDROCHLORIDE 0.5 MG: 1 INJECTION, SOLUTION INTRAMUSCULAR; INTRAVENOUS; SUBCUTANEOUS at 18:42

## 2022-03-20 RX ADMIN — HYDROMORPHONE HYDROCHLORIDE 0.5 MG: 1 INJECTION, SOLUTION INTRAMUSCULAR; INTRAVENOUS; SUBCUTANEOUS at 09:04

## 2022-03-20 RX ADMIN — OXYCODONE HYDROCHLORIDE 10 MG: 10 TABLET ORAL at 20:26

## 2022-03-20 RX ADMIN — DOCUSATE SODIUM 100 MG: 100 CAPSULE, LIQUID FILLED ORAL at 09:04

## 2022-03-20 RX ADMIN — GABAPENTIN 300 MG: 300 CAPSULE ORAL at 20:27

## 2022-03-20 RX ADMIN — GABAPENTIN 300 MG: 300 CAPSULE ORAL at 09:04

## 2022-03-20 RX ADMIN — GABAPENTIN 300 MG: 300 CAPSULE ORAL at 17:12

## 2022-03-20 RX ADMIN — INSULIN LISPRO 1 UNITS: 100 INJECTION, SOLUTION INTRAVENOUS; SUBCUTANEOUS at 09:05

## 2022-03-20 RX ADMIN — ENOXAPARIN SODIUM 40 MG: 40 INJECTION SUBCUTANEOUS at 09:05

## 2022-03-20 RX ADMIN — PANTOPRAZOLE SODIUM 40 MG: 40 TABLET, DELAYED RELEASE ORAL at 17:12

## 2022-03-20 RX ADMIN — LISINOPRIL 10 MG: 10 TABLET ORAL at 09:05

## 2022-03-20 RX ADMIN — HYDROMORPHONE HYDROCHLORIDE 0.5 MG: 1 INJECTION, SOLUTION INTRAMUSCULAR; INTRAVENOUS; SUBCUTANEOUS at 02:27

## 2022-03-20 RX ADMIN — SODIUM CHLORIDE, SODIUM LACTATE, POTASSIUM CHLORIDE, AND CALCIUM CHLORIDE 75 ML/HR: 600; 310; 30; 20 INJECTION, SOLUTION INTRAVENOUS at 20:24

## 2022-03-20 RX ADMIN — PANTOPRAZOLE SODIUM 40 MG: 40 TABLET, DELAYED RELEASE ORAL at 09:04

## 2022-03-20 RX ADMIN — DOCUSATE SODIUM 100 MG: 100 CAPSULE, LIQUID FILLED ORAL at 17:12

## 2022-03-20 RX ADMIN — OXYCODONE HYDROCHLORIDE 5 MG: 5 TABLET ORAL at 05:41

## 2022-03-20 RX ADMIN — AMLODIPINE BESYLATE 5 MG: 5 TABLET ORAL at 09:05

## 2022-03-20 NOTE — CONSULTS
Consultation - [facility]) [facility]  uKrt Thomson 60 y o  male MRN: [id number]  Unit/Bed#: -01 Encounter: [id number]        Consults    ASSESSMENT/PLAN:     67-year-old gentleman admitted for epigastric pain, found to have pancreatic stranding but normal lipase  1  Epigastric pain:  Differential including groove pancreatitis, chronic pancreatitis, underlying malignancy, peptic ulcer disease  Especially in light of normal lipase and persistent findings would recommend additional evaluation to exclude more significant primary pancreatic pathology  -recommend proceeding with an upper endoscopy tomorrow to evaluate for luminal pathology  -recommend an outpatient MRI/MRCP to evaluate pancreatic parenchyma  -recommend outpatient endoscopic ultrasound to further evaluate pending results of MRI  -low fat diet, tight glycemic control            ______________________________________________________________________    Reason for Consult / Principal Problem: Pancreatitis, acute    HPI: Kurt Thomson is a 60y o  year old male who presents with Pancreatitis, acute 67-year-old gentleman readmitted with epigastric pain  Was just in the hospital with epigastric pain, found to have imaging studies consistent with pancreatitis however his lipase has been normal   He has continued stranding around the head of his pancreas  He was discharged home after eating pizza he had continued epigastric pain which prompted his admission again  Patient with similar findings on his CT scan, on his last admission ultrasound was negative for gallstones, normal common bile duct  Patient has not had any alcohol in 6 months time he does have 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 but reports sobriety  Patient is otherwise treated for hypertension, diabetes, hyperlipidemia      No significant surgical history    Smokes 1 pack a day, smokes marijuana twice per week, reports no alcohol for the past 6 months but previously drank fairly heavily put her on disability  History of lymphoma in his mother  Review of Systems: The remainder of the review of systems was negative except for the pertinent positives noted in HPI       Historical Information   Past Medical History:   Diagnosis Date    Alcohol abuse     Back pain     Chronic low back pain     Depression     Diabetes mellitus (HCC)     DM2 (diabetes mellitus, type 2) (HCC)     Erectile dysfunction     GERD (gastroesophageal reflux disease)     Hepatic steatosis     Hyperlipidemia     Hypertension     Neuropathy     Psychiatric disorder     Tobacco abuse      Past Surgical History:   Procedure Laterality Date    ANKLE FRACTURE SURGERY Right     ANKLE SURGERY Right     INGUINAL HERNIA REPAIR Left     KNEE SURGERY Right     UMBILICAL HERNIA REPAIR       Social History   Social History     Substance and Sexual Activity   Alcohol Use Yes    Comment: 1-2 drinks per month; former heavy drinker, but not for last 2 years     Social History     Substance and Sexual Activity   Drug Use Yes    Types: Marijuana    Comment: not for a while     Social History     Tobacco Use   Smoking Status Current Every Day Smoker    Packs/day: 1 00    Years: 44 00    Pack years: 44 00    Types: Cigarettes    Start date: 1978   Smokeless Tobacco Never Used   Tobacco Comment    per [employer]     Family History   Problem Relation Age of Onset    Lymphoma Mother     No Known Problems Father        Meds/Allergies     Medications Prior to Admission   Medication    Accu-Chek FastClix Lancets MISC    Blood Glucose Monitoring Suppl (Accu-Chek Stefanie Plus) w/Device KIT    fenofibrate 160 MG tablet    gabapentin (NEURONTIN) 300 mg capsule    glimepiride (AMARYL) 2 mg tablet    nicotine (NICODERM CQ) 21 mg/24 hr TD 24 hr patch    omeprazole (PriLOSEC) 40 MG capsule    pioglitazone (ACTOS) 45 mg tablet    ramipril (ALTACE) 10 MG capsule    sertraline (ZOLOFT) 100 mg tablet    simvastatin (ZOCOR) 20 mg tablet    tadalafil (CIALIS) 5 MG tablet     Current Facility-Administered Medications   Medication Dose Route Frequency    acetaminophen (TYLENOL) tablet 650 mg  650 mg Oral Q6H PRN    amLODIPine (NORVASC) tablet 5 mg  5 mg Oral Daily    docusate sodium (COLACE) capsule 100 mg  100 mg Oral BID    enoxaparin (LOVENOX) subcutaneous injection 40 mg  40 mg Subcutaneous Daily    gabapentin (NEURONTIN) capsule 300 mg  300 mg Oral TID    HYDROmorphone (DILAUDID) injection 0 5 mg  0 5 mg Intravenous Q4H PRN    insulin lispro (HumaLOG) 100 units/mL subcutaneous injection 1-5 Units  1-5 Units Subcutaneous TID AC    insulin lispro (HumaLOG) 100 units/mL subcutaneous injection 1-5 Units  1-5 Units Subcutaneous HS    lactated ringers infusion  75 mL/hr Intravenous Continuous    lisinopril (ZESTRIL) tablet 10 mg  10 mg Oral Daily    nicotine (NICODERM CQ) 7 mg/24hr TD 24 hr patch 1 patch  1 patch Transdermal Daily    ondansetron (ZOFRAN) injection 4 mg  4 mg Intravenous Q6H PRN    oxyCODONE (ROXICODONE) IR tablet 2 5 mg  2 5 mg Oral Q6H PRN    oxyCODONE (ROXICODONE) IR tablet 5 mg  5 mg Oral Q6H PRN    pantoprazole (PROTONIX) EC tablet 40 mg  40 mg Oral BID AC    pravastatin (PRAVACHOL) tablet 20 mg  20 mg Oral Daily With Dinner    sertraline (ZOLOFT) tablet 200 mg  200 mg Oral Daily       Allergies   Allergen Reactions    Amoxicillin Swelling    Amoxil [Amoxicillin] Hives       Objective     Blood pressure 157/93, pulse 72, temperature (!) 96 3 °F (35 7 °C), temperature source Tympanic, resp  rate 20, height 5' 11" (1 803 m), weight 101 kg (221 lb 9 oz), SpO2 93 %        Intake/Output Summary (Last 24 hours) at 3/20/2022 1440  Last data filed at 3/20/2022 1401  Gross per 24 hour   Intake 300 ml   Output 1 ml   Net 299 ml       PHYSICAL EXAM     GEN: well nourished, well developed, no acute distress  HEENT: anicteric, MMM, no cervical or supraclavicular lymphadenopathy  CV: RRR, no m/r/g  CHEST: CTA b/l, no WRR  ABD: +BS, positive epigastric tenderness palpation, no rebound or guarding no hepatosplenomegaly  EXT: no c/c/e  SKIN: no rashes,  NEURO: aaox3    Lab Results:   Admission on 03/18/2022   Component Date Value    WBC 03/18/2022 6 66     RBC 03/18/2022 5 55     Hemoglobin 03/18/2022 17 5*    Hematocrit 03/18/2022 48 1     MCV 03/18/2022 87     MCH 03/18/2022 31 5     MCHC 03/18/2022 36 4     RDW 03/18/2022 11 8     MPV 03/18/2022 9 5     Platelets 69/73/8930 254     nRBC 03/18/2022 0     Neutrophils Relative 03/18/2022 59     Immat GRANS % 03/18/2022 0     Lymphocytes Relative 03/18/2022 31     Monocytes Relative 03/18/2022 6     Eosinophils Relative 03/18/2022 3     Basophils Relative 03/18/2022 1     Neutrophils Absolute 03/18/2022 3 89     Immature Grans Absolute 03/18/2022 0 02     Lymphocytes Absolute 03/18/2022 2 08     Monocytes Absolute 03/18/2022 0 41     Eosinophils Absolute 03/18/2022 0 17     Basophils Absolute 03/18/2022 0 09     Sodium 03/18/2022 136     Potassium 03/18/2022 3 5     Chloride 03/18/2022 99     CO2 03/18/2022 26     ANION GAP 03/18/2022 11     BUN 03/18/2022 10     Creatinine 03/18/2022 0 76     Glucose 03/18/2022 196*    Calcium 03/18/2022 9 7     AST 03/18/2022 29     ALT 03/18/2022 43     Alkaline Phosphatase 03/18/2022 106*    Total Protein 03/18/2022 7 9     Albumin 03/18/2022 4 4     Total Bilirubin 03/18/2022 0 89     eGFR 03/18/2022 100     Lipase 03/18/2022 33     Magnesium 03/18/2022 1 4*    Ethanol Lvl 03/18/2022 <10     POC Glucose 03/18/2022 145*    POC Glucose 03/18/2022 138     Sodium 03/19/2022 136     Potassium 03/19/2022 3 8     Chloride 03/19/2022 100     CO2 03/19/2022 29     ANION GAP 03/19/2022 7     BUN 03/19/2022 9     Creatinine 03/19/2022 0 65     Glucose 03/19/2022 128     Calcium 03/19/2022 8 8     eGFR 03/19/2022 107     Lipase 03/19/2022 24     POC Glucose 03/19/2022 154*    POC Glucose 03/19/2022 158*    POC Glucose 03/19/2022 209*    POC Glucose 03/19/2022 151*    Sodium 03/20/2022 137     Potassium 03/20/2022 3 7     Chloride 03/20/2022 101     CO2 03/20/2022 28     ANION GAP 03/20/2022 8     BUN 03/20/2022 7     Creatinine 03/20/2022 0 64     Glucose 03/20/2022 161*    Calcium 03/20/2022 8 9     eGFR 03/20/2022 107     WBC 03/20/2022 4 21*    RBC 03/20/2022 4 73     Hemoglobin 03/20/2022 14 8     Hematocrit 03/20/2022 43 0     MCV 03/20/2022 91     MCH 03/20/2022 31 3     MCHC 03/20/2022 34 4     RDW 03/20/2022 11 9     MPV 03/20/2022 9 8     Platelets 60/56/0286 192     nRBC 03/20/2022 0     Neutrophils Relative 03/20/2022 51     Immat GRANS % 03/20/2022 0     Lymphocytes Relative 03/20/2022 34     Monocytes Relative 03/20/2022 8     Eosinophils Relative 03/20/2022 6     Basophils Relative 03/20/2022 1     Neutrophils Absolute 03/20/2022 2 13     Immature Grans Absolute 03/20/2022 0 01     Lymphocytes Absolute 03/20/2022 1 42     Monocytes Absolute 03/20/2022 0 34     Eosinophils Absolute 03/20/2022 0 25     Basophils Absolute 03/20/2022 0 06     POC Glucose 03/20/2022 161*    POC Glucose 03/20/2022 143*     Imaging Studies: I have personally reviewed pertinent reports               ]

## 2022-03-20 NOTE — ASSESSMENT & PLAN NOTE
· BP reviewed and slightly elevated  · Home Medications:  · Ramipril  · C/w Ace- inhibitor   · Will add Amlodipine 5 mg Daily

## 2022-03-20 NOTE — ASSESSMENT & PLAN NOTE
Lab Results   Component Value Date    HGBA1C 9 1 (H) 10/28/2021       Recent Labs     03/19/22  1618 03/19/22  2149 03/20/22  0806 03/20/22  1136   POCGLU 209* 151* 161* 143*       Blood Sugar Average: Last 72 hrs:  (P) 157 375     · Will hold oral anti diabetics glimepiride and Actos while admitted  · C/w Accu-checks and SSI AC/HS   · Hypoglycemic protocol  · Once diet resumes, will place on diabetic diet

## 2022-03-20 NOTE — PLAN OF CARE
Problem: Potential for Falls  Goal: Patient will remain free of falls  Description: INTERVENTIONS:  - Educate patient/family on patient safety including physical limitations  - Instruct patient to call for assistance with activity   - Consult OT/PT to assist with strengthening/mobility   - Keep Call bell within reach  - Keep bed low and locked with side rails adjusted as appropriate  - Keep care items and personal belongings within reach  - Initiate and maintain comfort rounds  - Make Fall Risk Sign visible to staff  - Offer Toileting every 2 Hours, in advance of need  - Initiate/Maintain alarm  - Obtain necessary fall risk management equipmen  - Apply yellow socks and bracelet for high fall risk patients  - Consider moving patient to room near nurses station  3/20/2022 0111 by Kevin Cruz RN  Outcome: Progressing  3/20/2022 0105 by Kevin Cruz RN  Outcome: Progressing     Problem: Nutrition/Hydration-ADULT  Goal: Nutrient/Hydration intake appropriate for improving, restoring or maintaining nutritional needs  Description: Monitor and assess patient's nutrition/hydration status for malnutrition  Collaborate with interdisciplinary team and initiate plan and interventions as ordered  Monitor patient's weight and dietary intake as ordered or per policy  Utilize nutrition screening tool and intervene as necessary  Determine patient's food preferences and provide high-protein, high-caloric foods as appropriate       INTERVENTIONS:  - Monitor oral intake, urinary output, labs, and treatment plans  - Assess nutrition and hydration status and recommend course of action  - Evaluate amount of meals eaten  - Assist patient with eating if necessary   - Allow adequate time for meals  - Recommend/ encourage appropriate diets, oral nutritional supplements, and vitamin/mineral supplements  - Order, calculate, and assess calorie counts as needed  - Recommend, monitor, and adjust tube feedings and TPN/PPN based on assessed needs  - Assess need for intravenous fluids  - Provide specific nutrition/hydration education as appropriate  - Include patient/family/caregiver in decisions related to nutrition  3/20/2022 0111 by Emelina Escudero RN  Outcome: Progressing  3/20/2022 0105 by Emelina Escudero, RN  Outcome: Progressing

## 2022-03-20 NOTE — H&P (VIEW-ONLY)
Consultation - [facility]  Kurt Thomson 60 y o  male MRN: [id number]  Unit/Bed#: -01 Encounter: [id number]        Consults    ASSESSMENT/PLAN:     53-year-old gentleman admitted for epigastric pain, found to have pancreatic stranding but normal lipase  1  Epigastric pain:  Differential including groove pancreatitis, chronic pancreatitis, underlying malignancy, peptic ulcer disease  Especially in light of normal lipase and persistent findings would recommend additional evaluation to exclude more significant primary pancreatic pathology  -recommend proceeding with an upper endoscopy tomorrow to evaluate for luminal pathology  -recommend an outpatient MRI/MRCP to evaluate pancreatic parenchyma  -recommend outpatient endoscopic ultrasound to further evaluate pending results of MRI  -low fat diet, tight glycemic control            ______________________________________________________________________    Reason for Consult / Principal Problem: Pancreatitis, acute    HPI: Kurt Thomson is a 60y o  year old male who presents with Pancreatitis, acute 55-year-old gentleman readmitted with epigastric pain  Was just in the hospital with epigastric pain, found to have imaging studies consistent with pancreatitis however his lipase has been normal   He has continued stranding around the head of his pancreas  He was discharged home after eating pizza he had continued epigastric pain which prompted his admission again  Patient with similar findings on his CT scan, on his last admission ultrasound was negative for gallstones, normal common bile duct  Patient has not had any alcohol in 6 months time he does have 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 but reports sobriety  Patient is otherwise treated for hypertension, diabetes, hyperlipidemia      No significant surgical history    Smokes 1 pack a day, smokes marijuana twice per week, reports no alcohol for the past 6 months but previously drank fairly heavily put her on disability  History of lymphoma in his mother  Review of Systems: The remainder of the review of systems was negative except for the pertinent positives noted in HPI       Historical Information   Past Medical History:   Diagnosis Date    Alcohol abuse     Back pain     Chronic low back pain     Depression     Diabetes mellitus (HCC)     DM2 (diabetes mellitus, type 2) (HCC)     Erectile dysfunction     GERD (gastroesophageal reflux disease)     Hepatic steatosis     Hyperlipidemia     Hypertension     Neuropathy     Psychiatric disorder     Tobacco abuse      Past Surgical History:   Procedure Laterality Date    ANKLE FRACTURE SURGERY Right     ANKLE SURGERY Right     INGUINAL HERNIA REPAIR Left     KNEE SURGERY Right     UMBILICAL HERNIA REPAIR       Social History   Social History     Substance and Sexual Activity   Alcohol Use Yes    Comment: 1-2 drinks per month; former heavy drinker, but not for last 2 years     Social History     Substance and Sexual Activity   Drug Use Yes    Types: Marijuana    Comment: not for a while     Social History     Tobacco Use   Smoking Status Current Every Day Smoker    Packs/day: 1 00    Years: 44 00    Pack years: 44 00    Types: Cigarettes    Start date: 1978   Smokeless Tobacco Never Used   Tobacco Comment    per [employer]     Family History   Problem Relation Age of Onset    Lymphoma Mother     No Known Problems Father        Meds/Allergies     Medications Prior to Admission   Medication    Accu-Chek FastClix Lancets MISC    Blood Glucose Monitoring Suppl (Accu-Chek Stefanie Plus) w/Device KIT    fenofibrate 160 MG tablet    gabapentin (NEURONTIN) 300 mg capsule    glimepiride (AMARYL) 2 mg tablet    nicotine (NICODERM CQ) 21 mg/24 hr TD 24 hr patch    omeprazole (PriLOSEC) 40 MG capsule    pioglitazone (ACTOS) 45 mg tablet    ramipril (ALTACE) 10 MG capsule    sertraline (ZOLOFT) 100 mg tablet    simvastatin (ZOCOR) 20 mg tablet    tadalafil (CIALIS) 5 MG tablet     Current Facility-Administered Medications   Medication Dose Route Frequency    acetaminophen (TYLENOL) tablet 650 mg  650 mg Oral Q6H PRN    amLODIPine (NORVASC) tablet 5 mg  5 mg Oral Daily    docusate sodium (COLACE) capsule 100 mg  100 mg Oral BID    enoxaparin (LOVENOX) subcutaneous injection 40 mg  40 mg Subcutaneous Daily    gabapentin (NEURONTIN) capsule 300 mg  300 mg Oral TID    HYDROmorphone (DILAUDID) injection 0 5 mg  0 5 mg Intravenous Q4H PRN    insulin lispro (HumaLOG) 100 units/mL subcutaneous injection 1-5 Units  1-5 Units Subcutaneous TID AC    insulin lispro (HumaLOG) 100 units/mL subcutaneous injection 1-5 Units  1-5 Units Subcutaneous HS    lactated ringers infusion  75 mL/hr Intravenous Continuous    lisinopril (ZESTRIL) tablet 10 mg  10 mg Oral Daily    nicotine (NICODERM CQ) 7 mg/24hr TD 24 hr patch 1 patch  1 patch Transdermal Daily    ondansetron (ZOFRAN) injection 4 mg  4 mg Intravenous Q6H PRN    oxyCODONE (ROXICODONE) IR tablet 2 5 mg  2 5 mg Oral Q6H PRN    oxyCODONE (ROXICODONE) IR tablet 5 mg  5 mg Oral Q6H PRN    pantoprazole (PROTONIX) EC tablet 40 mg  40 mg Oral BID AC    pravastatin (PRAVACHOL) tablet 20 mg  20 mg Oral Daily With Dinner    sertraline (ZOLOFT) tablet 200 mg  200 mg Oral Daily       Allergies   Allergen Reactions    Amoxicillin Swelling    Amoxil [Amoxicillin] Hives       Objective     Blood pressure 157/93, pulse 72, temperature (!) 96 3 °F (35 7 °C), temperature source Tympanic, resp  rate 20, height 5' 11" (1 803 m), weight 101 kg (221 lb 9 oz), SpO2 93 %        Intake/Output Summary (Last 24 hours) at 3/20/2022 1440  Last data filed at 3/20/2022 1401  Gross per 24 hour   Intake 300 ml   Output 1 ml   Net 299 ml       PHYSICAL EXAM     GEN: well nourished, well developed, no acute distress  HEENT: anicteric, MMM, no cervical or supraclavicular lymphadenopathy  CV: RRR, no m/r/g  CHEST: CTA b/l, no WRR  ABD: +BS, positive epigastric tenderness palpation, no rebound or guarding no hepatosplenomegaly  EXT: no c/c/e  SKIN: no rashes,  NEURO: aaox3    Lab Results:   Admission on 03/18/2022   Component Date Value    WBC 03/18/2022 6 66     RBC 03/18/2022 5 55     Hemoglobin 03/18/2022 17 5*    Hematocrit 03/18/2022 48 1     MCV 03/18/2022 87     MCH 03/18/2022 31 5     MCHC 03/18/2022 36 4     RDW 03/18/2022 11 8     MPV 03/18/2022 9 5     Platelets 54/44/9390 254     nRBC 03/18/2022 0     Neutrophils Relative 03/18/2022 59     Immat GRANS % 03/18/2022 0     Lymphocytes Relative 03/18/2022 31     Monocytes Relative 03/18/2022 6     Eosinophils Relative 03/18/2022 3     Basophils Relative 03/18/2022 1     Neutrophils Absolute 03/18/2022 3 89     Immature Grans Absolute 03/18/2022 0 02     Lymphocytes Absolute 03/18/2022 2 08     Monocytes Absolute 03/18/2022 0 41     Eosinophils Absolute 03/18/2022 0 17     Basophils Absolute 03/18/2022 0 09     Sodium 03/18/2022 136     Potassium 03/18/2022 3 5     Chloride 03/18/2022 99     CO2 03/18/2022 26     ANION GAP 03/18/2022 11     BUN 03/18/2022 10     Creatinine 03/18/2022 0 76     Glucose 03/18/2022 196*    Calcium 03/18/2022 9 7     AST 03/18/2022 29     ALT 03/18/2022 43     Alkaline Phosphatase 03/18/2022 106*    Total Protein 03/18/2022 7 9     Albumin 03/18/2022 4 4     Total Bilirubin 03/18/2022 0 89     eGFR 03/18/2022 100     Lipase 03/18/2022 33     Magnesium 03/18/2022 1 4*    Ethanol Lvl 03/18/2022 <10     POC Glucose 03/18/2022 145*    POC Glucose 03/18/2022 138     Sodium 03/19/2022 136     Potassium 03/19/2022 3 8     Chloride 03/19/2022 100     CO2 03/19/2022 29     ANION GAP 03/19/2022 7     BUN 03/19/2022 9     Creatinine 03/19/2022 0 65     Glucose 03/19/2022 128     Calcium 03/19/2022 8 8     eGFR 03/19/2022 107     Lipase 03/19/2022 24     POC Glucose 03/19/2022 154*    POC Glucose 03/19/2022 158*    POC Glucose 03/19/2022 209*    POC Glucose 03/19/2022 151*    Sodium 03/20/2022 137     Potassium 03/20/2022 3 7     Chloride 03/20/2022 101     CO2 03/20/2022 28     ANION GAP 03/20/2022 8     BUN 03/20/2022 7     Creatinine 03/20/2022 0 64     Glucose 03/20/2022 161*    Calcium 03/20/2022 8 9     eGFR 03/20/2022 107     WBC 03/20/2022 4 21*    RBC 03/20/2022 4 73     Hemoglobin 03/20/2022 14 8     Hematocrit 03/20/2022 43 0     MCV 03/20/2022 91     MCH 03/20/2022 31 3     MCHC 03/20/2022 34 4     RDW 03/20/2022 11 9     MPV 03/20/2022 9 8     Platelets 97/53/3990 192     nRBC 03/20/2022 0     Neutrophils Relative 03/20/2022 51     Immat GRANS % 03/20/2022 0     Lymphocytes Relative 03/20/2022 34     Monocytes Relative 03/20/2022 8     Eosinophils Relative 03/20/2022 6     Basophils Relative 03/20/2022 1     Neutrophils Absolute 03/20/2022 2 13     Immature Grans Absolute 03/20/2022 0 01     Lymphocytes Absolute 03/20/2022 1 42     Monocytes Absolute 03/20/2022 0 34     Eosinophils Absolute 03/20/2022 0 25     Basophils Absolute 03/20/2022 0 06     POC Glucose 03/20/2022 161*    POC Glucose 03/20/2022 143*     Imaging Studies: I have personally reviewed pertinent reports               ]

## 2022-03-20 NOTE — ASSESSMENT & PLAN NOTE
· Patient presents with right upper quadrant and epigastric pain 10/10 radiating to the back associated with nausea and unable to eat for 4 days  · Patient states last alcohol use was 3 months ago  · Of Note, patient recently admitted with acute pancreatitis and discharged less than a week ago  · U/S of RUQ (3/10) showed No evidence of acute cholecystitis  Hepatic fibrofatty infiltration and hepatomegaly  · Lipase is within normal limits, CT of the abdomen was reviewed shows no change from prior study and shows very mild nonspecific fat stranding around the pancreatic head and uncinate process  · C/w IV fluid hydration  · C/w clear liquid diet  · Continue pain management  · Gastroenterology consulted, appreciate recommendations:  · Discussed with gastroenterology via TT  Plan to continue clear liquid diet  Patient to be NPO at midnight  Plan for EGD tomorrow, Monday 3/21

## 2022-03-20 NOTE — PROGRESS NOTES
[facility] [address]  Progress Note - Joe Cohen 1965, 60 y o  male MRN: [id number]  Unit/Bed#: -01 Encounter: [id number]  Primary Care Provider: Claudia Hendrickson MD   Date and time admitted to hospital: 3/18/2022 12:29 PM    * Pancreatitis, acute  Assessment & Plan  · Patient presents with right upper quadrant and epigastric pain 10/10 radiating to the back associated with nausea and unable to eat for 4 days  · Patient states last alcohol use was 3 months ago  · Of Note, patient recently admitted with acute pancreatitis and discharged less than a week ago  · U/S of RUQ (3/10) showed No evidence of acute cholecystitis  Hepatic fibrofatty infiltration and hepatomegaly  · Lipase is within normal limits, CT of the abdomen was reviewed shows no change from prior study and shows very mild nonspecific fat stranding around the pancreatic head and uncinate process  · C/w IV fluid hydration  · C/w clear liquid diet  · Continue pain management  · Gastroenterology consulted, appreciate recommendations:  · Discussed with gastroenterology via TT  Plan to continue clear liquid diet  Patient to be NPO at midnight  Plan for EGD tomorrow, Monday 3/21      Essential hypertension  Assessment & Plan  · BP reviewed and slightly elevated  · Home Medications:  · Ramipril  · C/w Ace- inhibitor   · Will add Amlodipine 5 mg Daily    Type 2 diabetes mellitus with hyperglycemia [facility])  Assessment & Plan  Lab Results   Component Value Date    HGBA1C 9 1 (H) 10/28/2021       Recent Labs     03/19/22  1618 03/19/22  2149 03/20/22  0806 03/20/22  1136   POCGLU 209* 151* 161* 143*       Blood Sugar Average: Last 72 hrs:  (P) 157 375     · Will hold oral anti diabetics glimepiride and Actos while admitted  · C/w Accu-checks and SSI AC/HS   · Hypoglycemic protocol  · Once diet resumes, will place on diabetic diet    Gastroesophageal reflux disease without esophagitis  Assessment & Plan  · Continue PPI    Dyslipidemia  Assessment & Plan  · Continue statin therapy      VTE Pharmacologic Prophylaxis: VTE Score: 2 Moderate Risk (Score 3-4) - Pharmacological DVT Prophylaxis Ordered: enoxaparin (Lovenox)  Patient Centered Rounds: I performed bedside rounds with nursing staff today  Discussions with Specialists or Other Care Team Provider:  Gastroenterology    Education and Discussions with Family / Patient: Patient declined call to   Time Spent for Care: 30 minutes  More than 50% of total time spent on counseling and coordination of care as described above  Current Length of Stay: 1 day(s)  Current Patient Status: Inpatient   Certification Statement: The patient will continue to require additional inpatient hospital stay due to Acute pancreatitis with poor oral intake requiring IV hydration and Gastroenterology consultation  Plan for EGD tomorrow  Discharge Plan: Anticipate discharge in 24-48 hrs to home  Code Status: Level 1 - Full Code    Subjective:   Patient states he is having continued abdominal pain  Patient states this pain is not well controlled with the Dilaudid wishes to have more Dilaudid ordered  Patient denies any active chest pain, worsening shortness of breath, nausea, vomiting or diarrhea  Objective:     Vitals:   Temp (24hrs), Av °F (36 1 °C), Min:96 3 °F (35 7 °C), Max:98 1 °F (36 7 °C)    Temp:  [96 3 °F (35 7 °C)-98 1 °F (36 7 °C)] 96 3 °F (35 7 °C)  HR:  [70-72] 72  Resp:  [18-20] 20  BP: (137-157)/(82-93) 157/93  SpO2:  [93 %-95 %] 93 %  Body mass index is 30 9 kg/m²  Input and Output Summary (last 24 hours):   No intake or output data in the 24 hours ending 22 1417    Physical Exam:   Physical Exam  Vitals and nursing note reviewed  Constitutional:       General: He is not in acute distress  Appearance: Normal appearance  He is normal weight  HENT:      Head: Normocephalic  Nose: Nose normal  No congestion        Mouth/Throat: Mouth: Mucous membranes are moist       Pharynx: Oropharynx is clear  Eyes:      Pupils: Pupils are equal, round, and reactive to light  Cardiovascular:      Rate and Rhythm: Normal rate and regular rhythm  Pulses: Normal pulses  Heart sounds: Normal heart sounds  No murmur heard  Pulmonary:      Effort: Pulmonary effort is normal  No respiratory distress  Breath sounds: Normal breath sounds  Abdominal:      General: Abdomen is flat  Bowel sounds are normal  There is no distension  Palpations: Abdomen is soft  Tenderness: There is abdominal tenderness  Musculoskeletal:         General: No swelling  Normal range of motion  Cervical back: Normal range of motion  No tenderness  Right lower leg: No edema  Left lower leg: No edema  Skin:     General: Skin is warm and dry  Capillary Refill: Capillary refill takes less than 2 seconds  Neurological:      General: No focal deficit present  Mental Status: He is alert and oriented to person, place, and time  Mental status is at baseline  Psychiatric:         Attention and Perception: Attention normal          Mood and Affect: Mood normal          Speech: Speech normal          Behavior: Behavior normal  Behavior is cooperative  Thought Content:  Thought content normal          Judgment: Judgment normal          Additional Data:     Labs:  Results from last 7 days   Lab Units 03/20/22  0611   WBC Thousand/uL 4 21*   HEMOGLOBIN g/dL 14 8   HEMATOCRIT % 43 0   PLATELETS Thousands/uL 192   NEUTROS PCT % 51   LYMPHS PCT % 34   MONOS PCT % 8   EOS PCT % 6     Results from last 7 days   Lab Units 03/20/22  0611 03/19/22  0514 03/18/22  1246   SODIUM mmol/L 137   < > 136   POTASSIUM mmol/L 3 7   < > 3 5   CHLORIDE mmol/L 101   < > 99   CO2 mmol/L 28   < > 26   BUN mg/dL 7   < > 10   CREATININE mg/dL 0 64   < > 0 76   ANION GAP mmol/L 8   < > 11   CALCIUM mg/dL 8 9   < > 9 7   ALBUMIN g/dL  --   --  4 4   TOTAL BILIRUBIN mg/dL  --   --  0 89   ALK PHOS U/L  --   --  106*   ALT U/L  --   --  43   AST U/L  --   --  29   GLUCOSE RANDOM mg/dL 161*   < > 196*    < > = values in this interval not displayed  Results from last 7 days   Lab Units 03/20/22  1136 03/20/22  0806 03/19/22  2149 03/19/22  1618 03/19/22  1111 03/19/22  0702 03/18/22  2143 03/18/22  1659   POC GLUCOSE mg/dl 143* 161* 151* 209* 158* 154* 138 145*               Lines/Drains:  Invasive Devices  Report    Peripheral Intravenous Line            Peripheral IV 03/18/22 Left Antecubital 2 days                      Imaging: No pertinent imaging reviewed      Recent Cultures (last 7 days):         Last 24 Hours Medication List:   Current Facility-Administered Medications   Medication Dose Route Frequency Provider Last Rate    acetaminophen  650 mg Oral Q6H PRN Haley Byers MD      amLODIPine  5 mg Oral Daily KRISTOFER Wood      docusate sodium  100 mg Oral BID Haley Byers MD      enoxaparin  40 mg Subcutaneous Daily Haley Byers MD      gabapentin  300 mg Oral TID Haley Byers MD      HYDROmorphone  0 5 mg Intravenous Q4H PRN Haley Byers MD      insulin lispro  1-5 Units Subcutaneous TID [facility] Haley Byers MD      insulin lispro  1-5 Units Subcutaneous HS Haley Byers MD      lactated ringers  75 mL/hr Intravenous Continuous KRISTOFER Zhang      lisinopril  10 mg Oral Daily Haley Byers MD      nicotine  1 patch Transdermal Daily Haley Byers MD      ondansetron  4 mg Intravenous Q6H PRN Haley Byers MD      oxyCODONE  2 5 mg Oral Q6H PRN Laureen Brody PA-C      oxyCODONE  5 mg Oral Q6H PRN Laureen Brody PA-C      pantoprazole  40 mg Oral BID AC Haley Byers MD      pravastatin  20 mg Oral Daily With Nimo Vargas MD      sertraline  200 mg Oral Daily Haley Byers MD          Today, Patient Was Seen By: Janice Kang KRISTOFER    **Please Note: This note may have been constructed using a voice recognition system  **

## 2022-03-21 ENCOUNTER — ANESTHESIA (INPATIENT)
Dept: GASTROENTEROLOGY | Facility: HOSPITAL | Age: 57
DRG: 440 | End: 2022-03-21
Payer: COMMERCIAL

## 2022-03-21 ENCOUNTER — APPOINTMENT (INPATIENT)
Dept: GASTROENTEROLOGY | Facility: HOSPITAL | Age: 57
DRG: 440 | End: 2022-03-21
Payer: COMMERCIAL

## 2022-03-21 ENCOUNTER — ANESTHESIA EVENT (INPATIENT)
Dept: GASTROENTEROLOGY | Facility: HOSPITAL | Age: 57
DRG: 440 | End: 2022-03-21
Payer: COMMERCIAL

## 2022-03-21 LAB
ANION GAP SERPL CALCULATED.3IONS-SCNC: 8 MMOL/L (ref 4–13)
BASOPHILS # BLD AUTO: 0.09 THOUSANDS/ΜL (ref 0–0.1)
BASOPHILS NFR BLD AUTO: 2 % (ref 0–1)
BUN SERPL-MCNC: 6 MG/DL (ref 5–25)
CALCIUM SERPL-MCNC: 9.5 MG/DL (ref 8.4–10.2)
CHLORIDE SERPL-SCNC: 100 MMOL/L (ref 96–108)
CO2 SERPL-SCNC: 30 MMOL/L (ref 21–32)
CREAT SERPL-MCNC: 0.66 MG/DL (ref 0.6–1.3)
EOSINOPHIL # BLD AUTO: 0.32 THOUSAND/ΜL (ref 0–0.61)
EOSINOPHIL NFR BLD AUTO: 6 % (ref 0–6)
ERYTHROCYTE [DISTWIDTH] IN BLOOD BY AUTOMATED COUNT: 11.9 % (ref 11.6–15.1)
GFR SERPL CREATININE-BSD FRML MDRD: 106 ML/MIN/1.73SQ M
GLUCOSE SERPL-MCNC: 124 MG/DL (ref 65–140)
GLUCOSE SERPL-MCNC: 126 MG/DL (ref 65–140)
GLUCOSE SERPL-MCNC: 132 MG/DL (ref 65–140)
GLUCOSE SERPL-MCNC: 161 MG/DL (ref 65–140)
GLUCOSE SERPL-MCNC: 94 MG/DL (ref 65–140)
HCT VFR BLD AUTO: 44.6 % (ref 36.5–49.3)
HGB BLD-MCNC: 15.5 G/DL (ref 12–17)
IMM GRANULOCYTES # BLD AUTO: 0.02 THOUSAND/UL (ref 0–0.2)
IMM GRANULOCYTES NFR BLD AUTO: 0 % (ref 0–2)
LYMPHOCYTES # BLD AUTO: 1.66 THOUSANDS/ΜL (ref 0.6–4.47)
LYMPHOCYTES NFR BLD AUTO: 31 % (ref 14–44)
MCH RBC QN AUTO: 31.2 PG (ref 26.8–34.3)
MCHC RBC AUTO-ENTMCNC: 34.8 G/DL (ref 31.4–37.4)
MCV RBC AUTO: 90 FL (ref 82–98)
MONOCYTES # BLD AUTO: 0.46 THOUSAND/ΜL (ref 0.17–1.22)
MONOCYTES NFR BLD AUTO: 9 % (ref 4–12)
NEUTROPHILS # BLD AUTO: 2.88 THOUSANDS/ΜL (ref 1.85–7.62)
NEUTS SEG NFR BLD AUTO: 52 % (ref 43–75)
NRBC BLD AUTO-RTO: 0 /100 WBCS
PLATELET # BLD AUTO: 213 THOUSANDS/UL (ref 149–390)
PMV BLD AUTO: 10.4 FL (ref 8.9–12.7)
POTASSIUM SERPL-SCNC: 3.9 MMOL/L (ref 3.5–5.3)
RBC # BLD AUTO: 4.97 MILLION/UL (ref 3.88–5.62)
SODIUM SERPL-SCNC: 138 MMOL/L (ref 135–147)
WBC # BLD AUTO: 5.43 THOUSAND/UL (ref 4.31–10.16)

## 2022-03-21 PROCEDURE — 82948 REAGENT STRIP/BLOOD GLUCOSE: CPT

## 2022-03-21 PROCEDURE — 85025 COMPLETE CBC W/AUTO DIFF WBC: CPT

## 2022-03-21 PROCEDURE — 43239 EGD BIOPSY SINGLE/MULTIPLE: CPT | Performed by: INTERNAL MEDICINE

## 2022-03-21 PROCEDURE — 88305 TISSUE EXAM BY PATHOLOGIST: CPT | Performed by: PATHOLOGY

## 2022-03-21 PROCEDURE — 0DB68ZX EXCISION OF STOMACH, VIA NATURAL OR ARTIFICIAL OPENING ENDOSCOPIC, DIAGNOSTIC: ICD-10-PCS | Performed by: INTERNAL MEDICINE

## 2022-03-21 PROCEDURE — 0DB78ZX EXCISION OF STOMACH, PYLORUS, VIA NATURAL OR ARTIFICIAL OPENING ENDOSCOPIC, DIAGNOSTIC: ICD-10-PCS | Performed by: INTERNAL MEDICINE

## 2022-03-21 PROCEDURE — 80048 BASIC METABOLIC PNL TOTAL CA: CPT

## 2022-03-21 PROCEDURE — 99232 SBSQ HOSP IP/OBS MODERATE 35: CPT

## 2022-03-21 RX ORDER — PROPOFOL 10 MG/ML
INJECTION, EMULSION INTRAVENOUS AS NEEDED
Status: DISCONTINUED | OUTPATIENT
Start: 2022-03-21 | End: 2022-03-21

## 2022-03-21 RX ORDER — OXYCODONE HYDROCHLORIDE 5 MG/1
5 TABLET ORAL EVERY 6 HOURS PRN
Status: DISCONTINUED | OUTPATIENT
Start: 2022-03-21 | End: 2022-03-22 | Stop reason: HOSPADM

## 2022-03-21 RX ORDER — SODIUM CHLORIDE, SODIUM LACTATE, POTASSIUM CHLORIDE, CALCIUM CHLORIDE 600; 310; 30; 20 MG/100ML; MG/100ML; MG/100ML; MG/100ML
INJECTION, SOLUTION INTRAVENOUS CONTINUOUS PRN
Status: DISCONTINUED | OUTPATIENT
Start: 2022-03-21 | End: 2022-03-21

## 2022-03-21 RX ORDER — OXYCODONE HYDROCHLORIDE 10 MG/1
10 TABLET ORAL EVERY 6 HOURS PRN
Status: DISCONTINUED | OUTPATIENT
Start: 2022-03-21 | End: 2022-03-22 | Stop reason: HOSPADM

## 2022-03-21 RX ORDER — GLYCOPYRROLATE 0.2 MG/ML
INJECTION INTRAMUSCULAR; INTRAVENOUS AS NEEDED
Status: DISCONTINUED | OUTPATIENT
Start: 2022-03-21 | End: 2022-03-21

## 2022-03-21 RX ORDER — LIDOCAINE HYDROCHLORIDE 20 MG/ML
INJECTION, SOLUTION EPIDURAL; INFILTRATION; INTRACAUDAL; PERINEURAL AS NEEDED
Status: DISCONTINUED | OUTPATIENT
Start: 2022-03-21 | End: 2022-03-21

## 2022-03-21 RX ORDER — OXYCODONE HYDROCHLORIDE 10 MG/1
10 TABLET ORAL EVERY 4 HOURS PRN
Status: DISCONTINUED | OUTPATIENT
Start: 2022-03-21 | End: 2022-03-21

## 2022-03-21 RX ORDER — HYDROMORPHONE HCL IN WATER/PF 6 MG/30 ML
0.2 PATIENT CONTROLLED ANALGESIA SYRINGE INTRAVENOUS EVERY 6 HOURS PRN
Status: DISCONTINUED | OUTPATIENT
Start: 2022-03-21 | End: 2022-03-22

## 2022-03-21 RX ADMIN — ACETAMINOPHEN 975 MG: 325 TABLET, FILM COATED ORAL at 13:50

## 2022-03-21 RX ADMIN — OXYCODONE HYDROCHLORIDE 5 MG: 5 TABLET ORAL at 20:55

## 2022-03-21 RX ADMIN — HYDROMORPHONE HYDROCHLORIDE 0.5 MG: 1 INJECTION, SOLUTION INTRAMUSCULAR; INTRAVENOUS; SUBCUTANEOUS at 03:37

## 2022-03-21 RX ADMIN — NICOTINE 1 PATCH: 7 PATCH, EXTENDED RELEASE TRANSDERMAL at 13:50

## 2022-03-21 RX ADMIN — HYDROMORPHONE HYDROCHLORIDE 0.2 MG: 0.2 INJECTION, SOLUTION INTRAMUSCULAR; INTRAVENOUS; SUBCUTANEOUS at 18:34

## 2022-03-21 RX ADMIN — GABAPENTIN 300 MG: 300 CAPSULE ORAL at 20:55

## 2022-03-21 RX ADMIN — OXYCODONE HYDROCHLORIDE 10 MG: 10 TABLET ORAL at 16:27

## 2022-03-21 RX ADMIN — PANTOPRAZOLE SODIUM 40 MG: 40 TABLET, DELAYED RELEASE ORAL at 16:27

## 2022-03-21 RX ADMIN — PROPOFOL 50 MG: 10 INJECTION, EMULSION INTRAVENOUS at 12:37

## 2022-03-21 RX ADMIN — GLYCOPYRROLATE 0.1 MG: 0.2 INJECTION, SOLUTION INTRAMUSCULAR; INTRAVENOUS at 12:31

## 2022-03-21 RX ADMIN — PROPOFOL 50 MG: 10 INJECTION, EMULSION INTRAVENOUS at 12:33

## 2022-03-21 RX ADMIN — SODIUM CHLORIDE, SODIUM LACTATE, POTASSIUM CHLORIDE, AND CALCIUM CHLORIDE: .6; .31; .03; .02 INJECTION, SOLUTION INTRAVENOUS at 12:25

## 2022-03-21 RX ADMIN — HYDROMORPHONE HYDROCHLORIDE 0.5 MG: 1 INJECTION, SOLUTION INTRAMUSCULAR; INTRAVENOUS; SUBCUTANEOUS at 13:50

## 2022-03-21 RX ADMIN — LISINOPRIL 10 MG: 10 TABLET ORAL at 13:50

## 2022-03-21 RX ADMIN — HYDROMORPHONE HYDROCHLORIDE 0.2 MG: 0.2 INJECTION, SOLUTION INTRAMUSCULAR; INTRAVENOUS; SUBCUTANEOUS at 22:47

## 2022-03-21 RX ADMIN — LIDOCAINE HYDROCHLORIDE 100 MG: 20 INJECTION, SOLUTION EPIDURAL; INFILTRATION; INTRACAUDAL; PERINEURAL at 12:31

## 2022-03-21 RX ADMIN — PROPOFOL 50 MG: 10 INJECTION, EMULSION INTRAVENOUS at 12:35

## 2022-03-21 RX ADMIN — ACETAMINOPHEN 975 MG: 325 TABLET, FILM COATED ORAL at 21:32

## 2022-03-21 RX ADMIN — AMLODIPINE BESYLATE 5 MG: 5 TABLET ORAL at 13:50

## 2022-03-21 RX ADMIN — PROPOFOL 150 MG: 10 INJECTION, EMULSION INTRAVENOUS at 12:32

## 2022-03-21 RX ADMIN — OXYCODONE HYDROCHLORIDE 5 MG: 5 TABLET ORAL at 00:22

## 2022-03-21 RX ADMIN — HYDROMORPHONE HYDROCHLORIDE 0.5 MG: 1 INJECTION, SOLUTION INTRAMUSCULAR; INTRAVENOUS; SUBCUTANEOUS at 08:02

## 2022-03-21 RX ADMIN — SERTRALINE HYDROCHLORIDE 200 MG: 100 TABLET ORAL at 13:50

## 2022-03-21 RX ADMIN — OXYCODONE HYDROCHLORIDE 10 MG: 10 TABLET ORAL at 06:22

## 2022-03-21 RX ADMIN — GABAPENTIN 300 MG: 300 CAPSULE ORAL at 16:27

## 2022-03-21 RX ADMIN — PRAVASTATIN SODIUM 20 MG: 20 TABLET ORAL at 16:27

## 2022-03-21 NOTE — ASSESSMENT & PLAN NOTE
Lab Results   Component Value Date    HGBA1C 9 1 (H) 10/28/2021       Recent Labs     03/20/22  0806 03/20/22  1136 03/20/22  1529 03/20/22 2120   POCGLU 161* 143* 130 141*       Blood Sugar Average: Last 72 hrs:  (P) 153     · Will hold oral anti diabetics glimepiride and Actos while admitted  · C/w Accu-checks and SSI AC/HS   · Hypoglycemic protocol  · Once diet resumes, will place on diabetic diet

## 2022-03-21 NOTE — PLAN OF CARE
Problem: Nutrition/Hydration-ADULT  Goal: Nutrient/Hydration intake appropriate for improving, restoring or maintaining nutritional needs  Description: Monitor and assess patient's nutrition/hydration status for malnutrition  Collaborate with interdisciplinary team and initiate plan and interventions as ordered  Monitor patient's weight and dietary intake as ordered or per policy  Utilize nutrition screening tool and intervene as necessary  Determine patient's food preferences and provide high-protein, high-caloric foods as appropriate       INTERVENTIONS:  - Monitor oral intake, urinary output, labs, and treatment plans  - Assess nutrition and hydration status and recommend course of action  - Evaluate amount of meals eaten  - Assist patient with eating if necessary   - Allow adequate time for meals  - Recommend/ encourage appropriate diets, oral nutritional supplements, and vitamin/mineral supplements  - Order, calculate, and assess calorie counts as needed  - Recommend, monitor, and adjust tube feedings and TPN/PPN based on assessed needs  - Assess need for intravenous fluids  - Provide specific nutrition/hydration education as appropriate  - Include patient/family/caregiver in decisions related to nutrition  Outcome: Progressing     Problem: GASTROINTESTINAL - ADULT  Goal: Minimal or absence of nausea and/or vomiting  Description: INTERVENTIONS:  - Administer IV fluids if ordered to ensure adequate hydration  - Maintain NPO status until nausea and vomiting are resolved  - Nasogastric tube if ordered  - Administer ordered antiemetic medications as needed  - Provide nonpharmacologic comfort measures as appropriate  - Advance diet as tolerated, if ordered  - Consider nutrition services referral to assist patient with adequate nutrition and appropriate food choices  Outcome: Progressing

## 2022-03-21 NOTE — ANESTHESIA PREPROCEDURE EVALUATION
Procedure:  EGD    Relevant Problems   CARDIO   (+) Essential hypertension      ENDO   (+) Type 2 diabetes mellitus with hyperglycemia (HCC)      GI/HEPATIC   (+) Gastroesophageal reflux disease without esophagitis   (+) Hepatic steatosis   (+) Pancreatitis, acute      /RENAL   (+) Lesion of left native kidney      MUSCULOSKELETAL   (+) Chronic bilateral low back pain with bilateral sciatica      NEURO/PSYCH   (+) Chronic depression   (+) Chronic left shoulder pain   Smoker, h/o ETOH abuse    Physical Exam    Airway    Mallampati score: II  TM Distance: >3 FB  Neck ROM: full     Dental       Cardiovascular  Rate: normal,     Pulmonary  Breath sounds clear to auscultation,     Other Findings        Anesthesia Plan  ASA Score- 3     Anesthesia Type- IV sedation with anesthesia with ASA Monitors  Additional Monitors:   Airway Plan:           Plan Factors-    Chart reviewed  EKG reviewed  Existing labs reviewed  Patient summary reviewed  Induction- intravenous  Postoperative Plan-     Informed Consent- Anesthetic plan and risks discussed with patient  I personally reviewed this patient with the CRNA  Discussed and agreed on the Anesthesia Plan with the CRNA  Nabila Garcia

## 2022-03-21 NOTE — PROGRESS NOTES
[facility] [address]  Progress Note - Martin Gone 1965, 60 y o  male MRN: [id number]  Unit/Bed#: -01 Encounter: [id number]  Primary Care Provider: Adeel Morse MD   Date and time admitted to hospital: 3/18/2022 12:29 PM    * Pancreatitis, acute  Assessment & Plan  · Patient presents with right upper quadrant and epigastric pain 10/10 radiating to the back associated with nausea and unable to eat for 4 days  · Patient states last alcohol use was 3 months ago  · Of Note, patient recently admitted with acute pancreatitis and discharged less than a week ago  · U/S of RUQ (3/10) showed No evidence of acute cholecystitis  Hepatic fibrofatty infiltration and hepatomegaly  · Lipase is within normal limits, CT of the abdomen was reviewed shows no change from prior study and shows very mild nonspecific fat stranding around the pancreatic head and uncinate process  · C/w IV fluid hydration  · Continue pain management  · Gastroenterology consulted, appreciate recommendations:  · Plan for EGD today 3/21  · Recommend outpatient MRI/MRCP to evaluate pancreatic parenchyma  · Recommend outpatient endoscopic ultrasound to further evaluate pending results of MRI  · Low-fat diet, tight glycemic control    Essential hypertension  Assessment & Plan  · BP reviewed and slightly elevated  · Home Medications:  · Ramipril  · C/w Ace- inhibitor and Amlodipine   · Hypertension could be component of pain  · Continue pain management:  Regimen adjusted    Type 2 diabetes mellitus with hyperglycemia [facility])  Assessment & Plan  Lab Results   Component Value Date    HGBA1C 9 1 (H) 10/28/2021       Recent Labs     03/20/22  0806 03/20/22  1136 03/20/22  1529 03/20/22  2120   POCGLU 161* 143* 130 141*       Blood Sugar Average: Last 72 hrs:  (P) 153     · Will hold oral anti diabetics glimepiride and Actos while admitted  · C/w Accu-checks and SSI AC/HS   · Hypoglycemic protocol  · Once diet resumes, will place on diabetic diet    Gastroesophageal reflux disease without esophagitis  Assessment & Plan  · Continue PPI    Dyslipidemia  Assessment & Plan  · Continue statin therapy        VTE Pharmacologic Prophylaxis: VTE Score: 2 Low Risk (Score 0-2) - Encourage Ambulation  Patient Centered Rounds: I performed bedside rounds with nursing staff today  Discussions with Specialists or Other Care Team Provider: Gastroenterology    Education and Discussions with Family / Patient: Updated  (wife) via phone  Time Spent for Care: 30 minutes  More than 50% of total time spent on counseling and coordination of care as described above  Current Length of Stay: 2 day(s)  Current Patient Status: Inpatient   Certification Statement: The patient will continue to require additional inpatient hospital stay due to Poor oral intake requiring IV hydration and pain management  Discharge Plan: Anticipate discharge in 24-48 hrs to home  Code Status: Level 1 - Full Code    Subjective:   Patient states he feels all right today  Patient states his pain is improved but is still 9/10  Patient denies any active chest pain, shortness of breath, nausea, vomiting, or diarrhea  Objective:     Vitals:   Temp (24hrs), Av 2 °F (36 2 °C), Min:96 4 °F (35 8 °C), Max:98 °F (36 7 °C)    Temp:  [96 4 °F (35 8 °C)-98 °F (36 7 °C)] 98 °F (36 7 °C)  HR:  [] 65  Resp:  [15-18] 15  BP: (105-181)/(55-90) 105/55  SpO2:  [90 %-98 %] 98 %  Body mass index is 30 9 kg/m²  Input and Output Summary (last 24 hours): Intake/Output Summary (Last 24 hours) at 3/21/2022 1257  Last data filed at 3/20/2022 2024  Gross per 24 hour   Intake 756 25 ml   Output 1 ml   Net 755 25 ml       Physical Exam:   Physical Exam  Vitals and nursing note reviewed  Constitutional:       General: He is not in acute distress  Appearance: He is not ill-appearing  HENT:      Head: Normocephalic  Nose: Nose normal  No congestion        Mouth/Throat: Mouth: Mucous membranes are moist       Pharynx: Oropharynx is clear  Cardiovascular:      Rate and Rhythm: Normal rate and regular rhythm  Pulses: Normal pulses  Heart sounds: Normal heart sounds  No murmur heard  Abdominal:      General: Bowel sounds are normal  There is no distension  Palpations: Abdomen is soft  Tenderness: There is abdominal tenderness  Musculoskeletal:         General: Normal range of motion  Cervical back: Normal range of motion  Right lower leg: No edema  Left lower leg: No edema  Skin:     General: Skin is warm and dry  Capillary Refill: Capillary refill takes less than 2 seconds  Neurological:      Mental Status: He is alert and oriented to person, place, and time  Mental status is at baseline  Motor: No weakness  Psychiatric:         Mood and Affect: Mood normal  Affect is flat  Speech: Speech normal          Behavior: Behavior is cooperative  Judgment: Judgment normal           Additional Data:     Labs:  Results from last 7 days   Lab Units 03/21/22  0452   WBC Thousand/uL 5 43   HEMOGLOBIN g/dL 15 5   HEMATOCRIT % 44 6   PLATELETS Thousands/uL 213   NEUTROS PCT % 52   LYMPHS PCT % 31   MONOS PCT % 9   EOS PCT % 6     Results from last 7 days   Lab Units 03/21/22  0452 03/19/22  0514 03/18/22  1246   SODIUM mmol/L 138   < > 136   POTASSIUM mmol/L 3 9   < > 3 5   CHLORIDE mmol/L 100   < > 99   CO2 mmol/L 30   < > 26   BUN mg/dL 6   < > 10   CREATININE mg/dL 0 66   < > 0 76   ANION GAP mmol/L 8   < > 11   CALCIUM mg/dL 9 5   < > 9 7   ALBUMIN g/dL  --   --  4 4   TOTAL BILIRUBIN mg/dL  --   --  0 89   ALK PHOS U/L  --   --  106*   ALT U/L  --   --  43   AST U/L  --   --  29   GLUCOSE RANDOM mg/dL 124   < > 196*    < > = values in this interval not displayed           Results from last 7 days   Lab Units 03/21/22  1117 03/21/22  0715 03/20/22  2120 03/20/22  1529 03/20/22  1136 03/20/22  0806 03/19/22  2149 03/19/22  1618 03/19/22  1111 03/19/22  0702 03/18/22  2143 03/18/22  1659   POC GLUCOSE mg/dl 132 161* 141* 130 143* 161* 151* 209* 158* 154* 138 145*               Lines/Drains:  Invasive Devices  Report    Peripheral Intravenous Line            Peripheral IV 03/18/22 Left Antecubital 3 days                      Imaging: No pertinent imaging reviewed  Recent Cultures (last 7 days):         Last 24 Hours Medication List:   Current Facility-Administered Medications   Medication Dose Route Frequency Provider Last Rate    acetaminophen  975 mg Oral Q8H KRISTOFER Liu      amLODIPine  5 mg Oral Daily KRISTOFER Zhang      docusate sodium  100 mg Oral BID Haley Byers MD      enoxaparin  40 mg Subcutaneous Daily Haley Byers MD      gabapentin  300 mg Oral TID Haley Byers MD      HYDROmorphone  0 5 mg Intravenous Q4H PRN Haley Byers MD      insulin lispro  1-5 Units Subcutaneous TID [facility] Haley Byers MD      insulin lispro  1-5 Units Subcutaneous HS Haley Byers MD      lactated ringers  75 mL/hr Intravenous Continuous KRISTOFER Zhang 75 mL/hr (03/20/22 2024)    lisinopril  10 mg Oral Daily Haley Byers MD      nicotine  1 patch Transdermal Daily Haley Byers MD      ondansetron  4 mg Intravenous Q6H PRN Haley Byers MD      oxyCODONE  10 mg Oral Q4H PRN KRISTOFER Zhang      oxyCODONE  5 mg Oral Q4H PRN KRISTOFER Zhang      pantoprazole  40 mg Oral BID AC Haley Byers MD      pravastatin  20 mg Oral Daily With Nimo Vargas MD      sertraline  200 mg Oral Daily Haley Byers MD          Today, Patient Was Seen By: KRISTOFER Zhang    **Please Note: This note may have been constructed using a voice recognition system  **

## 2022-03-21 NOTE — ANESTHESIA POSTPROCEDURE EVALUATION
Post-Op Assessment Note    CV Status:  Stable  Pain Score: 0    Pain management: adequate     Mental Status:  Awake   Hydration Status:  Stable   PONV Controlled:  Controlled   Airway Patency:  Patent      Post Op Vitals Reviewed: Yes      Staff: CRNA         No complications documented      BP   101/78   Temp     Pulse  75   Resp   11   SpO2   99

## 2022-03-21 NOTE — ASSESSMENT & PLAN NOTE
· Patient presents with right upper quadrant and epigastric pain 10/10 radiating to the back associated with nausea and unable to eat for 4 days  · Patient states last alcohol use was 3 months ago  · Of Note, patient recently admitted with acute pancreatitis and discharged less than a week ago  · U/S of RUQ (3/10) showed No evidence of acute cholecystitis  Hepatic fibrofatty infiltration and hepatomegaly  · Lipase is within normal limits, CT of the abdomen was reviewed shows no change from prior study and shows very mild nonspecific fat stranding around the pancreatic head and uncinate process  · C/w IV fluid hydration  · Continue pain management  · Gastroenterology consulted, appreciate recommendations:  · Plan for EGD today 3/21  · Recommend outpatient MRI/MRCP to evaluate pancreatic parenchyma  · Recommend outpatient endoscopic ultrasound to further evaluate pending results of MRI  · Low-fat diet, tight glycemic control

## 2022-03-21 NOTE — INTERVAL H&P NOTE
H&P reviewed  After examining the patient I find no changes in the patients condition since the H&P had been written      Vitals:    03/21/22 1153   BP: 163/84   Pulse: 81   Resp: 16   Temp: (!) 97 °F (36 1 °C)   SpO2: 95%

## 2022-03-21 NOTE — ASSESSMENT & PLAN NOTE
· BP reviewed and slightly elevated  · Home Medications:  · Ramipril  · C/w Ace- inhibitor and Amlodipine   · Hypertension could be component of pain  · Continue pain management:  Regimen adjusted

## 2022-03-22 VITALS
SYSTOLIC BLOOD PRESSURE: 169 MMHG | HEIGHT: 71 IN | OXYGEN SATURATION: 95 % | BODY MASS INDEX: 31.02 KG/M2 | DIASTOLIC BLOOD PRESSURE: 92 MMHG | WEIGHT: 221.56 LBS | TEMPERATURE: 96.4 F | RESPIRATION RATE: 19 BRPM | HEART RATE: 62 BPM

## 2022-03-22 PROBLEM — E83.42 HYPOMAGNESEMIA: Status: ACTIVE | Noted: 2022-03-22

## 2022-03-22 LAB
ANION GAP SERPL CALCULATED.3IONS-SCNC: 9 MMOL/L (ref 4–13)
BASOPHILS # BLD AUTO: 0.08 THOUSANDS/ΜL (ref 0–0.1)
BASOPHILS NFR BLD AUTO: 2 % (ref 0–1)
BUN SERPL-MCNC: 7 MG/DL (ref 5–25)
CALCIUM SERPL-MCNC: 9.2 MG/DL (ref 8.4–10.2)
CHLORIDE SERPL-SCNC: 102 MMOL/L (ref 96–108)
CO2 SERPL-SCNC: 26 MMOL/L (ref 21–32)
CREAT SERPL-MCNC: 0.7 MG/DL (ref 0.6–1.3)
EOSINOPHIL # BLD AUTO: 0.38 THOUSAND/ΜL (ref 0–0.61)
EOSINOPHIL NFR BLD AUTO: 7 % (ref 0–6)
ERYTHROCYTE [DISTWIDTH] IN BLOOD BY AUTOMATED COUNT: 11.9 % (ref 11.6–15.1)
GFR SERPL CREATININE-BSD FRML MDRD: 104 ML/MIN/1.73SQ M
GLUCOSE SERPL-MCNC: 159 MG/DL (ref 65–140)
GLUCOSE SERPL-MCNC: 186 MG/DL (ref 65–140)
GLUCOSE SERPL-MCNC: 190 MG/DL (ref 65–140)
HCT VFR BLD AUTO: 43.2 % (ref 36.5–49.3)
HGB BLD-MCNC: 15.4 G/DL (ref 12–17)
IMM GRANULOCYTES # BLD AUTO: 0.02 THOUSAND/UL (ref 0–0.2)
IMM GRANULOCYTES NFR BLD AUTO: 0 % (ref 0–2)
LYMPHOCYTES # BLD AUTO: 1.52 THOUSANDS/ΜL (ref 0.6–4.47)
LYMPHOCYTES NFR BLD AUTO: 30 % (ref 14–44)
MAGNESIUM SERPL-MCNC: 1.5 MG/DL (ref 1.9–2.7)
MCH RBC QN AUTO: 31.6 PG (ref 26.8–34.3)
MCHC RBC AUTO-ENTMCNC: 35.6 G/DL (ref 31.4–37.4)
MCV RBC AUTO: 89 FL (ref 82–98)
MONOCYTES # BLD AUTO: 0.42 THOUSAND/ΜL (ref 0.17–1.22)
MONOCYTES NFR BLD AUTO: 8 % (ref 4–12)
NEUTROPHILS # BLD AUTO: 2.74 THOUSANDS/ΜL (ref 1.85–7.62)
NEUTS SEG NFR BLD AUTO: 53 % (ref 43–75)
NRBC BLD AUTO-RTO: 0 /100 WBCS
PLATELET # BLD AUTO: 234 THOUSANDS/UL (ref 149–390)
PMV BLD AUTO: 9.6 FL (ref 8.9–12.7)
POTASSIUM SERPL-SCNC: 3.5 MMOL/L (ref 3.5–5.3)
RBC # BLD AUTO: 4.88 MILLION/UL (ref 3.88–5.62)
SODIUM SERPL-SCNC: 137 MMOL/L (ref 135–147)
WBC # BLD AUTO: 5.16 THOUSAND/UL (ref 4.31–10.16)

## 2022-03-22 PROCEDURE — 82948 REAGENT STRIP/BLOOD GLUCOSE: CPT

## 2022-03-22 PROCEDURE — 85025 COMPLETE CBC W/AUTO DIFF WBC: CPT

## 2022-03-22 PROCEDURE — 99239 HOSP IP/OBS DSCHRG MGMT >30: CPT | Performed by: PHYSICIAN ASSISTANT

## 2022-03-22 PROCEDURE — 83735 ASSAY OF MAGNESIUM: CPT | Performed by: PHYSICIAN ASSISTANT

## 2022-03-22 PROCEDURE — 80048 BASIC METABOLIC PNL TOTAL CA: CPT

## 2022-03-22 RX ORDER — MAGNESIUM SULFATE HEPTAHYDRATE 40 MG/ML
2 INJECTION, SOLUTION INTRAVENOUS ONCE
Status: DISCONTINUED | OUTPATIENT
Start: 2022-03-22 | End: 2022-03-22

## 2022-03-22 RX ORDER — ACETAMINOPHEN 325 MG/1
975 TABLET ORAL EVERY 8 HOURS
Qty: 63 TABLET | Refills: 0 | Status: SHIPPED | OUTPATIENT
Start: 2022-03-22 | End: 2022-03-29

## 2022-03-22 RX ORDER — AMLODIPINE BESYLATE 5 MG/1
5 TABLET ORAL DAILY
Qty: 30 TABLET | Refills: 0 | Status: SHIPPED | OUTPATIENT
Start: 2022-03-23 | End: 2022-04-22

## 2022-03-22 RX ORDER — POTASSIUM CHLORIDE 20 MEQ/1
40 TABLET, EXTENDED RELEASE ORAL ONCE
Status: COMPLETED | OUTPATIENT
Start: 2022-03-22 | End: 2022-03-22

## 2022-03-22 RX ORDER — ONDANSETRON 4 MG/1
4 TABLET, FILM COATED ORAL EVERY 8 HOURS PRN
Qty: 20 TABLET | Refills: 0 | Status: SHIPPED | OUTPATIENT
Start: 2022-03-22

## 2022-03-22 RX ORDER — MAGNESIUM SULFATE 1 G/100ML
1 INJECTION INTRAVENOUS ONCE
Status: COMPLETED | OUTPATIENT
Start: 2022-03-22 | End: 2022-03-22

## 2022-03-22 RX ADMIN — Medication 400 MG: at 12:50

## 2022-03-22 RX ADMIN — INSULIN LISPRO 1 UNITS: 100 INJECTION, SOLUTION INTRAVENOUS; SUBCUTANEOUS at 09:31

## 2022-03-22 RX ADMIN — SERTRALINE HYDROCHLORIDE 200 MG: 100 TABLET ORAL at 09:30

## 2022-03-22 RX ADMIN — MAGNESIUM SULFATE HEPTAHYDRATE 1 G: 1 INJECTION, SOLUTION INTRAVENOUS at 12:50

## 2022-03-22 RX ADMIN — AMLODIPINE BESYLATE 5 MG: 5 TABLET ORAL at 09:11

## 2022-03-22 RX ADMIN — POTASSIUM CHLORIDE 40 MEQ: 1500 TABLET, EXTENDED RELEASE ORAL at 09:11

## 2022-03-22 RX ADMIN — ENOXAPARIN SODIUM 40 MG: 40 INJECTION SUBCUTANEOUS at 09:11

## 2022-03-22 RX ADMIN — PANTOPRAZOLE SODIUM 40 MG: 40 TABLET, DELAYED RELEASE ORAL at 09:30

## 2022-03-22 RX ADMIN — GABAPENTIN 300 MG: 300 CAPSULE ORAL at 09:11

## 2022-03-22 RX ADMIN — OXYCODONE HYDROCHLORIDE 10 MG: 10 TABLET ORAL at 09:19

## 2022-03-22 RX ADMIN — ACETAMINOPHEN 975 MG: 325 TABLET, FILM COATED ORAL at 05:29

## 2022-03-22 RX ADMIN — ACETAMINOPHEN 975 MG: 325 TABLET, FILM COATED ORAL at 13:37

## 2022-03-22 RX ADMIN — NICOTINE 1 PATCH: 7 PATCH, EXTENDED RELEASE TRANSDERMAL at 09:11

## 2022-03-22 RX ADMIN — HYDROMORPHONE HYDROCHLORIDE 0.2 MG: 0.2 INJECTION, SOLUTION INTRAMUSCULAR; INTRAVENOUS; SUBCUTANEOUS at 05:29

## 2022-03-22 RX ADMIN — LISINOPRIL 10 MG: 10 TABLET ORAL at 09:11

## 2022-03-22 RX ADMIN — DOCUSATE SODIUM 100 MG: 100 CAPSULE, LIQUID FILLED ORAL at 09:11

## 2022-03-22 NOTE — NURSING NOTE
Pt discharged  IV removed prior to discharge  AVS given and explained to pt  Pt verbalized understanding

## 2022-03-22 NOTE — DISCHARGE INSTR - AVS FIRST PAGE
You were treated for acute pancreatitis, you had endoscopy or EGD on 03/21 which only showed mild inflammation of your stomach  GI will follow-up with your biopsy results  You will need to follow-up with GI outpatient for continued management of your recurrent pancreatitis, you will likely need MRI/MRCP to evaluate your pancreas tissue  Continue with low-fat diet  Sent script for magnesium supplements, as her magnesium levels were on the lower side during your admission    You can continue taking your usual Percocet for chronic pain, take Tylenol around the clock for 1 week for pain/inflammation, take Zofran as needed for nausea/vomiting  Your blood pressure was on the higher side, you were started on amlodipine or Norvasc, follow-up with your PCP for blood pressure management

## 2022-03-22 NOTE — ASSESSMENT & PLAN NOTE
Lab Results   Component Value Date    HGBA1C 9 1 (H) 10/28/2021       Recent Labs     03/21/22  1622 03/21/22  2046 03/22/22  0657 03/22/22  1119   POCGLU 126 94 159* 190*       Blood Sugar Average: Last 72 hrs:  (P) 150 [id number]     · Will hold oral anti diabetics glimepiride and Actos while admitted  · C/w Accu-checks and SSI AC/HS   · Hypoglycemic protocol  · CCD

## 2022-03-22 NOTE — DISCHARGE SUMMARY
Phylicia 43  Discharge- [address] 1965, 60 y o  male MRN: [id number]  Unit/Bed#: -01 Encounter: [id number]  Primary Care Provider: Shagufta Carmona MD   Date and time admitted to hospital: 3/18/2022 12:29 PM    * Pancreatitis, acute  Assessment & Plan  · Patient presents with right upper quadrant and epigastric pain 10/10 radiating to the back associated with nausea and unable to eat for 4 days  · Patient states last alcohol use was 3 months ago  · Of Note, patient recently admitted with acute pancreatitis and discharged less than a week ago  · U/S of RUQ (3/10) showed No evidence of acute cholecystitis  Hepatic fibrofatty infiltration and hepatomegaly  · Lipase is within normal limits, CT of the abdomen was reviewed shows no change from prior study and shows very mild nonspecific fat stranding around the pancreatic head and uncinate process  · Patient tolerated advancement from clear liquid to low-fat diet  · IV fluid hydration  · Abdominal pain much better controlled, will discharge with short course of Zofran and oxycodone p r n  · EGD 3/21:  No evidence of duodenal lesions, some mild gastritis    · Gastroenterology consulted, appreciate recommendations:  · Recommend outpatient MRI/MRCP to evaluate pancreatic parenchyma  · Recommend outpatient endoscopic ultrasound to further evaluate pending results of MRI  · Follow EGD biopsy results  · Low-fat diet, tight glycemic control    Hypomagnesemia  Assessment & Plan  · POA, suspect to be in setting of poor oral intake  · Repleted p r n  while inpatient  · Will discharge on magnesium supplements    Gastroesophageal reflux disease without esophagitis  Assessment & Plan  · Continue PPI    Type 2 diabetes mellitus with hyperglycemia [facility])  Assessment & Plan  Lab Results   Component Value Date    HGBA1C 9 1 (H) 10/28/2021       Recent Labs     03/21/22  1622 03/21/22  2046 03/22/22  0657 03/22/22  1119   POCGLU 126 94 159* 190* Blood Sugar Average: Last 72 hrs:  (P) 448 [id number]     · Will hold oral anti diabetics glimepiride and Actos while admitted  · C/w Accu-checks and SSI AC/HS   · Hypoglycemic protocol  · CCD    Dyslipidemia  Assessment & Plan  · Continue statin therapy    Essential hypertension  Assessment & Plan  · BP reviewed and slightly elevated this a m  but otherwise had been stable, likely component of pain  · Home Medications: Ramipril  · Continue ACE-inhibitor, Norvasc, follow-up with PCP for management when acute illness resolves    Medical Problems             Resolved Problems  Date Reviewed: 3/22/2022    None              Discharging Physician / Practitioner: Nabila Chiu PA-C  PCP: Dolores Michael MD  Admission Date:   Admission Orders (From admission, onward)     Ordered        03/19/22 1149  Inpatient Admission  Once            03/18/22 1425  Place in Observation  Once                      Discharge Date: 03/22/22    Consultations During Hospital Stay:  · GI    Procedures Performed:   · EGD 3/21:  FINDINGS:  · Erythematous mucosa in the fundus of the stomach and antrum  · Irregular Z-line; performed cold forceps biopsy  · The remainder of a detailed exam otherwise unremarkable     SPECIMENS:  ID Type Source Tests Collected by Time Destination   1 : proximal stomach Tissue Stomach TISSUE EXAM William Guajardo MD 3/21/2022 12:39 PM     2 : antrum Tissue Stomach TISSUE EXAM William Guajardo MD 3/21/2022 12:39 PM     3 : Tissue Esophagogastric junction TISSUE EXAM William Guajardo MD 3/21/2022 12:40 PM        IMPRESSION:  No evidence of duodenal lesions, some mild gastritis    Significant Findings / Test Results:   CT abdomen pelvis with contrast   Final Result by Yudy Ruiz MD (03/18 7542)      No change from 3/10/2022; as seen on the prior study there is very mild nonspecific fat stranding about the pancreatic head and uncinate process              Workstation performed: [id number]             Incidental Findings:   · None     Test Results Pending at Discharge (will require follow up):   · EGD biopsy results     Outpatient Tests Requested:  · Follow-up with GI outpatient, will need outpatient MRI/MRCP  · Follow-up with PCP outpatient for management of chronic conditions    Complications:  None    Reason for Admission:  Acute pancreatitis    Hospital Course:   Charise Nissen is a 60 y o  male patient who originally presented to the hospital on 3/18/2022 due to acute pancreatitis, presenting with epigastric and RUQ pain, as well as intolerance to food with nausea  Patient with recent admission for acute pancreatitis, GI and General surgery recommending MRI of abdomen and cholecystectomy outpatient if symptoms persist   On arrival to ED, patient was severe abdominal pain, lipase WNL, CT abdomen showed no changes from last CT, only with very mild nonspecific fat stranding around the pancreas  RUQ ultrasound did not show evidence of acute cholecystitis, GI consulted, started on IV fluids and pain management  Throughout course of admission, patient's abdominal pain/nausea had significantly improved, tolerated advancing from clear liquid to low-fat diet  EGD only showed mild gastritis, tissue biopsies pending on discharge  Patient medically stable for discharge home, recommend following up with GI outpatient for MRI/MRCP for further evaluation of pancreatitis, discharge with Tylenol, Zofran, can continue chronic outpatient Percocet, PDMP reviewed  In addition, patient was slightly hypertensive during this admission, started on Norvasc and well continue on discharge, follow-up with PCP for HTN management  Also with hypo magnesemia, will discharge on magnesium supplements  Please see above list of diagnoses and related plan for additional information       Condition at Discharge: stable    Discharge Day Visit / Exam:   Subjective:  Patient seen at bedside this tasha stein , reports abdominal pain and nausea have completely resolved, no episodes of vomiting/diarrhea, states he feels stable for discharge home  Vitals: Blood Pressure: 169/92 (03/22/22 0747)  Pulse: 62 (03/22/22 0747)  Temperature: (!) 96 4 °F (35 8 °C) (03/22/22 0747)  Temp Source: Tympanic (03/22/22 0747)  Respirations: 19 (03/22/22 0747)  Height: 5' 11" (180 3 cm) (03/18/22 1509)  Weight - Scale: 101 kg (221 lb 9 oz) (03/18/22 1509)  SpO2: 95 % (03/22/22 0930)  Exam:   Physical Exam  Constitutional:       General: He is not in acute distress  Appearance: Normal appearance  He is not ill-appearing, toxic-appearing or diaphoretic  Cardiovascular:      Rate and Rhythm: Normal rate and regular rhythm  Pulses: Normal pulses  Heart sounds: Normal heart sounds  Pulmonary:      Effort: Pulmonary effort is normal  No respiratory distress  Breath sounds: Normal breath sounds  Abdominal:      General: Bowel sounds are normal  There is no distension  Palpations: Abdomen is soft  Tenderness: There is no abdominal tenderness  Musculoskeletal:         General: No swelling or tenderness  Skin:     General: Skin is warm  Neurological:      General: No focal deficit present  Mental Status: He is alert and oriented to person, place, and time  Psychiatric:         Mood and Affect: Mood normal          Behavior: Behavior normal           Discussion with Family: Updated  (wife) via phone  Discharge instructions/Information to patient and family:   See after visit summary for information provided to patient and family  Provisions for Follow-Up Care:  See after visit summary for information related to follow-up care and any pertinent home health orders  Disposition:   Home    Planned Readmission:  None     Discharge Statement:  I spent 45 minutes discharging the patient  This time was spent on the day of discharge   I had direct contact with the patient on the day of discharge  Greater than 50% of the total time was spent examining patient, answering all patient questions, arranging and discussing plan of care with patient as well as directly providing post-discharge instructions  Additional time then spent on discharge activities  Discharge Medications:  See after visit summary for reconciled discharge medications provided to patient and/or family        **Please Note: This note may have been constructed using a voice recognition system**

## 2022-03-22 NOTE — ASSESSMENT & PLAN NOTE
· POA, suspect to be in setting of poor oral intake  · Repleted p r n  while inpatient  · Will discharge on magnesium supplements

## 2022-03-22 NOTE — ASSESSMENT & PLAN NOTE
· BP reviewed and slightly elevated this a m  but otherwise had been stable, likely component of pain  · Home Medications: Ramipril  · Continue ACE-inhibitor, Norvasc, follow-up with PCP for management when acute illness resolves

## 2022-03-22 NOTE — PLAN OF CARE
Problem: GASTROINTESTINAL - ADULT  Goal: Minimal or absence of nausea and/or vomiting  Description: INTERVENTIONS:  - Administer IV fluids if ordered to ensure adequate hydration  - Maintain NPO status until nausea and vomiting are resolved  - Nasogastric tube if ordered  - Administer ordered antiemetic medications as needed  - Provide nonpharmacologic comfort measures as appropriate  - Advance diet as tolerated, if ordered  - Consider nutrition services referral to assist patient with adequate nutrition and appropriate food choices  Outcome: Progressing     Problem: Nutrition/Hydration-ADULT  Goal: Nutrient/Hydration intake appropriate for improving, restoring or maintaining nutritional needs  Description: Monitor and assess patient's nutrition/hydration status for malnutrition  Collaborate with interdisciplinary team and initiate plan and interventions as ordered  Monitor patient's weight and dietary intake as ordered or per policy  Utilize nutrition screening tool and intervene as necessary  Determine patient's food preferences and provide high-protein, high-caloric foods as appropriate       INTERVENTIONS:  - Monitor oral intake, urinary output, labs, and treatment plans  - Assess nutrition and hydration status and recommend course of action  - Evaluate amount of meals eaten  - Assist patient with eating if necessary   - Allow adequate time for meals  - Recommend/ encourage appropriate diets, oral nutritional supplements, and vitamin/mineral supplements  - Order, calculate, and assess calorie counts as needed  - Recommend, monitor, and adjust tube feedings and TPN/PPN based on assessed needs  - Assess need for intravenous fluids  - Provide specific nutrition/hydration education as appropriate  - Include patient/family/caregiver in decisions related to nutrition  Outcome: Progressing

## 2022-03-22 NOTE — ASSESSMENT & PLAN NOTE
· Patient presents with right upper quadrant and epigastric pain 10/10 radiating to the back associated with nausea and unable to eat for 4 days  · Patient states last alcohol use was 3 months ago  · Of Note, patient recently admitted with acute pancreatitis and discharged less than a week ago  · U/S of RUQ (3/10) showed No evidence of acute cholecystitis  Hepatic fibrofatty infiltration and hepatomegaly  · Lipase is within normal limits, CT of the abdomen was reviewed shows no change from prior study and shows very mild nonspecific fat stranding around the pancreatic head and uncinate process  · Patient tolerated advancement from clear liquid to low-fat diet  · IV fluid hydration  · Abdominal pain much better controlled, will discharge with short course of Zofran and oxycodone p r n  · EGD 3/21:  No evidence of duodenal lesions, some mild gastritis    · Gastroenterology consulted, appreciate recommendations:  · Recommend outpatient MRI/MRCP to evaluate pancreatic parenchyma  · Recommend outpatient endoscopic ultrasound to further evaluate pending results of MRI  · Follow EGD biopsy results  · Low-fat diet, tight glycemic control

## 2022-03-23 NOTE — UTILIZATION REVIEW
Notification of Discharge   This is a Notification of Discharge from our facility [address]  Please be advised that this patient has been discharge from our facility  Below you will find the admission and discharge date and time including the patients disposition  UTILIZATION REVIEW CONTACT:  BETITO Harman  Utilization   Network Utilization Review Department  Phone: [phone] x carefully listen to the prompts  All voicemails are confidential   Email: [email or web address]     PHYSICIAN ADVISORY SERVICES:  FOR [id number] REVIEW - MEDICAL NECESSITY DENIAL  Phone: [phone]  Fax: [phone]  Email: [email or web address]     PRESENTATION DATE: 3/18/2022 12:29 PM  OBERVATION ADMISSION DATE:   INPATIENT ADMISSION DATE: 3/19/22 11:49 AM   DISCHARGE DATE: 3/22/2022  2:20 PM  DISPOSITION: Home/Self Care Home/Self Care      IMPORTANT INFORMATION:  Send all requests for admission clinical reviews, approved or denied determinations and any other requests to dedicated fax number below belonging to the campus where the patient is receiving treatment   List of dedicated fax numbers:  [address] DENIALS (Administrative/Medical Necessity) [phone]   [address] (Maternity/NICU/Pediatrics) [phone]   Lisa Lopezi [phone]   Landen Biswas [phone]   Shabbir Gore [phone]   [address] [place] [address],[address] [phone]   [facility]  [phone]   [address] [place] [address] [phone]

## 2022-03-24 ENCOUNTER — TRANSITIONAL CARE MANAGEMENT (OUTPATIENT)
Dept: FAMILY MEDICINE CLINIC | Facility: CLINIC | Age: 57
End: 2022-03-24

## 2022-04-08 DIAGNOSIS — F32.A CHRONIC DEPRESSION: ICD-10-CM

## 2022-04-08 DIAGNOSIS — E11.65 TYPE 2 DIABETES MELLITUS WITH HYPERGLYCEMIA, WITHOUT LONG-TERM CURRENT USE OF INSULIN (HCC): ICD-10-CM

## 2022-04-08 RX ORDER — SERTRALINE HYDROCHLORIDE 100 MG/1
200 TABLET, FILM COATED ORAL DAILY
Qty: 180 TABLET | Refills: 1 | Status: SHIPPED | OUTPATIENT
Start: 2022-04-08

## 2022-04-08 RX ORDER — GLIMEPIRIDE 2 MG/1
TABLET ORAL
Qty: 135 TABLET | Refills: 1 | Status: SHIPPED | OUTPATIENT
Start: 2022-04-08

## 2022-06-02 ENCOUNTER — RA CDI HCC (OUTPATIENT)
Dept: OTHER | Facility: HOSPITAL | Age: 57
End: 2022-06-02

## 2022-06-02 NOTE — PROGRESS NOTES
[facility]  coding opportunities          Chart Reviewed number of suggestions sent to Provider: 1    Based on clinical documentation indicated in your record, it appears that the patient may have the following conditions:    Can depression be further specified as one of the following? F33 0 Major depressive disorder, recurrent, mild  OR  F33 1 Major depressive disorder, recurrent, moderate  OR  F33 41 Major depressive disorder, recurrent, in partial remission  OR  F33 42 Major depressive disorder, recurrent in full remission  OR  F33 8 Other recurrent depressive disorders      If this is correct, please document and assess at your next visit   6/2/22       Patients Insurance     Medicare Insurance: [facility] [employer] [facility]

## 2022-06-13 ENCOUNTER — TELEPHONE (OUTPATIENT)
Dept: OBGYN CLINIC | Facility: HOSPITAL | Age: 57
End: 2022-06-13

## 2022-06-13 NOTE — TELEPHONE ENCOUNTER
Patient called in stating that he is experiencing left shoulder/arm pain  The pain is traveling into his elbow  He reports having surgery to his left shoulder with [facility]  States he had a broken shoulder and torn rotator cuff  He did not know date, but I did find 12/1/2021 in [facility]  He wants to be seen in [place]  Please advise if you would see this patient  Thank you      #[phone]

## 2022-06-14 NOTE — TELEPHONE ENCOUNTER
[facility] Dr Jessi Jeffery will see him but he will need to obtain his Operative Report from his surgery, and explained that would be a great help to the doctor  Dr Jessi Jeffery does not have appointments till July

## 2022-06-14 NOTE — TELEPHONE ENCOUNTER
Patient states that he will try to obtain surgical reports  Can I scheduled him, or does Dr Jeyson Dupont need to review report prior to scheduling? Just wanted to clarify       Cb# [phone]

## 2022-06-15 DIAGNOSIS — E78.1 HIGH TRIGLYCERIDES: ICD-10-CM

## 2022-06-15 RX ORDER — FENOFIBRATE 160 MG/1
TABLET ORAL
Qty: 90 TABLET | Refills: 0 | Status: SHIPPED | OUTPATIENT
Start: 2022-06-15

## 2022-06-15 NOTE — TELEPHONE ENCOUNTER
We just need him to bring it with him  Dr Nancie Castillo said we can see him  But he needs just to bring op report and any information he has regarding the body part he wants the second opinion for

## 2022-07-12 DIAGNOSIS — E11.65 TYPE 2 DIABETES MELLITUS WITH HYPERGLYCEMIA, WITHOUT LONG-TERM CURRENT USE OF INSULIN (HCC): ICD-10-CM

## 2022-07-12 RX ORDER — SIMVASTATIN 20 MG
TABLET ORAL
Qty: 90 TABLET | Refills: 3 | Status: SHIPPED | OUTPATIENT
Start: 2022-07-12

## 2022-07-13 DIAGNOSIS — K21.9 GASTROESOPHAGEAL REFLUX DISEASE: ICD-10-CM

## 2022-07-13 RX ORDER — OMEPRAZOLE 40 MG/1
40 CAPSULE, DELAYED RELEASE ORAL 2 TIMES DAILY
Qty: 180 CAPSULE | Refills: 1 | Status: SHIPPED | OUTPATIENT
Start: 2022-07-13 | End: 2022-10-27

## 2022-10-03 ENCOUNTER — OFFICE VISIT (OUTPATIENT)
Dept: FAMILY MEDICINE CLINIC | Facility: CLINIC | Age: 57
End: 2022-10-03
Payer: COMMERCIAL

## 2022-10-03 VITALS
HEIGHT: 71 IN | BODY MASS INDEX: 29.54 KG/M2 | OXYGEN SATURATION: 98 % | SYSTOLIC BLOOD PRESSURE: 170 MMHG | TEMPERATURE: 98.8 F | WEIGHT: 211 LBS | HEART RATE: 92 BPM | DIASTOLIC BLOOD PRESSURE: 90 MMHG | RESPIRATION RATE: 16 BRPM

## 2022-10-03 DIAGNOSIS — I10 ESSENTIAL HYPERTENSION: ICD-10-CM

## 2022-10-03 DIAGNOSIS — R07.81 RIB PAIN ON RIGHT SIDE: ICD-10-CM

## 2022-10-03 DIAGNOSIS — Z12.5 PROSTATE CANCER SCREENING: ICD-10-CM

## 2022-10-03 DIAGNOSIS — E11.65 TYPE 2 DIABETES MELLITUS WITH HYPERGLYCEMIA, WITHOUT LONG-TERM CURRENT USE OF INSULIN (HCC): ICD-10-CM

## 2022-10-03 DIAGNOSIS — N48.6 PEYRONIE'S DISEASE: ICD-10-CM

## 2022-10-03 DIAGNOSIS — K21.9 GASTROESOPHAGEAL REFLUX DISEASE WITHOUT ESOPHAGITIS: ICD-10-CM

## 2022-10-03 DIAGNOSIS — Z12.11 COLON CANCER SCREENING: ICD-10-CM

## 2022-10-03 DIAGNOSIS — E78.5 DYSLIPIDEMIA: ICD-10-CM

## 2022-10-03 DIAGNOSIS — M79.10 MUSCLE PAIN: ICD-10-CM

## 2022-10-03 DIAGNOSIS — F17.210 CIGARETTE SMOKER: ICD-10-CM

## 2022-10-03 DIAGNOSIS — Z59.9 FINANCIAL DIFFICULTIES: Primary | ICD-10-CM

## 2022-10-03 DIAGNOSIS — Z23 IMMUNIZATION DUE: ICD-10-CM

## 2022-10-03 LAB — SL AMB POCT HEMOGLOBIN AIC: 9.4 (ref ?–6.5)

## 2022-10-03 PROCEDURE — G0439 PPPS, SUBSEQ VISIT: HCPCS | Performed by: FAMILY MEDICINE

## 2022-10-03 PROCEDURE — 99214 OFFICE O/P EST MOD 30 MIN: CPT | Performed by: FAMILY MEDICINE

## 2022-10-03 PROCEDURE — G0008 ADMIN INFLUENZA VIRUS VAC: HCPCS | Performed by: FAMILY MEDICINE

## 2022-10-03 PROCEDURE — 83036 HEMOGLOBIN GLYCOSYLATED A1C: CPT | Performed by: FAMILY MEDICINE

## 2022-10-03 PROCEDURE — 90682 RIV4 VACC RECOMBINANT DNA IM: CPT | Performed by: FAMILY MEDICINE

## 2022-10-03 RX ORDER — GLIMEPIRIDE 2 MG/1
TABLET ORAL
Qty: 135 TABLET | Refills: 1 | Status: SHIPPED | OUTPATIENT
Start: 2022-10-03

## 2022-10-03 RX ORDER — CELECOXIB 200 MG/1
200 CAPSULE ORAL 2 TIMES DAILY
COMMUNITY
Start: 2022-06-22 | End: 2022-10-03 | Stop reason: SDUPTHER

## 2022-10-03 RX ORDER — CELECOXIB 200 MG/1
200 CAPSULE ORAL 2 TIMES DAILY
Qty: 180 CAPSULE | Refills: 1 | Status: SHIPPED | OUTPATIENT
Start: 2022-10-03

## 2022-10-03 RX ORDER — AMLODIPINE BESYLATE 5 MG/1
5 TABLET ORAL DAILY
Qty: 30 TABLET | Refills: 5 | Status: SHIPPED | OUTPATIENT
Start: 2022-10-03 | End: 2022-10-27

## 2022-10-03 RX ORDER — METFORMIN HYDROCHLORIDE 500 MG/1
1000 TABLET, EXTENDED RELEASE ORAL
COMMUNITY

## 2022-10-03 SDOH — ECONOMIC STABILITY - INCOME SECURITY: PROBLEM RELATED TO HOUSING AND ECONOMIC CIRCUMSTANCES, UNSPECIFIED: Z59.9

## 2022-10-03 NOTE — ASSESSMENT & PLAN NOTE
Lab Results   Component Value Date    HGBA1C 9 1 (H) 10/28/2021   check labs  Poor control pt not sure what meds he is taking will call with med list may would hope to add jardiance

## 2022-10-03 NOTE — PROGRESS NOTES
Assessment and Plan:     Problem List Items Addressed This Visit        Digestive    Gastroesophageal reflux disease without esophagitis     Ok on meds            Endocrine    Type 2 diabetes mellitus with hyperglycemia ([facility] [address] )       Lab Results   Component Value Date    HGBA1C 9 1 (H) 10/28/2021   check labs  Poor control pt not sure what meds he is taking will call with med list may would hope to add jardiance         Relevant Orders    Comprehensive metabolic panel    Microalbumin / creatinine urine ratio    POCT hemoglobin A1c (Completed)       Cardiovascular and Mediastinum    Essential hypertension     High today will add amlodipine         Relevant Medications    amLODIPine (NORVASC) 5 mg tablet       Musculoskeletal and Integument    Peyronie's disease     Will need urology consult         Relevant Orders    Ambulatory Referral to Urology       Other    Dyslipidemia     Check labs         Relevant Orders    Lipid panel    Rib pain on right side     1 week since lying on   Hard surface           Other Visit Diagnoses     Financial difficulties    -  Primary    Relevant Orders    Ambulatory referral to social work care management program    Colon cancer screening        Relevant Orders    Cologuard    Immunization due        Relevant Orders    influenza vaccine, quadrivalent, recombinant, PF, 0 5 mL, for patients 18 yr+ (FLUBLOK)    Cigarette smoker        Relevant Orders    CT lung screening program    Prostate cancer screening        Relevant Orders    PSA, Total Screen           Preventive health issues were discussed with patient, and age appropriate screening tests were ordered as noted in patient's After Visit Summary  Personalized health advice and appropriate referrals for health education or preventive services given if needed, as noted in patient's After Visit Summary       History of Present Illness:     Patient presents for a Medicare Wellness Visit    HPI   Patient Care Team:  Liset Deleon MD as PCP - General (Family Medicine)  Moni Mario MD     Review of Systems:     Review of Systems     Problem List:     Patient Active Problem List   Diagnosis    Pancreatitis, acute    Hepatic steatosis    Lesion of left native kidney    Essential hypertension    Dyslipidemia    Alcohol abuse    Lactic acidosis    Type 2 diabetes mellitus with hyperglycemia (HCC)    Chronic bilateral low back pain with bilateral sciatica    Gastroesophageal reflux disease without esophagitis    Neuropathy of right foot    Abnormal skin growth    Leucocytosis    Other fatigue    Vasculogenic erectile dysfunction    Chronic depression    Fall    Injury of right ankle    Closed fracture of proximal end of left humerus    Acute pain due to trauma    Acute toxic encephalopathy    Chronic left shoulder pain    Cigarette nicotine dependence without complication    Electrolyte abnormality    Hypomagnesemia    Rib pain on right side    Peyronie's disease      Past Medical and Surgical History:     Past Medical History:   Diagnosis Date    Alcohol abuse     Back pain     Chronic low back pain     Depression     Diabetes mellitus ([address] )     DM2 (diabetes mellitus, type 2) (HCC)     Erectile dysfunction     GERD (gastroesophageal reflux disease)     Hepatic steatosis     Hyperlipidemia     Hypertension     Neuropathy     Psychiatric disorder     Tobacco abuse      Past Surgical History:   Procedure Laterality Date    ANKLE FRACTURE SURGERY Right     ANKLE SURGERY Right     INGUINAL HERNIA REPAIR Left     KNEE SURGERY Right     UMBILICAL HERNIA REPAIR        Family History:     Family History   Problem Relation Age of Onset    Lymphoma Mother     No Known Problems Father       Social History:     Social History     Socioeconomic History    Marital status: /Civil Union     Spouse name: None    Number of children: None    Years of education: None    Highest education level: None Occupational History    None   Tobacco Use    Smoking status: Current Every Day Smoker     Packs/day: 1 00     Years: 44 00     Pack years: 44 00     Types: Cigarettes     Start date: Johnanna Oz    Smokeless tobacco: Never Used    Tobacco comment: per Mervat-quit   Vaping Use    Vaping Use: Never used   Substance and Sexual Activity    Alcohol use: Not Currently     Comment: 1-2 drinks per month; former heavy drinker, but not for last 2 years    Drug use: Yes     Types: Marijuana     Comment: marijuana each day    Sexual activity: Yes     Partners: Female   Other Topics Concern    None   Social History Narrative    ** Merged History Encounter **         · Most recent tobacco use screenin2019    · Alcohol intake:   Occasional    · Legally blind in one or both eyes:   No    · 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        Social Determinants of Health     Financial Resource Strain: High Risk    Difficulty of Paying Living Expenses: Hard   Food Insecurity: Not on file   Transportation Needs: No Transportation Needs    Lack of Transportation (Medical): No    Lack of Transportation (Non-Medical):  No   Physical Activity: Not on file   Stress: Not on file   Social Connections: Not on file   Intimate Partner Violence: Not on file   Housing Stability: Not on file      Medications and Allergies:     Current Outpatient Medications   Medication Sig Dispense Refill    Accu-Chek FastClix Lancets MISC USE AS DIRECTED 306 each 6    amLODIPine (NORVASC) 5 mg tablet Take 1 tablet (5 mg total) by mouth daily 30 tablet 5    celecoxib (CeleBREX) 200 mg capsule Take 200 mg by mouth 2 (two) times a day      fenofibrate 160 MG tablet TAKE 1 TABLET EVERY DAY 90 tablet 0    glimepiride (AMARYL) 2 mg tablet TAKE 1 TABLET EVERY MORNING  AND TAKE 1/2 TABLET EVERY EVENING 135 tablet 1    pioglitazone (ACTOS) 45 mg tablet TAKE 1 TABLET EVERY DAY 90 tablet 0    ramipril (ALTACE) 10 MG capsule TAKE 1 CAPSULE EVERY DAY 90 capsule 0    sertraline (ZOLOFT) 100 mg tablet Take 2 tablets (200 mg total) by mouth daily 180 tablet 1    simvastatin (ZOCOR) 20 mg tablet TAKE 1 TABLET EVERY DAY 90 tablet 3    amLODIPine (NORVASC) 5 mg tablet Take 1 tablet (5 mg total) by mouth daily 30 tablet 0    Blood Glucose Monitoring Suppl (Accu-Chek Stefanie Plus) w/Device KIT USE AS DIRECTED (Patient not taking: No sig reported) 1 kit 1    gabapentin (NEURONTIN) 300 mg capsule Take 300 mg by mouth Three times a day      magnesium oxide (MAG-OX) 400 mg Take 1 tablet (400 mg total) by mouth daily 30 tablet 0    omeprazole (PriLOSEC) 40 MG capsule Take 1 capsule (40 mg total) by mouth 2 (two) times a day 180 capsule 1     No current facility-administered medications for this visit  Allergies   Allergen Reactions    Amoxicillin Swelling    Amoxil [Amoxicillin] Hives      Immunizations:     Immunization History   Administered Date(s) Administered    COVID-19 MODERNA VACC 0 5 ML IM 03/27/2021, 04/24/2021    Influenza, recombinant, quadrivalent,injectable, preservative free 03/03/2019, 10/14/2020, 03/10/2022    Pneumococcal Polysaccharide PPV23 10/14/2020    Td (adult), adsorbed 10/27/2015      Health Maintenance:         Topic Date Due    HIV Screening  Never done    Colorectal Cancer Screening  Never done    Lung Cancer Screening  09/19/2022    Hepatitis C Screening  Completed         Topic Date Due    Hepatitis A Vaccine (1 of 2 - Risk 2-dose series) Never done    Hepatitis B Vaccine (1 of 3 - Risk 3-dose series) Never done    COVID-19 Vaccine (3 - Booster for Moderna series) 09/24/2021    Pneumococcal Vaccine: Pediatrics (0 to 5 Years) and At-Risk Patients (6 to 59 Years) (2 - PCV) 10/14/2021    Influenza Vaccine (1) 09/01/2022      Medicare Screening Tests and Risk Assessments:     Pratibha Koehler is here for his Subsequent Wellness visit  Health Risk Assessment:   Patient rates overall health as very good  Patient feels that their physical health rating is slightly better  Patient is satisfied with their life  Eyesight was rated as slightly worse  Hearing was rated as slightly worse  Patient feels that their emotional and mental health rating is same  Patients states they are sometimes angry  Patient states they are often unusually tired/fatigued  Pain experienced in the last 7 days has been some  Patient's pain rating has been 4/10  Patient states that he has experienced weight loss or gain in last 6 months  Back/ribs    Depression Screening:   PHQ-9 Score: 10      Fall Risk Screening: In the past year, patient has experienced: no history of falling in past year      Home Safety:  Patient does not have trouble with stairs inside or outside of their home  Patient has working smoke alarms and has working carbon monoxide detector  Home safety hazards include: none  Nutrition:   Current diet is Regular  Medications:   Patient is not currently taking any over-the-counter supplements  Patient is able to manage medications  Activities of Daily Living (ADLs)/Instrumental Activities of Daily Living (IADLs):   Walk and transfer into and out of bed and chair?: Yes  Dress and groom yourself?: Yes    Bathe or shower yourself?: Yes    Feed yourself?  Yes  Do your laundry/housekeeping?: Yes  Manage your money, pay your bills and track your expenses?: Yes  Make your own meals?: Yes    Do your own shopping?: Yes    Previous Hospitalizations:   Any hospitalizations or ED visits within the last 12 months?: Yes    How many hospitalizations have you had in the last year?: 1-2    Hospitalization Comments: pancreatitis    Advance Care Planning:   Living will: No    Durable POA for healthcare: No    Advanced directive: No    Advanced directive counseling given: Yes    Five wishes given: Yes    Patient declined ACP directive: Yes    End of Life Decisions reviewed with patient: Yes    Provider agrees with end of life decisions: Yes      Cognitive Screening:   Provider or family/friend/caregiver concerned regarding cognition?: No    PREVENTIVE SCREENINGS      Cardiovascular Screening:    General: Screening Not Indicated and History Lipid Disorder      Diabetes Screening:     General: Screening Not Indicated and History Diabetes      Abdominal Aortic Aneurysm (AAA) Screening:    Risk factors include: tobacco use        Hepatitis C Screening:    General: Screening Current    Screening, Brief Intervention, and Referral to Treatment (SBIRT)    Screening  Typical number of drinks in a day: 0  Typical number of drinks in a week: 0  Interpretation: Low risk drinking behavior  AUDIT-C Screenin) How often did you have a drink containing alcohol in the past year? monthly or less  2) How many drinks did you have on a typical day when you were drinking in the past year?  1 to 2  3) How often did you have 6 or more drinks on one occasion in the past year? never    AUDIT-C Score: 1  Interpretation: Score 0-3 (male): Negative screen for alcohol misuse    Single Item Drug Screening:  How often have you used an illegal drug (including marijuana) or a prescription medication for non-medical reasons in the past year? never    Single Item Drug Screen Score: 0  Interpretation: Negative screen for possible drug use disorder    No exam data present     Physical Exam:     /90 (BP Location: Left arm, Patient Position: Sitting, Cuff Size: Standard)   Pulse 92   Temp 98 8 °F (37 1 °C) (Temporal)   Resp 16   Ht 5' 11" (1 803 m)   Wt 95 7 kg (211 lb)   SpO2 98%   BMI 29 43 kg/m²     Physical Exam     Omid Root MD

## 2022-10-03 NOTE — PATIENT INSTRUCTIONS
Medicare Preventive Visit Patient Instructions  Thank you for completing your Welcome to Medicare Visit or Medicare Annual Wellness Visit today  Your next wellness visit will be due in one year (10/4/2023)  The screening/preventive services that you may require over the next 5-10 years are detailed below  Some tests may not apply to you based off risk factors and/or age  Screening tests ordered at today's visit but not completed yet may show as past due  Also, please note that scanned in results may not display below  Preventive Screenings:  Service Recommendations Previous Testing/Comments   Colorectal Cancer Screening  · Colonoscopy    · Fecal Occult Blood Test (FOBT)/Fecal Immunochemical Test (FIT)  · Fecal DNA/Cologuard Test  · Flexible Sigmoidoscopy Age: 39-70 years old   Colonoscopy: every 10 years (May be performed more frequently if at higher risk)  OR  FOBT/FIT: every 1 year  OR  Cologuard: every 3 years  OR  Sigmoidoscopy: every 5 years  Screening may be recommended earlier than age 39 if at higher risk for colorectal cancer  Also, an individualized decision between you and your healthcare provider will decide whether screening between the ages of 74-80 would be appropriate   Colonoscopy: Not on file  FOBT/FIT: Not on file  Cologuard: Not on file  Sigmoidoscopy: Not on file          Prostate Cancer Screening Individualized decision between patient and health care provider in men between ages of 53-78   Medicare will cover every 12 months beginning on the day after your 50th birthday PSA: No results in last 5 years           Hepatitis C Screening Once for adults born between [birth year] and 1965  More frequently in patients at high risk for Hepatitis C Hep C Antibody: Not on file    Screening Current   Diabetes Screening 1-2 times per year if you're at risk for diabetes or have pre-diabetes Fasting glucose: No results in last 5 years (No results in last 5 years)  A1C: 9 1 % (10/28/2021)  Screening Not Indicated  History Diabetes   Cholesterol Screening Once every 5 years if you don't have a lipid disorder  May order more often based on risk factors  Lipid panel: 03/11/2022  Screening Not Indicated  History Lipid Disorder      Other Preventive Screenings Covered by Medicare:  1  Abdominal Aortic Aneurysm (AAA) Screening: covered once if your at risk  You're considered to be at risk if you have a family history of AAA or a male between the age of 73-68 who smoking at least 100 cigarettes in your lifetime  2  Lung Cancer Screening: covers low dose CT scan once per year if you meet all of the following conditions: (1) Age 50-69; (2) No signs or symptoms of lung cancer; (3) Current smoker or have quit smoking within the last 15 years; (4) You have a tobacco smoking history of at least 20 pack years (packs per day x number of years you smoked); (5) You get a written order from a healthcare provider  3  Glaucoma Screening: covered annually if you're considered high risk: (1) You have diabetes OR (2) Family history of glaucoma OR (3)  aged 48 and older OR (3)  American aged 72 and older  3  Osteoporosis Screening: covered every 2 years if you meet one of the following conditions: (1) Have a vertebral abnormality; (2) On glucocorticoid therapy for more than 3 months; (3) Have primary hyperparathyroidism; (4) On osteoporosis medications and need to assess response to drug therapy  5  HIV Screening: covered annually if you're between the age of 12-76  Also covered annually if you are younger than 13 and older than 72 with risk factors for HIV infection  For pregnant patients, it is covered up to 3 times per pregnancy      Immunizations:  Immunization Recommendations   Influenza Vaccine Annual influenza vaccination during flu season is recommended for all persons aged >= 6 months who do not have contraindications   Pneumococcal Vaccine   * Pneumococcal conjugate vaccine = PCV13 (Prevnar 13), PCV15 (Vaxneuvance), PCV20 (Prevnar 20)  * Pneumococcal polysaccharide vaccine = PPSV23 (Pneumovax) Adults 2364 years old: 1-3 doses may be recommended based on certain risk factors  Adults 72 years old: 1-2 doses may be recommended based off what pneumonia vaccine you previously received   Hepatitis B Vaccine 3 dose series if at intermediate or high risk (ex: diabetes, end stage renal disease, liver disease)   Tetanus (Td) Vaccine - COST NOT COVERED BY MEDICARE PART B Following completion of primary series, a booster dose should be given every 10 years to maintain immunity against tetanus  Td may also be given as tetanus wound prophylaxis  Tdap Vaccine - COST NOT COVERED BY MEDICARE PART B Recommended at least once for all adults  For pregnant patients, recommended with each pregnancy  Shingles Vaccine (Shingrix) - COST NOT COVERED BY MEDICARE PART B  2 shot series recommended in those aged 48 and above     Health Maintenance Due:      Topic Date Due    HIV Screening  Never done    Colorectal Cancer Screening  Never done    Lung Cancer Screening  09/19/2022    Hepatitis C Screening  Completed     Immunizations Due:      Topic Date Due    Hepatitis A Vaccine (1 of 2 - Risk 2-dose series) Never done    Hepatitis B Vaccine (1 of 3 - Risk 3-dose series) Never done    COVID-19 Vaccine (3 - Booster for Moderna series) 09/24/2021    Pneumococcal Vaccine: Pediatrics (0 to 5 Years) and At-Risk Patients (6 to 59 Years) (2 - PCV) 10/14/2021    Influenza Vaccine (1) 09/01/2022     Advance Directives   What are advance directives? Advance directives are legal documents that state your wishes and plans for medical care  These plans are made ahead of time in case you lose your ability to make decisions for yourself  Advance directives can apply to any medical decision, such as the treatments you want, and if you want to donate organs  What are the types of advance directives?   There are many types of advance directives, and each state has rules about how to use them  You may choose a combination of any of the following:  · Living will: This is a written record of the treatment you want  You can also choose which treatments you do not want, which to limit, and which to stop at a certain time  This includes surgery, medicine, IV fluid, and tube feedings  · Durable power of  for healthcare [facility]): This is a written record that states who you want to make healthcare choices for you when you are unable to make them for yourself  This person, called a proxy, is usually a family member or a friend  You may choose more than 1 proxy  · Do not resuscitate (DNR) order:  A DNR order is used in case your heart stops beating or you stop breathing  It is a request not to have certain forms of treatment, such as CPR  A DNR order may be included in other types of advance directives  · Medical directive: This covers the care that you want if you are in a coma, near death, or unable to make decisions for yourself  You can list the treatments you want for each condition  Treatment may include pain medicine, surgery, blood transfusions, dialysis, IV or tube feedings, and a ventilator (breathing machine)  · Values history: This document has questions about your views, beliefs, and how you feel and think about life  This information can help others choose the care that you would choose  Why are advance directives important? An advance directive helps you control your care  Although spoken wishes may be used, it is better to have your wishes written down  Spoken wishes can be misunderstood, or not followed  Treatments may be given even if you do not want them  An advance directive may make it easier for your family to make difficult choices about your care  Cigarette Smoking and Your Health   Risks to your health if you smoke:  Nicotine and other chemicals found in tobacco damage every cell in your body   Even if you are a light smoker, you have an increased risk for cancer, heart disease, and lung disease  If you are pregnant or have diabetes, smoking increases your risk for complications  Benefits to your health if you stop smoking:   · You decrease respiratory symptoms such as coughing, wheezing, and shortness of breath  · You reduce your risk for cancers of the lung, mouth, throat, kidney, bladder, pancreas, stomach, and cervix  If you already have cancer, you increase the benefits of chemotherapy  You also reduce your risk for cancer returning or a second cancer from developing  · You reduce your risk for heart disease, blood clots, heart attack, and stroke  · You reduce your risk for lung infections, and diseases such as pneumonia, asthma, chronic bronchitis, and emphysema  · Your circulation improves  More oxygen can be delivered to your body  If you have diabetes, you lower your risk for complications, such as kidney, artery, and eye diseases  You also lower your risk for nerve damage  Nerve damage can lead to amputations, poor vision, and blindness  · You improve your body's ability to heal and to fight infections  For more information and support to stop smoking:   · [employer]  Phone: 2- 941 - 555-4917  Web Address: www [employer]  Weight Management   Why it is important to manage your weight:  Being overweight increases your risk of health conditions such as heart disease, high blood pressure, type 2 diabetes, and certain types of cancer  It can also increase your risk for osteoarthritis, sleep apnea, and other respiratory problems  Aim for a slow, steady weight loss  Even a small amount of weight loss can lower your risk of health problems  How to lose weight safely:  A safe and healthy way to lose weight is to eat fewer calories and get regular exercise  You can lose up about 1 pound a week by decreasing the number of calories you eat by 500 calories each day     Healthy meal plan for weight management: A healthy meal plan includes a variety of foods, contains fewer calories, and helps you stay healthy  A healthy meal plan includes the following:  · Eat whole-grain foods more often  A healthy meal plan should contain fiber  Fiber is the part of grains, fruits, and vegetables that is not broken down by your body  Whole-grain foods are healthy and provide extra fiber in your diet  Some examples of whole-grain foods are whole-wheat breads and pastas, oatmeal, brown rice, and bulgur  · Eat a variety of vegetables every day  Include dark, leafy greens such as spinach, kale, kevin greens, and mustard greens  Eat yellow and orange vegetables such as carrots, sweet potatoes, and winter squash  · Eat a variety of fruits every day  Choose fresh or canned fruit (canned in its own juice or light syrup) instead of juice  Fruit juice has very little or no fiber  · Eat low-fat dairy foods  Drink fat-free (skim) milk or 1% milk  Eat fat-free yogurt and low-fat cottage cheese  Try low-fat cheeses such as mozzarella and other reduced-fat cheeses  · Choose meat and other protein foods that are low in fat  Choose beans or other legumes such as split peas or lentils  Choose fish, skinless poultry (chicken or turkey), or lean cuts of red meat (beef or pork)  Before you cook meat or poultry, cut off any visible fat  · Use less fat and oil  Try baking foods instead of frying them  Add less fat, such as margarine, sour cream, regular salad dressing and mayonnaise to foods  Eat fewer high-fat foods  Some examples of high-fat foods include french fries, doughnuts, ice cream, and cakes  · Eat fewer sweets  Limit foods and drinks that are high in sugar  This includes candy, cookies, regular soda, and sweetened drinks  Exercise:  Exercise at least 30 minutes per day on most days of the week  Some examples of exercise include walking, biking, dancing, and swimming   You can also fit in more physical activity by taking the stairs instead of the elevator or parking farther away from stores  Ask your healthcare provider about the best exercise plan for you  © Copyright [employer] 2018 Information is for End User's use only and may not be sold, redistributed or otherwise used for commercial purposes   All illustrations and images included in CareNotes® are the copyrighted property of [employer]  or [address] [place] [address]

## 2022-10-03 NOTE — PROGRESS NOTES
Name: Dori Norwood      : 1965      MRN: [id number]  Encounter Provider: Vibha Maldonado MD  Encounter Date: 10/3/2022   Encounter department: [facility] MEDICINE    Assessment & Plan     1  Financial difficulties  -     Ambulatory referral to social work care management program; Future; Expected date: 10/03/2022    2  Colon cancer screening  -     Cologuard    3  Immunization due  -     influenza vaccine, quadrivalent, recombinant, PF, 0 5 mL, for patients 18 yr+ (FLUBLOK)    4  Cigarette smoker  -     CT lung screening program; Future; Expected date: 10/03/2022    5  Rib pain on right side  Assessment & Plan:  1 week since lying on   Hard surface      6  Essential hypertension  Assessment & Plan:  High today will add amlodipine    Orders:  -     amLODIPine (NORVASC) 5 mg tablet; Take 1 tablet (5 mg total) by mouth daily    7  Type 2 diabetes mellitus with hyperglycemia, without long-term current use of insulin [facility])  Assessment & Plan:    Lab Results   Component Value Date    HGBA1C 9 1 (H) 10/28/2021   check labs  Poor control pt not sure what meds he is taking will call with med list may would hope to add jardiance    Orders:  -     Comprehensive metabolic panel; Future; Expected date: 10/03/2022  -     Microalbumin / creatinine urine ratio; Future; Expected date: 10/03/2022  -     POCT hemoglobin A1c    8  Prostate cancer screening  -     PSA, Total Screen; Future    9  Dyslipidemia  Assessment & Plan:  Check labs    Orders:  -     Lipid panel; Future; Expected date: 10/03/2022    10  Peyronie's disease  Assessment & Plan: Will need urology consult    Orders:  -     Ambulatory Referral to Urology; Future    11  Gastroesophageal reflux disease without esophagitis  Assessment & Plan:  Ok on meds             Subjective      HPI  Review of Systems   Respiratory: Negative for cough, chest tightness and shortness of breath      Cardiovascular: Negative for chest pain, palpitations and leg swelling  Genitourinary:        Penis curves with erection   Musculoskeletal:        Right lateral trunk   Neurological: Negative for dizziness, weakness, light-headedness and headaches  Psychiatric/Behavioral: Negative for dysphoric mood  The patient is not nervous/anxious          Current Outpatient Medications on File Prior to Visit   Medication Sig    Accu-Chek FastClix Lancets MISC USE AS DIRECTED    celecoxib (CeleBREX) 200 mg capsule Take 200 mg by mouth 2 (two) times a day    fenofibrate 160 MG tablet TAKE 1 TABLET EVERY DAY    glimepiride (AMARYL) 2 mg tablet TAKE 1 TABLET EVERY MORNING  AND TAKE 1/2 TABLET EVERY EVENING    pioglitazone (ACTOS) 45 mg tablet TAKE 1 TABLET EVERY DAY    ramipril (ALTACE) 10 MG capsule TAKE 1 CAPSULE EVERY DAY    sertraline (ZOLOFT) 100 mg tablet Take 2 tablets (200 mg total) by mouth daily    simvastatin (ZOCOR) 20 mg tablet TAKE 1 TABLET EVERY DAY    amLODIPine (NORVASC) 5 mg tablet Take 1 tablet (5 mg total) by mouth daily    Blood Glucose Monitoring Suppl (Accu-Chek Stefanie Plus) w/Device KIT USE AS DIRECTED (Patient not taking: No sig reported)    gabapentin (NEURONTIN) 300 mg capsule Take 300 mg by mouth Three times a day    magnesium oxide (MAG-OX) 400 mg Take 1 tablet (400 mg total) by mouth daily    omeprazole (PriLOSEC) 40 MG capsule Take 1 capsule (40 mg total) by mouth 2 (two) times a day    [DISCONTINUED] nicotine (NICODERM CQ) 21 mg/24 hr TD 24 hr patch Place 1 patch on the skin daily (Patient not taking: Reported on 10/3/2022)    [DISCONTINUED] ondansetron (ZOFRAN) 4 mg tablet Take 1 tablet (4 mg total) by mouth every 8 (eight) hours as needed for nausea or vomiting (Patient not taking: Reported on 10/3/2022)    [DISCONTINUED] tadalafil (CIALIS) 5 MG tablet TAKE 1 TABLET BY MOUTH DAILY AS NEEDED FOR ERECTILE DYSFUNCTION (Patient not taking: Reported on 10/3/2022)       Objective     /90 (BP Location: Left arm, Patient Position: Sitting, Cuff Size: Standard)   Pulse 92   Temp 98 8 °F (37 1 °C) (Temporal)   Resp 16   Ht 5' 11" (1 803 m)   Wt 95 7 kg (211 lb)   SpO2 98%   BMI 29 43 kg/m²     Physical Exam  Vitals reviewed  Constitutional:       General: He is not in acute distress  Appearance: Normal appearance  He is well-developed  He is not ill-appearing  HENT:      Mouth/Throat:      Mouth: Mucous membranes are moist    Eyes:      Extraocular Movements: Extraocular movements intact  Conjunctiva/sclera: Conjunctivae normal       Pupils: Pupils are equal, round, and reactive to light  Neck:      Thyroid: No thyromegaly  Vascular: No carotid bruit  Cardiovascular:      Rate and Rhythm: Normal rate and regular rhythm  Pulses: Normal pulses  no weak pulses          Dorsalis pedis pulses are 2+ on the right side and 2+ on the left side  Heart sounds: Normal heart sounds  No murmur heard  Pulmonary:      Effort: Pulmonary effort is normal       Breath sounds: Normal breath sounds  Chest:      Chest wall: No tenderness  Abdominal:      General: Bowel sounds are normal  There is no distension  Palpations: Abdomen is soft  Tenderness: There is no abdominal tenderness  Musculoskeletal:      Cervical back: Normal range of motion and neck supple  Comments: Render right lateral lower rib   Feet:      Right foot:      Skin integrity: Dry skin present  No ulcer, skin breakdown, erythema, warmth or callus  Left foot:      Skin integrity: Dry skin present  No ulcer, skin breakdown, erythema, warmth or callus  Lymphadenopathy:      Cervical: No cervical adenopathy  Skin:     General: Skin is warm and dry  Neurological:      General: No focal deficit present  Mental Status: He is alert and oriented to person, place, and time  Mental status is at baseline  Cranial Nerves: No cranial nerve deficit     Psychiatric:         Mood and Affect: Mood normal          Behavior: Behavior normal  Patient's shoes and socks removed  Right Foot/Ankle   Right Foot Inspection  Skin Exam: skin normal, skin intact and dry skin  No warmth, no callus, no erythema, no maceration, no abnormal color, no pre-ulcer, no ulcer and no callus  Toe Exam: No swelling, no tenderness, erythema and  no right toe deformity    Sensory   Vibration: intact  Proprioception: intact  Monofilament testing: diminished    Vascular  The right DP pulse is 2+  Right Toe  - Comprehensive Exam  Ecchymosis: none  Swelling: none   Tenderness: none         Left Foot/Ankle  Left Foot Inspection  Skin Exam: skin normal, skin intact and dry skin  No warmth, no erythema, no maceration, normal color, no pre-ulcer, no ulcer and no callus  Sensory   Vibration: intact  Proprioception: intact  Monofilament testing: intact    Vascular  The left DP pulse is 2+       Left Toe  - Comprehensive Exam  Ecchymosis: none  Swelling: none   Tenderness: none           Assign Risk Category  No deformity present  No loss of protective sensation  No weak pulses  Risk: 0      Kalli Herr MD

## 2022-10-06 ENCOUNTER — PATIENT OUTREACH (OUTPATIENT)
Dept: FAMILY MEDICINE CLINIC | Facility: CLINIC | Age: 57
End: 2022-10-06

## 2022-10-06 NOTE — PROGRESS NOTES
SW received referral to outreach pt who reportedly has financial difficulties  SW called pt and reached voicemail, left message requesting call back

## 2022-10-13 ENCOUNTER — PATIENT OUTREACH (OUTPATIENT)
Dept: FAMILY MEDICINE CLINIC | Facility: CLINIC | Age: 57
End: 2022-10-13

## 2022-10-13 NOTE — LETTER
[address] [facility]  [place] [id number] [phone]    Re: social needs   10/17/2022       Dear Antonio Bray,    We tried to reach you by phone on 10/6 and 10/13 and was unfortunately unable to reach you  During your office visit on 10/3/22, your answered on the questionnaire that is was "HARD" to "pay for basics like food, housing, heating and medical care"  If you feel you ARE having financial resource strain, transportation needs, and/or food insecurity, please call me at [phone]       Sincerely,         Renata Mcadams

## 2022-10-13 NOTE — PROGRESS NOTES
2nd outreach    SW called pt  SW did not receive an answer or answering machine  SW will mail Unable to Reach letter

## 2022-10-17 ENCOUNTER — PATIENT OUTREACH (OUTPATIENT)
Dept: FAMILY MEDICINE CLINIC | Facility: CLINIC | Age: 57
End: 2022-10-17

## 2022-10-19 LAB — COLOGUARD RESULT REPORTABLE: NEGATIVE

## 2022-10-27 ENCOUNTER — RA CDI HCC (OUTPATIENT)
Dept: OTHER | Facility: HOSPITAL | Age: 57
End: 2022-10-27

## 2022-10-27 DIAGNOSIS — K21.9 GASTROESOPHAGEAL REFLUX DISEASE: ICD-10-CM

## 2022-10-27 DIAGNOSIS — I10 ESSENTIAL HYPERTENSION: ICD-10-CM

## 2022-10-27 RX ORDER — AMLODIPINE BESYLATE 5 MG/1
5 TABLET ORAL DAILY
Qty: 90 TABLET | Refills: 2 | Status: SHIPPED | OUTPATIENT
Start: 2022-10-27

## 2022-10-27 RX ORDER — OMEPRAZOLE 40 MG/1
CAPSULE, DELAYED RELEASE ORAL
Qty: 180 CAPSULE | Refills: 1 | Status: SHIPPED | OUTPATIENT
Start: 2022-10-27

## 2022-10-27 NOTE — PROGRESS NOTES
[facility]  coding opportunities       Chart reviewed, no opportunity found:   [address]        Patients Insurance     Medicare Insurance: [facility]

## 2022-10-28 LAB — HBA1C MFR BLD HPLC: 8 %

## 2022-11-03 ENCOUNTER — VBI (OUTPATIENT)
Dept: ADMINISTRATIVE | Facility: OTHER | Age: 57
End: 2022-11-03

## 2022-11-14 ENCOUNTER — TELEPHONE (OUTPATIENT)
Dept: FAMILY MEDICINE CLINIC | Facility: CLINIC | Age: 57
End: 2022-11-14

## 2022-11-14 DIAGNOSIS — E11.65 TYPE 2 DIABETES MELLITUS WITH HYPERGLYCEMIA, WITHOUT LONG-TERM CURRENT USE OF INSULIN (HCC): Primary | ICD-10-CM

## 2022-11-14 RX ORDER — METFORMIN HYDROCHLORIDE 500 MG/1
1000 TABLET, EXTENDED RELEASE ORAL
Qty: 180 TABLET | Refills: 1 | Status: SHIPPED | OUTPATIENT
Start: 2022-11-14

## 2022-11-14 NOTE — TELEPHONE ENCOUNTER
metFORMIN (GLUCOPHAGE-XR) 500 mg 24 hr tablet Take 1,000 mg by mouth daily with breakfast     Patient called for refill is taking 2 500 mg tablets daily    [address]

## 2022-12-18 DIAGNOSIS — F32.A CHRONIC DEPRESSION: ICD-10-CM

## 2022-12-19 RX ORDER — SERTRALINE HYDROCHLORIDE 100 MG/1
TABLET, FILM COATED ORAL
Qty: 90 TABLET | Refills: 1 | Status: SHIPPED | OUTPATIENT
Start: 2022-12-19

## 2023-01-13 DIAGNOSIS — E78.1 HIGH TRIGLYCERIDES: ICD-10-CM

## 2023-01-13 RX ORDER — FENOFIBRATE 160 MG/1
TABLET ORAL
Qty: 90 TABLET | Refills: 0 | Status: SHIPPED | OUTPATIENT
Start: 2023-01-13

## 2023-02-22 DIAGNOSIS — M79.10 MUSCLE PAIN: ICD-10-CM

## 2023-02-22 RX ORDER — CELECOXIB 200 MG/1
CAPSULE ORAL
Qty: 180 CAPSULE | Refills: 1 | Status: SHIPPED | OUTPATIENT
Start: 2023-02-22

## 2023-02-23 ENCOUNTER — TELEPHONE (OUTPATIENT)
Dept: FAMILY MEDICINE CLINIC | Facility: CLINIC | Age: 58
End: 2023-02-23

## 2023-04-24 ENCOUNTER — HOSPITAL ENCOUNTER (EMERGENCY)
Facility: HOSPITAL | Age: 58
Discharge: HOME | End: 2023-04-25
Attending: EMERGENCY MEDICINE

## 2023-04-24 VITALS
SYSTOLIC BLOOD PRESSURE: 195 MMHG | HEART RATE: 111 BPM | OXYGEN SATURATION: 96 % | RESPIRATION RATE: 16 BRPM | TEMPERATURE: 98.1 F | DIASTOLIC BLOOD PRESSURE: 92 MMHG

## 2023-04-24 DIAGNOSIS — S01.81XA FACIAL LACERATION, INITIAL ENCOUNTER: ICD-10-CM

## 2023-04-24 DIAGNOSIS — S09.90XA INJURY OF HEAD, INITIAL ENCOUNTER: Primary | ICD-10-CM

## 2023-04-24 RX ORDER — LIDOCAINE HYDROCHLORIDE AND EPINEPHRINE 10; 10 MG/ML; UG/ML
10 INJECTION, SOLUTION INFILTRATION; PERINEURAL ONCE
Status: COMPLETED | OUTPATIENT
Start: 2023-04-24 | End: 2023-04-24

## 2023-04-24 RX ADMIN — LIDOCAINE HYDROCHLORIDE,EPINEPHRINE BITARTRATE 10 ML: 10; .01 INJECTION, SOLUTION INFILTRATION; PERINEURAL at 23:12
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
## 2023-06-06 ENCOUNTER — VBI (OUTPATIENT)
Dept: ADMINISTRATIVE | Facility: OTHER | Age: 58
End: 2023-06-06

## 2023-06-23 ENCOUNTER — HOSPITAL ENCOUNTER (EMERGENCY)
Facility: HOSPITAL | Age: 58
Discharge: HOME | End: 2023-06-23
Attending: EMERGENCY MEDICINE
Payer: COMMERCIAL

## 2023-06-23 ENCOUNTER — OFFICE VISIT (OUTPATIENT)
Dept: FAMILY MEDICINE CLINIC | Facility: CLINIC | Age: 58
End: 2023-06-23
Payer: COMMERCIAL

## 2023-06-23 ENCOUNTER — APPOINTMENT (EMERGENCY)
Dept: RADIOLOGY | Facility: HOSPITAL | Age: 58
End: 2023-06-23
Payer: COMMERCIAL

## 2023-06-23 ENCOUNTER — APPOINTMENT (EMERGENCY)
Dept: ULTRASOUND IMAGING | Facility: HOSPITAL | Age: 58
End: 2023-06-23
Payer: COMMERCIAL

## 2023-06-23 ENCOUNTER — APPOINTMENT (EMERGENCY)
Dept: CT IMAGING | Facility: HOSPITAL | Age: 58
End: 2023-06-23
Payer: COMMERCIAL

## 2023-06-23 VITALS
BODY MASS INDEX: 31.64 KG/M2 | TEMPERATURE: 97.2 F | SYSTOLIC BLOOD PRESSURE: 160 MMHG | HEIGHT: 71 IN | DIASTOLIC BLOOD PRESSURE: 84 MMHG | HEART RATE: 96 BPM | OXYGEN SATURATION: 96 % | RESPIRATION RATE: 16 BRPM | WEIGHT: 226 LBS

## 2023-06-23 VITALS
TEMPERATURE: 97.6 F | DIASTOLIC BLOOD PRESSURE: 85 MMHG | OXYGEN SATURATION: 95 % | SYSTOLIC BLOOD PRESSURE: 154 MMHG | RESPIRATION RATE: 16 BRPM | HEART RATE: 81 BPM

## 2023-06-23 DIAGNOSIS — K29.00 ACUTE GASTRITIS WITHOUT HEMORRHAGE, UNSPECIFIED GASTRITIS TYPE: Primary | ICD-10-CM

## 2023-06-23 DIAGNOSIS — I10 ESSENTIAL HYPERTENSION: ICD-10-CM

## 2023-06-23 DIAGNOSIS — E11.65 TYPE 2 DIABETES MELLITUS WITH HYPERGLYCEMIA, WITHOUT LONG-TERM CURRENT USE OF INSULIN (HCC): Primary | ICD-10-CM

## 2023-06-23 DIAGNOSIS — R10.11 RIGHT UPPER QUADRANT ABDOMINAL PAIN: ICD-10-CM

## 2023-06-23 DIAGNOSIS — E83.42 HYPOMAGNESEMIA: ICD-10-CM

## 2023-06-23 DIAGNOSIS — R10.9 ACUTE ABDOMINAL PAIN: ICD-10-CM

## 2023-06-23 LAB
ALBUMIN SERPL BCP-MCNC: 3.8 G/DL (ref 3.5–5)
ALP SERPL-CCNC: 79 U/L (ref 34–104)
ALT SERPL W P-5'-P-CCNC: 50 U/L (ref 7–52)
ANION GAP SERPL CALCULATED.3IONS-SCNC: 7 MMOL/L
AST SERPL W P-5'-P-CCNC: 40 U/L (ref 13–39)
ATRIAL RATE: 73 BPM
BASOPHILS # BLD AUTO: 0.11 THOUSANDS/ÂΜL (ref 0–0.1)
BASOPHILS NFR BLD AUTO: 2 % (ref 0–1)
BILIRUB SERPL-MCNC: 0.78 MG/DL (ref 0.2–1)
BUN SERPL-MCNC: 10 MG/DL (ref 5–25)
CALCIUM SERPL-MCNC: 9 MG/DL (ref 8.4–10.2)
CARDIAC TROPONIN I PNL SERPL HS: 3 NG/L
CHLORIDE SERPL-SCNC: 103 MMOL/L (ref 96–108)
CO2 SERPL-SCNC: 25 MMOL/L (ref 21–32)
CREAT SERPL-MCNC: 0.83 MG/DL (ref 0.6–1.3)
EOSINOPHIL # BLD AUTO: 0.19 THOUSAND/ÂΜL (ref 0–0.61)
EOSINOPHIL NFR BLD AUTO: 4 % (ref 0–6)
ERYTHROCYTE [DISTWIDTH] IN BLOOD BY AUTOMATED COUNT: 11.9 % (ref 11.6–15.1)
GFR SERPL CREATININE-BSD FRML MDRD: 95 ML/MIN/1.73SQ M
GLUCOSE SERPL-MCNC: 218 MG/DL (ref 65–140)
HCT VFR BLD AUTO: 43.2 % (ref 36.5–49.3)
HGB BLD-MCNC: 15.8 G/DL (ref 12–17)
IMM GRANULOCYTES # BLD AUTO: 0.01 THOUSAND/UL (ref 0–0.2)
IMM GRANULOCYTES NFR BLD AUTO: 0 % (ref 0–2)
LACTATE SERPL-SCNC: 1.3 MMOL/L (ref 0.5–2)
LIPASE SERPL-CCNC: 17 U/L (ref 11–82)
LYMPHOCYTES # BLD AUTO: 1.57 THOUSANDS/ÂΜL (ref 0.6–4.47)
LYMPHOCYTES NFR BLD AUTO: 30 % (ref 14–44)
MAGNESIUM SERPL-MCNC: 1.6 MG/DL (ref 1.9–2.7)
MCH RBC QN AUTO: 33.2 PG (ref 26.8–34.3)
MCHC RBC AUTO-ENTMCNC: 36.6 G/DL (ref 31.4–37.4)
MCV RBC AUTO: 91 FL (ref 82–98)
MONOCYTES # BLD AUTO: 0.36 THOUSAND/ÂΜL (ref 0.17–1.22)
MONOCYTES NFR BLD AUTO: 7 % (ref 4–12)
NEUTROPHILS # BLD AUTO: 3.09 THOUSANDS/ÂΜL (ref 1.85–7.62)
NEUTS SEG NFR BLD AUTO: 57 % (ref 43–75)
NRBC BLD AUTO-RTO: 0 /100 WBCS
P AXIS: 44 DEGREES
PLATELET # BLD AUTO: 194 THOUSANDS/UL (ref 149–390)
PMV BLD AUTO: 9.8 FL (ref 8.9–12.7)
POTASSIUM SERPL-SCNC: 3.9 MMOL/L (ref 3.5–5.3)
PR INTERVAL: 186 MS
PROT SERPL-MCNC: 7.1 G/DL (ref 6.4–8.4)
QRS AXIS: -6 DEGREES
QRSD INTERVAL: 100 MS
QT INTERVAL: 380 MS
QTC INTERVAL: 418 MS
RBC # BLD AUTO: 4.76 MILLION/UL (ref 3.88–5.62)
SL AMB POCT HEMOGLOBIN AIC: 7.8 (ref ?–6.5)
SODIUM SERPL-SCNC: 135 MMOL/L (ref 135–147)
T WAVE AXIS: 27 DEGREES
VENTRICULAR RATE: 73 BPM
WBC # BLD AUTO: 5.33 THOUSAND/UL (ref 4.31–10.16)

## 2023-06-23 PROCEDURE — G1004 CDSM NDSC: HCPCS

## 2023-06-23 PROCEDURE — 93010 ELECTROCARDIOGRAM REPORT: CPT | Performed by: INTERNAL MEDICINE

## 2023-06-23 PROCEDURE — 74177 CT ABD & PELVIS W/CONTRAST: CPT

## 2023-06-23 PROCEDURE — 84484 ASSAY OF TROPONIN QUANT: CPT | Performed by: EMERGENCY MEDICINE

## 2023-06-23 PROCEDURE — 83690 ASSAY OF LIPASE: CPT | Performed by: EMERGENCY MEDICINE

## 2023-06-23 PROCEDURE — 76705 ECHO EXAM OF ABDOMEN: CPT

## 2023-06-23 PROCEDURE — 83036 HEMOGLOBIN GLYCOSYLATED A1C: CPT | Performed by: FAMILY MEDICINE

## 2023-06-23 PROCEDURE — 93005 ELECTROCARDIOGRAM TRACING: CPT

## 2023-06-23 PROCEDURE — 85025 COMPLETE CBC W/AUTO DIFF WBC: CPT | Performed by: EMERGENCY MEDICINE

## 2023-06-23 PROCEDURE — 83605 ASSAY OF LACTIC ACID: CPT | Performed by: EMERGENCY MEDICINE

## 2023-06-23 PROCEDURE — 80053 COMPREHEN METABOLIC PANEL: CPT | Performed by: EMERGENCY MEDICINE

## 2023-06-23 PROCEDURE — 83735 ASSAY OF MAGNESIUM: CPT | Performed by: EMERGENCY MEDICINE

## 2023-06-23 PROCEDURE — 99214 OFFICE O/P EST MOD 30 MIN: CPT | Performed by: FAMILY MEDICINE

## 2023-06-23 PROCEDURE — 71045 X-RAY EXAM CHEST 1 VIEW: CPT

## 2023-06-23 PROCEDURE — 36415 COLL VENOUS BLD VENIPUNCTURE: CPT | Performed by: EMERGENCY MEDICINE

## 2023-06-23 RX ORDER — SUCRALFATE 1 G/1
1 TABLET ORAL 4 TIMES DAILY
Qty: 28 TABLET | Refills: 0 | Status: SHIPPED | OUTPATIENT
Start: 2023-06-23 | End: 2023-06-30

## 2023-06-23 RX ORDER — MAGNESIUM HYDROXIDE/ALUMINUM HYDROXICE/SIMETHICONE 120; 1200; 1200 MG/30ML; MG/30ML; MG/30ML
15 SUSPENSION ORAL ONCE
Status: COMPLETED | OUTPATIENT
Start: 2023-06-23 | End: 2023-06-23

## 2023-06-23 RX ORDER — SUCRALFATE 1 G/1
1 TABLET ORAL ONCE
Status: COMPLETED | OUTPATIENT
Start: 2023-06-23 | End: 2023-06-23

## 2023-06-23 RX ORDER — MAGNESIUM SULFATE HEPTAHYDRATE 40 MG/ML
2 INJECTION, SOLUTION INTRAVENOUS ONCE
Status: COMPLETED | OUTPATIENT
Start: 2023-06-23 | End: 2023-06-23

## 2023-06-23 RX ORDER — FAMOTIDINE 20 MG/1
20 TABLET, FILM COATED ORAL DAILY
Qty: 14 TABLET | Refills: 0 | Status: SHIPPED | OUTPATIENT
Start: 2023-06-23 | End: 2023-07-07

## 2023-06-23 RX ORDER — HYDROMORPHONE HCL/PF 1 MG/ML
0.5 SYRINGE (ML) INJECTION ONCE
Status: COMPLETED | OUTPATIENT
Start: 2023-06-23 | End: 2023-06-23

## 2023-06-23 RX ORDER — MORPHINE SULFATE 4 MG/ML
4 INJECTION, SOLUTION INTRAMUSCULAR; INTRAVENOUS ONCE
Status: COMPLETED | OUTPATIENT
Start: 2023-06-23 | End: 2023-06-23

## 2023-06-23 RX ORDER — ALUMINA, MAGNESIA, AND SIMETHICONE 2400; 2400; 240 MG/30ML; MG/30ML; MG/30ML
10 SUSPENSION ORAL EVERY 6 HOURS PRN
Qty: 280 ML | Refills: 0 | Status: SHIPPED | OUTPATIENT
Start: 2023-06-23 | End: 2023-06-30

## 2023-06-23 RX ORDER — FAMOTIDINE 10 MG/ML
20 INJECTION, SOLUTION INTRAVENOUS ONCE
Status: COMPLETED | OUTPATIENT
Start: 2023-06-23 | End: 2023-06-23

## 2023-06-23 RX ADMIN — MORPHINE SULFATE 4 MG: 4 INJECTION INTRAVENOUS at 12:16

## 2023-06-23 RX ADMIN — IOHEXOL 100 ML: 350 INJECTION, SOLUTION INTRAVENOUS at 13:19

## 2023-06-23 RX ADMIN — ALUMINUM HYDROXIDE, MAGNESIUM HYDROXIDE, AND DIMETHICONE 15 ML: 200; 20; 200 SUSPENSION ORAL at 15:16

## 2023-06-23 RX ADMIN — SODIUM CHLORIDE 1000 ML: 0.9 INJECTION, SOLUTION INTRAVENOUS at 12:16

## 2023-06-23 RX ADMIN — HYDROMORPHONE HYDROCHLORIDE 0.5 MG: 1 INJECTION, SOLUTION INTRAMUSCULAR; INTRAVENOUS; SUBCUTANEOUS at 12:54

## 2023-06-23 RX ADMIN — SUCRALFATE 1 G: 1 TABLET ORAL at 15:16

## 2023-06-23 RX ADMIN — MAGNESIUM SULFATE HEPTAHYDRATE 2 G: 40 INJECTION, SOLUTION INTRAVENOUS at 13:55

## 2023-06-23 RX ADMIN — FAMOTIDINE 20 MG: 10 INJECTION, SOLUTION INTRAVENOUS at 15:17

## 2023-06-23 NOTE — PROGRESS NOTES
Name: Nikki November      : 1965      MRN: [id number]  Encounter Provider: Thien Bergeron MD  Encounter Date: 2023   Encounter department: [address] [place] [address] MEDICINE    Assessment & Plan     1  Type 2 diabetes mellitus with hyperglycemia, without long-term current use of insulin [facility])  Assessment & Plan:    Lab Results   Component Value Date    HGBA1C 8 0 10/28/2022   HGa1c today 7 8    Orders:  -     POCT hemoglobin A1c    2  Acute abdominal pain  Assessment & Plan:  2 weeks now worsening hx of pancreatitis pain is severe needs to go to Er pt very reluctant but finally agreed      3  Essential hypertension  Assessment & Plan:  High today pt in severe abd pain             Subjective      HPI pt with 2 weeks of severe abdominal pain in right upper quadrant area has been worsening pt has hx of pancreatitis no vomiting no diarrhe no fever no urinary complaints   Review of Systems   Respiratory: Negative for cough, chest tightness and shortness of breath  Cardiovascular: Negative for chest pain, palpitations and leg swelling  Gastrointestinal: Positive for abdominal pain  Negative for anal bleeding, blood in stool, constipation, diarrhea, nausea, rectal pain and vomiting  Neurological: Negative for dizziness, weakness, light-headedness and headaches  Psychiatric/Behavioral: Negative for dysphoric mood  The patient is not nervous/anxious  Current Outpatient Medications on File Prior to Visit   Medication Sig   • Accu-Chek FastClix Lancets MISC USE AS DIRECTED   • amLODIPine (NORVASC) 5 mg tablet TAKE 1 TABLET (5 MG TOTAL) BY MOUTH DAILY     • celecoxib (CeleBREX) 200 mg capsule TAKE 1 CAPSULE TWICE DAILY   • fenofibrate 160 MG tablet TAKE 1 TABLET (160 MG TOTAL) BY MOUTH DAILY   • gabapentin (NEURONTIN) 300 mg capsule Take 300 mg by mouth Three times a day   • glimepiride (AMARYL) 2 mg tablet TAKE 1 TABLET EVERY MORNING  AND TAKE 1/2 TABLET EVERY EVENING   • magnesium oxide "(MAG-OX) 400 mg Take 1 tablet (400 mg total) by mouth daily   • metFORMIN (GLUCOPHAGE-XR) 500 mg 24 hr tablet TAKE 2 TABLETS EVERY DAY WITH BREAKFAST   • omeprazole (PriLOSEC) 40 MG capsule TAKE 1 CAPSULE TWICE DAILY   • pioglitazone (ACTOS) 45 mg tablet TAKE 1 TABLET EVERY DAY   • ramipril (ALTACE) 10 MG capsule TAKE 1 CAPSULE EVERY DAY   • sertraline (ZOLOFT) 100 mg tablet TAKE 1 TABLET EVERY DAY   • simvastatin (ZOCOR) 20 mg tablet TAKE 1 TABLET EVERY DAY   • Blood Glucose Monitoring Suppl (Accu-Chek Stefanie Plus) w/Device KIT USE AS DIRECTED (Patient not taking: No sig reported)       Objective     /84 (BP Location: Left arm, Patient Position: Sitting, Cuff Size: Standard)   Pulse 96   Temp (!) 97 2 °F (36 2 °C) (Tympanic)   Resp 16   Ht 5' 11\" (1 803 m)   Wt 103 kg (226 lb)   SpO2 96%   BMI 31 52 kg/m²     Physical Exam  Constitutional:       General: He is in acute distress  Appearance: He is ill-appearing  Cardiovascular:      Rate and Rhythm: Regular rhythm  Heart sounds: Normal heart sounds  Pulmonary:      Effort: Pulmonary effort is normal       Breath sounds: Normal breath sounds  Abdominal:      Tenderness: There is abdominal tenderness (RUQ and midepigastric tenderness to mild palpation)  There is guarding  Neurological:      Mental Status: He is alert         Kennedy King MD  "

## 2023-06-23 NOTE — ED PROVIDER NOTES
History  Chief Complaint   Patient presents with   • Abdominal Pain     Pt presents to ED from home w/ upper right abd pain to mid back for two weeks  Pt sent here by PCP  Pt (+) constipation  Pt (+) hx HTN, pancreatitis  78-year-old male with history of diabetes, hypertension, hyperlipidemia, pancreatitis who presents for evaluation of abdominal pain  Patient reports his symptoms have been ongoing for the past 2 weeks  He has right upper quadrant pain which radiates to the back at times  He has not had any associated nausea, vomiting, diarrhea, fevers  He notes that he may be constipated as he has not had a bowel movement in a few days  He reports he has never had pain like this before and this does not feel similar to previous episodes of pancreatitis  Eating does not increase his pain but movement does  He has not been taking any medications for his symptoms  He presented to his primary care physician's office today who referred him to the emergency department for further evaluation  He denies any chest pain, shortness of breath, cough, urinary symptoms  He has not had any abdominal surgeries  Prior to Admission Medications   Prescriptions Last Dose Informant Patient Reported? Taking? Accu-Chek FastClix Lancets MISC   No No   Sig: USE AS DIRECTED   Blood Glucose Monitoring Suppl (Accu-Chek Stefanie Plus) w/Device KIT   No No   Sig: USE AS DIRECTED   Patient not taking: No sig reported   amLODIPine (NORVASC) 5 mg tablet   No No   Sig: TAKE 1 TABLET (5 MG TOTAL) BY MOUTH DAILY     celecoxib (CeleBREX) 200 mg capsule   No No   Sig: TAKE 1 CAPSULE TWICE DAILY   fenofibrate 160 MG tablet   No No   Sig: TAKE 1 TABLET (160 MG TOTAL) BY MOUTH DAILY   gabapentin (NEURONTIN) 300 mg capsule   Yes No   Sig: Take 300 mg by mouth Three times a day   glimepiride (AMARYL) 2 mg tablet   No No   Sig: TAKE 1 TABLET EVERY MORNING  AND TAKE 1/2 TABLET EVERY EVENING   magnesium oxide (MAG-OX) 400 mg   No No Sig: Take 1 tablet (400 mg total) by mouth daily   metFORMIN (GLUCOPHAGE-XR) 500 mg 24 hr tablet   No No   Sig: TAKE 2 TABLETS EVERY DAY WITH BREAKFAST   omeprazole (PriLOSEC) 40 MG capsule   No No   Sig: TAKE 1 CAPSULE TWICE DAILY   pioglitazone (ACTOS) 45 mg tablet   No No   Sig: TAKE 1 TABLET EVERY DAY   ramipril (ALTACE) 10 MG capsule   No No   Sig: TAKE 1 CAPSULE EVERY DAY   sertraline (ZOLOFT) 100 mg tablet   No No   Sig: TAKE 1 TABLET EVERY DAY   simvastatin (ZOCOR) 20 mg tablet   No No   Sig: TAKE 1 TABLET EVERY DAY      Facility-Administered Medications: None       Past Medical History:   Diagnosis Date   • Alcohol abuse    • Back pain    • Chronic low back pain    • Depression    • Diabetes mellitus (HCC)    • DM2 (diabetes mellitus, type 2) (HCC)    • Erectile dysfunction    • GERD (gastroesophageal reflux disease)    • Hepatic steatosis    • Hyperlipidemia    • Hypertension    • Neuropathy    • Pancreatitis    • Psychiatric disorder    • Tobacco abuse        Past Surgical History:   Procedure Laterality Date   • ANKLE FRACTURE SURGERY Right    • ANKLE SURGERY Right    • INGUINAL HERNIA REPAIR Left    • KNEE SURGERY Right    • UMBILICAL HERNIA REPAIR         Family History   Problem Relation Age of Onset   • Lymphoma Mother    • No Known Problems Father      I have reviewed and agree with the history as documented  E-Cigarette/Vaping   • E-Cigarette Use Never User      E-Cigarette/Vaping Substances   • Nicotine No    • THC No    • CBD No    • Flavoring No    • Other No    • Unknown No      Social History     Tobacco Use   • Smoking status: Every Day     Packs/day: 1 00     Years: 44 00     Total pack years: 44 00     Types: Cigarettes     Start date: 1978   • Smokeless tobacco: Never   • Tobacco comments:     per Mervat-quit   Vaping Use   • Vaping Use: Never used   Substance Use Topics   • Alcohol use:  Yes     Alcohol/week: 100 0 standard drinks of alcohol     Types: 100 Cans of beer per week Comment: heavy drinker   • Drug use: Yes     Frequency: 21 0 times per week     Types: Marijuana     Comment: marijuana each day       Review of Systems   Constitutional: Negative for chills and fever  HENT: Negative for congestion  Respiratory: Negative for cough and shortness of breath  Cardiovascular: Negative for chest pain  Gastrointestinal: Positive for abdominal pain and constipation  Negative for diarrhea, nausea and vomiting  Genitourinary: Negative for dysuria and flank pain  Musculoskeletal: Negative for gait problem  Skin: Negative for rash  Neurological: Negative for weakness and light-headedness  All other systems reviewed and are negative  Physical Exam  Physical Exam  Vitals and nursing note reviewed  Constitutional:       General: He is in acute distress (in pain)  Appearance: He is not ill-appearing  HENT:      Head: Normocephalic and atraumatic  Nose: Nose normal       Mouth/Throat:      Mouth: Mucous membranes are moist    Eyes:      Conjunctiva/sclera: Conjunctivae normal    Cardiovascular:      Rate and Rhythm: Normal rate and regular rhythm  Heart sounds: No murmur heard  No friction rub  No gallop  Pulmonary:      Effort: Pulmonary effort is normal       Breath sounds: Normal breath sounds  No wheezing, rhonchi or rales  Abdominal:      General: There is no distension  Palpations: Abdomen is soft  Tenderness: There is no abdominal tenderness  There is no right CVA tenderness or left CVA tenderness  Musculoskeletal:         General: No swelling or tenderness  Normal range of motion  Cervical back: Normal range of motion and neck supple  Skin:     General: Skin is warm and dry  Coloration: Skin is not pale  Findings: No rash  Neurological:      General: No focal deficit present  Mental Status: He is alert and oriented to person, place, and time     Psychiatric:         Behavior: Behavior normal          Vital Signs  ED Triage Vitals   Temperature Pulse Respirations Blood Pressure SpO2   06/23/23 1130 06/23/23 1130 06/23/23 1130 06/23/23 1130 06/23/23 1130   97 6 °F (36 4 °C) 81 16 154/85 95 %      Temp Source Heart Rate Source Patient Position - Orthostatic VS BP Location FiO2 (%)   06/23/23 1130 -- -- -- --   Oral          Pain Score       06/23/23 1216       10 - Worst Possible Pain           Vitals:    06/23/23 1130   BP: 154/85   Pulse: 81         Visual Acuity      ED Medications  Medications   morphine injection 4 mg (4 mg Intravenous Given 6/23/23 1216)   sodium chloride 0 9 % bolus 1,000 mL (0 mL Intravenous Stopped 6/23/23 1518)   HYDROmorphone (DILAUDID) injection 0 5 mg (0 5 mg Intravenous Given 6/23/23 1254)   magnesium sulfate 2 g/50 mL IVPB (premix) 2 g (0 g Intravenous Stopped 6/23/23 1518)   iohexol (OMNIPAQUE) 350 MG/ML injection (SINGLE-DOSE) 100 mL (100 mL Intravenous Given 6/23/23 1319)   Famotidine (PF) (PEPCID) injection 20 mg (20 mg Intravenous Given 6/23/23 1517)   sucralfate (CARAFATE) tablet 1 g (1 g Oral Given 6/23/23 1516)   aluminum-magnesium hydroxide-simethicone (MYLANTA) oral suspension 15 mL (15 mL Oral Given 6/23/23 1516)       Diagnostic Studies  Results Reviewed     Procedure Component Value Units Date/Time    Comprehensive metabolic panel [[id number]]  (Abnormal) Collected: 06/23/23 1244    Lab Status: Final result Specimen: Blood from Arm, Right Updated: 06/23/23 1312     Sodium 135 mmol/L      Potassium 3 9 mmol/L      Chloride 103 mmol/L      CO2 25 mmol/L      ANION GAP 7 mmol/L      BUN 10 mg/dL      Creatinine 0 83 mg/dL      Glucose 218 mg/dL      Calcium 9 0 mg/dL      AST 40 U/L      ALT 50 U/L      Alkaline Phosphatase 79 U/L      Total Protein 7 1 g/dL      Albumin 3 8 g/dL      Total Bilirubin 0 78 mg/dL      eGFR 95 ml/min/1 73sq m     Narrative:      [place] guidelines for Chronic Kidney Disease (CKD):   •  Stage 1 with normal or high GFR (GFR > 90 mL/min/1 73 square meters)  •  Stage 2 Mild CKD (GFR = 60-89 mL/min/1 73 square meters)  •  Stage 3A Moderate CKD (GFR = 45-59 mL/min/1 73 square meters)  •  Stage 3B Moderate CKD (GFR = 30-44 mL/min/1 73 square meters)  •  Stage 4 Severe CKD (GFR = 15-29 mL/min/1 73 square meters)  •  Stage 5 End Stage CKD (GFR <15 mL/min/1 73 square meters)  Note: GFR calculation is accurate only with a steady state creatinine    Lipase [[id number]]  (Normal) Collected: 06/23/23 1244    Lab Status: Final result Specimen: Blood from Arm, Right Updated: 06/23/23 1312     Lipase 17 u/L     Magnesium [[id number]]  (Abnormal) Collected: 06/23/23 1244    Lab Status: Final result Specimen: Blood from Arm, Right Updated: 06/23/23 1312     Magnesium 1 6 mg/dL     Lactic acid, plasma (w/reflex if result > 2 0) [[id number]]  (Normal) Collected: 06/23/23 1217    Lab Status: Final result Specimen: Blood from Arm, Right Updated: 06/23/23 1238     LACTIC ACID 1 3 mmol/L     Narrative:      Result may be elevated if tourniquet was used during collection      HS Troponin 0hr (reflex protocol) [[id number]]  (Normal) Collected: 06/23/23 1151    Lab Status: Final result Specimen: Blood from Arm, Right Updated: 06/23/23 1221     hs TnI 0hr 3 ng/L     CBC and differential [[id number]]  (Abnormal) Collected: 06/23/23 1151    Lab Status: Final result Specimen: Blood from Arm, Right Updated: 06/23/23 1155     WBC 5 33 Thousand/uL      RBC 4 76 Million/uL      Hemoglobin 15 8 g/dL      Hematocrit 43 2 %      MCV 91 fL      MCH 33 2 pg      MCHC 36 6 g/dL      RDW 11 9 %      MPV 9 8 fL      Platelets 386 Thousands/uL      nRBC 0 /100 WBCs      Neutrophils Relative 57 %      Immat GRANS % 0 %      Lymphocytes Relative 30 %      Monocytes Relative 7 %      Eosinophils Relative 4 %      Basophils Relative 2 %      Neutrophils Absolute 3 09 Thousands/µL      Immature Grans Absolute 0 01 Thousand/uL      Lymphocytes Absolute 1 57 Thousands/µL      Monocytes Absolute 0 36 Thousand/µL      Eosinophils Absolute 0 19 Thousand/µL      Basophils Absolute 0 11 Thousands/µL     UA w Reflex to Microscopic w Reflex to Culture [[id number]]     Lab Status: No result Specimen: Urine                  CT abdomen pelvis with contrast   Final Result by Lucrecia Castellanos MD (06/23 1437)      Gastric fold thickening appears to slightly increased is also an increase in size and gastrohepatic node  This could be related to gastritis  GI/endoscopic follow-up is advised  No evidence of small bowel obstruction  The study was marked in [facility] for immediate notification  Workstation performed: [id number]         XR chest 1 view portable   ED Interpretation by Leandra Mauricio MD (06/23 1232)   No acute cardiopulmonary abnormality  Independently interpreted by me  Final Result by Dallin Lorenzana MD (06/23 1227)      No acute cardiopulmonary disease  Workstation performed: Arn Lowery right upper quadrant   Final Result by Lucrecia aCstellanos MD (06/23 1233)      No gallstones  Normal ducts  Echogenic liver is again demonstrated most likely related to fatty infiltration given the recent CT appearance  Workstation performed: [id number]                    Procedures  Procedures         ED Course  ED Course as of 06/23/23 1534   Fri Jun 23, 2023   1156 CBC and differential(!)   1223 hs TnI 0hr: 3   1233 Procedure Note: EKG  Date/Time: 06/23/23 12:19 PM   Interpreted by: Ceci Pires  Indications / Diagnosis: abdominal pain  ECG reviewed by me, the ED Provider: yes   The EKG demonstrates:  Rhythm: rate 73, normal sinus  Intervals: normal intervals  Axis: normal axis  QRS/Blocks: normal QRS  ST Changes: No acute ST Changes, no STD/ISSAC     1238 LACTIC ACID: 1 3   1312 Lipase: 17   1312 Comprehensive metabolic panel(!)   2140 Magnesium(!): 1 6   1455 Patient's pain is significantly improved   He still has mild discomfort and would like a dose of GI cocktail before discharge  He is comfortable going home at this time  SBIRT 22yo+    Flowsheet Row Most Recent Value   Initial Alcohol Screen: US AUDIT-C     1  How often do you have a drink containing alcohol? 0 Filed at: 06/23/2023 1316   2  How many drinks containing alcohol do you have on a typical day you are drinking? 0 Filed at: 06/23/2023 1316   3a  Male UNDER 65: How often do you have five or more drinks on one occasion? 0 Filed at: 06/23/2023 1316   3b  FEMALE Any Age, or MALE 65+: How often do you have 4 or more drinks on one occassion? 0 Filed at: 06/23/2023 1316   Audit-C Score 0 Filed at: 06/23/2023 1316   JHON: How many times in the past year have you    Used an illegal drug or used a prescription medication for non-medical reasons? Never Filed at: 06/23/2023 1316                    Medical Decision Making  78-year-old male presenting for evaluation of epigastric/right upper quadrant abdominal pain x2 weeks  Vital signs stable on arrival, no signs of peritonitis on exam   Differential diagnoses include but not limited to atypical ACS, gastritis, Debbi lithiasis, cholecystitis, pancreatitis, perforation, obstruction  Labs unremarkable apart from mild hypomagnesemia which was repleted  CT showing possible gastritis but no other acute abnormality  Right upper quadrant ultrasound with any acute abnormality  Suspect patient's symptoms are secondary to gastritis as noted on CT  Patient feeling improved after analgesics, GI cocktail ordered as well  Discharged with prescriptions for Carafate, Pepcid, Maalox  Referred to gastroenterology for further evaluation  Return precautions discussed  Acute gastritis without hemorrhage, unspecified gastritis type: acute illness or injury  Hypomagnesemia: acute illness or injury  Right upper quadrant abdominal pain: acute illness or injury  Amount and/or Complexity of Data Reviewed  Labs: ordered   Decision-making details documented in ED Course  Radiology: ordered  ECG/medicine tests: ordered and independent interpretation performed  Decision-making details documented in ED Course  Risk  OTC drugs  Prescription drug management  Disposition  Final diagnoses:   Right upper quadrant abdominal pain   Hypomagnesemia   Acute gastritis without hemorrhage, unspecified gastritis type     Time reflects when diagnosis was documented in both MDM as applicable and the Disposition within this note     Time User Action Codes Description Comment    6/23/2023  1:12 PM Anthony Marcos Add [R10 11] Right upper quadrant abdominal pain     6/23/2023  1:13 PM Anthony Marcos Add [E83 42] Hypomagnesemia     6/23/2023  2:57 PM Anthony Marcos Add [K29 00] Acute gastritis without hemorrhage, unspecified gastritis type     6/23/2023  2:57 PM Anthony Marcos Modify [R10 11] Right upper quadrant abdominal pain     6/23/2023  2:57 PM Anthony Marcos Modify [K29 00] Acute gastritis without hemorrhage, unspecified gastritis type       ED Disposition     ED Disposition   Discharge    Condition   Stable    Date/Time   Fri Jun 23, 2023  2:57 PM    Comment   [address] discharge to home/self care                 Follow-up Information     Follow up With Specialties Details Why Contact Info    Gracy Butler MD Family Medicine In 2 days  [address]  [address] [address]  [phone]            Discharge Medication List as of 6/23/2023  2:59 PM      START taking these medications    Details   aluminum-magnesium hydroxide-simethicone (MAALOX MAX) 400-400-40 MG/5ML suspension Take 10 mL by mouth every 6 (six) hours as needed for indigestion (upper abdominal pain) for up to 7 days, Starting Fri 6/23/2023, Until Fri 6/30/2023 at 2359, Normal      famotidine (PEPCID) 20 mg tablet Take 1 tablet (20 mg total) by mouth daily for 14 days, Starting Fri 6/23/2023, Until Fri 7/7/2023, Normal      sucralfate (CARAFATE) 1 g tablet Take 1 tablet (1 g total) by mouth 4 (four) times a day for 7 days, Starting Fri 6/23/2023, Until Fri 6/30/2023, Normal         CONTINUE these medications which have NOT CHANGED    Details   Accu-Chek FastClix Lancets MISC USE AS DIRECTED, Normal      amLODIPine (NORVASC) 5 mg tablet TAKE 1 TABLET (5 MG TOTAL) BY MOUTH DAILY  , Starting Thu 10/27/2022, Normal      Blood Glucose Monitoring Suppl (Accu-Chek Stefanie Plus) w/Device KIT USE AS DIRECTED, Normal      celecoxib (CeleBREX) 200 mg capsule TAKE 1 CAPSULE TWICE DAILY, Normal      fenofibrate 160 MG tablet TAKE 1 TABLET (160 MG TOTAL) BY MOUTH DAILY, Normal      gabapentin (NEURONTIN) 300 mg capsule Take 300 mg by mouth Three times a day, Starting Tue 5/18/2021, Until Fri 6/23/2023, Historical Med      glimepiride (AMARYL) 2 mg tablet TAKE 1 TABLET EVERY MORNING  AND TAKE 1/2 TABLET EVERY EVENING, Normal      magnesium oxide (MAG-OX) 400 mg Take 1 tablet (400 mg total) by mouth daily, Starting Tue 3/22/2022, Until Fri 6/23/2023, Normal      metFORMIN (GLUCOPHAGE-XR) 500 mg 24 hr tablet TAKE 2 TABLETS EVERY DAY WITH BREAKFAST, Normal      omeprazole (PriLOSEC) 40 MG capsule TAKE 1 CAPSULE TWICE DAILY, Normal      pioglitazone (ACTOS) 45 mg tablet TAKE 1 TABLET EVERY DAY, Normal      ramipril (ALTACE) 10 MG capsule TAKE 1 CAPSULE EVERY DAY, Normal      sertraline (ZOLOFT) 100 mg tablet TAKE 1 TABLET EVERY DAY, Normal      simvastatin (ZOCOR) 20 mg tablet TAKE 1 TABLET EVERY DAY, Normal                 PDMP Review       Value Time User    PDMP Reviewed  Yes 4/8/2022 12:04 PM Nevaeh Santiago MD          ED Provider  Electronically Signed by           Mahsa Szymanski MD  06/23/23 3408

## 2023-06-23 NOTE — ASSESSMENT & PLAN NOTE
2 weeks now worsening hx of pancreatitis pain is severe needs to go to Er pt very reluctant but finally agreed

## 2023-06-23 NOTE — DISCHARGE INSTRUCTIONS
Follow-up with your primary care physician as well as gastroenterology  Take the prescribed medications as directed  Follow the guidelines at the end of your discharge paperwork regarding dietary changes in order to avoid flareups  Avoid taking medications including ibuprofen, Motrin, Advil, Aleve if possible as this may worsen your symptoms  Please return to the emergency department if you develop worsening symptoms, severe pain, uncontrolled vomiting, or anything else concerning to you

## 2023-07-12 DIAGNOSIS — E78.1 HIGH TRIGLYCERIDES: ICD-10-CM

## 2023-07-12 RX ORDER — FENOFIBRATE 160 MG/1
TABLET ORAL
Qty: 90 TABLET | Refills: 0 | Status: SHIPPED | OUTPATIENT
Start: 2023-07-12

## 2023-09-18 DIAGNOSIS — E11.65 TYPE 2 DIABETES MELLITUS WITH HYPERGLYCEMIA, WITHOUT LONG-TERM CURRENT USE OF INSULIN (HCC): ICD-10-CM

## 2023-09-18 NOTE — TELEPHONE ENCOUNTER
Reason for call:   [x] Refill   [] Prior Auth  [] Other:     Office:   [x] PCP/Provider - [id number]  [] Speciality/Provider -     Medication: Glimepiride 2 mg #135    Pharmacy: [address] [place] [address] Mail Delivery    Does the patient have enough for 3 days?    [x] Yes   [] No - Send as HP to POD

## 2023-09-19 RX ORDER — GLIMEPIRIDE 2 MG/1
TABLET ORAL
Qty: 135 TABLET | Refills: 1 | Status: SHIPPED | OUTPATIENT
Start: 2023-09-19

## 2023-09-24 DIAGNOSIS — E11.65 TYPE 2 DIABETES MELLITUS WITH HYPERGLYCEMIA, WITHOUT LONG-TERM CURRENT USE OF INSULIN (HCC): ICD-10-CM

## 2023-09-25 RX ORDER — METFORMIN HYDROCHLORIDE 500 MG/1
TABLET, EXTENDED RELEASE ORAL
Qty: 180 TABLET | Refills: 1 | Status: SHIPPED | OUTPATIENT
Start: 2023-09-25

## 2023-09-27 ENCOUNTER — HOSPITAL ENCOUNTER (OUTPATIENT)
Dept: RADIOLOGY | Facility: HOSPITAL | Age: 58
Discharge: HOME | End: 2023-09-27
Payer: COMMERCIAL

## 2023-09-27 ENCOUNTER — OFFICE VISIT (OUTPATIENT)
Dept: OBGYN CLINIC | Facility: CLINIC | Age: 58
End: 2023-09-27
Payer: COMMERCIAL

## 2023-09-27 VITALS
DIASTOLIC BLOOD PRESSURE: 101 MMHG | WEIGHT: 227.8 LBS | OXYGEN SATURATION: 97 % | BODY MASS INDEX: 31.89 KG/M2 | SYSTOLIC BLOOD PRESSURE: 171 MMHG | HEART RATE: 87 BPM | HEIGHT: 71 IN

## 2023-09-27 DIAGNOSIS — M67.911 TENDINOPATHY OF RIGHT ROTATOR CUFF: ICD-10-CM

## 2023-09-27 DIAGNOSIS — M19.121 POST-TRAUMATIC OSTEOARTHRITIS OF RIGHT ELBOW: ICD-10-CM

## 2023-09-27 DIAGNOSIS — M79.601 RIGHT ARM PAIN: ICD-10-CM

## 2023-09-27 DIAGNOSIS — M79.601 RIGHT ARM PAIN: Primary | ICD-10-CM

## 2023-09-27 PROCEDURE — 73080 X-RAY EXAM OF ELBOW: CPT

## 2023-09-27 PROCEDURE — 20610 DRAIN/INJ JOINT/BURSA W/O US: CPT | Performed by: PHYSICIAN ASSISTANT

## 2023-09-27 PROCEDURE — 73030 X-RAY EXAM OF SHOULDER: CPT

## 2023-09-27 PROCEDURE — 99213 OFFICE O/P EST LOW 20 MIN: CPT | Performed by: PHYSICIAN ASSISTANT

## 2023-09-27 PROCEDURE — 72040 X-RAY EXAM NECK SPINE 2-3 VW: CPT

## 2023-09-27 RX ORDER — BUPIVACAINE HYDROCHLORIDE 5 MG/ML
2 INJECTION, SOLUTION EPIDURAL; INTRACAUDAL
Status: COMPLETED | OUTPATIENT
Start: 2023-09-27 | End: 2023-09-27

## 2023-09-27 RX ORDER — LIDOCAINE HYDROCHLORIDE 10 MG/ML
2 INJECTION, SOLUTION INFILTRATION; PERINEURAL
Status: COMPLETED | OUTPATIENT
Start: 2023-09-27 | End: 2023-09-27

## 2023-09-27 RX ORDER — TRIAMCINOLONE ACETONIDE 40 MG/ML
80 INJECTION, SUSPENSION INTRA-ARTICULAR; INTRAMUSCULAR
Status: COMPLETED | OUTPATIENT
Start: 2023-09-27 | End: 2023-09-27

## 2023-09-27 RX ADMIN — BUPIVACAINE HYDROCHLORIDE 2 ML: 5 INJECTION, SOLUTION EPIDURAL; INTRACAUDAL at 13:30

## 2023-09-27 RX ADMIN — TRIAMCINOLONE ACETONIDE 80 MG: 40 INJECTION, SUSPENSION INTRA-ARTICULAR; INTRAMUSCULAR at 13:30

## 2023-09-27 RX ADMIN — LIDOCAINE HYDROCHLORIDE 2 ML: 10 INJECTION, SOLUTION INFILTRATION; PERINEURAL at 13:30

## 2023-09-27 NOTE — PROGRESS NOTES
Assessment/Plan   Diagnoses and all orders for this visit:    Right arm pain    Post-traumatic osteoarthritis of right elbow  - Start PT  - Follow up with Dr. Fernanda Garcia    Tendinopathy of right rotator cuff  - Start PT  - Cortisone injection today, subacromial  - Follow up with Dr. Amari Michelle   Patient ID: Estefani Friday is a 59 y.o. male. Vitals:    09/27/23 1327   BP: (!) 171/101   Pulse: 87   SpO2: 97%     59yo male comes in for an evaluation of his right shoulder and right elbow. Right elbow - He has had pain for a long time. His wife passed last month and he is coming in now to address his own medical issues. He has a history of an elbow fracture in the past.  He describes the pain as stinging in character and is mostly anterior in location. Right shoulder - For 2 days, he has been having some shoulder pain. No specific injury. He had a rotator cuff repair at [facility] in the past and this pain feels similar. The pain is dull in character, mild in severity, pain does not radiate and is not associated with numbness. He is a right handed underground .         The following portions of the patient's history were reviewed and updated as appropriate: allergies, current medications, past family history, past medical history, past social history, past surgical history and problem list.    Review of Systems  Ortho Exam  Past Medical History:   Diagnosis Date   • Alcohol abuse    • Back pain    • Chronic low back pain    • Depression    • Diabetes mellitus ([address])    • DM2 (diabetes mellitus, type 2) ([address])    • Erectile dysfunction    • GERD (gastroesophageal reflux disease)    • Hepatic steatosis    • Hyperlipidemia    • Hypertension    • Neuropathy    • Pancreatitis    • Psychiatric disorder    • Tobacco abuse      Past Surgical History:   Procedure Laterality Date   • ANKLE FRACTURE SURGERY Right    • ANKLE SURGERY Right    • INGUINAL HERNIA REPAIR Left    • KNEE SURGERY Right    • UMBILICAL HERNIA REPAIR       Family History   Problem Relation Age of Onset   • Lymphoma Mother    • No Known Problems Father      Social History     Occupational History   • Not on file   Tobacco Use   • Smoking status: Every Day     Packs/day: 1.00     Years: 44.00     Total pack years: 44.00     Types: Cigarettes     Start date: 1978   • Smokeless tobacco: Never   • Tobacco comments:     per [place]-quit   Vaping Use   • Vaping Use: Never used   Substance and Sexual Activity   • Alcohol use: Yes     Alcohol/week: 100.0 standard drinks of alcohol     Types: 100 Cans of beer per week     Comment: heavy drinker   • Drug use: Yes     Frequency: 21.0 times per week     Types: Marijuana     Comment: marijuana each day   • Sexual activity: Yes     Partners: Female       Review of Systems   Constitutional: Negative. HENT: Negative. Eyes: Negative. Respiratory: Negative. Cardiovascular: Negative. Gastrointestinal: Negative. Endocrine: Negative. Genitourinary: Negative. Musculoskeletal: As below. .   Allergic/Immunologic: Negative. Neurological: Negative. Hematological: Negative. Psychiatric/Behavioral: Negative. Objective   Physical Exam        I have personally reviewed pertinent films in PACS and my interpretation is   Shoulder - no acute displaced fracture. .  CSpine - no acute displaced fracture or significant disc narrowing  Elbow - significant osteoarthritic changes with osteophytes    · Constitutional: Awake, Alert, Oriented  · Eyes: EOMI  · Psych: Mood and affect appropriate  · Heart: regular rate   · Lungs: No audible wheezing  · Abdomen: No guarding  · Lymph: no lymphedema      • right Shoulder:  - Appearance  • No swelling, discoloration, deformity, or ecchymosis  - Palpation  • No tenderness to palpation of the clavicle, AC joint, biceps tendon, scapula, or proximal humerus  - ROM  • Flexion: 140, ER: 70 and IR: L5. Pain in all planes  - Motor  • Internal and external rotation 5/5 with shoulder at side, flexion 5/5  - Special tests  • Empty Can Test negative, Drop Arm Test negative and Hawkin's Impingement Test positive  - NVI distally    • right Elbow:  - Appearance  • No swelling, rash, erythema, or ecchymosis  - Palpation  • No tenderness to palpation of the medial / lateral epicondyles, olecranon, radial head, or anterior elbow  - ROM  • Extension lacks 15. Flexion 100. Pronation 90. Supination 60. - Motor  • 5/5 strength in all planes  - Special Tests  • No instability with valgus / varus stress   - NVI distally    Large joint arthrocentesis: R subacromial bursa  Universal Protocol:  Consent: Verbal consent obtained. Risks and benefits: risks, benefits and alternatives were discussed  Consent given by: patient  Time out: Immediately prior to procedure a "time out" was called to verify the correct patient, procedure, equipment, support staff and site/side marked as required. Timeout called at: 9/27/2023 2:33 PM.  Patient understanding: patient states understanding of the procedure being performed  Site marked: the operative site was marked  Radiology Images displayed and confirmed.  If images not available, report reviewed: imaging studies available  Patient identity confirmed: verbally with patient    Supporting Documentation  Indications: pain   Procedure Details  Location: shoulder - R subacromial bursa  Needle size: 22 G  Ultrasound guidance: no  Approach: posterior  Medications administered: 2 mL lidocaine 1 %; 2 mL bupivacaine (PF) 0.5 %; 80 mg triamcinolone acetonide 40 mg/mL    Patient tolerance: patient tolerated the procedure well with no immediate complications  Dressing:  Sterile dressing applied

## 2023-10-24 DIAGNOSIS — E11.65 TYPE 2 DIABETES MELLITUS WITH HYPERGLYCEMIA, WITHOUT LONG-TERM CURRENT USE OF INSULIN (HCC): ICD-10-CM

## 2023-10-24 RX ORDER — SIMVASTATIN 20 MG
TABLET ORAL
Qty: 90 TABLET | Refills: 0 | Status: SHIPPED | OUTPATIENT
Start: 2023-10-24

## 2023-11-10 ENCOUNTER — OFFICE VISIT (OUTPATIENT)
Dept: FAMILY MEDICINE CLINIC | Facility: CLINIC | Age: 58
End: 2023-11-10
Payer: COMMERCIAL

## 2023-11-10 VITALS
TEMPERATURE: 98.8 F | BODY MASS INDEX: 32.62 KG/M2 | HEART RATE: 102 BPM | OXYGEN SATURATION: 98 % | DIASTOLIC BLOOD PRESSURE: 96 MMHG | RESPIRATION RATE: 16 BRPM | WEIGHT: 233 LBS | HEIGHT: 71 IN | SYSTOLIC BLOOD PRESSURE: 160 MMHG

## 2023-11-10 DIAGNOSIS — G57.91 NEUROPATHY OF RIGHT FOOT: ICD-10-CM

## 2023-11-10 DIAGNOSIS — Z12.5 PROSTATE CANCER SCREENING: ICD-10-CM

## 2023-11-10 DIAGNOSIS — M54.42 CHRONIC BILATERAL LOW BACK PAIN WITH BILATERAL SCIATICA: ICD-10-CM

## 2023-11-10 DIAGNOSIS — E11.65 TYPE 2 DIABETES MELLITUS WITH HYPERGLYCEMIA, WITHOUT LONG-TERM CURRENT USE OF INSULIN (HCC): ICD-10-CM

## 2023-11-10 DIAGNOSIS — K21.9 GASTROESOPHAGEAL REFLUX DISEASE WITHOUT ESOPHAGITIS: ICD-10-CM

## 2023-11-10 DIAGNOSIS — G89.29 CHRONIC BILATERAL LOW BACK PAIN WITH BILATERAL SCIATICA: ICD-10-CM

## 2023-11-10 DIAGNOSIS — G89.29 CHRONIC RIGHT SHOULDER PAIN: ICD-10-CM

## 2023-11-10 DIAGNOSIS — I10 ESSENTIAL HYPERTENSION: ICD-10-CM

## 2023-11-10 DIAGNOSIS — F33.2 SEVERE EPISODE OF RECURRENT MAJOR DEPRESSIVE DISORDER, WITHOUT PSYCHOTIC FEATURES (HCC): ICD-10-CM

## 2023-11-10 DIAGNOSIS — M25.511 CHRONIC RIGHT SHOULDER PAIN: ICD-10-CM

## 2023-11-10 DIAGNOSIS — Z23 ENCOUNTER FOR IMMUNIZATION: Primary | ICD-10-CM

## 2023-11-10 DIAGNOSIS — F17.210 CIGARETTE SMOKER: ICD-10-CM

## 2023-11-10 DIAGNOSIS — E78.5 DYSLIPIDEMIA: ICD-10-CM

## 2023-11-10 DIAGNOSIS — K29.30 CHRONIC SUPERFICIAL GASTRITIS WITHOUT BLEEDING: ICD-10-CM

## 2023-11-10 DIAGNOSIS — Z23 NEED FOR COVID-19 VACCINE: ICD-10-CM

## 2023-11-10 DIAGNOSIS — M54.41 CHRONIC BILATERAL LOW BACK PAIN WITH BILATERAL SCIATICA: ICD-10-CM

## 2023-11-10 PROBLEM — G93.40 ACUTE ENCEPHALOPATHY: Status: RESOLVED | Noted: 2021-09-19 | Resolved: 2023-11-10

## 2023-11-10 PROBLEM — E83.42 HYPOMAGNESEMIA: Status: RESOLVED | Noted: 2022-03-22 | Resolved: 2023-11-10

## 2023-11-10 PROBLEM — R10.9 ACUTE ABDOMINAL PAIN: Status: RESOLVED | Noted: 2023-06-23 | Resolved: 2023-11-10

## 2023-11-10 PROBLEM — F32.A CHRONIC DEPRESSION: Status: RESOLVED | Noted: 2021-07-30 | Resolved: 2023-11-10

## 2023-11-10 PROBLEM — E87.8 ELECTROLYTE ABNORMALITY: Status: RESOLVED | Noted: 2022-03-10 | Resolved: 2023-11-10

## 2023-11-10 PROBLEM — F33.9 EPISODE OF RECURRENT MAJOR DEPRESSIVE DISORDER (HCC): Status: ACTIVE | Noted: 2023-11-10

## 2023-11-10 PROBLEM — M25.512 CHRONIC LEFT SHOULDER PAIN: Status: RESOLVED | Noted: 2021-09-21 | Resolved: 2023-11-10

## 2023-11-10 LAB
CREAT UR-MCNC: 96.2 MG/DL
LEFT EYE DIABETIC RETINOPATHY: NORMAL
LEFT EYE IMAGE QUALITY: NORMAL
LEFT EYE MACULAR EDEMA: NORMAL
LEFT EYE OTHER RETINOPATHY: NORMAL
MICROALBUMIN UR-MCNC: 13.5 MG/L
MICROALBUMIN/CREAT 24H UR: 14 MG/G CREATININE (ref 0–30)
RIGHT EYE DIABETIC RETINOPATHY: NORMAL
RIGHT EYE IMAGE QUALITY: NORMAL
RIGHT EYE MACULAR EDEMA: NORMAL
RIGHT EYE OTHER RETINOPATHY: NORMAL
SEVERITY (EYE EXAM): NORMAL

## 2023-11-10 PROCEDURE — G0008 ADMIN INFLUENZA VIRUS VAC: HCPCS | Performed by: FAMILY MEDICINE

## 2023-11-10 PROCEDURE — 90480 ADMN SARSCOV2 VAC 1/ONLY CMP: CPT | Performed by: FAMILY MEDICINE

## 2023-11-10 PROCEDURE — 82043 UR ALBUMIN QUANTITATIVE: CPT | Performed by: FAMILY MEDICINE

## 2023-11-10 PROCEDURE — G0439 PPPS, SUBSEQ VISIT: HCPCS | Performed by: FAMILY MEDICINE

## 2023-11-10 PROCEDURE — 90686 IIV4 VACC NO PRSV 0.5 ML IM: CPT | Performed by: FAMILY MEDICINE

## 2023-11-10 PROCEDURE — 99214 OFFICE O/P EST MOD 30 MIN: CPT | Performed by: FAMILY MEDICINE

## 2023-11-10 PROCEDURE — 91320 SARSCV2 VAC 30MCG TRS-SUC IM: CPT | Performed by: FAMILY MEDICINE

## 2023-11-10 PROCEDURE — 82570 ASSAY OF URINE CREATININE: CPT | Performed by: FAMILY MEDICINE

## 2023-11-10 RX ORDER — SERTRALINE HYDROCHLORIDE 100 MG/1
150 TABLET, FILM COATED ORAL DAILY
Qty: 135 TABLET | Refills: 5 | Status: SHIPPED | OUTPATIENT
Start: 2023-11-10

## 2023-11-10 RX ORDER — RAMIPRIL 10 MG/1
10 CAPSULE ORAL 2 TIMES DAILY
Qty: 180 CAPSULE | Refills: 3 | Status: SHIPPED | OUTPATIENT
Start: 2023-11-10

## 2023-11-10 NOTE — PATIENT INSTRUCTIONS
Medicare Preventive Visit Patient Instructions  Thank you for completing your Welcome to Medicare Visit or Medicare Annual Wellness Visit today. Your next wellness visit will be due in one year (11/10/2024). The screening/preventive services that you may require over the next 5-10 years are detailed below. Some tests may not apply to you based off risk factors and/or age. Screening tests ordered at today's visit but not completed yet may show as past due. Also, please note that scanned in results may not display below. Preventive Screenings:  Service Recommendations Previous Testing/Comments   Colorectal Cancer Screening  Colonoscopy    Fecal Occult Blood Test (FOBT)/Fecal Immunochemical Test (FIT)  Fecal DNA/Cologuard Test  Flexible Sigmoidoscopy Age: 43-73 years old   Colonoscopy: every 10 years (May be performed more frequently if at higher risk)  OR  FOBT/FIT: every 1 year  OR  Cologuard: every 3 years  OR  Sigmoidoscopy: every 5 years  Screening may be recommended earlier than age 39 if at higher risk for colorectal cancer. Also, an individualized decision between you and your healthcare provider will decide whether screening between the ages of 77-80 would be appropriate.  Colonoscopy: 10/13/2022  FOBT/FIT: Not on file  Cologuard: 10/13/2022  Sigmoidoscopy: Not on file    Screening Current     Prostate Cancer Screening Individualized decision between patient and health care provider in men between ages of 53-66   Medicare will cover every 12 months beginning on the day after your 50th birthday PSA: No results in last 5 years     Risks and Benefits Discussed  Due for PSA     Hepatitis C Screening Once for adults born between 1945 and 1965  More frequently in patients at high risk for Hepatitis C Hep C Antibody: Not on file    Screening Current   Diabetes Screening 1-2 times per year if you're at risk for diabetes or have pre-diabetes Fasting glucose: No results in last 5 years (No results in last 5 years)  A1C: 7.8 (6/23/2023)  History Diabetes  Risks and Benefits Discussed  Due for Blood Glucose   Cholesterol Screening Once every 5 years if you don't have a lipid disorder. May order more often based on risk factors. Lipid panel: 03/11/2022  History Lipid Disorder  Risks and Benefits Discussed  Due for Lipid Panel      Other Preventive Screenings Covered by Medicare:  Abdominal Aortic Aneurysm (AAA) Screening: covered once if your at risk. You're considered to be at risk if you have a family history of AAA or a male between the age of 70-76 who smoking at least 100 cigarettes in your lifetime. Lung Cancer Screening: covers low dose CT scan once per year if you meet all of the following conditions: (1) Age 48-67; (2) No signs or symptoms of lung cancer; (3) Current smoker or have quit smoking within the last 15 years; (4) You have a tobacco smoking history of at least 20 pack years (packs per day x number of years you smoked); (5) You get a written order from a healthcare provider. Glaucoma Screening: covered annually if you're considered high risk: (1) You have diabetes OR (2) Family history of glaucoma OR (3)  aged 48 and older OR (3)  American aged 72 and older  Osteoporosis Screening: covered every 2 years if you meet one of the following conditions: (1) Have a vertebral abnormality; (2) On glucocorticoid therapy for more than 3 months; (3) Have primary hyperparathyroidism; (4) On osteoporosis medications and need to assess response to drug therapy. HIV Screening: covered annually if you're between the age of 14-79. Also covered annually if you are younger than 13 and older than 72 with risk factors for HIV infection. For pregnant patients, it is covered up to 3 times per pregnancy.     Immunizations:  Immunization Recommendations   Influenza Vaccine Annual influenza vaccination during flu season is recommended for all persons aged >= 6 months who do not have contraindications Pneumococcal Vaccine   * Pneumococcal conjugate vaccine = PCV13 (Prevnar 13), PCV15 (Vaxneuvance), PCV20 (Prevnar 20)  * Pneumococcal polysaccharide vaccine = PPSV23 (Pneumovax) Adults 97-94 yo with certain risk factors or if 69+ yo  If never received any pneumonia vaccine: recommend Prevnar 20 (PCV20)  Give PCV20 if previously received 1 dose of PCV13 or PPSV23   Hepatitis B Vaccine 3 dose series if at intermediate or high risk (ex: diabetes, end stage renal disease, liver disease)   Respiratory syncytial virus (RSV) Vaccine - COVERED BY MEDICARE PART D  * RSVPreF3 (Arexvy) CDC recommends that adults 61years of age and older may receive a single dose of RSV vaccine using shared clinical decision-making (SCDM)   Tetanus (Td) Vaccine - COST NOT COVERED BY MEDICARE PART B Following completion of primary series, a booster dose should be given every 10 years to maintain immunity against tetanus. Td may also be given as tetanus wound prophylaxis. Tdap Vaccine - COST NOT COVERED BY MEDICARE PART B Recommended at least once for all adults. For pregnant patients, recommended with each pregnancy. Shingles Vaccine (Shingrix) - COST NOT COVERED BY MEDICARE PART B  2 shot series recommended in those 23 years and older who have or will have weakened immune systems or those 50 years and older     Health Maintenance Due:      Topic Date Due   • HIV Screening  Never done   • Lung Cancer Screening  09/19/2022   • Colorectal Cancer Screening  10/13/2025   • Hepatitis C Screening  Completed     Immunizations Due:      Topic Date Due   • Hepatitis A Vaccine (1 of 2 - Risk 2-dose series) Never done   • COVID-19 Vaccine (3 - Moderna series) 06/19/2021   • Pneumococcal Vaccine: Pediatrics (0 to 5 Years) and At-Risk Patients (6 to 59 Years) (2 - PCV) 10/14/2021   • Hepatitis B Vaccine (1 of 3 - Risk 3-dose series) Never done   • Influenza Vaccine (1) 09/01/2023     Advance Directives   What are advance directives?   Advance directives are legal documents that state your wishes and plans for medical care. These plans are made ahead of time in case you lose your ability to make decisions for yourself. Advance directives can apply to any medical decision, such as the treatments you want, and if you want to donate organs. What are the types of advance directives? There are many types of advance directives, and each state has rules about how to use them. You may choose a combination of any of the following:  Living will: This is a written record of the treatment you want. You can also choose which treatments you do not want, which to limit, and which to stop at a certain time. This includes surgery, medicine, IV fluid, and tube feedings. Durable power of  for [facility]): This is a written record that states who you want to make healthcare choices for you when you are unable to make them for yourself. This person, called a proxy, is usually a family member or a friend. You may choose more than 1 proxy. Do not resuscitate (DNR) order:  A DNR order is used in case your heart stops beating or you stop breathing. It is a request not to have certain forms of treatment, such as CPR. A DNR order may be included in other types of advance directives. Medical directive: This covers the care that you want if you are in a coma, near death, or unable to make decisions for yourself. You can list the treatments you want for each condition. Treatment may include pain medicine, surgery, blood transfusions, dialysis, IV or tube feedings, and a ventilator (breathing machine). Values history: This document has questions about your views, beliefs, and how you feel and think about life. This information can help others choose the care that you would choose. Why are advance directives important? An advance directive helps you control your care. Although spoken wishes may be used, it is better to have your wishes written down.  Spoken wishes can be misunderstood, or not followed. Treatments may be given even if you do not want them. An advance directive may make it easier for your family to make difficult choices about your care. Cigarette Smoking and Your Health   Risks to your health if you smoke:  Nicotine and other chemicals found in tobacco damage every cell in your body. Even if you are a light smoker, you have an increased risk for cancer, heart disease, and lung disease. If you are pregnant or have diabetes, smoking increases your risk for complications. Benefits to your health if you stop smoking: You decrease respiratory symptoms such as coughing, wheezing, and shortness of breath. You reduce your risk for cancers of the lung, mouth, throat, kidney, bladder, pancreas, stomach, and cervix. If you already have cancer, you increase the benefits of chemotherapy. You also reduce your risk for cancer returning or a second cancer from developing. You reduce your risk for heart disease, blood clots, heart attack, and stroke. You reduce your risk for lung infections, and diseases such as pneumonia, asthma, chronic bronchitis, and emphysema. Your circulation improves. More oxygen can be delivered to your body. If you have diabetes, you lower your risk for complications, such as kidney, artery, and eye diseases. You also lower your risk for nerve damage. Nerve damage can lead to amputations, poor vision, and blindness. You improve your body's ability to heal and to fight infections. For more information and support to stop smoking:   [employer]  Phone: 7- 037 - 238-6017  Web Address: www.[employer]  Weight Management   Why it is important to manage your weight:  Being overweight increases your risk of health conditions such as heart disease, high blood pressure, type 2 diabetes, and certain types of cancer. It can also increase your risk for osteoarthritis, sleep apnea, and other respiratory problems.  Aim for a slow, steady weight loss. Even a small amount of weight loss can lower your risk of health problems. How to lose weight safely:  A safe and healthy way to lose weight is to eat fewer calories and get regular exercise. You can lose up about 1 pound a week by decreasing the number of calories you eat by 500 calories each day. Healthy meal plan for weight management:  A healthy meal plan includes a variety of foods, contains fewer calories, and helps you stay healthy. A healthy meal plan includes the following:  Eat whole-grain foods more often. A healthy meal plan should contain fiber. Fiber is the part of grains, fruits, and vegetables that is not broken down by your body. Whole-grain foods are healthy and provide extra fiber in your diet. Some examples of whole-grain foods are whole-wheat breads and pastas, oatmeal, brown rice, and bulgur. Eat a variety of vegetables every day. Include dark, leafy greens such as spinach, kale, kevin greens, and mustard greens. Eat yellow and orange vegetables such as carrots, sweet potatoes, and winter squash. Eat a variety of fruits every day. Choose fresh or canned fruit (canned in its own juice or light syrup) instead of juice. Fruit juice has very little or no fiber. Eat low-fat dairy foods. Drink fat-free (skim) milk or 1% milk. Eat fat-free yogurt and low-fat cottage cheese. Try low-fat cheeses such as mozzarella and other reduced-fat cheeses. Choose meat and other protein foods that are low in fat. Choose beans or other legumes such as split peas or lentils. Choose fish, skinless poultry (chicken or turkey), or lean cuts of red meat (beef or pork). Before you cook meat or poultry, cut off any visible fat. Use less fat and oil. Try baking foods instead of frying them. Add less fat, such as margarine, sour cream, regular salad dressing and mayonnaise to foods. Eat fewer high-fat foods.  Some examples of high-fat foods include french fries, doughnuts, ice cream, and cakes. Eat fewer sweets. Limit foods and drinks that are high in sugar. This includes candy, cookies, regular soda, and sweetened drinks. Exercise:  Exercise at least 30 minutes per day on most days of the week. Some examples of exercise include walking, biking, dancing, and swimming. You can also fit in more physical activity by taking the stairs instead of the elevator or parking farther away from stores. Ask your healthcare provider about the best exercise plan for you. Alcohol Use and Your Health    Drinking too much can harm your health. Excessive alcohol use leads to about 88,000 death in [facility] each year, and shortens the life of those who diet by almost 30 years. Further, excessive drinking cost the economy $249 billion in 2010. Most excessive drinkers are not alcohol dependent. Excessive alcohol use has immediate effects that increase the risk of many harmful health conditions. These are most often the result of binge drinking. Over time, excessive alcohol use can lead to the development of chronic diseases and other series health problems. What is considered a "drink"? Excessive alcohol use includes:  Binge Drinking: For women, 4 or more drinks consumed on one occasion. For men, 5 or more drinks consumed on one occasion. Heavy Drinking: For women, 8 or more drinks per week. For men, 15 or more drinks per week  Any alcohol used by pregnant women  Any alcohol used by those under the age of 21 years    If you choose to drink, do so in moderation:  Do not drink at all if you are under the age of 24, or if you are or may be pregnant, or have health problems that could be made worse by drinking.   For women, up to 1 drink per day  For men, up to 2 drinks a day    No one should begin drinking or drink more frequently based on potential health benefits    Short-Term Health Risks:  Injuries: motor vehicle crashes, falls, drownings, burns  Violence: homicide, suicide, sexual assault, intimate partner violence  Alcohol poisoning  Reproductive health: risky sexual behaviors, unintended prengnacy, sexually transmitted diseases, miscarriage, stillbirth, fetal alcohol syndrome    Long-Term Health Risks:  Chronic diseases: high blood pressure, heart disease, stroke, liver disease, digestive problems  Cancers: breast, mouth and throat, liver, colon  Learning and memory problems: dementia, poor school performance  Mental health: depression, anxiety, insomnia  Social problems: lost productivity, family problems, unemployment  Alcohol dependence    For support and more information:  Substance Abuse and [address]  Web Address: https://[employer]/    Alcoholics Anonymous        Web Address: http://www.[employer].info/    https://www.cdc.gov/alcohol/fact-sheets/alcohol-use.htm     © Copyright [employer] 2018 Information is for End User's use only and may not be sold, redistributed or otherwise used for commercial purposes.  All illustrations and images included in CareNotes® are the copyrighted property of [employer] or [address] [place] [address]

## 2023-11-10 NOTE — PROGRESS NOTES
Assessment and Plan:     Problem List Items Addressed This Visit        Digestive    Gastroesophageal reflux disease without esophagitis    Chronic superficial gastritis without bleeding     Abnormal upper GI needs to have endoscopy         Relevant Orders    Ambulatory Referral to Gastroenterology       Endocrine    Type 2 diabetes mellitus with hyperglycemia ([address])       Lab Results   Component Value Date    HGBA1C 7.8 (A) 2023   Due for labs          Relevant Orders    IRIS Diabetic eye exam    Albumin / creatinine urine ratio    Basic metabolic panel    Hemoglobin A1C       Cardiovascular and Mediastinum    Essential hypertension     Rolando today increase ramipril to 10mg po bid follow up 1 mo         Relevant Medications    ramipril (ALTACE) 10 MG capsule       Nervous and Auditory    Chronic bilateral low back pain with bilateral sciatica     Very severe pain now pt advised to go  to pain medicine will see his ortho          Relevant Orders    Ambulatory referral to Spine & Pain Management    Neuropathy of right foot     No change since injury            Other    Dyslipidemia     Due for labs          Relevant Orders    Lipid panel    Episode of recurrent major depressive disorder ([address])     Complicated by grieving the loss of his wife  who recently  will increase  sertraline 150mg po qd          Relevant Medications    sertraline (ZOLOFT) 100 mg tablet   Other Visit Diagnoses     Encounter for immunization    -  Primary    Relevant Orders    influenza vaccine, quadrivalent, 0.5 mL, preservative-free, for adult and pediatric patients 6 mos+ (AFLURIA, FLUARIX, FLULAVAL, FLUZONE)    Need for COVID-19 vaccine        Relevant Orders    COVID-19 Pfizer mRNA vaccine 12 yr and older (GREY cap)    Cigarette smoker        Relevant Orders    CT lung screening program    Chronic right shoulder pain        Relevant Orders    Ambulatory Referral to Orthopedic Surgery    Ambulatory referral to Spine & Pain Management    Prostate cancer screening        Relevant Orders    PSA, Total Screen           Preventive health issues were discussed with patient, and age appropriate screening tests were ordered as noted in patient's After Visit Summary. Personalized health advice and appropriate referrals for health education or preventive services given if needed, as noted in patient's After Visit Summary.      History of Present Illness:     Patient presents for a Medicare Wellness Visit    HPI   Patient Care Team:  Ev Ruiz MD as PCP - General (Family Medicine)  Ev Ruiz MD     Review of Systems:     Review of Systems     Problem List:     Patient Active Problem List   Diagnosis   • Pancreatitis, acute   • Hepatic steatosis   • Lesion of left native kidney   • Essential hypertension   • Dyslipidemia   • Alcohol abuse   • Lactic acidosis   • Type 2 diabetes mellitus with hyperglycemia (HCC)   • Chronic bilateral low back pain with bilateral sciatica   • Gastroesophageal reflux disease without esophagitis   • Neuropathy of right foot   • Abnormal skin growth   • Leucocytosis   • Other fatigue   • Vasculogenic erectile dysfunction   • Fall   • Injury of right ankle   • Closed fracture of proximal end of left humerus   • Acute pain due to trauma   • Cigarette nicotine dependence without complication   • Rib pain on right side   • Peyronie's disease   • Episode of recurrent major depressive disorder (HCC)   • Chronic superficial gastritis without bleeding      Past Medical and Surgical History:     Past Medical History:   Diagnosis Date   • Alcohol abuse    • Back pain    • Chronic low back pain    • Depression    • Diabetes mellitus ([address])    • DM2 (diabetes mellitus, type 2) (HCC)    • Erectile dysfunction    • GERD (gastroesophageal reflux disease)    • Hepatic steatosis    • Hyperlipidemia    • Hypertension    • Neuropathy    • Pancreatitis    • Psychiatric disorder    • Tobacco abuse      Past Surgical History: Procedure Laterality Date   • ANKLE FRACTURE SURGERY Right    • ANKLE SURGERY Right    • INGUINAL HERNIA REPAIR Left    • KNEE SURGERY Right    • UMBILICAL HERNIA REPAIR        Family History:     Family History   Problem Relation Age of Onset   • Lymphoma Mother    • No Known Problems Father       Social History:     Social History     Socioeconomic History   • Marital status: /Civil Union     Spouse name: None   • Number of children: None   • Years of education: None   • Highest education level: None   Occupational History   • None   Tobacco Use   • Smoking status: Every Day     Packs/day: 1.00     Years: 44.00     Total pack years: 44.00     Types: Cigarettes     Start date:    • Smokeless tobacco: Never   • Tobacco comments:     per [place]-quit   Vaping Use   • Vaping Use: Never used   Substance and Sexual Activity   • Alcohol use: Yes     Alcohol/week: 100.0 standard drinks of alcohol     Types: 100 Cans of beer per week     Comment: heavy drinker   • Drug use: Yes     Frequency: 21.0 times per week     Types: Marijuana     Comment: marijuana each day   • Sexual activity: Yes     Partners: Female   Other Topics Concern   • None   Social History Narrative    ** Merged History Encounter **         · Most recent tobacco use screenin2019    · Alcohol intake:   Occasional    · Legally blind in one or both eyes:   No    · 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        Social Determinants of Health     Financial Resource Strain: High Risk (10/3/2022)    Overall Financial Resource Strain (CARDIA)    • Difficulty of Paying Living Expenses: Hard   Food Insecurity: Not on file   Transportation Needs: No Transportation Needs (10/3/2022)    PRAPARE - Transportation    • Lack of Transportation (Medical): No    • Lack of Transportation (Non-Medical):  No   Physical Activity: Not on file   Stress: Not on file   Social Connections: Not on file   Intimate Partner Violence: Not on file   Housing Stability: Not on file      Medications and Allergies:     Current Outpatient Medications   Medication Sig Dispense Refill   • Accu-Chek FastClix Lancets MISC USE AS DIRECTED 306 each 6   • amLODIPine (NORVASC) 5 mg tablet TAKE 1 TABLET (5 MG TOTAL) BY MOUTH DAILY. 90 tablet 2   • Blood Glucose Monitoring Suppl (Accu-Chek Stefanie Plus) w/Device KIT USE AS DIRECTED 1 kit 1   • celecoxib (CeleBREX) 200 mg capsule TAKE 1 CAPSULE TWICE DAILY 180 capsule 1   • fenofibrate 160 MG tablet TAKE 1 TABLET (160 MG TOTAL) BY MOUTH DAILY 90 tablet 0   • gabapentin (NEURONTIN) 300 mg capsule Take 300 mg by mouth Three times a day     • glimepiride (AMARYL) 2 mg tablet TAKE 1 TABLET EVERY MORNING  AND TAKE 1/2 TABLET EVERY EVENING 135 tablet 1   • magnesium oxide (MAG-OX) 400 mg Take 1 tablet (400 mg total) by mouth daily 30 tablet 0   • metFORMIN (GLUCOPHAGE-XR) 500 mg 24 hr tablet TAKE 2 TABLETS EVERY DAY WITH BREAKFAST 180 tablet 1   • omeprazole (PriLOSEC) 40 MG capsule TAKE 1 CAPSULE TWICE DAILY 180 capsule 1   • pioglitazone (ACTOS) 45 mg tablet TAKE 1 TABLET EVERY DAY 90 tablet 0   • ramipril (ALTACE) 10 MG capsule TAKE 1 CAPSULE EVERY DAY 90 capsule 0   • ramipril (ALTACE) 10 MG capsule Take 1 capsule (10 mg total) by mouth 2 (two) times a day 180 capsule 3   • sertraline (ZOLOFT) 100 mg tablet Take 1.5 tablets (150 mg total) by mouth daily 135 tablet 5   • simvastatin (ZOCOR) 20 mg tablet TAKE 1 TABLET EVERY DAY 90 tablet 0   • sucralfate (CARAFATE) 1 g tablet Take 1 tablet (1 g total) by mouth 4 (four) times a day for 7 days 28 tablet 0     No current facility-administered medications for this visit.      Allergies   Allergen Reactions   • Amoxicillin Swelling   • Amoxil [Amoxicillin] Hives      Immunizations:     Immunization History   Administered Date(s) Administered   • COVID-19 MODERNA VACC 0.5 ML IM 03/27/2021, 04/24/2021   • INFLUENZA 10/03/2022   • Influenza, recombinant, quadrivalent,injectable, preservative free 03/03/2019, 10/14/2020, 03/10/2022, 10/03/2022   • Pneumococcal Polysaccharide PPV23 10/14/2020   • Td (adult), adsorbed 10/27/2015      Health Maintenance:         Topic Date Due   • HIV Screening  Never done   • Lung Cancer Screening  09/19/2022   • Colorectal Cancer Screening  10/13/2025   • Hepatitis C Screening  Completed         Topic Date Due   • Hepatitis A Vaccine (1 of 2 - Risk 2-dose series) Never done   • COVID-19 Vaccine (3 - Moderna series) 06/19/2021   • Pneumococcal Vaccine: Pediatrics (0 to 5 Years) and At-Risk Patients (6 to 59 Years) (2 - PCV) 10/14/2021   • Hepatitis B Vaccine (1 of 3 - Risk 3-dose series) Never done   • Influenza Vaccine (1) 09/01/2023      Medicare Screening Tests and Risk Assessments:     Boni Posada is here for his Subsequent Wellness visit. Health Risk Assessment:   Patient rates overall health as good. Patient feels that their physical health rating is same. Patient is very satisfied with their life. Eyesight was rated as much worse. Hearing was rated as much worse. Patient feels that their emotional and mental health rating is much worse. Patients states they are never, rarely angry. Patient states they are often unusually tired/fatigued. Pain experienced in the last 7 days has been a lot. Patient's pain rating has been 10/10. Patient states that he has experienced no weight loss or gain in last 6 months. Depression Screening:   PHQ-9 Score: 16      Fall Risk Screening: In the past year, patient has experienced: no history of falling in past year      Home Safety:  Patient does not have trouble with stairs inside or outside of their home. Patient has working smoke alarms and has working carbon monoxide detector. Home safety hazards include: none. Nutrition:   Current diet is Regular. Medications:   Patient is currently taking over-the-counter supplements. OTC medications include: see medication list. Patient is able to manage medications. Activities of Daily Living (ADLs)/Instrumental Activities of Daily Living (IADLs):   Walk and transfer into and out of bed and chair?: Yes  Dress and groom yourself?: Yes    Bathe or shower yourself?: Yes    Feed yourself? Yes  Do your laundry/housekeeping?: Yes  Manage your money, pay your bills and track your expenses?: Yes  Make your own meals?: Yes    Do your own shopping?: Yes    Previous Hospitalizations:   Any hospitalizations or ED visits within the last 12 months?: No      Advance Care Planning:   Living will: No    Advanced directive: No      Cognitive Screening:   Provider or family/friend/caregiver concerned regarding cognition?: No    PREVENTIVE SCREENINGS      Cardiovascular Screening:    General: History Lipid Disorder and Risks and Benefits Discussed    Due for: Lipid Panel      Diabetes Screening:     General: History Diabetes and Risks and Benefits Discussed    Due for: Blood Glucose      Colorectal Cancer Screening:     General: Screening Current      Prostate Cancer Screening:    General: Risks and Benefits Discussed    Due for: PSA      Osteoporosis Screening:    General: Screening Not Indicated      Abdominal Aortic Aneurysm (AAA) Screening:    Risk factors include: tobacco use        General: Screening Not Indicated      Lung Cancer Screening:     General: Risks and Benefits Discussed    Due for: Low Dose CT (LDCT)      Hepatitis C Screening:    General: Screening Current    Screening, Brief Intervention, and Referral to Treatment (SBIRT)    Screening      AUDIT-C Screenin) How often did you have a drink containing alcohol in the past year? 2 to 3 times a week  2) How many drinks did you have on a typical day when you were drinking in the past year?  3 to 4  3) How often did you have 6 or more drinks on one occasion in the past year? less than monthly    AUDIT-C Score: 4  Interpretation: Score 4-12 (male): POSITIVE screen for alcohol misuse    Single Item Drug Screening:  How often have you used an illegal drug (including marijuana) or a prescription medication for non-medical reasons in the past year? never    Single Item Drug Screen Score: 0  Interpretation: Negative screen for possible drug use disorder    No results found.      Physical Exam:     /96   Pulse 102   Temp 98.8 °F (37.1 °C) (Tympanic)   Resp 16   Ht 5' 11" (1.803 m)   Wt 106 kg (233 lb)   SpO2 98%   BMI 32.50 kg/m²     Physical Exam     Kimberlee George MD

## 2023-11-10 NOTE — PROGRESS NOTES
Name: Michell Henley      : 1965      MRN: [id number]  Encounter Provider: Mimi Sweeney MD  Encounter Date: 11/10/2023   Encounter department: [facility] MEDICINE    Assessment & Plan     1. Encounter for immunization  -     influenza vaccine, quadrivalent, 0.5 mL, preservative-free, for adult and pediatric patients 6 mos+ (AFLURIA, FLUARIX, FLULAVAL, FLUZONE)    2. Need for COVID-19 vaccine  -     COVID-19 Pfizer mRNA vaccine 12 yr and older (GREY cap)    3. Gastroesophageal reflux disease without esophagitis    4. Type 2 diabetes mellitus with hyperglycemia, without long-term current use of insulin [facility])  Assessment & Plan:    Lab Results   Component Value Date    HGBA1C 7.8 (A) 2023   Due for labs     Orders:  -     IRIS Diabetic eye exam  -     Albumin / creatinine urine ratio; Future; Expected date: 11/10/2023  -     Basic metabolic panel; Future  -     Hemoglobin A1C; Future; Expected date: 11/10/2023    5. Essential hypertension  Assessment & Plan:  Rolando today increase ramipril to 10mg po bid follow up 1 mo    Orders:  -     ramipril (ALTACE) 10 MG capsule; Take 1 capsule (10 mg total) by mouth 2 (two) times a day    6. Chronic bilateral low back pain with bilateral sciatica  Assessment & Plan:  Very severe pain now pt advised to go  to pain medicine will see his ortho     Orders:  -     Ambulatory referral to Spine & Pain Management; Future    7. Neuropathy of right foot  Assessment & Plan:  No change since injury      8. Dyslipidemia  Assessment & Plan:  Due for labs     Orders:  -     Lipid panel; Future    9. Severe episode of recurrent major depressive disorder, without psychotic features ([address] [facility])  Assessment & Plan:  Complicated by grieving the loss of his wife  who recently  will increase  sertraline 150mg po qd     Orders:  -     sertraline (ZOLOFT) 100 mg tablet; Take 1.5 tablets (150 mg total) by mouth daily    10.  Cigarette smoker  -     CT lung screening program; Future; Expected date: 11/10/2023    11. Chronic right shoulder pain  -     Ambulatory Referral to Orthopedic Surgery; Future  -     Ambulatory referral to Spine & Pain Management; Future    12. Chronic superficial gastritis without bleeding  Assessment & Plan:  Abnormal upper GI needs to have endoscopy    Orders:  -     Ambulatory Referral to Gastroenterology; Future    13. Prostate cancer screening  -     PSA, Total Screen; Future      BMI Counseling: Body mass index is 32.5 kg/m². The BMI is above normal. Exercise recommendations include exercising 3-5 times per week and strength training exercises. Subjective      HPI  Review of Systems   Constitutional:  Negative for appetite change, chills, fatigue and fever. Respiratory:  Negative for cough, chest tightness and shortness of breath. Cardiovascular:  Negative for chest pain, palpitations and leg swelling. Gastrointestinal:  Negative for abdominal pain, constipation, diarrhea, nausea and vomiting. Genitourinary:  Negative for difficulty urinating and frequency. Musculoskeletal:  Positive for arthralgias (shoulder pain) and back pain. Negative for gait problem (back pain) and neck pain. Skin:  Negative for rash. Neurological:  Negative for dizziness, weakness, light-headedness, numbness and headaches. Hematological:  Does not bruise/bleed easily. Psychiatric/Behavioral:  Positive for dysphoric mood and sleep disturbance. The patient is not nervous/anxious. Current Outpatient Medications on File Prior to Visit   Medication Sig   • Accu-Chek FastClix Lancets MISC USE AS DIRECTED   • amLODIPine (NORVASC) 5 mg tablet TAKE 1 TABLET (5 MG TOTAL) BY MOUTH DAILY.    • Blood Glucose Monitoring Suppl (Accu-Chek Stefanie Plus) w/Device KIT USE AS DIRECTED   • celecoxib (CeleBREX) 200 mg capsule TAKE 1 CAPSULE TWICE DAILY   • fenofibrate 160 MG tablet TAKE 1 TABLET (160 MG TOTAL) BY MOUTH DAILY   • gabapentin (NEURONTIN) 300 mg capsule Take 300 mg by mouth Three times a day   • glimepiride (AMARYL) 2 mg tablet TAKE 1 TABLET EVERY MORNING  AND TAKE 1/2 TABLET EVERY EVENING   • magnesium oxide (MAG-OX) 400 mg Take 1 tablet (400 mg total) by mouth daily   • metFORMIN (GLUCOPHAGE-XR) 500 mg 24 hr tablet TAKE 2 TABLETS EVERY DAY WITH BREAKFAST   • omeprazole (PriLOSEC) 40 MG capsule TAKE 1 CAPSULE TWICE DAILY   • pioglitazone (ACTOS) 45 mg tablet TAKE 1 TABLET EVERY DAY   • ramipril (ALTACE) 10 MG capsule TAKE 1 CAPSULE EVERY DAY   • simvastatin (ZOCOR) 20 mg tablet TAKE 1 TABLET EVERY DAY   • sucralfate (CARAFATE) 1 g tablet Take 1 tablet (1 g total) by mouth 4 (four) times a day for 7 days   • [DISCONTINUED] famotidine (PEPCID) 20 mg tablet Take 1 tablet (20 mg total) by mouth daily for 14 days   • [DISCONTINUED] sertraline (ZOLOFT) 100 mg tablet TAKE 1 TABLET EVERY DAY       Objective     /96   Pulse 102   Temp 98.8 °F (37.1 °C) (Tympanic)   Resp 16   Ht 5' 11" (1.803 m)   Wt 106 kg (233 lb)   SpO2 98%   BMI 32.50 kg/m²     Physical Exam  Vitals reviewed. Constitutional:       General: He is not in acute distress. Appearance: Normal appearance. He is well-developed. He is not ill-appearing. HENT:      Right Ear: Tympanic membrane, ear canal and external ear normal.      Left Ear: Tympanic membrane, ear canal and external ear normal.      Nose: Nose normal.      Mouth/Throat:      Mouth: Mucous membranes are moist.   Eyes:      General: Lids are normal.      Extraocular Movements: Extraocular movements intact. Conjunctiva/sclera: Conjunctivae normal.      Pupils: Pupils are equal, round, and reactive to light. Neck:      Thyroid: No thyromegaly. Vascular: No carotid bruit. Cardiovascular:      Rate and Rhythm: Normal rate and regular rhythm. Pulses: Normal pulses. no weak pulses          Dorsalis pedis pulses are 2+ on the right side and 2+ on the left side. Heart sounds: Normal heart sounds.  No murmur heard.  Pulmonary:      Effort: Pulmonary effort is normal.      Breath sounds: Normal breath sounds. Chest:      Chest wall: No tenderness. Breasts:     Right: Normal.      Left: Normal.   Abdominal:      General: Bowel sounds are normal. There is no distension. Palpations: Abdomen is soft. There is no mass. Tenderness: There is no abdominal tenderness. Hernia: No hernia is present. Musculoskeletal:         General: Normal range of motion. Cervical back: Full passive range of motion without pain, normal range of motion and neck supple. Right lower leg: No edema. Left lower leg: No edema. Feet:      Right foot:      Skin integrity: Dry skin present. No ulcer, skin breakdown, erythema, warmth or callus. Left foot:      Skin integrity: Dry skin present. No ulcer, skin breakdown, erythema, warmth or callus. Lymphadenopathy:      Cervical: No cervical adenopathy. Skin:     General: Skin is warm and dry. Comments: No abn moles   Neurological:      General: No focal deficit present. Mental Status: He is alert and oriented to person, place, and time. Mental status is at baseline. Cranial Nerves: No cranial nerve deficit. Sensory: No sensory deficit. Motor: No weakness or tremor. Coordination: Coordination normal.      Gait: Gait normal.      Deep Tendon Reflexes: Reflexes normal.   Psychiatric:         Mood and Affect: Mood normal.         Speech: Speech normal.         Behavior: Behavior normal.     Patient's shoes and socks removed. Right Foot/Ankle   Right Foot Inspection  Skin Exam: skin normal, skin intact and dry skin. No warmth, no callus, no erythema, no maceration, no abnormal color, no pre-ulcer, no ulcer and no callus. Toe Exam: No swelling, no tenderness, erythema and  no right toe deformity    Sensory   Vibration: absent  Proprioception: intact  Monofilament testing: absent    Vascular  The right DP pulse is 2+.      Right Toe - Comprehensive Exam  Ecchymosis: none  Swelling: none   Tenderness: none       Left Foot/Ankle  Left Foot Inspection  Skin Exam: skin normal, skin intact and dry skin. No warmth, no erythema, no maceration, normal color, no pre-ulcer, no ulcer and no callus. Sensory   Vibration: diminished  Proprioception: intact  Monofilament testing: diminished    Vascular  The left DP pulse is 2+. Left Toe  - Comprehensive Exam  Ecchymosis: none  Swelling: none   Tenderness: none       Assign Risk Category  No deformity present  No loss of protective sensation  No weak pulses  Risk: 1    BMI Counseling: Body mass index is 32.5 kg/m². The BMI is above normal. Nutrition recommendations include 3-5 servings of fruits/vegetables daily, reducing fast food intake, consuming healthier snacks, decreasing soda and/or juice intake, moderation in carbohydrate intake, increasing intake of lean protein, and reducing intake of saturated fat and trans fat. Exercise recommendations include exercising 3-5 times per week and strength training exercises.   Brooke Dickens MD

## 2023-11-11 ENCOUNTER — HOSPITAL ENCOUNTER (EMERGENCY)
Facility: HOSPITAL | Age: 58
Discharge: HOME | End: 2023-11-11
Attending: EMERGENCY MEDICINE | Admitting: EMERGENCY MEDICINE
Payer: COMMERCIAL

## 2023-11-11 ENCOUNTER — APPOINTMENT (EMERGENCY)
Dept: RADIOLOGY | Facility: HOSPITAL | Age: 58
End: 2023-11-11
Payer: COMMERCIAL

## 2023-11-11 VITALS
RESPIRATION RATE: 20 BRPM | HEIGHT: 71 IN | HEART RATE: 89 BPM | BODY MASS INDEX: 33.02 KG/M2 | SYSTOLIC BLOOD PRESSURE: 90 MMHG | OXYGEN SATURATION: 99 % | DIASTOLIC BLOOD PRESSURE: 53 MMHG | WEIGHT: 235.89 LBS | TEMPERATURE: 97.6 F

## 2023-11-11 DIAGNOSIS — R55 SYNCOPE: Primary | ICD-10-CM

## 2023-11-11 PROCEDURE — 99285 EMERGENCY DEPT VISIT HI MDM: CPT | Performed by: PHYSICIAN ASSISTANT

## 2023-11-11 PROCEDURE — 93005 ELECTROCARDIOGRAM TRACING: CPT

## 2023-11-11 PROCEDURE — 99284 EMERGENCY DEPT VISIT MOD MDM: CPT

## 2023-11-11 NOTE — ED NOTES
Pt had 3 visitors at bedside. Made visitors aware that only two visitors allowed at bedside. Woman visitor started making disrespectful remarks, made visitor aware if she was to continue being disrespectful, breaking the visitor code of conduct,  would be asked to leave. Visitor stated, "I'm not going anywhere!"  Visitor continued to curse and call this RN a bitch, attempted to take pictures and video of this RN. Made visitor aware that video and picture taking was prohibited. Security called and visitors escorted out of ED. Family then proceeded to make a scene in [address] (being loud and disorderly), and security had to escort family and pt off premises.        Dioni Lozano RN  11/11/23 [id number]

## 2023-11-11 NOTE — ED NOTES
Pt refusing blood work/medical tests. Family being brought back to assist with collection/performing.      Ciro Reynolds RN  11/11/23 [id number]

## 2023-11-12 LAB
ATRIAL RATE: 84 BPM
P AXIS: 53 DEGREES
PR INTERVAL: 182 MS
QRS AXIS: 33 DEGREES
QRSD INTERVAL: 100 MS
QT INTERVAL: 388 MS
QTC INTERVAL: 458 MS
T WAVE AXIS: 45 DEGREES
VENTRICULAR RATE: 84 BPM

## 2023-11-12 PROCEDURE — 93010 ELECTROCARDIOGRAM REPORT: CPT | Performed by: INTERNAL MEDICINE

## 2023-11-12 NOTE — ED PROVIDER NOTES
History  Chief Complaint   Patient presents with    Seizure - Prior Hx Of     Pt 'I was at the bar when I started having problems with my arm, it wouldn't move. And the next thing I know, I dont remember anything else" EMS stated a bystander started doing the Heimlich maneuver. Pt was picked up from a bar and admits to "4 cups of beer"     This is a 75-year-old male with past medical history significant for alcohol abuse, type 2 diabetes mellitus, hypertension, hyperlipidemia, and tobacco abuse presenting to the emergency department after experiencing a syncopal episode. The patient notes that he had approximately 4 "cups of alcohol" while out earlier today. He notes he thinks he passed out and might of had a seizure. He did not bite is tongue. Bystanders performed the Heimlich maneuver on the patient because they thought he was choking. The patient does not recollect any of this. The patient presents to the emergency department soaked in his own urine. The patient denies any current dizziness, lightheadedness, or visual disturbances. He notes generalized chest pain but is uncertain how long this has been ongoing for. The patient denies any nausea, vomiting, diarrhea, or constipation. The patient denies any dysuria, hematuria, or foul-smelling urine. The patient notes he is a daily drinker and notes that his significant other passed away approximately 3 months ago but denies any suicidal or homicidal ideations. He does not want alcohol detox. Of note, the patient notes that today was the first day that they increased his ramipril for better hypertensive control; he notes he doubled the dose per his PCP's recommendations. He denies any current headache. No fevers or chills. No neck pain. No other complaints at this time. History provided by:  Patient   used: No    Syncope  Episode history:  Single  Most recent episode:   Today  Progression:  Resolved  Chronicity: New  Witnessed: yes    Associated symptoms: chest pain    Associated symptoms: no confusion, no diaphoresis, no difficulty breathing, no dizziness, no fever, no headaches, no malaise/fatigue, no nausea, no palpitations, no shortness of breath, no visual change, no vomiting and no weakness        Prior to Admission Medications   Prescriptions Last Dose Informant Patient Reported? Taking? Accu-Chek FastClix Lancets MISC   No Yes   Sig: USE AS DIRECTED   Blood Glucose Monitoring Suppl (Accu-Chek Stefanie Plus) w/Device KIT   No No   Sig: USE AS DIRECTED   amLODIPine (NORVASC) 5 mg tablet   No No   Sig: TAKE 1 TABLET (5 MG TOTAL) BY MOUTH DAILY.    celecoxib (CeleBREX) 200 mg capsule   No No   Sig: TAKE 1 CAPSULE TWICE DAILY   fenofibrate 160 MG tablet   No No   Sig: TAKE 1 TABLET (160 MG TOTAL) BY MOUTH DAILY   gabapentin (NEURONTIN) 300 mg capsule   Yes No   Sig: Take 300 mg by mouth Three times a day   glimepiride (AMARYL) 2 mg tablet   No No   Sig: TAKE 1 TABLET EVERY MORNING  AND TAKE 1/2 TABLET EVERY EVENING   magnesium oxide (MAG-OX) 400 mg   No No   Sig: Take 1 tablet (400 mg total) by mouth daily   metFORMIN (GLUCOPHAGE-XR) 500 mg 24 hr tablet   No No   Sig: TAKE 2 TABLETS EVERY DAY WITH BREAKFAST   omeprazole (PriLOSEC) 40 MG capsule   No No   Sig: TAKE 1 CAPSULE TWICE DAILY   pioglitazone (ACTOS) 45 mg tablet   No No   Sig: TAKE 1 TABLET EVERY DAY   ramipril (ALTACE) 10 MG capsule   No No   Sig: TAKE 1 CAPSULE EVERY DAY   ramipril (ALTACE) 10 MG capsule   No No   Sig: Take 1 capsule (10 mg total) by mouth 2 (two) times a day   sertraline (ZOLOFT) 100 mg tablet   No No   Sig: Take 1.5 tablets (150 mg total) by mouth daily   simvastatin (ZOCOR) 20 mg tablet   No No   Sig: TAKE 1 TABLET EVERY DAY   sucralfate (CARAFATE) 1 g tablet   No No   Sig: Take 1 tablet (1 g total) by mouth 4 (four) times a day for 7 days      Facility-Administered Medications: None       Past Medical History:   Diagnosis Date    Alcohol abuse     Back pain     Chronic low back pain     Depression     Diabetes mellitus (HCC)     DM2 (diabetes mellitus, type 2) (HCC)     Erectile dysfunction     GERD (gastroesophageal reflux disease)     Hepatic steatosis     Hyperlipidemia     Hypertension     Neuropathy     Pancreatitis     Psychiatric disorder     Tobacco abuse        Past Surgical History:   Procedure Laterality Date    ANKLE FRACTURE SURGERY Right     ANKLE SURGERY Right     INGUINAL HERNIA REPAIR Left     KNEE SURGERY Right     UMBILICAL HERNIA REPAIR         Family History   Problem Relation Age of Onset    Lymphoma Mother     No Known Problems Father      I have reviewed and agree with the history as documented. E-Cigarette/Vaping    E-Cigarette Use Never User      E-Cigarette/Vaping Substances    Nicotine No     THC No     CBD No     Flavoring No     Other No     Unknown No      Social History     Tobacco Use    Smoking status: Every Day     Packs/day: 1.00     Years: 44.00     Total pack years: 44.00     Types: Cigarettes     Start date: 1978    Smokeless tobacco: Never    Tobacco comments:     per [facility]-quit   Vaping Use    Vaping Use: Never used   Substance Use Topics    Alcohol use: Yes     Alcohol/week: 100.0 standard drinks of alcohol     Types: 100 Cans of beer per week     Comment: heavy drinker:    Drug use: Yes     Frequency: 21.0 times per week     Types: Marijuana     Comment: marijuana each day       Review of Systems   Constitutional:  Negative for appetite change, chills, diaphoresis, fatigue, fever and malaise/fatigue. Eyes:  Negative for visual disturbance. Respiratory:  Negative for cough, chest tightness, shortness of breath and wheezing. Cardiovascular:  Positive for chest pain and syncope. Negative for palpitations and leg swelling. Gastrointestinal:  Negative for constipation, diarrhea, nausea and vomiting. Musculoskeletal:  Negative for neck pain and neck stiffness. Skin:  Negative for rash and wound. Neurological:  Negative for dizziness, tremors, syncope, facial asymmetry, speech difficulty, weakness, light-headedness, numbness and headaches. Psychiatric/Behavioral:  Negative for confusion. All other systems reviewed and are negative. Physical Exam  Physical Exam  Vitals and nursing note reviewed. Constitutional:       General: He is not in acute distress. Appearance: Normal appearance. He is obese. He is not ill-appearing, toxic-appearing or diaphoretic. HENT:      Head: Normocephalic and atraumatic. Right Ear: Tympanic membrane, ear canal and external ear normal. There is no impacted cerumen. Left Ear: Tympanic membrane, ear canal and external ear normal. There is no impacted cerumen. Ears:      Comments: No hemotympanum or calderón sign bilaterally     Nose: Nose normal. No congestion or rhinorrhea. Mouth/Throat:      Mouth: Mucous membranes are moist.      Pharynx: No oropharyngeal exudate or posterior oropharyngeal erythema. Eyes:      General: No scleral icterus. Right eye: No discharge. Left eye: No discharge. Conjunctiva/sclera: Conjunctivae normal.      Comments: Negative raccoon eyes   Neck:      Comments: No tenderness to palpation to the midline cervical spine or bilateral paracervical musculature; no midline step-offs or deformities  Cardiovascular:      Rate and Rhythm: Normal rate and regular rhythm. Pulses: Normal pulses. Heart sounds: Normal heart sounds. No murmur heard. No friction rub. No gallop. Pulmonary:      Effort: Pulmonary effort is normal. No respiratory distress. Breath sounds: Normal breath sounds. No stridor. No wheezing, rhonchi or rales. Chest:      Chest wall: No tenderness. Abdominal:      General: Abdomen is flat. There is no distension. Palpations: Abdomen is soft. Tenderness: There is no abdominal tenderness.  There is no right CVA tenderness, left CVA tenderness, guarding or rebound. Musculoskeletal:         General: Normal range of motion. Cervical back: Normal range of motion. No rigidity. Right lower leg: No edema. Left lower leg: No edema. Skin:     General: Skin is warm and dry. Capillary Refill: Capillary refill takes less than 2 seconds. Coloration: Skin is not jaundiced or pale. Neurological:      General: No focal deficit present. Mental Status: He is alert and oriented to person, place, and time. Mental status is at baseline. Comments: 5/5 strength in bilateral upper and lower extremities  Normal sensation of bilateral upper and lower extremities  The patient is able to smile, frown, puff out cheeks, and raise eyebrows bilaterally symmetrically without difficulty  Normal finger-to-nose examination  No cerebellar signs are dysdiadochokinesia  Overall, a normal neurologic assessment  Patient speaks in full, clear sentences without slurring speech   Psychiatric:         Mood and Affect: Mood normal.         Behavior: Behavior normal.         Vital Signs  ED Triage Vitals [11/11/23 1731]   Temperature Pulse Respirations Blood Pressure SpO2   97.6 °F (36.4 °C) 89 20 90/53 99 %      Temp Source Heart Rate Source Patient Position - Orthostatic VS BP Location FiO2 (%)   Oral Monitor Sitting Left arm --      Pain Score       --           Vitals:    11/11/23 1731   BP: 90/53   Pulse: 89   Patient Position - Orthostatic VS: Sitting         Visual Acuity      ED Medications  Medications   sodium chloride 0.9 % bolus 2,000 mL (has no administration in time range)       Diagnostic Studies  Results Reviewed       Procedure Component Value Units Date/Time    Salicylate level [[id number]]     Lab Status: No result Specimen: Blood     Acetaminophen level-If concentration is detectable, please discuss with medical  on call.  [[id number]]     Lab Status: No result Specimen: Blood     CBC and differential [[id number]]     Lab Status: No result Specimen: Blood     Comprehensive metabolic panel [[id number]]     Lab Status: No result Specimen: Blood     Lactic acid, plasma (w/reflex if result > 2.0) [[id number]]     Lab Status: No result Specimen: Blood     HS Troponin 0hr (reflex protocol) [[id number]]     Lab Status: No result Specimen: Blood     B-Type Natriuretic Peptide(BNP) [[id number]]     Lab Status: No result Specimen: Blood     Procalcitonin [[id number]]     Lab Status: No result Specimen: Blood     Ethanol [[id number]]     Lab Status: No result Specimen: Blood                    CTA head and neck with and without contrast    (Results Pending)              Procedures  ECG 12 Lead Documentation Only    Date/Time: 11/11/2023 5:37 PM    Performed by: Christin Dash PA-C  Authorized by: Christin Dash PA-C    Indications / Diagnosis:  Syncope  Patient location:  ED  Previous ECG:     Previous ECG:  Compared to current    Comparison ECG info:  6/23/2023    Similarity:  No change  Interpretation:     Interpretation: normal    Rate:     ECG rate:  84    ECG rate assessment: normal    Rhythm:     Rhythm: sinus rhythm    Ectopy:     Ectopy: none    QRS:     QRS axis:  Normal  ST segments:     ST segments:  Normal  T waves:     T waves: normal    Comments:      Normal sinus rhythm with a rate of 84. Normal axis. No ectopy. No STEMI. ED Course  ED Course as of 11/11/23 1945   Sat Nov 11, 2023   1750 After I examined the patient, the patient noted that he wanted to leave the emergency department. Family members came back to see the patient. The patient continued to want to leave the emergency department. The patient's visitors were upset that only 2 visitors could be back and called our policies "stupid". Security was called. The patient continued to want to leave the emergency department [address] [place] [address]. The patient's daughter is willing to take the patient home. IV was discontinued by nursing staff.   I was unable to enter the room and give patient strict return precautions prior to him leaving [address] [place] [address]. The patient and his family left the emergency department prior to me being able to reassess the patient, give risks of leaving AMA, or giving return precautions. The patient then ambulated out of the emergency department with a steady gait accompanied by 3 family members occluding his daughter. SBIRT 20yo+      Flowsheet Row Most Recent Value   Initial Alcohol Screen: US AUDIT-C     1. How often do you have a drink containing alcohol? 6 Filed at: 11/11/2023 1737   2. How many drinks containing alcohol do you have on a typical day you are drinking? 6 Filed at: 11/11/2023 1737   3a. Male UNDER 65: How often do you have five or more drinks on one occasion? 6 Filed at: 11/11/2023 1737   Audit-C Score 18 Filed at: 11/11/2023 1737   Full Alcohol Screen: US AUDIT    4. How often during the last year have you found that you were not able to stop drinking once you had started? 4 Filed at: 11/11/2023 1737   5. How often during past year have you failed to do what was normally expected of you because of drinking? 0 Filed at: 11/11/2023 1737   6. How often in past year have you needed a first drink in the morning to get yourself going after a heavy drinking session? 0 Filed at: 11/11/2023 1737   7. How often in past year have you had feeling of guilt or remorse after drinking? 0 Filed at: 11/11/2023 1737   8. How often in past year have you been unable to remember what happened night before because you had been drinking? 2 Filed at: 11/11/2023 1737   9. Have you or someone else been injured as a result of your drinking? 0 Filed at: 11/11/2023 1737   10.  Has a relative, friend, doctor or other health worker been concerned about your drinking and suggested you cut down?  0 Filed at: 11/11/2023 1737   AUDIT Total Score 24 Filed at: 11/11/2023 1737   JHON: How many times in the past year have you. .. Used an illegal drug or used a prescription medication for non-medical reasons? Never Filed at: 11/11/2023 1737                      Medical Decision Making  59-year-old male presenting to the emergency department today for a syncopal episode. Uncertain seizure-like activity and uncertain head strike. Patient had been drinking prior to syncopal episode. Vital signs are stable; patient's blood pressure is slightly low at 90/53 but the patient did just increase his dose of ramipril per his CP's recommendations. EKG shows normal sinus rhythm with a rate of 84. No STEMI. No focal neurologic deficits on physical examination. After I left the room, the patient decided he no longer wanted to remain in the emergency department. The patient's family members including 2 daughters and 1 male came back to the emergency department. They were unhappy that only 2 people were allowed in the emergency department and subsequently security was called. The patient then demanded to leave the emergency department [address] [place] [address]. The patient's 2 daughters escorted him out of the emergency department and he walked out of the emergency department with a steady gait prior to me being able to explain to him risks of leaving [address] [place] [address] and giving him strict return precautions. Portions of the record may have been created with voice recognition software. Occasional wrong word or "sound a like" substitutions may have occurred due to the inherent limitations of voice recognition software. Read the chart carefully and recognize, using context, where substitutions have occurred. I reviewed prior notes. I reviewed prior EKG. Problems Addressed:  Syncope: undiagnosed new problem with uncertain prognosis    Amount and/or Complexity of Data Reviewed  External Data Reviewed: ECG and notes. Labs: ordered. Decision-making details documented in ED Course. Radiology: ordered.   ECG/medicine tests: ordered and independent interpretation performed. Decision-making details documented in ED Course. Disposition  Final diagnoses:   Syncope     Time reflects when diagnosis was documented in both MDM as applicable and the Disposition within this note       Time User Action Codes Description Comment    11/11/2023  5:49 PM Marlon, [address] [[id number]] Syncope           ED Disposition       ED Disposition   AMA    Condition   --    Date/Time   Sat Nov 11, 2023 1749    Comment   Date: 11/11/2023  Patient: Oseas Kaur  Admitted: 11/11/2023  5:28 PM  Attending Provider: Meagan Marsh MD    Oseas Kaur or his authorized caregiver has made the decision for the patient to leave the emergency department against t he advice of his attending physician (Sixto Vidal PA-C and Dr. No Lei). He or his authorized caregiver has been informed and understands the inherent risks, including death, intracerebral hemorrhage, hemorrhagic stroke, myocardial inf arction, delirium tremens, alcohol withdrawal, pulmonary embolism, status epilepticus, stroke, life-threatening trauma to the head, life-threatening trauma to the chest, other morbidities, or even death. He or his authorized caregiver has decided to  accept the responsibility for this decision. Oseas Kaur and all necessary parties have been advised that he may return for further evaluation or treatment. His condition at time of discharge was unchanged.   Oseas Kaur had current vital  signs as follows:  BP 90/53 (BP Location: Left arm)   Pulse 89   Temp 97.6 °F (36.4 °C) (Oral)   Resp 20   Ht 5' 11" (1.803 m)   Wt 107 kg (235 lb 14.3 oz)              Follow-up Information    None         Discharge Medication List as of 11/11/2023  6:02 PM        CONTINUE these medications which have NOT CHANGED    Details   Accu-Chek FastClix Lancets MISC USE AS DIRECTED, Normal      amLODIPine (NORVASC) 5 mg tablet TAKE 1 TABLET (5 MG TOTAL) BY MOUTH DAILY. , Starting Thu 10/27/2022, Normal      Blood Glucose Monitoring Suppl (Accu-Chek Stefanie Plus) w/Device KIT USE AS DIRECTED, Normal      celecoxib (CeleBREX) 200 mg capsule TAKE 1 CAPSULE TWICE DAILY, Normal      fenofibrate 160 MG tablet TAKE 1 TABLET (160 MG TOTAL) BY MOUTH DAILY, Normal      gabapentin (NEURONTIN) 300 mg capsule Take 300 mg by mouth Three times a day, Starting Tue 5/18/2021, Until Fri 11/10/2023, Historical Med      glimepiride (AMARYL) 2 mg tablet TAKE 1 TABLET EVERY MORNING  AND TAKE 1/2 TABLET EVERY EVENING, Normal      magnesium oxide (MAG-OX) 400 mg Take 1 tablet (400 mg total) by mouth daily, Starting Tue 3/22/2022, Until Fri 11/10/2023, Normal      metFORMIN (GLUCOPHAGE-XR) 500 mg 24 hr tablet TAKE 2 TABLETS EVERY DAY WITH BREAKFAST, Normal      omeprazole (PriLOSEC) 40 MG capsule TAKE 1 CAPSULE TWICE DAILY, Normal      pioglitazone (ACTOS) 45 mg tablet TAKE 1 TABLET EVERY DAY, Normal      !! ramipril (ALTACE) 10 MG capsule TAKE 1 CAPSULE EVERY DAY, Normal      !! ramipril (ALTACE) 10 MG capsule Take 1 capsule (10 mg total) by mouth 2 (two) times a day, Starting Fri 11/10/2023, Normal      sertraline (ZOLOFT) 100 mg tablet Take 1.5 tablets (150 mg total) by mouth daily, Starting Fri 11/10/2023, Normal      simvastatin (ZOCOR) 20 mg tablet TAKE 1 TABLET EVERY DAY, Normal      sucralfate (CARAFATE) 1 g tablet Take 1 tablet (1 g total) by mouth 4 (four) times a day for 7 days, Starting Fri 6/23/2023, Until Fri 11/10/2023, Normal       !! - Potential duplicate medications found. Please discuss with provider. No discharge procedures on file.     PDMP Review         Value Time User    PDMP Reviewed  Yes 4/8/2022 12:04 PM Ryan Walker MD            ED Provider  Electronically Signed by             Rosio Sharpe PA-C  11/11/23 1959

## 2023-12-01 ENCOUNTER — APPOINTMENT (EMERGENCY)
Dept: RADIOLOGY | Facility: HOSPITAL | Age: 58
End: 2023-12-01
Payer: COMMERCIAL

## 2023-12-01 ENCOUNTER — APPOINTMENT (EMERGENCY)
Dept: CT IMAGING | Facility: HOSPITAL | Age: 58
End: 2023-12-01
Payer: COMMERCIAL

## 2023-12-01 ENCOUNTER — HOSPITAL ENCOUNTER (EMERGENCY)
Facility: HOSPITAL | Age: 58
Discharge: HOME | End: 2023-12-01
Attending: EMERGENCY MEDICINE
Payer: COMMERCIAL

## 2023-12-01 VITALS
OXYGEN SATURATION: 93 % | WEIGHT: 235.89 LBS | RESPIRATION RATE: 20 BRPM | HEART RATE: 76 BPM | SYSTOLIC BLOOD PRESSURE: 142 MMHG | BODY MASS INDEX: 32.9 KG/M2 | TEMPERATURE: 97.4 F | DIASTOLIC BLOOD PRESSURE: 70 MMHG

## 2023-12-01 DIAGNOSIS — S22.43XA MULTIPLE FRACTURES OF RIBS, BILATERAL, INITIAL ENCOUNTER FOR CLOSED FRACTURE: Primary | ICD-10-CM

## 2023-12-01 LAB
ALBUMIN SERPL BCP-MCNC: 4 G/DL (ref 3.5–5)
ALP SERPL-CCNC: 176 U/L (ref 34–104)
ALT SERPL W P-5'-P-CCNC: 52 U/L (ref 7–52)
ANION GAP SERPL CALCULATED.3IONS-SCNC: 9 MMOL/L
AST SERPL W P-5'-P-CCNC: 58 U/L (ref 13–39)
BASOPHILS # BLD AUTO: 0.13 THOUSANDS/ÂΜL (ref 0–0.1)
BASOPHILS NFR BLD AUTO: 2 % (ref 0–1)
BILIRUB DIRECT SERPL-MCNC: 0.28 MG/DL (ref 0–0.2)
BILIRUB SERPL-MCNC: 1.24 MG/DL (ref 0.2–1)
BUN SERPL-MCNC: 10 MG/DL (ref 5–25)
CALCIUM SERPL-MCNC: 9.5 MG/DL (ref 8.4–10.2)
CARDIAC TROPONIN I PNL SERPL HS: 9 NG/L
CHLORIDE SERPL-SCNC: 101 MMOL/L (ref 96–108)
CO2 SERPL-SCNC: 26 MMOL/L (ref 21–32)
CREAT SERPL-MCNC: 0.79 MG/DL (ref 0.6–1.3)
EOSINOPHIL # BLD AUTO: 0.17 THOUSAND/ÂΜL (ref 0–0.61)
EOSINOPHIL NFR BLD AUTO: 3 % (ref 0–6)
ERYTHROCYTE [DISTWIDTH] IN BLOOD BY AUTOMATED COUNT: 12.4 % (ref 11.6–15.1)
GFR SERPL CREATININE-BSD FRML MDRD: 97 ML/MIN/1.73SQ M
GLUCOSE SERPL-MCNC: 150 MG/DL (ref 65–140)
HCT VFR BLD AUTO: 46.1 % (ref 36.5–49.3)
HGB BLD-MCNC: 16.4 G/DL (ref 12–17)
IMM GRANULOCYTES # BLD AUTO: 0.02 THOUSAND/UL (ref 0–0.2)
IMM GRANULOCYTES NFR BLD AUTO: 0 % (ref 0–2)
LIPASE SERPL-CCNC: 12 U/L (ref 11–82)
LYMPHOCYTES # BLD AUTO: 1.96 THOUSANDS/ÂΜL (ref 0.6–4.47)
LYMPHOCYTES NFR BLD AUTO: 32 % (ref 14–44)
MCH RBC QN AUTO: 33.3 PG (ref 26.8–34.3)
MCHC RBC AUTO-ENTMCNC: 35.6 G/DL (ref 31.4–37.4)
MCV RBC AUTO: 94 FL (ref 82–98)
MONOCYTES # BLD AUTO: 0.57 THOUSAND/ÂΜL (ref 0.17–1.22)
MONOCYTES NFR BLD AUTO: 9 % (ref 4–12)
NEUTROPHILS # BLD AUTO: 3.37 THOUSANDS/ÂΜL (ref 1.85–7.62)
NEUTS SEG NFR BLD AUTO: 54 % (ref 43–75)
NRBC BLD AUTO-RTO: 0 /100 WBCS
PLATELET # BLD AUTO: 209 THOUSANDS/UL (ref 149–390)
PMV BLD AUTO: 9.5 FL (ref 8.9–12.7)
POTASSIUM SERPL-SCNC: 3.8 MMOL/L (ref 3.5–5.3)
PROT SERPL-MCNC: 7.6 G/DL (ref 6.4–8.4)
RBC # BLD AUTO: 4.92 MILLION/UL (ref 3.88–5.62)
SODIUM SERPL-SCNC: 136 MMOL/L (ref 135–147)
WBC # BLD AUTO: 6.22 THOUSAND/UL (ref 4.31–10.16)

## 2023-12-01 PROCEDURE — 99285 EMERGENCY DEPT VISIT HI MDM: CPT | Performed by: EMERGENCY MEDICINE

## 2023-12-01 PROCEDURE — 80076 HEPATIC FUNCTION PANEL: CPT | Performed by: EMERGENCY MEDICINE

## 2023-12-01 PROCEDURE — 85025 COMPLETE CBC W/AUTO DIFF WBC: CPT | Performed by: EMERGENCY MEDICINE

## 2023-12-01 PROCEDURE — 36415 COLL VENOUS BLD VENIPUNCTURE: CPT | Performed by: EMERGENCY MEDICINE

## 2023-12-01 PROCEDURE — 84484 ASSAY OF TROPONIN QUANT: CPT | Performed by: EMERGENCY MEDICINE

## 2023-12-01 PROCEDURE — 96374 THER/PROPH/DIAG INJ IV PUSH: CPT

## 2023-12-01 PROCEDURE — 71045 X-RAY EXAM CHEST 1 VIEW: CPT

## 2023-12-01 PROCEDURE — 93005 ELECTROCARDIOGRAM TRACING: CPT

## 2023-12-01 PROCEDURE — 96375 TX/PRO/DX INJ NEW DRUG ADDON: CPT

## 2023-12-01 PROCEDURE — 71275 CT ANGIOGRAPHY CHEST: CPT

## 2023-12-01 PROCEDURE — 80048 BASIC METABOLIC PNL TOTAL CA: CPT | Performed by: EMERGENCY MEDICINE

## 2023-12-01 PROCEDURE — 74177 CT ABD & PELVIS W/CONTRAST: CPT

## 2023-12-01 PROCEDURE — 99284 EMERGENCY DEPT VISIT MOD MDM: CPT

## 2023-12-01 PROCEDURE — 83690 ASSAY OF LIPASE: CPT | Performed by: EMERGENCY MEDICINE

## 2023-12-01 RX ORDER — OXYCODONE HYDROCHLORIDE 5 MG/1
5 TABLET ORAL EVERY 4 HOURS PRN
Qty: 12 TABLET | Refills: 0 | Status: SHIPPED | OUTPATIENT
Start: 2023-12-01

## 2023-12-01 RX ORDER — ACETAMINOPHEN 500 MG
1000 TABLET ORAL EVERY 6 HOURS PRN
Qty: 40 TABLET | Refills: 0 | Status: SHIPPED | OUTPATIENT
Start: 2023-12-01

## 2023-12-01 RX ORDER — MORPHINE SULFATE 4 MG/ML
4 INJECTION, SOLUTION INTRAMUSCULAR; INTRAVENOUS ONCE
Status: COMPLETED | OUTPATIENT
Start: 2023-12-01 | End: 2023-12-01

## 2023-12-01 RX ORDER — SODIUM CHLORIDE 9 MG/ML
3 INJECTION INTRAVENOUS
Status: DISCONTINUED | OUTPATIENT
Start: 2023-12-01 | End: 2023-12-01 | Stop reason: HOSPADM

## 2023-12-01 RX ORDER — METHOCARBAMOL 500 MG/1
500 TABLET, FILM COATED ORAL 3 TIMES DAILY PRN
Qty: 20 TABLET | Refills: 0 | Status: SHIPPED | OUTPATIENT
Start: 2023-12-01 | End: 2023-12-04 | Stop reason: SDUPTHER

## 2023-12-01 RX ORDER — FENTANYL CITRATE 50 UG/ML
75 INJECTION, SOLUTION INTRAMUSCULAR; INTRAVENOUS ONCE
Status: COMPLETED | OUTPATIENT
Start: 2023-12-01 | End: 2023-12-01

## 2023-12-01 RX ORDER — LIDOCAINE 50 MG/G
2 PATCH TOPICAL DAILY
Qty: 30 PATCH | Refills: 0 | Status: SHIPPED | OUTPATIENT
Start: 2023-12-01

## 2023-12-01 RX ORDER — NAPROXEN 500 MG/1
500 TABLET ORAL 2 TIMES DAILY WITH MEALS
Qty: 30 TABLET | Refills: 0 | Status: SHIPPED | OUTPATIENT
Start: 2023-12-01

## 2023-12-01 RX ORDER — LIDOCAINE 50 MG/G
1 PATCH TOPICAL ONCE
Status: DISCONTINUED | OUTPATIENT
Start: 2023-12-01 | End: 2023-12-01 | Stop reason: HOSPADM

## 2023-12-01 RX ORDER — KETOROLAC TROMETHAMINE 30 MG/ML
15 INJECTION, SOLUTION INTRAMUSCULAR; INTRAVENOUS ONCE
Status: COMPLETED | OUTPATIENT
Start: 2023-12-01 | End: 2023-12-01

## 2023-12-01 RX ADMIN — FENTANYL CITRATE 75 MCG: 50 INJECTION, SOLUTION INTRAMUSCULAR; INTRAVENOUS at 08:15

## 2023-12-01 RX ADMIN — LIDOCAINE 1 PATCH: 700 PATCH TOPICAL at 08:14

## 2023-12-01 RX ADMIN — IOHEXOL 99 ML: 350 INJECTION, SOLUTION INTRAVENOUS at 09:03

## 2023-12-01 RX ADMIN — MORPHINE SULFATE 4 MG: 4 INJECTION INTRAVENOUS at 09:26

## 2023-12-01 RX ADMIN — KETOROLAC TROMETHAMINE 15 MG: 30 INJECTION INTRAMUSCULAR; INTRAVENOUS at 08:15

## 2023-12-01 NOTE — DISCHARGE INSTRUCTIONS
CT scan of your chest showed multiple rib fractures on both sides of your chest.    Take the Tylenol every 6 hours, naproxen every 12 hours, Robaxin 3 times daily as needed for muscle spasms. Use the lidocaine patches 12 hours on then 12 hours off. If your insurance does not cover the lidocaine patches you may buy Salonpas or IcyHot patches over-the-counter. Use the incentive spirometer at least every 2 hours while awake. Come back for shortness of breath, fevers, new or worsening symptoms.

## 2023-12-01 NOTE — ED PROVIDER NOTES
History  Chief Complaint   Patient presents with    Rib Pain     Pt reports being seen 11/11 after having a syncopal episode where a bystander did the heimlich on him. Since then c/o rib pain, has been getting better, but last night became much worse. 58-year-old male previous history of hypertension, hyperlipidemia, diabetes, alcohol use, tobacco use, pancreatitis, psych disorder presenting with rib pain, abd pain. Per review of records patient was seen on November 11. Patient was drinking alcohol at a bar. Reportedly had a syncopal event. Reportedly had the Heimlich performed on him after the syncopal event. Presented to the ER soaked in urine. His family arrived and was agitated and recommended that he sign out against medical advice before any lab work or testing was done. Patient did so at that time. Patient presents the emergency department with continued left-sided rib pain as well as abdominal pain. Pain in the abdomen is located in the left upper quadrant as well as left flank. Worse with breathing and deep. Worse with coughing. Worse with talking. He also reports rib pain on the left anterior and lateral rib cage. No hemoptysis, history of VTE, recent travel or hospitalization, unilateral lower extremity edema. Patient drinks approximately 10 drinks daily. Reports not drinking for the last 2 days. Not a ripping or tearing pain. Doesn't go from anterior chest to back. Chest Pain  Pain location:  L lateral chest  Pain quality: aching    Pain radiates to:  Does not radiate  Associated symptoms: shortness of breath        Prior to Admission Medications   Prescriptions Last Dose Informant Patient Reported? Taking?    Accu-Chek FastClix Lancets MISC   No No   Sig: USE AS DIRECTED   Blood Glucose Monitoring Suppl (Accu-Chek Stefanie Plus) w/Device KIT   No No   Sig: USE AS DIRECTED   amLODIPine (NORVASC) 5 mg tablet   No No   Sig: TAKE 1 TABLET (5 MG TOTAL) BY MOUTH DAILY.    celecoxib (CeleBREX) 200 mg capsule   No No   Sig: TAKE 1 CAPSULE TWICE DAILY   fenofibrate 160 MG tablet   No No   Sig: TAKE 1 TABLET (160 MG TOTAL) BY MOUTH DAILY   gabapentin (NEURONTIN) 300 mg capsule   Yes No   Sig: Take 300 mg by mouth Three times a day   glimepiride (AMARYL) 2 mg tablet   No No   Sig: TAKE 1 TABLET EVERY MORNING  AND TAKE 1/2 TABLET EVERY EVENING   magnesium oxide (MAG-OX) 400 mg   No No   Sig: Take 1 tablet (400 mg total) by mouth daily   metFORMIN (GLUCOPHAGE-XR) 500 mg 24 hr tablet   No No   Sig: TAKE 2 TABLETS EVERY DAY WITH BREAKFAST   omeprazole (PriLOSEC) 40 MG capsule   No No   Sig: TAKE 1 CAPSULE TWICE DAILY   pioglitazone (ACTOS) 45 mg tablet   No No   Sig: TAKE 1 TABLET EVERY DAY   ramipril (ALTACE) 10 MG capsule   No No   Sig: TAKE 1 CAPSULE EVERY DAY   ramipril (ALTACE) 10 MG capsule   No No   Sig: Take 1 capsule (10 mg total) by mouth 2 (two) times a day   sertraline (ZOLOFT) 100 mg tablet   No No   Sig: Take 1.5 tablets (150 mg total) by mouth daily   simvastatin (ZOCOR) 20 mg tablet   No No   Sig: TAKE 1 TABLET EVERY DAY   sucralfate (CARAFATE) 1 g tablet   No No   Sig: Take 1 tablet (1 g total) by mouth 4 (four) times a day for 7 days      Facility-Administered Medications: None       Past Medical History:   Diagnosis Date    Alcohol abuse     Back pain     Chronic low back pain     Depression     Diabetes mellitus (HCC)     DM2 (diabetes mellitus, type 2) (HCC)     Erectile dysfunction     GERD (gastroesophageal reflux disease)     Hepatic steatosis     Hyperlipidemia     Hypertension     Neuropathy     Pancreatitis     Psychiatric disorder     Tobacco abuse        Past Surgical History:   Procedure Laterality Date    ANKLE FRACTURE SURGERY Right     ANKLE SURGERY Right     INGUINAL HERNIA REPAIR Left     KNEE SURGERY Right     UMBILICAL HERNIA REPAIR         Family History   Problem Relation Age of Onset    Lymphoma Mother     No Known Problems Father      I have reviewed and agree with the history as documented. E-Cigarette/Vaping    E-Cigarette Use Never User      E-Cigarette/Vaping Substances    Nicotine No     THC No     CBD No     Flavoring No     Other No     Unknown No      Social History     Tobacco Use    Smoking status: Every Day     Packs/day: 1.00     Years: 44.00     Total pack years: 44.00     Types: Cigarettes     Start date: 1978    Smokeless tobacco: Never    Tobacco comments:     per [facility]-quit   Vaping Use    Vaping Use: Never used   Substance Use Topics    Alcohol use: Yes     Alcohol/week: 100.0 standard drinks of alcohol     Types: 100 Cans of beer per week     Comment: heavy drinker    Drug use: Yes     Frequency: 21.0 times per week     Types: Marijuana     Comment: marijuana each day       Review of Systems   Respiratory:  Positive for shortness of breath. Cardiovascular:  Positive for chest pain. All other systems reviewed and are negative. Physical Exam  Physical Exam  Vitals reviewed. Constitutional:       General: He is in acute distress. Appearance: He is well-developed. He is obese. He is not diaphoretic. HENT:      Head: Normocephalic and atraumatic. Right Ear: External ear normal.      Left Ear: External ear normal.   Eyes:      Conjunctiva/sclera: Conjunctivae normal.   Neck:      Trachea: No tracheal deviation. Cardiovascular:      Rate and Rhythm: Normal rate and regular rhythm. Heart sounds: Normal heart sounds. No murmur heard. Pulmonary:      Effort: No respiratory distress. Breath sounds: Normal breath sounds. No stridor. No wheezing or rales. Comments: Mildly tachypneic. Abdominal:      General: Bowel sounds are normal. There is no distension. Palpations: Abdomen is soft. There is no mass. Tenderness: There is abdominal tenderness (LUQ). There is no guarding or rebound. Musculoskeletal:         General: Tenderness present. No deformity. Skin:     General: Skin is dry. Findings: No rash. Neurological:      Motor: No abnormal muscle tone. Coordination: Coordination normal.   Psychiatric:         Behavior: Behavior normal.         Thought Content:  Thought content normal.         Judgment: Judgment normal.         Vital Signs  ED Triage Vitals [12/01/23 0757]   Temperature Pulse Respirations Blood Pressure SpO2   (!) 97.4 °F (36.3 °C) 92 (!) 24 (!) 193/89 98 %      Temp Source Heart Rate Source Patient Position - Orthostatic VS BP Location FiO2 (%)   Oral Monitor -- -- --      Pain Score       10 - Worst Possible Pain           Vitals:    12/01/23 0757 12/01/23 0926   BP: (!) 193/89 142/70   Pulse: 92 76         Visual Acuity      ED Medications  Medications   sodium chloride (PF) 0.9 % injection 3 mL (has no administration in time range)   lidocaine (LIDODERM) 5 % patch 1 patch (1 patch Topical Medication Applied 12/1/23 0814)   fentanyl citrate (PF) 100 MCG/2ML 75 mcg (75 mcg Intravenous Given 12/1/23 0815)   ketorolac (TORADOL) injection 15 mg (15 mg Intravenous Given 12/1/23 0815)   iohexol (OMNIPAQUE) 350 MG/ML injection (SINGLE-DOSE) 100 mL (99 mL Intravenous Given 12/1/23 0903)   morphine injection 4 mg (4 mg Intravenous Given 12/1/23 0926)       Diagnostic Studies  Results Reviewed       Procedure Component Value Units Date/Time    HS Troponin 0hr (reflex protocol) [[id number]]  (Normal) Collected: 12/01/23 0814    Lab Status: Final result Specimen: Blood from Arm, Right Updated: 12/01/23 0846     hs TnI 0hr 9 ng/L     Basic metabolic panel [[id number]]  (Abnormal) Collected: 12/01/23 0814    Lab Status: Final result Specimen: Blood from Arm, Right Updated: 12/01/23 0842     Sodium 136 mmol/L      Potassium 3.8 mmol/L      Chloride 101 mmol/L      CO2 26 mmol/L      ANION GAP 9 mmol/L      BUN 10 mg/dL      Creatinine 0.79 mg/dL      Glucose 150 mg/dL      Calcium 9.5 mg/dL      eGFR 97 ml/min/1.73sq m     Narrative:      [facility] guidelines for Chronic Kidney Disease (CKD):     Stage 1 with normal or high GFR (GFR > 90 mL/min/1.73 square meters)    Stage 2 Mild CKD (GFR = 60-89 mL/min/1.73 square meters)    Stage 3A Moderate CKD (GFR = 45-59 mL/min/1.73 square meters)    Stage 3B Moderate CKD (GFR = 30-44 mL/min/1.73 square meters)    Stage 4 Severe CKD (GFR = 15-29 mL/min/1.73 square meters)    Stage 5 End Stage CKD (GFR <15 mL/min/1.73 square meters)  Note: GFR calculation is accurate only with a steady state creatinine    Hepatic function panel [[id number]]  (Abnormal) Collected: 12/01/23 0814    Lab Status: Final result Specimen: Blood from Arm, Right Updated: 12/01/23 0842     Total Bilirubin 1.24 mg/dL      Bilirubin, Direct 0.28 mg/dL      Alkaline Phosphatase 176 U/L      AST 58 U/L      ALT 52 U/L      Total Protein 7.6 g/dL      Albumin 4.0 g/dL     Lipase [[id number]]  (Normal) Collected: 12/01/23 0814    Lab Status: Final result Specimen: Blood from Arm, Right Updated: 12/01/23 0842     Lipase 12 u/L     CBC and differential [[id number]]  (Abnormal) Collected: 12/01/23 0814    Lab Status: Final result Specimen: Blood from Arm, Right Updated: 12/01/23 0823     WBC 6.22 Thousand/uL      RBC 4.92 Million/uL      Hemoglobin 16.4 g/dL      Hematocrit 46.1 %      MCV 94 fL      MCH 33.3 pg      MCHC 35.6 g/dL      RDW 12.4 %      MPV 9.5 fL      Platelets 734 Thousands/uL      nRBC 0 /100 WBCs      Neutrophils Relative 54 %      Immat GRANS % 0 %      Lymphocytes Relative 32 %      Monocytes Relative 9 %      Eosinophils Relative 3 %      Basophils Relative 2 %      Neutrophils Absolute 3.37 Thousands/µL      Immature Grans Absolute 0.02 Thousand/uL      Lymphocytes Absolute 1.96 Thousands/µL      Monocytes Absolute 0.57 Thousand/µL      Eosinophils Absolute 0.17 Thousand/µL      Basophils Absolute 0.13 Thousands/µL                    CT pe study w abdomen pelvis w contrast   Final Result by Petr Chan MD (12/01 5258)      1.   Fractures of the left 5th through 10th ribs and right 5th through 7th ribs. 2.  No acute pulmonary embolism. 3.  No acute abnormality in the abdomen or pelvis. 4.  Diffuse hepatic steatosis. The study was marked in [facility] for immediate notification. Workstation performed: [id number]         X-ray chest 1 view portable   ED Interpretation by Jhony Quintana DO (12/01 0137)   No acute findings. Final Result by Silvano Palomares MD (12/01 1121)      No acute cardiopulmonary disease. Workstation performed: [id number]                    Procedures  Procedures         ED Course  ED Course as of 12/01/23 1147   Fri Dec 01, 2023   5192 Procedure Note: EKG  Date/Time: 12/01/23 8:19 AM   Interpreted by: Renetta Funes  Indications / Diagnosis: CP  ECG reviewed by me, the ED Provider: yes   The EKG demonstrates:  Rhythm: normal sinus  Intervals: normal intervals  Axis: normal axis  QRS/Blocks: normal QRS except q in v1/2  ST Changes: No acute ST Changes, no STD/ISSAC.     0843 TOTAL BILIRUBIN(!): 1.24   0843 BILIRUBIN DIRECT(!): 0.28   0843 Alkaline Phosphatase(!): 176   0843 AST(!): 58   0958 Patient confirms that has been no new trauma since the Heimlich was performed on him. No falls the ground. Rib fractures are not acute in nature. From roughly 3 weeks ago. He has no signs of pneumonia on the CT scan. The patient had been admitted to trauma he likely would have been released by now. Will treat pain. Patient tells me he would not want to be admitted to the hospital.                               SBIRT 20yo+      Flowsheet Row Most Recent Value   Initial Alcohol Screen: US AUDIT-C     1. How often do you have a drink containing alcohol? 4 Filed at: 12/01/2023 3989   2. How many drinks containing alcohol do you have on a typical day you are drinking? 6 Filed at: 12/01/2023 0759   3a. Male UNDER 65: How often do you have five or more drinks on one occasion?  4 Filed at: 12/01/2023 0759   Audit-C Score 14 Filed at: 12/01/2023 1297   Full Alcohol Screen: US AUDIT    4. How often during the last year have you found that you were not able to stop drinking once you had started? 0 Filed at: 12/01/2023 0759   5. How often during past year have you failed to do what was normally expected of you because of drinking? 0 Filed at: 12/01/2023 0759   6. How often in past year have you needed a first drink in the morning to get yourself going after a heavy drinking session? 0 Filed at: 12/01/2023 0759   7. How often in past year have you had feeling of guilt or remorse after drinking? 0 Filed at: 12/01/2023 0759   8. How often in past year have you been unable to remember what happened night before because you had been drinking? 0 Filed at: 12/01/2023 0759   9. Have you or someone else been injured as a result of your drinking? 0 Filed at: 12/01/2023 0759   10. Has a relative, friend, doctor or other health worker been concerned about your drinking and suggested you cut down?  0 Filed at: 12/01/2023 0759   AUDIT Total Score 14 Filed at: 12/01/2023 8812   JHON: How many times in the past year have you. .. Used an illegal drug or used a prescription medication for non-medical reasons? Monthly Filed at: 12/01/2023 0759   DAST-10: In the past 12 months. ..    1. Have you used drugs other than those required for medical reasons? 0 Filed at: 12/01/2023 0759   2. Do you use more than one drug at a time? 0 Filed at: 12/01/2023 0759   3. Have you had medical problems as a result of your drug use (e.g., memory loss, hepatitis, convulsions, bleeding, etc.)? 0 Filed at: 12/01/2023 0759   4. Have you had "blackouts" or "flashbacks" as a result of drug use? YesNo 0 Filed at: 12/01/2023 0759   5. Do you ever feel bad or guilty about your drug use? 0 Filed at: 12/01/2023 2307   6. Does your spouse (or parent) ever complain about your involvement with drugs? 0 Filed at: 12/01/2023 0759   7.  Have you neglected your family because of your use of drugs? 0 Filed at: 12/01/2023 0759   8. Have you engaged in illegal activities in order to obtain drugs? 0 Filed at: 12/01/2023 0759   9. Have you ever experienced withdrawal symptoms (felt sick) when you stopped taking drugs? 0 Filed at: 12/01/2023 0759   10. Are you always able to stop using drugs when you want to? 0 Filed at: 12/01/2023 0759   DAST-10 Score 0 Filed at: 12/01/2023 6593                      Medical Decision Making  Male presenting with continued pain since the Heimlich procedure being performed on him approximately 3 weeks ago. He reports rib cage pain on the left side. Denies any trauma since then to exacerbate the symptoms. Taking no medication for the symptoms. Also evaluate for ACS, arrhythmia, electrolyte derangement, myocardial contusion, pneumonia, pneumothorax, acute intra-abdominal process. He left previously before being evaluated. CT scan with multiple rib fractures bilaterally. No pneumonia. As this trauma was approximately 3 weeks ago we will not plan on admission as patient likely would have developed a pneumonia at this point if he was developing a pneumonia. And patient is declining admission regardless. Will give inspiratory spirometer as well as pain medication. Return precautions discussed. Problems Addressed:  Multiple fractures of ribs, bilateral, initial encounter for closed fracture: acute illness or injury    Amount and/or Complexity of Data Reviewed  Labs: ordered. Decision-making details documented in ED Course. Radiology: ordered and independent interpretation performed. Risk  OTC drugs. Prescription drug management.              Disposition  Final diagnoses:   Multiple fractures of ribs, bilateral, initial encounter for closed fracture     Time reflects when diagnosis was documented in both MDM as applicable and the Disposition within this note       Time User Action Codes Description Comment 12/1/2023 10:00 AM Igor Dee Add [S22.43XA] Multiple fractures of ribs, bilateral, initial encounter for closed fracture           ED Disposition       ED Disposition   Discharge    Condition   Stable    Date/Time   Fri Dec 1, 2023 4[facility] discharge to home/self care.                    Follow-up Information       Follow up With Specialties Details Why Contact Info Additional Information    Leslie Ruff MD Family Medicine  For re-evaluation as soon as possible [address] [facility]  [address] Emergency Department Emergency Medicine  If symptoms worsen [address] [id number]  [address] [facility] Emergency Department, [facility] , [facility]            Discharge Medication List as of 12/1/2023 10:06 AM        START taking these medications    Details   acetaminophen (TYLENOL) 500 mg tablet Take 2 tablets (1,000 mg total) by mouth every 6 (six) hours as needed for moderate pain, Starting Fri 12/1/2023, Normal      lidocaine (Lidoderm) 5 % Apply 2 patches topically over 12 hours daily Remove & Discard patch within 12 hours or as directed by MD, Starting Fri 12/1/2023, Normal      methocarbamol (ROBAXIN) 500 mg tablet Take 1 tablet (500 mg total) by mouth 3 (three) times a day as needed for muscle spasms, Starting Fri 12/1/2023, Normal      naproxen (Naprosyn) 500 mg tablet Take 1 tablet (500 mg total) by mouth 2 (two) times a day with meals, Starting Fri 12/1/2023, Normal      oxyCODONE (Roxicodone) 5 immediate release tablet Take 1 tablet (5 mg total) by mouth every 4 (four) hours as needed for moderate pain for up to 12 doses Max Daily Amount: 30 mg, Starting Fri 12/1/2023, Normal           CONTINUE these medications which have NOT CHANGED    Details   Accu-Chek FastClix Lancets MISC USE AS DIRECTED, Normal      amLODIPine (NORVASC) 5 mg tablet TAKE 1 TABLET (5 MG TOTAL) BY MOUTH DAILY. , Starting Thu 10/27/2022, Normal      Blood Glucose Monitoring Suppl (Accu-Chek Stefanie Plus) w/Device KIT USE AS DIRECTED, Normal      celecoxib (CeleBREX) 200 mg capsule TAKE 1 CAPSULE TWICE DAILY, Normal      fenofibrate 160 MG tablet TAKE 1 TABLET (160 MG TOTAL) BY MOUTH DAILY, Normal      gabapentin (NEURONTIN) 300 mg capsule Take 300 mg by mouth Three times a day, Starting Tue 5/18/2021, Until Fri 11/10/2023, Historical Med      glimepiride (AMARYL) 2 mg tablet TAKE 1 TABLET EVERY MORNING  AND TAKE 1/2 TABLET EVERY EVENING, Normal      magnesium oxide (MAG-OX) 400 mg Take 1 tablet (400 mg total) by mouth daily, Starting Tue 3/22/2022, Until Fri 11/10/2023, Normal      metFORMIN (GLUCOPHAGE-XR) 500 mg 24 hr tablet TAKE 2 TABLETS EVERY DAY WITH BREAKFAST, Normal      omeprazole (PriLOSEC) 40 MG capsule TAKE 1 CAPSULE TWICE DAILY, Normal      pioglitazone (ACTOS) 45 mg tablet TAKE 1 TABLET EVERY DAY, Normal      !! ramipril (ALTACE) 10 MG capsule TAKE 1 CAPSULE EVERY DAY, Normal      !! ramipril (ALTACE) 10 MG capsule Take 1 capsule (10 mg total) by mouth 2 (two) times a day, Starting Fri 11/10/2023, Normal      sertraline (ZOLOFT) 100 mg tablet Take 1.5 tablets (150 mg total) by mouth daily, Starting Fri 11/10/2023, Normal      simvastatin (ZOCOR) 20 mg tablet TAKE 1 TABLET EVERY DAY, Normal      sucralfate (CARAFATE) 1 g tablet Take 1 tablet (1 g total) by mouth 4 (four) times a day for 7 days, Starting Fri 6/23/2023, Until Fri 11/10/2023, Normal       !! - Potential duplicate medications found. Please discuss with provider. No discharge procedures on file.     PDMP Review         Value Time User    PDMP Reviewed  Yes 12/1/2023 10:02 AM Susi Rawls DO            ED Provider  Electronically Signed by             Susi Rawls DO  12/01/23 0171

## 2023-12-03 LAB
ATRIAL RATE: 83 BPM
P AXIS: 1 DEGREES
PR INTERVAL: 162 MS
QRS AXIS: 14 DEGREES
QRSD INTERVAL: 96 MS
QT INTERVAL: 376 MS
QTC INTERVAL: 441 MS
T WAVE AXIS: 42 DEGREES
VENTRICULAR RATE: 83 BPM

## 2023-12-04 ENCOUNTER — OFFICE VISIT (OUTPATIENT)
Dept: FAMILY MEDICINE CLINIC | Facility: CLINIC | Age: 58
End: 2023-12-04
Payer: COMMERCIAL

## 2023-12-04 VITALS
SYSTOLIC BLOOD PRESSURE: 150 MMHG | BODY MASS INDEX: 32.53 KG/M2 | HEIGHT: 71 IN | HEART RATE: 86 BPM | TEMPERATURE: 97.1 F | WEIGHT: 232.4 LBS | OXYGEN SATURATION: 96 % | DIASTOLIC BLOOD PRESSURE: 80 MMHG

## 2023-12-04 DIAGNOSIS — S22.43XA MULTIPLE FRACTURES OF RIBS, BILATERAL, INITIAL ENCOUNTER FOR CLOSED FRACTURE: Primary | ICD-10-CM

## 2023-12-04 DIAGNOSIS — I10 ESSENTIAL HYPERTENSION: ICD-10-CM

## 2023-12-04 PROCEDURE — 99214 OFFICE O/P EST MOD 30 MIN: CPT

## 2023-12-04 RX ORDER — METHOCARBAMOL 500 MG/1
500 TABLET, FILM COATED ORAL 3 TIMES DAILY PRN
Qty: 20 TABLET | Refills: 0 | Status: SHIPPED | OUTPATIENT
Start: 2023-12-04

## 2023-12-04 NOTE — ASSESSMENT & PLAN NOTE
Pt reports compliance with current therapy, BP elevated secondary to pain. Discussed appropriate pain control, low Na diet, monitoring BP at home and f/u for consistently elevated BP readings.

## 2023-12-04 NOTE — ASSESSMENT & PLAN NOTE
Seen in ER 12/01/23 multiple rib fractures seen on CT suspect secondary to  Heimlich maneuver 3 weeks prior when pt was found down. Pt was prescribed 12 pills oxycodone 5 mg Q 4hrs which he is requesting refill for along with muscle relaxer, tylenol 1000 mg, lidocaine patches and naproxen 500 mg bid. On exam breath sounds are normal, has tenderness in b/l ribs, no difficulty breathing. Educated pt rib fractures can take several weeks to heal, will not refill oxycodone needs to transition to NSAID and tylenol as prescribed, continue lidocaine patches and recommend rest and abdominal binder prn for rib support. Must go to ER for any respiratory difficulty.

## 2023-12-04 NOTE — PATIENT INSTRUCTIONS
Rib Fracture   WHAT YOU NEED TO KNOW:   A rib fracture is a crack or break in a rib bone. Rib fractures usually heal within 6 weeks. You should be able to return to your usual activities before that time. DISCHARGE INSTRUCTIONS:   Call your local emergency number (91 in the [address]) if:   You have trouble breathing. You have new or increased pain. Return to the emergency department if:   Your pain does not get better, even after treatment. You have a fever or a cough. Call your doctor if:   You have questions or concerns about your condition or care. Medicines: You may need any of the following:  NSAIDs , such as ibuprofen, help decrease swelling, pain, and fever. This medicine is available with or without a doctor's order. NSAIDs can cause stomach bleeding or kidney problems in certain people. If you take blood thinner medicine, always ask your healthcare provider if NSAIDs are safe for you. Always read the medicine label and follow directions. Prescription pain medicine  may be given. Ask your healthcare provider how to take this medicine safely. Some prescription pain medicines contain acetaminophen. Do not take other medicines that contain acetaminophen without talking to your healthcare provider. Too much acetaminophen may cause liver damage. Prescription pain medicine may cause constipation. Ask your healthcare provider how to prevent or treat constipation. Take your medicine as directed. Contact your healthcare provider if you think your medicine is not helping or if you have side effects. Tell your provider if you are allergic to any medicine. Keep a list of the medicines, vitamins, and herbs you take. Include the amounts, and when and why you take them. Bring the list or the pill bottles to follow-up visits. Carry your medicine list with you in case of an emergency. Self-care: Take deep breaths and cough 10 times each hour. This will decrease your risk for a lung infection. Hug a pillow on your injured side to decrease pain while you take deep breaths. Take a deep breath and hold it for as long as you can. Let the air out and then cough. Deep breaths help open your airway. You may be given an incentive spirometer to help you take deep breaths. Put the plastic piece in your mouth and take a slow, deep breath, then let the air out and cough. Repeat these steps 10 times every hour. Rest and limit activity as directed. Do not pull, push, or lift objects. Start to do more as your pain decreases. Ask your healthcare provider how much activity you can do. Apply ice  on your chest near your fractured rib for 15 to 20 minutes every hour or as directed. Use an ice pack, or put crushed ice in a plastic bag. Cover it with a towel. Ice helps prevent tissue damage and decreases swelling and pain. Follow up with your doctor as directed:  Write down your questions so you remember to ask them during your visits. © Copyright [facility] 2023 Information is for End User's use only and may not be sold, redistributed or otherwise used for commercial purposes. The above information is an  only. It is not intended as medical advice for individual conditions or treatments. Talk to your doctor, nurse or pharmacist before following any medical regimen to see if it is safe and effective for you.

## 2023-12-04 NOTE — PROGRESS NOTES
Name: Fracisco Poe      : 1965      MRN: [id number]  Encounter Provider: KRISTOFER Lan  Encounter Date: 2023   Encounter department: [facility] [address]    Assessment & Plan     1. Multiple fractures of ribs, bilateral, initial encounter for closed fracture  Assessment & Plan:  Seen in ER 23 multiple rib fractures seen on CT suspect secondary to  Heimlich maneuver 3 weeks prior when pt was found down. Pt was prescribed 12 pills oxycodone 5 mg Q 4hrs which he is requesting refill for along with muscle relaxer, tylenol 1000 mg, lidocaine patches and naproxen 500 mg bid. On exam breath sounds are normal, has tenderness in b/l ribs, no difficulty breathing. Educated pt rib fractures can take several weeks to heal, will not refill oxycodone needs to transition to NSAID and tylenol as prescribed, continue lidocaine patches and recommend rest and abdominal binder prn for rib support. Must go to ER for any respiratory difficulty. Orders:  -     methocarbamol (ROBAXIN) 500 mg tablet; Take 1 tablet (500 mg total) by mouth 3 (three) times a day as needed for muscle spasms    2. Essential hypertension  Assessment & Plan:  Pt reports compliance with current therapy, BP elevated secondary to pain. Discussed appropriate pain control, low Na diet, monitoring BP at home and f/u for consistently elevated BP readings. Subjective      Pt presents for hospital discharge follow-up was seen in ER  for reported syncopal episode where pt believes he had a seizure states bystanders performed heimlich maneuver as the believed pt was choking. Pt admitted to excessive alcohol intake was found to be soaked in urine however refused detox treatment left ER AMA. Pt returned to ER  for worsening rib pain CT pe study with abdomen/pelvis showed Fractures of the left 5th through 10th ribs and right 5th through 7th ribs.  Pt was prescribed acetaminophen 1000 mg q 6hrs prn, lidocaine 5% patches, methocarbamol 500 mg tid prn, naproxen 500 mg bid and 12 tablets of oxycodone 5 mg q 4hrs prn. Pt requesting refill of oxycodone script as completed medication. Educated will need rest continue with NSAID and tylenol as prescribed provided additional 20 pills of muscle relaxer and recommend abdominal binder prn to support ribs. Review of Systems   Constitutional:  Negative for chills, diaphoresis, fatigue and fever. HENT:  Negative for congestion, ear pain and tinnitus. Respiratory:  Negative for apnea, cough, choking, chest tightness, shortness of breath, wheezing and stridor. Cardiovascular:  Positive for chest pain. Negative for palpitations and leg swelling. Gastrointestinal:  Negative for abdominal distention, abdominal pain, diarrhea, nausea and vomiting. Genitourinary:  Positive for flank pain. Negative for difficulty urinating. Musculoskeletal:  Negative for arthralgias, joint swelling and myalgias. Skin:  Negative for color change. Neurological:  Negative for dizziness, speech difficulty and headaches. Psychiatric/Behavioral:  Positive for sleep disturbance. Current Outpatient Medications on File Prior to Visit   Medication Sig    Accu-Chek FastClix Lancets MISC USE AS DIRECTED    amLODIPine (NORVASC) 5 mg tablet TAKE 1 TABLET (5 MG TOTAL) BY MOUTH DAILY.     Blood Glucose Monitoring Suppl (Accu-Chek Stefanie Plus) w/Device KIT USE AS DIRECTED    fenofibrate 160 MG tablet TAKE 1 TABLET (160 MG TOTAL) BY MOUTH DAILY    glimepiride (AMARYL) 2 mg tablet TAKE 1 TABLET EVERY MORNING  AND TAKE 1/2 TABLET EVERY EVENING    lidocaine (Lidoderm) 5 % Apply 2 patches topically over 12 hours daily Remove & Discard patch within 12 hours or as directed by MD    metFORMIN (GLUCOPHAGE-XR) 500 mg 24 hr tablet TAKE 2 TABLETS EVERY DAY WITH BREAKFAST    naproxen (Naprosyn) 500 mg tablet Take 1 tablet (500 mg total) by mouth 2 (two) times a day with meals    oxyCODONE (Roxicodone) 5 immediate release tablet Take 1 tablet (5 mg total) by mouth every 4 (four) hours as needed for moderate pain for up to 12 doses Max Daily Amount: 30 mg    pioglitazone (ACTOS) 45 mg tablet TAKE 1 TABLET EVERY DAY    ramipril (ALTACE) 10 MG capsule Take 1 capsule (10 mg total) by mouth 2 (two) times a day    sertraline (ZOLOFT) 100 mg tablet Take 1.5 tablets (150 mg total) by mouth daily    simvastatin (ZOCOR) 20 mg tablet TAKE 1 TABLET EVERY DAY    [DISCONTINUED] methocarbamol (ROBAXIN) 500 mg tablet Take 1 tablet (500 mg total) by mouth 3 (three) times a day as needed for muscle spasms    acetaminophen (TYLENOL) 500 mg tablet Take 2 tablets (1,000 mg total) by mouth every 6 (six) hours as needed for moderate pain (Patient not taking: Reported on 12/4/2023)    celecoxib (CeleBREX) 200 mg capsule TAKE 1 CAPSULE TWICE DAILY (Patient not taking: Reported on 12/4/2023)    gabapentin (NEURONTIN) 300 mg capsule Take 300 mg by mouth Three times a day    magnesium oxide (MAG-OX) 400 mg Take 1 tablet (400 mg total) by mouth daily    omeprazole (PriLOSEC) 40 MG capsule TAKE 1 CAPSULE TWICE DAILY (Patient not taking: Reported on 12/4/2023)    ramipril (ALTACE) 10 MG capsule TAKE 1 CAPSULE EVERY DAY (Patient not taking: Reported on 12/4/2023)    sucralfate (CARAFATE) 1 g tablet Take 1 tablet (1 g total) by mouth 4 (four) times a day for 7 days       Objective     /80 (BP Location: Right arm, Patient Position: Sitting, Cuff Size: Adult)   Pulse 86   Temp (!) 97.1 °F (36.2 °C) (Tympanic)   Ht 5' 11" (1.803 m)   Wt 105 kg (232 lb 6.4 oz)   SpO2 96%   BMI 32.41 kg/m²     Physical Exam  Vitals and nursing note reviewed. Constitutional:       General: He is not in acute distress. Appearance: Normal appearance. He is not ill-appearing, toxic-appearing or diaphoretic. HENT:      Head: Normocephalic and atraumatic.       Nose: Nose normal.      Mouth/Throat:      Mouth: Mucous membranes are moist. Pharynx: No posterior oropharyngeal erythema. Eyes:      Pupils: Pupils are equal, round, and reactive to light. Cardiovascular:      Rate and Rhythm: Normal rate and regular rhythm. Pulses: Normal pulses. Heart sounds: Normal heart sounds. Pulmonary:      Effort: Pulmonary effort is normal. No respiratory distress. Breath sounds: Normal breath sounds. No stridor. No wheezing, rhonchi or rales. Chest:      Chest wall: Tenderness (b/l anterior chest) present. No mass, lacerations, deformity, swelling, crepitus or edema. There is no dullness to percussion. Abdominal:      General: Bowel sounds are normal. There is no distension. Palpations: There is no mass. Tenderness: There is abdominal tenderness. There is no right CVA tenderness, left CVA tenderness, guarding or rebound. Hernia: No hernia is present. Musculoskeletal:      Thoracic back: Tenderness present. No swelling, edema, deformity, signs of trauma, lacerations, spasms or bony tenderness. Decreased range of motion. No scoliosis. Skin:     General: Skin is warm. Capillary Refill: Capillary refill takes less than 2 seconds. Neurological:      Mental Status: He is alert and oriented to person, place, and time. Psychiatric:         Attention and Perception: Attention and perception normal. He is attentive. Mood and Affect: Mood normal. Affect is not inappropriate. Speech: Speech normal.         Behavior: Behavior is agitated. Behavior is cooperative. Thought Content: Thought content is not paranoid or delusional. Thought content does not include homicidal or suicidal ideation. Thought content does not include homicidal or suicidal plan. Cognition and Memory: Cognition and memory normal. Cognition is not impaired.          Judgment: Judgment normal.       KRISTOFER Cowan

## 2023-12-05 ENCOUNTER — TELEPHONE (OUTPATIENT)
Age: 58
End: 2023-12-05

## 2023-12-05 NOTE — TELEPHONE ENCOUNTER
The patient and request to be transfer to the office to speak with alex. The was with the patient at the time of the transfer.     The call was connect with the MR at the clinic

## 2023-12-08 DIAGNOSIS — E78.1 HIGH TRIGLYCERIDES: ICD-10-CM

## 2023-12-08 RX ORDER — FENOFIBRATE 160 MG/1
160 TABLET ORAL DAILY
Qty: 90 TABLET | Refills: 0 | Status: SHIPPED | OUTPATIENT
Start: 2023-12-08

## 2024-01-24 ENCOUNTER — TELEPHONE (OUTPATIENT)
Dept: FAMILY MEDICINE CLINIC | Facility: CLINIC | Age: 59
End: 2024-01-24

## 2024-01-24 NOTE — TELEPHONE ENCOUNTER
Reminder form radiology pt had CT abd in ER had abnormalities in stomach  needs to see GI for endoscopy   send to GI put referral in

## 2024-01-31 ENCOUNTER — VBI (OUTPATIENT)
Dept: ADMINISTRATIVE | Facility: OTHER | Age: 59
End: 2024-01-31

## 2024-01-31 NOTE — TELEPHONE ENCOUNTER
01/31/24 10:52 AM     [employer] SmartForm used to document caregap status.    Melissa Melgoza MA

## 2024-02-05 ENCOUNTER — VBI (OUTPATIENT)
Dept: ADMINISTRATIVE | Facility: OTHER | Age: 59
End: 2024-02-05

## 2024-02-05 NOTE — TELEPHONE ENCOUNTER
02/05/24 10:54 AM     [employer] SmartForm used to document caregap status.    Melissa Melgoza MA

## 2024-02-08 DIAGNOSIS — E11.65 TYPE 2 DIABETES MELLITUS WITH HYPERGLYCEMIA, WITHOUT LONG-TERM CURRENT USE OF INSULIN (HCC): ICD-10-CM

## 2024-02-08 RX ORDER — PIOGLITAZONEHYDROCHLORIDE 45 MG/1
TABLET ORAL
Qty: 90 TABLET | Refills: 0 | Status: SHIPPED | OUTPATIENT
Start: 2024-02-08

## 2024-02-12 DIAGNOSIS — E11.65 TYPE 2 DIABETES MELLITUS WITH HYPERGLYCEMIA, WITHOUT LONG-TERM CURRENT USE OF INSULIN (HCC): ICD-10-CM

## 2024-02-12 RX ORDER — GLIMEPIRIDE 2 MG/1
TABLET ORAL
Qty: 135 TABLET | Refills: 1 | Status: SHIPPED | OUTPATIENT
Start: 2024-02-12

## 2024-02-19 DIAGNOSIS — E78.1 HIGH TRIGLYCERIDES: ICD-10-CM

## 2024-02-19 RX ORDER — FENOFIBRATE 160 MG/1
160 TABLET ORAL DAILY
Qty: 90 TABLET | Refills: 1 | Status: SHIPPED | OUTPATIENT
Start: 2024-02-19

## 2024-03-04 DIAGNOSIS — I10 ESSENTIAL HYPERTENSION: ICD-10-CM

## 2024-03-04 DIAGNOSIS — M79.10 MUSCLE PAIN: ICD-10-CM

## 2024-03-04 RX ORDER — RAMIPRIL 10 MG/1
CAPSULE ORAL
Qty: 90 CAPSULE | Refills: 3 | Status: SHIPPED | OUTPATIENT
Start: 2024-03-04

## 2024-03-04 RX ORDER — CELECOXIB 200 MG/1
CAPSULE ORAL
Qty: 180 CAPSULE | Refills: 3 | Status: SHIPPED | OUTPATIENT
Start: 2024-03-04

## 2024-03-20 DIAGNOSIS — F33.2 SEVERE EPISODE OF RECURRENT MAJOR DEPRESSIVE DISORDER, WITHOUT PSYCHOTIC FEATURES (HCC): ICD-10-CM

## 2024-03-20 RX ORDER — SERTRALINE HYDROCHLORIDE 100 MG/1
150 TABLET, FILM COATED ORAL DAILY
Qty: 135 TABLET | Refills: 1 | Status: SHIPPED | OUTPATIENT
Start: 2024-03-20

## 2024-03-20 RX ORDER — SERTRALINE HYDROCHLORIDE 100 MG/1
150 TABLET, FILM COATED ORAL DAILY
Qty: 135 TABLET | Refills: 5 | Status: SHIPPED | OUTPATIENT
Start: 2024-03-20 | End: 2024-03-20 | Stop reason: SDUPTHER

## 2024-03-20 NOTE — TELEPHONE ENCOUNTER
This is NOT a duplicate. Medication was sent to the wrong pharmacy    Reason for call:   [x] Refill   [] Prior Auth  [] Other:     Office:   [x] PCP/Provider -   [] Specialty/Provider -     Medication:   Sertraline 100mg- take 1.5 tablets by mouth daily    Pharmacy: [facility] pharmacy    Does the patient have enough for 3 days?   [x] Yes   [] No - Send as HP to POD

## 2024-03-20 NOTE — TELEPHONE ENCOUNTER
Fax from [facility] requesting refill of Sertraline 100mg   Fax: [phone]

## 2024-04-06 DIAGNOSIS — E11.65 TYPE 2 DIABETES MELLITUS WITH HYPERGLYCEMIA, WITHOUT LONG-TERM CURRENT USE OF INSULIN (HCC): ICD-10-CM

## 2024-04-08 RX ORDER — PIOGLITAZONEHYDROCHLORIDE 45 MG/1
TABLET ORAL
Qty: 90 TABLET | Refills: 0 | Status: SHIPPED | OUTPATIENT
Start: 2024-04-08

## 2024-05-23 DIAGNOSIS — E11.65 TYPE 2 DIABETES MELLITUS WITH HYPERGLYCEMIA, WITHOUT LONG-TERM CURRENT USE OF INSULIN (HCC): ICD-10-CM

## 2024-05-24 RX ORDER — PIOGLITAZONEHYDROCHLORIDE 45 MG/1
TABLET ORAL
Qty: 90 TABLET | Refills: 1 | Status: SHIPPED | OUTPATIENT
Start: 2024-05-24

## 2024-05-28 DIAGNOSIS — E11.65 TYPE 2 DIABETES MELLITUS WITH HYPERGLYCEMIA, WITHOUT LONG-TERM CURRENT USE OF INSULIN (HCC): ICD-10-CM

## 2024-05-29 RX ORDER — SIMVASTATIN 20 MG
TABLET ORAL
Qty: 90 TABLET | Refills: 1 | Status: SHIPPED | OUTPATIENT
Start: 2024-05-29

## 2024-06-04 ENCOUNTER — VBI (OUTPATIENT)
Dept: ADMINISTRATIVE | Facility: OTHER | Age: 59
End: 2024-06-04

## 2024-07-05 DIAGNOSIS — E11.65 TYPE 2 DIABETES MELLITUS WITH HYPERGLYCEMIA, WITHOUT LONG-TERM CURRENT USE OF INSULIN (HCC): ICD-10-CM

## 2024-07-07 RX ORDER — GLIMEPIRIDE 2 MG/1
TABLET ORAL
Qty: 135 TABLET | Refills: 0 | Status: SHIPPED | OUTPATIENT
Start: 2024-07-07 | End: 2024-07-08

## 2024-07-07 NOTE — TELEPHONE ENCOUNTER
Patient needs updated blood work and has previously placed orders 11.2023. Please contact patient to go for labs.     [facility] [employer] refill provided.

## 2024-07-08 ENCOUNTER — OFFICE VISIT (OUTPATIENT)
Dept: FAMILY MEDICINE CLINIC | Facility: CLINIC | Age: 59
End: 2024-07-08
Payer: COMMERCIAL

## 2024-07-08 VITALS
HEIGHT: 71 IN | OXYGEN SATURATION: 99 % | TEMPERATURE: 97.9 F | HEART RATE: 82 BPM | WEIGHT: 229 LBS | SYSTOLIC BLOOD PRESSURE: 152 MMHG | RESPIRATION RATE: 16 BRPM | DIASTOLIC BLOOD PRESSURE: 82 MMHG | BODY MASS INDEX: 32.06 KG/M2

## 2024-07-08 DIAGNOSIS — K21.9 GASTROESOPHAGEAL REFLUX DISEASE: ICD-10-CM

## 2024-07-08 DIAGNOSIS — I10 ESSENTIAL HYPERTENSION: ICD-10-CM

## 2024-07-08 DIAGNOSIS — M54.42 CHRONIC BILATERAL LOW BACK PAIN WITH BILATERAL SCIATICA: ICD-10-CM

## 2024-07-08 DIAGNOSIS — J02.0 STREP THROAT: ICD-10-CM

## 2024-07-08 DIAGNOSIS — E11.65 TYPE 2 DIABETES MELLITUS WITH HYPERGLYCEMIA, WITHOUT LONG-TERM CURRENT USE OF INSULIN (HCC): ICD-10-CM

## 2024-07-08 DIAGNOSIS — G57.91 NEUROPATHY OF RIGHT FOOT: ICD-10-CM

## 2024-07-08 DIAGNOSIS — E11.9 TYPE 2 DIABETES MELLITUS WITHOUT COMPLICATION, WITHOUT LONG-TERM CURRENT USE OF INSULIN (HCC): ICD-10-CM

## 2024-07-08 DIAGNOSIS — F33.2 SEVERE EPISODE OF RECURRENT MAJOR DEPRESSIVE DISORDER, WITHOUT PSYCHOTIC FEATURES (HCC): ICD-10-CM

## 2024-07-08 DIAGNOSIS — E78.5 DYSLIPIDEMIA: Primary | ICD-10-CM

## 2024-07-08 DIAGNOSIS — M54.41 CHRONIC BILATERAL LOW BACK PAIN WITH BILATERAL SCIATICA: ICD-10-CM

## 2024-07-08 DIAGNOSIS — K21.9 GASTROESOPHAGEAL REFLUX DISEASE WITHOUT ESOPHAGITIS: ICD-10-CM

## 2024-07-08 DIAGNOSIS — J02.9 SORE THROAT: ICD-10-CM

## 2024-07-08 DIAGNOSIS — G89.29 CHRONIC BILATERAL LOW BACK PAIN WITH BILATERAL SCIATICA: ICD-10-CM

## 2024-07-08 LAB
S PYO AG THROAT QL: POSITIVE
SL AMB POCT HEMOGLOBIN AIC: 5.4 (ref ?–6.5)

## 2024-07-08 PROCEDURE — 99214 OFFICE O/P EST MOD 30 MIN: CPT | Performed by: FAMILY MEDICINE

## 2024-07-08 PROCEDURE — 83036 HEMOGLOBIN GLYCOSYLATED A1C: CPT | Performed by: FAMILY MEDICINE

## 2024-07-08 PROCEDURE — 87880 STREP A ASSAY W/OPTIC: CPT | Performed by: FAMILY MEDICINE

## 2024-07-08 RX ORDER — AZITHROMYCIN 500 MG/1
500 TABLET, FILM COATED ORAL DAILY
Qty: 5 TABLET | Refills: 0 | Status: SHIPPED | OUTPATIENT
Start: 2024-07-08 | End: 2024-07-13

## 2024-07-08 RX ORDER — SIMVASTATIN 20 MG
20 TABLET ORAL DAILY
Qty: 90 TABLET | Refills: 3 | Status: SHIPPED | OUTPATIENT
Start: 2024-07-08

## 2024-07-08 RX ORDER — OMEPRAZOLE 40 MG/1
40 CAPSULE, DELAYED RELEASE ORAL 2 TIMES DAILY
Qty: 180 CAPSULE | Refills: 3 | Status: SHIPPED | OUTPATIENT
Start: 2024-07-08

## 2024-07-08 RX ORDER — AMLODIPINE BESYLATE 5 MG/1
5 TABLET ORAL DAILY
Qty: 90 TABLET | Refills: 3 | Status: SHIPPED | OUTPATIENT
Start: 2024-07-08

## 2024-07-08 RX ORDER — GLIMEPIRIDE 1 MG/1
1 TABLET ORAL
Qty: 90 TABLET | Refills: 3 | Status: SHIPPED | OUTPATIENT
Start: 2024-07-08 | End: 2025-01-04

## 2024-07-08 RX ORDER — RAMIPRIL 10 MG/1
10 CAPSULE ORAL 2 TIMES DAILY
Qty: 180 CAPSULE | Refills: 3 | Status: SHIPPED | OUTPATIENT
Start: 2024-07-08

## 2024-07-08 RX ORDER — SERTRALINE HYDROCHLORIDE 100 MG/1
150 TABLET, FILM COATED ORAL DAILY
Qty: 135 TABLET | Refills: 1 | Status: SHIPPED | OUTPATIENT
Start: 2024-07-08

## 2024-07-08 RX ORDER — GABAPENTIN 300 MG/1
300 CAPSULE ORAL 3 TIMES DAILY
Qty: 90 CAPSULE | Refills: 3 | Status: SHIPPED | OUTPATIENT
Start: 2024-07-08 | End: 2027-08-29

## 2024-07-08 RX ORDER — METFORMIN HYDROCHLORIDE 500 MG/1
500 TABLET, EXTENDED RELEASE ORAL 2 TIMES DAILY WITH MEALS
Qty: 180 TABLET | Refills: 3 | Status: SHIPPED | OUTPATIENT
Start: 2024-07-08

## 2024-07-08 RX ORDER — PIOGLITAZONEHYDROCHLORIDE 45 MG/1
45 TABLET ORAL DAILY
Qty: 90 TABLET | Refills: 3 | Status: SHIPPED | OUTPATIENT
Start: 2024-07-08

## 2024-07-08 NOTE — PROGRESS NOTES
Subjective: Pt is here for interval visit and evaluation of multiple medical problems, review of medications, labs, Health Maintenance and any recent specialty consults       Patient ID: Michael Briones is a 59 y.o. male.    Sore Throat   Pertinent negatives include no abdominal pain, coughing, diarrhea, headaches, neck pain, shortness of breath or vomiting.       Past Medical History:   Diagnosis Date   • Alcohol abuse    • Back pain    • Chronic low back pain    • Depression    • Diabetes mellitus (HCC)    • DM2 (diabetes mellitus, type 2) (HCC)    • Erectile dysfunction    • GERD (gastroesophageal reflux disease)    • Hepatic steatosis    • Hyperlipidemia    • Hypertension    • Neuropathy    • Pancreatitis    • Psychiatric disorder    • Tobacco abuse        Family History   Problem Relation Age of Onset   • Lymphoma Mother    • No Known Problems Father        Past Surgical History:   Procedure Laterality Date   • ANKLE FRACTURE SURGERY Right    • ANKLE SURGERY Right    • INGUINAL HERNIA REPAIR Left    • KNEE SURGERY Right    • UMBILICAL HERNIA REPAIR          reports that he has been smoking cigarettes. He started smoking about 46 years ago. He has a 46.5 pack-year smoking history. He has never used smokeless tobacco. He reports current alcohol use of about 100.0 standard drinks of alcohol per week. He reports current drug use. Frequency: 21.00 times per week. Drug: Marijuana.      Current Outpatient Medications:   •  Accu-Chek FastClix Lancets MISC, USE AS DIRECTED, Disp: 306 each, Rfl: 6  •  amLODIPine (NORVASC) 5 mg tablet, Take 1 tablet (5 mg total) by mouth daily, Disp: 90 tablet, Rfl: 3  •  azithromycin (ZITHROMAX) 500 MG tablet, Take 1 tablet (500 mg total) by mouth daily for 5 days, Disp: 5 tablet, Rfl: 0  •  Blood Glucose Monitoring Suppl (Accu-Chek Stefanie Plus) w/Device KIT, USE AS DIRECTED, Disp: 1 kit, Rfl: 1  •  fenofibrate 160 MG tablet, TAKE 1 TABLET EVERY DAY, Disp: 90 tablet, Rfl: 1  •   gabapentin (NEURONTIN) 300 mg capsule, Take 1 capsule (300 mg total) by mouth 3 (three) times a day, Disp: 90 capsule, Rfl: 3  •  glimepiride (AMARYL) 1 mg tablet, Take 1 tablet (1 mg total) by mouth daily with breakfast, Disp: 90 tablet, Rfl: 3  •  lidocaine (Lidoderm) 5 %, Apply 2 patches topically over 12 hours daily Remove & Discard patch within 12 hours or as directed by MD, Disp: 30 patch, Rfl: 0  •  magnesium oxide (MAG-OX) 400 mg, Take 1 tablet (400 mg total) by mouth daily, Disp: 30 tablet, Rfl: 0  •  metFORMIN (GLUCOPHAGE-XR) 500 mg 24 hr tablet, Take 1 tablet (500 mg total) by mouth 2 (two) times a day with meals, Disp: 180 tablet, Rfl: 3  •  methocarbamol (ROBAXIN) 500 mg tablet, Take 1 tablet (500 mg total) by mouth 3 (three) times a day as needed for muscle spasms, Disp: 20 tablet, Rfl: 0  •  naproxen (Naprosyn) 500 mg tablet, Take 1 tablet (500 mg total) by mouth 2 (two) times a day with meals, Disp: 30 tablet, Rfl: 0  •  omeprazole (PriLOSEC) 40 MG capsule, Take 1 capsule (40 mg total) by mouth 2 (two) times a day, Disp: 180 capsule, Rfl: 3  •  pioglitazone (ACTOS) 45 mg tablet, Take 1 tablet (45 mg total) by mouth daily, Disp: 90 tablet, Rfl: 3  •  ramipril (ALTACE) 10 MG capsule, Take 1 capsule (10 mg total) by mouth 2 (two) times a day, Disp: 180 capsule, Rfl: 3  •  sertraline (ZOLOFT) 100 mg tablet, Take 1.5 tablets (150 mg total) by mouth daily, Disp: 135 tablet, Rfl: 1  •  simvastatin (ZOCOR) 20 mg tablet, Take 1 tablet (20 mg total) by mouth daily, Disp: 90 tablet, Rfl: 3    The following portions of the patient's history were reviewed and updated as appropriate: allergies, current medications, past family history, past medical history, past social history, past surgical history and problem list.    Review of Systems   Constitutional:  Negative for appetite change, chills, fatigue and fever.   HENT:  Positive for sore throat.    Respiratory:  Negative for cough, chest tightness and shortness  "of breath.    Cardiovascular:  Negative for chest pain, palpitations and leg swelling.   Gastrointestinal:  Negative for abdominal pain, constipation, diarrhea, nausea and vomiting.   Genitourinary:  Negative for difficulty urinating and frequency.   Musculoskeletal:  Negative for arthralgias, back pain, gait problem and neck pain.   Skin:  Negative for rash.   Neurological:  Negative for dizziness, weakness, light-headedness, numbness and headaches.   Hematological:  Does not bruise/bleed easily.   Psychiatric/Behavioral:  Negative for dysphoric mood and sleep disturbance. The patient is not nervous/anxious.          PHQ-2/9 Depression Screening    Little interest or pleasure in doing things: 0 - not at all  Feeling down, depressed, or hopeless: 0 - not at all  Trouble falling or staying asleep, or sleeping too much: 0 - not at all  Feeling tired or having little energy: 0 - not at all  Poor appetite or overeatin - not at all  Feeling bad about yourself - or that you are a failure or have let yourself or your family down: 0 - not at all  Trouble concentrating on things, such as reading the newspaper or watching television: 0 - not at all  Moving or speaking so slowly that other people could have noticed. Or the opposite - being so fidgety or restless that you have been moving around a lot more than usual: 0 - not at all  Thoughts that you would be better off dead, or of hurting yourself in some way: 0 - not at all  PHQ-9 Score: 0  PHQ-9 Interpretation: No or Minimal depression             Objective:    /82   Pulse 82   Temp 97.9 °F (36.6 °C) (Tympanic)   Resp 16   Ht 5' 11\" (1.803 m)   Wt 104 kg (229 lb)   SpO2 99%   BMI 31.94 kg/m²      Physical Exam  Vitals reviewed.   Constitutional:       General: He is not in acute distress.     Appearance: Normal appearance. He is well-developed. He is not ill-appearing.   HENT:      Right Ear: Tympanic membrane normal.      Left Ear: Tympanic membrane " normal.      Mouth/Throat:      Mouth: Mucous membranes are moist.      Pharynx: Posterior oropharyngeal erythema present.   Eyes:      Extraocular Movements: Extraocular movements intact.      Conjunctiva/sclera: Conjunctivae normal.      Pupils: Pupils are equal, round, and reactive to light.   Neck:      Thyroid: No thyromegaly.      Vascular: No carotid bruit.   Cardiovascular:      Rate and Rhythm: Normal rate and regular rhythm.      Pulses: Normal pulses.      Heart sounds: Normal heart sounds. No murmur heard.  Pulmonary:      Effort: Pulmonary effort is normal.      Breath sounds: Normal breath sounds.   Chest:      Chest wall: No tenderness.   Abdominal:      General: Bowel sounds are normal. There is no distension.      Palpations: Abdomen is soft.      Tenderness: There is no abdominal tenderness.   Musculoskeletal:      Cervical back: Normal range of motion and neck supple.   Lymphadenopathy:      Cervical: No cervical adenopathy.   Skin:     General: Skin is warm and dry.   Neurological:      General: No focal deficit present.      Mental Status: He is alert and oriented to person, place, and time. Mental status is at baseline.      Cranial Nerves: No cranial nerve deficit.   Psychiatric:         Mood and Affect: Mood normal.         Behavior: Behavior normal.           Recent Results (from the past 8736 hour(s))   [facility] Diabetic eye exam    Collection Time: 11/10/23  2:24 PM   Result Value Ref Range    Severity NORMAL     Right Eye Diabetic Retinopathy None     Right Eye Macular Edema None     Right Eye Other Retinopathy None     Right Eye Image Quality Gradable Image     Left Eye Diabetic Retinopathy None     Left Eye Macular Edema None     Left Eye Other Retinopathy None     Left Eye Image Quality Gradable Image     Result Retinal Study Result for ARSLAN SMITH     Result       Result        tasha LUDWIG 57 y/o, M (: 1965, MRN: [id number])    Result        presented to [facility] on 11- for a retinal imaging study of the left and right eyes.    Result       Result        Based on the findings of the study, the following is recommended for ARSLAN SMITH    Result        Normal Study: Re-scan the patient in 12 months or in the next calendar year.    Result       Result        Interpreting Provider's Comments:  No comments provided    Result        Diagnoses Present:  - Type 2 diabetes mellitus with hyperglycemia    Result       Result        Right eye findings: Negative for Diabetic Retinopathy    Result Negative for Macular Edema     Result       Result        Left eye findings: Negative for Diabetic Retinopathy    Result Negative for Macular Edema     Result       Result        This result was electronically signed by Eladio Wilkinson MD, NPI: [id number], Taxonomy: [id number] on 11- 02:01 [facility].    Result      Result       NOTE:  Any pathology noted on this diabetic retinal evaluation should be confirmed by an appropriate ophthalmic examination.   Albumin / creatinine urine ratio    Collection Time: 11/10/23  4:11 PM   Result Value Ref Range    Creatinine, Ur 96.2 Reference range not established. mg/dL    Albumin,U,Random 13.5 <20.0 mg/L    Albumin Creat Ratio 14 0 - 30 mg/g creatinine   ECG 12 lead    Collection Time: 11/11/23  5:37 PM   Result Value Ref Range    Ventricular Rate 84 BPM    Atrial Rate 84 BPM    MO Interval 182 ms    QRSD Interval 100 ms    QT Interval 388 ms    QTC Interval 458 ms    P [place] 53 degrees    QRS Axis 33 degrees    T Wave Axis 45 degrees   CBC and differential    Collection Time: 12/01/23  8:14 AM   Result Value Ref Range    WBC 6.22 4.31 - 10.16 Thousand/uL    RBC 4.92 3.88 - 5.62 Million/uL    Hemoglobin 16.4 12.0 - 17.0 g/dL    Hematocrit 46.1 36.5 - 49.3 %    MCV 94 82 - 98 fL    MCH 33.3 26.8 - 34.3 pg    MCHC 35.6 31.4 - 37.4 g/dL    RDW 12.4 11.6 - 15.1 %    MPV 9.5 8.9 - 12.7 fL    Platelets 209 149 - 390  "Thousands/uL    nRBC 0 /100 WBCs    Segmented % 54 43 - 75 %    Immature Grans % 0 0 - 2 %    Lymphocytes % 32 14 - 44 %    Monocytes % 9 4 - 12 %    Eosinophils Relative 3 0 - 6 %    Basophils Relative 2 (H) 0 - 1 %    Absolute Neutrophils 3.37 1.85 - 7.62 Thousands/µL    Absolute Immature Grans 0.02 0.00 - 0.20 Thousand/uL    Absolute Lymphocytes 1.96 0.60 - 4.47 Thousands/µL    Absolute Monocytes 0.57 0.17 - 1.22 Thousand/µL    Eosinophils Absolute 0.17 0.00 - 0.61 Thousand/µL    Basophils Absolute 0.13 (H) 0.00 - 0.10 Thousands/µL   Basic metabolic panel    Collection Time: 12/01/23  8:14 AM   Result Value Ref Range    Sodium 136 135 - 147 mmol/L    Potassium 3.8 3.5 - 5.3 mmol/L    Chloride 101 96 - 108 mmol/L    CO2 26 21 - 32 mmol/L    ANION GAP 9 mmol/L    BUN 10 5 - 25 mg/dL    Creatinine 0.79 0.60 - 1.30 mg/dL    Glucose 150 (H) 65 - 140 mg/dL    Calcium 9.5 8.4 - 10.2 mg/dL    eGFR 97 ml/min/1.73sq m   HS Troponin 0hr (reflex protocol)    Collection Time: 12/01/23  8:14 AM   Result Value Ref Range    hs TnI 0hr 9 \"Refer to ACS Flowchart\"- see link ng/L   Hepatic function panel    Collection Time: 12/01/23  8:14 AM   Result Value Ref Range    Total Bilirubin 1.24 (H) 0.20 - 1.00 mg/dL    Bilirubin, Direct 0.28 (H) 0.00 - 0.20 mg/dL    Alkaline Phosphatase 176 (H) 34 - 104 U/L    AST 58 (H) 13 - 39 U/L    ALT 52 7 - 52 U/L    Total Protein 7.6 6.4 - 8.4 g/dL    Albumin 4.0 3.5 - 5.0 g/dL   Lipase    Collection Time: 12/01/23  8:14 AM   Result Value Ref Range    Lipase 12 11 - 82 u/L   ECG 12 lead    Collection Time: 12/01/23  8:15 AM   Result Value Ref Range    Ventricular Rate 83 BPM    Atrial Rate 83 BPM    WA Interval 162 ms    QRSD Interval 96 ms    QT Interval 376 ms    QTC Interval 441 ms    P Axis 1 degrees    QRS Axis 14 degrees    T Wave Axis 42 degrees   POCT hemoglobin A1c    Collection Time: 07/08/24  9:13 AM   Result Value Ref Range    Hemoglobin A1C 5.4 6.5   POCT rapid ANTIGEN strepA    " Collection Time: 07/08/24  9:13 AM   Result Value Ref Range     RAPID STREP A Positive (A) Negative       Laboratory Results: I have personally reviewed the pertinent laboratory results/reports     Radiology/Other Diagnostic Testing Results: I have personally reviewed pertinent reports.      X-ray chest 1 view portable    Result Date: 12/1/2023  CHEST INDICATION:   chest pain. COMPARISON: 6/23/2023 EXAM PERFORMED/VIEWS:  XR CHEST PORTABLE FINDINGS: Cardiomediastinal silhouette appears unremarkable. The lungs are clear.  No pneumothorax or pleural effusion. Osseous structures appear within normal limits for patient age.     No acute cardiopulmonary disease. Workstation performed: [id number]     CT pe study w abdomen pelvis w contrast    Result Date: 12/1/2023  CT PULMONARY ANGIOGRAM OF THE CHEST AND CT ABDOMEN AND PELVIS WITH INTRAVENOUS CONTRAST INDICATION:   Pleuritic pain, LUQ pain, Left flank pain.. COMPARISON: 6/23/2023, 9/19/2021 TECHNIQUE:  CT examination of the chest, abdomen and pelvis was performed.  Thin section CT angiographic technique was used in the chest in order to evaluate for pulmonary embolus and coronal 3D MIP postprocessing was performed on the acquisition scanner. Multiplanar 2D reformatted images were created from the source data. This examination, like all CT scans performed in the [facility], was performed utilizing techniques to minimize radiation dose exposure, including the use of iterative reconstruction and automated exposure control. Radiation dose length product (DLP) for this visit:  1269 mGy-cm IV Contrast:  99 mL of iohexol (OMNIPAQUE) Enteric Contrast:  Enteric contrast was not administered. FINDINGS: CHEST PULMONARY ARTERIAL TREE:  No pulmonary embolus is seen. LUNGS:  Lungs are clear.  There is no tracheal or endobronchial lesion. PLEURA:  Unremarkable. HEART/AORTA:  Heart is unremarkable for patient's age.  No thoracic aortic aneurysm. MEDIASTINUM AND MUKUND:  There is shotty mediastinal lymphadenopathy. No lymph nodes measure greater than 1 cm. This is stable from 2021. CHEST WALL AND LOWER NECK:  Unremarkable. ABDOMEN LIVER/BILIARY TREE: Liver is diffusely decreased in density consistent with fatty change.  No CT evidence of suspicious hepatic mass.  Normal hepatic contours.  No biliary dilatation. GALLBLADDER:  No calcified gallstones. No pericholecystic inflammatory change. SPLEEN:  Unremarkable. PANCREAS:  Unremarkable. ADRENAL GLANDS:  Unremarkable. KIDNEYS/URETERS: There is a hyperdense lesion exophytic off the left kidney measuring 1.8 x 1.9 cm consistent with a hemorrhagic cyst based on CT from 2021. No hydronephrosis. STOMACH AND BOWEL:  Unremarkable. APPENDIX:  No findings to suggest appendicitis. ABDOMINOPELVIC CAVITY:  No ascites.  No pneumoperitoneum.  No lymphadenopathy. VESSELS:  Unremarkable for patient's age. PELVIS REPRODUCTIVE ORGANS:  Unremarkable for patient's age. URINARY BLADDER:  Unremarkable. ABDOMINAL WALL/INGUINAL REGIONS:  Unremarkable. OSSEOUS STRUCTURES: There our minimally displaced fractures of the left lateral 8th through 10th ribs and nondisplaced fractures of the left anterior 5th through 7th ribs. There also appears to be a fracture of the right anterior 5th through 7th ribs.     1.  Fractures of the left 5th through 10th ribs and right 5th through 7th ribs. 2.  No acute pulmonary embolism. 3.  No acute abnormality in the abdomen or pelvis. 4.  Diffuse hepatic steatosis. The study was marked in EPIC for immediate notification. Workstation performed: [id number]        Assessment/Plan:  1. Dyslipidemia  Assessment & Plan:  Check lipid s  2. Type 2 diabetes mellitus with hyperglycemia, without long-term current use of insulin (HCC)  -     simvastatin (ZOCOR) 20 mg tablet; Take 1 tablet (20 mg total) by mouth daily  -     metFORMIN (GLUCOPHAGE-XR) 500 mg 24 hr tablet; Take 1 tablet (500 mg total) by mouth 2 (two) times a day with  meals  -     pioglitazone (ACTOS) 45 mg tablet; Take 1 tablet (45 mg total) by mouth daily  -     POCT hemoglobin A1c  -     glimepiride (AMARYL) 1 mg tablet; Take 1 tablet (1 mg total) by mouth daily with breakfast  -     Comprehensive metabolic panel; Future; Expected date: 10/08/2024  -     Hemoglobin A1C; Future; Expected date: 10/08/2024  -     Albumin / creatinine urine ratio; Future; Expected date: 10/08/2024  -     Lipid Panel with Direct LDL reflex; Future; Expected date: 10/08/2024  3. Gastroesophageal reflux disease  -     omeprazole (PriLOSEC) 40 MG capsule; Take 1 capsule (40 mg total) by mouth 2 (two) times a day  4. Essential hypertension  Assessment & Plan:  Pt has been missing amlodipine will restart   Orders:  -     ramipril (ALTACE) 10 MG capsule; Take 1 capsule (10 mg total) by mouth 2 (two) times a day  -     amLODIPine (NORVASC) 5 mg tablet; Take 1 tablet (5 mg total) by mouth daily  5. Severe episode of recurrent major depressive disorder, without psychotic features (Formerly [facility])  Assessment & Plan:  Ok on sertraline   Orders:  -     sertraline (ZOLOFT) 100 mg tablet; Take 1.5 tablets (150 mg total) by mouth daily  6. Sore throat  Assessment & Plan:  Check strep   Orders:  -     POCT rapid ANTIGEN strepA  7. Neuropathy of right foot  Assessment & Plan:  On gabapentin  Orders:  -     gabapentin (NEURONTIN) 300 mg capsule; Take 1 capsule (300 mg total) by mouth 3 (three) times a day  8. Gastroesophageal reflux disease without esophagitis  Assessment & Plan:  Ok on omeprazole   9. Strep throat  -     azithromycin (ZITHROMAX) 500 MG tablet; Take 1 tablet (500 mg total) by mouth daily for 5 days  10. Chronic bilateral low back pain with bilateral sciatica  Assessment & Plan:  On gabapentin  11. Type 2 diabetes mellitus without complication, without long-term current use of insulin (Formerly [facility])                   Read package inserts for all medications before starting a new medications, call me if you have any  "questions.    Patient was given opportunity to ask questions and all questions were answered.    Disclaimer: Portions of the record may have been created with voice recognition software. Occasional wrong word or \"sound a like\" substitutions may have occurred due to the inherent limitations of voice recognition software. Read the chart carefully and recognize, using context, where substitutions have occurred. I have used the Epic copy/forward function to compose this note. I have reviewed my current note to ensure it reflects the current patient status, exam, assessment and plan.  "

## 2024-07-14 DIAGNOSIS — E78.1 HIGH TRIGLYCERIDES: ICD-10-CM

## 2024-07-14 RX ORDER — FENOFIBRATE 160 MG/1
160 TABLET ORAL DAILY
Qty: 100 TABLET | Refills: 0 | Status: SHIPPED | OUTPATIENT
Start: 2024-07-14

## 2024-08-07 ENCOUNTER — VBI (OUTPATIENT)
Dept: ADMINISTRATIVE | Facility: OTHER | Age: 59
End: 2024-08-07

## 2024-08-07 NOTE — TELEPHONE ENCOUNTER
08/07/24 1:06 PM     Chart reviewed for Hemoglobin A1c and Microalbumin Creatinine Urine Ratio OR Albumin Creatinine Urine Ratio  was/were submitted to the patient's insurance.     Kaelyn Quinones   [facility]

## 2024-08-09 ENCOUNTER — HOSPITAL ENCOUNTER (INPATIENT)
Facility: HOSPITAL | Age: 59
LOS: 2 days | Discharge: HOME | DRG: 438 | End: 2024-08-12
Attending: EMERGENCY MEDICINE | Admitting: INTERNAL MEDICINE
Payer: COMMERCIAL

## 2024-08-09 ENCOUNTER — APPOINTMENT (EMERGENCY)
Dept: CT IMAGING | Facility: HOSPITAL | Age: 59
DRG: 438 | End: 2024-08-09
Payer: COMMERCIAL

## 2024-08-09 ENCOUNTER — APPOINTMENT (OUTPATIENT)
Dept: ULTRASOUND IMAGING | Facility: HOSPITAL | Age: 59
DRG: 438 | End: 2024-08-09
Payer: COMMERCIAL

## 2024-08-09 DIAGNOSIS — K85.90 ACUTE PANCREATITIS WITHOUT INFECTION OR NECROSIS, UNSPECIFIED PANCREATITIS TYPE: Primary | ICD-10-CM

## 2024-08-09 DIAGNOSIS — E87.1 HYPONATREMIA: ICD-10-CM

## 2024-08-09 DIAGNOSIS — K59.09 OTHER CONSTIPATION: ICD-10-CM

## 2024-08-09 DIAGNOSIS — N17.9 AKI (ACUTE KIDNEY INJURY) (HCC): ICD-10-CM

## 2024-08-09 DIAGNOSIS — K85.90 ACUTE PANCREATITIS: ICD-10-CM

## 2024-08-09 LAB
2HR DELTA HS TROPONIN: 1 NG/L
4HR DELTA HS TROPONIN: 1 NG/L
ALBUMIN SERPL BCG-MCNC: 3.8 G/DL (ref 3.5–5)
ALBUMIN SERPL BCG-MCNC: 4.2 G/DL (ref 3.5–5)
ALP SERPL-CCNC: 95 U/L (ref 34–104)
ALP SERPL-CCNC: 98 U/L (ref 34–104)
ALT SERPL W P-5'-P-CCNC: 26 U/L (ref 7–52)
ALT SERPL W P-5'-P-CCNC: 28 U/L (ref 7–52)
AMYLASE SERPL-CCNC: 25 IU/L (ref 29–103)
ANION GAP SERPL CALCULATED.3IONS-SCNC: 10 MMOL/L (ref 4–13)
ANION GAP SERPL CALCULATED.3IONS-SCNC: 9 MMOL/L (ref 4–13)
AST SERPL W P-5'-P-CCNC: 23 U/L (ref 13–39)
AST SERPL W P-5'-P-CCNC: 25 U/L (ref 13–39)
ATRIAL RATE: 107 BPM
BASOPHILS # BLD AUTO: 0.12 THOUSANDS/ΜL (ref 0–0.1)
BASOPHILS NFR BLD AUTO: 2 % (ref 0–1)
BILIRUB SERPL-MCNC: 0.64 MG/DL (ref 0.2–1)
BILIRUB SERPL-MCNC: 0.69 MG/DL (ref 0.2–1)
BUN SERPL-MCNC: 10 MG/DL (ref 5–25)
BUN SERPL-MCNC: 8 MG/DL (ref 5–25)
CALCIUM SERPL-MCNC: 10 MG/DL (ref 8.4–10.2)
CALCIUM SERPL-MCNC: 9.3 MG/DL (ref 8.4–10.2)
CARDIAC TROPONIN I PNL SERPL HS: 6 NG/L
CARDIAC TROPONIN I PNL SERPL HS: 7 NG/L
CARDIAC TROPONIN I PNL SERPL HS: 7 NG/L
CHLORIDE SERPL-SCNC: 103 MMOL/L (ref 96–108)
CHLORIDE SERPL-SCNC: 99 MMOL/L (ref 96–108)
CHOLEST SERPL-MCNC: 158 MG/DL
CO2 SERPL-SCNC: 22 MMOL/L (ref 21–32)
CO2 SERPL-SCNC: 23 MMOL/L (ref 21–32)
CREAT SERPL-MCNC: 0.86 MG/DL (ref 0.6–1.3)
CREAT SERPL-MCNC: 0.96 MG/DL (ref 0.6–1.3)
CRP SERPL QL: 8.8 MG/L
EOSINOPHIL # BLD AUTO: 0.15 THOUSAND/ΜL (ref 0–0.61)
EOSINOPHIL NFR BLD AUTO: 2 % (ref 0–6)
ERYTHROCYTE [DISTWIDTH] IN BLOOD BY AUTOMATED COUNT: 11.9 % (ref 11.6–15.1)
GFR SERPL CREATININE-BSD FRML MDRD: 84 ML/MIN/1.73SQ M
GFR SERPL CREATININE-BSD FRML MDRD: 93 ML/MIN/1.73SQ M
GLUCOSE SERPL-MCNC: 145 MG/DL (ref 65–140)
GLUCOSE SERPL-MCNC: 150 MG/DL (ref 65–140)
GLUCOSE SERPL-MCNC: 150 MG/DL (ref 65–140)
GLUCOSE SERPL-MCNC: 160 MG/DL (ref 65–140)
GLUCOSE SERPL-MCNC: 168 MG/DL (ref 65–140)
GLUCOSE SERPL-MCNC: 175 MG/DL (ref 65–140)
GLUCOSE SERPL-MCNC: 271 MG/DL (ref 65–140)
HCT VFR BLD AUTO: 46.7 % (ref 36.5–49.3)
HDLC SERPL-MCNC: 52 MG/DL
HGB BLD-MCNC: 16.4 G/DL (ref 12–17)
IMM GRANULOCYTES # BLD AUTO: 0.03 THOUSAND/UL (ref 0–0.2)
IMM GRANULOCYTES NFR BLD AUTO: 0 % (ref 0–2)
LDLC SERPL CALC-MCNC: 86 MG/DL (ref 0–100)
LIPASE SERPL-CCNC: 112 U/L (ref 11–82)
LYMPHOCYTES # BLD AUTO: 1.99 THOUSANDS/ΜL (ref 0.6–4.47)
LYMPHOCYTES NFR BLD AUTO: 26 % (ref 14–44)
MCH RBC QN AUTO: 33.4 PG (ref 26.8–34.3)
MCHC RBC AUTO-ENTMCNC: 35.1 G/DL (ref 31.4–37.4)
MCV RBC AUTO: 95 FL (ref 82–98)
MONOCYTES # BLD AUTO: 0.68 THOUSAND/ΜL (ref 0.17–1.22)
MONOCYTES NFR BLD AUTO: 9 % (ref 4–12)
NEUTROPHILS # BLD AUTO: 4.56 THOUSANDS/ΜL (ref 1.85–7.62)
NEUTS SEG NFR BLD AUTO: 61 % (ref 43–75)
NRBC BLD AUTO-RTO: 0 /100 WBCS
P AXIS: 46 DEGREES
PLATELET # BLD AUTO: 193 THOUSANDS/UL (ref 149–390)
PMV BLD AUTO: 9.3 FL (ref 8.9–12.7)
POTASSIUM SERPL-SCNC: 3.9 MMOL/L (ref 3.5–5.3)
POTASSIUM SERPL-SCNC: 4 MMOL/L (ref 3.5–5.3)
PR INTERVAL: 176 MS
PROT SERPL-MCNC: 7.1 G/DL (ref 6.4–8.4)
PROT SERPL-MCNC: 7.9 G/DL (ref 6.4–8.4)
QRS AXIS: 0 DEGREES
QRSD INTERVAL: 94 MS
QT INTERVAL: 342 MS
QTC INTERVAL: 456 MS
RBC # BLD AUTO: 4.91 MILLION/UL (ref 3.88–5.62)
SODIUM SERPL-SCNC: 132 MMOL/L (ref 135–147)
SODIUM SERPL-SCNC: 134 MMOL/L (ref 135–147)
T WAVE AXIS: 50 DEGREES
TRIGL SERPL-MCNC: 99 MG/DL
VENTRICULAR RATE: 107 BPM
WBC # BLD AUTO: 7.53 THOUSAND/UL (ref 4.31–10.16)

## 2024-08-09 PROCEDURE — 99284 EMERGENCY DEPT VISIT MOD MDM: CPT

## 2024-08-09 PROCEDURE — 82150 ASSAY OF AMYLASE: CPT

## 2024-08-09 PROCEDURE — 96374 THER/PROPH/DIAG INJ IV PUSH: CPT

## 2024-08-09 PROCEDURE — 93005 ELECTROCARDIOGRAM TRACING: CPT

## 2024-08-09 PROCEDURE — 96361 HYDRATE IV INFUSION ADD-ON: CPT

## 2024-08-09 PROCEDURE — 99222 1ST HOSP IP/OBS MODERATE 55: CPT | Performed by: INTERNAL MEDICINE

## 2024-08-09 PROCEDURE — 83690 ASSAY OF LIPASE: CPT | Performed by: EMERGENCY MEDICINE

## 2024-08-09 PROCEDURE — 85025 COMPLETE CBC W/AUTO DIFF WBC: CPT | Performed by: EMERGENCY MEDICINE

## 2024-08-09 PROCEDURE — 84484 ASSAY OF TROPONIN QUANT: CPT | Performed by: EMERGENCY MEDICINE

## 2024-08-09 PROCEDURE — 80053 COMPREHEN METABOLIC PANEL: CPT

## 2024-08-09 PROCEDURE — 93010 ELECTROCARDIOGRAM REPORT: CPT | Performed by: INTERNAL MEDICINE

## 2024-08-09 PROCEDURE — 80061 LIPID PANEL: CPT

## 2024-08-09 PROCEDURE — 99285 EMERGENCY DEPT VISIT HI MDM: CPT | Performed by: EMERGENCY MEDICINE

## 2024-08-09 PROCEDURE — 76705 ECHO EXAM OF ABDOMEN: CPT

## 2024-08-09 PROCEDURE — 74177 CT ABD & PELVIS W/CONTRAST: CPT

## 2024-08-09 PROCEDURE — 36415 COLL VENOUS BLD VENIPUNCTURE: CPT | Performed by: EMERGENCY MEDICINE

## 2024-08-09 PROCEDURE — 82948 REAGENT STRIP/BLOOD GLUCOSE: CPT

## 2024-08-09 PROCEDURE — 86140 C-REACTIVE PROTEIN: CPT

## 2024-08-09 PROCEDURE — 96375 TX/PRO/DX INJ NEW DRUG ADDON: CPT

## 2024-08-09 PROCEDURE — 80053 COMPREHEN METABOLIC PANEL: CPT | Performed by: EMERGENCY MEDICINE

## 2024-08-09 PROCEDURE — 99222 1ST HOSP IP/OBS MODERATE 55: CPT | Performed by: NURSE PRACTITIONER

## 2024-08-09 RX ORDER — POLYETHYLENE GLYCOL 3350 17 G/17G
17 POWDER, FOR SOLUTION ORAL DAILY
Status: DISCONTINUED | OUTPATIENT
Start: 2024-08-09 | End: 2024-08-12 | Stop reason: HOSPADM

## 2024-08-09 RX ORDER — HYDROMORPHONE HCL/PF 1 MG/ML
0.3 SYRINGE (ML) INJECTION
Status: DISCONTINUED | OUTPATIENT
Start: 2024-08-09 | End: 2024-08-12 | Stop reason: HOSPADM

## 2024-08-09 RX ORDER — FENOFIBRATE 145 MG/1
145 TABLET, COATED ORAL DAILY
Status: DISCONTINUED | OUTPATIENT
Start: 2024-08-09 | End: 2024-08-10

## 2024-08-09 RX ORDER — MAGNESIUM HYDROXIDE/ALUMINUM HYDROXICE/SIMETHICONE 120; 1200; 1200 MG/30ML; MG/30ML; MG/30ML
30 SUSPENSION ORAL EVERY 4 HOURS PRN
Status: DISCONTINUED | OUTPATIENT
Start: 2024-08-09 | End: 2024-08-12 | Stop reason: HOSPADM

## 2024-08-09 RX ORDER — LISINOPRIL 20 MG/1
40 TABLET ORAL DAILY
Status: DISCONTINUED | OUTPATIENT
Start: 2024-08-09 | End: 2024-08-10

## 2024-08-09 RX ORDER — MORPHINE SULFATE 4 MG/ML
4 INJECTION, SOLUTION INTRAMUSCULAR; INTRAVENOUS ONCE
Status: COMPLETED | OUTPATIENT
Start: 2024-08-09 | End: 2024-08-09

## 2024-08-09 RX ORDER — ONDANSETRON 2 MG/ML
4 INJECTION INTRAMUSCULAR; INTRAVENOUS EVERY 6 HOURS PRN
Status: DISCONTINUED | OUTPATIENT
Start: 2024-08-09 | End: 2024-08-12 | Stop reason: HOSPADM

## 2024-08-09 RX ORDER — LANOLIN ALCOHOL/MO/W.PET/CERES
400 CREAM (GRAM) TOPICAL DAILY
Status: DISCONTINUED | OUTPATIENT
Start: 2024-08-09 | End: 2024-08-12 | Stop reason: HOSPADM

## 2024-08-09 RX ORDER — KETOROLAC TROMETHAMINE 30 MG/ML
15 INJECTION, SOLUTION INTRAMUSCULAR; INTRAVENOUS ONCE
Status: COMPLETED | OUTPATIENT
Start: 2024-08-09 | End: 2024-08-09

## 2024-08-09 RX ORDER — ACETAMINOPHEN 325 MG/1
650 TABLET ORAL EVERY 6 HOURS PRN
Status: DISCONTINUED | OUTPATIENT
Start: 2024-08-09 | End: 2024-08-12 | Stop reason: HOSPADM

## 2024-08-09 RX ORDER — ENOXAPARIN SODIUM 100 MG/ML
40 INJECTION SUBCUTANEOUS DAILY
Status: DISCONTINUED | OUTPATIENT
Start: 2024-08-09 | End: 2024-08-12 | Stop reason: HOSPADM

## 2024-08-09 RX ORDER — AMLODIPINE BESYLATE 5 MG/1
5 TABLET ORAL DAILY
Status: DISCONTINUED | OUTPATIENT
Start: 2024-08-09 | End: 2024-08-10

## 2024-08-09 RX ORDER — NICOTINE 21 MG/24HR
1 PATCH, TRANSDERMAL 24 HOURS TRANSDERMAL DAILY
Status: DISCONTINUED | OUTPATIENT
Start: 2024-08-09 | End: 2024-08-12 | Stop reason: HOSPADM

## 2024-08-09 RX ORDER — HYDROMORPHONE HCL/PF 1 MG/ML
1 SYRINGE (ML) INJECTION EVERY 6 HOURS PRN
Status: DISCONTINUED | OUTPATIENT
Start: 2024-08-09 | End: 2024-08-12 | Stop reason: HOSPADM

## 2024-08-09 RX ORDER — HYDROMORPHONE HCL/PF 1 MG/ML
0.5 SYRINGE (ML) INJECTION EVERY 6 HOURS PRN
Status: DISCONTINUED | OUTPATIENT
Start: 2024-08-09 | End: 2024-08-09

## 2024-08-09 RX ORDER — HYDROMORPHONE HCL/PF 1 MG/ML
0.5 SYRINGE (ML) INJECTION EVERY 6 HOURS PRN
Status: DISCONTINUED | OUTPATIENT
Start: 2024-08-09 | End: 2024-08-12 | Stop reason: HOSPADM

## 2024-08-09 RX ORDER — GABAPENTIN 300 MG/1
300 CAPSULE ORAL 3 TIMES DAILY
Status: DISCONTINUED | OUTPATIENT
Start: 2024-08-09 | End: 2024-08-12 | Stop reason: HOSPADM

## 2024-08-09 RX ORDER — PANTOPRAZOLE SODIUM 40 MG/10ML
40 INJECTION, POWDER, LYOPHILIZED, FOR SOLUTION INTRAVENOUS
Status: DISCONTINUED | OUTPATIENT
Start: 2024-08-09 | End: 2024-08-12

## 2024-08-09 RX ORDER — BISACODYL 10 MG
10 SUPPOSITORY, RECTAL RECTAL DAILY PRN
Status: DISCONTINUED | OUTPATIENT
Start: 2024-08-09 | End: 2024-08-12 | Stop reason: HOSPADM

## 2024-08-09 RX ORDER — PRAVASTATIN SODIUM 40 MG
40 TABLET ORAL
Status: DISCONTINUED | OUTPATIENT
Start: 2024-08-09 | End: 2024-08-10

## 2024-08-09 RX ORDER — SODIUM CHLORIDE 9 MG/ML
200 INJECTION, SOLUTION INTRAVENOUS CONTINUOUS
Status: DISCONTINUED | OUTPATIENT
Start: 2024-08-09 | End: 2024-08-10

## 2024-08-09 RX ORDER — INSULIN LISPRO 100 [IU]/ML
1-6 INJECTION, SOLUTION INTRAVENOUS; SUBCUTANEOUS EVERY 6 HOURS SCHEDULED
Status: DISCONTINUED | OUTPATIENT
Start: 2024-08-09 | End: 2024-08-12 | Stop reason: HOSPADM

## 2024-08-09 RX ADMIN — SERTRALINE HYDROCHLORIDE 150 MG: 50 TABLET ORAL at 08:55

## 2024-08-09 RX ADMIN — LISINOPRIL 40 MG: 20 TABLET ORAL at 08:55

## 2024-08-09 RX ADMIN — INSULIN LISPRO 1 UNITS: 100 INJECTION, SOLUTION INTRAVENOUS; SUBCUTANEOUS at 06:15

## 2024-08-09 RX ADMIN — PANTOPRAZOLE SODIUM 40 MG: 40 INJECTION, POWDER, FOR SOLUTION INTRAVENOUS at 05:46

## 2024-08-09 RX ADMIN — ACETAMINOPHEN 650 MG: 325 TABLET, FILM COATED ORAL at 05:56

## 2024-08-09 RX ADMIN — ENOXAPARIN SODIUM 40 MG: 40 INJECTION SUBCUTANEOUS at 08:56

## 2024-08-09 RX ADMIN — INSULIN LISPRO 1 UNITS: 100 INJECTION, SOLUTION INTRAVENOUS; SUBCUTANEOUS at 17:14

## 2024-08-09 RX ADMIN — MORPHINE SULFATE 4 MG: 4 INJECTION INTRAVENOUS at 03:53

## 2024-08-09 RX ADMIN — HYDROMORPHONE HYDROCHLORIDE 0.5 MG: 1 INJECTION, SOLUTION INTRAMUSCULAR; INTRAVENOUS; SUBCUTANEOUS at 23:45

## 2024-08-09 RX ADMIN — FENOFIBRATE 145 MG: 145 TABLET ORAL at 08:55

## 2024-08-09 RX ADMIN — SODIUM CHLORIDE 200 ML/HR: 0.9 INJECTION, SOLUTION INTRAVENOUS at 17:26

## 2024-08-09 RX ADMIN — KETOROLAC TROMETHAMINE 15 MG: 30 INJECTION, SOLUTION INTRAMUSCULAR at 01:45

## 2024-08-09 RX ADMIN — GABAPENTIN 300 MG: 300 CAPSULE ORAL at 08:55

## 2024-08-09 RX ADMIN — HYDROMORPHONE HYDROCHLORIDE 1 MG: 1 INJECTION, SOLUTION INTRAMUSCULAR; INTRAVENOUS; SUBCUTANEOUS at 18:36

## 2024-08-09 RX ADMIN — SODIUM CHLORIDE 200 ML/HR: 0.9 INJECTION, SOLUTION INTRAVENOUS at 08:57

## 2024-08-09 RX ADMIN — POLYETHYLENE GLYCOL 3350 17 G: 17 POWDER, FOR SOLUTION ORAL at 11:29

## 2024-08-09 RX ADMIN — HYDROMORPHONE HYDROCHLORIDE 0.3 MG: 1 INJECTION, SOLUTION INTRAMUSCULAR; INTRAVENOUS; SUBCUTANEOUS at 13:43

## 2024-08-09 RX ADMIN — SODIUM CHLORIDE 200 ML/HR: 0.9 INJECTION, SOLUTION INTRAVENOUS at 06:15

## 2024-08-09 RX ADMIN — SODIUM CHLORIDE 200 ML/HR: 0.9 INJECTION, SOLUTION INTRAVENOUS at 22:30

## 2024-08-09 RX ADMIN — Medication 400 MG: at 08:55

## 2024-08-09 RX ADMIN — INSULIN LISPRO 1 UNITS: 100 INJECTION, SOLUTION INTRAVENOUS; SUBCUTANEOUS at 11:29

## 2024-08-09 RX ADMIN — SODIUM CHLORIDE 1000 ML: 0.9 INJECTION, SOLUTION INTRAVENOUS at 01:47

## 2024-08-09 RX ADMIN — GABAPENTIN 300 MG: 300 CAPSULE ORAL at 21:18

## 2024-08-09 RX ADMIN — PRAVASTATIN SODIUM 40 MG: 40 TABLET ORAL at 17:13

## 2024-08-09 RX ADMIN — NICOTINE 1 PATCH: 21 PATCH, EXTENDED RELEASE TRANSDERMAL at 08:56

## 2024-08-09 RX ADMIN — HYDROMORPHONE HYDROCHLORIDE 1 MG: 1 INJECTION, SOLUTION INTRAMUSCULAR; INTRAVENOUS; SUBCUTANEOUS at 09:06

## 2024-08-09 RX ADMIN — MORPHINE SULFATE 4 MG: 4 INJECTION INTRAVENOUS at 02:43

## 2024-08-09 RX ADMIN — AMLODIPINE BESYLATE 5 MG: 5 TABLET ORAL at 08:55

## 2024-08-09 RX ADMIN — KETOROLAC TROMETHAMINE 15 MG: 30 INJECTION, SOLUTION INTRAMUSCULAR at 06:01

## 2024-08-09 RX ADMIN — ALUMINUM HYDROXIDE, MAGNESIUM HYDROXIDE, SIMETHICONE 30 ML: 200; 200; 20 LIQUID ORAL at 21:18

## 2024-08-09 RX ADMIN — IOHEXOL 100 ML: 350 INJECTION, SOLUTION INTRAVENOUS at 03:06

## 2024-08-09 RX ADMIN — GABAPENTIN 300 MG: 300 CAPSULE ORAL at 17:13

## 2024-08-09 NOTE — ASSESSMENT & PLAN NOTE
"Lab Results   Component Value Date    HGBA1C 5.4 07/08/2024       No results for input(s): \"POCGLU\" in the last 72 hours.    Blood Sugar Average: Last 72 hrs:  Home meds: pioglitazone 45 mg and glimepiride 1 mg with breakfast, and metformin 500 mg BID on hold  SSI coverage q 6 hours while NPO  Avoid hypoglycemia, hypoglycemia protocol    "

## 2024-08-09 NOTE — ASSESSMENT & PLAN NOTE
POA with sodium level 133  Suspect prerenal secondary to low oral intake   In ED, 1 liter NS IV given x1  Will continue  IV aggressive IV hydration  Monitor BMP

## 2024-08-09 NOTE — ASSESSMENT & PLAN NOTE
POA w/ elevated Blood pressure initially elevated in ED, 202/100  Suspect elevated bp secondary to pain  Blood pressure improved with pain control  Home meds: ramipril & amlodipine continue  Monitor blood pressure

## 2024-08-09 NOTE — ASSESSMENT & PLAN NOTE
"POA with worsening epigastric pain over the past 4 days with radiation to back.  History of pancreatitis  Denies recent drinking, does state he drinks once in a while but not in the last 4 days  Pain generalized mid/upper abd, tender  & soft  Elevated Lipase 112, AST/Alk & ALk phos WNL  CT a/p w/ contrast: \"Trace stranding about the pancreas may be due to pancreatitis, No discrete peripancreatic collection. Gallbladder no calcified gallstones.  No pericholecystic inflammatory change\"  Suspect mild pancreatitis   Consider transabdominal ultrasound r/o biliary pancreatitis   GI consulted  NPO sip with med  Aggressive IV hydration, normal saline 200 ml/hr  Pain management  Obtain CRP, obtain serum triglycerides level, trend lipase  Continue trend CMP,  and lipase  "

## 2024-08-09 NOTE — CASE MANAGEMENT
Case Management Assessment & Discharge Planning Note    Patient name Michael Briones  Location /-01 MRN [id number]  : 1965 Date 2024       Current Admission Date: 2024  Current Admission Diagnosis:Acute pancreatitis   Patient Active Problem List    Diagnosis Date Noted Date Diagnosed    Acute pancreatitis 2024     Hyponatremia 2024     Sore throat 2024     Multiple fractures of ribs, bilateral, initial encounter for closed fracture 2023     Episode of recurrent major depressive disorder (HCC) 11/10/2023     Chronic superficial gastritis without bleeding 11/10/2023     Rib pain on right side 10/03/2022     Peyronie's disease 10/03/2022     Cigarette nicotine dependence without complication 03/10/2022     Fall 2021     Injury of right ankle 2021     Closed fracture of proximal end of left humerus 2021     Acute pain due to trauma 2021     Other fatigue 2021     Vasculogenic erectile dysfunction 2021     Leucocytosis 2021     Abnormal skin growth 2020     Chronic bilateral low back pain with bilateral sciatica 10/14/2020     GERD (gastroesophageal reflux disease) 10/14/2020     Neuropathy of right foot 10/14/2020     Alcohol abuse 2019     Lactic acidosis 2019     Type 2 diabetes mellitus (HCC) 2019     Hepatic steatosis 2018     Lesion of left native kidney 2018     Essential hypertension 2018     Dyslipidemia 2018     Pancreatitis, acute 2018       LOS (days): 0  Geometric Mean LOS (GMLOS) (days):   Days to GMLOS:     OBJECTIVE:              Current admission status: Observation       Preferred Pharmacy:   [id number] - [place] - [address]  [address] [facility] [address]  Phone: [phone] Fax: [phone]    [facility] - [place], [place] - [address] [facility]  [address]  [facility] [address]  Phone:  [phone] Fax: [phone]    Primary Care Provider: Annmarie Dunaway MD    Primary Insurance: [employer]  Secondary Insurance:     ASSESSMENT:  Readmission Root Cause  30 Day Readmission: No    Patient Information  Admitted from:: Home  Mental Status: Alert  During Assessment patient was accompanied by: Not accompanied during assessment  Assessment information provided by:: Patient  Primary Caregiver: Self  Support Systems: Self, Son  County of Residence: [place]  What city do you live in?: [place]  Living Arrangements: Lives w/ Son  Is patient a ?: No    Activities of Daily Living Prior to Admission  Functional Status: Independent  Completes ADLs independently?: Yes  Ambulates independently?: Yes  Does patient use assisted devices?: No  Does patient currently own DME?: No  Does patient have a history of Outpatient Therapy (PT/OT)?: No  Does the patient have a history of Short-Term Rehab?: No  Does patient have a history of HHC?: No  Does patient currently have HHC?: No    Patient Information Continued  Income Source: SSI/SSD  Does patient have prescription coverage?: Yes  Does patient receive dialysis treatments?: No  Does patient have a history of substance abuse?: Yes  Historical substance use preference: Alcohol/ETOH  History of Withdrawal Symptoms: Denies past symptoms  Is patient currently in treatment for substance abuse?: No. Patient declined treatment information.  Does patient have a history of Mental Health Diagnosis?: Yes (Depression)  Is patient receiving treatment for mental health?: No. Patient declined treatment information.  Has patient received inpatient treatment related to mental health in the last 2 years?: No    PHQ 2/9 Screening   Reviewed PHQ 2/9 Depression Screening Score?: No    Means of Transportation  Means of Transport to Appts:: Drives Self    Social Determinants of Health (SDOH)      Flowsheet Row Most Recent Value   Housing Stability    In the last 12 months, was there  a time when you were not able to pay the mortgage or rent on time? N   In the past 12 months, how many times have you moved where you were living? 0   At any time in the past 12 months, were you homeless or living in a shelter (including now)? N   Transportation Needs    In the past 12 months, has lack of transportation kept you from medical appointments or from getting medications? no   In the past 12 months, has lack of transportation kept you from meetings, work, or from getting things needed for daily living? No   Food Insecurity    Within the past 12 months, you worried that your food would run out before you got the money to buy more. Never true   Within the past 12 months, the food you bought just didn't last and you didn't have money to get more. Never true   Utilities    In the past 12 months has the electric, gas, oil, or water company threatened to shut off services in your home? No            DISCHARGE DETAILS:    Discharge planning discussed with:: Patient  Freedom of Choice: Yes     CM contacted family/caregiver?: No- see comments (declined call to family member (son Mora Rodriguez))  Were Treatment Team discharge recommendations reviewed with patient/caregiver?: Yes  Did patient/caregiver verbalize understanding of patient care needs?: Yes  Were patient/caregiver advised of the risks associated with not following Treatment Team discharge recommendations?: Yes    Requested Home Health Care         Is the patient interested in HHC at discharge?: No    DME Referral Provided  Referral made for DME?: No    Other Referral/Resources/Interventions Provided:  Interventions: None Indicated    Would you like to participate in our [facility] service program?  : No - Declined    Treatment Team Recommendation: Home  Discharge Destination Plan:: Home  Transport at Discharge : [place], Family

## 2024-08-09 NOTE — ED PROVIDER NOTES
History  Chief Complaint   Patient presents with    Abdominal Pain     Pt having upper gi pain he think its his pancreas hx of pancreatitis. Pain has been on going x3 days, last bm 4 days ago. No meds PTA.      59-year-old male with history of pancreatitis presents to the emergency department for evaluation of abdominal pain.  Patient reports worsening epigastric pain over the past 3 days.  He describes it as a sharp pain with radiation to his back and now to his chest.  He states that this feels like episodes of pancreatitis that he has had in the past.  Patient also reports being constipated stating that his last bowel movement was 4 days ago.  He has not been taking any medications to treat his symptoms.  He denies fevers, chills, vomiting, diarrhea, sick contacts, shortness of breath and urinary symptoms.        Prior to Admission Medications   Prescriptions Last Dose Informant Patient Reported? Taking?   Accu-Chek FastClix Lancets MISC   No No   Sig: USE AS DIRECTED   Blood Glucose Monitoring Suppl (Accu-Chek Stefanie Plus) w/Device KIT   No No   Sig: USE AS DIRECTED   amLODIPine (NORVASC) 5 mg tablet 8/8/2024  No Yes   Sig: Take 1 tablet (5 mg total) by mouth daily   fenofibrate 160 MG tablet   No No   Sig: TAKE 1 TABLET EVERY DAY   gabapentin (NEURONTIN) 300 mg capsule 8/8/2024  No Yes   Sig: Take 1 capsule (300 mg total) by mouth 3 (three) times a day   glimepiride (AMARYL) 1 mg tablet 8/8/2024  No Yes   Sig: Take 1 tablet (1 mg total) by mouth daily with breakfast   lidocaine (Lidoderm) 5 %   No No   Sig: Apply 2 patches topically over 12 hours daily Remove & Discard patch within 12 hours or as directed by MD   magnesium oxide (MAG-OX) 400 mg   No No   Sig: Take 1 tablet (400 mg total) by mouth daily   metFORMIN (GLUCOPHAGE-XR) 500 mg 24 hr tablet 8/8/2024  No Yes   Sig: Take 1 tablet (500 mg total) by mouth 2 (two) times a day with meals   methocarbamol (ROBAXIN) 500 mg tablet Unknown  No No   Sig: Take 1  tablet (500 mg total) by mouth 3 (three) times a day as needed for muscle spasms   naproxen (Naprosyn) 500 mg tablet Unknown  No No   Sig: Take 1 tablet (500 mg total) by mouth 2 (two) times a day with meals   omeprazole (PriLOSEC) 40 MG capsule 8/8/2024  No Yes   Sig: Take 1 capsule (40 mg total) by mouth 2 (two) times a day   pioglitazone (ACTOS) 45 mg tablet 8/8/2024  No Yes   Sig: Take 1 tablet (45 mg total) by mouth daily   ramipril (ALTACE) 10 MG capsule 8/8/2024  No Yes   Sig: Take 1 capsule (10 mg total) by mouth 2 (two) times a day   sertraline (ZOLOFT) 100 mg tablet 8/8/2024  No Yes   Sig: Take 1.5 tablets (150 mg total) by mouth daily   simvastatin (ZOCOR) 20 mg tablet 8/8/2024  No Yes   Sig: Take 1 tablet (20 mg total) by mouth daily      Facility-Administered Medications: None       Past Medical History:   Diagnosis Date    Alcohol abuse     Back pain     Chronic low back pain     Depression     Diabetes mellitus (HCC)     DM2 (diabetes mellitus, type 2) (HCC)     Erectile dysfunction     GERD (gastroesophageal reflux disease)     Hepatic steatosis     Hyperlipidemia     Hypertension     Neuropathy     Pancreatitis     Psychiatric disorder     Tobacco abuse        Past Surgical History:   Procedure Laterality Date    ANKLE FRACTURE SURGERY Right     ANKLE SURGERY Right     INGUINAL HERNIA REPAIR Left     KNEE SURGERY Right     UMBILICAL HERNIA REPAIR         Family History   Problem Relation Age of Onset    Lymphoma Mother     No Known Problems Father      I have reviewed and agree with the history as documented.    E-Cigarette/Vaping    E-Cigarette Use Never User      E-Cigarette/Vaping Substances    Nicotine No     THC No     CBD No     Flavoring No     Other No     Unknown No      Social History     Tobacco Use    Smoking status: Every Day     Current packs/day: 1.00     Average packs/day: 1 pack/day for 46.6 years (46.6 ttl pk-yrs)     Types: Cigarettes     Start date: 1978    Smokeless tobacco:  Never    Tobacco comments:     per [facility]erv[facility]-quit   Vaping Use    Vaping status: Never Used   Substance Use Topics    Alcohol use: Yes     Alcohol/week: 100.0 standard drinks of alcohol     Types: 100 Cans of beer per week     Comment: heavy drinker    Drug use: Yes     Frequency: 21.0 times per week     Types: Marijuana     Comment: marijuana each day       Review of Systems   Constitutional:  Negative for chills and fever.   HENT:  Negative for ear pain and sore throat.    Eyes:  Negative for pain and visual disturbance.   Respiratory:  Negative for cough and shortness of breath.    Cardiovascular:  Positive for chest pain. Negative for palpitations.   Gastrointestinal:  Positive for abdominal pain. Negative for vomiting.   Genitourinary:  Negative for dysuria and hematuria.   Musculoskeletal:  Positive for back pain. Negative for arthralgias.   Skin:  Negative for color change and rash.   Neurological:  Negative for seizures and syncope.   All other systems reviewed and are negative.      Physical Exam  Physical Exam  Vitals and nursing note reviewed.   Constitutional:       General: He is in acute distress.      Appearance: He is well-developed.   HENT:      Head: Normocephalic and atraumatic.   Eyes:      Conjunctiva/sclera: Conjunctivae normal.   Cardiovascular:      Rate and Rhythm: Regular rhythm. Tachycardia present.      Pulses:           Radial pulses are 2+ on the right side.        Dorsalis pedis pulses are 2+ on the right side and 2+ on the left side.      Heart sounds: No murmur heard.  Pulmonary:      Effort: Pulmonary effort is normal. No respiratory distress.      Breath sounds: Normal breath sounds.   Abdominal:      Palpations: Abdomen is soft.      Tenderness: There is abdominal tenderness in the epigastric area.   Musculoskeletal:         General: No swelling.      Cervical back: Neck supple.      Right lower leg: No edema.      Left lower leg: No edema.   Skin:     General: Skin is warm and dry.       Capillary Refill: Capillary refill takes less than 2 seconds.   Neurological:      Mental Status: He is alert.   Psychiatric:         Mood and Affect: Mood normal.         Vital Signs  ED Triage Vitals   Temperature Pulse Respirations Blood Pressure SpO2   08/09/24 0141 08/09/24 0138 08/09/24 0138 08/09/24 0138 08/09/24 0138   98 °F (36.7 °C) (!) 108 19 (!) 208/98 97 %      Temp Source Heart Rate Source Patient Position - Orthostatic VS BP Location FiO2 (%)   08/09/24 0141 08/09/24 0138 08/09/24 0138 08/09/24 0138 --   Oral Monitor Lying Left arm       Pain Score       08/09/24 0145       10 - Worst Possible Pain           Vitals:    08/09/24 0350 08/09/24 0432 08/09/24 0855 08/09/24 1520   BP: 150/73 143/89 122/77 100/57   Pulse: 79 87  84   Patient Position - Orthostatic VS:  Lying  Lying         Visual Acuity      ED Medications  Medications   fenofibrate (TRICOR) tablet 145 mg (145 mg Oral Given 8/9/24 0855)   gabapentin (NEURONTIN) capsule 300 mg (300 mg Oral Given 8/9/24 1713)   magnesium Oxide (MAG-OX) tablet 400 mg (400 mg Oral Given 8/9/24 0855)   lisinopril (ZESTRIL) tablet 40 mg (40 mg Oral Given 8/9/24 0855)   sertraline (ZOLOFT) tablet 150 mg (150 mg Oral Given 8/9/24 0855)   pravastatin (PRAVACHOL) tablet 40 mg (40 mg Oral Given 8/9/24 1713)   sodium chloride 0.9 % infusion (200 mL/hr Intravenous New Bag 8/9/24 1726)   acetaminophen (TYLENOL) tablet 650 mg (650 mg Oral Given 8/9/24 0556)   nicotine (NICODERM CQ) 21 mg/24 hr TD 24 hr patch 1 patch (1 patch Transdermal Medication Applied 8/9/24 0856)   enoxaparin (LOVENOX) subcutaneous injection 40 mg (40 mg Subcutaneous Given 8/9/24 0856)   insulin lispro (HumALOG/ADMELOG) 100 units/mL subcutaneous injection 1-6 Units (1 Units Subcutaneous Given 8/9/24 1714)   pantoprazole (PROTONIX) injection 40 mg (40 mg Intravenous Given 8/9/24 7407)   naloxone (NARCAN) 0.04 mg/mL syringe 0.04 mg (has no administration in time range)   ondansetron (ZOFRAN)  injection 4 mg (has no administration in time range)   amLODIPine (NORVASC) tablet 5 mg (5 mg Oral Given 8/9/24 0855)   HYDROmorphone (DILAUDID) injection 1 mg (1 mg Intravenous Given 8/9/24 1836)   HYDROmorphone (DILAUDID) injection 0.3 mg (0.3 mg Intravenous Given 8/9/24 1343)   HYDROmorphone (DILAUDID) injection 0.5 mg (has no administration in time range)   polyethylene glycol (MIRALAX) packet 17 g (17 g Oral Given 8/9/24 1129)   bisacodyl (DULCOLAX) rectal suppository 10 mg (has no administration in time range)   sodium chloride 0.9 % bolus 1,000 mL (1,000 mL Intravenous New Bag 8/9/24 0147)   ketorolac (TORADOL) injection 15 mg (15 mg Intravenous Given 8/9/24 0145)   morphine injection 4 mg (4 mg Intravenous Given 8/9/24 0243)   iohexol (OMNIPAQUE) 350 MG/ML injection (SINGLE-DOSE) 100 mL (100 mL Intravenous Given 8/9/24 0306)   morphine injection 4 mg (4 mg Intravenous Given 8/9/24 0353)   ketorolac (TORADOL) injection 15 mg (15 mg Intravenous Given 8/9/24 0601)       Diagnostic Studies  Results Reviewed       Procedure Component Value Units Date/Time    HS Troponin I 2hr [[id number]]  (Normal) Collected: 08/09/24 0523    Lab Status: Final result Specimen: Blood from Hand, Right Updated: 08/09/24 0620     hs TnI 2hr 7 ng/L      Delta 2hr hsTnI 1 ng/L     HS Troponin I 4hr [[id number]]  (Normal) Collected: 08/09/24 0353    Lab Status: Final result Specimen: Blood from Arm, Left Updated: 08/09/24 0423     hs TnI 4hr 7 ng/L      Delta 4hr hsTnI 1 ng/L     HS Troponin 0hr (reflex protocol) [[id number]]  (Normal) Collected: 08/09/24 0144    Lab Status: Final result Specimen: Blood from Arm, Left Updated: 08/09/24 0232     hs TnI 0hr 6 ng/L     Comprehensive metabolic panel [[id number]]  (Abnormal) Collected: 08/09/24 0144    Lab Status: Final result Specimen: Blood from Arm, Left Updated: 08/09/24 0210     Sodium 132 mmol/L      Potassium 3.9 mmol/L      Chloride 99 mmol/L      CO2 23 mmol/L      ANION GAP 10 mmol/L       BUN 8 mg/dL      Creatinine 0.96 mg/dL      Glucose 271 mg/dL      Calcium 10.0 mg/dL      AST 25 U/L      ALT 28 U/L      Alkaline Phosphatase 98 U/L      Total Protein 7.9 g/dL      Albumin 4.2 g/dL      Total Bilirubin 0.69 mg/dL      eGFR 84 ml/min/1.73sq m     Narrative:      National Kid[employer] Foundation guidelines for Chronic Kidney Disease (CKD):     Stage 1 with normal or high GFR (GFR > 90 mL/min/1.73 square meters)    Stage 2 Mild CKD (GFR = 60-89 mL/min/1.73 square meters)    Stage 3A Moderate CKD (GFR = 45-59 mL/min/1.73 square meters)    Stage 3B Moderate CKD (GFR = 30-44 mL/min/1.73 square meters)    Stage 4 Severe CKD (GFR = 15-29 mL/min/1.73 square meters)    Stage 5 End Stage CKD (GFR <15 mL/min/1.73 square meters)  Note: GFR calculation is accurate only with a steady state creatinine    Lipase [[id number]]  (Abnormal) Collected: 08/09/24 0144    Lab Status: Final result Specimen: Blood from Arm, Left Updated: 08/09/24 0210     Lipase 112 u/L     CBC and differential [[id number]]  (Abnormal) Collected: 08/09/24 0144    Lab Status: Final result Specimen: Blood from Arm, Left Updated: 08/09/24 0200     WBC 7.53 Thousand/uL      RBC 4.91 Million/uL      Hemoglobin 16.4 g/dL      Hematocrit 46.7 %      MCV 95 fL      MCH 33.4 pg      MCHC 35.1 g/dL      RDW 11.9 %      MPV 9.3 fL      Platelets 193 Thousands/uL      nRBC 0 /100 WBCs      Segmented % 61 %      Immature Grans % 0 %      Lymphocytes % 26 %      Monocytes % 9 %      Eosinophils Relative 2 %      Basophils Relative 2 %      Absolute Neutrophils 4.56 Thousands/µL      Absolute Immature Grans 0.03 Thousand/uL      Absolute Lymphocytes 1.99 Thousands/µL      Absolute Monocytes 0.68 Thousand/µL      Eosinophils Absolute 0.15 Thousand/µL      Basophils Absolute 0.12 Thousands/µL                    CT abdomen pelvis with contrast   Final Result by Piter Baker MD (08/09 0328)      Trace stranding about the pancreas may be due to  pancreatitis, correlate with lipase. No discrete peripancreatic collection.         Workstation performed: [id number]         US right upper quadrant    (Results Pending)   MRI abdomen w wo contrast and mrcp    (Results Pending)              Procedures  ECG 12 Lead Documentation Only    Date/Time: 8/9/2024 2:00 AM    Performed by: Kev Maher MD  Authorized by: Kev Maher MD    ECG reviewed by me, the ED Provider: yes    Patient location:  ED  Previous ECG:     Previous ECG:  Compared to current    Similarity:  No change    Comparison to cardiac monitor: Yes    Interpretation:     Interpretation: abnormal    Rate:     ECG rate assessment: tachycardic    Rhythm:     Rhythm: sinus tachycardia    Ectopy:     Ectopy: none    QRS:     QRS axis:  Normal  Conduction:     Conduction: normal    ST segments:     ST segments:  Normal  T waves:     T waves: normal             ED Course  ED Course as of 08/09/24 1941   Fri Aug 09, 2024   0201 WBC: 7.53                   Medical Decision Making  59-year-old male presented to the emergency department for evaluation of epigastric abdominal pain.  On arrival patient awake, alert and in moderate acute painful distress.  Initial vital signs show that the patient was tachycardic.  On exam patient had tenderness to palpation of his epigastric region.  No peritoneal signs were present.  EKG was ordered to evaluate for acute ischemia/arrhythmia.  EKG on my interpretation with a sinus rhythm with no acute ischemic changes.  CT scan was ordered to evaluate for acute pathology including but not limited to infection, abscess, perforation, obstruction, pancreatitis.  Workup done in the emergency department was consistent with mild pancreatitis.  Patient was treated with analgesic medications.  All diagnostic studies were discussed with the patient in detail and he was reevaluated.  Patient reported continued pain and preferred admission to the hospital.  Patient was provided  with additional analgesic medications and admitted for further treatment and evaluation.        Amount and/or Complexity of Data Reviewed  Labs: ordered. Decision-making details documented in ED Course.  Radiology: ordered.  ECG/medicine tests: ordered and independent interpretation performed.    Risk  Prescription drug management.  Decision regarding hospitalization.                 Disposition  Final diagnoses:   Acute pancreatitis without infection or necrosis, unspecified pancreatitis type     Time reflects when diagnosis was documented in both MDM as applicable and the Disposition within this note       Time User Action Codes Description Comment    8/9/2024  3:48 AM Kev Maher Add [K85.90] Acute pancreatitis without infection or necrosis, unspecified pancreatitis type     8/9/2024  5:24 AM Gayla Thomas Add [K85.90] Acute pancreatitis           ED Disposition       ED Disposition   Admit    Condition   Stable    Date/Time   Fri Aug 9, 2024  3:47 AM    Comment   Case was discussed with jens and the patient's admission status was agreed to be Admission Status: observation status to the service of Dr. Adorno .               Follow-up Information       Follow up With Specialties Details Why Contact Info Additional Information    [facility] [place] [address] [employer] [facility] Follow up  [address]  [facility] [address] [phone] [facility] [place] [address] [address] [facility], [address], [address], [place], [address] [phone]            Current Discharge Medication List        CONTINUE these medications which have NOT CHANGED    Details   amLODIPine (NORVASC) 5 mg tablet Take 1 tablet (5 mg total) by mouth daily  Qty: 90 tablet, Refills: 3    Associated Diagnoses: Essential hypertension      gabapentin (NEURONTIN) 300 mg capsule Take 1 capsule (300 mg total) by mouth 3 (three) times a day  Qty: 90 capsule, Refills: 3     Associated Diagnoses: Neuropathy of right foot      glimepiride (AMARYL) 1 mg tablet Take 1 tablet (1 mg total) by mouth daily with breakfast  Qty: 90 tablet, Refills: 3    Associated Diagnoses: Type 2 diabetes mellitus with hyperglycemia, without long-term current use of insulin ([facility])      metFORMIN (GLUCOPHAGE-XR) 500 mg 24 hr tablet Take 1 tablet (500 mg total) by mouth 2 (two) times a day with meals  Qty: 180 tablet, Refills: 3    Associated Diagnoses: Type 2 diabetes mellitus with hyperglycemia, without long-term current use of insulin ([facility])      omeprazole (PriLOSEC) 40 MG capsule Take 1 capsule (40 mg total) by mouth 2 (two) times a day  Qty: 180 capsule, Refills: 3    Associated Diagnoses: Gastroesophageal reflux disease      pioglitazone (ACTOS) 45 mg tablet Take 1 tablet (45 mg total) by mouth daily  Qty: 90 tablet, Refills: 3    Associated Diagnoses: Type 2 diabetes mellitus with hyperglycemia, without long-term current use of insulin ([facility])      ramipril (ALTACE) 10 MG capsule Take 1 capsule (10 mg total) by mouth 2 (two) times a day  Qty: 180 capsule, Refills: 3    Associated Diagnoses: Essential hypertension      sertraline (ZOLOFT) 100 mg tablet Take 1.5 tablets (150 mg total) by mouth daily  Qty: 135 tablet, Refills: 1    Associated Diagnoses: Severe episode of recurrent major depressive disorder, without psychotic features ([facility])      simvastatin (ZOCOR) 20 mg tablet Take 1 tablet (20 mg total) by mouth daily  Qty: 90 tablet, Refills: 3    Associated Diagnoses: Type 2 diabetes mellitus with hyperglycemia, without long-term current use of insulin ([facility])      Accu-Chek FastClix Lancets MISC USE AS DIRECTED  Qty: 306 each, Refills: 6    Associated Diagnoses: Type 2 diabetes mellitus without complication, without long-term current use of insulin ([facility])      Blood Glucose Monitoring Suppl (Accu-Chek Stefanie Plus) w/Device KIT USE AS DIRECTED  Qty: 1 kit, Refills: 1    Associated Diagnoses: Type 2 diabetes  mellitus without complication, without long-term current use of insulin (HCC)      fenofibrate 160 MG tablet TAKE 1 TABLET EVERY DAY  Qty: 100 tablet, Refills: 0    Associated Diagnoses: High triglycerides      lidocaine (Lidoderm) 5 % Apply 2 patches topically over 12 hours daily Remove & Discard patch within 12 hours or as directed by MD  Qty: 30 patch, Refills: 0    Associated Diagnoses: Multiple fractures of ribs, bilateral, initial encounter for closed fracture      magnesium oxide (MAG-OX) 400 mg Take 1 tablet (400 mg total) by mouth daily  Qty: 30 tablet, Refills: 0    Associated Diagnoses: Hypomagnesemia      methocarbamol (ROBAXIN) 500 mg tablet Take 1 tablet (500 mg total) by mouth 3 (three) times a day as needed for muscle spasms  Qty: 20 tablet, Refills: 0    Associated Diagnoses: Multiple fractures of ribs, bilateral, initial encounter for closed fracture      naproxen (Naprosyn) 500 mg tablet Take 1 tablet (500 mg total) by mouth 2 (two) times a day with meals  Qty: 30 tablet, Refills: 0    Associated Diagnoses: Multiple fractures of ribs, bilateral, initial encounter for closed fracture             Outpatient Discharge Orders   MRI abdomen w wo contrast and mrcp   Standing Status: Future Standing Exp. Date: 08/09/28       PDMP Review         Value Time User    PDMP Reviewed  Yes 12/1/2023 10:02 AM Cipriano Dee DO            ED Provider  Electronically Signed by             Kev Maher MD  08/09/24 [id number]

## 2024-08-09 NOTE — Clinical Note
Case was discussed with jens and the patient's admission status was agreed to be Admission Status: observation status to the service of Dr. john Becker

## 2024-08-09 NOTE — CONSULTS
Consultation -  [facility]  Michael Briones 59 y.o. male MRN: [id number]  Unit/Bed#: -01 Encounter: [id number]        Inpatient consult to gastroenterology  Consult performed by: KRISTOFER Smith  Consult ordered by: KRISTOFER Mcguire          Reason for Consult / Principal Problem:           ASSESSMENT AND PLAN:      #1 Pancreatitis: Patient reports progressive upper abdominal pain for the last 4 days with associated nausea with radiation of pain to his back. CT with trace stranding about the pancreas which may be due to pancreatitis, however lipase only 112.  LFTs/triglycerides normal.  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 Igg4 in 2022 negative, EGD at that time showed mild gastritis & moderate chronic inflammation at the GE junction, negative for H.pylori & Tellez's.  He continues to smoke but has cut down to 1 pack a day.  He has resumed drinking alcohol and reports drinking a sixpack of beer weekly.  He has had a distended gallbladder on prior imaging but most recently had US in 6/2023 with no gallbladder findings. Pt may have acute on chronic pancreatitis d/t recurrent episodes, etiology likely continued alcohol/tobacco use ?microlithiasis, r/o underlying lesion. He is also on Actos, Ramipril, Omeprazole which have been linked to cases of pancreatitis.     -Continue IV fluids    -Pain control and anti emetics as needed    -Continue NPO, advance to clears once pain improves and then to low fat as tolerated    -Continue PPI daily    -Start Miralax daily for constipation, dulcolax suppository PRN  -Recommend outpatient colonoscopy for screening purposes as he has never had any colon cancer screening test    -Recommend complete alcohol/tobacco cessation as well as MRI/MRCP in 4-6 weeks as previously ordered to evaluate for any occult lesions.    -Consider outpatient  surgical evaluation for possible microlithiasis     Thank you for the consultation. Case will be discussed with Dr. Matthews    ______________________________________________________________________    HPI:  Michael Briones is a 59 y.o. male with history of DM, HLD, HTN, neuropathy, GERD, hepatic steatosis, pancreatitis, and alcohol/tobacco use who presented to the ER with upper abdominal pain since Tuesday radiating to his back.    In the ER, he was tachycardic and hypertensive with /98.  Workup showed hyponatremia with sodium 132, glucose 271, lipase 112, LFTs and WBC normal.  CAT scan abdomen pelvis performed showed trace stranding about the pancreas which may be due to pancreatitis.  No discrete peripancreatic collection was noted.  Cardiac enzymes were negative.    Patient has a history of pancreatitis in 2018 and 2019 attributed to alcohol use, he had another episode in 2021 after he quit alcohol use & at that time an EGD was only notable for mild esophagitis and gastritis.  His most recent admission in March 2022 he had CT imaging of possible acute pancreatitis and distended gallbladder with normal Lipase at that time as well & US showed GB distention, no biliary dilation. EGD at that time showing some mild gastritis and biopsy of the EG junction showed moderate chronic inflammation negative for Tellez's and antral biopsy showed mild chronic inactive gastritis and reactive gastropathy but was otherwise benign.  He was supposed to have outpatient MRI/MRCP to exclude underlying lesion but never did follow up for this.    He reports he cut down tobacco use from 2 packs to 1 pack a day.  Continues to smoke marijuana on occasion.  He resumed drinking alcohol but is now drinking a sixpack per week.  Denies any known family history of pancreatic cancer.  His wife passed away from liver cancer last year.  Denies any new medications.  Reports his reflux symptoms are controlled on omeprazole.  He has never had a  colonoscopy.  Reports no bowel movement for the past 4 to 5 days which is new for him.  Denies any unintended weight loss, early satiety, diarrhea at home.      REVIEW OF SYSTEMS:    CONSTITUTIONAL: Denies any fever, chills, rigors, and weight loss.  HEENT: No earache or tinnitus. Denies hearing loss or visual disturbances.  CARDIOVASCULAR: No chest pain or palpitations.   RESPIRATORY: Denies any cough, hemoptysis, shortness of breath or dyspnea on exertion.  GASTROINTESTINAL: As noted in the History of Present Illness.   GENITOURINARY: No problems with urination. Denies any hematuria or dysuria.  NEUROLOGIC: No dizziness or vertigo, denies headaches.   MUSCULOSKELETAL: Denies any muscle or joint pain.   SKIN: Denies skin rashes or itching.   ENDOCRINE: Denies excessive thirst. Denies intolerance to heat or cold.  PSYCHOSOCIAL: Denies depression or anxiety. Denies any recent memory loss.       Historical Information   Past Medical History:   Diagnosis Date    Alcohol abuse     Back pain     Chronic low back pain     Depression     Diabetes mellitus (HCC)     DM2 (diabetes mellitus, type 2) (HCC)     Erectile dysfunction     GERD (gastroesophageal reflux disease)     Hepatic steatosis     Hyperlipidemia     Hypertension     Neuropathy     Pancreatitis     Psychiatric disorder     Tobacco abuse      Past Surgical History:   Procedure Laterality Date    ANKLE FRACTURE SURGERY Right     ANKLE SURGERY Right     INGUINAL HERNIA REPAIR Left     KNEE SURGERY Right     UMBILICAL HERNIA REPAIR       Social History   Social History     Substance and Sexual Activity   Alcohol Use Yes    Alcohol/week: 100.0 standard drinks of alcohol    Types: 100 Cans of beer per week    Comment: heavy drinker     Social History     Substance and Sexual Activity   Drug Use Yes    Frequency: 21.0 times per week    Types: Marijuana    Comment: marijuana each day     Social History     Tobacco Use   Smoking Status Every Day    Current packs/day:  1.00    Average packs/day: 1 pack/day for 46.6 years (46.6 ttl pk-yrs)    Types: Cigarettes    Start date: 1978   Smokeless Tobacco Never   Tobacco Comments    per Mervat-quit     Family History   Problem Relation Age of Onset    Lymphoma Mother     No Known Problems Father        Meds/Allergies       Medications Prior to Admission:     amLODIPine (NORVASC) 5 mg tablet    gabapentin (NEURONTIN) 300 mg capsule    glimepiride (AMARYL) 1 mg tablet    metFORMIN (GLUCOPHAGE-XR) 500 mg 24 hr tablet    omeprazole (PriLOSEC) 40 MG capsule    pioglitazone (ACTOS) 45 mg tablet    ramipril (ALTACE) 10 MG capsule    sertraline (ZOLOFT) 100 mg tablet    simvastatin (ZOCOR) 20 mg tablet    Accu-Chek FastClix Lancets MISC    Blood Glucose Monitoring Suppl (Accu-Chek Stefanie Plus) w/Device KIT    fenofibrate 160 MG tablet    lidocaine (Lidoderm) 5 %    magnesium oxide (MAG-OX) 400 mg    methocarbamol (ROBAXIN) 500 mg tablet    naproxen (Naprosyn) 500 mg tablet  Current Facility-Administered Medications   Medication Dose Route Frequency    acetaminophen (TYLENOL) tablet 650 mg  650 mg Oral Q6H PRN    amLODIPine (NORVASC) tablet 5 mg  5 mg Oral Daily    enoxaparin (LOVENOX) subcutaneous injection 40 mg  40 mg Subcutaneous Daily    fenofibrate (TRICOR) tablet 145 mg  145 mg Oral Daily    gabapentin (NEURONTIN) capsule 300 mg  300 mg Oral TID    HYDROmorphone (DILAUDID) injection 0.3 mg  0.3 mg Intravenous Q3H PRN    HYDROmorphone (DILAUDID) injection 0.5 mg  0.5 mg Intravenous Q6H PRN    HYDROmorphone (DILAUDID) injection 1 mg  1 mg Intravenous Q6H PRN    insulin lispro (HumALOG/ADMELOG) 100 units/mL subcutaneous injection 1-6 Units  1-6 Units Subcutaneous Q6H ROSALEE    lisinopril (ZESTRIL) tablet 40 mg  40 mg Oral Daily    magnesium Oxide (MAG-OX) tablet 400 mg  400 mg Oral Daily    naloxone (NARCAN) 0.04 mg/mL syringe 0.04 mg  0.04 mg Intravenous Q1MIN PRN    nicotine (NICODERM CQ) 21 mg/24 hr TD 24 hr patch 1 patch  1 patch Transdermal  "Daily    ondansetron (ZOFRAN) injection 4 mg  4 mg Intravenous Q6H PRN    pantoprazole (PROTONIX) injection 40 mg  40 mg Intravenous Q24H ROSALEE    pravastatin (PRAVACHOL) tablet 40 mg  40 mg Oral Daily With Dinner    sertraline (ZOLOFT) tablet 150 mg  150 mg Oral Daily    sodium chloride 0.9 % infusion  200 mL/hr Intravenous Continuous       Allergies   Allergen Reactions    Amoxicillin Swelling    Amoxil [Amoxicillin] Hives           Objective     Blood pressure 122/77, pulse 87, temperature (!) 97.4 °F (36.3 °C), temperature source Oral, resp. rate 18, height 5' 10\" (1.778 m), weight 101 kg (223 lb 2 oz), SpO2 96%. Body mass index is 32.02 kg/m².    No intake or output data in the 24 hours ending 08/09/24 0926      PHYSICAL EXAM:      General Appearance:   Alert, cooperative, no distress   HEENT:   Normocephalic, atraumatic, anicteric.     Neck:  Supple, symmetrical, trachea midline   Lungs:   Clear to auscultation bilaterally; no rales, rhonchi or wheezing; respirations unlabored    Heart::   Regular rate and rhythm; no murmur, rub, or gallop.   Abdomen:   Soft, epigastric -tender, non-distended; normal bowel sounds; no masses, no organomegaly    Genitalia:   Deferred    Rectal:   Deferred    Extremities:  No cyanosis, clubbing or edema    Pulses:  2+ and symmetric all extremities    Skin:  No jaundice, rashes, or lesions    Lymph nodes:  No palpable cervical lymphadenopathy        Lab Results:   Admission on 08/09/2024   Component Date Value    WBC 08/09/2024 7.53     RBC 08/09/2024 4.91     Hemoglobin 08/09/2024 16.4     Hematocrit 08/09/2024 46.7     MCV 08/09/2024 95     MCH 08/09/2024 33.4     MCHC 08/09/2024 35.1     RDW 08/09/2024 11.9     MPV 08/09/2024 9.3     Platelets 08/09/2024 193     nRBC 08/09/2024 0     Segmented % 08/09/2024 61     Immature Grans % 08/09/2024 0     Lymphocytes % 08/09/2024 26     Monocytes % 08/09/2024 9     Eosinophils Relative 08/09/2024 2     Basophils Relative 08/09/2024 2 (H)  "    Absolute Neutrophils 08/09/2024 4.56     Absolute Immature Grans 08/09/2024 0.03     Absolute Lymphocytes 08/09/2024 1.99     Absolute Monocytes 08/09/2024 0.68     Eosinophils Absolute 08/09/2024 0.15     Basophils Absolute 08/09/2024 0.12 (H)     Sodium 08/09/2024 132 (L)     Potassium 08/09/2024 3.9     Chloride 08/09/2024 99     CO2 08/09/2024 23     ANION GAP 08/09/2024 10     BUN 08/09/2024 8     Creatinine 08/09/2024 0.96     Glucose 08/09/2024 271 (H)     Calcium 08/09/2024 10.0     AST 08/09/2024 25     ALT 08/09/2024 28     Alkaline Phosphatase 08/09/2024 98     Total Protein 08/09/2024 7.9     Albumin 08/09/2024 4.2     Total Bilirubin 08/09/2024 0.69     eGFR 08/09/2024 84     Lipase 08/09/2024 112 (H)     hs TnI 0hr 08/09/2024 6     Ventricular Rate 08/09/2024 107     Atrial Rate 08/09/2024 107     OK Interval 08/09/2024 176     QRSD Interval 08/09/2024 94     QT Interval 08/09/2024 342     QTC Interval 08/09/2024 456     P Axis 08/09/2024 46     QRS Axis 08/09/2024 0     T Wave Taiban 08/09/2024 50     hs TnI 2hr 08/09/2024 7     Delta 2hr hsTnI 08/09/2024 1     hs TnI 4hr 08/09/2024 7     Delta 4hr hsTnI 08/09/2024 1     POC Glucose 08/09/2024 160 (H)     POC Glucose 08/09/2024 168 (H)        Imaging Studies: I have personally reviewed pertinent imaging studies.

## 2024-08-09 NOTE — ASSESSMENT & PLAN NOTE
Updated lipid panel noted - excellent LDL control at 86 and triglycerides at 99  In light of pancreatitis, will hold fenofibrate/statin for now

## 2024-08-09 NOTE — H&P
"[facility]  H&P  Name: Michael Briones 59 y.o. male I MRN: [id number]  Unit/Bed#: -01 I Date of Admission: 8/9/2024   Date of Service: 8/9/2024 I Hospital Day: 0      Assessment & Plan   * Acute pancreatitis  Assessment & Plan  POA with worsening epigastric pain over the past 4 days with radiation to back.  History of pancreatitis  Denies recent drinking, does state he drinks once in a while but not in the last 4 days  Pain generalized mid/upper abd, tender  & soft  Elevated Lipase 112, AST/Alk & ALk phos WNL  CT a/p w/ contrast: \"Trace stranding about the pancreas may be due to pancreatitis, No discrete peripancreatic collection. Gallbladder no calcified gallstones.  No pericholecystic inflammatory change\"  Suspect mild pancreatitis   Consider transabdominal ultrasound r/o biliary pancreatitis   GI consulted  NPO sip with med  Aggressive IV hydration, normal saline 200 ml/hr  Pain management  Obtain CRP, obtain serum triglycerides level, trend lipase  Continue trend CMP,  and lipase    GERD (gastroesophageal reflux disease)  Assessment & Plan  Home meds omeprazole transitioned to IV protonix   GI consulted     Type 2 diabetes mellitus (HCC)  Assessment & Plan  Lab Results   Component Value Date    HGBA1C 5.4 07/08/2024       No results for input(s): \"POCGLU\" in the last 72 hours.    Blood Sugar Average: Last 72 hrs:  Home meds: pioglitazone 45 mg and glimepiride 1 mg with breakfast, and metformin 500 mg BID on hold  SSI coverage q 6 hours while NPO  Avoid hypoglycemia, hypoglycemia protocol      Essential hypertension  Assessment & Plan  POA w/ elevated Blood pressure initially elevated in ED, 202/100 suspect secondary to pain  Home meds: ramipril & amlodipine continue  Monitor blood pressure       VTE Pharmacologic Prophylaxis:   High Risk (Score >/= 5) - Pharmacological DVT Prophylaxis Ordered: enoxaparin (Lovenox). Sequential Compression Devices Ordered.  Code Status: Level 1 - " Full Code with patient  Discussion with family: Patient declined call to .     Anticipated Length of Stay: Patient will be admitted on an observation basis with an anticipated length of stay of less than 2 midnights secondary to abdominal pain secondary to mild pancreatitis, GI consulted.  Patient requiring IV hydration and npo..    Total Time Spent on Date of Encounter in care of patient: mins. This time was spent on one or more of the following: performing physical exam; counseling and coordination of care; obtaining or reviewing history; documenting in the medical record; reviewing/ordering tests, medications or procedures; communicating with other healthcare professionals and discussing with patient's family/caregivers.    Chief Complaint: upper abdominal pain     History of Present Illness:  Michael Briones is a 59 y.o. male with a PMH of type 2 diabetes, hypertension, hepatic steatosis, dyslipidemia, GERD who presents with mid upper abdominal pain.  Patient presents to the ED with worsening upper mid gastric abdominal pain since Tuesday.  Patient states pain radiates to back and it is 10 out of 10.  Denies nausea or vomiting.  Denies recent drinking with pain.  Patient states he recently lost his wife a year ago and this is the month of the anniversary of her death and he is in a lot of distress.  Patient states that he has no precepting factors to the abdominal pain.  States he has had a history of pancreatitis and usually the pain presents more in his upper abdomen and into his chest.  This abdominal pain is different than before.   Review of Systems:  Review of Systems   Constitutional: Negative.  Negative for chills and fever.   HENT:  Negative for ear pain and sore throat.    Eyes:  Negative for pain and visual disturbance.   Respiratory:  Negative for cough and shortness of breath.    Cardiovascular:  Negative for chest pain and palpitations.   Gastrointestinal:  Positive for abdominal  pain. Negative for diarrhea, nausea and vomiting.   Genitourinary:  Negative for dysuria and hematuria.   Musculoskeletal:  Negative for arthralgias and back pain.   Skin:  Negative for color change and rash.   Neurological:  Negative for seizures and syncope.   All other systems reviewed and are negative.      Past Medical and Surgical History:   Past Medical History:   Diagnosis Date    Alcohol abuse     Back pain     Chronic low back pain     Depression     Diabetes mellitus (HCC)     DM2 (diabetes mellitus, type 2) (HCC)     Erectile dysfunction     GERD (gastroesophageal reflux disease)     Hepatic steatosis     Hyperlipidemia     Hypertension     Neuropathy     Pancreatitis     Psychiatric disorder     Tobacco abuse        Past Surgical History:   Procedure Laterality Date    ANKLE FRACTURE SURGERY Right     ANKLE SURGERY Right     INGUINAL HERNIA REPAIR Left     KNEE SURGERY Right     UMBILICAL HERNIA REPAIR         Meds/Allergies:  Prior to Admission medications    Medication Sig Start Date End Date Taking? Authorizing Provider   amLODIPine (NORVASC) 5 mg tablet Take 1 tablet (5 mg total) by mouth daily 7/8/24  Yes Annmarie Dunaway MD   gabapentin (NEURONTIN) 300 mg capsule Take 1 capsule (300 mg total) by mouth 3 (three) times a day 7/8/24 8/29/27 Yes Annmarie Dunaway MD   glimepiride (AMARYL) 1 mg tablet Take 1 tablet (1 mg total) by mouth daily with breakfast 7/8/24 1/4/25 Yes Annmarie Dunaway MD   metFORMIN (GLUCOPHAGE-XR) 500 mg 24 hr tablet Take 1 tablet (500 mg total) by mouth 2 (two) times a day with meals 7/8/24  Yes Annmarie Dunaway MD   omeprazole (PriLOSEC) 40 MG capsule Take 1 capsule (40 mg total) by mouth 2 (two) times a day 7/8/24  Yes Annmarie Dunaway MD   pioglitazone (ACTOS) 45 mg tablet Take 1 tablet (45 mg total) by mouth daily 7/8/24  Yes Annmarie Dunaway MD   ramipril (ALTACE) 10 MG capsule Take 1 capsule (10 mg total) by mouth 2 (two) times a day 7/8/24  Yes Annmarie Dunaway MD   sertraline  (ZOLOFT) 100 mg tablet Take 1.5 tablets (150 mg total) by mouth daily 7/8/24  Yes Annmarie Dunaway MD   simvastatin (ZOCOR) 20 mg tablet Take 1 tablet (20 mg total) by mouth daily 7/8/24  Yes Annmarie Dunaway MD   Accu-Chek FastClix Lancets MISC USE AS DIRECTED 12/9/20   Annmarie Dunaway MD   Blood Glucose Monitoring Suppl (Accu-Chek Stefanie Plus) w/Device KIT USE AS DIRECTED 12/9/20   Annmarie Dunaway MD   fenofibrate 160 MG tablet TAKE 1 TABLET EVERY DAY 7/14/24   Annmarie Dunaway MD   lidocaine (Lidoderm) 5 % Apply 2 patches topically over 12 hours daily Remove & Discard patch within 12 hours or as directed by MD 12/1/23   Cipriano Dee DO   magnesium oxide (MAG-OX) 400 mg Take 1 tablet (400 mg total) by mouth daily 3/22/22 7/8/24  Nita Ruff PA-C   methocarbamol (ROBAXIN) 500 mg tablet Take 1 tablet (500 mg total) by mouth 3 (three) times a day as needed for muscle spasms 12/4/23   KRISTOFER Lag[place]   naproxen (Naprosyn) 500 mg tablet Take 1 tablet (500 mg total) by mouth 2 (two) times a day with meals 12/1/23   Cipriano Dee DO     I have reviewed home medications with patient personally.    Allergies:   Allergies   Allergen Reactions    Amoxicillin Swelling    Amoxil [Amoxicillin] Hives       Social History:  Marital Status: /Civil Union   Occupation: Employed  Patient Pre-hospital Living Situation: Home  Patient Pre-hospital Level of Mobility: walks  Patient Pre-hospital Diet Restrictions: none  Substance Use History:   Social History     Substance and Sexual Activity   Alcohol Use Yes    Alcohol/week: 100.0 standard drinks of alcohol    Types: 100 Cans of beer per week    Comment: heavy drinker     Social History     Tobacco Use   Smoking Status Every Day    Current packs/day: 1.00    Average packs/day: 1 pack/day for 46.6 years (46.6 ttl pk-yrs)    Types: Cigarettes    Start date: 1978   Smokeless Tobacco Never   Tobacco Comments    per Mervat-quit     Social History     Substance and  "Sexual Activity   Drug Use Yes    Frequency: 21.0 times per week    Types: Marijuana    Comment: marijuana each day       Family History:  Family History   Problem Relation Age of Onset    Lymphoma Mother     No Known Problems Father        Physical Exam:     Vitals:   Blood Pressure: 143/89 (08/09/24 0432)  Pulse: 87 (08/09/24 0432)  Temperature: (!) 97.4 °F (36.3 °C) (08/09/24 0432)  Temp Source: Oral (08/09/24 0432)  Respirations: 18 (08/09/24 0432)  Height: 5' 10\" (177.8 cm) (08/09/24 0432)  Weight - Scale: 101 kg (223 lb 2 oz) (08/09/24 0432)  SpO2: 96 % (08/09/24 0432)    Physical Exam  Vitals and nursing note reviewed.   Constitutional:       General: He is not in acute distress.     Appearance: He is well-developed.   HENT:      Head: Normocephalic and atraumatic.   Eyes:      Conjunctiva/sclera: Conjunctivae normal.   Cardiovascular:      Rate and Rhythm: Normal rate and regular rhythm.      Heart sounds: No murmur heard.  Pulmonary:      Effort: Pulmonary effort is normal. No respiratory distress.      Breath sounds: Normal breath sounds.   Abdominal:      General: Bowel sounds are normal.      Palpations: Abdomen is soft.      Tenderness: There is abdominal tenderness in the right upper quadrant, right lower quadrant, left upper quadrant and left lower quadrant. There is guarding. There is no rebound. Negative signs include Maldonado's sign.   Musculoskeletal:         General: No swelling.      Cervical back: Neck supple.   Skin:     General: Skin is warm and dry.      Capillary Refill: Capillary refill takes less than 2 seconds.   Neurological:      Mental Status: He is alert.   Psychiatric:         Mood and Affect: Mood normal.          Additional Data:     Lab Results:  Results from last 7 days   Lab Units 08/09/24  0144   WBC Thousand/uL 7.53   HEMOGLOBIN g/dL 16.4   HEMATOCRIT % 46.7   PLATELETS Thousands/uL 193   SEGS PCT % 61   LYMPHO PCT % 26   MONO PCT % 9   EOS PCT % 2     Results from last 7 days "   Lab Units 08/09/24  0144   SODIUM mmol/L 132*   POTASSIUM mmol/L 3.9   CHLORIDE mmol/L 99   CO2 mmol/L 23   BUN mg/dL 8   CREATININE mg/dL 0.96   ANION GAP mmol/L 10   CALCIUM mg/dL 10.0   ALBUMIN g/dL 4.2   TOTAL BILIRUBIN mg/dL 0.69   ALK PHOS U/L 98   ALT U/L 28   AST U/L 25   GLUCOSE RANDOM mg/dL 271*         Results from last 7 days   Lab Units 08/09/24  0601   POC GLUCOSE mg/dl 160*     Lab Results   Component Value Date    HGBA1C 5.4 07/08/2024    HGBA1C 7.8 (A) 06/23/2023    HGBA1C 8.0 10/28/2022           Lines/Drains:  Invasive Devices       Peripheral Intravenous Line  Duration             Peripheral IV 08/09/24 Left Antecubital <1 day                        Imaging: Reviewed radiology reports from this admission including: abdominal/pelvic CT  CT abdomen pelvis with contrast   Final Result by Piter Baker MD (08/09 0328)      Trace stranding about the pancreas may be due to pancreatitis, correlate with lipase. No discrete peripancreatic collection.         Workstation performed: [id number]             EKG and Other Studies Reviewed on Admission:   EKG: Personally Reviewed. Sinus Tachycardia. .    ** Please Note: This note has been constructed using a voice recognition system. **

## 2024-08-09 NOTE — PLAN OF CARE
Problem: PAIN - ADULT  Goal: Verbalizes/displays adequate comfort level or baseline comfort level  Description: Interventions:  - Encourage patient to monitor pain and request assistance  - Assess pain using appropriate pain scale  - Administer analgesics based on type and severity of pain and evaluate response  - Implement non-pharmacological measures as appropriate and evaluate response  - Consider cultural and social influences on pain and pain management  - Notify physician/advanced practitioner if interventions unsuccessful or patient reports new pain  Outcome: Progressing     Problem: INFECTION - ADULT  Goal: Absence or prevention of progression during hospitalization  Description: INTERVENTIONS:  - Assess and monitor for signs and symptoms of infection  - Monitor lab/diagnostic results  - Monitor all insertion sites, i.e. indwelling lines, tubes, and drains  - Monitor endotracheal if appropriate and nasal secretions for changes in amount and color  - [place] appropriate cooling/warming therapies per order  - Administer medications as ordered  - Instruct and encourage patient and family to use good hand hygiene technique  - Identify and instruct in appropriate isolation precautions for identified infection/condition  Outcome: Progressing  Goal: Absence of fever/infection during neutropenic period  Description: INTERVENTIONS:  - Monitor WBC    Outcome: Progressing     Problem: SAFETY ADULT  Goal: Patient will remain free of falls  Description: INTERVENTIONS:  - Educate patient/family on patient safety including physical limitations  - Instruct patient to call for assistance with activity   - Consult OT/PT to assist with strengthening/mobility   - Keep Call bell within reach  - Keep bed low and locked with side rails adjusted as appropriate  - Keep care items and personal belongings within reach  - Initiate and maintain comfort rounds  - Make Fall Risk Sign visible to staff  - Apply yellow socks and bracelet  for high fall risk patients  - Consider moving patient to room near nurses station  Outcome: Progressing  Goal: Maintain or return to baseline ADL function  Description: INTERVENTIONS:  -  Assess patient's ability to carry out ADLs; assess patient's baseline for ADL function and identify physical deficits which impact ability to perform ADLs (bathing, care of mouth/teeth, toileting, grooming, dressing, etc.)  - Assess/evaluate cause of self-care deficits   - Assess range of motion  - Assess patient's mobility; develop plan if impaired  - Assess patient's need for assistive devices and provide as appropriate  - Encourage maximum independence but intervene and supervise when necessary  - Involve family in performance of ADLs  - Assess for home care needs following discharge   - Consider OT consult to assist with ADL evaluation and planning for discharge  - Provide patient education as appropriate  Outcome: Progressing  Goal: Maintains/Returns to pre admission functional level  Description: INTERVENTIONS:  - Perform AM-PAC 6 Click Basic Mobility/ Daily Activity assessment daily.  - Set and communicate daily mobility goal to care team and patient/family/caregiver.   - Collaborate with rehabilitation services on mobility goals if consulted  - Out of bed for toileting  - Record patient progress and toleration of activity level   Outcome: Progressing     Problem: DISCHARGE PLANNING  Goal: Discharge to home or other facility with appropriate resources  Description: INTERVENTIONS:  - Identify barriers to discharge w/patient and caregiver  - Arrange for needed discharge resources and transportation as appropriate  - Identify discharge learning needs (meds, wound care, etc.)  - Arrange for interpretive services to assist at discharge as needed  - Refer to Case Management Department for coordinating discharge planning if the patient needs post-hospital services based on physician/advanced practitioner order or complex needs  related to functional status, cognitive ability, or social support system  Outcome: Progressing     Problem: Knowledge Deficit  Goal: Patient/family/caregiver demonstrates understanding of disease process, treatment plan, medications, and discharge instructions  Description: Complete learning assessment and assess knowledge base.  Interventions:  - Provide teaching at level of understanding  - Provide teaching via preferred learning methods  Outcome: Progressing     Problem: GASTROINTESTINAL - ADULT  Goal: Minimal or absence of nausea and/or vomiting  Description: INTERVENTIONS:  - Administer IV fluids if ordered to ensure adequate hydration  - Maintain NPO status until nausea and vomiting are resolved  - Nasogastric tube if ordered  - Administer ordered antiemetic medications as needed  - Provide nonpharmacologic comfort measures as appropriate  - Advance diet as tolerated, if ordered  - Consider nutrition services referral to assist patient with adequate nutrition and appropriate food choices  Outcome: Progressing  Goal: Maintains or returns to baseline bowel function  Description: INTERVENTIONS:  - Assess bowel function  - Encourage oral fluids to ensure adequate hydration  - Administer IV fluids if ordered to ensure adequate hydration  - Administer ordered medications as needed  - Encourage mobilization and activity  - Consider nutritional services referral to assist patient with adequate nutrition and appropriate food choices  Outcome: Progressing  Goal: Maintains adequate nutritional intake  Description: INTERVENTIONS:  - Monitor percentage of each meal consumed  - Identify factors contributing to decreased intake, treat as appropriate  - Assist with meals as needed  - Monitor I&O, weight, and lab values if indicated  - Obtain nutrition services referral as needed  Outcome: Progressing  Goal: Oral mucous membranes remain intact  Description: INTERVENTIONS  - Assess oral mucosa and hygiene practices  -  Implement preventative oral hygiene regimen  - Implement oral medicated treatments as ordered  - Initiate Nutrition services referral as needed  Outcome: Progressing

## 2024-08-10 ENCOUNTER — APPOINTMENT (INPATIENT)
Dept: CT IMAGING | Facility: HOSPITAL | Age: 59
DRG: 438 | End: 2024-08-10
Payer: COMMERCIAL

## 2024-08-10 LAB
ANION GAP SERPL CALCULATED.3IONS-SCNC: 8 MMOL/L (ref 4–13)
BACTERIA UR QL AUTO: ABNORMAL /HPF
BASOPHILS # BLD AUTO: 0.07 THOUSANDS/ΜL (ref 0–0.1)
BASOPHILS NFR BLD AUTO: 0 % (ref 0–1)
BILIRUB UR QL STRIP: ABNORMAL
BUN SERPL-MCNC: 19 MG/DL (ref 5–25)
CALCIUM SERPL-MCNC: 8.6 MG/DL (ref 8.4–10.2)
CHLORIDE SERPL-SCNC: 102 MMOL/L (ref 96–108)
CLARITY UR: CLEAR
CO2 SERPL-SCNC: 21 MMOL/L (ref 21–32)
COLOR UR: ABNORMAL
CREAT SERPL-MCNC: 1.84 MG/DL (ref 0.6–1.3)
EOSINOPHIL # BLD AUTO: 0.02 THOUSAND/ΜL (ref 0–0.61)
EOSINOPHIL NFR BLD AUTO: 0 % (ref 0–6)
ERYTHROCYTE [DISTWIDTH] IN BLOOD BY AUTOMATED COUNT: 12.3 % (ref 11.6–15.1)
GFR SERPL CREATININE-BSD FRML MDRD: 38 ML/MIN/1.73SQ M
GLUCOSE P FAST SERPL-MCNC: 191 MG/DL (ref 65–99)
GLUCOSE SERPL-MCNC: 147 MG/DL (ref 65–140)
GLUCOSE SERPL-MCNC: 160 MG/DL (ref 65–140)
GLUCOSE SERPL-MCNC: 184 MG/DL (ref 65–140)
GLUCOSE SERPL-MCNC: 188 MG/DL (ref 65–140)
GLUCOSE SERPL-MCNC: 191 MG/DL (ref 65–140)
GLUCOSE UR STRIP-MCNC: NEGATIVE MG/DL
HCT VFR BLD AUTO: 41.2 % (ref 36.5–49.3)
HGB BLD-MCNC: 13.9 G/DL (ref 12–17)
HGB UR QL STRIP.AUTO: NEGATIVE
HYALINE CASTS #/AREA URNS LPF: ABNORMAL /LPF
IMM GRANULOCYTES # BLD AUTO: 0.08 THOUSAND/UL (ref 0–0.2)
IMM GRANULOCYTES NFR BLD AUTO: 1 % (ref 0–2)
KETONES UR STRIP-MCNC: ABNORMAL MG/DL
LACTATE SERPL-SCNC: 1.9 MMOL/L (ref 0.5–2)
LEUKOCYTE ESTERASE UR QL STRIP: NEGATIVE
LIPASE SERPL-CCNC: 19 U/L (ref 11–82)
LYMPHOCYTES # BLD AUTO: 0.42 THOUSANDS/ΜL (ref 0.6–4.47)
LYMPHOCYTES NFR BLD AUTO: 3 % (ref 14–44)
MCH RBC QN AUTO: 33.6 PG (ref 26.8–34.3)
MCHC RBC AUTO-ENTMCNC: 33.7 G/DL (ref 31.4–37.4)
MCV RBC AUTO: 100 FL (ref 82–98)
MONOCYTES # BLD AUTO: 0.91 THOUSAND/ΜL (ref 0.17–1.22)
MONOCYTES NFR BLD AUTO: 6 % (ref 4–12)
MUCOUS THREADS UR QL AUTO: ABNORMAL
NEUTROPHILS # BLD AUTO: 14.46 THOUSANDS/ΜL (ref 1.85–7.62)
NEUTS SEG NFR BLD AUTO: 90 % (ref 43–75)
NITRITE UR QL STRIP: POSITIVE
NON-SQ EPI CELLS URNS QL MICRO: ABNORMAL /HPF
NRBC BLD AUTO-RTO: 0 /100 WBCS
PH UR STRIP.AUTO: 5.5 [PH]
PLATELET # BLD AUTO: 157 THOUSANDS/UL (ref 149–390)
PMV BLD AUTO: 9.6 FL (ref 8.9–12.7)
POTASSIUM SERPL-SCNC: 4.7 MMOL/L (ref 3.5–5.3)
PROCALCITONIN SERPL-MCNC: 4.11 NG/ML
PROT UR STRIP-MCNC: ABNORMAL MG/DL
RBC # BLD AUTO: 4.14 MILLION/UL (ref 3.88–5.62)
RBC #/AREA URNS AUTO: ABNORMAL /HPF
SODIUM SERPL-SCNC: 131 MMOL/L (ref 135–147)
SP GR UR STRIP.AUTO: 1.02 (ref 1–1.03)
UROBILINOGEN UR QL STRIP.AUTO: 1 E.U./DL
WBC # BLD AUTO: 15.96 THOUSAND/UL (ref 4.31–10.16)
WBC #/AREA URNS AUTO: ABNORMAL /HPF

## 2024-08-10 PROCEDURE — 82948 REAGENT STRIP/BLOOD GLUCOSE: CPT

## 2024-08-10 PROCEDURE — 81001 URINALYSIS AUTO W/SCOPE: CPT

## 2024-08-10 PROCEDURE — 74176 CT ABD & PELVIS W/O CONTRAST: CPT

## 2024-08-10 PROCEDURE — 85025 COMPLETE CBC W/AUTO DIFF WBC: CPT

## 2024-08-10 PROCEDURE — 83690 ASSAY OF LIPASE: CPT

## 2024-08-10 PROCEDURE — 80048 BASIC METABOLIC PNL TOTAL CA: CPT

## 2024-08-10 PROCEDURE — 87040 BLOOD CULTURE FOR BACTERIA: CPT | Performed by: INTERNAL MEDICINE

## 2024-08-10 PROCEDURE — 84145 PROCALCITONIN (PCT): CPT | Performed by: INTERNAL MEDICINE

## 2024-08-10 PROCEDURE — 99232 SBSQ HOSP IP/OBS MODERATE 35: CPT | Performed by: INTERNAL MEDICINE

## 2024-08-10 PROCEDURE — 99205 OFFICE O/P NEW HI 60 MIN: CPT | Performed by: INTERNAL MEDICINE

## 2024-08-10 PROCEDURE — 83605 ASSAY OF LACTIC ACID: CPT

## 2024-08-10 RX ORDER — METRONIDAZOLE 500 MG/100ML
500 INJECTION, SOLUTION INTRAVENOUS EVERY 8 HOURS
Status: DISCONTINUED | OUTPATIENT
Start: 2024-08-10 | End: 2024-08-12

## 2024-08-10 RX ORDER — SODIUM CHLORIDE, SODIUM GLUCONATE, SODIUM ACETATE, POTASSIUM CHLORIDE, MAGNESIUM CHLORIDE, SODIUM PHOSPHATE, DIBASIC, AND POTASSIUM PHOSPHATE .53; .5; .37; .037; .03; .012; .00082 G/100ML; G/100ML; G/100ML; G/100ML; G/100ML; G/100ML; G/100ML
150 INJECTION, SOLUTION INTRAVENOUS CONTINUOUS
Status: DISCONTINUED | OUTPATIENT
Start: 2024-08-10 | End: 2024-08-12 | Stop reason: HOSPADM

## 2024-08-10 RX ORDER — LEVOFLOXACIN 5 MG/ML
750 INJECTION, SOLUTION INTRAVENOUS EVERY 24 HOURS
Status: DISCONTINUED | OUTPATIENT
Start: 2024-08-10 | End: 2024-08-10

## 2024-08-10 RX ORDER — LEVOFLOXACIN 5 MG/ML
750 INJECTION, SOLUTION INTRAVENOUS
Status: DISCONTINUED | OUTPATIENT
Start: 2024-08-10 | End: 2024-08-11

## 2024-08-10 RX ORDER — SODIUM CHLORIDE, SODIUM GLUCONATE, SODIUM ACETATE, POTASSIUM CHLORIDE, MAGNESIUM CHLORIDE, SODIUM PHOSPHATE, DIBASIC, AND POTASSIUM PHOSPHATE .53; .5; .37; .037; .03; .012; .00082 G/100ML; G/100ML; G/100ML; G/100ML; G/100ML; G/100ML; G/100ML
1000 INJECTION, SOLUTION INTRAVENOUS ONCE
Status: COMPLETED | OUTPATIENT
Start: 2024-08-10 | End: 2024-08-10

## 2024-08-10 RX ORDER — SENNOSIDES 8.6 MG
2 TABLET ORAL ONCE
Status: COMPLETED | OUTPATIENT
Start: 2024-08-10 | End: 2024-08-10

## 2024-08-10 RX ORDER — DOCUSATE SODIUM 100 MG/1
100 CAPSULE, LIQUID FILLED ORAL 2 TIMES DAILY
Status: DISCONTINUED | OUTPATIENT
Start: 2024-08-10 | End: 2024-08-12 | Stop reason: HOSPADM

## 2024-08-10 RX ADMIN — SODIUM CHLORIDE, SODIUM GLUCONATE, SODIUM ACETATE, POTASSIUM CHLORIDE, MAGNESIUM CHLORIDE, SODIUM PHOSPHATE, DIBASIC, AND POTASSIUM PHOSPHATE 150 ML/HR: .53; .5; .37; .037; .03; .012; .00082 INJECTION, SOLUTION INTRAVENOUS at 14:27

## 2024-08-10 RX ADMIN — PANTOPRAZOLE SODIUM 40 MG: 40 INJECTION, POWDER, FOR SOLUTION INTRAVENOUS at 08:38

## 2024-08-10 RX ADMIN — AMLODIPINE BESYLATE 5 MG: 5 TABLET ORAL at 08:37

## 2024-08-10 RX ADMIN — SENNOSIDES 17.2 MG: 8.6 TABLET, FILM COATED ORAL at 04:23

## 2024-08-10 RX ADMIN — GABAPENTIN 300 MG: 300 CAPSULE ORAL at 17:20

## 2024-08-10 RX ADMIN — HYDROMORPHONE HYDROCHLORIDE 1 MG: 1 INJECTION, SOLUTION INTRAMUSCULAR; INTRAVENOUS; SUBCUTANEOUS at 03:41

## 2024-08-10 RX ADMIN — HYDROMORPHONE HYDROCHLORIDE 1 MG: 1 INJECTION, SOLUTION INTRAMUSCULAR; INTRAVENOUS; SUBCUTANEOUS at 17:20

## 2024-08-10 RX ADMIN — METRONIDAZOLE 500 MG: 500 INJECTION, SOLUTION INTRAVENOUS at 19:45

## 2024-08-10 RX ADMIN — SODIUM CHLORIDE 200 ML/HR: 0.9 INJECTION, SOLUTION INTRAVENOUS at 03:39

## 2024-08-10 RX ADMIN — HYDROMORPHONE HYDROCHLORIDE 1 MG: 1 INJECTION, SOLUTION INTRAMUSCULAR; INTRAVENOUS; SUBCUTANEOUS at 23:07

## 2024-08-10 RX ADMIN — HYDROMORPHONE HYDROCHLORIDE 0.5 MG: 1 INJECTION, SOLUTION INTRAMUSCULAR; INTRAVENOUS; SUBCUTANEOUS at 14:27

## 2024-08-10 RX ADMIN — INSULIN LISPRO 1 UNITS: 100 INJECTION, SOLUTION INTRAVENOUS; SUBCUTANEOUS at 12:20

## 2024-08-10 RX ADMIN — ENOXAPARIN SODIUM 40 MG: 40 INJECTION SUBCUTANEOUS at 08:39

## 2024-08-10 RX ADMIN — BISACODYL 10 MG: 10 SUPPOSITORY RECTAL at 20:06

## 2024-08-10 RX ADMIN — GABAPENTIN 300 MG: 300 CAPSULE ORAL at 21:12

## 2024-08-10 RX ADMIN — INSULIN LISPRO 1 UNITS: 100 INJECTION, SOLUTION INTRAVENOUS; SUBCUTANEOUS at 18:01

## 2024-08-10 RX ADMIN — Medication 400 MG: at 08:37

## 2024-08-10 RX ADMIN — HYDROMORPHONE HYDROCHLORIDE 0.5 MG: 1 INJECTION, SOLUTION INTRAMUSCULAR; INTRAVENOUS; SUBCUTANEOUS at 08:38

## 2024-08-10 RX ADMIN — GABAPENTIN 300 MG: 300 CAPSULE ORAL at 08:38

## 2024-08-10 RX ADMIN — HYDROMORPHONE HYDROCHLORIDE 1 MG: 1 INJECTION, SOLUTION INTRAMUSCULAR; INTRAVENOUS; SUBCUTANEOUS at 11:01

## 2024-08-10 RX ADMIN — DOCUSATE SODIUM 100 MG: 100 CAPSULE, LIQUID FILLED ORAL at 17:20

## 2024-08-10 RX ADMIN — FENOFIBRATE 145 MG: 145 TABLET ORAL at 08:38

## 2024-08-10 RX ADMIN — POLYETHYLENE GLYCOL 3350 17 G: 17 POWDER, FOR SOLUTION ORAL at 08:38

## 2024-08-10 RX ADMIN — ALUMINUM HYDROXIDE, MAGNESIUM HYDROXIDE, SIMETHICONE 30 ML: 200; 200; 20 LIQUID ORAL at 03:43

## 2024-08-10 RX ADMIN — METRONIDAZOLE 500 MG: 500 INJECTION, SOLUTION INTRAVENOUS at 11:38

## 2024-08-10 RX ADMIN — NICOTINE 1 PATCH: 21 PATCH, EXTENDED RELEASE TRANSDERMAL at 08:38

## 2024-08-10 RX ADMIN — INSULIN LISPRO 1 UNITS: 100 INJECTION, SOLUTION INTRAVENOUS; SUBCUTANEOUS at 06:18

## 2024-08-10 RX ADMIN — SODIUM CHLORIDE, SODIUM GLUCONATE, SODIUM ACETATE, POTASSIUM CHLORIDE, MAGNESIUM CHLORIDE, SODIUM PHOSPHATE, DIBASIC, AND POTASSIUM PHOSPHATE 1000 ML: .53; .5; .37; .037; .03; .012; .00082 INJECTION, SOLUTION INTRAVENOUS at 09:28

## 2024-08-10 RX ADMIN — LEVOFLOXACIN 750 MG: 750 INJECTION, SOLUTION INTRAVENOUS at 12:56

## 2024-08-10 RX ADMIN — DOCUSATE SODIUM 100 MG: 100 CAPSULE, LIQUID FILLED ORAL at 04:23

## 2024-08-10 RX ADMIN — SERTRALINE HYDROCHLORIDE 150 MG: 50 TABLET ORAL at 08:38

## 2024-08-10 RX ADMIN — LISINOPRIL 40 MG: 20 TABLET ORAL at 08:37

## 2024-08-10 RX ADMIN — HYDROMORPHONE HYDROCHLORIDE 0.3 MG: 1 INJECTION, SOLUTION INTRAMUSCULAR; INTRAVENOUS; SUBCUTANEOUS at 19:50

## 2024-08-10 NOTE — ASSESSMENT & PLAN NOTE
Serum sodium currently at 131  Possibly multifactorial secondary to decreased oral solute intake in the setting of nausea/vomiting due to pancreatitis and a pain mediated SIADH-type reaction  Continue IV fluid hydration and monitor serum sodium  Nephrology following

## 2024-08-10 NOTE — PLAN OF CARE
Problem: PAIN - ADULT  Goal: Verbalizes/displays adequate comfort level or baseline comfort level  Description: Interventions:  - Encourage patient to monitor pain and request assistance  - Assess pain using appropriate pain scale  - Administer analgesics based on type and severity of pain and evaluate response  - Implement non-pharmacological measures as appropriate and evaluate response  - Consider cultural and social influences on pain and pain management  - Notify physician/advanced practitioner if interventions unsuccessful or patient reports new pain  Outcome: Progressing     Problem: INFECTION - ADULT  Goal: Absence or prevention of progression during hospitalization  Description: INTERVENTIONS:  - Assess and monitor for signs and symptoms of infection  - Monitor lab/diagnostic results  - Monitor all insertion sites, i.e. indwelling lines, tubes, and drains  - Monitor endotracheal if appropriate and nasal secretions for changes in amount and color  - [place] appropriate cooling/warming therapies per order  - Administer medications as ordered  - Instruct and encourage patient and family to use good hand hygiene technique  - Identify and instruct in appropriate isolation precautions for identified infection/condition  Outcome: Progressing  Goal: Absence of fever/infection during neutropenic period  Description: INTERVENTIONS:  - Monitor WBC    Outcome: Progressing     Problem: SAFETY ADULT  Goal: Patient will remain free of falls  Description: INTERVENTIONS:  - Educate patient/family on patient safety including physical limitations  - Instruct patient to call for assistance with activity   - Consult OT/PT to assist with strengthening/mobility   - Keep Call bell within reach  - Keep bed low and locked with side rails adjusted as appropriate  - Keep care items and personal belongings within reach  - Initiate and maintain comfort rounds  - Make Fall Risk Sign visible to staff  - Offer Toileting every  Hours,  in advance of need  - Initiate/Maintain alarm  - Obtain necessary fall risk management equipment:   - Apply yellow socks and bracelet for high fall risk patients  - Consider moving patient to room near nurses station  Outcome: Progressing  Goal: Maintain or return to baseline ADL function  Description: INTERVENTIONS:  -  Assess patient's ability to carry out ADLs; assess patient's baseline for ADL function and identify physical deficits which impact ability to perform ADLs (bathing, care of mouth/teeth, toileting, grooming, dressing, etc.)  - Assess/evaluate cause of self-care deficits   - Assess range of motion  - Assess patient's mobility; develop plan if impaired  - Assess patient's need for assistive devices and provide as appropriate  - Encourage maximum independence but intervene and supervise when necessary  - Involve family in performance of ADLs  - Assess for home care needs following discharge   - Consider OT consult to assist with ADL evaluation and planning for discharge  - Provide patient education as appropriate  Outcome: Progressing  Goal: Maintains/Returns to pre admission functional level  Description: INTERVENTIONS:  - Perform AM-PAC 6 Click Basic Mobility/ Daily Activity assessment daily.  - Set and communicate daily mobility goal to care team and patient/family/caregiver.   - Collaborate with rehabilitation services on mobility goals if consulted  - Perform Range of Motion  times a day.  - Reposition patient every  hours.  - Dangle patient  times a day  - Stand patient  times a day  - Ambulate patient  times a day  - Out of bed to chair  times a day   - Out of bed for meals  times a day  - Out of bed for toileting  - Record patient progress and toleration of activity level   Outcome: Progressing     Problem: DISCHARGE PLANNING  Goal: Discharge to home or other facility with appropriate resources  Description: INTERVENTIONS:  - Identify barriers to discharge w/patient and caregiver  - Arrange for  needed discharge resources and transportation as appropriate  - Identify discharge learning needs (meds, wound care, etc.)  - Arrange for interpretive services to assist at discharge as needed  - Refer to Case Management Department for coordinating discharge planning if the patient needs post-hospital services based on physician/advanced practitioner order or complex needs related to functional status, cognitive ability, or social support system  Outcome: Progressing     Problem: GASTROINTESTINAL - ADULT  Goal: Minimal or absence of nausea and/or vomiting  Description: INTERVENTIONS:  - Administer IV fluids if ordered to ensure adequate hydration  - Maintain NPO status until nausea and vomiting are resolved  - Nasogastric tube if ordered  - Administer ordered antiemetic medications as needed  - Provide nonpharmacologic comfort measures as appropriate  - Advance diet as tolerated, if ordered  - Consider nutrition services referral to assist patient with adequate nutrition and appropriate food choices  Outcome: Progressing  Goal: Maintains or returns to baseline bowel function  Description: INTERVENTIONS:  - Assess bowel function  - Encourage oral fluids to ensure adequate hydration  - Administer IV fluids if ordered to ensure adequate hydration  - Administer ordered medications as needed  - Encourage mobilization and activity  - Consider nutritional services referral to assist patient with adequate nutrition and appropriate food choices  Outcome: Progressing  Goal: Maintains adequate nutritional intake  Description: INTERVENTIONS:  - Monitor percentage of each meal consumed  - Identify factors contributing to decreased intake, treat as appropriate  - Assist with meals as needed  - Monitor I&O, weight, and lab values if indicated  - Obtain nutrition services referral as needed  Outcome: Progressing  Goal: Oral mucous membranes remain intact  Description: INTERVENTIONS  - Assess oral mucosa and hygiene practices  -  Implement preventative oral hygiene regimen  - Implement oral medicated treatments as ordered  - Initiate Nutrition services referral as needed  Outcome: Progressing

## 2024-08-10 NOTE — ASSESSMENT & PLAN NOTE
Consider reactive secondary to pain coupled with possible underlying/developing infection  Check blood cultures and procalcitonin  Initiate broad-spectrum IV antibiotics as above after blood cultures collected  Plan for repeat CT imaging due to persistent pain

## 2024-08-10 NOTE — PLAN OF CARE
Problem: PAIN - ADULT  Goal: Verbalizes/displays adequate comfort level or baseline comfort level  Description: Interventions:  - Encourage patient to monitor pain and request assistance  - Assess pain using appropriate pain scale  - Administer analgesics based on type and severity of pain and evaluate response  - Implement non-pharmacological measures as appropriate and evaluate response  - Consider cultural and social influences on pain and pain management  - Notify physician/advanced practitioner if interventions unsuccessful or patient reports new pain  Outcome: Progressing     Problem: INFECTION - ADULT  Goal: Absence or prevention of progression during hospitalization  Description: INTERVENTIONS:  - Assess and monitor for signs and symptoms of infection  - Monitor lab/diagnostic results  - Monitor all insertion sites, i.e. indwelling lines, tubes, and drains  - Monitor endotracheal if appropriate and nasal secretions for changes in amount and color  - [place] appropriate cooling/warming therapies per order  - Administer medications as ordered  - Instruct and encourage patient and family to use good hand hygiene technique  - Identify and instruct in appropriate isolation precautions for identified infection/condition  Outcome: Progressing     Problem: SAFETY ADULT  Goal: Patient will remain free of falls  Description: INTERVENTIONS:  - Educate patient/family on patient safety including physical limitations  - Instruct patient to call for assistance with activity   - Consult OT/PT to assist with strengthening/mobility   - Keep Call bell within reach  - Keep bed low and locked with side rails adjusted as appropriate  - Keep care items and personal belongings within reach  - Initiate and maintain comfort rounds  - Make Fall Risk Sign visible to staff  - Offer Toileting every 2 Hours, in advance of need  - Initiate/Maintain bed alarm  - Apply yellow socks and bracelet for high fall risk patients  - Consider  moving patient to room near nurses station  Outcome: Progressing     Problem: DISCHARGE PLANNING  Goal: Discharge to home or other facility with appropriate resources  Description: INTERVENTIONS:  - Identify barriers to discharge w/patient and caregiver  - Arrange for needed discharge resources and transportation as appropriate  - Identify discharge learning needs (meds, wound care, etc.)  - Arrange for interpretive services to assist at discharge as needed  - Refer to Case Management Department for coordinating discharge planning if the patient needs post-hospital services based on physician/advanced practitioner order or complex needs related to functional status, cognitive ability, or social support system  Outcome: Progressing     Problem: Knowledge Deficit  Goal: Patient/family/caregiver demonstrates understanding of disease process, treatment plan, medications, and discharge instructions  Description: Complete learning assessment and assess knowledge base.  Interventions:  - Provide teaching at level of understanding  - Provide teaching via preferred learning methods  Outcome: Progressing     Problem: GASTROINTESTINAL - ADULT  Goal: Minimal or absence of nausea and/or vomiting  Description: INTERVENTIONS:  - Administer IV fluids if ordered to ensure adequate hydration  - Maintain NPO status until nausea and vomiting are resolved  - Nasogastric tube if ordered  - Administer ordered antiemetic medications as needed  - Provide nonpharmacologic comfort measures as appropriate  - Advance diet as tolerated, if ordered  - Consider nutrition services referral to assist patient with adequate nutrition and appropriate food choices  Outcome: Progressing  Goal: Maintains or returns to baseline bowel function  Description: INTERVENTIONS:  - Assess bowel function  - Encourage oral fluids to ensure adequate hydration  - Administer IV fluids if ordered to ensure adequate hydration  - Administer ordered medications as  needed  - Encourage mobilization and activity  - Consider nutritional services referral to assist patient with adequate nutrition and appropriate food choices  Outcome: Progressing  Goal: Maintains adequate nutritional intake  Description: INTERVENTIONS:  - Monitor percentage of each meal consumed  - Identify factors contributing to decreased intake, treat as appropriate  - Assist with meals as needed  - Monitor I&O, weight, and lab values if indicated  - Obtain nutrition services referral as needed  Outcome: Progressing  Goal: Oral mucous membranes remain intact  Description: INTERVENTIONS  - Assess oral mucosa and hygiene practices  - Implement preventative oral hygiene regimen  - Implement oral medicated treatments as ordered  - Initiate Nutrition services referral as needed  Outcome: Progressing

## 2024-08-10 NOTE — ASSESSMENT & PLAN NOTE
Creatinine elevation to 1.84 today from 0.86 yesterday  Continue aggressive IV fluid hydration in the setting of pancreatitis - consider ATN in the setting of intermittent hypotension  Received doses of NSAID (IV Toradol) yesterday, now discontinued - will also discontinue ACEI - holding Metformin  Monitor renal function and urine output  Limit/avoid nephrotoxins and hypotension as possible  Nephrology following

## 2024-08-10 NOTE — NURSING NOTE
pt woke up c/o increased left side abdominal pain gradiates towards the chest and lower abdomen. Pain scale of 10/10. CRNP okayed to give, higher dose of dilaudid. Pt abdomen slightly firm. Pt stated passing gas. Normoactive bowel sounds.  See new orders. V/S stables.

## 2024-08-10 NOTE — PROGRESS NOTES
"[facility]  Progress Note  Name: Michael Briones I  MRN: [id number]  Unit/Bed#: -01 I Date of Admission: 8/9/2024   Date of Service: 8/10/2024 I Hospital Day: 0      Assessment & Plan:    * Acute pancreatitis  Assessment & Plan  8/9 - POA with worsening epigastric pain over the past 4 days with radiation to back.  History of pancreatitis  Denies recent drinking, does state he drinks once in a while but not in the last 4 days  Pain generalized mid/upper abd, tender  & soft  Elevated Lipase 112, AST/Alk & ALk phos WNL  CT a/p w/ contrast: \"Trace stranding about the pancreas may be due to pancreatitis, No discrete peripancreatic collection. Gallbladder no calcified gallstones.  No pericholecystic inflammatory change\"  Suspect mild pancreatitis   Consider transabdominal ultrasound r/o biliary pancreatitis   GI consulted  NPO sip with med  Aggressive IV hydration, normal saline 200 ml/hr  Pain management  Obtain CRP, obtain serum triglycerides level, trend lipase  Continue trend CMP,  and lipase    8/10 - reports no significant improvement/changes in pain at this time.  Had a one-time low-grade fever of 100°F yesterday afternoon.  WBC count elevated today, with an elevated procalcitonin, hence, plan to repeat CT imaging (without contrast however due to GUSTAVO), and initiate broad-spectrum IV Levaquin/Flagyl (PCN allergy/intolerance noted).  Continue IV fluid hydration/resuscitation.  Serum lipase has normalized today.  Will keep on clear liquids.  Optimize pain/nausea control, as necessary.  Appreciate gastroenterology input, recommending an outpatient MRI/MRCP in 4-6 weeks.    GUSTAVO (acute kidney injury) (HCC)  Assessment & Plan  Creatinine elevation to 1.84 today from 0.86 yesterday  Continue aggressive IV fluid hydration in the setting of pancreatitis - consider ATN in the setting of intermittent hypotension  Received doses of NSAID (IV Toradol) yesterday, now discontinued - will also " discontinue ACEI - holding Metformin  Monitor renal function and urine output  Limit/avoid nephrotoxins and hypotension as possible  Nephrology following    Leukocytosis  Assessment & Plan  Consider reactive secondary to pain coupled with possible underlying/developing infection  Check blood cultures and procalcitonin  Initiate broad-spectrum IV antibiotics as above after blood cultures collected  Plan for repeat CT imaging due to persistent pain    Hyponatremia  Assessment & Plan  Serum sodium currently at 131  Possibly multifactorial secondary to decreased oral solute intake in the setting of nausea/vomiting due to pancreatitis and a pain mediated SIADH-type reaction  Continue IV fluid hydration and monitor serum sodium  Nephrology following    Dyslipidemia  Assessment & Plan  Updated lipid panel noted - excellent LDL control at 86 and triglycerides at 99  In light of pancreatitis, will hold fenofibrate/statin for now    Essential hypertension  Assessment & Plan  Initially presented with elevated blood pressures with systolic BP > 200 likely secondary to uncontrolled pain  Serial BP normalized and now borderline hypotensive -> in the setting of worsening renal function, hold BP meds pending holding parameters  Aggressive analgesic control also contributory to low BP  Continue IV fluid hydration/resuscitation    GERD (gastroesophageal reflux disease)  Assessment & Plan  C/w PPI     Type 2 diabetes mellitus (HCC)  Assessment & Plan  Lab Results   Component Value Date    HGBA1C 5.4 07/08/2024     As noted above, A1c reveals excellent control - holding home Metformin/Actos -> consider lowering dosing and/or discontinuation of these medications on discharge  On SSI coverage per Accu-Cheks while hospitalized  Carbohydrate restriction once back on a solid diet      DVT Prophylaxis:  Lovenox       AM-PAC Basic Mobility:  Basic Mobility Inpatient Raw Score: 24  JH-HLM Achieved: 7: Walk 25 feet or more  JH-HLM Goal: 8: Walk  250 feet or more    Patient Centered Rounds:  I have performed bedside rounds and discussed plan of care with nursing today.  Discussions with Specialists or Other Care Team Provider:  see above assessments if applicable    Education and Discussions with Family / Patient:  Patient at bedside    Time Spent for Care:  35 minutes. More than 50% of total time spent on counseling and coordination of care, on one or more of the following: performing physical exam; counseling and coordination of care, obtaining or reviewing history, documenting in the medical record, reviewing/ordering tests/medications/procedures, and communicating with other healthcare professionals.    Current Length of Stay: 0 day(s)  Current Patient Status: Observation   Certification Statement:  Patient will continue to require additional hospital stay due to assessments as noted above.    Code Status: Level 1 - Full Code        Subjective:     Encountered earlier today.  Reports no significant improvement in pain at this time.  Denies fever/chills currently.  Still feels occasionally nauseous.        Objective:     Vitals:   Temp (24hrs), Av.7 °F (37.1 °C), Min:98.1 °F (36.7 °C), Max:100 °F (37.8 °C)    Temp:  [98.1 °F (36.7 °C)-100 °F (37.8 °C)] 98.4 °F (36.9 °C)  HR:  [79-97] 82  Resp:  [18-20] 20  BP: ()/(57-63) 112/58  SpO2:  [90 %-92 %] 92 %  Body mass index is 32.02 kg/m².     Input and Output Summary (last 24 hours):       Intake/Output Summary (Last 24 hours) at 8/10/2024 1209  Last data filed at 8/10/2024 0901  Gross per 24 hour   Intake 2820 ml   Output 420 ml   Net 2400 ml       Physical Exam:     GENERAL Mildly weak/fatigued with intermittent distress from pain   HEAD   Normocephalic - atraumatic   EYES Nonicteric   MOUTH   Mucosa moist   NECK   Supple - full range of motion   CARDIAC Rate controlled   PULMONARY Fairly clear to auscultation without labored respirations   ABDOMEN Midepigastric tenderness on palpation without  distention   MUSCULOSKELETAL   Motor strength/range of motion intact   NEUROLOGIC   Alert/oriented at baseline   PSYCHIATRIC   Mood/affect stable         Additional Data:     Labs & Recent Cultures:    Results from last 7 days   Lab Units 08/10/24  0415   WBC Thousand/uL 15.96*   HEMOGLOBIN g/dL 13.9   HEMATOCRIT % 41.2   PLATELETS Thousands/uL 157   SEGS PCT % 90*   LYMPHO PCT % 3*   MONO PCT % 6   EOS PCT % 0     Results from last 7 days   Lab Units 08/10/24  0415 08/09/24  0523   POTASSIUM mmol/L 4.7 4.0   CHLORIDE mmol/L 102 103   CO2 mmol/L 21 22   BUN mg/dL 19 10   CREATININE mg/dL 1.84* 0.86   CALCIUM mg/dL 8.6 9.3   ALK PHOS U/L  --  95   ALT U/L  --  26   AST U/L  --  23         Results from last 7 days   Lab Units 08/10/24  0604 08/10/24  0026 08/09/24  2123 08/09/24  1704 08/09/24  1113 08/09/24  0712 08/09/24  0601   POC GLUCOSE mg/dl 184* 147* 145* 175* 150* 168* 160*         Results from last 7 days   Lab Units 08/10/24  0415   LACTIC ACID mmol/L 1.9   PROCALCITONIN ng/ml 4.11*                 Lines/Drains:  Invasive Devices       Peripheral Intravenous Line  Duration             Peripheral IV 08/09/24 Left Antecubital 1 day                      Last 24 Hours Medication List:   Current Facility-Administered Medications   Medication Dose Route Frequency Provider Last Rate    acetaminophen  650 mg Oral Q6H PRN Gayla KRISTOFER Mott      aluminum-magnesium hydroxide-simethicone  30 mL Oral Q4H PRN KRISTOFER Mcguire      bisacodyl  10 mg Rectal Daily PRN KRISTOFER Smith      docusate sodium  100 mg Oral BID GaylaKRISTOFER Valles      enoxaparin  40 mg Subcutaneous Daily KRISTOFER Mcguire      gabapentin  300 mg Oral TID KRISTOFER Mcguire      HYDROmorphone  0.3 mg Intravenous Q3H PRN Huong Adorno MD      HYDROmorphone  0.5 mg Intravenous Q6H PRN Huong Adorno MD      HYDROmorphone  1 mg Intravenous Q6H PRN Huong Adorno MD      insulin lispro  1-6 Units Subcutaneous  Q6H ROSALEE KRISTOFER Mcguire      levofloxacin  750 mg Intravenous Q48H Huong Adorno MD      magnesium Oxide  400 mg Oral Daily KRISTOFER Mcguire      metroNIDAZOLE  500 mg Intravenous Q8H Huong Adorno  mg (08/10/24 1138)    multi-electrolyte  150 mL/hr Intravenous Continuous Huong Adorno MD      naloxone  0.04 mg Intravenous Q1MIN PRN Gayla KRISTOFER Mott      nicotine  1 patch Transdermal Daily KRISTOFER Mcguire      ondansetron  4 mg Intravenous Q6H PRN Gayla KRISTOFER Mott      pantoprazole  40 mg Intravenous Q24H ROSALEE KRISTOFER Mcguire      polyethylene glycol  17 g Oral Daily KRISTOFER Smith      sertraline  150 mg Oral Daily KRISTOFER Mcguire MD   Hospitalist - [facility]        ** Please Note: This note is constructed using a voice recognition dictation system.  An occasional wrong word/phrase or “sound-a-like” substitution may have been picked up by dictation device due to the inherent limitations of voice recognition software.  Read the chart carefully and recognize, using reasonable context, where substitutions may have occurred.**

## 2024-08-10 NOTE — ASSESSMENT & PLAN NOTE
Initially presented with elevated blood pressures with systolic BP > 200 likely secondary to uncontrolled pain  Serial BP normalized and now borderline hypotensive -> in the setting of worsening renal function, hold BP meds pending holding parameters  Aggressive analgesic control also contributory to low BP  Continue IV fluid hydration/resuscitation

## 2024-08-10 NOTE — ASSESSMENT & PLAN NOTE
Lab Results   Component Value Date    HGBA1C 5.4 07/08/2024     As noted above, A1c reveals excellent control - holding home Metformin/Actos -> consider lowering dosing and/or discontinuation of these medications on discharge  On SSI coverage per Accu-Cheks while hospitalized  Carbohydrate restriction once back on a solid diet

## 2024-08-10 NOTE — CONSULTS
NEPHROLOGY HOSPITAL CONSULTATION   Michael Briones 59 y.o. male MRN: [id number]  Unit/Bed#: -01 Encounter: [id number]    ASSESSMENT and PLAN:    59 y.o. male with a past medical history of diabetes, hypertension, steatosis, HPL, GERD, pancreatitis who was admitted to [facility] on 8/9 after presenting with abdominal pain. A renal consultation is requested today for assistance in the management of acute kidney injury.    1-acute kidney injury-    - Baseline creatinine-0.7 to 0.9 mg/dL  - Admission creatinine-0.9 mg/dL on 8/9  - Creatinine rising to 1.8 on 8/10 prompting nephrology consultation  - UA-moderate mucus threads, 4-10 hyaline cast, 2+ bilirubin, nitrite positive but minimal WBC and no RBC  - Renal imaging-CT scan initially with contrast-stable 1.9 cm proteinaceous/hemorrhagic cyst of left kidney.  No hydronephrosis  - To note is on ACE inhibitor as outpatient, received 2 doses of Toradol a.m. of 8/9, received lisinopril 40 mg 8/9 and 40 mg 8/10.  - Patient received volume expansion with normal saline initially which was changed to pH balance fluids  - Patient received IV contrast on 8/9    Overall, patient likely has acute kidney injury in the setting of relative hypotension/sepsis/ACE inhibitor/autoregulatory failure/Toradol/hypovolemia/contrast all leading to ATN    Plan  - Hold fenofibrate  - Please avoid all NSAIDs and all nephrotoxic agents  - Will need follow-up of renal cyst in 6 to 12 months as outpatient  - I/os; avoid hypotension  - Even though the data supports that it is okay to use an MRI with contrast with chronic kidney disease.  Given that the patient has significant acute kidney injury, would recommend avoiding MRI contrast for now unless emergently indicated.  I have reached out to primary team to discuss  - Agree with intravenous fluids  - Hold amlodipine  - Restart amlodipine if blood pressures rise only above 140 systolic  - Hold lisinopril  - Hold any Toradol  - Agree with  intravenous fluids  - Agree with repeat CT scan per primary team to evaluate abdominal pain worsening.  Please avoid IV contrast  - Reviewed case with primary team attending we are in agreement renal plan to continue intravenous fluids and rest of renal plan  - I reviewed with the patient my concerns that his renal function will likely worsen over the coming 24 to 48 hours if not longer.    2-electrolytes-mild hyponatremia-likely in the setting of increased ADH/pain.  Has decreased with volume expansion but patient needs volume expansion due to acute issues being managed by primary team.  Monitor closely for now with repeat BMP in a.m.    3-acid/base-bicarbonate appropriate    4-leukocytosis-per primary team    5-hypertension-initially with significant hypertension now with marginal blood pressures.  Agree with holding lisinopril.  We will also hold amlodipine.    7-abdominal pain-patient with significant abdominal pain.  Initial lipase was 112.  With the pain appears to be worse than the serological values would suggest.  Imaging with pancreatic inflammation.  Still with white count elevation.  Primary team is pursuing a repeat image today.    8-alcohol use-patient states he has not had alcohol in over 4 days.  Drinks twice a week but does drink a sixpack when he is drinking alcohol.    - Per primary team    9-pancreatic lesion-Per hepatology and primary team    HISTORY OF PRESENT ILLNESS:  Requesting Physician: Huong Adorno MD  Reason for Consult: Acute kidney injury    Michael Briones is a 59 y.o. male with a past medical history of diabetes, hypertension, steatosis, HPL, GERD, pancreatitis who was admitted to [facility] on 8/9 after presenting with abdominal pain. A renal consultation is requested today for assistance in the management of acute kidney injury.  Initially patient was noted to have worsening epigastric pain with radiation to the back.  Was noted to have lipase of 112.  CT scan was showing  stranding of the pancreas without collections and was admitted for further evaluation for pancreatitis and was started on intravenous volume expansion.  Nephrology is consulted due to rising creatinine from admission.  Patient was also initially noted to have hypertension.    PAST MEDICAL HISTORY:  Past Medical History:   Diagnosis Date    Alcohol abuse     Back pain     Chronic low back pain     Depression     Diabetes mellitus (HCC)     DM2 (diabetes mellitus, type 2) (HCC)     Erectile dysfunction     GERD (gastroesophageal reflux disease)     Hepatic steatosis     Hyperlipidemia     Hypertension     Neuropathy     Pancreatitis     Psychiatric disorder     Tobacco abuse        PAST SURGICAL HISTORY:  Past Surgical History:   Procedure Laterality Date    ANKLE FRACTURE SURGERY Right     ANKLE SURGERY Right     INGUINAL HERNIA REPAIR Left     KNEE SURGERY Right     UMBILICAL HERNIA REPAIR         ALLERGIES:  Allergies   Allergen Reactions    Amoxicillin Swelling    Amoxil [Amoxicillin] Hives       SOCIAL HISTORY:  Social History     Substance and Sexual Activity   Alcohol Use Yes    Alcohol/week: 100.0 standard drinks of alcohol    Types: 100 Cans of beer per week    Comment: heavy drinker     Social History     Substance and Sexual Activity   Drug Use Yes    Frequency: 21.0 times per week    Types: Marijuana    Comment: marijuana each day     Social History     Tobacco Use   Smoking Status Every Day    Current packs/day: 1.00    Average packs/day: 1 pack/day for 46.6 years (46.6 ttl pk-yrs)    Types: Cigarettes    Start date: 1978   Smokeless Tobacco Never   Tobacco Comments    per [facility]ervat-quit       FAMILY HISTORY:  Family History   Problem Relation Age of Onset    Lymphoma Mother     No Known Problems Father        MEDICATIONS:    Current Facility-Administered Medications:     acetaminophen (TYLENOL) tablet 650 mg, 650 mg, Oral, Q6H PRN, KRISTOFER Mcguire, 650 mg at 08/09/24 0556    aluminum-magnesium  hydroxide-simethicone (MAALOX) oral suspension 30 mL, 30 mL, Oral, Q4H PRN, Gayla HEMANTH Thomas, CRNP, 30 mL at 08/10/24 0343    amLODIPine (NORVASC) tablet 5 mg, 5 mg, Oral, Daily, Gayla A Bingvarjameson, CRNP, 5 mg at 08/10/24 0837    bisacodyl (DULCOLAX) rectal suppository 10 mg, 10 mg, Rectal, Daily PRN, KRISTOFER Smith    docusate sodium (COLACE) capsule 100 mg, 100 mg, Oral, BID, Gayla HEMANTH MONETjvarjameson, CRNP, 100 mg at 08/10/24 0423    enoxaparin (LOVENOX) subcutaneous injection 40 mg, 40 mg, Subcutaneous, Daily, Gayla HEMANTH Smartvarjameson, CRNP, 40 mg at 08/10/24 0839    fenofibrate (TRICOR) tablet 145 mg, 145 mg, Oral, Daily, Gayla HEMANTH Smartvarjameson, CRNP, 145 mg at 08/10/24 0838    gabapentin (NEURONTIN) capsule 300 mg, 300 mg, Oral, TID, Gayla A Bingvary, CRNP, 300 mg at 08/10/24 0838    HYDROmorphone (DILAUDID) injection 0.3 mg, 0.3 mg, Intravenous, Q3H PRN, Huong Adorno MD, 0.3 mg at 08/09/24 1343    HYDROmorphone (DILAUDID) injection 0.5 mg, 0.5 mg, Intravenous, Q6H PRN, Huong Adorno MD, 0.5 mg at 08/10/24 0838    HYDROmorphone (DILAUDID) injection 1 mg, 1 mg, Intravenous, Q6H PRN, Huong Adorno MD, 1 mg at 08/10/24 0341    insulin lispro (HumALOG/ADMELOG) 100 units/mL subcutaneous injection 1-6 Units, 1-6 Units, Subcutaneous, Q6H ROSALEE, 1 Units at 08/10/24 0618 **AND** Fingerstick Glucose (POCT), , , Q6H, KRISTOFER Mcguire    magnesium Oxide (MAG-OX) tablet 400 mg, 400 mg, Oral, Daily, KRISTOFER Mcguire, 400 mg at 08/10/24 0837    multi-electrolyte (ISOLYTE-S PH 7.4) bolus 1,000 mL, 1,000 mL, Intravenous, Once, Huong Adorno MD, 1,000 mL at 08/10/24 0928    multi-electrolyte (PLASMALYTE-A/ISOLYTE-S PH 7.4) IV solution, 150 mL/hr, Intravenous, Continuous, Huong Adorno MD    naloxone (NARCAN) 0.04 mg/mL syringe 0.04 mg, 0.04 mg, Intravenous, Q1MIN PRN, KRISTOFER Mcguire    nicotine (NICODERM CQ) 21 mg/24 hr TD 24 hr patch 1 patch, 1 patch, Transdermal, Daily, KRISTOFER Mcguire, 1 patch  at 08/10/24 0838    ondansetron (ZOFRAN) injection 4 mg, 4 mg, Intravenous, Q6H PRN, Gayla HEMANTH Thomas, CRNP    pantoprazole (PROTONIX) injection 40 mg, 40 mg, Intravenous, Q24H ROSALEE, Gayla HEMANTH MONETjvary, CRNP, 40 mg at 08/10/24 0838    polyethylene glycol (MIRALAX) packet 17 g, 17 g, Oral, Daily, EMELINA SmithNP, 17 g at 08/10/24 0838    pravastatin (PRAVACHOL) tablet 40 mg, 40 mg, Oral, Daily With Dinner, Gayla MONETjvarjameson, CRNP, 40 mg at 08/09/24 1713    sertraline (ZOLOFT) tablet 150 mg, 150 mg, Oral, Daily, Gayla HEMANTH MONETjvary, CRNP, 150 mg at 08/10/24 0838    REVIEW OF SYSTEMS:    All the systems were reviewed and were negative except as documented on the HPI.    PHYSICAL EXAM:  Current Weight: Weight - Scale: 101 kg (223 lb 2 oz)  First Weight: Weight - Scale: 101 kg (223 lb 2 oz)  Vitals:    08/09/24 2332 08/09/24 2345 08/10/24 0340 08/10/24 0749   BP: 95/57 101/60 124/63 112/58   BP Location: Left arm Left arm Right arm Right arm   Pulse: 79  97 82   Resp: 18  20 20   Temp: 98.1 °F (36.7 °C)  98.3 °F (36.8 °C) 98.4 °F (36.9 °C)   TempSrc: Oral  Oral Oral   SpO2: 91%   92%   Weight:       Height:           Intake/Output Summary (Last 24 hours) at 8/10/2024 1021  Last data filed at 8/10/2024 0901  Gross per 24 hour   Intake 2820 ml   Output 420 ml   Net 2400 ml     Physical Exam  General: NAD  Skin: no rash  Eyes: anicteric sclera  ENT: moist mucous membrane  Neck: supple  Chest: CTA b/l, no ronchii, no wheeze, no rubs, no rales  CVS: s1s2, no murmur, no gallop, no rub  Abdomen: soft, epigastric tenderness  Extremities: no edema LE b/l  : no perez  Neuro: AAOX3  Psych: normal affect      Invasive Devices:      Lab Results:   Results from last 7 days   Lab Units 08/10/24  0415 08/09/24  0523 08/09/24  0144   WBC Thousand/uL 15.96*  --  7.53   HEMOGLOBIN g/dL 13.9  --  16.4   HEMATOCRIT % 41.2  --  46.7   PLATELETS Thousands/uL 157  --  193   POTASSIUM mmol/L 4.7 4.0 3.9   CHLORIDE mmol/L 102 103 99    CO2 mmol/L 21 22 23   BUN mg/dL 19 10 8   CREATININE mg/dL 1.84* 0.86 0.96   CALCIUM mg/dL 8.6 9.3 10.0   ALK PHOS U/L  --  95 98   ALT U/L  --  26 28   AST U/L  --  23 25

## 2024-08-10 NOTE — ASSESSMENT & PLAN NOTE
"8/9 - POA with worsening epigastric pain over the past 4 days with radiation to back.  History of pancreatitis  Denies recent drinking, does state he drinks once in a while but not in the last 4 days  Pain generalized mid/upper abd, tender  & soft  Elevated Lipase 112, AST/Alk & ALk phos WNL  CT a/p w/ contrast: \"Trace stranding about the pancreas may be due to pancreatitis, No discrete peripancreatic collection. Gallbladder no calcified gallstones.  No pericholecystic inflammatory change\"  Suspect mild pancreatitis   Consider transabdominal ultrasound r/o biliary pancreatitis   GI consulted  NPO sip with med  Aggressive IV hydration, normal saline 200 ml/hr  Pain management  Obtain CRP, obtain serum triglycerides level, trend lipase  Continue trend CMP,  and lipase    8/10 - reports no significant improvement/changes in pain at this time.  Had a one-time low-grade fever of 100°F yesterday afternoon.  WBC count elevated today, with an elevated procalcitonin, hence, plan to repeat CT imaging (without contrast however due to GUSTAVO), and initiate broad-spectrum IV Levaquin/Flagyl (PCN allergy/intolerance noted).  Continue IV fluid hydration/resuscitation.  Serum lipase has normalized today.  Will keep on clear liquids.  Optimize pain/nausea control, as necessary.  Appreciate gastroenterology input, recommending an outpatient MRI/MRCP in 4-6 weeks.  "

## 2024-08-11 LAB
ALBUMIN SERPL BCG-MCNC: 3.6 G/DL (ref 3.5–5)
ALP SERPL-CCNC: 70 U/L (ref 34–104)
ALT SERPL W P-5'-P-CCNC: 40 U/L (ref 7–52)
ANION GAP SERPL CALCULATED.3IONS-SCNC: 7 MMOL/L (ref 4–13)
AST SERPL W P-5'-P-CCNC: 103 U/L (ref 13–39)
BASOPHILS # BLD AUTO: 0.04 THOUSANDS/ΜL (ref 0–0.1)
BASOPHILS NFR BLD AUTO: 1 % (ref 0–1)
BILIRUB DIRECT SERPL-MCNC: 0.43 MG/DL (ref 0–0.2)
BILIRUB SERPL-MCNC: 1.2 MG/DL (ref 0.2–1)
BUN SERPL-MCNC: 12 MG/DL (ref 5–25)
CALCIUM SERPL-MCNC: 9.1 MG/DL (ref 8.4–10.2)
CHLORIDE SERPL-SCNC: 100 MMOL/L (ref 96–108)
CO2 SERPL-SCNC: 26 MMOL/L (ref 21–32)
CREAT SERPL-MCNC: 0.9 MG/DL (ref 0.6–1.3)
EOSINOPHIL # BLD AUTO: 0.06 THOUSAND/ΜL (ref 0–0.61)
EOSINOPHIL NFR BLD AUTO: 1 % (ref 0–6)
ERYTHROCYTE [DISTWIDTH] IN BLOOD BY AUTOMATED COUNT: 12 % (ref 11.6–15.1)
GFR SERPL CREATININE-BSD FRML MDRD: 91 ML/MIN/1.73SQ M
GLUCOSE SERPL-MCNC: 140 MG/DL (ref 65–140)
GLUCOSE SERPL-MCNC: 142 MG/DL (ref 65–140)
GLUCOSE SERPL-MCNC: 146 MG/DL (ref 65–140)
GLUCOSE SERPL-MCNC: 148 MG/DL (ref 65–140)
GLUCOSE SERPL-MCNC: 154 MG/DL (ref 65–140)
GLUCOSE SERPL-MCNC: 232 MG/DL (ref 65–140)
HCT VFR BLD AUTO: 38.2 % (ref 36.5–49.3)
HGB BLD-MCNC: 13.1 G/DL (ref 12–17)
IMM GRANULOCYTES # BLD AUTO: 0.03 THOUSAND/UL (ref 0–0.2)
IMM GRANULOCYTES NFR BLD AUTO: 0 % (ref 0–2)
LIPASE SERPL-CCNC: 16 U/L (ref 11–82)
LYMPHOCYTES # BLD AUTO: 0.95 THOUSANDS/ΜL (ref 0.6–4.47)
LYMPHOCYTES NFR BLD AUTO: 11 % (ref 14–44)
MCH RBC QN AUTO: 33.7 PG (ref 26.8–34.3)
MCHC RBC AUTO-ENTMCNC: 34.3 G/DL (ref 31.4–37.4)
MCV RBC AUTO: 98 FL (ref 82–98)
MONOCYTES # BLD AUTO: 0.7 THOUSAND/ΜL (ref 0.17–1.22)
MONOCYTES NFR BLD AUTO: 8 % (ref 4–12)
NEUTROPHILS # BLD AUTO: 6.7 THOUSANDS/ΜL (ref 1.85–7.62)
NEUTS SEG NFR BLD AUTO: 79 % (ref 43–75)
NRBC BLD AUTO-RTO: 0 /100 WBCS
PLATELET # BLD AUTO: 134 THOUSANDS/UL (ref 149–390)
PMV BLD AUTO: 9.6 FL (ref 8.9–12.7)
POTASSIUM SERPL-SCNC: 4.4 MMOL/L (ref 3.5–5.3)
PROCALCITONIN SERPL-MCNC: 1.95 NG/ML
PROT SERPL-MCNC: 6.8 G/DL (ref 6.4–8.4)
RBC # BLD AUTO: 3.89 MILLION/UL (ref 3.88–5.62)
SODIUM SERPL-SCNC: 133 MMOL/L (ref 135–147)
WBC # BLD AUTO: 8.48 THOUSAND/UL (ref 4.31–10.16)

## 2024-08-11 PROCEDURE — 99232 SBSQ HOSP IP/OBS MODERATE 35: CPT | Performed by: INTERNAL MEDICINE

## 2024-08-11 PROCEDURE — 80048 BASIC METABOLIC PNL TOTAL CA: CPT | Performed by: INTERNAL MEDICINE

## 2024-08-11 PROCEDURE — 84145 PROCALCITONIN (PCT): CPT | Performed by: INTERNAL MEDICINE

## 2024-08-11 PROCEDURE — 83690 ASSAY OF LIPASE: CPT | Performed by: INTERNAL MEDICINE

## 2024-08-11 PROCEDURE — 82948 REAGENT STRIP/BLOOD GLUCOSE: CPT

## 2024-08-11 PROCEDURE — 80076 HEPATIC FUNCTION PANEL: CPT | Performed by: INTERNAL MEDICINE

## 2024-08-11 PROCEDURE — 85025 COMPLETE CBC W/AUTO DIFF WBC: CPT | Performed by: INTERNAL MEDICINE

## 2024-08-11 RX ORDER — LEVOFLOXACIN 5 MG/ML
750 INJECTION, SOLUTION INTRAVENOUS EVERY 24 HOURS
Status: DISCONTINUED | OUTPATIENT
Start: 2024-08-11 | End: 2024-08-12

## 2024-08-11 RX ORDER — HYDRALAZINE HYDROCHLORIDE 20 MG/ML
5 INJECTION INTRAMUSCULAR; INTRAVENOUS EVERY 6 HOURS PRN
Status: DISCONTINUED | OUTPATIENT
Start: 2024-08-11 | End: 2024-08-12 | Stop reason: HOSPADM

## 2024-08-11 RX ADMIN — SODIUM CHLORIDE, SODIUM GLUCONATE, SODIUM ACETATE, POTASSIUM CHLORIDE, MAGNESIUM CHLORIDE, SODIUM PHOSPHATE, DIBASIC, AND POTASSIUM PHOSPHATE 150 ML/HR: .53; .5; .37; .037; .03; .012; .00082 INJECTION, SOLUTION INTRAVENOUS at 08:52

## 2024-08-11 RX ADMIN — HYDROMORPHONE HYDROCHLORIDE 0.3 MG: 1 INJECTION, SOLUTION INTRAMUSCULAR; INTRAVENOUS; SUBCUTANEOUS at 20:40

## 2024-08-11 RX ADMIN — DOCUSATE SODIUM 100 MG: 100 CAPSULE, LIQUID FILLED ORAL at 08:51

## 2024-08-11 RX ADMIN — INSULIN LISPRO 1 UNITS: 100 INJECTION, SOLUTION INTRAVENOUS; SUBCUTANEOUS at 12:37

## 2024-08-11 RX ADMIN — GABAPENTIN 300 MG: 300 CAPSULE ORAL at 08:51

## 2024-08-11 RX ADMIN — ACETAMINOPHEN 650 MG: 325 TABLET, FILM COATED ORAL at 19:32

## 2024-08-11 RX ADMIN — GABAPENTIN 300 MG: 300 CAPSULE ORAL at 16:37

## 2024-08-11 RX ADMIN — ACETAMINOPHEN 650 MG: 325 TABLET, FILM COATED ORAL at 10:57

## 2024-08-11 RX ADMIN — HYDROMORPHONE HYDROCHLORIDE 1 MG: 1 INJECTION, SOLUTION INTRAMUSCULAR; INTRAVENOUS; SUBCUTANEOUS at 05:04

## 2024-08-11 RX ADMIN — ENOXAPARIN SODIUM 40 MG: 40 INJECTION SUBCUTANEOUS at 08:52

## 2024-08-11 RX ADMIN — GABAPENTIN 300 MG: 300 CAPSULE ORAL at 20:40

## 2024-08-11 RX ADMIN — Medication 400 MG: at 08:51

## 2024-08-11 RX ADMIN — INSULIN LISPRO 3 UNITS: 100 INJECTION, SOLUTION INTRAVENOUS; SUBCUTANEOUS at 18:07

## 2024-08-11 RX ADMIN — METRONIDAZOLE 500 MG: 500 INJECTION, SOLUTION INTRAVENOUS at 03:55

## 2024-08-11 RX ADMIN — NICOTINE 1 PATCH: 21 PATCH, EXTENDED RELEASE TRANSDERMAL at 09:40

## 2024-08-11 RX ADMIN — SODIUM CHLORIDE, SODIUM GLUCONATE, SODIUM ACETATE, POTASSIUM CHLORIDE, MAGNESIUM CHLORIDE, SODIUM PHOSPHATE, DIBASIC, AND POTASSIUM PHOSPHATE 150 ML/HR: .53; .5; .37; .037; .03; .012; .00082 INJECTION, SOLUTION INTRAVENOUS at 17:15

## 2024-08-11 RX ADMIN — POLYETHYLENE GLYCOL 3350 17 G: 17 POWDER, FOR SOLUTION ORAL at 08:50

## 2024-08-11 RX ADMIN — METRONIDAZOLE 500 MG: 500 INJECTION, SOLUTION INTRAVENOUS at 19:32

## 2024-08-11 RX ADMIN — LEVOFLOXACIN 750 MG: 750 INJECTION, SOLUTION INTRAVENOUS at 16:37

## 2024-08-11 RX ADMIN — METRONIDAZOLE 500 MG: 500 INJECTION, SOLUTION INTRAVENOUS at 11:01

## 2024-08-11 RX ADMIN — HYDROMORPHONE HYDROCHLORIDE 0.3 MG: 1 INJECTION, SOLUTION INTRAMUSCULAR; INTRAVENOUS; SUBCUTANEOUS at 02:15

## 2024-08-11 RX ADMIN — SERTRALINE HYDROCHLORIDE 150 MG: 50 TABLET ORAL at 08:51

## 2024-08-11 RX ADMIN — SODIUM CHLORIDE, SODIUM GLUCONATE, SODIUM ACETATE, POTASSIUM CHLORIDE, MAGNESIUM CHLORIDE, SODIUM PHOSPHATE, DIBASIC, AND POTASSIUM PHOSPHATE 150 ML/HR: .53; .5; .37; .037; .03; .012; .00082 INJECTION, SOLUTION INTRAVENOUS at 00:20

## 2024-08-11 RX ADMIN — HYDROMORPHONE HYDROCHLORIDE 0.5 MG: 1 INJECTION, SOLUTION INTRAMUSCULAR; INTRAVENOUS; SUBCUTANEOUS at 18:10

## 2024-08-11 RX ADMIN — HYDROMORPHONE HYDROCHLORIDE 1 MG: 1 INJECTION, SOLUTION INTRAMUSCULAR; INTRAVENOUS; SUBCUTANEOUS at 14:35

## 2024-08-11 RX ADMIN — DOCUSATE SODIUM 100 MG: 100 CAPSULE, LIQUID FILLED ORAL at 17:13

## 2024-08-11 RX ADMIN — PANTOPRAZOLE SODIUM 40 MG: 40 INJECTION, POWDER, FOR SOLUTION INTRAVENOUS at 08:51

## 2024-08-11 RX ADMIN — HYDROMORPHONE HYDROCHLORIDE 0.5 MG: 1 INJECTION, SOLUTION INTRAMUSCULAR; INTRAVENOUS; SUBCUTANEOUS at 11:01

## 2024-08-11 NOTE — ASSESSMENT & PLAN NOTE
"8/9 - POA with worsening epigastric pain over the past 4 days with radiation to back.  History of pancreatitis  Denies recent drinking, does state he drinks once in a while but not in the last 4 days  Pain generalized mid/upper abd, tender  & soft  Elevated Lipase 112, AST/Alk & ALk phos WNL  CT a/p w/ contrast: \"Trace stranding about the pancreas may be due to pancreatitis, No discrete peripancreatic collection. Gallbladder no calcified gallstones.  No pericholecystic inflammatory change\"  Suspect mild pancreatitis   Consider transabdominal ultrasound r/o biliary pancreatitis   GI consulted  NPO sip with med  Aggressive IV hydration, normal saline 200 ml/hr  Pain management  Obtain CRP, obtain serum triglycerides level, trend lipase  Continue trend CMP,  and lipase    8/10 - reports no significant improvement/changes in pain at this time.  Had a one-time low-grade fever of 100°F yesterday afternoon.  WBC count elevated today, with an elevated procalcitonin, hence, plan to repeat CT imaging (without contrast however due to GUSTAVO), and initiate broad-spectrum IV Levaquin/Flagyl (PCN allergy/intolerance noted).  Continue IV fluid hydration/resuscitation.  Serum lipase has normalized today.  Will keep on clear liquids.  Optimize pain/nausea control, as necessary.  Appreciate gastroenterology input, recommending an outpatient MRI/MRCP in 4-6 weeks.    8/11 - reports mild improvement in pain today and is requesting diet to be advanced -> will trial full liquids with toast/crackers today.  Repeat CT imaging yesterday still reveals unchanged/mild peripancreatic fat stranding, but no worsening.  LFTs today revealed elevation of AST and bilirubin from a few days prior.  RUQ ultrasound notable for steatosis.  Anticipate in the upcoming days, LFTs will plateau and subsequently improved.  Continue IV fluid hydration with as needed pain/nausea control.  Blood cultures from yesterday remain negative thus far.  Continue " Levaquin/Flagyl course, at least until cultures negative 48-72 hrs due to prior near high-grade fever.  Procalcitonin improved from a 4.11 peak -> 1.95 today.  Leukocytosis normalized.  Supportive care otherwise.  Await further gastroenterology recommendations.

## 2024-08-11 NOTE — PROGRESS NOTES
NEPHROLOGY HOSPITAL PROGRESS NOTE   Michael Briones 59 y.o. male MRN: [id number]  Unit/Bed#: -01 Encounter: [id number]  Reason for Consult: Acute kidney injury    ASSESSMENT and PLAN:    59 y.o. male with a past medical history of diabetes, hypertension, steatosis, HPL, GERD, pancreatitis who was admitted to [facility] on 8/9 after presenting with abdominal pain. A renal consultation is requested today for assistance in the management of acute kidney injury.     1-acute kidney injury-     - Baseline creatinine-0.7 to 0.9 mg/dL  - Admission creatinine-0.9 mg/dL on 8/9  - Creatinine rising to 1.8 on 8/10 prompting nephrology consultation  - UA-moderate mucus threads, 4-10 hyaline cast, 2+ bilirubin, nitrite positive but minimal WBC and no RBC  - Renal imaging-CT scan initially with contrast-stable 1.9 cm proteinaceous/hemorrhagic cyst of left kidney.  No hydronephrosis  - To note is on ACE inhibitor as outpatient, received 2 doses of Toradol a.m. of 8/9, received lisinopril 40 mg 8/9 and 40 mg 8/10.  - Patient received volume expansion with normal saline initially which was changed to pH balance fluids  - Repeat CT scan on 8/10 with unchanged mild peripancreatic fat stranding consistent with acute interstitial pancreatitis  - Patient received IV contrast on 8/9     Overall, patient likely has acute kidney injury in the setting of relative hypotension/sepsis/ACE inhibitor/autoregulatory failure/Toradol/hypovolemia/contrast all leading to ATN    8/11-Creatinine improving back to baseline 0.9 mg/dL.  Sodium improving 133.  Bilirubin borderline elevated being managed by primary team.  White count improved with starting of antibiotics yesterday.           Plan    - Patient's renal function has returned to baseline  - Fluid management per primary team as patient is with pancreatitis  - Can increase levofloxacin from a renal standpoint  - Hold fenofibrate  - Please avoid all NSAIDs and all nephrotoxic agents  - Will  "need follow-up of renal cyst in 6 to 12 months as outpatient.  Patient reports that he is aware of the cyst and his PCP was aware of it and they are monitoring.  I advised the patient that he should talk to his PCP for repeat imaging in the future and he agrees  - I/os; avoid hypotension  - Hold amlodipine for another day as the patient had marginal blood pressures this morning  - Hold lisinopril for now    Portions of the record may have been created with voice recognition software. Occasional wrong word or \"sound a like\" substitutions may have occurred due to the inherent limitations of voice recognition software. Read the chart carefully and recognize, using context, where substitutions have occurred.If you have any questions, please contact the dictating provider.       2-electrolytes-mild hyponatremia-likely in the setting of increased ADH/pain.  Improving slowly with volume expansion     3-acid/base-bicarbonate appropriate     4-leukocytosis-per primary team.  Due to pancreatitis.  On levofloxacin.     5-hypertension-initially with significant hypertension now with marginal blood pressures.  Agree with holding lisinopril.  We will also hold amlodipine.     7-abdominal pain-patient with significant abdominal pain.  Initial lipase was 112.  With the pain appears to be worse than the serological values would suggest.  Imaging with pancreatic inflammation.  Still with white count elevation.  Repeat imaging confirms pancreatitis     8-alcohol use-patient states he has not had alcohol in over 4 days.  Drinks twice a week but does drink a sixpack when he is drinking alcohol.     - Per primary team     9-pancreatic lesion-Per hepatology and primary team    SUBJECTIVE / 24H INTERVAL HISTORY:    Patient denies complaints today.  States abdomen pain has resolved.  He is going to try solid intake tonight.    OBJECTIVE:  Current Weight: Weight - Scale: 101 kg (223 lb 2 oz)  Vitals:    08/10/24 2232 08/11/24 0743 08/11/24 " 1133 08/11/24 1439   BP: 133/73 97/70 118/67 140/80   BP Location: Right arm Right arm Right arm Right arm   Pulse: 79 93 80 70   Resp: 18 18 18 18   Temp: 97.8 °F (36.6 °C) 98.8 °F (37.1 °C) 98.3 °F (36.8 °C) 98.1 °F (36.7 °C)   TempSrc: Tympanic Oral Oral Oral   SpO2: 95% 91% 90% 91%   Weight:       Height:           Intake/Output Summary (Last 24 hours) at 8/11/2024 1553  Last data filed at 8/11/2024 1046  Gross per 24 hour   Intake 1526 ml   Output 950 ml   Net 576 ml     General: NAD  Skin: no rash  Eyes: anicteric sclera  ENT: moist mucous membrane  Neck: supple  Chest: CTA b/l, no ronchii, no wheeze, no rubs, no rales  CVS: s1s2, no murmur, no gallop, no rub  Abdomen: soft, nontender, nl sounds  Extremities: no edema LE b/l  : no perez  Neuro: AAOX3  Psych: normal affect    Medications:    Current Facility-Administered Medications:     acetaminophen (TYLENOL) tablet 650 mg, 650 mg, Oral, Q6H PRN, Gayla EMELINA MottNP, 650 mg at 08/11/24 1057    aluminum-magnesium hydroxide-simethicone (MAALOX) oral suspension 30 mL, 30 mL, Oral, Q4H PRN, Gayla HEMANTH Thomas, CRNP, 30 mL at 08/10/24 0343    bisacodyl (DULCOLAX) rectal suppository 10 mg, 10 mg, Rectal, Daily PRN, EMELINA SmithNP, 10 mg at 08/10/24 2006    docusate sodium (COLACE) capsule 100 mg, 100 mg, Oral, BID, Gayla A Martha, CRNP, 100 mg at 08/11/24 0851    enoxaparin (LOVENOX) subcutaneous injection 40 mg, 40 mg, Subcutaneous, Daily, Gayla HEMANTH Thomas, CRNP, 40 mg at 08/11/24 0852    gabapentin (NEURONTIN) capsule 300 mg, 300 mg, Oral, TID, KRISTOFER Mcguire, 300 mg at 08/11/24 0851    hydrALAZINE (APRESOLINE) injection 5 mg, 5 mg, Intravenous, Q6H PRN, Huong Adorno MD    HYDROmorphone (DILAUDID) injection 0.3 mg, 0.3 mg, Intravenous, Q3H PRN, Huong Adorno MD, 0.3 mg at 08/11/24 0215    HYDROmorphone (DILAUDID) injection 0.5 mg, 0.5 mg, Intravenous, Q6H PRN, Huong Adorno MD, 0.5 mg at 08/11/24 1101    HYDROmorphone (DILAUDID)  injection 1 mg, 1 mg, Intravenous, Q6H PRN, Huong Adorno MD, 1 mg at 08/11/24 1435    insulin lispro (HumALOG/ADMELOG) 100 units/mL subcutaneous injection 1-6 Units, 1-6 Units, Subcutaneous, Q6H ROSALEE, 1 Units at 08/11/24 1237 **AND** Fingerstick Glucose (POCT), , , Q6H, KRISTOFER Mcguire    levofloxacin (LEVAQUIN) IVPB (premix in dextrose) 750 mg 150 mL, 750 mg, Intravenous, Q24H, Huong Adorno MD    magnesium Oxide (MAG-OX) tablet 400 mg, 400 mg, Oral, Daily, KRISTOFER Mcguire, 400 mg at 08/11/24 0851    metroNIDAZOLE (FLAGYL) IVPB (premix) 500 mg 100 mL, 500 mg, Intravenous, Q8H, Huong Adorno MD, Last Rate: 200 mL/hr at 08/11/24 1101, 500 mg at 08/11/24 1101    multi-electrolyte (PLASMALYTE-A/ISOLYTE-S PH 7.4) IV solution, 150 mL/hr, Intravenous, Continuous, Huong Adorno MD, Last Rate: 150 mL/hr at 08/11/24 0852, 150 mL/hr at 08/11/24 0852    naloxone (NARCAN) 0.04 mg/mL syringe 0.04 mg, 0.04 mg, Intravenous, Q1MIN PRN, KRISTOFER Mcguire    nicotine (NICODERM CQ) 21 mg/24 hr TD 24 hr patch 1 patch, 1 patch, Transdermal, Daily, Gayla KRISTOFER Mott, 1 patch at 08/11/24 0940    ondansetron (ZOFRAN) injection 4 mg, 4 mg, Intravenous, Q6H PRN, KRISTOFER Mcguire    pantoprazole (PROTONIX) injection 40 mg, 40 mg, Intravenous, Q24H ROSALEE, Gayla KRISTOFER Mott, 40 mg at 08/11/24 0851    polyethylene glycol (MIRALAX) packet 17 g, 17 g, Oral, Daily, KRISTOFER Smith, 17 g at 08/11/24 0850    sertraline (ZOLOFT) tablet 150 mg, 150 mg, Oral, Daily, KRISTOFER Mcguire, 150 mg at 08/11/24 0851    Laboratory Results:  Results from last 7 days   Lab Units 08/11/24  0459 08/10/24  0415 08/09/24  0523 08/09/24  0144   WBC Thousand/uL 8.48 15.96*  --  7.53   HEMOGLOBIN g/dL 13.1 13.9  --  16.4   HEMATOCRIT % 38.2 41.2  --  46.7   PLATELETS Thousands/uL 134* 157  --  193   POTASSIUM mmol/L 4.4 4.7 4.0 3.9   CHLORIDE mmol/L 100 102 103 99   CO2 mmol/L 26 21 22 23   BUN mg/dL 12 19 10  8   CREATININE mg/dL 0.90 1.84* 0.86 0.96   CALCIUM mg/dL 9.1 8.6 9.3 10.0

## 2024-08-11 NOTE — PROGRESS NOTES
"[facility]  Progress Note  Name: Michael Briones I  MRN: [id number]  Unit/Bed#: -01 I Date of Admission: 8/9/2024   Date of Service: 8/11/2024 I Hospital Day: 1      Assessment & Plan:    * Acute pancreatitis  Assessment & Plan  8/9 - POA with worsening epigastric pain over the past 4 days with radiation to back.  History of pancreatitis  Denies recent drinking, does state he drinks once in a while but not in the last 4 days  Pain generalized mid/upper abd, tender  & soft  Elevated Lipase 112, AST/Alk & ALk phos WNL  CT a/p w/ contrast: \"Trace stranding about the pancreas may be due to pancreatitis, No discrete peripancreatic collection. Gallbladder no calcified gallstones.  No pericholecystic inflammatory change\"  Suspect mild pancreatitis   Consider transabdominal ultrasound r/o biliary pancreatitis   GI consulted  NPO sip with med  Aggressive IV hydration, normal saline 200 ml/hr  Pain management  Obtain CRP, obtain serum triglycerides level, trend lipase  Continue trend CMP,  and lipase    8/10 - reports no significant improvement/changes in pain at this time.  Had a one-time low-grade fever of 100°F yesterday afternoon.  WBC count elevated today, with an elevated procalcitonin, hence, plan to repeat CT imaging (without contrast however due to GUSTAVO), and initiate broad-spectrum IV Levaquin/Flagyl (PCN allergy/intolerance noted).  Continue IV fluid hydration/resuscitation.  Serum lipase has normalized today.  Will keep on clear liquids.  Optimize pain/nausea control, as necessary.  Appreciate gastroenterology input, recommending an outpatient MRI/MRCP in 4-6 weeks.    8/11 - reports mild improvement in pain today and is requesting diet to be advanced -> will trial full liquids with toast/crackers today.  Repeat CT imaging yesterday still reveals unchanged/mild peripancreatic fat stranding, but no worsening.  LFTs today revealed elevation of AST and bilirubin from a few days prior.  " RUQ ultrasound notable for steatosis.  Anticipate in the upcoming days, LFTs will plateau and subsequently improved.  Continue IV fluid hydration with as needed pain/nausea control.  Blood cultures from yesterday remain negative thus far.  Continue Levaquin/Flagyl course, at least until cultures negative 48-72 hrs due to prior near high-grade fever.  Procalcitonin improved from a 4.11 peak -> 1.95 today.  Leukocytosis normalized.  Supportive care otherwise.  Await further gastroenterology recommendations.    GUSTAVO (acute kidney injury) (HCC)  Assessment & Plan  Creatinine elevated to 1.84 yesterday -> normalized at 0.9 today status post continued IV fluid hydration - consider ATN in the setting of intermittent hypotension  ACEI/Metformin held - Toradol was discontinued  Monitor renal function and urine output  Limit/avoid nephrotoxins and hypotension as possible  Nephrology following    Leukocytosis  Assessment & Plan  Consider reactive secondary to pain coupled with possible underlying/developing infection  WBC count normalized today  Initiated broad-spectrum IV antibiotics as above after blood cultures collected  Repeat CT imaging as above    Hyponatremia  Assessment & Plan  Serum sodium improved to 133 today on Isolyte IV fluids  Possibly multifactorial secondary to decreased oral solute intake in the setting of nausea/vomiting due to pancreatitis and a possible pain mediated SIADH-type reaction (although responding positively to fluids)  Continue IV fluid hydration and monitor serum sodium  Nephrology following    Dyslipidemia  Assessment & Plan  Updated lipid panel noted - excellent LDL control at 86 and triglycerides at 99  In light of pancreatitis, will hold fenofibrate/statin for now    Essential hypertension  Assessment & Plan  Initially presented with elevated blood pressures with systolic BP > 200 likely secondary to uncontrolled pain  Serial BP normalized and intermittently became borderline hypotensive ->  in the setting of worsening renal function, currently holding BP meds (including ACEI) - PRN IV Hydralazine on board for BP spikes  Aggressive analgesic control also contributory to low BP  Continue IV fluid hydration/resuscitation    GERD (gastroesophageal reflux disease)  Assessment & Plan  C/w PPI     Type 2 diabetes mellitus (HCC)  Assessment & Plan  Lab Results   Component Value Date    HGBA1C 5.4 07/08/2024     As noted above, A1c reveals excellent control - holding home Metformin/Actos -> consider lowering dosing and/or discontinuation of these medications on discharge  On SSI coverage per [employer]u-Cheks while hospitalized  Carbohydrate restriction once back on a solid diet      DVT Prophylaxis:  Lovenox       AM-PAC Basic Mobility:  Basic Mobility Inpatient Raw Score: 24  JH-HLM Achieved: 7: Walk 25 feet or more  JH-HLM Goal: 8: Walk 250 feet or more    Patient Centered Rounds:  I have performed bedside rounds and discussed plan of care with nursing today.  Discussions with Specialists or Other Care Team Provider:  see above assessments if applicable    Education and Discussions with Family / Patient:  Patient at bedside, who denied need to update other contacts    Time Spent for Care:  35 minutes. More than 50% of total time spent on counseling and coordination of care, on one or more of the following: performing physical exam; counseling and coordination of care, obtaining or reviewing history, documenting in the medical record, reviewing/ordering tests/medications/procedures, and communicating with other healthcare professionals.    Current Length of Stay: 1 day(s)  Current Patient Status: Inpatient   Certification Statement:  Patient will continue to require additional hospital stay due to assessments as noted above.    Code Status: Level 1 - Full Code        Subjective:     Encountered earlier in the day.  Pain has mildly improved today.  Patient requesting advancement of his diet.  Denies fever/chills  currently.        Objective:     Vitals:   Temp (24hrs), Av.3 °F (36.8 °C), Min:97.8 °F (36.6 °C), Max:98.8 °F (37.1 °C)    Temp:  [97.8 °F (36.6 °C)-98.8 °F (37.1 °C)] 98.1 °F (36.7 °C)  HR:  [70-93] 70  Resp:  [18] 18  BP: ()/(67-80) 140/80  SpO2:  [90 %-95 %] 91 %  Body mass index is 32.02 kg/m².     Input and Output Summary (last 24 hours):       Intake/Output Summary (Last 24 hours) at 2024 1502  Last data filed at 2024 1046  Gross per 24 hour   Intake 1526 ml   Output 950 ml   Net 576 ml       Physical Exam:     GENERAL Waxing/waning but improving distress from pain   HEAD   Normocephalic - atraumatic   EYES   PERRL - EOMI    MOUTH   Mucosa moist   NECK   Supple - full range of motion   CARDIAC Rate controlled   PULMONARY Fairly clear to auscultation   ABDOMEN Residual midepigastric tenderness - nondistended    MUSCULOSKELETAL   Motor strength/range of motion intact   NEUROLOGIC   Alert/oriented at baseline   PSYCHIATRIC   Mood/affect stable         Additional Data:     Labs & Recent Cultures:    Results from last 7 days   Lab Units 24  0459   WBC Thousand/uL 8.48   HEMOGLOBIN g/dL 13.1   HEMATOCRIT % 38.2   PLATELETS Thousands/uL 134*   SEGS PCT % 79*   LYMPHO PCT % 11*   MONO PCT % 8   EOS PCT % 1     Results from last 7 days   Lab Units 24  0459   POTASSIUM mmol/L 4.4   CHLORIDE mmol/L 100   CO2 mmol/L 26   BUN mg/dL 12   CREATININE mg/dL 0.90   CALCIUM mg/dL 9.1   ALK PHOS U/L 70   ALT U/L 40   AST U/L 103*         Results from last 7 days   Lab Units 24  1207 24  0619 24  0000 08/10/24  1737 08/10/24  1218 08/10/24  0604 08/10/24  0026 24  2123 24  1704 24  1113 24  0712 24  0601   POC GLUCOSE mg/dl 154* 142* 148* 188* 160* 184* 147* 145* 175* 150* 168* 160*         Results from last 7 days   Lab Units 24  0459 08/10/24  0415   LACTIC ACID mmol/L  --  1.9   PROCALCITONIN ng/ml 1.95* 4.11*         Results from last 7 days    Lab Units 08/10/24  1218   BLOOD CULTURE  No Growth at 24 hrs.  No Growth at 24 hrs.         Lines/Drains:  Invasive Devices       Peripheral Intravenous Line  Duration             Peripheral IV 08/09/24 Left Antecubital 2 days                      Last 24 Hours Medication List:   Current Facility-Administered Medications   Medication Dose Route Frequency Provider Last Rate    acetaminophen  650 mg Oral Q6H PRN Gayla A KRISTOFER Thomas      aluminum-magnesium hydroxide-simethicone  30 mL Oral Q4H PRN Gayla A KRISTOFER Thomas      bisacodyl  10 mg Rectal Daily PRN KRISTOFER Smith      docusate sodium  100 mg Oral BID Gayla A KRISTOFER Thomas      enoxaparin  40 mg Subcutaneous Daily Gayla A KRISTOFER Thomas      gabapentin  300 mg Oral TID Gayla A KRISTOFER Thomas      hydrALAZINE  5 mg Intravenous Q6H PRN Huong Adorno MD      HYDROmorphone  0.3 mg Intravenous Q3H PRN Huong Adorno MD      HYDROmorphone  0.5 mg Intravenous Q6H PRN Huong Adorno MD      HYDROmorphone  1 mg Intravenous Q6H PRN Huong Adorno MD      insulin lispro  1-6 Units Subcutaneous Q6H ROSALEE Gayla A KRISTOFER Thomas      levofloxacin  750 mg Intravenous Q48H Huong Adorno  mg (08/10/24 1256)    magnesium Oxide  400 mg Oral Daily GaylaKRISTOFER Valles      metroNIDAZOLE  500 mg Intravenous Q8H Huong Adorno  mg (08/11/24 1101)    multi-electrolyte  150 mL/hr Intravenous Continuous Huong Adorno  mL/hr (08/11/24 0852)    naloxone  0.04 mg Intravenous Q1MIN PRN Gayla A KRISTOFER Thomas      nicotine  1 patch Transdermal Daily Gayla A KRISTOFER Thomas      ondansetron  4 mg Intravenous Q6H PRN Gayla A KRISTOFER Thomas      pantoprazole  40 mg Intravenous Q24H ROSALEE Gayla A KRISTOFER Thomas      polyethylene glycol  17 g Oral Daily KRISTOFER Smith      sertraline  150 mg Oral Daily Gayla A KRISTOFER Thomas MD   Hospitalist - [facility]        ** Please Note: This  note is constructed using a voice recognition dictation system.  An occasional wrong word/phrase or “sound-a-like” substitution may have been picked up by dictation device due to the inherent limitations of voice recognition software.  Read the chart carefully and recognize, using reasonable context, where substitutions may have occurred.**

## 2024-08-11 NOTE — ASSESSMENT & PLAN NOTE
Consider reactive secondary to pain coupled with possible underlying/developing infection  WBC count normalized today  Initiated broad-spectrum IV antibiotics as above after blood cultures collected  Repeat CT imaging as above

## 2024-08-11 NOTE — PLAN OF CARE
Problem: PAIN - ADULT  Goal: Verbalizes/displays adequate comfort level or baseline comfort level  Description: Interventions:  - Encourage patient to monitor pain and request assistance  - Assess pain using appropriate pain scale  - Administer analgesics based on type and severity of pain and evaluate response  - Implement non-pharmacological measures as appropriate and evaluate response  - Consider cultural and social influences on pain and pain management  - Notify physician/advanced practitioner if interventions unsuccessful or patient reports new pain  Outcome: Progressing     Problem: GASTROINTESTINAL - ADULT  Goal: Minimal or absence of nausea and/or vomiting  Description: INTERVENTIONS:  - Administer IV fluids if ordered to ensure adequate hydration  - Maintain NPO status until nausea and vomiting are resolved  - Nasogastric tube if ordered  - Administer ordered antiemetic medications as needed  - Provide nonpharmacologic comfort measures as appropriate  - Advance diet as tolerated, if ordered  - Consider nutrition services referral to assist patient with adequate nutrition and appropriate food choices  Outcome: Progressing  Goal: Maintains or returns to baseline bowel function  Description: INTERVENTIONS:  - Assess bowel function  - Encourage oral fluids to ensure adequate hydration  - Administer IV fluids if ordered to ensure adequate hydration  - Administer ordered medications as needed  - Encourage mobilization and activity  - Consider nutritional services referral to assist patient with adequate nutrition and appropriate food choices  Outcome: Progressing  Goal: Maintains adequate nutritional intake  Description: INTERVENTIONS:  - Monitor percentage of each meal consumed  - Identify factors contributing to decreased intake, treat as appropriate  - Assist with meals as needed  - Monitor I&O, weight, and lab values if indicated  - Obtain nutrition services referral as needed  Outcome: Progressing  Goal:  Oral mucous membranes remain intact  Description: INTERVENTIONS  - Assess oral mucosa and hygiene practices  - Implement preventative oral hygiene regimen  - Implement oral medicated treatments as ordered  - Initiate Nutrition services referral as needed  Outcome: Progressing

## 2024-08-11 NOTE — ASSESSMENT & PLAN NOTE
Serum sodium improved to 133 today on Isolyte IV fluids  Possibly multifactorial secondary to decreased oral solute intake in the setting of nausea/vomiting due to pancreatitis and a possible pain mediated SIADH-type reaction (although responding positively to fluids)  Continue IV fluid hydration and monitor serum sodium  Nephrology following

## 2024-08-11 NOTE — PLAN OF CARE
Problem: PAIN - ADULT  Goal: Verbalizes/displays adequate comfort level or baseline comfort level  Description: Interventions:  - Encourage patient to monitor pain and request assistance  - Assess pain using appropriate pain scale  - Administer analgesics based on type and severity of pain and evaluate response  - Implement non-pharmacological measures as appropriate and evaluate response  - Consider cultural and social influences on pain and pain management  - Notify physician/advanced practitioner if interventions unsuccessful or patient reports new pain  Outcome: Progressing     Problem: INFECTION - ADULT  Goal: Absence or prevention of progression during hospitalization  Description: INTERVENTIONS:  - Assess and monitor for signs and symptoms of infection  - Monitor lab/diagnostic results  - Monitor all insertion sites, i.e. indwelling lines, tubes, and drains  - Monitor endotracheal if appropriate and nasal secretions for changes in amount and color  - [place] appropriate cooling/warming therapies per order  - Administer medications as ordered  - Instruct and encourage patient and family to use good hand hygiene technique  - Identify and instruct in appropriate isolation precautions for identified infection/condition  Outcome: Progressing  Goal: Absence of fever/infection during neutropenic period  Description: INTERVENTIONS:  - Monitor WBC    Outcome: Progressing     Problem: SAFETY ADULT  Goal: Patient will remain free of falls  Description: INTERVENTIONS:  - Educate patient/family on patient safety including physical limitations  - Instruct patient to call for assistance with activity   - Consult OT/PT to assist with strengthening/mobility   - Keep Call bell within reach  - Keep bed low and locked with side rails adjusted as appropriate  - Keep care items and personal belongings within reach  - Initiate and maintain comfort rounds  - Make Fall Risk Sign visible to staff  - Apply yellow socks and bracelet  for high fall risk patients  - Consider moving patient to room near nurses station  Outcome: Progressing  Goal: Maintain or return to baseline ADL function  Description: INTERVENTIONS:  -  Assess patient's ability to carry out ADLs; assess patient's baseline for ADL function and identify physical deficits which impact ability to perform ADLs (bathing, care of mouth/teeth, toileting, grooming, dressing, etc.)  - Assess/evaluate cause of self-care deficits   - Assess range of motion  - Assess patient's mobility; develop plan if impaired  - Assess patient's need for assistive devices and provide as appropriate  - Encourage maximum independence but intervene and supervise when necessary  - Involve family in performance of ADLs  - Assess for home care needs following discharge   - Consider OT consult to assist with ADL evaluation and planning for discharge  - Provide patient education as appropriate  Outcome: Progressing  Goal: Maintains/Returns to pre admission functional level  Description: INTERVENTIONS:  - Perform AM-PAC 6 Click Basic Mobility/ Daily Activity assessment daily.  - Set and communicate daily mobility goal to care team and patient/family/caregiver.   - Collaborate with rehabilitation services on mobility goals if consulted  - Out of bed for toileting  - Record patient progress and toleration of activity level   Outcome: Progressing     Problem: DISCHARGE PLANNING  Goal: Discharge to home or other facility with appropriate resources  Description: INTERVENTIONS:  - Identify barriers to discharge w/patient and caregiver  - Arrange for needed discharge resources and transportation as appropriate  - Identify discharge learning needs (meds, wound care, etc.)  - Arrange for interpretive services to assist at discharge as needed  - Refer to Case Management Department for coordinating discharge planning if the patient needs post-hospital services based on physician/advanced practitioner order or complex needs  related to functional status, cognitive ability, or social support system  Outcome: Progressing     Problem: Knowledge Deficit  Goal: Patient/family/caregiver demonstrates understanding of disease process, treatment plan, medications, and discharge instructions  Description: Complete learning assessment and assess knowledge base.  Interventions:  - Provide teaching at level of understanding  - Provide teaching via preferred learning methods  Outcome: Progressing     Problem: GASTROINTESTINAL - ADULT  Goal: Minimal or absence of nausea and/or vomiting  Description: INTERVENTIONS:  - Administer IV fluids if ordered to ensure adequate hydration  - Maintain NPO status until nausea and vomiting are resolved  - Nasogastric tube if ordered  - Administer ordered antiemetic medications as needed  - Provide nonpharmacologic comfort measures as appropriate  - Advance diet as tolerated, if ordered  - Consider nutrition services referral to assist patient with adequate nutrition and appropriate food choices  Outcome: Progressing  Goal: Maintains or returns to baseline bowel function  Description: INTERVENTIONS:  - Assess bowel function  - Encourage oral fluids to ensure adequate hydration  - Administer IV fluids if ordered to ensure adequate hydration  - Administer ordered medications as needed  - Encourage mobilization and activity  - Consider nutritional services referral to assist patient with adequate nutrition and appropriate food choices  Outcome: Progressing  Goal: Maintains adequate nutritional intake  Description: INTERVENTIONS:  - Monitor percentage of each meal consumed  - Identify factors contributing to decreased intake, treat as appropriate  - Assist with meals as needed  - Monitor I&O, weight, and lab values if indicated  - Obtain nutrition services referral as needed  Outcome: Progressing  Goal: Oral mucous membranes remain intact  Description: INTERVENTIONS  - Assess oral mucosa and hygiene practices  -  Implement preventative oral hygiene regimen  - Implement oral medicated treatments as ordered  - Initiate Nutrition services referral as needed  Outcome: Progressing

## 2024-08-11 NOTE — ASSESSMENT & PLAN NOTE
Initially presented with elevated blood pressures with systolic BP > 200 likely secondary to uncontrolled pain  Serial BP normalized and intermittently became borderline hypotensive -> in the setting of worsening renal function, currently holding BP meds (including ACEI) - PRN IV Hydralazine on board for BP spikes  Aggressive analgesic control also contributory to low BP  Continue IV fluid hydration/resuscitation

## 2024-08-11 NOTE — ASSESSMENT & PLAN NOTE
Creatinine elevated to 1.84 yesterday -> normalized at 0.9 today status post continued IV fluid hydration - consider ATN in the setting of intermittent hypotension  ACEI/Metformin held - Toradol was discontinued  Monitor renal function and urine output  Limit/avoid nephrotoxins and hypotension as possible  Nephrology following

## 2024-08-12 VITALS
TEMPERATURE: 98.3 F | HEART RATE: 77 BPM | RESPIRATION RATE: 17 BRPM | OXYGEN SATURATION: 91 % | DIASTOLIC BLOOD PRESSURE: 81 MMHG | WEIGHT: 223.13 LBS | BODY MASS INDEX: 31.94 KG/M2 | HEIGHT: 70 IN | SYSTOLIC BLOOD PRESSURE: 157 MMHG

## 2024-08-12 PROBLEM — N28.1 RENAL CYST: Status: ACTIVE | Noted: 2018-03-22

## 2024-08-12 PROBLEM — K59.09 OTHER CONSTIPATION: Status: ACTIVE | Noted: 2024-08-12

## 2024-08-12 LAB
ALBUMIN SERPL BCG-MCNC: 3.5 G/DL (ref 3.5–5)
ALP SERPL-CCNC: 81 U/L (ref 34–104)
ALT SERPL W P-5'-P-CCNC: 38 U/L (ref 7–52)
ANION GAP SERPL CALCULATED.3IONS-SCNC: 10 MMOL/L (ref 4–13)
AST SERPL W P-5'-P-CCNC: 84 U/L (ref 13–39)
BASOPHILS # BLD AUTO: 0.06 THOUSANDS/ΜL (ref 0–0.1)
BASOPHILS NFR BLD AUTO: 1 % (ref 0–1)
BILIRUB SERPL-MCNC: 1.04 MG/DL (ref 0.2–1)
BUN SERPL-MCNC: 7 MG/DL (ref 5–25)
CALCIUM SERPL-MCNC: 9 MG/DL (ref 8.4–10.2)
CHLORIDE SERPL-SCNC: 102 MMOL/L (ref 96–108)
CO2 SERPL-SCNC: 24 MMOL/L (ref 21–32)
CREAT SERPL-MCNC: 0.68 MG/DL (ref 0.6–1.3)
EOSINOPHIL # BLD AUTO: 0.12 THOUSAND/ΜL (ref 0–0.61)
EOSINOPHIL NFR BLD AUTO: 2 % (ref 0–6)
ERYTHROCYTE [DISTWIDTH] IN BLOOD BY AUTOMATED COUNT: 12.1 % (ref 11.6–15.1)
GFR SERPL CREATININE-BSD FRML MDRD: 103 ML/MIN/1.73SQ M
GLUCOSE SERPL-MCNC: 127 MG/DL (ref 65–140)
GLUCOSE SERPL-MCNC: 138 MG/DL (ref 65–140)
GLUCOSE SERPL-MCNC: 206 MG/DL (ref 65–140)
HCT VFR BLD AUTO: 39.8 % (ref 36.5–49.3)
HGB BLD-MCNC: 13.4 G/DL (ref 12–17)
IMM GRANULOCYTES # BLD AUTO: 0.03 THOUSAND/UL (ref 0–0.2)
IMM GRANULOCYTES NFR BLD AUTO: 1 % (ref 0–2)
LIPASE SERPL-CCNC: 15 U/L (ref 11–82)
LYMPHOCYTES # BLD AUTO: 1.12 THOUSANDS/ΜL (ref 0.6–4.47)
LYMPHOCYTES NFR BLD AUTO: 17 % (ref 14–44)
MCH RBC QN AUTO: 33.3 PG (ref 26.8–34.3)
MCHC RBC AUTO-ENTMCNC: 33.7 G/DL (ref 31.4–37.4)
MCV RBC AUTO: 99 FL (ref 82–98)
MONOCYTES # BLD AUTO: 0.54 THOUSAND/ΜL (ref 0.17–1.22)
MONOCYTES NFR BLD AUTO: 8 % (ref 4–12)
NEUTROPHILS # BLD AUTO: 4.79 THOUSANDS/ΜL (ref 1.85–7.62)
NEUTS SEG NFR BLD AUTO: 71 % (ref 43–75)
NRBC BLD AUTO-RTO: 0 /100 WBCS
PLATELET # BLD AUTO: 157 THOUSANDS/UL (ref 149–390)
PMV BLD AUTO: 9.8 FL (ref 8.9–12.7)
POTASSIUM SERPL-SCNC: 3.9 MMOL/L (ref 3.5–5.3)
PROCALCITONIN SERPL-MCNC: 1.13 NG/ML
PROT SERPL-MCNC: 6.8 G/DL (ref 6.4–8.4)
RBC # BLD AUTO: 4.02 MILLION/UL (ref 3.88–5.62)
SODIUM SERPL-SCNC: 136 MMOL/L (ref 135–147)
WBC # BLD AUTO: 6.66 THOUSAND/UL (ref 4.31–10.16)

## 2024-08-12 PROCEDURE — 80053 COMPREHEN METABOLIC PANEL: CPT | Performed by: INTERNAL MEDICINE

## 2024-08-12 PROCEDURE — 84145 PROCALCITONIN (PCT): CPT | Performed by: INTERNAL MEDICINE

## 2024-08-12 PROCEDURE — 85025 COMPLETE CBC W/AUTO DIFF WBC: CPT | Performed by: INTERNAL MEDICINE

## 2024-08-12 PROCEDURE — 99239 HOSP IP/OBS DSCHRG MGMT >30: CPT

## 2024-08-12 PROCEDURE — 82948 REAGENT STRIP/BLOOD GLUCOSE: CPT

## 2024-08-12 PROCEDURE — 99232 SBSQ HOSP IP/OBS MODERATE 35: CPT | Performed by: INTERNAL MEDICINE

## 2024-08-12 PROCEDURE — 83690 ASSAY OF LIPASE: CPT | Performed by: INTERNAL MEDICINE

## 2024-08-12 PROCEDURE — 99232 SBSQ HOSP IP/OBS MODERATE 35: CPT | Performed by: STUDENT IN AN ORGANIZED HEALTH CARE EDUCATION/TRAINING PROGRAM

## 2024-08-12 RX ORDER — METRONIDAZOLE 500 MG/1
500 TABLET ORAL EVERY 8 HOURS SCHEDULED
Status: DISCONTINUED | OUTPATIENT
Start: 2024-08-12 | End: 2024-08-12 | Stop reason: HOSPADM

## 2024-08-12 RX ORDER — POLYETHYLENE GLYCOL 3350 17 G/17G
17 POWDER, FOR SOLUTION ORAL DAILY
Qty: 510 G | Refills: 0 | Status: SHIPPED | OUTPATIENT
Start: 2024-08-13 | End: 2024-09-12

## 2024-08-12 RX ORDER — PANTOPRAZOLE SODIUM 40 MG/1
40 TABLET, DELAYED RELEASE ORAL
Status: DISCONTINUED | OUTPATIENT
Start: 2024-08-12 | End: 2024-08-12

## 2024-08-12 RX ORDER — LEVOFLOXACIN 750 MG/1
750 TABLET, FILM COATED ORAL EVERY 24 HOURS
Qty: 3 TABLET | Refills: 0 | Status: SHIPPED | OUTPATIENT
Start: 2024-08-12 | End: 2024-08-15

## 2024-08-12 RX ORDER — PANTOPRAZOLE SODIUM 40 MG/1
40 TABLET, DELAYED RELEASE ORAL
Status: DISCONTINUED | OUTPATIENT
Start: 2024-08-13 | End: 2024-08-12 | Stop reason: HOSPADM

## 2024-08-12 RX ORDER — OXYCODONE HYDROCHLORIDE 5 MG/1
5 TABLET ORAL EVERY 6 HOURS PRN
Qty: 12 TABLET | Refills: 0 | Status: SHIPPED | OUTPATIENT
Start: 2024-08-12 | End: 2024-08-15

## 2024-08-12 RX ORDER — OXYCODONE HYDROCHLORIDE 5 MG/1
5 TABLET ORAL ONCE
Status: COMPLETED | OUTPATIENT
Start: 2024-08-12 | End: 2024-08-12

## 2024-08-12 RX ADMIN — HYDROMORPHONE HYDROCHLORIDE 0.5 MG: 1 INJECTION, SOLUTION INTRAMUSCULAR; INTRAVENOUS; SUBCUTANEOUS at 08:18

## 2024-08-12 RX ADMIN — METRONIDAZOLE 500 MG: 500 TABLET ORAL at 13:18

## 2024-08-12 RX ADMIN — HYDROMORPHONE HYDROCHLORIDE 1 MG: 1 INJECTION, SOLUTION INTRAMUSCULAR; INTRAVENOUS; SUBCUTANEOUS at 00:15

## 2024-08-12 RX ADMIN — HYDROMORPHONE HYDROCHLORIDE 0.3 MG: 1 INJECTION, SOLUTION INTRAMUSCULAR; INTRAVENOUS; SUBCUTANEOUS at 03:38

## 2024-08-12 RX ADMIN — POLYETHYLENE GLYCOL 3350 17 G: 17 POWDER, FOR SOLUTION ORAL at 08:18

## 2024-08-12 RX ADMIN — ENOXAPARIN SODIUM 40 MG: 40 INJECTION SUBCUTANEOUS at 08:12

## 2024-08-12 RX ADMIN — DOCUSATE SODIUM 100 MG: 100 CAPSULE, LIQUID FILLED ORAL at 08:12

## 2024-08-12 RX ADMIN — SERTRALINE HYDROCHLORIDE 150 MG: 50 TABLET ORAL at 08:12

## 2024-08-12 RX ADMIN — Medication 400 MG: at 08:12

## 2024-08-12 RX ADMIN — PANTOPRAZOLE SODIUM 40 MG: 40 INJECTION, POWDER, FOR SOLUTION INTRAVENOUS at 08:12

## 2024-08-12 RX ADMIN — OXYCODONE HYDROCHLORIDE 5 MG: 5 TABLET ORAL at 13:18

## 2024-08-12 RX ADMIN — NICOTINE 1 PATCH: 21 PATCH, EXTENDED RELEASE TRANSDERMAL at 08:18

## 2024-08-12 RX ADMIN — METRONIDAZOLE 500 MG: 500 INJECTION, SOLUTION INTRAVENOUS at 03:26

## 2024-08-12 RX ADMIN — GABAPENTIN 300 MG: 300 CAPSULE ORAL at 08:12

## 2024-08-12 RX ADMIN — ACETAMINOPHEN 650 MG: 325 TABLET, FILM COATED ORAL at 05:22

## 2024-08-12 RX ADMIN — SODIUM CHLORIDE, SODIUM GLUCONATE, SODIUM ACETATE, POTASSIUM CHLORIDE, MAGNESIUM CHLORIDE, SODIUM PHOSPHATE, DIBASIC, AND POTASSIUM PHOSPHATE 150 ML/HR: .53; .5; .37; .037; .03; .012; .00082 INJECTION, SOLUTION INTRAVENOUS at 03:19

## 2024-08-12 NOTE — PLAN OF CARE
Problem: PAIN - ADULT  Goal: Verbalizes/displays adequate comfort level or baseline comfort level  Description: Interventions:  - Encourage patient to monitor pain and request assistance  - Assess pain using appropriate pain scale  - Administer analgesics based on type and severity of pain and evaluate response  - Implement non-pharmacological measures as appropriate and evaluate response  - Consider cultural and social influences on pain and pain management  - Notify physician/advanced practitioner if interventions unsuccessful or patient reports new pain  Outcome: Progressing     Problem: INFECTION - ADULT  Goal: Absence or prevention of progression during hospitalization  Description: INTERVENTIONS:  - Assess and monitor for signs and symptoms of infection  - Monitor lab/diagnostic results  - Monitor all insertion sites, i.e. indwelling lines, tubes, and drains  - Monitor endotracheal if appropriate and nasal secretions for changes in amount and color  - [place] appropriate cooling/warming therapies per order  - Administer medications as ordered  - Instruct and encourage patient and family to use good hand hygiene technique  - Identify and instruct in appropriate isolation precautions for identified infection/condition  Outcome: Progressing     Problem: SAFETY ADULT  Goal: Patient will remain free of falls  Description: INTERVENTIONS:  - Educate patient/family on patient safety including physical limitations  - Instruct patient to call for assistance with activity   - Consult OT/PT to assist with strengthening/mobility   - Keep Call bell within reach  - Keep bed low and locked with side rails adjusted as appropriate  - Keep care items and personal belongings within reach  - Initiate and maintain comfort rounds  - Make Fall Risk Sign visible to staff  - Offer Toileting every 2 Hours, in advance of need  - Initiate/Maintain alarm  - Obtain necessary fall risk management equipment:   - Apply yellow socks and  bracelet for high fall risk patients  - Consider moving patient to room near nurses station  Outcome: Progressing  Goal: Maintain or return to baseline ADL function  Description: INTERVENTIONS:  -  Assess patient's ability to carry out ADLs; assess patient's baseline for ADL function and identify physical deficits which impact ability to perform ADLs (bathing, care of mouth/teeth, toileting, grooming, dressing, etc.)  - Assess/evaluate cause of self-care deficits   - Assess range of motion  - Assess patient's mobility; develop plan if impaired  - Assess patient's need for assistive devices and provide as appropriate  - Encourage maximum independence but intervene and supervise when necessary  - Involve family in performance of ADLs  - Assess for home care needs following discharge   - Consider OT consult to assist with ADL evaluation and planning for discharge  - Provide patient education as appropriate  Outcome: Progressing  Goal: Maintains/Returns to pre admission functional level  Description: INTERVENTIONS:  - Perform AM-PAC 6 Click Basic Mobility/ Daily Activity assessment daily.  - Set and communicate daily mobility goal to care team and patient/family/caregiver.   - Collaborate with rehabilitation services on mobility goals if consulted  - Perform Range of Motion 4 times a day.  - Reposition patient every 2 hours.  - Dangle patient 3 times a day  - Stand patient 3 times a day  - Ambulate patient 3 times a day  - Out of bed to chair 3 times a day   - Out of bed for meals 3 times a day  - Out of bed for toileting  - Record patient progress and toleration of activity level   Outcome: Progressing     Problem: DISCHARGE PLANNING  Goal: Discharge to home or other facility with appropriate resources  Description: INTERVENTIONS:  - Identify barriers to discharge w/patient and caregiver  - Arrange for needed discharge resources and transportation as appropriate  - Identify discharge learning needs (meds, wound care,  etc.)  - Arrange for interpretive services to assist at discharge as needed  - Refer to Case Management Department for coordinating discharge planning if the patient needs post-hospital services based on physician/advanced practitioner order or complex needs related to functional status, cognitive ability, or social support system  Outcome: Progressing     Problem: Knowledge Deficit  Goal: Patient/family/caregiver demonstrates understanding of disease process, treatment plan, medications, and discharge instructions  Description: Complete learning assessment and assess knowledge base.  Interventions:  - Provide teaching at level of understanding  - Provide teaching via preferred learning methods  Outcome: Progressing     Problem: GASTROINTESTINAL - ADULT  Goal: Minimal or absence of nausea and/or vomiting  Description: INTERVENTIONS:  - Administer IV fluids if ordered to ensure adequate hydration  - Maintain NPO status until nausea and vomiting are resolved  - Nasogastric tube if ordered  - Administer ordered antiemetic medications as needed  - Provide nonpharmacologic comfort measures as appropriate  - Advance diet as tolerated, if ordered  - Consider nutrition services referral to assist patient with adequate nutrition and appropriate food choices  Outcome: Progressing  Goal: Maintains or returns to baseline bowel function  Description: INTERVENTIONS:  - Assess bowel function  - Encourage oral fluids to ensure adequate hydration  - Administer IV fluids if ordered to ensure adequate hydration  - Administer ordered medications as needed  - Encourage mobilization and activity  - Consider nutritional services referral to assist patient with adequate nutrition and appropriate food choices  Outcome: Progressing  Goal: Maintains adequate nutritional intake  Description: INTERVENTIONS:  - Monitor percentage of each meal consumed  - Identify factors contributing to decreased intake, treat as appropriate  - Assist with meals  as needed  - Monitor I&O, weight, and lab values if indicated  - Obtain nutrition services referral as needed  Outcome: Progressing  Goal: Oral mucous membranes remain intact  Description: INTERVENTIONS  - Assess oral mucosa and hygiene practices  - Implement preventative oral hygiene regimen  - Implement oral medicated treatments as ordered  - Initiate Nutrition services referral as needed  Outcome: Progressing

## 2024-08-12 NOTE — DISCHARGE SUMMARY
[facility]  Discharge- Michael Briones 1965, 59 y.o. male MRN: [id number]  Unit/Bed#: -[facility] Encounter: [id number]  Primary Care Provider: Annmarie Dunaway MD   Date and time admitted to hospital: 8/9/2024  1:31 AM    * Acute pancreatitis  Assessment & Plan  CT abd/pelvis: Trace stranding about the pancreas may be due to pancreatitis, correlate with lipase. No discrete peripancreatic collection   Repeat CT abd/pelvis: Unchanged mild peripancreatic fat stranding consistent with acute interstitial pancreatitis (series 201 images 60-72.) No evidence of peripancreatic fluid collections.   Appreciate GI recommendations  Suspected AoC pancreatitis likely secondary to continued alcohol use vs microlithiasis vs medication  Cleared for discharge home with tolerance of diet advancement to low-fat diet.  Continue low-fat diet  Complete alcohol cessation   Continue Levaquin for empiric 5-day course given high-grade fever, leukocytosis which has since resolved with abx  Continue analgesia with Tylenol as needed. No NSAIDs. Provided oxycodone 5mg q6 prn x 3 days for mod/severe pain  Outpatient follow-up with GI. Recommended for MRCP in 4-6 weeks         GUSTAVO (acute kidney injury) (HCC)  Assessment & Plan  Appreciate nephrology recs  Developed GUSTAVO over admission which since resolved  Suspected ATN with intermittent HTN, pancreatitis, hypovolemia, ACEi, NSAID, contrast  Hold ACEi at discharge  BMP in 1 week, nephrology to follow-up results to determine in outpatient follow-up  needed     Leukocytosis  Assessment & Plan  Consider reactive secondary to pain coupled with possible underlying/developing infection  Since normalized. Remains afebrile. Noted with elevated procal which is downtrending  BC x 2 remain negative x 24hrs  Repeat CT imaging revealing mild unchanged peripancreatic fat stranding. No fluid collection  Continue Levaquin at discharge to complete empiric 5-day  course    Hyponatremia  Assessment & Plan  Resolved     GERD (gastroesophageal reflux disease)  Assessment & Plan  C/w PPI     Type 2 diabetes mellitus (HCC)  Assessment & Plan  Lab Results   Component Value Date    HGBA1C 5.4 07/08/2024     Resume home regimen, outpatient follow-up with PCP    Dyslipidemia  Assessment & Plan  Updated lipid panel noted - excellent LDL control at 86 and triglycerides at 99  Resume statin      Essential hypertension  Assessment & Plan  Discontinue ramipril at discharge  Continue Norvasc 5mg   Outpatient follow-up with PCP    Renal cyst  Assessment & Plan  Stable 1.9 cm exophytic hemorrhagic/proteinaceous cyst at the lower pole of the left kidney. No hydronephrosis.   Outpatient follow-up with PCP        Medical Problems       Resolved Problems  Date Reviewed: 8/11/2024   None       Discharging Physician / Practitioner: Laureen Jansen PA-C  PCP: Annmarie Dunaway MD  Admission Date:   Admission Orders (From admission, onward)       Ordered        08/10/24 1257  INPATIENT ADMISSION  Once            08/09/24 0348  Place in Observation  Once                          Discharge Date: 08/12/24    Consultations During Hospital Stay:  GI  Nephrology     Procedures Performed:   None    Significant Findings / Test Results:   CT abd/pelvis w: Trace stranding about the pancreas may be due to pancreatitis, correlate with lipase. No discrete peripancreatic collection.   US RUQ: Mild hepatomegaly and hepatic steatosis   Ct abd/pelvis wo: Unchanged mild peripancreatic fat stranding consistent with acute interstitial pancreatitis (series 201 images 60-72.) No evidence of peripancreatic fluid collections.   Blood cultures negative at 24 hrs    Incidental Findings:   None       Test Results Pending at Discharge (will require follow up):   Final blood cultures      Outpatient Tests Requested:  BMP in 1 week   MRCP in 4-6 weeks  Outpatient follow-up with GI  Outpatient follow-up with  PCP    Complications:  None    Reason for Admission: acute pancreatitis     Hospital Course:   Michael Briones is a 59 y.o. male patient who originally presented to the hospital on 8/9/2024 due to suspected alcohol induced pancreatitis. Patient noted with hx of recurrent EtOH-related pancreatitis with 6 episodes. He was managed symptomatically with IV fluids, analgesia and slow diet advancement with improvement in symptoms. Patient tolerated low-fat diet, was cleared for discharge from GI standpoint with outpatient follow-up. Recommended complete alcohol cessation. He was provided 3-day course of oxycodone 5mg q6 prn for mod/severe pain. Of note, patient had episode of isolated high grade fever & leukocytosis and was initiated on antibiotics. Noted with resolution of leukocytosis and remained afebrile thereafter. Repeat imaging revealing mild peripancreatic fat stranding. Continue Levaquin to complete empiric 5-day course.  Patient noted to develop GUSTAVO over admission suspected secondary to ATN with relative hypotension, ACE-I use, contrast, Toradol, hypovolemia. This resolved with IV hydration. Nephrology recommended holding ACEi at discharge and to obtain BMP in 1 week. Outpatient follow-up recommendations pending the BMP.     Additionally, patient was recommended for outpatient MRCP in past to exclude lesion which was not completed. Discussed recommenced to obtain this in 4-6 weeks after discharge, order placed by GI. Patient agreeable. Patient discharged to home in stable condition. Given education low-fat diet and return to care precautions.              Please see above list of diagnoses and related plan for additional information.     Condition at Discharge: stable    Discharge Day Visit / Exam:   Subjective:  Patient reports improvement in abdominal pain. Notes mild pain at the LUQ. Not worsened after eating. Tolerated low fat diet without increased pain, nausea or vomiting. Eager for discharge. Denies SOB  "or chest pain  Vitals: Blood Pressure: 157/81 (08/12/24 0658)  Pulse: 77 (08/12/24 0658)  Temperature: 98.3 °F (36.8 °C) (08/12/24 0658)  Temp Source: Oral (08/12/24 0658)  Respirations: 17 (08/12/24 0658)  Height: 5' 10\" (177.8 cm) (08/09/24 0432)  Weight - Scale: 101 kg (223 lb 2 oz) (08/09/24 0432)  SpO2: 91 % (08/12/24 0658)  Exam:   Physical Exam  HENT:      Head: Normocephalic and atraumatic.   Cardiovascular:      Rate and Rhythm: Normal rate and regular rhythm.   Pulmonary:      Effort: Pulmonary effort is normal.      Breath sounds: Normal breath sounds.   Abdominal:      General: Abdomen is flat. Bowel sounds are normal.      Palpations: Abdomen is soft.      Tenderness: There is no abdominal tenderness.   Skin:     General: Skin is warm and dry.   Neurological:      General: No focal deficit present.      Mental Status: He is alert and oriented to person, place, and time.          Discussion with Family: Patient declined call to .     Discharge instructions/Information to patient and family:   See after visit summary for information provided to patient and family.      Provisions for Follow-Up Care:  See after visit summary for information related to follow-up care and any pertinent home health orders.      Mobility at time of Discharge:   Basic Mobility Inpatient Raw Score: 24  JH-HLM Goal: 8: Walk 250 feet or more  JH-HLM Achieved: 7: Walk 25 feet or more  HLM Goal NOT achieved. Continue to encourage mobility in post discharge setting.     Disposition:   Home    Planned Readmission: none     Discharge Statement:  I spent 35 minutes discharging the patient. This time was spent on the day of discharge. I had direct contact with the patient on the day of discharge. Greater than 50% of the total time was spent examining patient, answering all patient questions, arranging and discussing plan of care with patient as well as directly providing post-discharge instructions.  Additional time then " spent on discharge activities.    Discharge Medications:  See after visit summary for reconciled discharge medications provided to patient and/or family.      **Please Note: This note may have been constructed using a voice recognition system**

## 2024-08-12 NOTE — ASSESSMENT & PLAN NOTE
Lab Results   Component Value Date    HGBA1C 5.4 07/08/2024     Resume home regimen, outpatient follow-up with PCP

## 2024-08-12 NOTE — ASSESSMENT & PLAN NOTE
Consider reactive secondary to pain coupled with possible underlying/developing infection  Since normalized. Remains afebrile. Noted with elevated procal which is downtrending  BC x 2 remain negative x 24hrs  Repeat CT imaging revealing mild unchanged peripancreatic fat stranding. No fluid collection  Continue Levaquin at discharge to complete empiric 5-day course

## 2024-08-12 NOTE — ASSESSMENT & PLAN NOTE
Appreciate nephrology recs  Developed GUSTAVO over admission which since resolved  Suspected ATN with intermittent HTN, pancreatitis, hypovolemia, ACEi, NSAID, contrast  Hold ACEi at discharge  BMP in 1 week, nephrology to follow-up results to determine in outpatient follow-up  needed

## 2024-08-12 NOTE — DISCHARGE INSTR - AVS FIRST PAGE
Continue low-fat diet, see attached education.   Can use oxycodone 5mg every 6 hours as needed for moderate/severe pain x 3 days. Utilized Tylenol as needed per bottle instructions for mild pain. Avoid ibuprofen/Motrin, Aleeve/naproxen given recent kidney injury   Continue Levaquin as prescribed  Obtain lab work with BMP in 1 week after discharge to evaluate renal function. Nephrology to call to arrange outpatient follow-up if indicated pending results.   Discontinue ramipril at discharge  Please obtain MRCP in next 4-6 weeks after discharge  Continue outpatient follow-up with GI   Complete alcohol cessation encouraged. Continue daily Prilosec. Daily Miralax to prevent constipation.   Please follow-up with PCP within 1 week for transition of care appointment  Return to ED for recurrent or worsening symptoms

## 2024-08-12 NOTE — PROGRESS NOTES
Progress Note - [facility] [place] [facility]  Michael Briones 59 y.o. male MRN: [id number]    Unit/Bed#: -01 Encounter: [id number]      Assessment/Plan:  59 y.o. male with history of DM, HLD, HTN, neuropathy, GERD, hepatic steatosis, pancreatitis, and alcohol/tobacco use who presented to the ER with upper abdominal pain since Tuesday radiating to his back.     1 Pancreatitis: Patient reports progressive upper abdominal pain for the last 4 days with associated nausea with radiation of pain to his back. CT with trace stranding about the pancreas which may be due to pancreatitis, however lipase only 112.  LFTs/triglycerides normal on admission.  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 IgG4 in 2022 normal. EGD at that time showed mild gastritis & moderate chronic inflammation at the GE junction, negative for H.pylori & Tellez's.  He continues to smoke but has cut down to 1 pack a day.  He has resumed drinking alcohol and reports drinking a sixpack of beer weekly.  He has had a distended gallbladder on prior imaging but most recently had US in 6/2023 with no gallbladder findings. Pt may have acute on chronic pancreatitis d/t recurrent episodes, etiology likely continued alcohol use ?microlithiasis, r/o underlying lesion. He is also on Actos, Ramipril, Omeprazole which have been linked to cases of pancreatitis.      -Continue IV fluids  -Pain management per attending team  -Continue antiemetics as needed  -Low fat as tolerated  -If tolerates low-fat diet okay to discharge from GI standpoint.  Follow-up with GI as outpatient.  -Continue PPI daily  -Continue Miralax daily for constipation  -Recommend complete alcohol/tobacco cessation   -MRI/MRCP in 4-6 weeks for further evaluation of pancreatitis and to evaluate for any occult lesion.  -Consider outpatient surgical evaluation for possible microlithiasis  -Patient had previous Cologuard  "testing 10/13/2022 which was negative for colon cancer DNA.    Subjective:   Lying in bed in no acute distress.  Patient reports pain has improved only having mild discomfort in left upper quadrant.  No tenderness with palpation.  Denies nausea or vomiting.  Patient reports he is moving his bowels.    Objective:     Vitals: Blood pressure 157/81, pulse 77, temperature 98.3 °F (36.8 °C), temperature source Oral, resp. rate 17, height 5' 10\" (1.778 m), weight 101 kg (223 lb 2 oz), SpO2 91%.,Body mass index is 32.02 kg/m².      Intake/Output Summary (Last 24 hours) at 8/12/2024 0905  Last data filed at 8/12/2024 0738  Gross per 24 hour   Intake 1313 ml   Output 351 ml   Net 962 ml       Physical Exam: Physical Exam  Vitals and nursing note reviewed.   Constitutional:       General: He is not in acute distress.  Cardiovascular:      Rate and Rhythm: Normal rate and regular rhythm.      Pulses: Normal pulses.      Heart sounds: Normal heart sounds.   Pulmonary:      Effort: Pulmonary effort is normal. No respiratory distress.      Breath sounds: Normal breath sounds. No stridor. No wheezing, rhonchi or rales.   Abdominal:      General: Bowel sounds are normal. There is no distension.      Palpations: Abdomen is soft. There is no mass.      Tenderness: There is no abdominal tenderness. There is no guarding or rebound.      Hernia: No hernia is present.   Musculoskeletal:      Right lower leg: No edema.      Left lower leg: No edema.   Skin:     General: Skin is warm and dry.      Capillary Refill: Capillary refill takes less than 2 seconds.      Coloration: Skin is not jaundiced or pale.   Neurological:      Mental Status: He is alert and oriented to person, place, and time.   Psychiatric:         Mood and Affect: Mood normal.         Invasive Devices       Peripheral Intravenous Line  Duration             Peripheral IV 08/11/24 Dorsal (posterior);Right Hand <1 day                    Current Facility-Administered " Medications:     acetaminophen (TYLENOL) tablet 650 mg, 650 mg, Oral, Q6H PRN, Gayla KRISTOFER Mott, 650 mg at 08/12/24 0522    aluminum-magnesium hydroxide-simethicone (MAALOX) oral suspension 30 mL, 30 mL, Oral, Q4H PRN, KRISTOFER Mcguire, 30 mL at 08/10/24 0343    bisacodyl (DULCOLAX) rectal suppository 10 mg, 10 mg, Rectal, Daily PRN, Chelo Guerra, CRNP, 10 mg at 08/10/24 2006    docusate sodium (COLACE) capsule 100 mg, 100 mg, Oral, BID, KRISTOFER Mcguire, 100 mg at 08/12/24 0812    enoxaparin (LOVENOX) subcutaneous injection 40 mg, 40 mg, Subcutaneous, Daily, KRISTOFER Mcguire, 40 mg at 08/12/24 0812    gabapentin (NEURONTIN) capsule 300 mg, 300 mg, Oral, TID, Gayla EMELINA MottNP, 300 mg at 08/12/24 0812    hydrALAZINE (APRESOLINE) injection 5 mg, 5 mg, Intravenous, Q6H PRN, Huong Adorno MD    HYDROmorphone (DILAUDID) injection 0.3 mg, 0.3 mg, Intravenous, Q3H PRN, Huong Adorno MD, 0.3 mg at 08/12/24 0338    HYDROmorphone (DILAUDID) injection 0.5 mg, 0.5 mg, Intravenous, Q6H PRN, Huong Adorno MD, 0.5 mg at 08/12/24 0818    HYDROmorphone (DILAUDID) injection 1 mg, 1 mg, Intravenous, Q6H PRN, Huong Adorno MD, 1 mg at 08/12/24 0015    insulin lispro (HumALOG/ADMELOG) 100 units/mL subcutaneous injection 1-6 Units, 1-6 Units, Subcutaneous, Q6H ROSALEE, 3 Units at 08/11/24 1807 **AND** Fingerstick Glucose (POCT), , , Q6H, KRISTOFER Mcguire    levofloxacin (LEVAQUIN) IVPB (premix in dextrose) 750 mg 150 mL, 750 mg, Intravenous, Q24H, Huong Adorno MD, Last Rate: 100 mL/hr at 08/11/24 1637, 750 mg at 08/11/24 1637    magnesium Oxide (MAG-OX) tablet 400 mg, 400 mg, Oral, Daily, KRISTOFER Mcguire, 400 mg at 08/12/24 0812    metroNIDAZOLE (FLAGYL) IVPB (premix) 500 mg 100 mL, 500 mg, Intravenous, Q8H, Huong Adorno MD, Last Rate: 200 mL/hr at 08/12/24 0326, 500 mg at 08/12/24 0326    multi-electrolyte (PLASMALYTE-A/ISOLYTE-S PH 7.4) IV solution, 150 mL/hr, Intravenous,  Continuous, Huong Adorno MD, Last Rate: 150 mL/hr at 08/12/24 0319, 150 mL/hr at 08/12/24 0319    naloxone (NARCAN) 0.04 mg/mL syringe 0.04 mg, 0.04 mg, Intravenous, Q1MIN PRN, Gayla A CHIKIjvary, CRNP    nicotine (NICODERM CQ) 21 mg/24 hr TD 24 hr patch 1 patch, 1 patch, Transdermal, Daily, Gayla A Ujvary, CRNP, 1 patch at 08/12/24 0818    ondansetron (ZOFRAN) injection 4 mg, 4 mg, Intravenous, Q6H PRN, Gayla A Ujvary, CRNP    pantoprazole (PROTONIX) injection 40 mg, 40 mg, Intravenous, Q24H ROSALEE, Gayla A Ujvary, CRNP, 40 mg at 08/12/24 0812    polyethylene glycol (MIRALAX) packet 17 g, 17 g, Oral, Daily, KRISTOFER Smith, 17 g at 08/12/24 0818    sertraline (ZOLOFT) tablet 150 mg, 150 mg, Oral, Daily, Gayla A Ujvary, CRNP, 150 mg at 08/12/24 0812    Lab Results:   Recent Results (from the past 24 hour(s))   Fingerstick Glucose (POCT)    Collection Time: 08/11/24 12:07 PM   Result Value Ref Range    POC Glucose 154 (H) 65 - 140 mg/dl   Fingerstick Glucose (POCT)    Collection Time: 08/11/24  6:00 PM   Result Value Ref Range    POC Glucose 232 (H) 65 - 140 mg/dl   Fingerstick Glucose (POCT)    Collection Time: 08/11/24  9:38 PM   Result Value Ref Range    POC Glucose 146 (H) 65 - 140 mg/dl   Lipase    Collection Time: 08/12/24  5:13 AM   Result Value Ref Range    Lipase 15 11 - 82 u/L   Comprehensive metabolic panel    Collection Time: 08/12/24  5:13 AM   Result Value Ref Range    Sodium 136 135 - 147 mmol/L    Potassium 3.9 3.5 - 5.3 mmol/L    Chloride 102 96 - 108 mmol/L    CO2 24 21 - 32 mmol/L    ANION GAP 10 4 - 13 mmol/L    BUN 7 5 - 25 mg/dL    Creatinine 0.68 0.60 - 1.30 mg/dL    Glucose 127 65 - 140 mg/dL    Calcium 9.0 8.4 - 10.2 mg/dL    AST 84 (H) 13 - 39 U/L    ALT 38 7 - 52 U/L    Alkaline Phosphatase 81 34 - 104 U/L    Total Protein 6.8 6.4 - 8.4 g/dL    Albumin 3.5 3.5 - 5.0 g/dL    Total Bilirubin 1.04 (H) 0.20 - 1.00 mg/dL    eGFR 103 ml/min/1.73sq m   Procalcitonin     Collection Time: 08/12/24  5:13 AM   Result Value Ref Range    Procalcitonin 1.13 (H) <=0.25 ng/ml   CBC and differential    Collection Time: 08/12/24  5:13 AM   Result Value Ref Range    WBC 6.66 4.31 - 10.16 Thousand/uL    RBC 4.02 3.88 - 5.62 Million/uL    Hemoglobin 13.4 12.0 - 17.0 g/dL    Hematocrit 39.8 36.5 - 49.3 %    MCV 99 (H) 82 - 98 fL    MCH 33.3 26.8 - 34.3 pg    MCHC 33.7 31.4 - 37.4 g/dL    RDW 12.1 11.6 - 15.1 %    MPV 9.8 8.9 - 12.7 fL    Platelets 157 149 - 390 Thousands/uL    nRBC 0 /100 WBCs    Segmented % 71 43 - 75 %    Immature Grans % 1 0 - 2 %    Lymphocytes % 17 14 - 44 %    Monocytes % 8 4 - 12 %    Eosinophils Relative 2 0 - 6 %    Basophils Relative 1 0 - 1 %    Absolute Neutrophils 4.79 1.85 - 7.62 Thousands/µL    Absolute Immature Grans 0.03 0.00 - 0.20 Thousand/uL    Absolute Lymphocytes 1.12 0.60 - 4.47 Thousands/µL    Absolute Monocytes 0.54 0.17 - 1.22 Thousand/µL    Eosinophils Absolute 0.12 0.00 - 0.61 Thousand/µL    Basophils Absolute 0.06 0.00 - 0.10 Thousands/µL   Fingerstick Glucose (POCT)    Collection Time: 08/12/24  5:21 AM   Result Value Ref Range    POC Glucose 138 65 - 140 mg/dl             Imaging Studies: CT abdomen pelvis wo contrast    Result Date: 8/10/2024  Narrative: CT ABDOMEN AND PELVIS WITHOUT IV CONTRAST INDICATION: pancreatitis with leukocytosis. COMPARISON: Abdomen and pelvic CT from 8/9/2024. TECHNIQUE: CT examination of the abdomen and pelvis was performed without intravenous contrast. Multiplanar 2D reformatted images were created from the source data. This examination, like all CT scans performed in the [facility], was performed utilizing techniques to minimize radiation dose exposure, including the use of iterative reconstruction and automated exposure control. Radiation dose length product (DLP) for this visit: 827.9 mGy-cm Enteric Contrast: Not administered. FINDINGS: ABDOMEN LOWER CHEST: Bibasilar atelectasis. LIVER/BILIARY TREE:  Unremarkable. GALLBLADDER: Vicarious excretion of contrast into the gallbladder. SPLEEN: Unremarkable. PANCREAS: Unchanged mild peripancreatic fat stranding consistent with acute interstitial pancreatitis (series 201 images 60-72.) No evidence of peripancreatic fluid collections. Evaluation for pancreas necrosis cannot be made without IV contrast. ADRENAL GLANDS: Unremarkable. KIDNEYS/URETERS: No hydronephrosis. There is a 1.9 cm exophytic left kidney lower pole lesion measuring 71 Hounsfield units. Recent literature (Radiology, 2019) indicates that high density renal lesions of greater than 70 HU in density can be considered benign complex cysts, and no further imaging workup of these lesions is recommended. STOMACH AND BOWEL: Unremarkable. APPENDIX: No findings to suggest appendicitis. ABDOMINOPELVIC CAVITY: No ascites. No pneumoperitoneum. No lymphadenopathy. VESSELS: Unremarkable for patient's age. PELVIS REPRODUCTIVE ORGANS: Unremarkable for patient's age. URINARY BLADDER: Unremarkable. ABDOMINAL WALL/INGUINAL REGIONS: Unremarkable. BONES: No acute fracture or suspicious osseous lesion.     Impression: Unchanged mild peripancreatic fat stranding consistent with acute interstitial pancreatitis (series 201 images 60-72.) No evidence of peripancreatic fluid collections. Workstation performed: [id number]     US right upper quadrant    Result Date: 8/9/2024  Narrative: RIGHT UPPER QUADRANT ULTRASOUND INDICATION: recurrent pancreatitis. COMPARISON: CT 8/9/2024, ultrasound 6/23/2024 TECHNIQUE: Real-time ultrasound of the right upper quadrant was performed with a curvilinear transducer with both volumetric sweeps and still imaging techniques. FINDINGS: PANCREAS: Obscured by bowel gas AORTA AND IVC: Obscured by bowel gas. LIVER: Size: Mildly enlarged. The liver measures 17.2 cm in the midclavicular line. Contour: Surface contour is smooth. Parenchyma: There is mild diffuse increased echogenicity with smooth echotexture,  without significant beam attenuation or loss of periportal echogenicity. Most consistent with mild hepatic steatosis. No liver mass identified. Limited imaging of the main portal vein shows it to be patent and hepatopetal. BILIARY: No gallbladder findings. No intrahepatic biliary dilatation. CBD measures 4.0 mm. No choledocholithiasis. KIDNEY: Right kidney measures 11.6 x 6.1 x 5.9 cm. Volume 218.7 mL Kidney within normal limits. ASCITES: None.     Impression: Mild hepatomegaly and hepatic steatosis. Workstation performed: [id number]     CT abdomen pelvis with contrast    Result Date: 8/9/2024  Narrative: CT ABDOMEN AND PELVIS WITH IV CONTRAST INDICATION: epigastric pain. COMPARISON: CT of the chest abdomen pelvis on December 1, 2023. TECHNIQUE: CT examination of the abdomen and pelvis was performed. Multiplanar 2D reformatted images were created from the source data. This examination, like all CT scans performed in the [facility], was performed utilizing techniques to minimize radiation dose exposure, including the use of iterative reconstruction and automated exposure control. Radiation dose length product (DLP) for this visit: 776.3 mGy-cm IV Contrast: 100 mL of iohexol (OMNIPAQUE) Enteric Contrast: Not administered. FINDINGS: ABDOMEN LOWER CHEST: No clinically significant abnormality in the visualized lower chest. LIVER/BILIARY TREE: Unremarkable. GALLBLADDER: No calcified gallstones. No pericholecystic inflammatory change. SPLEEN: Unremarkable. PANCREAS: Trace fat stranding about the pancreas may be due to pancreatitis. No discrete peripancreatic collection. ADRENAL GLANDS: Unremarkable. KIDNEYS/URETERS: Stable 1.9 cm exophytic hemorrhagic/proteinaceous cyst at the lower pole of the left kidney. No hydronephrosis. STOMACH AND BOWEL: No bowel obstruction. Scattered colonic diverticulosis without evidence of diverticulitis. APPENDIX: No findings to suggest appendicitis. ABDOMINOPELVIC CAVITY: No  "ascites. No pneumoperitoneum. No lymphadenopathy. VESSELS: Patent portal, superior mesenteric, and splenic veins. Retroaortic left renal vein. Mild atherosclerotic calcifications. PELVIS REPRODUCTIVE ORGANS: Unremarkable for patient's age. URINARY BLADDER: Unremarkable. ABDOMINAL WALL/INGUINAL REGIONS: Small fat-containing right inguinal hernia. BONES: No acute fracture or suspicious osseous lesion. Spinal degenerative changes. Old bilateral rib fractures.     Impression: Trace stranding about the pancreas may be due to pancreatitis, correlate with lipase. No discrete peripancreatic collection. Workstation performed: [id number]           KRISTOFER Barnes      Please Note: \"This note has been constructed using a voice recognition system.Therefore there may be syntax, spelling, and/or grammatical errors. Please call if you have any questions. \"**   "

## 2024-08-12 NOTE — UTILIZATION REVIEW
NOTIFICATION OF INPATIENT ADMISSION   AUTHORIZATION REQUEST   SERVICING FACILITY:   [place] [address]  Tax ID: [id number]  NPI: [id number] ATTENDING PROVIDER:  Attending Name and NPI#: Ki Randall Do [[id number]]  Address: [address] [place] [address]  Phone:  [phone]     ADMISSION INFORMATION:  Place of Service: Inpatient [facility]  Place of Service Code: 21  Inpatient Admission Date/Time: 8/10/24 12:57 PM  Discharge Date/Time: No discharge date for patient encounter.  Admitting Diagnosis Code/Description:  Abdominal pain [R10.9]  Acute pancreatitis without infection or necrosis, unspecified pancreatitis type [K85.90]     UTILIZATION REVIEW CONTACT:  Monica Harman Utilization   Network Utilization Review Department  Phone: [phone]  Fax: [phone]  Email: [email or web address]  Contact for approvals/pending authorizations, clinical reviews, and discharge.     PHYSICIAN ADVISORY SERVICES:  Medical Necessity Denial & Cnfw-ua-Aorb Review  Phone: [phone]  Fax: [phone]  Email: [email or web address]     DISCHARGE SUPPORT TEAM:  For Patients Discharge Needs & Updates  Phone: [phone] opt. 2 Fax: [phone]  Email: [email or web address]

## 2024-08-12 NOTE — ASSESSMENT & PLAN NOTE
Stable 1.9 cm exophytic hemorrhagic/proteinaceous cyst at the lower pole of the left kidney. No hydronephrosis.   Outpatient follow-up with PCP

## 2024-08-12 NOTE — ASSESSMENT & PLAN NOTE
CT abd/pelvis: Trace stranding about the pancreas may be due to pancreatitis, correlate with lipase. No discrete peripancreatic collection   Repeat CT abd/pelvis: Unchanged mild peripancreatic fat stranding consistent with acute interstitial pancreatitis (series 201 images 60-72.) No evidence of peripancreatic fluid collections.   Appreciate GI recommendations  Suspected AoC pancreatitis likely secondary to continued alcohol use vs microlithiasis vs medication  Cleared for discharge home with tolerance of diet advancement to low-fat diet.  Continue low-fat diet  Complete alcohol cessation   Continue Levaquin for empiric 5-day course given high-grade fever, leukocytosis which has since resolved with abx  Continue analgesia with Tylenol as needed. No NSAIDs. Provided oxycodone 5mg q6 prn x 3 days for mod/severe pain  Outpatient follow-up with GI. Recommended for MRCP in 4-6 weeks

## 2024-08-12 NOTE — PROGRESS NOTES
NEPHROLOGY PROGRESS NOTE   Michael Briones 59 y.o. male MRN: [id number]  Unit/Bed#: -01 Encounter: [id number]  Reason for Consult: GUSTAVO    59-year-old male with DM2, HTN, HLD, GERD, hepatic steatosis, pancreatitis p/w abdominal pain.  Nephrology consulted for management of GUSTAVO.    ASSESSMENT/PLAN:  GUSTAVO:   -Resolved  -Etiology: Suspect due to to relative hypotension, sepsis, autoregulatory failure with ACEi, NSAID, contrast associated nephropathy leading to ATN  -Admission creatinine 0.9 on 8/9/2024.  Today's creatinine 0.68, stable remains at baseline  -Peak creatinine 1.84 on 8/10/2024  -baseline creatinine 0.7-0.9  -workup: UA with + nitrite, trace ketones, 2+ protein, no hematuria, 4-10 hyaline casts, 2+ bilirubin  -Imaging: CT with no hydronephrosis.  +1.9 cm exophytic left lower pole lesion, possibly benign complex cysts.  -nonoliguric  -Renal function stable and remains at baseline  -IVF per primary team related to pancreatitis.  Okay to DC IVF from nephrology standpoint when medically appropriate  -Continue to hold ACEi  -Strict I/Os, daily weights  -Avoid nephrotoxins, NSAIDs, IV contrast if possible  -Avoid hypotension.  Maintain MAP >65  -Trend BMP  -Adjust meds to appropriate GFR  -Optimize hemodynamic status to avoid delay in renal recovery  -Will d/w Dr. Reyes    Hyponatremia:  -Resolved  -Continue to monitor    Hypertension/Volume status:  -BP acceptable.  No further hypotension and trending towards hypertension  -volume status euvolemic  -Home medications: Ramipril 10 mg twice daily, amlodipine 5 mg daily  -Current medications: None  -On Isolyte at 150 ml/hr related to pancreatitis.  IVF per primary team  -Continue to hold ACEi.  May resume amlodipine if BP remains consistently elevated  -Optimize hemodynamic status to avoid delay in renal recovery  -recommend hold parameters on antihypertensive's for SBP <130 mmHg.  -Avoid hypotension or fluctuations in blood pressure.  Maintain MAP >65  -low  sodium (2 gm) diet  -continue to trend    Acute pancreatitis:  -POA  -Symptoms improving.  Diet being advanced today  -GI following  -IVF per primary team  -Management per primary team and GI    DM II:  -stable  -HgbA1C 5.4  -Metformin on hold  -continue to optimize glycemic control   -management per primary team    Left renal cyst:  -Recommend repeat imaging in 6/12 months as outpatient  -Management per PCP    Plan Summary:  -Renal function stable remains at baseline  -Patient stable from nephrology standpoint.  Will sign off and see on a as needed basis.  Please call with any further questions or concerns  -Recommend repeat BMP in 1 week after hospital discharge.  Need for nephrology follow-up based on results of repeat BMP  -Hold ACEi on discharge.  -IVF per primary team    SUBJECTIVE:  Patient awake, alert without complaints.  Abdominal pain improving and localized to left upper quadrant.  Advancing diet this a.m.  Remains on Isolyte @ 150 ml/hr. function back to baseline remained stable.  Adequate urine output.  VSS    A complete review of systems was performed. Pertinent positives and negatives noted in the HPI. Otherwise the review of systems was negative.  OBJECTIVE:  Current Weight: Weight - Scale: 101 kg (223 lb 2 oz)  Vitals:    08/11/24 1133 08/11/24 1439 08/11/24 2215 08/12/24 0658   BP: 118/67 140/80 123/68 157/81   BP Location: Right arm Right arm Right arm Left arm   Pulse: 80 70 68 77   Resp: 18 18 16 17   Temp: 98.3 °F (36.8 °C) 98.1 °F (36.7 °C) 97.9 °F (36.6 °C) 98.3 °F (36.8 °C)   TempSrc: Oral Oral Tympanic Oral   SpO2: 90% 91% 93% 91%   Weight:       Height:           Intake/Output Summary (Last 24 hours) at 8/12/2024 1017  Last data filed at 8/12/2024 0738  Gross per 24 hour   Intake 1313 ml   Output 351 ml   Net 962 ml       General:  Awake, alert, appears comfortable and in no acute distress.  Nontoxic.  Skin:  No rash, warm, good skin turgor   Eyes:  PERRL, EOMI, sclerae nonicteric.  no  periorbital edema   ENT:  Moist mucous membranes  Neck:  Trachea midline, symmetric.  No JVD.  Chest:  Clear to auscultation bilaterally without wheezes, crackles or rhonchi  CVS:  Regular rate and rhythm without murmur, gallop or rub.  S1 and S2 identified and normal.  No S3, S4.   Abdomen:  Soft, minimal tenderness LUQ with palpation, nondistended without masses.  Normal bowel sounds x 4 quadrants.  No bruit.  Extremities:  Warm, pink, motor and sensory intact and well perfused.  No cyanosis, pallor.  No BLE edema.  Neuro:  Awake, alert, oriented x3.  Grossly intact  Psych:  Appropriate affect.  Mentating appropriately.  Normal mental status exam      Medications:    Current Facility-Administered Medications:     acetaminophen (TYLENOL) tablet 650 mg, 650 mg, Oral, Q6H PRN, Gayla A Bingvarjameson, CRNP, 650 mg at 08/12/24 0522    aluminum-magnesium hydroxide-simethicone (MAALOX) oral suspension 30 mL, 30 mL, Oral, Q4H PRN, Gayla A CHIKIjvary, CRNP, 30 mL at 08/10/24 0343    bisacodyl (DULCOLAX) rectal suppository 10 mg, 10 mg, Rectal, Daily PRN, Chelo Guerra, CRNP, 10 mg at 08/10/24 2006    docusate sodium (COLACE) capsule 100 mg, 100 mg, Oral, BID, Gayla A Ujvary, CRNP, 100 mg at 08/12/24 0812    enoxaparin (LOVENOX) subcutaneous injection 40 mg, 40 mg, Subcutaneous, Daily, Gayla A Ujvary, CRNP, 40 mg at 08/12/24 0812    gabapentin (NEURONTIN) capsule 300 mg, 300 mg, Oral, TID, Gayla A Ujvary, CRNP, 300 mg at 08/12/24 0812    hydrALAZINE (APRESOLINE) injection 5 mg, 5 mg, Intravenous, Q6H PRN, Huong Adorno MD    HYDROmorphone (DILAUDID) injection 0.3 mg, 0.3 mg, Intravenous, Q3H PRN, Huong Adorno MD, 0.3 mg at 08/12/24 0338    HYDROmorphone (DILAUDID) injection 0.5 mg, 0.5 mg, Intravenous, Q6H PRN, Huong Adorno MD, 0.5 mg at 08/12/24 0818    HYDROmorphone (DILAUDID) injection 1 mg, 1 mg, Intravenous, Q6H PRN, Huong Adorno MD, 1 mg at 08/12/24 0015    insulin lispro (HumALOG/ADMELOG) 100  units/mL subcutaneous injection 1-6 Units, 1-6 Units, Subcutaneous, Q6H ROSALEE, 3 Units at 08/11/24 1807 **AND** Fingerstick Glucose (POCT), , , Q6H, KRISTOFER Mcguire    levofloxacin (LEVAQUIN) tablet 750 mg, 750 mg, Oral, Q24H, Huong Adorno MD    magnesium Oxide (MAG-OX) tablet 400 mg, 400 mg, Oral, Daily, KRISTOFER Mcguire, 400 mg at 08/12/24 0812    metroNIDAZOLE (FLAGYL) tablet 500 mg, 500 mg, Oral, Q8H ROSALEE, Huong Adorno MD    multi-electrolyte (PLASMALYTE-A/ISOLYTE-S PH 7.4) IV solution, 150 mL/hr, Intravenous, Continuous, Huong Adorno MD, Last Rate: 150 mL/hr at 08/12/24 0319, 150 mL/hr at 08/12/24 0319    naloxone (NARCAN) 0.04 mg/mL syringe 0.04 mg, 0.04 mg, Intravenous, Q1MIN PRN, KRISTOFER Mcguire    nicotine (NICODERM CQ) 21 mg/24 hr TD 24 hr patch 1 patch, 1 patch, Transdermal, Daily, KRISTOFER Mcguire, 1 patch at 08/12/24 0818    ondansetron (ZOFRAN) injection 4 mg, 4 mg, Intravenous, Q6H PRN, KRISTOFER Mcguire    [START ON 8/13/2024] pantoprazole (PROTONIX) EC tablet 40 mg, 40 mg, Oral, Early Morning, KRISTOFER Leo    polyethylene glycol (MIRALAX) packet 17 g, 17 g, Oral, Daily, KRISTOFER Smith, 17 g at 08/12/24 0818    sertraline (ZOLOFT) tablet 150 mg, 150 mg, Oral, Daily, Gayla KRISTOFER Mott, 150 mg at 08/12/24 0812    Laboratory Results:  Results from last 7 days   Lab Units 08/12/24  0513 08/11/24  0459 08/10/24  0415 08/09/24  0523 08/09/24  0144   WBC Thousand/uL 6.66 8.48 15.96*  --  7.53   HEMOGLOBIN g/dL 13.4 13.1 13.9  --  16.4   HEMATOCRIT % 39.8 38.2 41.2  --  46.7   PLATELETS Thousands/uL 157 134* 157  --  193   SODIUM mmol/L 136 133* 131* 134* 132*   POTASSIUM mmol/L 3.9 4.4 4.7 4.0 3.9   CHLORIDE mmol/L 102 100 102 103 99   CO2 mmol/L 24 26 21 22 23   BUN mg/dL 7 12 19 10 8   CREATININE mg/dL 0.68 0.90 1.84* 0.86 0.96   CALCIUM mg/dL 9.0 9.1 8.6 9.3 10.0       I have personally reviewed the blood work as stated above and in  my note.  I have personally reviewed internal Medicine, co-consultants and previous nephrology notes.

## 2024-08-12 NOTE — PROGRESS NOTES
The levofloxacin and metronidazole have been converted to Oral per [facility] IV-to-PO Auto-Conversion Protocol for Adults as approved by the Pharmacy and Therapeutics Committee. The patient met all eligible criteria:  1) Age = 18 years old   2) Received at least one dose of the IV form   3) Receiving at least one other scheduled oral/enteral medication   4) Tolerating an oral/enteral diet   and did not have any exclusions:   1) Critical care patient   2) Active GI bleed (IF assessing H2RAs or PPIs)   3) Continuous tube feeding (IF assessing cipro, doxycycline, levofloxacin, minocycline, rifampin, or voriconazole)   4) Receiving PO vancomycin (IF assessing metronidazole)   5) Persistent nausea and/or vomiting   6) Ileus or gastrointestinal obstruction   7) Annabelle/nasogastric tube set for continuous suction   8) Specific order not to automatically convert to PO (in the order's comments or if discussed in the most recent Infectious Disease or primary team's progress notes).

## 2024-08-12 NOTE — PLAN OF CARE
Problem: PAIN - ADULT  Goal: Verbalizes/displays adequate comfort level or baseline comfort level  Description: Interventions:  - Encourage patient to monitor pain and request assistance  - Assess pain using appropriate pain scale  - Administer analgesics based on type and severity of pain and evaluate response  - Implement non-pharmacological measures as appropriate and evaluate response  - Consider cultural and social influences on pain and pain management  - Notify physician/advanced practitioner if interventions unsuccessful or patient reports new pain  Outcome: Progressing     Problem: INFECTION - ADULT  Goal: Absence or prevention of progression during hospitalization  Description: INTERVENTIONS:  - Assess and monitor for signs and symptoms of infection  - Monitor lab/diagnostic results  - Monitor all insertion sites, i.e. indwelling lines, tubes, and drains  - Monitor endotracheal if appropriate and nasal secretions for changes in amount and color  - [place] appropriate cooling/warming therapies per order  - Administer medications as ordered  - Instruct and encourage patient and family to use good hand hygiene technique  - Identify and instruct in appropriate isolation precautions for identified infection/condition  Outcome: Progressing  Goal: Absence of fever/infection during neutropenic period  Description: INTERVENTIONS:  - Monitor WBC    Outcome: Progressing     Problem: SAFETY ADULT  Goal: Patient will remain free of falls  Description: INTERVENTIONS:  - Educate patient/family on patient safety including physical limitations  - Instruct patient to call for assistance with activity   - Consult OT/PT to assist with strengthening/mobility   - Keep Call bell within reach  - Keep bed low and locked with side rails adjusted as appropriate  - Keep care items and personal belongings within reach  - Initiate and maintain comfort rounds  - Make Fall Risk Sign visible to staff  - Apply yellow socks and bracelet  for high fall risk patients  - Consider moving patient to room near nurses station  Outcome: Progressing  Goal: Maintain or return to baseline ADL function  Description: INTERVENTIONS:  -  Assess patient's ability to carry out ADLs; assess patient's baseline for ADL function and identify physical deficits which impact ability to perform ADLs (bathing, care of mouth/teeth, toileting, grooming, dressing, etc.)  - Assess/evaluate cause of self-care deficits   - Assess range of motion  - Assess patient's mobility; develop plan if impaired  - Assess patient's need for assistive devices and provide as appropriate  - Encourage maximum independence but intervene and supervise when necessary  - Involve family in performance of ADLs  - Assess for home care needs following discharge   - Consider OT consult to assist with ADL evaluation and planning for discharge  - Provide patient education as appropriate  Outcome: Progressing  Goal: Maintains/Returns to pre admission functional level  Description: INTERVENTIONS:  - Perform AM-PAC 6 Click Basic Mobility/ Daily Activity assessment daily.  - Set and communicate daily mobility goal to care team and patient/family/caregiver.   - Collaborate with rehabilitation services on mobility goals if consulted  - Out of bed for toileting  - Record patient progress and toleration of activity level   Outcome: Progressing     Problem: DISCHARGE PLANNING  Goal: Discharge to home or other facility with appropriate resources  Description: INTERVENTIONS:  - Identify barriers to discharge w/patient and caregiver  - Arrange for needed discharge resources and transportation as appropriate  - Identify discharge learning needs (meds, wound care, etc.)  - Arrange for interpretive services to assist at discharge as needed  - Refer to Case Management Department for coordinating discharge planning if the patient needs post-hospital services based on physician/advanced practitioner order or complex needs  related to functional status, cognitive ability, or social support system  Outcome: Progressing     Problem: Knowledge Deficit  Goal: Patient/family/caregiver demonstrates understanding of disease process, treatment plan, medications, and discharge instructions  Description: Complete learning assessment and assess knowledge base.  Interventions:  - Provide teaching at level of understanding  - Provide teaching via preferred learning methods  Outcome: Progressing     Problem: GASTROINTESTINAL - ADULT  Goal: Minimal or absence of nausea and/or vomiting  Description: INTERVENTIONS:  - Administer IV fluids if ordered to ensure adequate hydration  - Maintain NPO status until nausea and vomiting are resolved  - Nasogastric tube if ordered  - Administer ordered antiemetic medications as needed  - Provide nonpharmacologic comfort measures as appropriate  - Advance diet as tolerated, if ordered  - Consider nutrition services referral to assist patient with adequate nutrition and appropriate food choices  Outcome: Progressing  Goal: Maintains or returns to baseline bowel function  Description: INTERVENTIONS:  - Assess bowel function  - Encourage oral fluids to ensure adequate hydration  - Administer IV fluids if ordered to ensure adequate hydration  - Administer ordered medications as needed  - Encourage mobilization and activity  - Consider nutritional services referral to assist patient with adequate nutrition and appropriate food choices  Outcome: Progressing  Goal: Maintains adequate nutritional intake  Description: INTERVENTIONS:  - Monitor percentage of each meal consumed  - Identify factors contributing to decreased intake, treat as appropriate  - Assist with meals as needed  - Monitor I&O, weight, and lab values if indicated  - Obtain nutrition services referral as needed  Outcome: Progressing  Goal: Oral mucous membranes remain intact  Description: INTERVENTIONS  - Assess oral mucosa and hygiene practices  -  Implement preventative oral hygiene regimen  - Implement oral medicated treatments as ordered  - Initiate Nutrition services referral as needed  Outcome: Progressing

## 2024-08-13 NOTE — UTILIZATION REVIEW
NOTIFICATION OF ADMISSION DISCHARGE   This is a Notification of Discharge from [facility]. Please be advised that this patient has been discharge from our facility. Below you will find the admission and discharge date and time including the patient’s disposition.   UTILIZATION REVIEW CONTACT:  Monica Harman  Utilization   Network Utilization Review Department  Phone: [phone] x carefully listen to the prompts. All voicemails are confidential.  Email: [email or web address]     ADMISSION INFORMATION  PRESENTATION DATE: 8/9/2024  1:31 AM  OBERVATION ADMISSION DATE: 08/09/2024 0348  INPATIENT ADMISSION DATE: 8/10/24 12:57 PM   DISCHARGE DATE: 8/12/2024  2:09 PM   DISPOSITION:Home/Self Care    Network Utilization Review Department  ATTENTION: Please call with any questions or concerns to [phone] and carefully listen to the prompts so that you are directed to the right person. All voicemails are confidential.   For Discharge needs, contact Care Management DC Support Team at [phone] opt. 2  Send all requests for admission clinical reviews, approved or denied determinations and any other requests to dedicated fax number below belonging to the campus where the patient is receiving treatment. List of dedicated fax numbers for the Facilities:  FACILITY NAME UR FAX NUMBER   ADMISSION DENIALS (Administrative/Medical Necessity) [phone]   DISCHARGE SUPPORT TEAM ([facility]) [phone]   PARENT CHILD HEALTH (Maternity/NICU/Pediatrics) [phone]   [facility] [phone]   [facility] [phone]   [facility] [phone]   [facility] [phone]   [facility] [phone]   [facility] [phone]   [facility] [phone]   [facility]  [facility] [phone]   [facility] [phone]   [facility] [phone]   [facility] [phone]   [facility] [phone]

## 2024-08-14 ENCOUNTER — TRANSITIONAL CARE MANAGEMENT (OUTPATIENT)
Dept: FAMILY MEDICINE CLINIC | Facility: CLINIC | Age: 59
End: 2024-08-14

## 2024-08-15 LAB
BACTERIA BLD CULT: NORMAL
BACTERIA BLD CULT: NORMAL

## 2024-08-28 ENCOUNTER — HOSPITAL ENCOUNTER (INPATIENT)
Facility: HOSPITAL | Age: 59
LOS: 2 days | Discharge: HOME | DRG: 440 | End: 2024-08-31
Attending: EMERGENCY MEDICINE | Admitting: INTERNAL MEDICINE
Payer: COMMERCIAL

## 2024-08-28 DIAGNOSIS — K85.20 ALCOHOL-INDUCED ACUTE PANCREATITIS, UNSPECIFIED COMPLICATION STATUS: ICD-10-CM

## 2024-08-28 DIAGNOSIS — R10.10 PAIN OF UPPER ABDOMEN: Primary | ICD-10-CM
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
## 2024-08-28 DIAGNOSIS — K85.90 PANCREATITIS: ICD-10-CM

## 2024-08-28 DIAGNOSIS — F10.10 ALCOHOL ABUSE: ICD-10-CM

## 2024-08-28 PROBLEM — R10.13 EPIGASTRIC ABDOMINAL PAIN: Status: ACTIVE | Noted: 2024-08-28

## 2024-08-28 PROBLEM — K59.00 CONSTIPATION: Status: ACTIVE | Noted: 2024-08-28

## 2024-08-28 LAB
ALBUMIN SERPL BCG-MCNC: 4.1 G/DL (ref 3.5–5)
ALP SERPL-CCNC: 92 U/L (ref 34–104)
ALT SERPL W P-5'-P-CCNC: 22 U/L (ref 7–52)
AMPHETAMINES SERPL QL SCN: NEGATIVE
ANION GAP SERPL CALCULATED.3IONS-SCNC: 12 MMOL/L (ref 4–13)
AST SERPL W P-5'-P-CCNC: 22 U/L (ref 13–39)
BARBITURATES UR QL: NEGATIVE
BASOPHILS # BLD AUTO: 0.13 THOUSANDS/ÂΜL (ref 0–0.1)
BASOPHILS NFR BLD AUTO: 2 % (ref 0–1)
BENZODIAZ UR QL: NEGATIVE
BILIRUB SERPL-MCNC: 0.75 MG/DL (ref 0.2–1)
BILIRUB UR QL STRIP: NEGATIVE
BUN SERPL-MCNC: 9 MG/DL (ref 5–25)
CALCIUM SERPL-MCNC: 9.6 MG/DL (ref 8.4–10.2)
CHLORIDE SERPL-SCNC: 101 MMOL/L (ref 96–108)
CLARITY UR: CLEAR
CO2 SERPL-SCNC: 21 MMOL/L (ref 21–32)
COCAINE UR QL: NEGATIVE
COLOR UR: YELLOW
CREAT SERPL-MCNC: 0.78 MG/DL (ref 0.6–1.3)
EOSINOPHIL # BLD AUTO: 0.17 THOUSAND/ÂΜL (ref 0–0.61)
EOSINOPHIL NFR BLD AUTO: 2 % (ref 0–6)
ERYTHROCYTE [DISTWIDTH] IN BLOOD BY AUTOMATED COUNT: 11.3 % (ref 11.6–15.1)
ETHANOL SERPL-MCNC: <10 MG/DL
FENTANYL UR QL SCN: NEGATIVE
FLUAV RNA RESP QL NAA+PROBE: NEGATIVE
FLUBV RNA RESP QL NAA+PROBE: NEGATIVE
GFR SERPL CREATININE-BSD FRML MDRD: 97 ML/MIN/1.73SQ M
GLUCOSE SERPL-MCNC: 316 MG/DL (ref 65–140)
GLUCOSE UR STRIP-MCNC: ABNORMAL MG/DL
HCT VFR BLD AUTO: 45.7 % (ref 36.5–49.3)
HGB BLD-MCNC: 16.2 G/DL (ref 12–17)
HGB UR QL STRIP.AUTO: NEGATIVE
HYDROCODONE UR QL SCN: NEGATIVE
IMM GRANULOCYTES # BLD AUTO: 0.03 THOUSAND/UL (ref 0–0.2)
IMM GRANULOCYTES NFR BLD AUTO: 0 % (ref 0–2)
KETONES UR STRIP-MCNC: NEGATIVE MG/DL
LACTATE SERPL-SCNC: 1.1 MMOL/L (ref 0.5–2)
LEUKOCYTE ESTERASE UR QL STRIP: NEGATIVE
LIPASE SERPL-CCNC: 432 U/L (ref 11–82)
LYMPHOCYTES # BLD AUTO: 2.4 THOUSANDS/ÂΜL (ref 0.6–4.47)
LYMPHOCYTES NFR BLD AUTO: 28 % (ref 14–44)
MCH RBC QN AUTO: 32.7 PG (ref 26.8–34.3)
MCHC RBC AUTO-ENTMCNC: 35.4 G/DL (ref 31.4–37.4)
MCV RBC AUTO: 92 FL (ref 82–98)
METHADONE UR QL: NEGATIVE
MONOCYTES # BLD AUTO: 0.74 THOUSAND/ÂΜL (ref 0.17–1.22)
MONOCYTES NFR BLD AUTO: 9 % (ref 4–12)
NEUTROPHILS # BLD AUTO: 5.22 THOUSANDS/ÂΜL (ref 1.85–7.62)
NEUTS SEG NFR BLD AUTO: 59 % (ref 43–75)
NITRITE UR QL STRIP: NEGATIVE
NRBC BLD AUTO-RTO: 0 /100 WBCS
OPIATES UR QL SCN: NEGATIVE
OXYCODONE+OXYMORPHONE UR QL SCN: NEGATIVE
PCP UR QL: NEGATIVE
PH UR STRIP.AUTO: 6 [PH]
PLATELET # BLD AUTO: 270 THOUSANDS/UL (ref 149–390)
PMV BLD AUTO: 9.9 FL (ref 8.9–12.7)
POTASSIUM SERPL-SCNC: 4 MMOL/L (ref 3.5–5.3)
PROT SERPL-MCNC: 7.8 G/DL (ref 6.4–8.4)
PROT UR STRIP-MCNC: NEGATIVE MG/DL
RBC # BLD AUTO: 4.95 MILLION/UL (ref 3.88–5.62)
RSV RNA RESP QL NAA+PROBE: NEGATIVE
SARS-COV-2 RNA RESP QL NAA+PROBE: NEGATIVE
SODIUM SERPL-SCNC: 134 MMOL/L (ref 135–147)
SP GR UR STRIP.AUTO: 1.01 (ref 1–1.03)
THC UR QL: POSITIVE
UROBILINOGEN UR QL STRIP.AUTO: 1 E.U./DL
WBC # BLD AUTO: 8.69 THOUSAND/UL (ref 4.31–10.16)

## 2024-08-28 PROCEDURE — 83690 ASSAY OF LIPASE: CPT | Performed by: PHYSICIAN ASSISTANT

## 2024-08-28 PROCEDURE — 80053 COMPREHEN METABOLIC PANEL: CPT | Performed by: PHYSICIAN ASSISTANT

## 2024-08-28 PROCEDURE — 99284 EMERGENCY DEPT VISIT MOD MDM: CPT

## 2024-08-28 PROCEDURE — 36415 COLL VENOUS BLD VENIPUNCTURE: CPT | Performed by: PHYSICIAN ASSISTANT

## 2024-08-28 PROCEDURE — 82077 ASSAY SPEC XCP UR&BREATH IA: CPT | Performed by: PHYSICIAN ASSISTANT

## 2024-08-28 PROCEDURE — 96361 HYDRATE IV INFUSION ADD-ON: CPT

## 2024-08-28 PROCEDURE — 99285 EMERGENCY DEPT VISIT HI MDM: CPT | Performed by: PHYSICIAN ASSISTANT

## 2024-08-28 PROCEDURE — 96374 THER/PROPH/DIAG INJ IV PUSH: CPT

## 2024-08-28 PROCEDURE — 96375 TX/PRO/DX INJ NEW DRUG ADDON: CPT

## 2024-08-28 PROCEDURE — 80307 DRUG TEST PRSMV CHEM ANLYZR: CPT | Performed by: PHYSICIAN ASSISTANT

## 2024-08-28 PROCEDURE — 99222 1ST HOSP IP/OBS MODERATE 55: CPT | Performed by: PHYSICIAN ASSISTANT

## 2024-08-28 PROCEDURE — 83605 ASSAY OF LACTIC ACID: CPT | Performed by: PHYSICIAN ASSISTANT

## 2024-08-28 PROCEDURE — 85025 COMPLETE CBC W/AUTO DIFF WBC: CPT | Performed by: PHYSICIAN ASSISTANT

## 2024-08-28 PROCEDURE — 82948 REAGENT STRIP/BLOOD GLUCOSE: CPT

## 2024-08-28 PROCEDURE — 93005 ELECTROCARDIOGRAM TRACING: CPT

## 2024-08-28 PROCEDURE — 81003 URINALYSIS AUTO W/O SCOPE: CPT | Performed by: PHYSICIAN ASSISTANT

## 2024-08-28 PROCEDURE — 0241U HB NFCT DS VIR RESP RNA 4 TRGT: CPT | Performed by: PHYSICIAN ASSISTANT

## 2024-08-28 RX ORDER — ONDANSETRON 2 MG/ML
4 INJECTION INTRAMUSCULAR; INTRAVENOUS EVERY 4 HOURS PRN
Status: DISCONTINUED | OUTPATIENT
Start: 2024-08-28 | End: 2024-08-31 | Stop reason: HOSPADM

## 2024-08-28 RX ORDER — HYDROMORPHONE HCL/PF 1 MG/ML
0.5 SYRINGE (ML) INJECTION ONCE
Status: COMPLETED | OUTPATIENT
Start: 2024-08-28 | End: 2024-08-28

## 2024-08-28 RX ORDER — POLYETHYLENE GLYCOL 3350 17 G/17G
17 POWDER, FOR SOLUTION ORAL DAILY
Status: DISCONTINUED | OUTPATIENT
Start: 2024-08-29 | End: 2024-08-29

## 2024-08-28 RX ORDER — AMLODIPINE BESYLATE 5 MG/1
5 TABLET ORAL DAILY
Status: DISCONTINUED | OUTPATIENT
Start: 2024-08-29 | End: 2024-08-31 | Stop reason: HOSPADM

## 2024-08-28 RX ORDER — FENOFIBRATE 145 MG/1
145 TABLET, COATED ORAL DAILY
Status: DISCONTINUED | OUTPATIENT
Start: 2024-08-29 | End: 2024-08-31 | Stop reason: HOSPADM

## 2024-08-28 RX ORDER — ENOXAPARIN SODIUM 100 MG/ML
40 INJECTION SUBCUTANEOUS DAILY
Status: DISCONTINUED | OUTPATIENT
Start: 2024-08-29 | End: 2024-08-31 | Stop reason: HOSPADM

## 2024-08-28 RX ORDER — OXYCODONE AND ACETAMINOPHEN 5; 325 MG/1; MG/1
1 TABLET ORAL ONCE
Status: COMPLETED | OUTPATIENT
Start: 2024-08-28 | End: 2024-08-28

## 2024-08-28 RX ORDER — OXYCODONE HYDROCHLORIDE 5 MG/1
5 TABLET ORAL EVERY 6 HOURS PRN
Status: DISCONTINUED | OUTPATIENT
Start: 2024-08-28 | End: 2024-08-30

## 2024-08-28 RX ORDER — SODIUM CHLORIDE, SODIUM LACTATE, POTASSIUM CHLORIDE, CALCIUM CHLORIDE 600; 310; 30; 20 MG/100ML; MG/100ML; MG/100ML; MG/100ML
200 INJECTION, SOLUTION INTRAVENOUS CONTINUOUS
Status: DISCONTINUED | OUTPATIENT
Start: 2024-08-28 | End: 2024-08-30

## 2024-08-28 RX ORDER — ACETAMINOPHEN 325 MG/1
650 TABLET ORAL EVERY 6 HOURS PRN
Status: DISCONTINUED | OUTPATIENT
Start: 2024-08-28 | End: 2024-08-31 | Stop reason: HOSPADM

## 2024-08-28 RX ORDER — INSULIN LISPRO 100 [IU]/ML
1-6 INJECTION, SOLUTION INTRAVENOUS; SUBCUTANEOUS
Status: DISCONTINUED | OUTPATIENT
Start: 2024-08-29 | End: 2024-08-31 | Stop reason: HOSPADM

## 2024-08-28 RX ORDER — PANTOPRAZOLE SODIUM 40 MG/10ML
40 INJECTION, POWDER, LYOPHILIZED, FOR SOLUTION INTRAVENOUS ONCE
Status: COMPLETED | OUTPATIENT
Start: 2024-08-28 | End: 2024-08-28

## 2024-08-28 RX ORDER — DOCUSATE SODIUM 100 MG/1
100 CAPSULE, LIQUID FILLED ORAL 2 TIMES DAILY
Status: DISCONTINUED | OUTPATIENT
Start: 2024-08-28 | End: 2024-08-31 | Stop reason: HOSPADM

## 2024-08-28 RX ORDER — NICOTINE 21 MG/24HR
1 PATCH, TRANSDERMAL 24 HOURS TRANSDERMAL DAILY
Status: DISCONTINUED | OUTPATIENT
Start: 2024-08-28 | End: 2024-08-31 | Stop reason: HOSPADM

## 2024-08-28 RX ORDER — ONDANSETRON 2 MG/ML
4 INJECTION INTRAMUSCULAR; INTRAVENOUS ONCE
Status: COMPLETED | OUTPATIENT
Start: 2024-08-28 | End: 2024-08-28

## 2024-08-28 RX ORDER — PANTOPRAZOLE SODIUM 40 MG/1
40 TABLET, DELAYED RELEASE ORAL
Status: DISCONTINUED | OUTPATIENT
Start: 2024-08-29 | End: 2024-08-31 | Stop reason: HOSPADM

## 2024-08-28 RX ORDER — GABAPENTIN 300 MG/1
300 CAPSULE ORAL 3 TIMES DAILY
Status: DISCONTINUED | OUTPATIENT
Start: 2024-08-28 | End: 2024-08-31 | Stop reason: HOSPADM

## 2024-08-28 RX ORDER — INSULIN LISPRO 100 [IU]/ML
1-6 INJECTION, SOLUTION INTRAVENOUS; SUBCUTANEOUS
Status: DISCONTINUED | OUTPATIENT
Start: 2024-08-28 | End: 2024-08-31 | Stop reason: HOSPADM

## 2024-08-28 RX ORDER — HYDROMORPHONE HCL/PF 1 MG/ML
0.5 SYRINGE (ML) INJECTION EVERY 4 HOURS PRN
Status: DISCONTINUED | OUTPATIENT
Start: 2024-08-28 | End: 2024-08-30

## 2024-08-28 RX ORDER — OXYCODONE HYDROCHLORIDE 5 MG/1
7.5 TABLET ORAL EVERY 6 HOURS PRN
Status: DISCONTINUED | OUTPATIENT
Start: 2024-08-28 | End: 2024-08-30

## 2024-08-28 RX ORDER — NICOTINE 21 MG/24HR
1 PATCH, TRANSDERMAL 24 HOURS TRANSDERMAL DAILY
Status: DISCONTINUED | OUTPATIENT
Start: 2024-08-29 | End: 2024-08-28

## 2024-08-28 RX ORDER — PRAVASTATIN SODIUM 40 MG
40 TABLET ORAL
Status: DISCONTINUED | OUTPATIENT
Start: 2024-08-29 | End: 2024-08-31 | Stop reason: HOSPADM

## 2024-08-28 RX ADMIN — SODIUM CHLORIDE 1000 ML: 0.9 INJECTION, SOLUTION INTRAVENOUS at 18:10

## 2024-08-28 RX ADMIN — HYDROMORPHONE HYDROCHLORIDE 0.5 MG: 1 INJECTION, SOLUTION INTRAMUSCULAR; INTRAVENOUS; SUBCUTANEOUS at 19:42

## 2024-08-28 RX ADMIN — ACETAMINOPHEN 650 MG: 325 TABLET, FILM COATED ORAL at 23:00

## 2024-08-28 RX ADMIN — DOCUSATE SODIUM 100 MG: 100 CAPSULE, LIQUID FILLED ORAL at 23:01

## 2024-08-28 RX ADMIN — HYDROMORPHONE HYDROCHLORIDE 0.5 MG: 1 INJECTION, SOLUTION INTRAMUSCULAR; INTRAVENOUS; SUBCUTANEOUS at 21:42

## 2024-08-28 RX ADMIN — ONDANSETRON 4 MG: 2 INJECTION INTRAMUSCULAR; INTRAVENOUS at 18:15

## 2024-08-28 RX ADMIN — PANTOPRAZOLE SODIUM 40 MG: 40 INJECTION, POWDER, FOR SOLUTION INTRAVENOUS at 18:21

## 2024-08-28 RX ADMIN — SODIUM CHLORIDE, SODIUM LACTATE, POTASSIUM CHLORIDE, AND CALCIUM CHLORIDE 200 ML/HR: .6; .31; .03; .02 INJECTION, SOLUTION INTRAVENOUS at 21:39

## 2024-08-28 RX ADMIN — NICOTINE 1 PATCH: 14 PATCH, EXTENDED RELEASE TRANSDERMAL at 23:01

## 2024-08-28 RX ADMIN — GABAPENTIN 300 MG: 300 CAPSULE ORAL at 21:39

## 2024-08-28 RX ADMIN — OXYCODONE HYDROCHLORIDE AND ACETAMINOPHEN 1 TABLET: 5; 325 TABLET ORAL at 18:18

## 2024-08-28 RX ADMIN — INSULIN LISPRO 4 UNITS: 100 INJECTION, SOLUTION INTRAVENOUS; SUBCUTANEOUS at 23:36

## 2024-08-28 NOTE — ED PROVIDER NOTES
"History  Chief Complaint   Patient presents with    Abdominal Pain     Recent admission for pancreatitis.  Pt reports pain was still there when he was discharged.  Has not had bm since 8/12.  Had had progressively worsening pain since     Past Medical History: Alcohol abuse, Chronic low back pain, Depression, Diabetes mellitus, Erectile dysfunction, GERD, Hepatic steatosis, Hyperlipidemia, HTN, Neuropathy, Alcohol related Pancreatitis, Psychiatric disorder, Tobacco abuse     Past Surgical History: Right ANKLE FRACTURE SURGERY, Left INGUINAL HERNIA REPAIR, Right KNEE SURGERY, UMBILICAL HERNIA REPAIR      Pt presents to ED c/o persistent diffuse upper abd pain with radiation into back, nausea, decreased PO intake, constipation, states no BM \"since being dc from here on 8/12\", 16 days ago.  Pt states only tried to drink coffee today no food.  Can't sleep bc pain/pressure.  Last drink of ETOH 8 weeks ago.  NO fever, cp, sob, urinary complaints, LE edema    Recent admission for pancreatitis A on C pancreatitis  States has MRI scheduled early Sept and GI FU in 2 days   CT abd/pelvis: Trace stranding about the pancreas may be due to pancreatitis, correlate with lipase. No discrete peripancreatic collection   Repeat CT abd/pelvis: Unchanged mild peripancreatic fat stranding consistent with acute interstitial pancreatitis (series 201 images 60-72.) No evidence of peripancreatic fluid collections.   GUSTAVO: Suspected ATN with intermittent HTN, pancreatitis, hypovolemia, ACEi, NSAID, contrast                   Prior to Admission Medications   Prescriptions Last Dose Informant Patient Reported? Taking?   Accu-Chek FastClix Lancets MISC   No No   Sig: USE AS DIRECTED   Blood Glucose Monitoring Suppl (Accu-Chek Stefanie Plus) w/Device KIT   No No   Sig: USE AS DIRECTED   amLODIPine (NORVASC) 5 mg tablet   No No   Sig: Take 1 tablet (5 mg total) by mouth daily   fenofibrate 160 MG tablet   No No   Sig: TAKE 1 TABLET EVERY DAY "   gabapentin (NEURONTIN) 300 mg capsule   No No   Sig: Take 1 capsule (300 mg total) by mouth 3 (three) times a day   glimepiride (AMARYL) 1 mg tablet   No No   Sig: Take 1 tablet (1 mg total) by mouth daily with breakfast   lidocaine (Lidoderm) 5 %   No No   Sig: Apply 2 patches topically over 12 hours daily Remove & Discard patch within 12 hours or as directed by MD   magnesium oxide (MAG-OX) 400 mg   No No   Sig: Take 1 tablet (400 mg total) by mouth daily   metFORMIN (GLUCOPHAGE-XR) 500 mg 24 hr tablet   No No   Sig: Take 1 tablet (500 mg total) by mouth 2 (two) times a day with meals   methocarbamol (ROBAXIN) 500 mg tablet   No No   Sig: Take 1 tablet (500 mg total) by mouth 3 (three) times a day as needed for muscle spasms   omeprazole (PriLOSEC) 40 MG capsule   No No   Sig: Take 1 capsule (40 mg total) by mouth 2 (two) times a day   pioglitazone (ACTOS) 45 mg tablet   No No   Sig: Take 1 tablet (45 mg total) by mouth daily   polyethylene glycol (MIRALAX) 17 g packet   No No   Sig: Take 17 g by mouth daily   sertraline (ZOLOFT) 100 mg tablet   No No   Sig: Take 1.5 tablets (150 mg total) by mouth daily   simvastatin (ZOCOR) 20 mg tablet   No No   Sig: Take 1 tablet (20 mg total) by mouth daily      Facility-Administered Medications: None       Past Medical History:   Diagnosis Date    Alcohol abuse     Back pain     Chronic low back pain     Depression     Diabetes mellitus (HCC)     DM2 (diabetes mellitus, type 2) (HCC)     Erectile dysfunction     GERD (gastroesophageal reflux disease)     Hepatic steatosis     Hyperlipidemia     Hypertension     Neuropathy     Pancreatitis     Psychiatric disorder     Tobacco abuse        Past Surgical History:   Procedure Laterality Date    ANKLE FRACTURE SURGERY Right     ANKLE SURGERY Right     INGUINAL HERNIA REPAIR Left     KNEE SURGERY Right     UMBILICAL HERNIA REPAIR         Family History   Problem Relation Age of Onset    Lymphoma Mother     No Known Problems  Father      I have reviewed and agree with the history as documented.    E-Cigarette/Vaping    E-Cigarette Use Never User      E-Cigarette/Vaping Substances    Nicotine No     THC No     CBD No     Flavoring No     Other No     Unknown No      Social History     Tobacco Use    Smoking status: Every Day     Current packs/day: 1.00     Average packs/day: 1 pack/day for 46.7 years (46.7 ttl pk-yrs)     Types: Cigarettes     Start date: 1978    Smokeless tobacco: Never    Tobacco comments:     per [facility]-quit   Vaping Use    Vaping status: Never Used   Substance Use Topics    Alcohol use: Not Currently     Alcohol/week: 100.0 standard drinks of alcohol     Types: 100 Cans of beer per week     Comment: heavy drinker    Drug use: Yes     Frequency: 21.0 times per week     Types: Marijuana     Comment: marijuana each day       Review of Systems   Constitutional:  Positive for appetite change.   HENT:  Negative for sore throat.    Respiratory:  Negative for cough and shortness of breath.    Cardiovascular:  Negative for chest pain.   Gastrointestinal:  Positive for abdominal pain, constipation and nausea. Negative for vomiting.   Genitourinary:  Negative for dysuria and frequency.   Musculoskeletal:  Negative for arthralgias and myalgias.   Neurological:  Negative for dizziness and weakness.   Psychiatric/Behavioral:  Negative for behavioral problems.    All other systems reviewed and are negative.      Physical Exam  Physical Exam  Vitals and nursing note reviewed.   Constitutional:       General: He is in acute distress (mild distress).      Appearance: He is well-developed.   HENT:      Head: Normocephalic and atraumatic.      Right Ear: External ear normal.      Left Ear: External ear normal.      Nose: Nose normal.      Mouth/Throat:      Mouth: Mucous membranes are moist.      Pharynx: Oropharynx is clear.   Eyes:      Conjunctiva/sclera: Conjunctivae normal.   Cardiovascular:      Rate and Rhythm: Regular rhythm.  Tachycardia present.   Pulmonary:      Effort: Pulmonary effort is normal.      Breath sounds: Normal breath sounds.   Abdominal:      General: Bowel sounds are normal.      Palpations: Abdomen is soft.      Tenderness: There is abdominal tenderness (diffuse upper).   Genitourinary:     Rectum: Normal.      Comments: No stool noted in vault  Musculoskeletal:         General: Normal range of motion.      Cervical back: Normal range of motion.   Skin:     General: Skin is warm and dry.   Neurological:      Mental Status: He is alert and oriented to person, place, and time.   Psychiatric:         Behavior: Behavior normal.         Vital Signs  ED Triage Vitals   Temperature Pulse Respirations Blood Pressure SpO2   08/28/24 1731 08/28/24 1731 08/28/24 1731 08/28/24 1731 08/28/24 1731   98.1 °F (36.7 °C) (!) 111 17 (!) 180/94 98 %      Temp Source Heart Rate Source Patient Position - Orthostatic VS BP Location FiO2 (%)   08/28/24 1731 08/28/24 1857 08/28/24 1731 08/28/24 1731 --   Oral Monitor Lying Left arm       Pain Score       08/28/24 1731       10 - Worst Possible Pain           Vitals:    08/28/24 1731 08/28/24 1857   BP: (!) 180/94 165/77   Pulse: (!) 111 84   Patient Position - Orthostatic VS: Lying Lying         Visual Acuity      ED Medications  Medications   sodium chloride 0.9 % bolus 1,000 mL (1,000 mL Intravenous New Bag 8/28/24 1810)   ondansetron (ZOFRAN) injection 4 mg (4 mg Intravenous Given 8/28/24 1815)   oxyCODONE-acetaminophen (PERCOCET) 5-325 mg per tablet 1 tablet (1 tablet Oral Given 8/28/24 1818)   pantoprazole (PROTONIX) injection 40 mg (40 mg Intravenous Given 8/28/24 1821)   HYDROmorphone (DILAUDID) injection 0.5 mg (0.5 mg Intravenous Given 8/28/24 1942)       Diagnostic Studies  Results Reviewed       Procedure Component Value Units Date/Time    Lactic acid, plasma (w/reflex if result > 2.0) [[id number]]  (Normal) Collected: 08/28/24 1857    Lab Status: Final result Specimen: Blood from  Arm, Right Updated: 08/28/24 1917     LACTIC ACID 1.1 mmol/L     Narrative:      Result may be elevated if tourniquet was used during collection.    FLU/RSV/COVID - if FLU/RSV clinically relevant [[id number]]  (Normal) Collected: 08/28/24 1831    Lab Status: Final result Specimen: Nares from Nose Updated: 08/28/24 1914     SARS-CoV-2 Negative     INFLUENZA A PCR Negative     INFLUENZA B PCR Negative     RSV PCR Negative    Narrative:      This test has been performed using the CoV-2/Flu/RSV plus assay on the [employer] platform. This test has been validated by the  and verified by the performing laboratory.     This test is designed to amplify and detect the following: nucleocapsid (N), envelope (E), and RNA-dependent RNA polymerase (RdRP) genes of the SARS-CoV-2 genome; matrix (M), basic polymerase (PB2), and acidic protein (PA) segments of the influenza A genome; matrix (M) and non-structural protein (NS) segments of the influenza B genome, and the nucleocapsid genes of RSV A and RSV B.     Positive results are indicative of the presence of Flu A, Flu B, RSV, and/or SARS-CoV-2 RNA. Positive results for SARS-CoV-2 or suspected novel influenza should be reported to state, local, or federal health departments according to local reporting requirements.      All results should be assessed in conjunction with clinical presentation and other laboratory markers for clinical management.     FOR PEDIATRIC PATIENTS - copy/paste COVID Guidelines URL to browser: [email or web address]       Comprehensive metabolic panel [[id number]]  (Abnormal) Collected: 08/28/24 1828    Lab Status: Final result Specimen: Blood from Arm, Right Updated: 08/28/24 1857     Sodium 134 mmol/L      Potassium 4.0 mmol/L      Chloride 101 mmol/L      CO2 21 mmol/L      ANION GAP 12 mmol/L      BUN 9 mg/dL      Creatinine 0.78 mg/dL      Glucose 316 mg/dL      Calcium 9.6 mg/dL       AST 22 U/L      ALT 22 U/L      Alkaline Phosphatase 92 U/L      Total Protein 7.8 g/dL      Albumin 4.1 g/dL      Total Bilirubin 0.75 mg/dL      eGFR 97 ml/min/1.73sq m     Narrative:      National Kidney Disease Foundation guidelines for Chronic Kidney Disease (CKD):     Stage 1 with normal or high GFR (GFR > 90 mL/min/1.73 square meters)    Stage 2 Mild CKD (GFR = 60-89 mL/min/1.73 square meters)    Stage 3A Moderate CKD (GFR = 45-59 mL/min/1.73 square meters)    Stage 3B Moderate CKD (GFR = 30-44 mL/min/1.73 square meters)    Stage 4 Severe CKD (GFR = 15-29 mL/min/1.73 square meters)    Stage 5 End Stage CKD (GFR <15 mL/min/1.73 square meters)  Note: GFR calculation is accurate only with a steady state creatinine    Lipase [[id number]]  (Abnormal) Collected: 08/28/24 1828    Lab Status: Final result Specimen: Blood from Arm, Right Updated: 08/28/24 1857     Lipase 432 u/L     Ethanol [[id number]]  (Normal) Collected: 08/28/24 1828    Lab Status: Final result Specimen: Blood from Arm, Right Updated: 08/28/24 1855     Ethanol Lvl <10 mg/dL     CBC and differential [[id number]]  (Abnormal) Collected: 08/28/24 1828    Lab Status: Final result Specimen: Blood from Arm, Right Updated: 08/28/24 1838     WBC 8.69 Thousand/uL      RBC 4.95 Million/uL      Hemoglobin 16.2 g/dL      Hematocrit 45.7 %      MCV 92 fL      MCH 32.7 pg      MCHC 35.4 g/dL      RDW 11.3 %      MPV 9.9 fL      Platelets 270 Thousands/uL      nRBC 0 /100 WBCs      Segmented % 59 %      Immature Grans % 0 %      Lymphocytes % 28 %      Monocytes % 9 %      Eosinophils Relative 2 %      Basophils Relative 2 %      Absolute Neutrophils 5.22 Thousands/µL      Absolute Immature Grans 0.03 Thousand/uL      Absolute Lymphocytes 2.40 Thousands/µL      Absolute Monocytes 0.74 Thousand/µL      Eosinophils Absolute 0.17 Thousand/µL      Basophils Absolute 0.13 Thousands/µL     Rapid drug screen, urine [[id number]]     Lab Status: No result Specimen: Urine      UA w Reflex to Microscopic w Reflex to Culture [[id number]]     Lab Status: No result Specimen: Urine, Clean Catch                    No orders to display              Procedures  Procedures         ED Course  ED Course as of 08/28/24 1946   Wed Aug 28, 2024   1833 Pt asking for Aspirin for HA, but just given Percocet   1859 ETHANOL: <10  normal   1859 CBC unremarkable   1859 GLUCOSE(!): 316  Elevated                                   SBIRT 22yo+      Flowsheet Row Most Recent Value   Initial Alcohol Screen: US AUDIT-C     1. How often do you have a drink containing alcohol? 0 Filed at: 08/28/2024 1734   2. How many drinks containing alcohol do you have on a typical day you are drinking?  0 Filed at: 08/28/2024 1734   3a. Male UNDER 65: How often do you have five or more drinks on one occasion? 0 Filed at: 08/28/2024 1734   Audit-C Score 0 Filed at: 08/28/2024 1734   JHON: How many times in the past year have you...    Used an illegal drug or used a prescription medication for non-medical reasons? Never Filed at: 08/28/2024 1734                      Medical Decision Making  Pt with A/C pancreatitis, unclear etiology, recent admission, FU with GI pending, MR/MRCP pending, returns with persistent pain, constipation, rectal vault is void of any stool abdomen is soft, nondistended, patient not vomiting, no fever here  We will repeat some labs treat pain, as patient is uncomfortable appearing  Patient denies recent alcohol use states been sober for 8 weeks is trying to quit  Lipase is elevated, will recommend admission to keep n.p.o. will treat pain, give IV fluids, prompt GI follow-up    Amount and/or Complexity of Data Reviewed  External Data Reviewed: labs, radiology and notes.  Labs: ordered. Decision-making details documented in ED Course.    Risk  Prescription drug management.  Decision regarding hospitalization.                 Disposition  Final diagnoses:   Pain of upper abdomen   Pancreatitis     Time reflects  when diagnosis was documented in both MDM as applicable and the Disposition within this note       Time User Action Codes Description Comment    8/28/2024  7:38 PM Linnea Cisneros [R10.10] Pain of upper abdomen     8/28/2024  7:38 PM Linnea Cisneros [K85.90] Pancreatitis           ED Disposition       ED Disposition   Admit    Condition   Stable    Date/Time   Wed Aug 28, 2024 1938    Comment   Case was discussed with [facility] and the patient's admission status was agreed to be Admission Status: observation status to the service of Dr. Adorno .               Follow-up Information    None         Patient's Medications   Discharge Prescriptions    No medications on file       No discharge procedures on file.    PDMP Review         Value Time User    PDMP Reviewed  Yes 8/12/2024 12:21 PM Laureen Jansen PA-C            ED Provider  Electronically Signed by             Linnea Cisneros PA-C  08/28/24 1946

## 2024-08-29 ENCOUNTER — APPOINTMENT (OUTPATIENT)
Dept: MRI IMAGING | Facility: HOSPITAL | Age: 59
DRG: 440 | End: 2024-08-29
Payer: COMMERCIAL

## 2024-08-29 PROBLEM — K85.90 ACUTE ON CHRONIC PANCREATITIS (HCC): Status: ACTIVE | Noted: 2024-08-28

## 2024-08-29 PROBLEM — K86.1 ACUTE ON CHRONIC PANCREATITIS (HCC): Status: ACTIVE | Noted: 2024-08-28

## 2024-08-29 PROBLEM — Z72.0 TOBACCO ABUSE: Status: ACTIVE | Noted: 2022-03-10

## 2024-08-29 LAB
ALBUMIN SERPL BCG-MCNC: 3.6 G/DL (ref 3.5–5)
ALP SERPL-CCNC: 75 U/L (ref 34–104)
ALT SERPL W P-5'-P-CCNC: 20 U/L (ref 7–52)
ANION GAP SERPL CALCULATED.3IONS-SCNC: 6 MMOL/L (ref 4–13)
AST SERPL W P-5'-P-CCNC: 22 U/L (ref 13–39)
ATRIAL RATE: 74 BPM
ATRIAL RATE: 82 BPM
BASOPHILS # BLD AUTO: 0.11 THOUSANDS/ÂΜL (ref 0–0.1)
BASOPHILS NFR BLD AUTO: 2 % (ref 0–1)
BILIRUB SERPL-MCNC: 0.74 MG/DL (ref 0.2–1)
BUN SERPL-MCNC: 7 MG/DL (ref 5–25)
CALCIUM SERPL-MCNC: 8.9 MG/DL (ref 8.4–10.2)
CHLORIDE SERPL-SCNC: 103 MMOL/L (ref 96–108)
CO2 SERPL-SCNC: 29 MMOL/L (ref 21–32)
CREAT SERPL-MCNC: 0.72 MG/DL (ref 0.6–1.3)
EOSINOPHIL # BLD AUTO: 0.17 THOUSAND/ÂΜL (ref 0–0.61)
EOSINOPHIL NFR BLD AUTO: 3 % (ref 0–6)
ERYTHROCYTE [DISTWIDTH] IN BLOOD BY AUTOMATED COUNT: 11.6 % (ref 11.6–15.1)
GFR SERPL CREATININE-BSD FRML MDRD: 100 ML/MIN/1.73SQ M
GLUCOSE P FAST SERPL-MCNC: 137 MG/DL (ref 65–99)
GLUCOSE SERPL-MCNC: 104 MG/DL (ref 65–140)
GLUCOSE SERPL-MCNC: 107 MG/DL (ref 65–140)
GLUCOSE SERPL-MCNC: 132 MG/DL (ref 65–140)
GLUCOSE SERPL-MCNC: 137 MG/DL (ref 65–140)
GLUCOSE SERPL-MCNC: 139 MG/DL (ref 65–140)
GLUCOSE SERPL-MCNC: 288 MG/DL (ref 65–140)
GLUCOSE SERPL-MCNC: 298 MG/DL (ref 65–140)
GLUCOSE SERPL-MCNC: 88 MG/DL (ref 65–140)
HCT VFR BLD AUTO: 40.6 % (ref 36.5–49.3)
HGB BLD-MCNC: 13.8 G/DL (ref 12–17)
IMM GRANULOCYTES # BLD AUTO: 0.01 THOUSAND/UL (ref 0–0.2)
IMM GRANULOCYTES NFR BLD AUTO: 0 % (ref 0–2)
LIPASE SERPL-CCNC: 101 U/L (ref 11–82)
LYMPHOCYTES # BLD AUTO: 2.27 THOUSANDS/ÂΜL (ref 0.6–4.47)
LYMPHOCYTES NFR BLD AUTO: 40 % (ref 14–44)
MCH RBC QN AUTO: 32.9 PG (ref 26.8–34.3)
MCHC RBC AUTO-ENTMCNC: 34 G/DL (ref 31.4–37.4)
MCV RBC AUTO: 97 FL (ref 82–98)
MONOCYTES # BLD AUTO: 0.52 THOUSAND/ÂΜL (ref 0.17–1.22)
MONOCYTES NFR BLD AUTO: 9 % (ref 4–12)
NEUTROPHILS # BLD AUTO: 2.54 THOUSANDS/ÂΜL (ref 1.85–7.62)
NEUTS SEG NFR BLD AUTO: 46 % (ref 43–75)
NRBC BLD AUTO-RTO: 0 /100 WBCS
P AXIS: 44 DEGREES
P AXIS: 50 DEGREES
PLATELET # BLD AUTO: 197 THOUSANDS/UL (ref 149–390)
PMV BLD AUTO: 10 FL (ref 8.9–12.7)
POTASSIUM SERPL-SCNC: 3.7 MMOL/L (ref 3.5–5.3)
PR INTERVAL: 192 MS
PR INTERVAL: 196 MS
PROT SERPL-MCNC: 6.6 G/DL (ref 6.4–8.4)
QRS AXIS: 11 DEGREES
QRS AXIS: 12 DEGREES
QRSD INTERVAL: 92 MS
QRSD INTERVAL: 98 MS
QT INTERVAL: 384 MS
QT INTERVAL: 398 MS
QTC INTERVAL: 441 MS
QTC INTERVAL: 448 MS
RBC # BLD AUTO: 4.19 MILLION/UL (ref 3.88–5.62)
SODIUM SERPL-SCNC: 138 MMOL/L (ref 135–147)
T WAVE AXIS: 5 DEGREES
T WAVE AXIS: 9 DEGREES
VENTRICULAR RATE: 74 BPM
VENTRICULAR RATE: 82 BPM
WBC # BLD AUTO: 5.62 THOUSAND/UL (ref 4.31–10.16)

## 2024-08-29 PROCEDURE — 93010 ELECTROCARDIOGRAM REPORT: CPT | Performed by: INTERNAL MEDICINE

## 2024-08-29 PROCEDURE — 74183 MRI ABD W/O CNTR FLWD CNTR: CPT

## 2024-08-29 PROCEDURE — 83690 ASSAY OF LIPASE: CPT | Performed by: PHYSICIAN ASSISTANT

## 2024-08-29 PROCEDURE — 99232 SBSQ HOSP IP/OBS MODERATE 35: CPT | Performed by: INTERNAL MEDICINE

## 2024-08-29 PROCEDURE — 85025 COMPLETE CBC W/AUTO DIFF WBC: CPT | Performed by: PHYSICIAN ASSISTANT

## 2024-08-29 PROCEDURE — 99223 1ST HOSP IP/OBS HIGH 75: CPT | Performed by: INTERNAL MEDICINE

## 2024-08-29 PROCEDURE — 82948 REAGENT STRIP/BLOOD GLUCOSE: CPT

## 2024-08-29 PROCEDURE — A9585 GADOBUTROL INJECTION: HCPCS | Performed by: INTERNAL MEDICINE

## 2024-08-29 PROCEDURE — 80053 COMPREHEN METABOLIC PANEL: CPT | Performed by: PHYSICIAN ASSISTANT

## 2024-08-29 RX ORDER — MINERAL OIL 100 G/100G
1 OIL RECTAL ONCE
Status: COMPLETED | OUTPATIENT
Start: 2024-08-29 | End: 2024-08-29

## 2024-08-29 RX ORDER — LANOLIN ALCOHOL/MO/W.PET/CERES
100 CREAM (GRAM) TOPICAL DAILY
Status: DISCONTINUED | OUTPATIENT
Start: 2024-08-29 | End: 2024-08-31 | Stop reason: HOSPADM

## 2024-08-29 RX ORDER — HYDRALAZINE HYDROCHLORIDE 20 MG/ML
5 INJECTION INTRAMUSCULAR; INTRAVENOUS EVERY 6 HOURS PRN
Status: DISCONTINUED | OUTPATIENT
Start: 2024-08-29 | End: 2024-08-31 | Stop reason: HOSPADM

## 2024-08-29 RX ORDER — POLYETHYLENE GLYCOL 3350 17 G/17G
17 POWDER, FOR SOLUTION ORAL 2 TIMES DAILY
Status: DISCONTINUED | OUTPATIENT
Start: 2024-08-29 | End: 2024-08-31 | Stop reason: HOSPADM

## 2024-08-29 RX ORDER — FOLIC ACID 1 MG/1
1 TABLET ORAL DAILY
Status: DISCONTINUED | OUTPATIENT
Start: 2024-08-29 | End: 2024-08-31 | Stop reason: HOSPADM

## 2024-08-29 RX ORDER — GADOBUTROL 604.72 MG/ML
9 INJECTION INTRAVENOUS
Status: COMPLETED | OUTPATIENT
Start: 2024-08-29 | End: 2024-08-29

## 2024-08-29 RX ADMIN — OXYCODONE HYDROCHLORIDE 7.5 MG: 5 TABLET ORAL at 01:06

## 2024-08-29 RX ADMIN — NICOTINE 1 PATCH: 14 PATCH, EXTENDED RELEASE TRANSDERMAL at 08:02

## 2024-08-29 RX ADMIN — SODIUM CHLORIDE, SODIUM LACTATE, POTASSIUM CHLORIDE, AND CALCIUM CHLORIDE 200 ML/HR: .6; .31; .03; .02 INJECTION, SOLUTION INTRAVENOUS at 18:14

## 2024-08-29 RX ADMIN — AMLODIPINE BESYLATE 5 MG: 5 TABLET ORAL at 08:01

## 2024-08-29 RX ADMIN — HYDROMORPHONE HYDROCHLORIDE 0.5 MG: 1 INJECTION, SOLUTION INTRAMUSCULAR; INTRAVENOUS; SUBCUTANEOUS at 16:50

## 2024-08-29 RX ADMIN — PANTOPRAZOLE SODIUM 40 MG: 40 TABLET, DELAYED RELEASE ORAL at 06:46

## 2024-08-29 RX ADMIN — FOLIC ACID 1 MG: 1 TABLET ORAL at 11:09

## 2024-08-29 RX ADMIN — FENOFIBRATE 145 MG: 145 TABLET ORAL at 08:01

## 2024-08-29 RX ADMIN — DOCUSATE SODIUM 100 MG: 100 CAPSULE, LIQUID FILLED ORAL at 08:01

## 2024-08-29 RX ADMIN — Medication 1 TABLET: at 11:09

## 2024-08-29 RX ADMIN — PRAVASTATIN SODIUM 40 MG: 40 TABLET ORAL at 16:44

## 2024-08-29 RX ADMIN — POLYETHYLENE GLYCOL 3350 17 G: 17 POWDER, FOR SOLUTION ORAL at 18:11

## 2024-08-29 RX ADMIN — GABAPENTIN 300 MG: 300 CAPSULE ORAL at 08:01

## 2024-08-29 RX ADMIN — ENOXAPARIN SODIUM 40 MG: 40 INJECTION SUBCUTANEOUS at 08:02

## 2024-08-29 RX ADMIN — GABAPENTIN 300 MG: 300 CAPSULE ORAL at 16:44

## 2024-08-29 RX ADMIN — SODIUM CHLORIDE, SODIUM LACTATE, POTASSIUM CHLORIDE, AND CALCIUM CHLORIDE 200 ML/HR: .6; .31; .03; .02 INJECTION, SOLUTION INTRAVENOUS at 23:59

## 2024-08-29 RX ADMIN — OXYCODONE HYDROCHLORIDE 5 MG: 5 TABLET ORAL at 11:13

## 2024-08-29 RX ADMIN — HYDROMORPHONE HYDROCHLORIDE 0.5 MG: 1 INJECTION, SOLUTION INTRAMUSCULAR; INTRAVENOUS; SUBCUTANEOUS at 08:01

## 2024-08-29 RX ADMIN — PANTOPRAZOLE SODIUM 40 MG: 40 TABLET, DELAYED RELEASE ORAL at 16:44

## 2024-08-29 RX ADMIN — HYDROMORPHONE HYDROCHLORIDE 0.5 MG: 1 INJECTION, SOLUTION INTRAMUSCULAR; INTRAVENOUS; SUBCUTANEOUS at 03:35

## 2024-08-29 RX ADMIN — MINERAL OIL 1 ENEMA: 100 ENEMA RECTAL at 11:14

## 2024-08-29 RX ADMIN — GABAPENTIN 300 MG: 300 CAPSULE ORAL at 21:07

## 2024-08-29 RX ADMIN — GADOBUTROL 9 ML: 604.72 INJECTION INTRAVENOUS at 12:19

## 2024-08-29 RX ADMIN — SODIUM CHLORIDE, SODIUM LACTATE, POTASSIUM CHLORIDE, AND CALCIUM CHLORIDE 200 ML/HR: .6; .31; .03; .02 INJECTION, SOLUTION INTRAVENOUS at 03:40

## 2024-08-29 RX ADMIN — DOCUSATE SODIUM 100 MG: 100 CAPSULE, LIQUID FILLED ORAL at 17:16

## 2024-08-29 RX ADMIN — ACETAMINOPHEN 650 MG: 325 TABLET, FILM COATED ORAL at 09:11

## 2024-08-29 RX ADMIN — THIAMINE HCL TAB 100 MG 100 MG: 100 TAB at 11:09

## 2024-08-29 RX ADMIN — POLYETHYLENE GLYCOL 3350 17 G: 17 POWDER, FOR SOLUTION ORAL at 08:02

## 2024-08-29 RX ADMIN — HYDROMORPHONE HYDROCHLORIDE 0.5 MG: 1 INJECTION, SOLUTION INTRAMUSCULAR; INTRAVENOUS; SUBCUTANEOUS at 13:20

## 2024-08-29 RX ADMIN — METHYLNALTREXONE BROMIDE 12 MG: 12 INJECTION, SOLUTION SUBCUTANEOUS at 18:13

## 2024-08-29 RX ADMIN — SERTRALINE HYDROCHLORIDE 150 MG: 50 TABLET ORAL at 08:01

## 2024-08-29 RX ADMIN — HYDROMORPHONE HYDROCHLORIDE 0.5 MG: 1 INJECTION, SOLUTION INTRAMUSCULAR; INTRAVENOUS; SUBCUTANEOUS at 21:05

## 2024-08-29 RX ADMIN — OXYCODONE HYDROCHLORIDE 7.5 MG: 5 TABLET ORAL at 19:44

## 2024-08-29 NOTE — ASSESSMENT & PLAN NOTE
Patient states he cannot remember the last time he had a BM, states he thinks it was when he was in the hospital last 08/12   Reports passing flatus. On physical exam with active bowel sounds.   Start bowel regimen: colace BID, Miralax daily

## 2024-08-29 NOTE — CASE MANAGEMENT
Case Management Assessment & Discharge Planning Note    Patient name Michael Briones  Location /-01 MRN [id number]  : 1965 Date 2024       Current Admission Date: 2024  Current Admission Diagnosis:Epigastric abdominal pain   Patient Active Problem List    Diagnosis Date Noted Date Diagnosed    Constipation 2024     Epigastric abdominal pain 2024     Other constipation 2024     Acute pancreatitis 2024     Hyponatremia 2024     Sore throat 2024     Multiple fractures of ribs, bilateral, initial encounter for closed fracture 2023     Episode of recurrent major depressive disorder (HCC) 11/10/2023     Chronic superficial gastritis without bleeding 11/10/2023     Rib pain on right side 10/03/2022     Peyronie's disease 10/03/2022     Cigarette nicotine dependence without complication 03/10/2022     GUSTAVO (acute kidney injury) (HCC) 2021     Fall 2021     Injury of right ankle 2021     Closed fracture of proximal end of left humerus 2021     Acute pain due to trauma 2021     Other fatigue 2021     Vasculogenic erectile dysfunction 2021     Leukocytosis 2021     Abnormal skin growth 2020     Chronic bilateral low back pain with bilateral sciatica 10/14/2020     GERD (gastroesophageal reflux disease) 10/14/2020     Neuropathy of right foot 10/14/2020     Alcohol abuse 2019     Lactic acidosis 2019     Type 2 diabetes mellitus (HCC) 2019     Hepatic steatosis 2018     Renal cyst 2018     Essential hypertension 2018     Dyslipidemia 2018     Pancreatitis, acute 2018       LOS (days): 0  Geometric Mean LOS (GMLOS) (days):   Days to GMLOS:     OBJECTIVE:     Current admission status: Observation     Preferred Pharmacy:   [id number] - [place] - [address]  [address] [place] [address]  Phone: [phone] Fax:  [phone]    [facility] - [place], [place] - [address]  [address] [address]  Phone: [phone] Fax: [phone]    Primary Care Provider: Annmarie Dunaway MD    Primary Insurance: [employer] REP  Secondary Insurance:     ASSESSMENT:  Active Health Care Proxies    There are no active Health Care Proxies on file.       Readmission Root Cause  30 Day Readmission: Yes  Who directed you to return to the hospital?: Self  Did you understand whom to contact if you had questions or problems?: Yes  Did you get your prescriptions before you left the hospital?: No  Reason:: Declined service  Were you able to get your prescriptions filled when you left the hospital?: Yes  Did you take your medications as prescribed?: Yes  Were you able to get to your follow-up appointments?: Yes  During previous admission, was a post-acute recommendation made?: No  Patient was readmitted due to: Abdominal Pain  Action Plan: Clinical work up and referrals as indicated    Patient Information  Admitted from:: Home  Mental Status: Alert  During Assessment patient was accompanied by: Not accompanied during assessment  Assessment information provided by:: Patient  Primary Caregiver: Self  Support Systems: Self, Children, Family members  County of Residence: [place]  What city do you live in?: [place]  Home entry access options. Select all that apply.: Stairs  Number of steps to enter home.: 3  Do the steps have railings?: Yes  Type of Current Residence: [address] home  Upon entering residence, is there a bedroom on the main floor (no further steps)?: No  A bedroom is located on the following floor levels of residence (select all that apply):: 2nd Floor  Upon entering residence, is there a bathroom on the main floor (no further steps)?: Yes  Number of steps to 2nd floor from main floor: One Flight  Living Arrangements: Lives w/ Son  Is patient a ?: No    Activities of Daily Living Prior to  Admission  Functional Status: Independent  Completes ADLs independently?: Yes  Ambulates independently?: Yes  Does patient use assisted devices?: No  Does patient currently own DME?: Yes  What DME does the patient currently own?: [employer]  Does patient have a history of Outpatient Therapy (PT/OT)?: Yes  Does the patient have a history of Short-Term Rehab?: No  Does patient have a history of HHC?: No  Does patient currently have HHC?: No    Patient Information Continued  Income Source: SSI/SSD  Does patient have prescription coverage?: Yes  Does patient receive dialysis treatments?: No  Does patient have a history of substance abuse?: Yes  Historical substance use preference: Alcohol/ETOH  History of Withdrawal Symptoms: Denies past symptoms  Is patient currently in treatment for substance abuse?: N/A - sober  Does patient have a history of Mental Health Diagnosis?: No    Means of Transportation  Means of Transport to Appts:: Drives Self    Social Determinants of Health (SDOH)      Flowsheet Row Most Recent Value   Housing Stability    In the last 12 months, was there a time when you were not able to pay the mortgage or rent on time? N   In the past 12 months, how many times have you moved where you were living? 0   At any time in the past 12 months, were you homeless or living in a shelter (including now)? N   Transportation Needs    In the past 12 months, has lack of transportation kept you from medical appointments or from getting medications? no   In the past 12 months, has lack of transportation kept you from meetings, work, or from getting things needed for daily living? No   Food Insecurity    Within the past 12 months, you worried that your food would run out before you got the money to buy more. Never true   Within the past 12 months, the food you bought just didn't last and you didn't have money to get more. Never true   Utilities    In the past 12 months has the electric, gas, oil, or water company  threatened to shut off services in your home? No          DISCHARGE DETAILS:    Discharge planning discussed with:: Patient  Freedom of Choice: Yes     CM contacted family/caregiver?: No- see comments (Patient declined)  Were Treatment Team discharge recommendations reviewed with patient/caregiver?: Yes  Did patient/caregiver verbalize understanding of patient care needs?: Yes  Were patient/caregiver advised of the risks associated with not following Treatment Team discharge recommendations?: Yes    Contacts  Patient Contacts: Abey (daughter)  Relationship to Patient:: Family  Contact Method: Phone  Phone Number: [phone]  Reason/Outcome: Emergency Contact    Requested Home Health Care         Is the patient interested in HHC at discharge?: No    DME Referral Provided  Referral made for DME?: No    Other Referral/Resources/Interventions Provided:  Interventions: None Indicated    Would you like to participate in our [facility] service program?  : No - Declined    Treatment Team Recommendation: Home  Discharge Destination Plan:: Home  Transport at Discharge : Family    CM met with the patient at bedside. Patient reports he lives with his son at home and has family support. Patient reports being sober from alcohol for 8 weeks and denied any withdrawal symptoms. Patient denies having any unmet need at home/in community. No discharge needs anticipated.

## 2024-08-29 NOTE — UTILIZATION REVIEW
WAS OBSERVATION 8/28/24 @ 1925 CONVERTED TO INPATIENT ADMISSION 8/29/24 @ 1350 DUE TO CONTINUED STAY REQUIRED TO CARE FOR PATIENT WITH Acute on chronic Pancreatitis.    Initial Clinical Review    Admission: Date/Time/Statement:   Admission Orders (From admission, onward)       Ordered        08/29/24 1350  INPATIENT ADMISSION  Once            08/28/24 1925  Place in Observation  Once                          Orders Placed This Encounter   Procedures    INPATIENT ADMISSION     Standing Status:   Standing     Number of Occurrences:   1     Order Specific Question:   Level of Care     Answer:   Med Surg [16]     Order Specific Question:   Estimated length of stay     Answer:   More than 2 Midnights     Order Specific Question:   Certification     Answer:   I certify that inpatient services are medically necessary for this patient for a duration of greater than two midnights. See H&P and MD Progress Notes for additional information about the patient's course of treatment.     ED Arrival Information       Expected   -    Arrival   8/28/2024 17:22    Acuity   Urgent              Means of arrival   Walk-In    Escorted by   Self    Service   Hospitalist    Admission type   Emergency              Arrival complaint   flank pain             Chief Complaint   Patient presents with    Abdominal Pain     Recent admission for pancreatitis.  Pt reports pain was still there when he was discharged.  Has not had bm since 8/12.  Had had progressively worsening pain since       Initial Presentation: 59 y.o. male who presented self from home to [facility] ED. Admitted as Observation for evaluation and treatment of Epigastric abd pain.     PMHx:  has a past medical history of Alcohol abuse, Back pain, Chronic low back pain, Depression, Diabetes mellitus (HCC), DM2 (diabetes mellitus, type 2) (HCC), Erectile dysfunction, GERD (gastroesophageal reflux disease), Hepatic steatosis, Hyperlipidemia, Hypertension, Neuropathy,  Pancreatitis, Psychiatric disorder, and Tobacco abuse.      Presented w/ persistent and progressively worsened epigastric abdominal pain since he was discharged from the hospital 08/12/24 for acute pancreatitis. Reports when he got home the epigastric abd pain was mild but tolerable and thought it'll get better but got worse. Pt reports has not drank alcohol since he was admitted last. Reports he cannot remember the last time he had a BM. On exam, abd tenderness noted in the epigastric area without guarding. + bowel sounds.  Labs Na 134, Glucose 316, Lipase 432, Ethanol <10. MRI/MRCP results:  Mild edema about the pancreatic body in keeping with mild residual or recurrent interstitial edematous pancreatitis. Adjacent trace quantity of nonloculated fluid without a discrete collection.  Normal MRCP without biliary dilation or choledocholithiasis.In the ED, pt was given IV bolus NS 1 L then started on LR IV @ 200 ml/hr. Pt was also given Zofran IV x1, Protonix IV x1 and Dilaudid IV x2 in the ED.     Plan: NPO, Aggressive IV fluids 200 ml/hr, Pain control and antiemetic,  Start bowel regimen: Colace and Miralax. PPI. GI consulted.    8/29/24 BED STATUS CHANGED TO INPATIENT    GI Consult: Acute Pancreatitis. Constipation.  Pt with worsening epigastric pain radiating to his back associated with nausea after last discharge. Lipase 432 on admission but improved to 101 today. No leukocytosis, fevers. Renal function, liver enzymes normal. He is on fenofibrate with recent Triglycerides WNL at 99 and RUQ US with no gallbladder findings. Igg4 in 2022 WNL. Reports no alcohol consumption for 8 weeks, but continues to smoke. Plan: NPO, IV fluids LR @ 200 ml/hr. Pain control. Antiemetics, Home dose PPI, Monitor CBC, CMP, Strict I&O. Continue Miralax. Fleets enema x1. Relister SQ x1, Encourage OOB as tolerated.     Date: 8/30/24  Day 3: Has surpassed a 2nd midnight with active treatments and services.   Pt reports abd pain is  improving. Reports 9/10 but looks comfortable. Has not had a BM yet but is passing gas. On exam, pt's abd soft with epigastric area tender, non-distended with normal BS. Plan: Advance diet to low-fat diet, Decrease IV fluid if tolerating diet, Continue Pain control and antiemetics, home dose PPI daily. Continue Miralax BID, Relister SQ x1, Milk of Magnesia, Monitor stool output. Encourage OOB. Monitor CBC, CMP.     ED Triage Vitals [08/28/24 1731]   Temperature Pulse Respirations Blood Pressure SpO2 Pain Score   98.1 °F (36.7 °C) (!) 111 17 (!) 180/94 98 % 10 - Worst Possible Pain     Weight (last 2 days)       Date/Time Weight    08/28/24 2225 97.9 (215.8)    08/28/24 1731 101 (223)            Vital Signs (last 3 days)       Date/Time Temp Pulse Resp BP MAP (mmHg) SpO2 O2 Device Patient Position - Orthostatic VS Pain    08/29/24 1320 -- -- -- -- -- -- -- -- 9    08/29/24 1113 -- -- -- -- -- -- -- -- 10 - Worst Possible Pain    08/29/24 0911 -- -- -- -- -- -- -- -- 10 - Worst Possible Pain    08/29/24 0830 -- -- -- -- -- -- -- -- 10 - Worst Possible Pain    08/29/24 0655 97.1 °F (36.2 °C) 65 16 148/84 -- 96 % None (Room air) Lying --    08/29/24 0335 -- -- -- -- -- -- -- -- 10 - Worst Possible Pain    08/29/24 0106 -- -- -- -- -- -- -- -- 10 - Worst Possible Pain    08/28/24 2300 -- -- -- -- -- -- -- -- 10 - Worst Possible Pain    08/28/24 2225 98.3 °F (36.8 °C) 79 16 137/67 95 98 % None (Room air) Lying 10 - Worst Possible Pain    08/28/24 2142 -- -- -- -- -- -- -- -- 10 - Worst Possible Pain    08/28/24 2139 -- 79 15 116/54 -- -- -- -- --    08/28/24 2114 -- -- -- -- -- -- -- -- 10 - Worst Possible Pain    08/28/24 1942 -- -- -- -- -- -- -- -- 10 - Worst Possible Pain    08/28/24 1909 -- -- -- -- -- -- -- -- 10 - Worst Possible Pain    08/28/24 1857 -- 84 18 165/77 -- 98 % None (Room air) Lying --    08/28/24 1818 -- -- -- -- -- -- -- -- 10 - Worst Possible Pain    08/28/24 1731 98.1 °F (36.7 °C) 111 17 180/94 --  98 % None (Room air) Lying 10 - Worst Possible Pain              Pertinent Labs/Diagnostic Test Results:   Radiology:  MRI abdomen w wo contrast and mrcp   Final Interpretation by Jeronimo Cooper MD (08/29 1443)      Mild edema about the pancreatic body in keeping with mild residual or recurrent interstitial edematous pancreatitis. Adjacent trace quantity of nonloculated fluid without a discrete collection      Normal MRCP without biliary dilation or choledocholithiasis.      The study was marked in EPIC for immediate notification.      Workstation performed: [id number]             Results from last 7 days   Lab Units 08/28/24  1831   SARS-COV-2  Negative     Results from last 7 days   Lab Units 08/29/24  0437 08/28/24  1828   WBC Thousand/uL 5.62 8.69   HEMOGLOBIN g/dL 13.8 16.2   HEMATOCRIT % 40.6 45.7   PLATELETS Thousands/uL 197 270   TOTAL NEUT ABS Thousands/µL 2.54 5.22         Results from last 7 days   Lab Units 08/29/24  0437 08/28/24  1828   SODIUM mmol/L 138 134*   POTASSIUM mmol/L 3.7 4.0   CHLORIDE mmol/L 103 101   CO2 mmol/L 29 21   ANION GAP mmol/L 6 12   BUN mg/dL 7 9   CREATININE mg/dL 0.72 0.78   EGFR ml/min/1.73sq m 100 97   CALCIUM mg/dL 8.9 9.6     Results from last 7 days   Lab Units 08/29/24  0437 08/28/24  1828   AST U/L 22 22   ALT U/L 20 22   ALK PHOS U/L 75 92   TOTAL PROTEIN g/dL 6.6 7.8   ALBUMIN g/dL 3.6 4.1   TOTAL BILIRUBIN mg/dL 0.74 0.75     Results from last 7 days   Lab Units 08/29/24  1101 08/29/24  0717 08/28/24  2253 08/28/24  2146   POC GLUCOSE mg/dl 132 139 288* 298*     Results from last 7 days   Lab Units 08/29/24 0437 08/28/24  1828   GLUCOSE RANDOM mg/dL 137 316*     BETA-HYDROXYBUTYRATE   Date Value Ref Range Status   09/20/2021 0.5 <0.6 mmol/L Final      Results from last 7 days   Lab Units 08/28/24  1857   LACTIC ACID mmol/L 1.1     Results from last 7 days   Lab Units 08/29/24  0437 08/28/24  1828   LIPASE u/L 101* 432*     Results from last 7 days   Lab  Units 08/28/24 2030   CLARITY UA  Clear   COLOR UA  Yellow   SPEC GRAV UA  1.015   PH UA  6.0   GLUCOSE UA mg/dl 3+*   KETONES UA mg/dl Negative   BLOOD UA  Negative   PROTEIN UA mg/dl Negative   NITRITE UA  Negative   BILIRUBIN UA  Negative   UROBILINOGEN UA E.U./dl 1.0   LEUKOCYTES UA  Negative     Results from last 7 days   Lab Units 08/28/24  1831   INFLUENZA A PCR  Negative   INFLUENZA B PCR  Negative   RSV PCR  Negative         Results from last 7 days   Lab Units 08/28/24 2029   AMPH/METH  Negative   BARBITURATE UR  Negative   BENZODIAZEPINE UR  Negative   COCAINE UR  Negative   METHADONE URINE  Negative   OPIATE UR  Negative   PCP UR  Negative   THC UR  Positive*     Results from last 7 days   Lab Units 08/28/24  1828   ETHANOL LVL mg/dL <10           ED Treatment-Medication Administration from 08/28/2024 1722 to 08/28/2024 2219         Date/Time Order Dose Route Action     08/28/2024 1810 sodium chloride 0.9 % bolus 1,000 mL 1,000 mL Intravenous New Bag     08/28/2024 1815 ondansetron (ZOFRAN) injection 4 mg 4 mg Intravenous Given     08/28/2024 1818 oxyCODONE-acetaminophen (PERCOCET) 5-325 mg per tablet 1 tablet 1 tablet Oral Given     08/28/2024 1821 pantoprazole (PROTONIX) injection 40 mg 40 mg Intravenous Given     08/28/2024 1942 HYDROmorphone (DILAUDID) injection 0.5 mg 0.5 mg Intravenous Given     08/28/2024 2142 HYDROmorphone (DILAUDID) injection 0.5 mg 0.5 mg Intravenous Given     08/28/2024 2139 gabapentin (NEURONTIN) capsule 300 mg 300 mg Oral Given     08/28/2024 2139 lactated ringers infusion 200 mL/hr Intravenous New Bag            Past Medical History:   Diagnosis Date    Alcohol abuse     Back pain     Chronic low back pain     Depression     Diabetes mellitus (HCC)     DM2 (diabetes mellitus, type 2) (HCC)     Erectile dysfunction     GERD (gastroesophageal reflux disease)     Hepatic steatosis     Hyperlipidemia     Hypertension     Neuropathy     Pancreatitis     Psychiatric disorder      Tobacco abuse      Present on Admission:   Essential hypertension   Dyslipidemia   Alcohol abuse   Type 2 diabetes mellitus (HCC)   GERD (gastroesophageal reflux disease)   Tobacco abuse   Constipation      Admitting Diagnosis: Pancreatitis [K85.90]  Pain of upper abdomen [R10.10]  Alcohol-induced acute pancreatitis, unspecified complication status [K85.20]  Age/Sex: 59 y.o. male  Admission Orders:  Scheduled Medications:  amLODIPine, 5 mg, Oral, Daily  docusate sodium, 100 mg, Oral, BID  enoxaparin, 40 mg, Subcutaneous, Daily  fenofibrate, 145 mg, Oral, Daily  folic acid, 1 mg, Oral, Daily  gabapentin, 300 mg, Oral, TID  insulin lispro, 1-6 Units, Subcutaneous, TID AC  insulin lispro, 1-6 Units, Subcutaneous, HS  multivitamin stress formula, 1 tablet, Oral, Daily  nicotine, 1 patch, Transdermal, Daily  pantoprazole, 40 mg, Oral, BID AC  polyethylene glycol, 17 g, Oral, Daily  pravastatin, 40 mg, Oral, Daily With Dinner  sertraline, 150 mg, Oral, Daily  thiamine, 100 mg, Oral, Daily  mineral oil enema 1 enema  Dose: 1 enema  Freq: Once Route: RE  Start: 08/29/24 1030 End: 08/29/24 1114  methylnaltrexone (Relistor) subcutaneous injection 12 mg  Dose: 12 mg  Freq: Once Route: SC  Start: 08/29/24 1830 End: 08/29/24 1813  methylnaltrexone (Relistor) subcutaneous injection 12 mg  Dose: 12 mg  Freq: Once Route: SC  Start: 08/30/24 1245     Continuous IV Infusions:  lactated ringers, 200 mL/hr, Intravenous, Continuous      PRN Meds:  acetaminophen, 650 mg, Oral, Q6H PRN  hydrALAZINE, 5 mg, Intravenous, Q6H PRN  HYDROmorphone, 0.5 mg, Intravenous, Q4H PRN - x3 given 8/29 and x2 8/30  ondansetron, 4 mg, Intravenous, Q4H PRN  oxyCODONE, 5 mg, Oral, Q6H PRN- x1 given 8/29 and x1 8/30  oxyCODONE, 7.5 mg, Oral, Q6H PRN-x2 given 8/29 and x1 8/30        IP CONSULT TO GASTROENTEROLOGY    Network Utilization Review Department  ATTENTION: Please call with any questions or concerns to [phone] and carefully listen to the  prompts so that you are directed to the right person. All voicemails are confidential.   For Discharge needs, contact Care Management DC Support Team at [phone] opt. 2  Send all requests for admission clinical reviews, approved or denied determinations and any other requests to dedicated fax number below belonging to the campus where the patient is receiving treatment. List of dedicated fax numbers for the Facilities:  FACILITY NAME UR FAX NUMBER   ADMISSION DENIALS (Administrative/Medical Necessity) [phone]   DISCHARGE SUPPORT TEAM (NETWORK) [phone]   PARENT CHILD HEALTH (Maternity/NICU/Pediatrics) [phone]   [facility] [phone]   [facility] [phone]   [facility] [phone]   [facility] [phone]   [facility] [phone]   [facility] [phone]   General acute hospital [phone]   [facility] [phone]   [facility] [phone]   [facility] [phone]   [facility] [phone]   [facility] [phone]

## 2024-08-29 NOTE — CONSULTS
Consultation -  [facility]  Michael Briones 59 y.o. male MRN: [id number]  Unit/Bed#: -01 Encounter: [id number]        Inpatient consult to gastroenterology  Consult performed by: KRISTOFER Smith  Consult ordered by: Evelyn Alvares PA-C          Reason for Consult / Principal Problem:     Pancreatitis      ASSESSMENT AND PLAN:      59 y.o. male DM, HLD, HTN, hepatic steatosis, EtOH dependence in remission x8 weeks, tobacco use and recurrent pancreatitis with recent admission 8/9-8/12 for the same who presented 8/28 with worsening epigastric pain,nausea.    #1 Acute pancreatitis. Pt with worsening epigastric pain radiating to his back associated with nausea after last discharge. Lipase 432 on admission. No leukocytosis, fevers. Renal function, liver chemistries normal. He is on fenofibrate with recent Triglycerides WNL at 99 and RUQ US with no gallbladder findings. Igg4 in 2022 WNL. Reports no alcohol consumption for 8 weeks, but continues to smoke.     -Follow up MRI/MRCP ordered by primary team. This has been ordered several times for pt to have done after prior admissions for pancreatitis, but he has not been able to complete this.   -Keep NPO, can transition to trial of clear liquids after MRI  -Continue LR at 200 ml/hr  -Pain control per primary team  -Continue anti-emetics as needed  -Continue home dose PPI daily  -Encourage OOB, IS  -Monitor CBC, CMP, strict I+Os  -Recommend complete tobacco, alcohol cessation      #2 Constipation-likely in the setting of recent admission for pancreatitis & pain medications. Reports only passing small sadi of stool a few times after discharge 8/12. Had negative cologuard in 2022    -Continue Miralax  -Fleets enema ordered x1  -Encourage OOB as tolerated      Thank you for the consultation. Case will be discussed with Dr. Howard    ______________________________________________________________________    HPI:  Michael BLUNT Anselmo is a  59 y.o. male DM, HLD, HTN, hepatic steatosis, EtOH dependence, tobacco use and recurrent pancreatitis with recent admission 8/9-8/12 for the same who presented 8/28 with persistent pain. He reports pain had improved but not resolved after last discharge, and started progressively getting worse 2 days after discharge despite 12 tablets of Oxycodone given on d/c. Reports pain in the epigastric area radiating around to his back & associated with nausea. Denies any alcohol use for 8 weeks, still smoking 1/2 ppd. Reports he hasn't had a good BM since d/c on the 12th, just small hard sadi. Is passing gas. Denies any fevers/chills, vomiting.     He was tachycardic and hypertensive on admission with labs notable for Lipase 432, glucose 316, Na 134. No imaging performed however MRI/MRCP was ordered. He still has his gallbladder, recent US 8/9 with no gallbladder findings. Liver chemistries remain normal. Igg4 WNL in 2022.  He is on fenofibrate with recent triglycerides on 8/9 WNL at 99.    His last EGD was in March 2022 showing some mild gastritis.  He takes Prilosec 40 mg daily at home.  Had a negative cologuard in 2022    REVIEW OF SYSTEMS:    CONSTITUTIONAL: Denies any fever, chills, rigors, and weight loss.  HEENT: No earache or tinnitus. Denies hearing loss or visual disturbances.  CARDIOVASCULAR: No chest pain or palpitations.   RESPIRATORY: Denies any cough, hemoptysis, shortness of breath or dyspnea on exertion.  GASTROINTESTINAL: As noted in the History of Present Illness.   GENITOURINARY: No problems with urination. Denies any hematuria or dysuria.  NEUROLOGIC: No dizziness or vertigo, denies headaches.   MUSCULOSKELETAL: Denies any muscle or joint pain.   SKIN: Denies skin rashes or itching.   ENDOCRINE: Denies excessive thirst. Denies intolerance to heat or cold.  PSYCHOSOCIAL: Denies depression or anxiety. Denies any recent memory loss.       Historical Information   Past Medical History:   Diagnosis Date     Alcohol abuse     Back pain     Chronic low back pain     Depression     Diabetes mellitus (HCC)     DM2 (diabetes mellitus, type 2) (HCC)     Erectile dysfunction     GERD (gastroesophageal reflux disease)     Hepatic steatosis     Hyperlipidemia     Hypertension     Neuropathy     Pancreatitis     Psychiatric disorder     Tobacco abuse      Past Surgical History:   Procedure Laterality Date    ANKLE FRACTURE SURGERY Right     ANKLE SURGERY Right     INGUINAL HERNIA REPAIR Left     KNEE SURGERY Right     UMBILICAL HERNIA REPAIR       Social History   Social History     Substance and Sexual Activity   Alcohol Use Not Currently    Alcohol/week: 100.0 standard drinks of alcohol    Types: 100 Cans of beer per week    Comment: heavy drinker     Social History     Substance and Sexual Activity   Drug Use Yes    Frequency: 21.0 times per week    Types: Marijuana    Comment: marijuana each day     Social History     Tobacco Use   Smoking Status Every Day    Current packs/day: 1.00    Average packs/day: 1 pack/day for 46.7 years (46.7 ttl pk-yrs)    Types: Cigarettes    Start date: 1978   Smokeless Tobacco Never   Tobacco Comments    per Mervat-quit     Family History   Problem Relation Age of Onset    Lymphoma Mother     No Known Problems Father        Meds/Allergies       Medications Prior to Admission:     simvastatin (ZOCOR) 20 mg tablet    Accu-Chek FastClix Lancets MISC    amLODIPine (NORVASC) 5 mg tablet    Blood Glucose Monitoring Suppl (Accu-Chek Stefanie Plus) w/Device KIT    fenofibrate 160 MG tablet    gabapentin (NEURONTIN) 300 mg capsule    glimepiride (AMARYL) 1 mg tablet    lidocaine (Lidoderm) 5 %    magnesium oxide (MAG-OX) 400 mg    metFORMIN (GLUCOPHAGE-XR) 500 mg 24 hr tablet    methocarbamol (ROBAXIN) 500 mg tablet    omeprazole (PriLOSEC) 40 MG capsule    pioglitazone (ACTOS) 45 mg tablet    polyethylene glycol (MIRALAX) 17 g packet    sertraline (ZOLOFT) 100 mg tablet  Current Facility-Administered  "Medications   Medication Dose Route Frequency    acetaminophen (TYLENOL) tablet 650 mg  650 mg Oral Q6H PRN    amLODIPine (NORVASC) tablet 5 mg  5 mg Oral Daily    docusate sodium (COLACE) capsule 100 mg  100 mg Oral BID    enoxaparin (LOVENOX) subcutaneous injection 40 mg  40 mg Subcutaneous Daily    fenofibrate (TRICOR) tablet 145 mg  145 mg Oral Daily    gabapentin (NEURONTIN) capsule 300 mg  300 mg Oral TID    HYDROmorphone (DILAUDID) injection 0.5 mg  0.5 mg Intravenous Q4H PRN    insulin lispro (HumALOG/ADMELOG) 100 units/mL subcutaneous injection 1-6 Units  1-6 Units Subcutaneous TID AC    insulin lispro (HumALOG/ADMELOG) 100 units/mL subcutaneous injection 1-6 Units  1-6 Units Subcutaneous HS    lactated ringers infusion  200 mL/hr Intravenous Continuous    nicotine (NICODERM CQ) 14 mg/24hr TD 24 hr patch 1 patch  1 patch Transdermal Daily    ondansetron (ZOFRAN) injection 4 mg  4 mg Intravenous Q4H PRN    oxyCODONE (ROXICODONE) IR tablet 5 mg  5 mg Oral Q6H PRN    oxyCODONE (ROXICODONE) IR tablet 7.5 mg  7.5 mg Oral Q6H PRN    pantoprazole (PROTONIX) EC tablet 40 mg  40 mg Oral BID AC    polyethylene glycol (MIRALAX) packet 17 g  17 g Oral Daily    pravastatin (PRAVACHOL) tablet 40 mg  40 mg Oral Daily With Dinner    sertraline (ZOLOFT) tablet 150 mg  150 mg Oral Daily       Allergies   Allergen Reactions    Amoxicillin Swelling    Amoxil [Amoxicillin] Hives           Objective     Blood pressure 148/84, pulse 65, temperature (!) 97.1 °F (36.2 °C), temperature source Tympanic, resp. rate 16, height 5' 11\" (1.803 m), weight 97.9 kg (215 lb 12.8 oz), SpO2 96%. Body mass index is 30.1 kg/m².      Intake/Output Summary (Last 24 hours) at 8/29/2024 0900  Last data filed at 8/29/2024 0601  Gross per 24 hour   Intake 2503.33 ml   Output 550 ml   Net 1953.33 ml         PHYSICAL EXAM:      General Appearance:   Alert, cooperative, no distress   HEENT:   Normocephalic, atraumatic, anicteric.     Neck:  Supple, " symmetrical, trachea midline   Lungs:   Clear to auscultation bilaterally; no rales, rhonchi or wheezing; respirations unlabored    Heart::   Regular rate and rhythm; no murmur, rub, or gallop.   Abdomen:   Soft, epigastric tender, non-distended; normal bowel sounds; no masses, no organomegaly    Genitalia:   Deferred    Rectal:   Deferred    Extremities:  No cyanosis, clubbing or edema    Pulses:  2+ and symmetric all extremities    Skin:  No jaundice, rashes, or lesions    Lymph nodes:  No palpable cervical lymphadenopathy        Lab Results:   Admission on 08/28/2024   Component Date Value    WBC 08/28/2024 8.69     RBC 08/28/2024 4.95     Hemoglobin 08/28/2024 16.2     Hematocrit 08/28/2024 45.7     MCV 08/28/2024 92     MCH 08/28/2024 32.7     MCHC 08/28/2024 35.4     RDW 08/28/2024 11.3 (L)     MPV 08/28/2024 9.9     Platelets 08/28/2024 270     nRBC 08/28/2024 0     Segmented % 08/28/2024 59     Immature Grans % 08/28/2024 0     Lymphocytes % 08/28/2024 28     Monocytes % 08/28/2024 9     Eosinophils Relative 08/28/2024 2     Basophils Relative 08/28/2024 2 (H)     Absolute Neutrophils 08/28/2024 5.22     Absolute Immature Grans 08/28/2024 0.03     Absolute Lymphocytes 08/28/2024 2.40     Absolute Monocytes 08/28/2024 0.74     Eosinophils Absolute 08/28/2024 0.17     Basophils Absolute 08/28/2024 0.13 (H)     Sodium 08/28/2024 134 (L)     Potassium 08/28/2024 4.0     Chloride 08/28/2024 101     CO2 08/28/2024 21     ANION GAP 08/28/2024 12     BUN 08/28/2024 9     Creatinine 08/28/2024 0.78     Glucose 08/28/2024 316 (H)     Calcium 08/28/2024 9.6     AST 08/28/2024 22     ALT 08/28/2024 22     Alkaline Phosphatase 08/28/2024 92     Total Protein 08/28/2024 7.8     Albumin 08/28/2024 4.1     Total Bilirubin 08/28/2024 0.75     eGFR 08/28/2024 97     Lipase 08/28/2024 432 (H)     LACTIC ACID 08/28/2024 1.1     SARS-CoV-2 08/28/2024 Negative     INFLUENZA A PCR 08/28/2024 Negative     INFLUENZA B PCR 08/28/2024  Negative     RSV PCR 08/28/2024 Negative     Ethanol Lvl 08/28/2024 <10     Amph/Meth UR 08/28/2024 Negative     Barbiturate Ur 08/28/2024 Negative     Benzodiazepine Urine 08/28/2024 Negative     Cocaine Urine 08/28/2024 Negative     Methadone Urine 08/28/2024 Negative     Opiate Urine 08/28/2024 Negative     PCP Ur 08/28/2024 Negative     THC Urine 08/28/2024 Positive (A)     Oxycodone Urine 08/28/2024 Negative     Fentanyl Urine 08/28/2024 Negative     HYDROCODONE URINE 08/28/2024 Negative     Color, UA 08/28/2024 Yellow     Clarity, UA 08/28/2024 Clear     Specific Gravity, UA 08/28/2024 1.015     pH, UA 08/28/2024 6.0     Leukocytes, UA 08/28/2024 Negative     Nitrite, UA 08/28/2024 Negative     Protein, UA 08/28/2024 Negative     Glucose, UA 08/28/2024 3+ (A)     Ketones, UA 08/28/2024 Negative     Urobilinogen, UA 08/28/2024 1.0     Bilirubin, UA 08/28/2024 Negative     Occult Blood, UA 08/28/2024 Negative     Ventricular Rate 08/28/2024 82     Atrial Rate 08/28/2024 82     DE Interval 08/28/2024 196     QRSD Interval 08/28/2024 92     QT Interval 08/28/2024 384     QTC Interval 08/28/2024 448     P Axis 08/28/2024 50     QRS Axis 08/28/2024 11     T Wave Axis 08/28/2024 5     POC Glucose 08/28/2024 298 (H)     POC Glucose 08/28/2024 288 (H)     Sodium 08/29/2024 138     Potassium 08/29/2024 3.7     Chloride 08/29/2024 103     CO2 08/29/2024 29     ANION GAP 08/29/2024 6     BUN 08/29/2024 7     Creatinine 08/29/2024 0.72     Glucose 08/29/2024 137     Glucose, Fasting 08/29/2024 137 (H)     Calcium 08/29/2024 8.9     AST 08/29/2024 22     ALT 08/29/2024 20     Alkaline Phosphatase 08/29/2024 75     Total Protein 08/29/2024 6.6     Albumin 08/29/2024 3.6     Total Bilirubin 08/29/2024 0.74     eGFR 08/29/2024 100     WBC 08/29/2024 5.62     RBC 08/29/2024 4.19     Hemoglobin 08/29/2024 13.8     Hematocrit 08/29/2024 40.6     MCV 08/29/2024 97     MCH 08/29/2024 32.9     MCHC 08/29/2024 34.0     RDW 08/29/2024  11.6     MPV 08/29/2024 10.0     Platelets 08/29/2024 197     nRBC 08/29/2024 0     Segmented % 08/29/2024 46     Immature Grans % 08/29/2024 0     Lymphocytes % 08/29/2024 40     Monocytes % 08/29/2024 9     Eosinophils Relative 08/29/2024 3     Basophils Relative 08/29/2024 2 (H)     Absolute Neutrophils 08/29/2024 2.54     Absolute Immature Grans 08/29/2024 0.01     Absolute Lymphocytes 08/29/2024 2.27     Absolute Monocytes 08/29/2024 0.52     Eosinophils Absolute 08/29/2024 0.17     Basophils Absolute 08/29/2024 0.11 (H)     Lipase 08/29/2024 101 (H)     POC Glucose 08/29/2024 139        Imaging Studies: I have personally reviewed pertinent imaging studies.

## 2024-08-29 NOTE — PLAN OF CARE
Problem: PAIN - ADULT  Goal: Verbalizes/displays adequate comfort level or baseline comfort level  Description: Interventions:  - Encourage patient to monitor pain and request assistance  - Assess pain using appropriate pain scale  - Administer analgesics based on type and severity of pain and evaluate response  - Implement non-pharmacological measures as appropriate and evaluate response  - Consider cultural and social influences on pain and pain management  - Notify physician/advanced practitioner if interventions unsuccessful or patient reports new pain  Outcome: Progressing     Problem: INFECTION - ADULT  Goal: Absence or prevention of progression during hospitalization  Description: INTERVENTIONS:  - Assess and monitor for signs and symptoms of infection  - Monitor lab/diagnostic results  - Monitor all insertion sites, i.e. indwelling lines, tubes, and drains  - Monitor endotracheal if appropriate and nasal secretions for changes in amount and color  - [place] appropriate cooling/warming therapies per order  - Administer medications as ordered  - Instruct and encourage patient and family to use good hand hygiene technique  - Identify and instruct in appropriate isolation precautions for identified infection/condition  Outcome: Progressing  Goal: Absence of fever/infection during neutropenic period  Description: INTERVENTIONS:  - Monitor WBC    Outcome: Progressing     Problem: SAFETY ADULT  Goal: Patient will remain free of falls  Description: INTERVENTIONS:  - Educate patient/family on patient safety including physical limitations  - Instruct patient to call for assistance with activity   - Consult OT/PT to assist with strengthening/mobility   - Keep Call bell within reach  - Keep bed low and locked with side rails adjusted as appropriate  - Keep care items and personal belongings within reach  - Initiate and maintain comfort rounds  - Make Fall Risk Sign visible to staff  - Offer Toileting every 2 Hours,  in advance of need  - Apply yellow socks and bracelet for high fall risk patients  - Consider moving patient to room near nurses station  Outcome: Progressing     Problem: DISCHARGE PLANNING  Goal: Discharge to home or other facility with appropriate resources  Description: INTERVENTIONS:  - Identify barriers to discharge w/patient and caregiver  - Arrange for needed discharge resources and transportation as appropriate  - Identify discharge learning needs (meds, wound care, etc.)  - Arrange for interpretive services to assist at discharge as needed  - Refer to Case Management Department for coordinating discharge planning if the patient needs post-hospital services based on physician/advanced practitioner order or complex needs related to functional status, cognitive ability, or social support system  Outcome: Progressing     Problem: Knowledge Deficit  Goal: Patient/family/caregiver demonstrates understanding of disease process, treatment plan, medications, and discharge instructions  Description: Complete learning assessment and assess knowledge base.  Interventions:  - Provide teaching at level of understanding  - Provide teaching via preferred learning methods  Outcome: Progressing     Problem: GASTROINTESTINAL - ADULT  Goal: Minimal or absence of nausea and/or vomiting  Description: INTERVENTIONS:  - Administer IV fluids if ordered to ensure adequate hydration  - Maintain NPO status until nausea and vomiting are resolved  - Nasogastric tube if ordered  - Administer ordered antiemetic medications as needed  - Provide nonpharmacologic comfort measures as appropriate  - Advance diet as tolerated, if ordered  - Consider nutrition services referral to assist patient with adequate nutrition and appropriate food choices  Outcome: Progressing  Goal: Maintains or returns to baseline bowel function  Description: INTERVENTIONS:  - Assess bowel function  - Encourage oral fluids to ensure adequate hydration  - Administer  IV fluids if ordered to ensure adequate hydration  - Administer ordered medications as needed  - Encourage mobilization and activity  - Consider nutritional services referral to assist patient with adequate nutrition and appropriate food choices  Outcome: Progressing  Goal: Maintains adequate nutritional intake  Description: INTERVENTIONS:  - Monitor percentage of each meal consumed  - Identify factors contributing to decreased intake, treat as appropriate  - Assist with meals as needed  - Monitor I&O, weight, and lab values if indicated  - Obtain nutrition services referral as needed  Outcome: Progressing     Problem: METABOLIC, FLUID AND ELECTROLYTES - ADULT  Goal: Electrolytes maintained within normal limits  Description: INTERVENTIONS:  - Monitor labs and assess patient for signs and symptoms of electrolyte imbalances  - Administer electrolyte replacement as ordered  - Monitor response to electrolyte replacements, including repeat lab results as appropriate  - Instruct patient on fluid and nutrition as appropriate  Outcome: Progressing

## 2024-08-29 NOTE — ASSESSMENT & PLAN NOTE
Lab Results   Component Value Date    HGBA1C 5.4 07/08/2024     Hold oral hypoglycemics while hospitalized  Continue SSI coverage per Accu-Cheks  Carbohydrate restriction  Hypoglycemia protocol

## 2024-08-29 NOTE — ASSESSMENT & PLAN NOTE
8/28 - patient with history of alcohol induced acute on chronic pancreatitis presents with persistent epigastric abdominal pain since he was discharged 08/12/24 for acute pancreatitis. Patient reports he has not drank any alcohol since before he was admitted and has cut back on smoking from 2PPD to 1/2 PPD.   During previous admission, with normal RUQ US, LFTs, TG.   Lipase 432 and normal LFTs on admission   Will empirically treat as acute pancreatitis given history and elevated lipase   NPO  Aggressive IVF hydration 200cc/hr   PRN analgesia/antiemetic   Obtain MRI/MRCP  GI consult appreciated     8/29 - still reports intermittent abdominal discomfort more prominent in the epigastric region.  Awaiting MRI/MRCP.  Continue IV fluid hydration and optimize pain/emesis control.  Serum lipase improved from 432 -> 101.  Gastroenterology following.

## 2024-08-29 NOTE — ASSESSMENT & PLAN NOTE
Known prior history of alcohol abuse -> per patient last drink was over a month ago  Continue to encourage abstinence  Continue multivitamin and thiamine/folic acid supplementation

## 2024-08-29 NOTE — ASSESSMENT & PLAN NOTE
Patient with history of alcohol induced acute on chronic pancreatitis presents with persistent epigastric abdominal pain since he was discharged 08/12/24 for acute pancreatitis. Patient reports he has not drank any alcohol since before he was admitted and has cut back on smoking from 2PPD to 1/2 PPD.   During previous admission, with normal RUQ US, LFTs, TG.   Lipase 432 and normal LFTs on admission   Will empirically treat as acute pancreatitis given history and elevated lipase   NPO  Aggressive IVF hydration 200cc/hr   PRN analgesia/antiemetic   Obtain MRI/MRCP  GI consult appreciated

## 2024-08-29 NOTE — ASSESSMENT & PLAN NOTE
Hx of alcohol abuse, patient reports he has not drank any alcohol since before he was admitted to the hospital last time, so it's been about 4 weeks   Encouraged continued cessation

## 2024-08-29 NOTE — PLAN OF CARE
Problem: PAIN - ADULT  Goal: Verbalizes/displays adequate comfort level or baseline comfort level  Description: Interventions:  - Encourage patient to monitor pain and request assistance  - Assess pain using appropriate pain scale  - Administer analgesics based on type and severity of pain and evaluate response  - Implement non-pharmacological measures as appropriate and evaluate response  - Consider cultural and social influences on pain and pain management  - Notify physician/advanced practitioner if interventions unsuccessful or patient reports new pain  Outcome: Progressing     Problem: INFECTION - ADULT  Goal: Absence or prevention of progression during hospitalization  Description: INTERVENTIONS:  - Assess and monitor for signs and symptoms of infection  - Monitor lab/diagnostic results  - Monitor all insertion sites, i.e. indwelling lines, tubes, and drains  - Monitor endotracheal if appropriate and nasal secretions for changes in amount and color  - [place] appropriate cooling/warming therapies per order  - Administer medications as ordered  - Instruct and encourage patient and family to use good hand hygiene technique  - Identify and instruct in appropriate isolation precautions for identified infection/condition  Outcome: Progressing  Goal: Absence of fever/infection during neutropenic period  Description: INTERVENTIONS:  - Monitor WBC    Outcome: Progressing     Problem: SAFETY ADULT  Goal: Patient will remain free of falls  Description: INTERVENTIONS:  - Educate patient/family on patient safety including physical limitations  - Instruct patient to call for assistance with activity   - Consult OT/PT to assist with strengthening/mobility   - Keep Call bell within reach  - Keep bed low and locked with side rails adjusted as appropriate  - Keep care items and personal belongings within reach  - Initiate and maintain comfort rounds  - Make Fall Risk Sign visible to staff  - Offer Toileting every  Hours,  in advance of need  - Initiate/Maintain alarm  - Obtain necessary fall risk management equipment:   - Apply yellow socks and bracelet for high fall risk patients  - Consider moving patient to room near nurses station  Outcome: Progressing     Problem: DISCHARGE PLANNING  Goal: Discharge to home or other facility with appropriate resources  Description: INTERVENTIONS:  - Identify barriers to discharge w/patient and caregiver  - Arrange for needed discharge resources and transportation as appropriate  - Identify discharge learning needs (meds, wound care, etc.)  - Arrange for interpretive services to assist at discharge as needed  - Refer to Case Management Department for coordinating discharge planning if the patient needs post-hospital services based on physician/advanced practitioner order or complex needs related to functional status, cognitive ability, or social support system  Outcome: Progressing     Problem: Knowledge Deficit  Goal: Patient/family/caregiver demonstrates understanding of disease process, treatment plan, medications, and discharge instructions  Description: Complete learning assessment and assess knowledge base.  Interventions:  - Provide teaching at level of understanding  - Provide teaching via preferred learning methods  Outcome: Progressing

## 2024-08-29 NOTE — ASSESSMENT & PLAN NOTE
Lab Results   Component Value Date    HGBA1C 5.4 07/08/2024       Recent Labs     08/28/24  2146 08/28/24  2253   POCGLU 298* 288*       Blood Sugar Average: Last 72 hrs:  (P) 293    Hold home oral meds, resume at discharge   SSI with accuchecks

## 2024-08-29 NOTE — H&P
Former[facility]  H&P  Name: Michael Briones 59 y.o. male I MRN: [id number]  Unit/Bed#: -01 I Date of Admission: 8/28/2024   Date of Service: 8/29/2024 I Hospital Day: 0      Assessment & Plan   * Epigastric abdominal pain  Assessment & Plan  Patient with history of alcohol induced acute on chronic pancreatitis presents with persistent epigastric abdominal pain since he was discharged 08/12/24 for acute pancreatitis. Patient reports he has not drank any alcohol since before he was admitted and has cut back on smoking from 2PPD to 1/2 PPD.   During previous admission, with normal RUQ US, LFTs, TG.   Lipase 432 and normal LFTs on admission   Will empirically treat as acute pancreatitis given history and elevated lipase   NPO  Aggressive IVF hydration 200cc/hr   PRN analgesia/antiemetic   Obtain MRI/MRCP  GI consult appreciated     Constipation  Assessment & Plan  Patient states he cannot remember the last time he had a BM, states he thinks it was when he was in the hospital last 08/12   Reports passing flatus. On physical exam with active bowel sounds.   Start bowel regimen: colace BID, Miralax daily     Cigarette nicotine dependence without complication  Assessment & Plan  Patient reports he has cut back from smoking 2 PPD to 1/2 PPD now   NRT    GERD (gastroesophageal reflux disease)  Assessment & Plan  Continue PPI    Type 2 diabetes mellitus (HCC)  Assessment & Plan  Lab Results   Component Value Date    HGBA1C 5.4 07/08/2024       Recent Labs     08/28/24  2146 08/28/24  2253   POCGLU 298* 288*       Blood Sugar Average: Last 72 hrs:  (P) 293    Hold home oral meds, resume at discharge   SSI with accuchecks    Alcohol abuse  Assessment & Plan  Hx of alcohol abuse, patient reports he has not drank any alcohol since before he was admitted to the hospital last time, so it's been about 4 weeks   Encouraged continued cessation     Dyslipidemia  Assessment & Plan  Continue statin,  Tricor    Essential hypertension  Assessment & Plan  BP stable   Continue Norvasc           VTE Pharmacologic Prophylaxis: VTE Score: 3 Moderate Risk (Score 3-4) - Pharmacological DVT Prophylaxis Ordered: enoxaparin (Lovenox).  Code Status: Level 1 - Full Code   Discussion with family: Patient declined call to .     Anticipated Length of Stay: Patient will be admitted on an observation basis with an anticipated length of stay of less than 2 midnights secondary to epigastric abdominal pain.    Total Time Spent on Date of Encounter in care of patient: 60 mins. This time was spent on one or more of the following: performing physical exam; counseling and coordination of care; obtaining or reviewing history; documenting in the medical record; reviewing/ordering tests, medications or procedures; communicating with other healthcare professionals and discussing with patient's family/caregivers.    Chief Complaint: epigastric abdominal pain, nausea    History of Present Illness:  Michael Briones is a 59 y.o. male with a PMH of alcohol induced pancreatitis, HLD, HTN, T2DM, alcohol abuse, tobacco abuse, GERD who presents with persistent and progressively worsened epigastric abdominal pain since he was discharged from the hospital 08/12/24 for acute pancreatitis. Patient states when he got home, he noticed mild but tolerable epigastric abdominal pain which he thought would get better but has only gotten worse. He states the pain radiates to his back and is with associated nausea and decreased oral intake. He states he has not drank any alcohol since before he was admitted last so >4 weeks ago. States he has cut back on smoking from 2PPD to 1/2 PPD. He also reports he does not recall the last time he had a bowel movement but thinks it may have been when he was in the hospital last. He reports he is passing flatus. Did not eat anything today.    Review of Systems:  Review of Systems   Constitutional:  Positive for  appetite change. Negative for chills and fever.   Respiratory:  Negative for shortness of breath.    Cardiovascular:  Negative for chest pain.   Gastrointestinal:  Positive for abdominal pain, constipation and nausea.   Genitourinary:  Negative for dysuria.   Neurological:  Negative for weakness.   Psychiatric/Behavioral:  Negative for confusion.         Past Medical and Surgical History:   Past Medical History:   Diagnosis Date    Alcohol abuse     Back pain     Chronic low back pain     Depression     Diabetes mellitus (HCC)     DM2 (diabetes mellitus, type 2) (HCC)     Erectile dysfunction     GERD (gastroesophageal reflux disease)     Hepatic steatosis     Hyperlipidemia     Hypertension     Neuropathy     Pancreatitis     Psychiatric disorder     Tobacco abuse        Past Surgical History:   Procedure Laterality Date    ANKLE FRACTURE SURGERY Right     ANKLE SURGERY Right     INGUINAL HERNIA REPAIR Left     KNEE SURGERY Right     UMBILICAL HERNIA REPAIR         Meds/Allergies:  Prior to Admission medications    Medication Sig Start Date End Date Taking? Authorizing Provider   simvastatin (ZOCOR) 20 mg tablet Take 1 tablet (20 mg total) by mouth daily 7/8/24  Yes Annmarie Dunaway MD   Accu-Chek FastClix Lancets MISC USE AS DIRECTED 12/9/20   Annmarie Dunaway MD   amLODIPine (NORVASC) 5 mg tablet Take 1 tablet (5 mg total) by mouth daily 7/8/24   Annmarie Dunaway MD   Blood Glucose Monitoring Suppl (Accu-Chek Stefanie Plus) w/Device KIT USE AS DIRECTED 12/9/20   Annmarie Dunaway MD   fenofibrate 160 MG tablet TAKE 1 TABLET EVERY DAY 7/14/24   Annmarie Dunaway MD   gabapentin (NEURONTIN) 300 mg capsule Take 1 capsule (300 mg total) by mouth 3 (three) times a day 7/8/24 8/29/27  Annmarie Dunaway MD   glimepiride (AMARYL) 1 mg tablet Take 1 tablet (1 mg total) by mouth daily with breakfast 7/8/24 1/4/25  Annmarie Dunaway MD   lidocaine (Lidoderm) 5 % Apply 2 patches topically over 12 hours daily Remove & Discard patch within 12  hours or as directed by MD 12/1/23   Cipriano Dee DO   magnesium oxide (MAG-OX) 400 mg Take 1 tablet (400 mg total) by mouth daily 3/22/22 7/8/24  Nita Ruff PA-C   metFORMIN (GLUCOPHAGE-XR) 500 mg 24 hr tablet Take 1 tablet (500 mg total) by mouth 2 (two) times a day with meals 7/8/24   Annmarie Dunaway MD   methocarbamol (ROBAXIN) 500 mg tablet Take 1 tablet (500 mg total) by mouth 3 (three) times a day as needed for muscle spasms 12/4/23   KRISTOFER [place]   omeprazole (PriLOSEC) 40 MG capsule Take 1 capsule (40 mg total) by mouth 2 (two) times a day 7/8/24   Annmarie Dunaway MD   pioglitazone (ACTOS) 45 mg tablet Take 1 tablet (45 mg total) by mouth daily 7/8/24   Annmarie Dunaway MD   polyethylene glycol (MIRALAX) 17 g packet Take 17 g by mouth daily 8/13/24 9/12/24  Laureen Jansen PA-C   sertraline (ZOLOFT) 100 mg tablet Take 1.5 tablets (150 mg total) by mouth daily 7/8/24   Annmarie Dunaway MD     I have reviewed home medications with patient personally.    Allergies:   Allergies   Allergen Reactions    Amoxicillin Swelling    Amoxil [Amoxicillin] Hives       Social History:  Marital Status: /Civil Union   Patient Pre-hospital Living Situation: Home  Patient Pre-hospital Level of Mobility: walks  Patient Pre-hospital Diet Restrictions: none  Substance Use History:   Social History     Substance and Sexual Activity   Alcohol Use Not Currently    Alcohol/week: 100.0 standard drinks of alcohol    Types: 100 Cans of beer per week    Comment: heavy drinker     Social History     Tobacco Use   Smoking Status Every Day    Current packs/day: 1.00    Average packs/day: 1 pack/day for 46.7 years (46.7 ttl pk-yrs)    Types: Cigarettes    Start date: 1978   Smokeless Tobacco Never   Tobacco Comments    per [place]-quit     Social History     Substance and Sexual Activity   Drug Use Yes    Frequency: 21.0 times per week    Types: Marijuana    Comment: marijuana each day       Family  "History:  Family History   Problem Relation Age of Onset    Lymphoma Mother     No Known Problems Father        Physical Exam:     Vitals:   Blood Pressure: 137/67 (08/28/24 2225)  Pulse: 79 (08/28/24 2225)  Temperature: 98.3 °F (36.8 °C) (08/28/24 2225)  Temp Source: Oral (08/28/24 2225)  Respirations: 16 (08/28/24 2225)  Height: 5' 11\" (180.3 cm) (08/28/24 2225)  Weight - Scale: 97.9 kg (215 lb 12.8 oz) (08/28/24 2225)  SpO2: 98 % (08/28/24 2225)    Physical Exam  Constitutional:       General: He is not in acute distress.  HENT:      Mouth/Throat:      Mouth: Mucous membranes are dry.   Eyes:      General: No scleral icterus.  Cardiovascular:      Rate and Rhythm: Normal rate and regular rhythm.      Heart sounds: Normal heart sounds.   Pulmonary:      Breath sounds: Normal breath sounds.   Abdominal:      General: Abdomen is flat. Bowel sounds are normal. There is no distension.      Palpations: Abdomen is soft.      Tenderness: There is abdominal tenderness in the epigastric area. There is no guarding.   Musculoskeletal:      Right lower leg: No edema.      Left lower leg: No edema.   Skin:     General: Skin is warm and dry.   Neurological:      General: No focal deficit present.      Mental Status: He is alert and oriented to person, place, and time.   Psychiatric:         Mood and Affect: Mood normal.          Additional Data:     Lab Results:  Results from last 7 days   Lab Units 08/28/24  1828   WBC Thousand/uL 8.69   HEMOGLOBIN g/dL 16.2   HEMATOCRIT % 45.7   PLATELETS Thousands/uL 270   SEGS PCT % 59   LYMPHO PCT % 28   MONO PCT % 9   EOS PCT % 2     Results from last 7 days   Lab Units 08/28/24  1828   SODIUM mmol/L 134*   POTASSIUM mmol/L 4.0   CHLORIDE mmol/L 101   CO2 mmol/L 21   BUN mg/dL 9   CREATININE mg/dL 0.78   ANION GAP mmol/L 12   CALCIUM mg/dL 9.6   ALBUMIN g/dL 4.1   TOTAL BILIRUBIN mg/dL 0.75   ALK PHOS U/L 92   ALT U/L 22   AST U/L 22   GLUCOSE RANDOM mg/dL 316*         Results from last 7 " days   Lab Units 08/28/24 2253 08/28/24  2146   POC GLUCOSE mg/dl 288* 298*     Lab Results   Component Value Date    HGBA1C 5.4 07/08/2024    HGBA1C 7.8 (A) 06/23/2023    HGBA1C 8.0 10/28/2022     Results from last 7 days   Lab Units 08/28/24  1857   LACTIC ACID mmol/L 1.1       Lines/Drains:  Invasive Devices       Peripheral Intravenous Line  Duration             Peripheral IV 08/11/24 Dorsal (posterior);Right Hand 17 days    Peripheral IV 08/28/24 Right Forearm <1 day                        Imaging: No pertinent imaging reviewed.  MRI inpatient order    (Results Pending)       EKG and Other Studies Reviewed on Admission:   EKG: NSR. HR 82.    ** Please Note: This note has been constructed using a voice recognition system. **

## 2024-08-29 NOTE — PROGRESS NOTES
[facility]  Progress Note  Name: Michael Briones I  MRN: [id number]  Unit/Bed#: -01 I Date of Admission: 8/28/2024   Date of Service: 8/29/2024 I Hospital Day: 0    Assessment & Plan:    * Acute on chronic pancreatitis (HCC)  Assessment & Plan  8/28 - patient with history of alcohol induced acute on chronic pancreatitis presents with persistent epigastric abdominal pain since he was discharged 08/12/24 for acute pancreatitis. Patient reports he has not drank any alcohol since before he was admitted and has cut back on smoking from 2PPD to 1/2 PPD.   During previous admission, with normal RUQ US, LFTs, TG.   Lipase 432 and normal LFTs on admission   Will empirically treat as acute pancreatitis given history and elevated lipase   NPO  Aggressive IVF hydration 200cc/hr   PRN analgesia/antiemetic   Obtain MRI/MRCP  GI consult appreciated     8/29 - still reports intermittent abdominal discomfort more prominent in the epigastric region.  Awaiting MRI/MRCP.  Continue IV fluid hydration and optimize pain/emesis control.  Serum lipase improved from 432 -> 101.  Gastroenterology following.    Essential hypertension  Assessment & Plan  Continue Norvasc - additional PRN IV Hydralazine on board for BP spikes    Alcohol abuse  Assessment & Plan  Known prior history of alcohol abuse -> per patient last drink was over a month ago  Continue to encourage abstinence  Continue multivitamin and thiamine/folic acid supplementation    Depression  Assessment & Plan  Continue Zoloft    Dyslipidemia  Assessment & Plan  Continue statin/fibrate    Tobacco abuse  Assessment & Plan  Smoking cessation counseling  Transdermal nicotine patch on board    GERD (gastroesophageal reflux disease)  Assessment & Plan  Continue PPI    Type 2 diabetes mellitus (HCC)  Assessment & Plan  Lab Results   Component Value Date    HGBA1C 5.4 07/08/2024     Hold oral hypoglycemics while hospitalized  Continue SSI coverage per  Accu-Cheks  Carbohydrate restriction  Hypoglycemia protocol    Constipation  Assessment & Plan  Per patient, has not had a real bowel movement in a few weeks - recent hospitalization for pancreatitis earlier this month with use of continuous pain medications possibly contributory  Gastroenterology following - initiated on a stool softener and laxative regimen -> enema ordered today  If issue continues to persist, may consider a trial of Relistor      DVT Prophylaxis:  Lovenox       AM-PAC Basic Mobility:  Basic Mobility Inpatient Raw Score: 24  JH-HLM Achieved: 7: Walk 25 feet or more  JH-HLM Goal: 8: Walk 250 feet or more    Patient Centered Rounds:  I have performed bedside rounds and discussed plan of care with nursing today.  Discussions with Specialists or Other Care Team Provider:  see above assessments if applicable    Education and Discussions with Family / Patient:  Patient at bedside    Time Spent for Care:  35 minutes. More than 50% of total time spent on counseling and coordination of care, on one or more of the following: performing physical exam; counseling and coordination of care, obtaining or reviewing history, documenting in the medical record, reviewing/ordering tests/medications/procedures, and communicating with other healthcare professionals.    Current Length of Stay: 0 day(s)  Current Patient Status: Inpatient   Certification Statement:  Patient will continue to require additional hospital stay due to assessments as noted above.    Code Status: Level 1 - Full Code        Subjective:     Encountered earlier in the morning.  Still reports some intermittent epigastric discomfort.        Objective:     Vitals:   Temp (24hrs), Av.8 °F (36.6 °C), Min:97.1 °F (36.2 °C), Max:98.3 °F (36.8 °C)    Temp:  [97.1 °F (36.2 °C)-98.3 °F (36.8 °C)] 97.1 °F (36.2 °C)  HR:  [] 65  Resp:  [15-18] 16  BP: (116-180)/(54-94) 148/84  SpO2:  [96 %-98 %] 96 %  Body mass index is 30.1 kg/m².     Input and  Output Summary (last 24 hours):       Intake/Output Summary (Last 24 hours) at 8/29/2024 1402  Last data filed at 8/29/2024 0601  Gross per 24 hour   Intake 2503.33 ml   Output 550 ml   Net 1953.33 ml       Physical Exam:     GENERAL No immediate distress at rest   HEAD   Normocephalic - atraumatic   EYES   PERRL - EOMI    MOUTH   Mucosa moist   NECK   Supple - full range of motion   CARDIAC Rate controlled currently   PULMONARY    Clear breath sounds bilaterally - nonlabored respirations   ABDOMEN Mild epigastric tenderness but nondistended   MUSCULOSKELETAL   Motor strength/range of motion intact   NEUROLOGIC   Alert/oriented at baseline   SKIN   Chronic wrinkles/blemishes    PSYCHIATRIC   Mood/affect stable         Additional Data:     Labs & Recent Cultures:    Results from last 7 days   Lab Units 08/29/24  0437   WBC Thousand/uL 5.62   HEMOGLOBIN g/dL 13.8   HEMATOCRIT % 40.6   PLATELETS Thousands/uL 197   SEGS PCT % 46   LYMPHO PCT % 40   MONO PCT % 9   EOS PCT % 3     Results from last 7 days   Lab Units 08/29/24  0437   POTASSIUM mmol/L 3.7   CHLORIDE mmol/L 103   CO2 mmol/L 29   BUN mg/dL 7   CREATININE mg/dL 0.72   CALCIUM mg/dL 8.9   ALK PHOS U/L 75   ALT U/L 20   AST U/L 22         Results from last 7 days   Lab Units 08/29/24  1101 08/29/24  0717 08/28/24  2253 08/28/24  2146   POC GLUCOSE mg/dl 132 139 288* 298*         Results from last 7 days   Lab Units 08/28/24  1857   LACTIC ACID mmol/L 1.1                 Lines/Drains:  Invasive Devices       Peripheral Intravenous Line  Duration             Peripheral IV 08/11/24 Dorsal (posterior);Right Hand 17 days    Peripheral IV 08/28/24 Right Forearm <1 day                      Last 24 Hours Medication List:   Current Facility-Administered Medications   Medication Dose Route Frequency Provider Last Rate    acetaminophen  650 mg Oral Q6H PRN Evelyn Alvares PA-C      amLODIPine  5 mg Oral Daily Evelyn Alvares PA-C      docusate sodium  100 mg  Oral BID Evelyn Alvares PA-C      enoxaparin  40 mg Subcutaneous Daily Evelyn Alvares PA-C      fenofibrate  145 mg Oral Daily Evelyn Alvares PA-C      folic acid  1 mg Oral Daily Huong Adorno MD      gabapentin  300 mg Oral TID Evelyn Alvares PA-C      hydrALAZINE  5 mg Intravenous Q6H PRN Huong Adorno MD      HYDROmorphone  0.5 mg Intravenous Q4H PRN Evelyn Alvares PA-C      insulin lispro  1-6 Units Subcutaneous TID AC Evelyn Alvares PA-C      insulin lispro  1-6 Units Subcutaneous HS Evelyn Alvares PA-C      lactated ringers  200 mL/hr Intravenous Continuous Evelyn Alvares PA-C 200 mL/hr (08/29/24 0340)    multivitamin stress formula  1 tablet Oral Daily Huong Adorno MD      nicotine  1 patch Transdermal Daily Evelyn Alvares PA-C      ondansetron  4 mg Intravenous Q4H PRN Evelyn Alvares PA-C      oxyCODONE  5 mg Oral Q6H PRN Evelyn Alvares PA-C      oxyCODONE  7.5 mg Oral Q6H PRN Evelyn Alvares PA-C      pantoprazole  40 mg Oral BID AC Evelyn Alvarse PA-C      polyethylene glycol  17 g Oral Daily Evelyn Alvares PA-C      pravastatin  40 mg Oral Daily With Dinner Evelyn Alvares PA-C      sertraline  150 mg Oral Daily Evelyn Alvares PA-C      thiamine  100 mg Oral Daily MD HUONG Franco MD   Hospitalist - [facility]        ** Please Note: This note is constructed using a voice recognition dictation system.  An occasional wrong word/phrase or “sound-a-like” substitution may have been picked up by dictation device due to the inherent limitations of voice recognition software.  Read the chart carefully and recognize, using reasonable context, where substitutions may have occurred.**

## 2024-08-29 NOTE — ASSESSMENT & PLAN NOTE
Per patient, has not had a real bowel movement in a few weeks - recent hospitalization for pancreatitis earlier this month with use of continuous pain medications possibly contributory  Gastroenterology following - initiated on a stool softener and laxative regimen -> enema ordered today  If issue continues to persist, may consider a trial of Relistor

## 2024-08-30 ENCOUNTER — APPOINTMENT (INPATIENT)
Dept: RADIOLOGY | Facility: HOSPITAL | Age: 59
DRG: 440 | End: 2024-08-30
Payer: COMMERCIAL

## 2024-08-30 LAB
ALBUMIN SERPL BCG-MCNC: 3.4 G/DL (ref 3.5–5)
ALP SERPL-CCNC: 71 U/L (ref 34–104)
ALT SERPL W P-5'-P-CCNC: 25 U/L (ref 7–52)
ANION GAP SERPL CALCULATED.3IONS-SCNC: 14 MMOL/L (ref 4–13)
AST SERPL W P-5'-P-CCNC: 32 U/L (ref 13–39)
BASOPHILS # BLD AUTO: 0.1 THOUSANDS/ÂΜL (ref 0–0.1)
BASOPHILS NFR BLD AUTO: 1 % (ref 0–1)
BILIRUB SERPL-MCNC: 1.2 MG/DL (ref 0.2–1)
BUN SERPL-MCNC: 7 MG/DL (ref 5–25)
CALCIUM ALBUM COR SERPL-MCNC: 9.4 MG/DL (ref 8.3–10.1)
CALCIUM SERPL-MCNC: 8.9 MG/DL (ref 8.4–10.2)
CHLORIDE SERPL-SCNC: 100 MMOL/L (ref 96–108)
CO2 SERPL-SCNC: 23 MMOL/L (ref 21–32)
CREAT SERPL-MCNC: 0.65 MG/DL (ref 0.6–1.3)
EOSINOPHIL # BLD AUTO: 0.07 THOUSAND/ÂΜL (ref 0–0.61)
EOSINOPHIL NFR BLD AUTO: 1 % (ref 0–6)
ERYTHROCYTE [DISTWIDTH] IN BLOOD BY AUTOMATED COUNT: 11.4 % (ref 11.6–15.1)
GFR SERPL CREATININE-BSD FRML MDRD: 105 ML/MIN/1.73SQ M
GLUCOSE SERPL-MCNC: 114 MG/DL (ref 65–140)
GLUCOSE SERPL-MCNC: 126 MG/DL (ref 65–140)
GLUCOSE SERPL-MCNC: 127 MG/DL (ref 65–140)
GLUCOSE SERPL-MCNC: 263 MG/DL (ref 65–140)
GLUCOSE SERPL-MCNC: 263 MG/DL (ref 65–140)
HCT VFR BLD AUTO: 40.8 % (ref 36.5–49.3)
HGB BLD-MCNC: 14 G/DL (ref 12–17)
IMM GRANULOCYTES # BLD AUTO: 0.04 THOUSAND/UL (ref 0–0.2)
IMM GRANULOCYTES NFR BLD AUTO: 0 % (ref 0–2)
LIPASE SERPL-CCNC: 25 U/L (ref 11–82)
LYMPHOCYTES # BLD AUTO: 0.86 THOUSANDS/ÂΜL (ref 0.6–4.47)
LYMPHOCYTES NFR BLD AUTO: 9 % (ref 14–44)
MCH RBC QN AUTO: 32.9 PG (ref 26.8–34.3)
MCHC RBC AUTO-ENTMCNC: 34.3 G/DL (ref 31.4–37.4)
MCV RBC AUTO: 96 FL (ref 82–98)
MONOCYTES # BLD AUTO: 0.49 THOUSAND/ÂΜL (ref 0.17–1.22)
MONOCYTES NFR BLD AUTO: 5 % (ref 4–12)
NEUTROPHILS # BLD AUTO: 7.59 THOUSANDS/ÂΜL (ref 1.85–7.62)
NEUTS SEG NFR BLD AUTO: 84 % (ref 43–75)
NRBC BLD AUTO-RTO: 0 /100 WBCS
PLATELET # BLD AUTO: 182 THOUSANDS/UL (ref 149–390)
PMV BLD AUTO: 10.1 FL (ref 8.9–12.7)
POTASSIUM SERPL-SCNC: 3.7 MMOL/L (ref 3.5–5.3)
PROT SERPL-MCNC: 6.6 G/DL (ref 6.4–8.4)
RBC # BLD AUTO: 4.26 MILLION/UL (ref 3.88–5.62)
SODIUM SERPL-SCNC: 137 MMOL/L (ref 135–147)
WBC # BLD AUTO: 9.15 THOUSAND/UL (ref 4.31–10.16)

## 2024-08-30 PROCEDURE — 80053 COMPREHEN METABOLIC PANEL: CPT | Performed by: INTERNAL MEDICINE

## 2024-08-30 PROCEDURE — 82948 REAGENT STRIP/BLOOD GLUCOSE: CPT

## 2024-08-30 PROCEDURE — 74018 RADEX ABDOMEN 1 VIEW: CPT

## 2024-08-30 PROCEDURE — 99232 SBSQ HOSP IP/OBS MODERATE 35: CPT | Performed by: INTERNAL MEDICINE

## 2024-08-30 PROCEDURE — 83690 ASSAY OF LIPASE: CPT | Performed by: INTERNAL MEDICINE

## 2024-08-30 PROCEDURE — 85025 COMPLETE CBC W/AUTO DIFF WBC: CPT | Performed by: INTERNAL MEDICINE

## 2024-08-30 RX ORDER — HYDROMORPHONE HCL/PF 1 MG/ML
0.3 SYRINGE (ML) INJECTION EVERY 4 HOURS PRN
Status: DISCONTINUED | OUTPATIENT
Start: 2024-08-30 | End: 2024-08-31 | Stop reason: HOSPADM

## 2024-08-30 RX ORDER — HYDROMORPHONE HYDROCHLORIDE 2 MG/1
1 TABLET ORAL EVERY 6 HOURS PRN
Status: DISCONTINUED | OUTPATIENT
Start: 2024-08-30 | End: 2024-08-31 | Stop reason: HOSPADM

## 2024-08-30 RX ORDER — SODIUM CHLORIDE, SODIUM LACTATE, POTASSIUM CHLORIDE, CALCIUM CHLORIDE 600; 310; 30; 20 MG/100ML; MG/100ML; MG/100ML; MG/100ML
100 INJECTION, SOLUTION INTRAVENOUS CONTINUOUS
Status: DISCONTINUED | OUTPATIENT
Start: 2024-08-30 | End: 2024-08-31 | Stop reason: HOSPADM

## 2024-08-30 RX ORDER — OXYCODONE HYDROCHLORIDE 10 MG/1
10 TABLET ORAL EVERY 6 HOURS PRN
Status: DISCONTINUED | OUTPATIENT
Start: 2024-08-30 | End: 2024-08-30

## 2024-08-30 RX ORDER — HYDROMORPHONE HYDROCHLORIDE 2 MG/1
2 TABLET ORAL EVERY 6 HOURS PRN
Status: DISCONTINUED | OUTPATIENT
Start: 2024-08-30 | End: 2024-08-31 | Stop reason: HOSPADM

## 2024-08-30 RX ADMIN — Medication 1 TABLET: at 09:07

## 2024-08-30 RX ADMIN — THIAMINE HCL TAB 100 MG 100 MG: 100 TAB at 09:07

## 2024-08-30 RX ADMIN — OXYCODONE HYDROCHLORIDE 7.5 MG: 5 TABLET ORAL at 04:48

## 2024-08-30 RX ADMIN — FOLIC ACID 1 MG: 1 TABLET ORAL at 09:07

## 2024-08-30 RX ADMIN — PRAVASTATIN SODIUM 40 MG: 40 TABLET ORAL at 15:42

## 2024-08-30 RX ADMIN — NICOTINE 1 PATCH: 14 PATCH, EXTENDED RELEASE TRANSDERMAL at 09:08

## 2024-08-30 RX ADMIN — SODIUM CHLORIDE, SODIUM LACTATE, POTASSIUM CHLORIDE, AND CALCIUM CHLORIDE 200 ML/HR: .6; .31; .03; .02 INJECTION, SOLUTION INTRAVENOUS at 10:09

## 2024-08-30 RX ADMIN — GABAPENTIN 300 MG: 300 CAPSULE ORAL at 21:27

## 2024-08-30 RX ADMIN — HYDROMORPHONE HYDROCHLORIDE 0.5 MG: 1 INJECTION, SOLUTION INTRAMUSCULAR; INTRAVENOUS; SUBCUTANEOUS at 06:33

## 2024-08-30 RX ADMIN — SODIUM CHLORIDE, SODIUM LACTATE, POTASSIUM CHLORIDE, AND CALCIUM CHLORIDE 100 ML/HR: .6; .31; .03; .02 INJECTION, SOLUTION INTRAVENOUS at 18:53

## 2024-08-30 RX ADMIN — GABAPENTIN 300 MG: 300 CAPSULE ORAL at 15:42

## 2024-08-30 RX ADMIN — INSULIN LISPRO 3 UNITS: 100 INJECTION, SOLUTION INTRAVENOUS; SUBCUTANEOUS at 22:51

## 2024-08-30 RX ADMIN — ENOXAPARIN SODIUM 40 MG: 40 INJECTION SUBCUTANEOUS at 09:07

## 2024-08-30 RX ADMIN — OXYCODONE HYDROCHLORIDE 5 MG: 5 TABLET ORAL at 11:03

## 2024-08-30 RX ADMIN — METHYLNALTREXONE BROMIDE 12 MG: 12 INJECTION, SOLUTION SUBCUTANEOUS at 14:31

## 2024-08-30 RX ADMIN — MAGNESIUM HYDROXIDE 30 ML: 2400 SUSPENSION ORAL at 12:45

## 2024-08-30 RX ADMIN — INSULIN LISPRO 3 UNITS: 100 INJECTION, SOLUTION INTRAVENOUS; SUBCUTANEOUS at 16:17

## 2024-08-30 RX ADMIN — HYDROMORPHONE HYDROCHLORIDE 0.5 MG: 1 INJECTION, SOLUTION INTRAMUSCULAR; INTRAVENOUS; SUBCUTANEOUS at 12:45

## 2024-08-30 RX ADMIN — FENOFIBRATE 145 MG: 145 TABLET ORAL at 09:07

## 2024-08-30 RX ADMIN — GABAPENTIN 300 MG: 300 CAPSULE ORAL at 09:07

## 2024-08-30 RX ADMIN — POLYETHYLENE GLYCOL 3350 17 G: 17 POWDER, FOR SOLUTION ORAL at 21:27

## 2024-08-30 RX ADMIN — PANTOPRAZOLE SODIUM 40 MG: 40 TABLET, DELAYED RELEASE ORAL at 06:33

## 2024-08-30 RX ADMIN — HYDROMORPHONE HYDROCHLORIDE 1 MG: 2 TABLET ORAL at 21:27

## 2024-08-30 RX ADMIN — DOCUSATE SODIUM 100 MG: 100 CAPSULE, LIQUID FILLED ORAL at 17:12

## 2024-08-30 RX ADMIN — POLYETHYLENE GLYCOL 3350 17 G: 17 POWDER, FOR SOLUTION ORAL at 09:07

## 2024-08-30 RX ADMIN — SODIUM CHLORIDE, SODIUM LACTATE, POTASSIUM CHLORIDE, AND CALCIUM CHLORIDE 200 ML/HR: .6; .31; .03; .02 INJECTION, SOLUTION INTRAVENOUS at 04:48

## 2024-08-30 RX ADMIN — DOCUSATE SODIUM 100 MG: 100 CAPSULE, LIQUID FILLED ORAL at 09:07

## 2024-08-30 RX ADMIN — SERTRALINE HYDROCHLORIDE 150 MG: 50 TABLET ORAL at 09:07

## 2024-08-30 RX ADMIN — PANTOPRAZOLE SODIUM 40 MG: 40 TABLET, DELAYED RELEASE ORAL at 15:42

## 2024-08-30 RX ADMIN — AMLODIPINE BESYLATE 5 MG: 5 TABLET ORAL at 09:07

## 2024-08-30 RX ADMIN — HYDROMORPHONE HYDROCHLORIDE 2 MG: 2 TABLET ORAL at 18:51

## 2024-08-30 NOTE — PROGRESS NOTES
Progress Note- Michael Briones 59 y.o. male MRN: [id number]    Unit/Bed#: -01 Encounter: [id number]      Assessment and Plan:    59 y.o. male DM, HLD, HTN, hepatic steatosis, EtOH dependence in remission x8 weeks, tobacco use and recurrent pancreatitis with recent admission 8/9-8/12 for the same who presented 8/28 with worsening epigastric pain & nausea.     #1 Acute pancreatitis. Pt with worsening epigastric pain radiating to his back associated with nausea after last discharge. Lipase 432 on admission with mild edema about the pancreatic body in keeping with mild residual or recurrent interstitial edematous pancreatitis. Adjascent trace quantity of nonloculated fluid without a discrete collection noted on MRI. No leukocytosis, fevers. Renal function, liver chemistries normal & MRCP without and biliary dilation or choledocholithiasis. He is on fenofibrate with recent Triglycerides WNL at 99. Igg4 in 2022 WNL. Reports no alcohol consumption for 8 weeks, but continues to smoke.      -Advance to low-fat diet  -Can decrease IV fluids if patient is tolerating diet  -Pain control per primary team  -Continue anti-emetics as needed  -Continue home dose PPI daily  -Encourage OOB, incentive spirometry  -Monitor CBC, CMP, strict I+Os  -Recommend complete tobacco, alcohol cessation.  Complications of continued use discussed  -Consider outpatient surgical evaluation if patient has recurrent episodes of pancreatitis while still abstaining from alcohol use     #2 Constipation-likely in the setting of recent admission for pancreatitis & pain medications. Reports only passing small sadi of stool a few times after discharge 8/12. Had negative cologuard in 2022.     -S/p Relistor, enema yesterday.   -Continue Miralax twice daily, titrate to effect  -Monitor stool output  -Encourage OOB as tolerated    ______________________________________________________________________    Subjective:     Patient reports his abdominal pain  is improving.  Reports 9 out of 10 pain, however looks comfortable.  He would like to try a low-fat diet.  He has not yet had a bowel movement but is passing gas    Medication Administration - last 24 hours from 08/29/2024 0915 to 08/30/2024 0915         Date/Time Order Dose Route Action Action by     08/30/2024 0907 EDT amLODIPine (NORVASC) tablet 5 mg 5 mg Oral Given Alida Montes LPN     08/30/2024 0907 EDT fenofibrate (TRICOR) tablet 145 mg 145 mg Oral Given Alida Montes, JANN     08/30/2024 0907 EDT gabapentin (NEURONTIN) capsule 300 mg 300 mg Oral Given Alida Montes LPN     08/29/2024 2107 EDT gabapentin (NEURONTIN) capsule 300 mg 300 mg Oral Given Benedicto Simpson, MEI     08/29/2024 1644 EDT gabapentin (NEURONTIN) capsule 300 mg 300 mg Oral Given Thu Diehl RN     08/30/2024 0633 EDT pantoprazole (PROTONIX) EC tablet 40 mg 40 mg Oral Given Benedicto Simpson, MEI     08/29/2024 1644 EDT pantoprazole (PROTONIX) EC tablet 40 mg 40 mg Oral Given Thu Diehl RN     08/30/2024 0907 EDT sertraline (ZOLOFT) tablet 150 mg 150 mg Oral Given Alida Montes LPN     08/29/2024 1644 EDT pravastatin (PRAVACHOL) tablet 40 mg 40 mg Oral Given Thu Diehl RN     08/30/2024 0448 EDT lactated ringers infusion 200 mL/hr Intravenous New Bag Bneedicto Simpson, MEI     08/29/2024 2359 EDT lactated ringers infusion 200 mL/hr Intravenous New Bag Benedicto Simpson, MEI     08/29/2024 1814 EDT lactated ringers infusion 200 mL/hr Intravenous New Bag Thu Diehl, MEI     08/30/2024 0907 EDT enoxaparin (LOVENOX) subcutaneous injection 40 mg 40 mg Subcutaneous Given Alida Montes LPN     08/30/2024 0858 EDT insulin lispro (HumALOG/ADMELOG) 100 units/mL subcutaneous injection 1-6 Units -- Subcutaneous Not Given Alida Montes LPN     08/29/2024 1809 EDT insulin lispro (HumALOG/ADMELOG) 100 units/mL subcutaneous injection 1-6 Units -- Subcutaneous Not Given Thu Diehl RN     08/29/2024 1109 EDT insulin lispro (HumALOG/ADMELOG) 100  units/mL subcutaneous injection 1-6 Units -- Subcutaneous Not Given Thu Diehl RN     08/29/2024 2107 EDT insulin lispro (HumALOG/ADMELOG) 100 units/mL subcutaneous injection 1-6 Units -- Subcutaneous Not Given Benedicto Simpson, MEI     08/29/2024 1113 EDT oxyCODONE (ROXICODONE) IR tablet 5 mg 5 mg Oral Given Thu Diehl RN     08/30/2024 0448 EDT oxyCODONE (ROXICODONE) IR tablet 7.5 mg 7.5 mg Oral Given Benedicto Simpson, MEI     08/29/2024 1944 EDT oxyCODONE (ROXICODONE) IR tablet 7.5 mg 7.5 mg Oral Given Beneditco Simpson RN     08/30/2024 0633 EDT HYDROmorphone (DILAUDID) injection 0.5 mg 0.5 mg Intravenous Given Benedicto Simpson RN     08/29/2024 2105 EDT HYDROmorphone (DILAUDID) injection 0.5 mg 0.5 mg Intravenous Given Benedicto Simpson RN     08/29/2024 1650 EDT HYDROmorphone (DILAUDID) injection 0.5 mg 0.5 mg Intravenous Given Thu Diehl RN     08/29/2024 1320 EDT HYDROmorphone (DILAUDID) injection 0.5 mg 0.5 mg Intravenous Given Thu Diehl RN     08/30/2024 0907 EDT docusate sodium (COLACE) capsule 100 mg 100 mg Oral Given Alida Montes LPN     08/29/2024 1716 EDT docusate sodium (COLACE) capsule 100 mg 100 mg Oral Given Thu Diehl RN     08/30/2024 0908 EDT nicotine (NICODERM CQ) 14 mg/24hr TD 24 hr patch 1 patch 1 patch Transdermal Medication Applied Alida Montes LPN     08/30/2024 0906 EDT nicotine (NICODERM CQ) 14 mg/24hr TD 24 hr patch 1 patch 1 patch Transdermal Patch Removed Alida Montes LPN     08/30/2024 0907 EDT multivitamin stress formula tablet 1 tablet 1 tablet Oral Given Alida Montes LPN     08/29/2024 1109 EDT multivitamin stress formula tablet 1 tablet 1 tablet Oral Given Thu Diehl RN     08/30/2024 0907 EDT thiamine tablet 100 mg 100 mg Oral Given Alida Montes LPN     08/29/2024 1109 EDT thiamine tablet 100 mg 100 mg Oral Given Thu Diehl RN     08/30/2024 0907 EDT folic acid (FOLVITE) tablet 1 mg 1 mg Oral Given Alida Montes LPN     08/29/2024 1109  "EDT folic acid (FOLVITE) tablet 1 mg 1 mg Oral Given Thu Diehl RN     08/29/2024 1114 EDT mineral oil enema 1 enema 1 enema Rectal Given Thu Diehl RN     08/29/2024 1219 EDT Gadobutrol injection (SINGLE-DOSE) SOLN 9 mL 9 mL Intravenous Given Floyd Cain     08/30/2024 0907 EDT polyethylene glycol (MIRALAX) packet 17 g 17 g Oral Given Alida Montes LPN     08/29/2024 1811 EDT polyethylene glycol (MIRALAX) packet 17 g 17 g Oral Given Thu Diehl RN     08/29/2024 1813 EDT methylnaltrexone (Relistor) subcutaneous injection 12 mg 12 mg Subcutaneous Given Thu Diehl RN            Objective:     Vitals: Blood pressure 120/52, pulse 84, temperature 98 °F (36.7 °C), temperature source Oral, resp. rate 16, height 5' 11\" (1.803 m), weight 97.9 kg (215 lb 12.8 oz), SpO2 90%.,Body mass index is 30.1 kg/m².      Intake/Output Summary (Last 24 hours) at 8/30/2024 0915  Last data filed at 8/30/2024 0601  Gross per 24 hour   Intake 2870 ml   Output 500 ml   Net 2370 ml       Physical Exam:   General Appearance: Awake and alert, in no acute distress  Abdomen: Soft, epigastric area-tender, non-distended; bowel sounds normal; no masses or no organomegaly    Invasive Devices       Peripheral Intravenous Line  Duration             Peripheral IV 08/11/24 Dorsal (posterior);Right Hand 18 days    Peripheral IV 08/28/24 Right Forearm 1 day                    Lab Results:  Admission on 08/28/2024   Component Date Value    WBC 08/28/2024 8.69     RBC 08/28/2024 4.95     Hemoglobin 08/28/2024 16.2     Hematocrit 08/28/2024 45.7     MCV 08/28/2024 92     MCH 08/28/2024 32.7     MCHC 08/28/2024 35.4     RDW 08/28/2024 11.3 (L)     MPV 08/28/2024 9.9     Platelets 08/28/2024 270     nRBC 08/28/2024 0     Segmented % 08/28/2024 59     Immature Grans % 08/28/2024 0     Lymphocytes % 08/28/2024 28     Monocytes % 08/28/2024 9     Eosinophils Relative 08/28/2024 2     Basophils Relative 08/28/2024 2 (H)     Absolute Neutrophils " 08/28/2024 5.22     Absolute Immature Grans 08/28/2024 0.03     Absolute Lymphocytes 08/28/2024 2.40     Absolute Monocytes 08/28/2024 0.74     Eosinophils Absolute 08/28/2024 0.17     Basophils Absolute 08/28/2024 0.13 (H)     Sodium 08/28/2024 134 (L)     Potassium 08/28/2024 4.0     Chloride 08/28/2024 101     CO2 08/28/2024 21     ANION GAP 08/28/2024 12     BUN 08/28/2024 9     Creatinine 08/28/2024 0.78     Glucose 08/28/2024 316 (H)     Calcium 08/28/2024 9.6     AST 08/28/2024 22     ALT 08/28/2024 22     Alkaline Phosphatase 08/28/2024 92     Total Protein 08/28/2024 7.8     Albumin 08/28/2024 4.1     Total Bilirubin 08/28/2024 0.75     eGFR 08/28/2024 97     Lipase 08/28/2024 432 (H)     LACTIC ACID 08/28/2024 1.1     SARS-CoV-2 08/28/2024 Negative     INFLUENZA A PCR 08/28/2024 Negative     INFLUENZA B PCR 08/28/2024 Negative     RSV PCR 08/28/2024 Negative     Ethanol Lvl 08/28/2024 <10     Amph/Meth UR 08/28/2024 Negative     Barbiturate Ur 08/28/2024 Negative     Benzodiazepine Urine 08/28/2024 Negative     Cocaine Urine 08/28/2024 Negative     Methadone Urine 08/28/2024 Negative     Opiate Urine 08/28/2024 Negative     PCP Ur 08/28/2024 Negative     THC Urine 08/28/2024 Positive (A)     Oxycodone Urine 08/28/2024 Negative     Fentanyl Urine 08/28/2024 Negative     HYDROCODONE URINE 08/28/2024 Negative     Color, UA 08/28/2024 Yellow     Clarity, UA 08/28/2024 Clear     Specific Gravity, UA 08/28/2024 1.015     pH, UA 08/28/2024 6.0     Leukocytes, UA 08/28/2024 Negative     Nitrite, UA 08/28/2024 Negative     Protein, UA 08/28/2024 Negative     Glucose, UA 08/28/2024 3+ (A)     Ketones, UA 08/28/2024 Negative     Urobilinogen, UA 08/28/2024 1.0     Bilirubin, UA 08/28/2024 Negative     Occult Blood, UA 08/28/2024 Negative     Ventricular Rate 08/28/2024 82     Atrial Rate 08/28/2024 82     WI Interval 08/28/2024 196     QRSD Interval 08/28/2024 92     QT Interval 08/28/2024 384     QTC Interval  08/28/2024 448     P Axis 08/28/2024 50     QRS Axis 08/28/2024 11     T Wave Axis 08/28/2024 5     POC Glucose 08/28/2024 298 (H)     POC Glucose 08/28/2024 288 (H)     Sodium 08/29/2024 138     Potassium 08/29/2024 3.7     Chloride 08/29/2024 103     CO2 08/29/2024 29     ANION GAP 08/29/2024 6     BUN 08/29/2024 7     Creatinine 08/29/2024 0.72     Glucose 08/29/2024 137     Glucose, Fasting 08/29/2024 137 (H)     Calcium 08/29/2024 8.9     AST 08/29/2024 22     ALT 08/29/2024 20     Alkaline Phosphatase 08/29/2024 75     Total Protein 08/29/2024 6.6     Albumin 08/29/2024 3.6     Total Bilirubin 08/29/2024 0.74     eGFR 08/29/2024 100     WBC 08/29/2024 5.62     RBC 08/29/2024 4.19     Hemoglobin 08/29/2024 13.8     Hematocrit 08/29/2024 40.6     MCV 08/29/2024 97     MCH 08/29/2024 32.9     MCHC 08/29/2024 34.0     RDW 08/29/2024 11.6     MPV 08/29/2024 10.0     Platelets 08/29/2024 197     nRBC 08/29/2024 0     Segmented % 08/29/2024 46     Immature Grans % 08/29/2024 0     Lymphocytes % 08/29/2024 40     Monocytes % 08/29/2024 9     Eosinophils Relative 08/29/2024 3     Basophils Relative 08/29/2024 2 (H)     Absolute Neutrophils 08/29/2024 2.54     Absolute Immature Grans 08/29/2024 0.01     Absolute Lymphocytes 08/29/2024 2.27     Absolute Monocytes 08/29/2024 0.52     Eosinophils Absolute 08/29/2024 0.17     Basophils Absolute 08/29/2024 0.11 (H)     Lipase 08/29/2024 101 (H)     POC Glucose 08/29/2024 139     POC Glucose 08/29/2024 132     POC Glucose 08/29/2024 104     Ventricular Rate 08/28/2024 74     Atrial Rate 08/28/2024 74     HI Interval 08/28/2024 192     QRSD Interval 08/28/2024 98     QT Interval 08/28/2024 398     QTC Interval 08/28/2024 441     P Axis 08/28/2024 44     QRS [place] 08/28/2024 12     T Wave Axis 08/28/2024 9     POC Glucose 08/29/2024 107     POC Glucose 08/29/2024 88     Sodium 08/30/2024 137     Potassium 08/30/2024 3.7     Chloride 08/30/2024 100     CO2 08/30/2024 23     ANION  GAP 08/30/2024 14 (H)     BUN 08/30/2024 7     Creatinine 08/30/2024 0.65     Glucose 08/30/2024 127     Calcium 08/30/2024 8.9     Corrected Calcium 08/30/2024 9.4     AST 08/30/2024 32     ALT 08/30/2024 25     Alkaline Phosphatase 08/30/2024 71     Total Protein 08/30/2024 6.6     Albumin 08/30/2024 3.4 (L)     Total Bilirubin 08/30/2024 1.20 (H)     eGFR 08/30/2024 105     WBC 08/30/2024 9.15     RBC 08/30/2024 4.26     Hemoglobin 08/30/2024 14.0     Hematocrit 08/30/2024 40.8     MCV 08/30/2024 96     MCH 08/30/2024 32.9     MCHC 08/30/2024 34.3     RDW 08/30/2024 11.4 (L)     MPV 08/30/2024 10.1     Platelets 08/30/2024 182     nRBC 08/30/2024 0     Segmented % 08/30/2024 84 (H)     Immature Grans % 08/30/2024 0     Lymphocytes % 08/30/2024 9 (L)     Monocytes % 08/30/2024 5     Eosinophils Relative 08/30/2024 1     Basophils Relative 08/30/2024 1     Absolute Neutrophils 08/30/2024 7.59     Absolute Immature Grans 08/30/2024 0.04     Absolute Lymphocytes 08/30/2024 0.86     Absolute Monocytes 08/30/2024 0.49     Eosinophils Absolute 08/30/2024 0.07     Basophils Absolute 08/30/2024 0.10     Lipase 08/30/2024 25     POC Glucose 08/30/2024 126        Imaging Studies: I have personally reviewed pertinent imaging studies.

## 2024-08-30 NOTE — ASSESSMENT & PLAN NOTE
8/28 - patient with history of alcohol induced acute on chronic pancreatitis presents with persistent epigastric abdominal pain since he was discharged 08/12/24 for acute pancreatitis. Patient reports he has not drank any alcohol since before he was admitted and has cut back on smoking from 2PPD to 1/2 PPD.   During previous admission, with normal RUQ US, LFTs, TG.   Lipase 432 and normal LFTs on admission   Will empirically treat as acute pancreatitis given history and elevated lipase   NPO  Aggressive IVF hydration 200cc/hr   PRN analgesia/antiemetic   Obtain MRI/MRCP  GI consult appreciated     8/29 - still reports intermittent abdominal discomfort more prominent in the epigastric region.  Awaiting MRI/MRCP.  Continue IV fluid hydration and optimize pain/emesis control.  Serum lipase improved from 432 -> 101.  Gastroenterology following.    8/30 - pain relatively improving today.  MRI compatible with residual pancreatitis but MRCP negative for biliary dilatation or stones.  Diet to be advanced to a low fat/fiber/residue texture.  Continue pain control.  Anticipating discharge tomorrow, if continues to clinically improve.

## 2024-08-30 NOTE — PROGRESS NOTES
[facility]  Progress Note  Name: Michael Briones I  MRN: [id number]  Unit/Bed#: -01 I Date of Admission: 8/28/2024   Date of Service: 8/30/2024 I Hospital Day: 1    Assessment & Plan:    * Acute on chronic pancreatitis (HCC)  Assessment & Plan  8/28 - patient with history of alcohol induced acute on chronic pancreatitis presents with persistent epigastric abdominal pain since he was discharged 08/12/24 for acute pancreatitis. Patient reports he has not drank any alcohol since before he was admitted and has cut back on smoking from 2PPD to 1/2 PPD.   During previous admission, with normal RUQ US, LFTs, TG.   Lipase 432 and normal LFTs on admission   Will empirically treat as acute pancreatitis given history and elevated lipase   NPO  Aggressive IVF hydration 200cc/hr   PRN analgesia/antiemetic   Obtain MRI/MRCP  GI consult appreciated     8/29 - still reports intermittent abdominal discomfort more prominent in the epigastric region.  Awaiting MRI/MRCP.  Continue IV fluid hydration and optimize pain/emesis control.  Serum lipase improved from 432 -> 101.  Gastroenterology following.    8/30 - pain relatively improving today.  MRI compatible with residual pancreatitis but MRCP negative for biliary dilatation or stones.  Diet to be advanced to a low fat/fiber/residue texture.  Continue pain control.  Anticipating discharge tomorrow, if continues to clinically improve.    Essential hypertension  Assessment & Plan  Continue Norvasc - additional PRN IV Hydralazine on board for BP spikes    Alcohol abuse  Assessment & Plan  Known prior history of alcohol abuse -> per patient last drink was over a month ago  Continue to encourage abstinence  Continue multivitamin and thiamine/folic acid supplementation    Depression  Assessment & Plan  Continue Zoloft    Dyslipidemia  Assessment & Plan  Continue statin/fibrate    Tobacco abuse  Assessment & Plan  Smoking cessation counseling  Transdermal  Pended order for supplies   nicotine patch on board    GERD (gastroesophageal reflux disease)  Assessment & Plan  Continue PPI    Type 2 diabetes mellitus (HCC)  Assessment & Plan  Lab Results   Component Value Date    HGBA1C 5.4 07/08/2024     Hold oral hypoglycemics while hospitalized  Continue SSI coverage per Accu-Cheks  Carbohydrate restriction  Hypoglycemia protocol    Constipation  Assessment & Plan  Per patient, has not had a real bowel movement in a few weeks - recent hospitalization for pancreatitis earlier this month with use of continuous pain medications possibly contributory  Gastroenterology following - initiated on a stool softener and laxative regimen -> enema/Relistor administered yesterday without significant effect  Underwent XR imaging today revealing a nonobstructive gas pattern without significant stool burden      DVT Prophylaxis:  Lovenox       AM-PAC Basic Mobility:  Basic Mobility Inpatient Raw Score: 24  JH-HLM Achieved: 6: Walk 10 steps or more  JH-HLM Goal: 8: Walk 250 feet or more    Patient Centered Rounds:  I have performed bedside rounds and discussed plan of care with nursing today.  Discussions with Specialists or Other Care Team Provider:  see above assessments if applicable    Education and Discussions with Family / Patient:  Patient at bedside    Time Spent for Care:  35 minutes. More than 50% of total time spent on counseling and coordination of care, on one or more of the following: performing physical exam; counseling and coordination of care, obtaining or reviewing history, documenting in the medical record, reviewing/ordering tests/medications/procedures, and communicating with other healthcare professionals.    Current Length of Stay: 1 day(s)  Current Patient Status: Inpatient   Certification Statement:  Patient will continue to require additional hospital stay due to assessments as noted above.    Code Status: Level 1 - Full Code        Subjective:     Seen and examined earlier in the day.  Pain  is relatively improved today, when compared to yesterday.  Still reports lack of bowel movement, however, patient was told while getting an imaging study that there was no significant stool burden present.  Requesting diet to be advanced.        Objective:     Vitals:   Temp (24hrs), Av.5 °F (36.9 °C), Min:98 °F (36.7 °C), Max:99.4 °F (37.4 °C)    Temp:  [98 °F (36.7 °C)-99.4 °F (37.4 °C)] (P) 99.4 °F (37.4 °C)  HR:  [78-85] (P) 85  Resp:  [16-18] (P) 16  BP: (120-143)/(52-74) (P) 122/56  SpO2:  [90 %-93 %] (P) 93 %  Body mass index is 30.1 kg/m².     Input and Output Summary (last 24 hours):       Intake/Output Summary (Last 24 hours) at 2024 1735  Last data filed at 2024 0601  Gross per 24 hour   Intake 2870 ml   Output 500 ml   Net 2370 ml       Physical Exam:     GENERAL Waxing/waning but improved distress from pain today   HEAD   Normocephalic - atraumatic   EYES   PERRL - EOMI    MOUTH   Mucosa moist   NECK   Supple - full range of motion   CARDIAC Rate controlled   PULMONARY Fairly clear to auscultation   ABDOMEN Mild epigastric tenderness to palpation persists   MUSCULOSKELETAL   Motor strength/range of motion intact   NEUROLOGIC   Alert/oriented at baseline   SKIN   Chronic wrinkles/blemishes    PSYCHIATRIC   Mood/affect stable         Additional Data:     Labs & Recent Cultures:    Results from last 7 days   Lab Units 24  0440   WBC Thousand/uL 9.15   HEMOGLOBIN g/dL 14.0   HEMATOCRIT % 40.8   PLATELETS Thousands/uL 182   SEGS PCT % 84*   LYMPHO PCT % 9*   MONO PCT % 5   EOS PCT % 1     Results from last 7 days   Lab Units 24  0440   POTASSIUM mmol/L 3.7   CHLORIDE mmol/L 100   CO2 mmol/L 23   BUN mg/dL 7   CREATININE mg/dL 0.65   CALCIUM mg/dL 8.9   ALK PHOS U/L 71   ALT U/L 25   AST U/L 32         Results from last 7 days   Lab Units 24  1610 24  1139 24  0714 24  2350 24  2042 24  1809 24  1101 24  0717 24  2253  08/28/24  2146   POC GLUCOSE mg/dl 263* 114 126 88 107 104 132 139 288* 298*         Results from last 7 days   Lab Units 08/28/24  1857   LACTIC ACID mmol/L 1.1                 Lines/Drains:  Invasive Devices       Peripheral Intravenous Line  Duration             Peripheral IV 08/11/24 Dorsal (posterior);Right Hand 19 days    Peripheral IV 08/28/24 Right Forearm 2 days                      Last 24 Hours Medication List:   Current Facility-Administered Medications   Medication Dose Route Frequency Provider Last Rate    acetaminophen  650 mg Oral Q6H PRN Evelyn Alvares PA-C      amLODIPine  5 mg Oral Daily Evelyn Alvares PA-C      docusate sodium  100 mg Oral BID Evelyn Alvares PA-C      enoxaparin  40 mg Subcutaneous Daily Evelyn Alvares PA-C      fenofibrate  145 mg Oral Daily Evelyn Alvares PA-C      folic acid  1 mg Oral Daily Huong Adorno MD      gabapentin  300 mg Oral TID Evelyn Alvares PA-C      hydrALAZINE  5 mg Intravenous Q6H PRN Huong Adorno MD      HYDROmorphone  0.5 mg Intravenous Q4H PRN Evelyn Alvares PA-C      insulin lispro  1-6 Units Subcutaneous TID AC Evelyn Alvares PA-C      insulin lispro  1-6 Units Subcutaneous HS Evelyn Alvares PA-C      lactated ringers  100 mL/hr Intravenous Continuous Chelo KRISTOFER Ramos 100 mL/hr (08/30/24 1152)    multivitamin stress formula  1 tablet Oral Daily Huong Adorno MD      nicotine  1 patch Transdermal Daily Evelyn Alvares PA-C      ondansetron  4 mg Intravenous Q4H PRN Evelyn Alvares PA-C      oxyCODONE  5 mg Oral Q6H PRN Evelyn Alvares PA-C      oxyCODONE  7.5 mg Oral Q6H PRN Evelyn Alvares PA-C      pantoprazole  40 mg Oral BID AC Evelyn Alvares PA-C      polyethylene glycol  17 g Oral BID Huong Adorno MD      pravastatin  40 mg Oral Daily With Dinner Evelyn Alvares PA-C      sertraline  150 mg Oral Daily Evelyn Alvares PA-C      thiamine  100 mg Oral  Daily MD JENNIFER Franco MD   Hospitalist - [facility]        ** Please Note: This note is constructed using a voice recognition dictation system.  An occasional wrong word/phrase or “sound-a-like” substitution may have been picked up by dictation device due to the inherent limitations of voice recognition software.  Read the chart carefully and recognize, using reasonable context, where substitutions may have occurred.**

## 2024-08-30 NOTE — ASSESSMENT & PLAN NOTE
Per patient, has not had a real bowel movement in a few weeks - recent hospitalization for pancreatitis earlier this month with use of continuous pain medications possibly contributory  Gastroenterology following - initiated on a stool softener and laxative regimen -> enema/Relistor administered yesterday without significant effect  Underwent XR imaging today revealing a nonobstructive gas pattern without significant stool burden

## 2024-08-30 NOTE — PLAN OF CARE
Problem: PAIN - ADULT  Goal: Verbalizes/displays adequate comfort level or baseline comfort level  Description: Interventions:  - Encourage patient to monitor pain and request assistance  - Assess pain using appropriate pain scale  - Administer analgesics based on type and severity of pain and evaluate response  - Implement non-pharmacological measures as appropriate and evaluate response  - Consider cultural and social influences on pain and pain management  - Notify physician/advanced practitioner if interventions unsuccessful or patient reports new pain  Outcome: Progressing     Problem: INFECTION - ADULT  Goal: Absence or prevention of progression during hospitalization  Description: INTERVENTIONS:  - Assess and monitor for signs and symptoms of infection  - Monitor lab/diagnostic results  - Monitor all insertion sites, i.e. indwelling lines, tubes, and drains  - Monitor endotracheal if appropriate and nasal secretions for changes in amount and color  - [place] appropriate cooling/warming therapies per order  - Administer medications as ordered  - Instruct and encourage patient and family to use good hand hygiene technique  - Identify and instruct in appropriate isolation precautions for identified infection/condition  Outcome: Progressing  Goal: Absence of fever/infection during neutropenic period  Description: INTERVENTIONS:  - Monitor WBC    Outcome: Progressing     Problem: SAFETY ADULT  Goal: Patient will remain free of falls  Description: INTERVENTIONS:  - Educate patient/family on patient safety including physical limitations  - Instruct patient to call for assistance with activity   - Consult OT/PT to assist with strengthening/mobility   - Keep Call bell within reach  - Keep bed low and locked with side rails adjusted as appropriate  - Keep care items and personal belongings within reach  - Initiate and maintain comfort rounds  - Make Fall Risk Sign visible to staff  - Offer Toileting every 2 Hours,  in advance of need  - Initiate/Maintain bed alarm  - Apply yellow socks and bracelet for high fall risk patients  - Consider moving patient to room near nurses station  Outcome: Progressing     Problem: DISCHARGE PLANNING  Goal: Discharge to home or other facility with appropriate resources  Description: INTERVENTIONS:  - Identify barriers to discharge w/patient and caregiver  - Arrange for needed discharge resources and transportation as appropriate  - Identify discharge learning needs (meds, wound care, etc.)  - Arrange for interpretive services to assist at discharge as needed  - Refer to Case Management Department for coordinating discharge planning if the patient needs post-hospital services based on physician/advanced practitioner order or complex needs related to functional status, cognitive ability, or social support system  Outcome: Progressing     Problem: Knowledge Deficit  Goal: Patient/family/caregiver demonstrates understanding of disease process, treatment plan, medications, and discharge instructions  Description: Complete learning assessment and assess knowledge base.  Interventions:  - Provide teaching at level of understanding  - Provide teaching via preferred learning methods  Outcome: Progressing     Problem: GASTROINTESTINAL - ADULT  Goal: Minimal or absence of nausea and/or vomiting  Description: INTERVENTIONS:  - Administer IV fluids if ordered to ensure adequate hydration  - Maintain NPO status until nausea and vomiting are resolved  - Nasogastric tube if ordered  - Administer ordered antiemetic medications as needed  - Provide nonpharmacologic comfort measures as appropriate  - Advance diet as tolerated, if ordered  - Consider nutrition services referral to assist patient with adequate nutrition and appropriate food choices  Outcome: Progressing  Goal: Maintains or returns to baseline bowel function  Description: INTERVENTIONS:  - Assess bowel function  - Encourage oral fluids to ensure  adequate hydration  - Administer IV fluids if ordered to ensure adequate hydration  - Administer ordered medications as needed  - Encourage mobilization and activity  - Consider nutritional services referral to assist patient with adequate nutrition and appropriate food choices  Outcome: Progressing  Goal: Maintains adequate nutritional intake  Description: INTERVENTIONS:  - Monitor percentage of each meal consumed  - Identify factors contributing to decreased intake, treat as appropriate  - Assist with meals as needed  - Monitor I&O, weight, and lab values if indicated  - Obtain nutrition services referral as needed  Outcome: Progressing     Problem: METABOLIC, FLUID AND ELECTROLYTES - ADULT  Goal: Electrolytes maintained within normal limits  Description: INTERVENTIONS:  - Monitor labs and assess patient for signs and symptoms of electrolyte imbalances  - Administer electrolyte replacement as ordered  - Monitor response to electrolyte replacements, including repeat lab results as appropriate  - Instruct patient on fluid and nutrition as appropriate  Outcome: Progressing

## 2024-08-31 VITALS
RESPIRATION RATE: 17 BRPM | DIASTOLIC BLOOD PRESSURE: 62 MMHG | TEMPERATURE: 97.8 F | HEIGHT: 71 IN | OXYGEN SATURATION: 91 % | BODY MASS INDEX: 30.21 KG/M2 | SYSTOLIC BLOOD PRESSURE: 129 MMHG | HEART RATE: 70 BPM | WEIGHT: 215.8 LBS

## 2024-08-31 LAB
ALBUMIN SERPL BCG-MCNC: 3.3 G/DL (ref 3.5–5)
ALP SERPL-CCNC: 70 U/L (ref 34–104)
ALT SERPL W P-5'-P-CCNC: 20 U/L (ref 7–52)
ANION GAP SERPL CALCULATED.3IONS-SCNC: 7 MMOL/L (ref 4–13)
AST SERPL W P-5'-P-CCNC: 21 U/L (ref 13–39)
BASOPHILS # BLD AUTO: 0.06 THOUSANDS/ÂΜL (ref 0–0.1)
BASOPHILS NFR BLD AUTO: 1 % (ref 0–1)
BILIRUB SERPL-MCNC: 1.17 MG/DL (ref 0.2–1)
BUN SERPL-MCNC: 6 MG/DL (ref 5–25)
CALCIUM ALBUM COR SERPL-MCNC: 9.5 MG/DL (ref 8.3–10.1)
CALCIUM SERPL-MCNC: 8.9 MG/DL (ref 8.4–10.2)
CHLORIDE SERPL-SCNC: 103 MMOL/L (ref 96–108)
CO2 SERPL-SCNC: 29 MMOL/L (ref 21–32)
CREAT SERPL-MCNC: 0.72 MG/DL (ref 0.6–1.3)
EOSINOPHIL # BLD AUTO: 0.15 THOUSAND/ÂΜL (ref 0–0.61)
EOSINOPHIL NFR BLD AUTO: 2 % (ref 0–6)
ERYTHROCYTE [DISTWIDTH] IN BLOOD BY AUTOMATED COUNT: 11.5 % (ref 11.6–15.1)
GFR SERPL CREATININE-BSD FRML MDRD: 100 ML/MIN/1.73SQ M
GLUCOSE SERPL-MCNC: 136 MG/DL (ref 65–140)
GLUCOSE SERPL-MCNC: 281 MG/DL (ref 65–140)
HCT VFR BLD AUTO: 37.4 % (ref 36.5–49.3)
HGB BLD-MCNC: 12.9 G/DL (ref 12–17)
IMM GRANULOCYTES # BLD AUTO: 0.03 THOUSAND/UL (ref 0–0.2)
IMM GRANULOCYTES NFR BLD AUTO: 1 % (ref 0–2)
LIPASE SERPL-CCNC: 28 U/L (ref 11–82)
LYMPHOCYTES # BLD AUTO: 1.6 THOUSANDS/ÂΜL (ref 0.6–4.47)
LYMPHOCYTES NFR BLD AUTO: 25 % (ref 14–44)
MCH RBC QN AUTO: 33 PG (ref 26.8–34.3)
MCHC RBC AUTO-ENTMCNC: 34.5 G/DL (ref 31.4–37.4)
MCV RBC AUTO: 96 FL (ref 82–98)
MONOCYTES # BLD AUTO: 0.53 THOUSAND/ÂΜL (ref 0.17–1.22)
MONOCYTES NFR BLD AUTO: 8 % (ref 4–12)
NEUTROPHILS # BLD AUTO: 4.11 THOUSANDS/ÂΜL (ref 1.85–7.62)
NEUTS SEG NFR BLD AUTO: 63 % (ref 43–75)
NRBC BLD AUTO-RTO: 0 /100 WBCS
PLATELET # BLD AUTO: 166 THOUSANDS/UL (ref 149–390)
PMV BLD AUTO: 9.7 FL (ref 8.9–12.7)
POTASSIUM SERPL-SCNC: 4 MMOL/L (ref 3.5–5.3)
PROT SERPL-MCNC: 6.3 G/DL (ref 6.4–8.4)
RBC # BLD AUTO: 3.91 MILLION/UL (ref 3.88–5.62)
SODIUM SERPL-SCNC: 139 MMOL/L (ref 135–147)
WBC # BLD AUTO: 6.48 THOUSAND/UL (ref 4.31–10.16)

## 2024-08-31 PROCEDURE — 85025 COMPLETE CBC W/AUTO DIFF WBC: CPT | Performed by: INTERNAL MEDICINE

## 2024-08-31 PROCEDURE — 80053 COMPREHEN METABOLIC PANEL: CPT | Performed by: INTERNAL MEDICINE

## 2024-08-31 PROCEDURE — 99239 HOSP IP/OBS DSCHRG MGMT >30: CPT | Performed by: INTERNAL MEDICINE

## 2024-08-31 PROCEDURE — 83690 ASSAY OF LIPASE: CPT | Performed by: INTERNAL MEDICINE

## 2024-08-31 PROCEDURE — 82948 REAGENT STRIP/BLOOD GLUCOSE: CPT

## 2024-08-31 RX ORDER — HYDROMORPHONE HYDROCHLORIDE 2 MG/1
2 TABLET ORAL EVERY 6 HOURS PRN
Qty: 15 TABLET | Refills: 0 | Status: SHIPPED | OUTPATIENT
Start: 2024-08-31

## 2024-08-31 RX ORDER — FOLIC ACID 1 MG/1
1 TABLET ORAL DAILY
Qty: 30 TABLET | Refills: 0 | Status: SHIPPED | OUTPATIENT
Start: 2024-09-01

## 2024-08-31 RX ORDER — MULTIVITAMIN
1 CAPSULE ORAL DAILY
Qty: 30 CAPSULE | Refills: 0 | Status: SHIPPED | OUTPATIENT
Start: 2024-08-31

## 2024-08-31 RX ORDER — LANOLIN ALCOHOL/MO/W.PET/CERES
100 CREAM (GRAM) TOPICAL DAILY
Qty: 30 TABLET | Refills: 0 | Status: SHIPPED | OUTPATIENT
Start: 2024-09-01

## 2024-08-31 RX ADMIN — HYDROMORPHONE HYDROCHLORIDE 1 MG: 2 TABLET ORAL at 10:38

## 2024-08-31 RX ADMIN — ENOXAPARIN SODIUM 40 MG: 40 INJECTION SUBCUTANEOUS at 08:26

## 2024-08-31 RX ADMIN — POLYETHYLENE GLYCOL 3350 17 G: 17 POWDER, FOR SOLUTION ORAL at 08:28

## 2024-08-31 RX ADMIN — PANTOPRAZOLE SODIUM 40 MG: 40 TABLET, DELAYED RELEASE ORAL at 08:27

## 2024-08-31 RX ADMIN — THIAMINE HCL TAB 100 MG 100 MG: 100 TAB at 08:27

## 2024-08-31 RX ADMIN — FENOFIBRATE 145 MG: 145 TABLET ORAL at 08:27

## 2024-08-31 RX ADMIN — Medication 1 TABLET: at 08:26

## 2024-08-31 RX ADMIN — GABAPENTIN 300 MG: 300 CAPSULE ORAL at 08:26

## 2024-08-31 RX ADMIN — SERTRALINE HYDROCHLORIDE 150 MG: 50 TABLET ORAL at 08:26

## 2024-08-31 RX ADMIN — AMLODIPINE BESYLATE 5 MG: 5 TABLET ORAL at 08:27

## 2024-08-31 RX ADMIN — SODIUM CHLORIDE, SODIUM LACTATE, POTASSIUM CHLORIDE, AND CALCIUM CHLORIDE 100 ML/HR: .6; .31; .03; .02 INJECTION, SOLUTION INTRAVENOUS at 06:18

## 2024-08-31 RX ADMIN — HYDROMORPHONE HYDROCHLORIDE 2 MG: 2 TABLET ORAL at 01:27

## 2024-08-31 RX ADMIN — FOLIC ACID 1 MG: 1 TABLET ORAL at 08:27

## 2024-08-31 RX ADMIN — DOCUSATE SODIUM 100 MG: 100 CAPSULE, LIQUID FILLED ORAL at 08:28

## 2024-08-31 RX ADMIN — HYDROMORPHONE HYDROCHLORIDE 2 MG: 2 TABLET ORAL at 06:56

## 2024-08-31 RX ADMIN — NICOTINE 1 PATCH: 14 PATCH, EXTENDED RELEASE TRANSDERMAL at 08:28

## 2024-08-31 NOTE — PLAN OF CARE
Problem: PAIN - ADULT  Goal: Verbalizes/displays adequate comfort level or baseline comfort level  Description: Interventions:  - Encourage patient to monitor pain and request assistance  - Assess pain using appropriate pain scale  - Administer analgesics based on type and severity of pain and evaluate response  - Implement non-pharmacological measures as appropriate and evaluate response  - Consider cultural and social influences on pain and pain management  - Notify physician/advanced practitioner if interventions unsuccessful or patient reports new pain  Outcome: Progressing     Problem: INFECTION - ADULT  Goal: Absence or prevention of progression during hospitalization  Description: INTERVENTIONS:  - Assess and monitor for signs and symptoms of infection  - Monitor lab/diagnostic results  - Monitor all insertion sites, i.e. indwelling lines, tubes, and drains  - Monitor endotracheal if appropriate and nasal secretions for changes in amount and color  - [place] appropriate cooling/warming therapies per order  - Administer medications as ordered  - Instruct and encourage patient and family to use good hand hygiene technique  - Identify and instruct in appropriate isolation precautions for identified infection/condition  Outcome: Progressing  Goal: Absence of fever/infection during neutropenic period  Description: INTERVENTIONS:  - Monitor WBC    Outcome: Progressing     Problem: SAFETY ADULT  Goal: Patient will remain free of falls  Description: INTERVENTIONS:  - Educate patient/family on patient safety including physical limitations  - Instruct patient to call for assistance with activity   - Consult OT/PT to assist with strengthening/mobility   - Keep Call bell within reach  - Keep bed low and locked with side rails adjusted as appropriate  - Keep care items and personal belongings within reach  - Initiate and maintain comfort rounds  - Make Fall Risk Sign visible to staff  - Offer Toileting every  Hours,  in advance of need  - Initiate/Maintain alarm  - Obtain necessary fall risk management equipment:  - Apply yellow socks and bracelet for high fall risk patients  - Consider moving patient to room near nurses station  Outcome: Progressing     Problem: DISCHARGE PLANNING  Goal: Discharge to home or other facility with appropriate resources  Description: INTERVENTIONS:  - Identify barriers to discharge w/patient and caregiver  - Arrange for needed discharge resources and transportation as appropriate  - Identify discharge learning needs (meds, wound care, etc.)  - Arrange for interpretive services to assist at discharge as needed  - Refer to Case Management Department for coordinating discharge planning if the patient needs post-hospital services based on physician/advanced practitioner order or complex needs related to functional status, cognitive ability, or social support system  Outcome: Progressing

## 2024-08-31 NOTE — PLAN OF CARE
Problem: PAIN - ADULT  Goal: Verbalizes/displays adequate comfort level or baseline comfort level  Description: Interventions:  - Encourage patient to monitor pain and request assistance  - Assess pain using appropriate pain scale  - Administer analgesics based on type and severity of pain and evaluate response  - Implement non-pharmacological measures as appropriate and evaluate response  - Consider cultural and social influences on pain and pain management  - Notify physician/advanced practitioner if interventions unsuccessful or patient reports new pain  Outcome: Progressing     Problem: GASTROINTESTINAL - ADULT  Goal: Minimal or absence of nausea and/or vomiting  Description: INTERVENTIONS:  - Administer IV fluids if ordered to ensure adequate hydration  - Maintain NPO status until nausea and vomiting are resolved  - Nasogastric tube if ordered  - Administer ordered antiemetic medications as needed  - Provide nonpharmacologic comfort measures as appropriate  - Advance diet as tolerated, if ordered  - Consider nutrition services referral to assist patient with adequate nutrition and appropriate food choices  Outcome: Progressing  Goal: Maintains or returns to baseline bowel function  Description: INTERVENTIONS:  - Assess bowel function  - Encourage oral fluids to ensure adequate hydration  - Administer IV fluids if ordered to ensure adequate hydration  - Administer ordered medications as needed  - Encourage mobilization and activity  - Consider nutritional services referral to assist patient with adequate nutrition and appropriate food choices  Outcome: Progressing     Problem: METABOLIC, FLUID AND ELECTROLYTES - ADULT  Goal: Electrolytes maintained within normal limits  Description: INTERVENTIONS:  - Monitor labs and assess patient for signs and symptoms of electrolyte imbalances  - Administer electrolyte replacement as ordered  - Monitor response to electrolyte replacements, including repeat lab results as  appropriate  - Instruct patient on fluid and nutrition as appropriate  Outcome: Progressing

## 2024-08-31 NOTE — DISCHARGE SUMMARY
Discharge Summary - [facility] Internal Medicine  Patient: Michael Briones 59 y.o. male   MRN: [id number]  PCP: Annmarie Dunaway MD  Unit/Bed#: -01 Encounter: [id number]            Discharging Physician / Practitioner: Huong Adorno MD  PCP: Annmarie Dunaway MD  Admission Date:   Admission Orders (From admission, onward)       Ordered        08/29/24 1350  INPATIENT ADMISSION  Once            08/28/24 1925  Place in Observation  Once                          Discharge Date: 08/31/24      Reason for Admission:  Abdominal pain and nausea      Discharge Diagnoses:     Principal Problem:    Acute on chronic pancreatitis (HCC)    Active Problems:    Essential hypertension    Alcohol abuse    Depression    Dyslipidemia    Tobacco abuse    GERD (gastroesophageal reflux disease)    Type 2 diabetes mellitus (HCC)    Constipation      Consultations During Hospital Stay:  Gastroenterology      Hospital Course:     * Acute on chronic pancreatitis (HCC)  Assessment & Plan  8/28 - patient with history of alcohol induced acute on chronic pancreatitis presents with persistent epigastric abdominal pain since he was discharged 08/12/24 for acute pancreatitis. Patient reports he has not drank any alcohol since before he was admitted and has cut back on smoking from 2PPD to 1/2 PPD.   During previous admission, with normal RUQ US, LFTs, TG.   Lipase 432 and normal LFTs on admission   Will empirically treat as acute pancreatitis given history and elevated lipase   NPO  Aggressive IVF hydration 200cc/hr   PRN analgesia/antiemetic   Obtain MRI/MRCP  GI consult appreciated      8/29 - still reports intermittent abdominal discomfort more prominent in the epigastric region.  Awaiting MRI/MRCP.  Continue IV fluid hydration and optimize pain/emesis control.  Serum lipase improved from 432 -> 101.  Gastroenterology following.     8/30 - pain relatively improving today.  MRI compatible with residual pancreatitis but MRCP negative for  biliary dilatation or stones.  Diet to be advanced to a low fat/fiber/residue texture.  Continue pain control.  Anticipating discharge tomorrow, if continues to clinically improve.    8/31 - pain significantly improved today with titration of analgesic regimen.  Denies any further nausea.  Tolerating consistent meals currently.  Serum lipase remains normalized over the last two.  Plan for discharge home today.     Essential hypertension  Assessment & Plan  Continue Norvasc - additional PRN IV Hydralazine on board for BP spikes     Alcohol abuse  Assessment & Plan  Known prior history of alcohol abuse -> per patient last drink was over a month ago  Continue to encourage abstinence  Continue multivitamin and thiamine/folic acid supplementation     Depression  Assessment & Plan  Continue Zoloft     Dyslipidemia  Assessment & Plan  Continue statin/fibrate     Tobacco abuse  Assessment & Plan  Smoking cessation counseling  Transdermal nicotine patch on board     GERD (gastroesophageal reflux disease)  Assessment & Plan  Continue PPI     Type 2 diabetes mellitus (HCC)  Assessment & Plan        Lab Results   Component Value Date     HGBA1C 5.4 07/08/2024      Held oral hypoglycemics while hospitalized -> resume home regimen on discharge  Maintained on SSI coverage per Accu-Cheks  Carbohydrate restriction  Hypoglycemia protocol     Constipation  Assessment & Plan  Per patient, has not had a real bowel movement in a few weeks - recent hospitalization for pancreatitis earlier this month with use of continuous pain medications possibly contributory  Gastroenterology following - initiated on a stool softener and laxative regimen -> enema/Relistor administered previously without significant effect  Underwent XR imaging yesterday revealing a nonobstructive gas pattern without significant stool burden      Condition at Discharge: fair       Discharge Day Visit / Exam:     Vitals:  Blood Pressure: 129/62 (08/31/24 0730)  Pulse: 70  "(08/31/24 0730)  Temperature: 97.8 °F (36.6 °C) (08/31/24 0730)  Temp Source: Oral (08/31/24 0730)  Respirations: 17 (08/31/24 0730)  Height: 5' 11\" (180.3 cm) (08/28/24 2225)  Weight - Scale: 97.9 kg (215 lb 12.8 oz) (08/28/24 2225)  SpO2: 91 % (08/31/24 0730)      Physical exam:  I had a face-to-face encounter with the patient on day of discharge.      Discussion with Patient and/or Family:  The patient has been advised to return to the ER immediately if any symptoms recur or worsen.       Discharge instructions/Information to Patient and/or Family:   See after visit summary for information provided to patient and/or family.        Provisions for Follow-Up Care:  See after visit summary for information related to follow-up care and any pertinent home health orders.        Disposition:   Home      Discharge Medications:  See after visit summary for reconciled discharge medications provided to patient and/or family.        Discharge Statement:  I spent 38 minutes discharging the patient. This time was spent on the day of discharge. I had direct contact with the patient on the day of discharge. Greater than 50% of the total time was spent examining patient, answering all patient questions, arranging and discussing plan of care with patient as well as directly providing post-discharge instructions.  Additional time then spent on discharge activities.           JENNIFER MACARIO MD   Hospitalist - [facility] Internal Medicine        ** Please Note: This note is constructed using a voice recognition dictation system.  An occasional wrong word/phrase or “sound-a-like” substitution may have been picked up by dictation device due to the inherent limitations of voice recognition software.  Read the chart carefully and recognize, using reasonable context, where substitutions may have occurred.**       "

## 2024-09-03 ENCOUNTER — TRANSITIONAL CARE MANAGEMENT (OUTPATIENT)
Dept: FAMILY MEDICINE CLINIC | Facility: CLINIC | Age: 59
End: 2024-09-03

## 2024-09-03 NOTE — UTILIZATION REVIEW
NOTIFICATION OF ADMISSION DISCHARGE   This is a Notification of Discharge from [facility]. Please be advised that this patient has been discharge from our facility. Below you will find the admission and discharge date and time including the patient’s disposition.   UTILIZATION REVIEW CONTACT:  Monica Harman  Utilization   Network Utilization Review Department  Phone: [phone] x carefully listen to the prompts. All voicemails are confidential.  Email: [email or web address]     ADMISSION INFORMATION  PRESENTATION DATE: 8/28/2024  5:28 PM  OBERVATION ADMISSION DATE: 08/28/2024 1925  INPATIENT ADMISSION DATE: 8/29/24  1:50 PM   DISCHARGE DATE: 8/31/2024 12:07 PM   DISPOSITION:Home/Self Care    Network Utilization Review Department  ATTENTION: Please call with any questions or concerns to [phone] and carefully listen to the prompts so that you are directed to the right person. All voicemails are confidential.   For Discharge needs, contact Care Management DC Support Team at [phone] opt. 2  Send all requests for admission clinical reviews, approved or denied determinations and any other requests to dedicated fax number below belonging to the campus where the patient is receiving treatment. List of dedicated fax numbers for the Facilities:  FACILITY NAME UR FAX NUMBER   ADMISSION DENIALS (Administrative/Medical Necessity) [phone]   DISCHARGE SUPPORT TEAM ([facility]) [phone]   PARENT CHILD HEALTH (Maternity/NICU/Pediatrics) [phone]   [facility] [phone]   [facility] [phone]   Count includes the [facility] [phone]   [facility] [phone]   [facility] [phone]   [facility] [phone]   [facility] [phone]   [facility]  [facility] [phone]   [facility] [phone]   [facility] [phone]   [facility] [phone]   [facility] [phone]

## 2024-09-12 ENCOUNTER — APPOINTMENT (EMERGENCY)
Dept: RADIOLOGY | Facility: HOSPITAL | Age: 59
End: 2024-09-12
Payer: COMMERCIAL

## 2024-09-12 ENCOUNTER — HOSPITAL ENCOUNTER (EMERGENCY)
Facility: HOSPITAL | Age: 59
Discharge: HOME | End: 2024-09-12
Attending: EMERGENCY MEDICINE
Payer: COMMERCIAL

## 2024-09-12 VITALS
OXYGEN SATURATION: 98 % | WEIGHT: 215 LBS | BODY MASS INDEX: 29.99 KG/M2 | RESPIRATION RATE: 18 BRPM | TEMPERATURE: 97.8 F | HEART RATE: 91 BPM | SYSTOLIC BLOOD PRESSURE: 167 MMHG | DIASTOLIC BLOOD PRESSURE: 86 MMHG

## 2024-09-12 DIAGNOSIS — S52.502A CLOSED FRACTURE OF DISTAL END OF LEFT RADIUS, UNSPECIFIED FRACTURE MORPHOLOGY, INITIAL ENCOUNTER: Primary | ICD-10-CM

## 2024-09-12 PROCEDURE — 99284 EMERGENCY DEPT VISIT MOD MDM: CPT | Performed by: EMERGENCY MEDICINE

## 2024-09-12 PROCEDURE — 73130 X-RAY EXAM OF HAND: CPT

## 2024-09-12 PROCEDURE — 96372 THER/PROPH/DIAG INJ SC/IM: CPT

## 2024-09-12 PROCEDURE — 73110 X-RAY EXAM OF WRIST: CPT

## 2024-09-12 PROCEDURE — 99283 EMERGENCY DEPT VISIT LOW MDM: CPT

## 2024-09-12 RX ORDER — IBUPROFEN 600 MG/1
600 TABLET, FILM COATED ORAL EVERY 6 HOURS PRN
Qty: 30 TABLET | Refills: 0 | Status: SHIPPED | OUTPATIENT
Start: 2024-09-12

## 2024-09-12 RX ORDER — KETOROLAC TROMETHAMINE 30 MG/ML
30 INJECTION, SOLUTION INTRAMUSCULAR; INTRAVENOUS ONCE
Status: COMPLETED | OUTPATIENT
Start: 2024-09-12 | End: 2024-09-12

## 2024-09-12 RX ADMIN — KETOROLAC TROMETHAMINE 30 MG: 30 INJECTION, SOLUTION INTRAMUSCULAR at 12:52

## 2024-09-12 NOTE — ED PROVIDER NOTES
1. Closed fracture of distal end of left radius, unspecified fracture morphology, initial encounter      ED Disposition       ED Disposition   Discharge    Condition   Stable    Date/Time   Thu Sep 12, 2024  1:35 PM    Comment   Michael Briones discharge to home/self care.                   Assessment & Plan       Medical Decision Making  59-year-old male presented to the emergency department for evaluation of wrist pain.  Imaging done in the emergency department on my interpretation with a distal left radius fracture.  The patient was placed in a volar splint by an ED technician.  All diagnostic studies were discussed with the patient in detail.  Distal circulation, motor and sensation intact after splint placement.  Recommendation was made for the patient to follow-up with orthopedic surgery.  An ambulatory referral was placed.  Return precautions were discussed.    Patient agrees with the plan for discharge and feels comfortable to go home with proper f/u. Advised to return for worsening or additional problems. Diagnostic tests were reviewed and questions answered. Diagnosis, care plan and treatment options were discussed. The patient understands instructions and will follow up as directed.        Amount and/or Complexity of Data Reviewed  Radiology: ordered and independent interpretation performed.    Risk  Prescription drug management.                       Medications   ketorolac (TORADOL) injection 30 mg (30 mg Intramuscular Given 9/12/24 1252)       History of Present Illness       59-year-old male presents to the emergency department for evaluation of left wrist pain.  The patient reports having a mechanical fall approximately 1 week ago.  Denies head strike, loss of consciousness or using any antiplatelet or anticoagulation medications.  Patient reports continued pain to his left wrist and hand since the fall.  He has not been taking any medications to treat his symptoms.  No headache, neck pain, blurry  vision or localized numbness, tingling or weakness.            Review of Systems   Constitutional:  Negative for chills and fever.   HENT:  Negative for ear pain and sore throat.    Eyes:  Negative for pain and visual disturbance.   Respiratory:  Negative for cough and shortness of breath.    Cardiovascular:  Negative for chest pain and palpitations.   Gastrointestinal:  Negative for abdominal pain and vomiting.   Genitourinary:  Negative for dysuria and hematuria.   Musculoskeletal:  Negative for arthralgias and back pain.   Skin:  Negative for color change and rash.   Neurological:  Negative for seizures and syncope.   All other systems reviewed and are negative.          Objective     ED Triage Vitals   Temperature Pulse Blood Pressure Respirations SpO2 Patient Position - Orthostatic VS   09/12/24 1239 09/12/24 1239 09/12/24 1241 09/12/24 1239 09/12/24 1239 09/12/24 1241   97.8 °F (36.6 °C) 91 167/86 18 98 % Sitting      Temp src Heart Rate Source BP Location FiO2 (%) Pain Score    -- 09/12/24 1239 09/12/24 1241 -- 09/12/24 1239     Monitor Right arm  10 - Worst Possible Pain        Physical Exam  Vitals and nursing note reviewed.   Constitutional:       General: He is not in acute distress.     Appearance: He is well-developed.   HENT:      Head: Normocephalic and atraumatic.   Eyes:      Conjunctiva/sclera: Conjunctivae normal.   Cardiovascular:      Rate and Rhythm: Normal rate and regular rhythm.      Pulses:           Radial pulses are 2+ on the right side and 2+ on the left side.      Heart sounds: No murmur heard.  Pulmonary:      Effort: Pulmonary effort is normal. No respiratory distress.      Breath sounds: Normal breath sounds.   Abdominal:      Palpations: Abdomen is soft.      Tenderness: There is no abdominal tenderness.   Musculoskeletal:         General: Tenderness present. No swelling.      Right wrist: Normal.      Left wrist: Tenderness and bony tenderness present. No snuff box tenderness.  Decreased range of motion.      Left hand: Tenderness present.      Cervical back: Neck supple.   Skin:     General: Skin is warm and dry.      Capillary Refill: Capillary refill takes less than 2 seconds.   Neurological:      Mental Status: He is alert.      Sensory: Sensation is intact.      Motor: Motor function is intact.   Psychiatric:         Mood and Affect: Mood normal.         Labs Reviewed - No data to display  XR wrist 3+ views LEFT   ED Interpretation by Kev Maher MD (09/12 1334)   Distal radius fracture      XR hand 3+ views LEFT   ED Interpretation by Kev Maher MD (09/12 4572)   Distal radius fracture          Procedures       Kev Maher MD  09/12/24 0943

## 2024-09-17 ENCOUNTER — TELEPHONE (OUTPATIENT)
Age: 59
End: 2024-09-17

## 2024-09-17 NOTE — TELEPHONE ENCOUNTER
Patient is being referred to a orthopedics. Please schedule accordingly.    [facility]   [phone]

## 2024-10-01 ENCOUNTER — TELEPHONE (OUTPATIENT)
Dept: FAMILY MEDICINE CLINIC | Facility: CLINIC | Age: 59
End: 2024-10-01

## 2024-10-01 DIAGNOSIS — E78.1 HIGH TRIGLYCERIDES: ICD-10-CM

## 2024-10-01 RX ORDER — FENOFIBRATE 160 MG/1
160 TABLET ORAL DAILY
Qty: 100 TABLET | Refills: 0 | Status: SHIPPED | OUTPATIENT
Start: 2024-10-01

## 2024-11-01 ENCOUNTER — APPOINTMENT (OUTPATIENT)
Dept: LAB | Facility: HOSPITAL | Age: 59
End: 2024-11-01
Attending: FAMILY MEDICINE
Payer: COMMERCIAL

## 2024-11-01 DIAGNOSIS — Z12.5 PROSTATE CANCER SCREENING: ICD-10-CM

## 2024-11-01 DIAGNOSIS — E11.65 TYPE 2 DIABETES MELLITUS WITH HYPERGLYCEMIA, WITHOUT LONG-TERM CURRENT USE OF INSULIN (HCC): ICD-10-CM

## 2024-11-01 DIAGNOSIS — E78.5 DYSLIPIDEMIA: ICD-10-CM

## 2024-11-01 LAB
ALBUMIN SERPL BCG-MCNC: 4.4 G/DL (ref 3.5–5)
ALP SERPL-CCNC: 105 U/L (ref 34–104)
ALT SERPL W P-5'-P-CCNC: 23 U/L (ref 7–52)
ANION GAP SERPL CALCULATED.3IONS-SCNC: 8 MMOL/L (ref 4–13)
AST SERPL W P-5'-P-CCNC: 26 U/L (ref 13–39)
BILIRUB SERPL-MCNC: 1.15 MG/DL (ref 0.2–1)
BUN SERPL-MCNC: 12 MG/DL (ref 5–25)
CALCIUM SERPL-MCNC: 9.9 MG/DL (ref 8.4–10.2)
CHLORIDE SERPL-SCNC: 102 MMOL/L (ref 96–108)
CHOLEST SERPL-MCNC: 191 MG/DL
CO2 SERPL-SCNC: 26 MMOL/L (ref 21–32)
CREAT SERPL-MCNC: 0.87 MG/DL (ref 0.6–1.3)
CREAT UR-MCNC: 353.9 MG/DL
EST. AVERAGE GLUCOSE BLD GHB EST-MCNC: 166 MG/DL
GFR SERPL CREATININE-BSD FRML MDRD: 93 ML/MIN/1.73SQ M
GLUCOSE P FAST SERPL-MCNC: 205 MG/DL (ref 65–99)
HBA1C MFR BLD: 7.4 %
HDLC SERPL-MCNC: 44 MG/DL
LDLC SERPL CALC-MCNC: 116 MG/DL (ref 0–100)
MICROALBUMIN UR-MCNC: 50.1 MG/L
MICROALBUMIN/CREAT 24H UR: 14 MG/G CREATININE (ref 0–30)
POTASSIUM SERPL-SCNC: 4.1 MMOL/L (ref 3.5–5.3)
PROT SERPL-MCNC: 8 G/DL (ref 6.4–8.4)
PSA SERPL-MCNC: 0.78 NG/ML (ref 0–4)
SODIUM SERPL-SCNC: 136 MMOL/L (ref 135–147)
TRIGL SERPL-MCNC: 157 MG/DL

## 2024-11-01 PROCEDURE — G0103 PSA SCREENING: HCPCS

## 2024-11-01 PROCEDURE — 80061 LIPID PANEL: CPT

## 2024-11-01 PROCEDURE — 80053 COMPREHEN METABOLIC PANEL: CPT

## 2024-11-01 PROCEDURE — 82570 ASSAY OF URINE CREATININE: CPT

## 2024-11-01 PROCEDURE — 83036 HEMOGLOBIN GLYCOSYLATED A1C: CPT

## 2024-11-01 PROCEDURE — 82043 UR ALBUMIN QUANTITATIVE: CPT

## 2024-11-01 PROCEDURE — 36415 COLL VENOUS BLD VENIPUNCTURE: CPT

## 2024-11-05 ENCOUNTER — RA CDI HCC (OUTPATIENT)
Dept: OTHER | Facility: HOSPITAL | Age: 59
End: 2024-11-05

## 2024-11-05 PROBLEM — G89.11 ACUTE PAIN DUE TO TRAUMA: Status: RESOLVED | Noted: 2021-07-31 | Resolved: 2024-11-05

## 2024-11-05 PROBLEM — S22.43XA MULTIPLE FRACTURES OF RIBS, BILATERAL, INITIAL ENCOUNTER FOR CLOSED FRACTURE: Status: RESOLVED | Noted: 2023-12-04 | Resolved: 2024-11-05

## 2024-11-05 PROBLEM — S42.202A CLOSED FRACTURE OF PROXIMAL END OF LEFT HUMERUS: Status: RESOLVED | Noted: 2021-07-31 | Resolved: 2024-11-05

## 2024-11-08 ENCOUNTER — TELEPHONE (OUTPATIENT)
Age: 59
End: 2024-11-08

## 2024-11-08 NOTE — TELEPHONE ENCOUNTER
Caller: Patient    Doctor: Summariva    Reason for call: Called to schedule appointment, scheduled 11/18. Would like a call if any availability sooner.     Call back#: [phone]

## 2024-11-12 ENCOUNTER — OFFICE VISIT (OUTPATIENT)
Dept: FAMILY MEDICINE CLINIC | Facility: CLINIC | Age: 59
End: 2024-11-12
Payer: COMMERCIAL

## 2024-11-12 VITALS
RESPIRATION RATE: 16 BRPM | HEIGHT: 71 IN | WEIGHT: 221 LBS | BODY MASS INDEX: 30.94 KG/M2 | OXYGEN SATURATION: 95 % | DIASTOLIC BLOOD PRESSURE: 80 MMHG | SYSTOLIC BLOOD PRESSURE: 138 MMHG | HEART RATE: 81 BPM

## 2024-11-12 DIAGNOSIS — G89.29 CHRONIC BILATERAL LOW BACK PAIN WITH BILATERAL SCIATICA: Primary | ICD-10-CM

## 2024-11-12 DIAGNOSIS — K29.30 CHRONIC SUPERFICIAL GASTRITIS WITHOUT BLEEDING: ICD-10-CM

## 2024-11-12 DIAGNOSIS — M54.41 CHRONIC BILATERAL LOW BACK PAIN WITH BILATERAL SCIATICA: Primary | ICD-10-CM

## 2024-11-12 DIAGNOSIS — Z23 IMMUNIZATION DUE: ICD-10-CM

## 2024-11-12 DIAGNOSIS — Z23 ENCOUNTER FOR IMMUNIZATION: ICD-10-CM

## 2024-11-12 DIAGNOSIS — K21.9 GASTROESOPHAGEAL REFLUX DISEASE WITHOUT ESOPHAGITIS: ICD-10-CM

## 2024-11-12 DIAGNOSIS — E78.5 DYSLIPIDEMIA: ICD-10-CM

## 2024-11-12 DIAGNOSIS — I10 ESSENTIAL HYPERTENSION: ICD-10-CM

## 2024-11-12 DIAGNOSIS — F17.210 CIGARETTE SMOKER: ICD-10-CM

## 2024-11-12 DIAGNOSIS — M54.42 CHRONIC BILATERAL LOW BACK PAIN WITH BILATERAL SCIATICA: Primary | ICD-10-CM

## 2024-11-12 DIAGNOSIS — K76.0 HEPATIC STEATOSIS: ICD-10-CM

## 2024-11-12 DIAGNOSIS — R80.9 MICROALBUMINURIA: ICD-10-CM

## 2024-11-12 DIAGNOSIS — E11.65 TYPE 2 DIABETES MELLITUS WITH HYPERGLYCEMIA, WITHOUT LONG-TERM CURRENT USE OF INSULIN (HCC): ICD-10-CM

## 2024-11-12 DIAGNOSIS — Z72.0 TOBACCO ABUSE: ICD-10-CM

## 2024-11-12 DIAGNOSIS — G57.91 NEUROPATHY OF RIGHT FOOT: ICD-10-CM

## 2024-11-12 PROBLEM — K86.1 CHRONIC PANCREATITIS (HCC): Status: ACTIVE | Noted: 2024-11-12
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
## 2024-11-12 PROBLEM — K59.09 OTHER CONSTIPATION: Status: RESOLVED | Noted: 2024-08-12 | Resolved: 2024-11-12

## 2024-11-12 PROBLEM — J02.9 SORE THROAT: Status: RESOLVED | Noted: 2024-07-08 | Resolved: 2024-11-12

## 2024-11-12 PROBLEM — F33.9 EPISODE OF RECURRENT MAJOR DEPRESSIVE DISORDER (HCC): Status: RESOLVED | Noted: 2023-11-10 | Resolved: 2024-11-12

## 2024-11-12 PROBLEM — K85.90 PANCREATITIS, ACUTE: Status: RESOLVED | Noted: 2018-03-19 | Resolved: 2024-11-12

## 2024-11-12 LAB
LEFT EYE DIABETIC RETINOPATHY: NORMAL
LEFT EYE IMAGE QUALITY: NORMAL
LEFT EYE MACULAR EDEMA: NORMAL
LEFT EYE OTHER RETINOPATHY: NORMAL
RIGHT EYE DIABETIC RETINOPATHY: NORMAL
RIGHT EYE IMAGE QUALITY: NORMAL
RIGHT EYE MACULAR EDEMA: NORMAL
RIGHT EYE OTHER RETINOPATHY: NORMAL
SEVERITY (EYE EXAM): NORMAL

## 2024-11-12 PROCEDURE — 90471 IMMUNIZATION ADMIN: CPT | Performed by: FAMILY MEDICINE

## 2024-11-12 PROCEDURE — G0439 PPPS, SUBSEQ VISIT: HCPCS | Performed by: FAMILY MEDICINE

## 2024-11-12 PROCEDURE — 99214 OFFICE O/P EST MOD 30 MIN: CPT | Performed by: FAMILY MEDICINE

## 2024-11-12 PROCEDURE — 90656 IIV3 VACC NO PRSV 0.5 ML IM: CPT | Performed by: FAMILY MEDICINE

## 2024-11-12 PROCEDURE — 90750 HZV VACC RECOMBINANT IM: CPT | Performed by: FAMILY MEDICINE

## 2024-11-12 PROCEDURE — G0008 ADMIN INFLUENZA VIRUS VAC: HCPCS | Performed by: FAMILY MEDICINE

## 2024-11-12 RX ORDER — ATORVASTATIN CALCIUM 40 MG/1
40 TABLET, FILM COATED ORAL DAILY
Qty: 90 TABLET | Refills: 3 | Status: SHIPPED | OUTPATIENT
Start: 2024-11-12

## 2024-11-12 NOTE — ASSESSMENT & PLAN NOTE
Lab Results   Component Value Date    HGBA1C 7.4 (H) 11/01/2024   Hga1c 7.4 diet guidelines increase exercise    Orders:    [facility] Diabetic eye exam

## 2024-11-12 NOTE — PROGRESS NOTES
Ambulatory Visit  Name: Michael Briones      : 1965      MRN: [id number]  Encounter Provider: Annmarie Dunaway MD  Encounter Date: 2024   Encounter department: [facility]ICINE    Assessment & Plan       Preventive health issues were discussed with patient, and age appropriate screening tests were ordered as noted in patient's After Visit Summary. Personalized health advice and appropriate referrals for health education or preventive services given if needed, as noted in patient's After Visit Summary.    History of Present Illness     HPI   Patient Care Team:  Annmarie Dunaway MD as PCP - General (Family Medicine)  Annmarie Dunaway MD    Review of Systems  Medical History Reviewed by provider this encounter:       Annual Wellness Visit Questionnaire   Michael is here for his Subsequent Wellness visit.     Health Risk Assessment:   Patient rates overall health as good. Patient feels that their physical health rating is same. Patient is satisfied with their life. Eyesight was rated as same. Hearing was rated as same. Patient feels that their emotional and mental health rating is same. Patients states they are never, rarely angry. Patient states they are never, rarely unusually tired/fatigued. Pain experienced in the last 7 days has been a lot. Patient's pain rating has been 5/10. Patient states that he has experienced no weight loss or gain in last 6 months.     Fall Risk Screening:   In the past year, patient has experienced: no history of falling in past year      Home Safety:  Patient does not have trouble with stairs inside or outside of their home. Patient has working smoke alarms and has working carbon monoxide detector. Home safety hazards include: none.     Nutrition:   Current diet is Regular.     Medications:   Patient is currently taking over-the-counter supplements. OTC medications include: see medication list. Patient is able to manage medications.     Activities of Daily  Living (ADLs)/Instrumental Activities of Daily Living (IADLs):   Walk and transfer into and out of bed and chair?: Yes  Dress and groom yourself?: Yes    Bathe or shower yourself?: Yes    Feed yourself? Yes  Do your laundry/housekeeping?: Yes  Manage your money, pay your bills and track your expenses?: Yes  Make your own meals?: Yes    Do your own shopping?: Yes    Previous Hospitalizations:   Any hospitalizations or ED visits within the last 12 months?: Yes    How many hospitalizations have you had in the last year?: 1-2    Advance Care Planning:   Living will: No    Advanced directive: No      Cognitive Screening:   Provider or family/friend/caregiver concerned regarding cognition?: No    PREVENTIVE SCREENINGS      Cardiovascular Screening:    General: Screening Not Indicated and History Lipid Disorder      Diabetes Screening:     General: Screening Not Indicated and History Diabetes      Colorectal Cancer Screening:     General: Screening Current      Prostate Cancer Screening:    General: Screening Current      Abdominal Aortic Aneurysm (AAA) Screening:    Risk factors include: tobacco use        Hepatitis C Screening:    General: Screening Current    Screening, Brief Intervention, and Referral to Treatment (SBIRT)    Screening  Typical number of drinks in a day: 0  Typical number of drinks in a week: 0  Interpretation: Low risk drinking behavior.    Single Item Drug Screening:  How often have you used an illegal drug (including marijuana) or a prescription medication for non-medical reasons in the past year? never    Single Item Drug Screen Score: 0  Interpretation: Negative screen for possible drug use disorder    Review of Current Opioid Use    Opioid Risk Tool (ORT) Interpretation: Complete Opioid Risk Tool (ORT)    Social Determinants of Health     Financial Resource Strain: High Risk (10/3/2022)    Overall Financial Resource Strain (CARDIA)     Difficulty of Paying Living Expenses: Hard   Food Insecurity:  No Food Insecurity (8/29/2024)    Nursing - Inadequate Food Risk Classification     Worried About Running Out of Food in the Last Year: Never true     Ran Out of Food in the Last Year: Never true   Transportation Needs: No Transportation Needs (8/29/2024)    PRAPARE - Transportation     Lack of Transportation (Medical): No     Lack of Transportation (Non-Medical): No   Housing Stability: Low Risk  (8/29/2024)    Housing Stability Vital Sign     Unable to Pay for Housing in the Last Year: No     Number of Times Moved in the Last Year: 0     Homeless in the Last Year: No   Utilities: Not At Risk (8/29/2024)    [facility] Utilities     Threatened with loss of utilities: No     No results found.    Objective     There were no vitals taken for this visit.    Physical Exam

## 2024-11-12 NOTE — PROGRESS NOTES
Ambulatory Visit  Name: Michael Briones      : 1965      MRN: [id number]  Encounter Provider: Annmarie Dunaway MD  Encounter Date: 2024   Encounter department: [facility] MEDICINE    Assessment & Plan  Chronic bilateral low back pain with bilateral sciatica  Ok today        Chronic superficial gastritis without bleeding  Ok on med       Dyslipidemia  Will change to lipitor for better control    Orders:    atorvastatin (LIPITOR) 40 mg tablet; Take 1 tablet (40 mg total) by mouth daily    Essential hypertension  Well controlled on current therapy continue with current medications and will reassess next visit         Gastroesophageal reflux disease without esophagitis  Ok on meds        Hepatic steatosis  Pt denies recent alcohol use        Type 2 diabetes mellitus with hyperglycemia, without long-term current use of insulin (HCC)    Lab Results   Component Value Date    HGBA1C 7.4 (H) 2024   Hga1c 7.4 diet guidelines increase exercise    Orders:    IRIS Diabetic eye exam    Tobacco abuse  Due for CT LUNG not ready to stop       Microalbuminuria  On ACE         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   Respiratory:  Negative for cough, chest tightness and shortness of breath.    Cardiovascular:  Negative for chest pain, palpitations and leg swelling.   Gastrointestinal:  Negative for abdominal pain, constipation, diarrhea, nausea and vomiting.  "  Genitourinary:  Negative for difficulty urinating and frequency.   Musculoskeletal:  Negative for arthralgias, back pain, gait problem and neck pain.   Skin:  Negative for rash.   Neurological:  Negative for dizziness, weakness, light-headedness, numbness and headaches.   Hematological:  Does not bruise/bleed easily.   Psychiatric/Behavioral:  Negative for dysphoric mood and sleep disturbance. The patient is not nervous/anxious.            Objective     /80 (BP Location: Left arm, Patient Position: Sitting, Cuff Size: Standard)   Pulse 81   Resp 16   Ht 5' 11\" (1.803 m)   Wt 100 kg (221 lb)   SpO2 95%   BMI 30.82 kg/m²     Physical Exam  Vitals and nursing note reviewed.   Constitutional:       General: He is not in acute distress.     Appearance: Normal appearance. He is well-developed.   Eyes:      Conjunctiva/sclera: Conjunctivae normal.      Pupils: Pupils are equal, round, and reactive to light.   Neck:      Thyroid: No thyromegaly.   Cardiovascular:      Rate and Rhythm: Normal rate and regular rhythm.      Pulses: Normal pulses. no weak pulses.           Dorsalis pedis pulses are 2+ on the right side and 2+ on the left side.      Heart sounds: Normal heart sounds. No murmur heard.     No friction rub.   Pulmonary:      Effort: Pulmonary effort is normal. No respiratory distress.      Breath sounds: Normal breath sounds. No wheezing.   Abdominal:      General: Bowel sounds are normal. There is no distension.      Palpations: Abdomen is soft. There is no mass.      Tenderness: There is no abdominal tenderness. There is no guarding.      Hernia: No hernia is present.   Musculoskeletal:      Cervical back: Normal range of motion and neck supple.      Right lower leg: No edema.      Left lower leg: No edema.   Feet:      Right foot:      Skin integrity: No ulcer, skin breakdown, erythema, warmth, callus or dry skin.      Left foot:      Skin integrity: No ulcer, skin breakdown, erythema, warmth, " callus or dry skin.   Lymphadenopathy:      Cervical: No cervical adenopathy.   Skin:     General: Skin is warm and dry.      Findings: No erythema or rash.   Neurological:      General: No focal deficit present.      Mental Status: He is alert and oriented to person, place, and time.      Cranial Nerves: No cranial nerve deficit.      Motor: No weakness.      Gait: Gait normal.      Deep Tendon Reflexes: Reflexes normal.   Psychiatric:         Mood and Affect: Mood normal.     Patient's shoes and socks removed.    Right Foot/Ankle   Right Foot Inspection  Skin Exam: skin normal. Skin not intact, no dry skin, no warmth, no callus, no erythema, no maceration, no abnormal color, no pre-ulcer, no ulcer and no callus.     Toe Exam: ROM and strength within normal limits. No swelling, no tenderness, erythema and  no right toe deformity    Sensory   Vibration: diminished  Monofilament testing: diminished    Vascular  The right DP pulse is 2+.     Right Toe  - Comprehensive Exam  Ecchymosis: none  Swelling: none   Tenderness: none       Left Foot/Ankle  Left Foot Inspection  Skin Exam: skin normal. Skin not intact, no dry skin, no warmth, no erythema, no maceration, normal color, no pre-ulcer, no ulcer and no callus.     Toe Exam: No swelling, no tenderness, no erythema and no left toe deformity.     Sensory   Vibration: intact  Proprioception: intact  Monofilament testing: intact    Vascular  The left DP pulse is 2+.     Left Toe  - Comprehensive Exam  Ecchymosis: none  Swelling: none   Tenderness: none       Assign Risk Category  No deformity present  No loss of protective sensation  No weak pulses  Risk: 0

## 2024-11-12 NOTE — PATIENT INSTRUCTIONS
Medicare Preventive Visit Patient Instructions  Thank you for completing your Welcome to Medicare Visit or Medicare Annual Wellness Visit today. Your next wellness visit will be due in one year (11/13/2025).  The screening/preventive services that you may require over the next 5-10 years are detailed below. Some tests may not apply to you based off risk factors and/or age. Screening tests ordered at today's visit but not completed yet may show as past due. Also, please note that scanned in results may not display below.  Preventive Screenings:  Service Recommendations Previous Testing/Comments   Colorectal Cancer Screening  Colonoscopy    Fecal Occult Blood Test (FOBT)/Fecal Immunochemical Test (FIT)  Fecal DNA/Cologuard Test  Flexible Sigmoidoscopy Age: 45-75 years old   Colonoscopy: every 10 years (May be performed more frequently if at higher risk)  OR  FOBT/FIT: every 1 year  OR  Cologuard: every 3 years  OR  Sigmoidoscopy: every 5 years  Screening may be recommended earlier than age 45 if at higher risk for colorectal cancer. Also, an individualized decision between you and your healthcare provider will decide whether screening between the ages of 76-85 would be appropriate. Colonoscopy: Not on file  FOBT/FIT: Not on file  Cologuard: 10/13/2022  Sigmoidoscopy: Not on file    Screening Current     Prostate Cancer Screening Individualized decision between patient and health care provider in men between ages of 55-69   Medicare will cover every 12 months beginning on the day after your 50th birthday PSA: 0.783 ng/mL     Screening Current     Hepatitis C Screening Once for adults born between 1945 and 1965  More frequently in patients at high risk for Hepatitis C Hep C Antibody: Not on file    Screening Current   Diabetes Screening 1-2 times per year if you're at risk for diabetes or have pre-diabetes Fasting glucose: 205 mg/dL (11/1/2024)  A1C: 7.4 % (11/1/2024)  Screening Not Indicated  History Diabetes    Cholesterol Screening Once every 5 years if you don't have a lipid disorder. May order more often based on risk factors. Lipid panel: 11/01/2024  Screening Not Indicated  History Lipid Disorder      Other Preventive Screenings Covered by Medicare:  Abdominal Aortic Aneurysm (AAA) Screening: covered once if your at risk. You're considered to be at risk if you have a family history of AAA or a male between the age of 65-75 who smoking at least 100 cigarettes in your lifetime.  Lung Cancer Screening: covers low dose CT scan once per year if you meet all of the following conditions: (1) Age 55-77; (2) No signs or symptoms of lung cancer; (3) Current smoker or have quit smoking within the last 15 years; (4) You have a tobacco smoking history of at least 20 pack years (packs per day x number of years you smoked); (5) You get a written order from a healthcare provider.  Glaucoma Screening: covered annually if you're considered high risk: (1) You have diabetes OR (2) Family history of glaucoma OR (3)  aged 50 and older OR (4)  American aged 65 and older  Osteoporosis Screening: covered every 2 years if you meet one of the following conditions: (1) Have a vertebral abnormality; (2) On glucocorticoid therapy for more than 3 months; (3) Have primary hyperparathyroidism; (4) On osteoporosis medications and need to assess response to drug therapy.  HIV Screening: covered annually if you're between the age of 15-65. Also covered annually if you are younger than 15 and older than 65 with risk factors for HIV infection. For pregnant patients, it is covered up to 3 times per pregnancy.    Immunizations:  Immunization Recommendations   Influenza Vaccine Annual influenza vaccination during flu season is recommended for all persons aged >= 6 months who do not have contraindications   Pneumococcal Vaccine   * Pneumococcal conjugate vaccine = PCV13 (Prevnar 13), PCV15 (Vaxneuvance), PCV20 (Prevnar 20)  *  Pneumococcal polysaccharide vaccine = PPSV23 (Pneumovax) Adults 19-63 yo with certain risk factors or if 65+ yo  If never received any pneumonia vaccine: recommend Prevnar 20 (PCV20)  Give PCV20 if previously received 1 dose of PCV13 or PPSV23   Hepatitis B Vaccine 3 dose series if at intermediate or high risk (ex: diabetes, end stage renal disease, liver disease)   Respiratory syncytial virus (RSV) Vaccine - COVERED BY MEDICARE PART D  * RSVPreF3 (Arexvy) CDC recommends that adults 60 years of age and older may receive a single dose of RSV vaccine using shared clinical decision-making (SCDM)   Tetanus (Td) Vaccine - COST NOT COVERED BY MEDICARE PART B Following completion of primary series, a booster dose should be given every 10 years to maintain immunity against tetanus. Td may also be given as tetanus wound prophylaxis.   Tdap Vaccine - COST NOT COVERED BY MEDICARE PART B Recommended at least once for all adults. For pregnant patients, recommended with each pregnancy.   Shingles Vaccine (Shingrix) - COST NOT COVERED BY MEDICARE PART B  2 shot series recommended in those 19 years and older who have or will have weakened immune systems or those 50 years and older     Health Maintenance Due:      Topic Date Due   • HIV Screening  Never done   • Lung Cancer Screening  09/19/2022   • Colorectal Cancer Screening  10/13/2025   • Hepatitis C Screening  Completed     Immunizations Due:      Topic Date Due   • Hepatitis A Vaccine (1 of 2 - Risk 2-dose series) Never done   • Pneumococcal Vaccine: Pediatrics (0 to 5 Years) and At-Risk Patients (6 to 64 Years) (2 of 2 - PCV) 10/14/2021   • Influenza Vaccine (1) 09/01/2024     Advance Directives   What are advance directives?  Advance directives are legal documents that state your wishes and plans for medical care. These plans are made ahead of time in case you lose your ability to make decisions for yourself. Advance directives can apply to any medical decision, such as the  treatments you want, and if you want to donate organs.   What are the types of advance directives?  There are many types of advance directives, and each state has rules about how to use them. You may choose a combination of any of the following:  Living will:  This is a written record of the treatment you want. You can also choose which treatments you do not want, which to limit, and which to stop at a certain time. This includes surgery, medicine, IV fluid, and tube feedings.   Durable power of  for healthcare (DPAHC):  This is a written record that states who you want to make healthcare choices for you when you are unable to make them for yourself. This person, called a proxy, is usually a family member or a friend. You may choose more than 1 proxy.  Do not resuscitate (DNR) order:  A DNR order is used in case your heart stops beating or you stop breathing. It is a request not to have certain forms of treatment, such as CPR. A DNR order may be included in other types of advance directives.  Medical directive:  This covers the care that you want if you are in a coma, near death, or unable to make decisions for yourself. You can list the treatments you want for each condition. Treatment may include pain medicine, surgery, blood transfusions, dialysis, IV or tube feedings, and a ventilator (breathing machine).  Values history:  This document has questions about your views, beliefs, and how you feel and think about life. This information can help others choose the care that you would choose.  Why are advance directives important?  An advance directive helps you control your care. Although spoken wishes may be used, it is better to have your wishes written down. Spoken wishes can be misunderstood, or not followed. Treatments may be given even if you do not want them. An advance directive may make it easier for your family to make difficult choices about your care.   Cigarette Smoking and Your Health   Risks to  your health if you smoke:  Nicotine and other chemicals found in tobacco damage every cell in your body. Even if you are a light smoker, you have an increased risk for cancer, heart disease, and lung disease. If you are pregnant or have diabetes, smoking increases your risk for complications.   Benefits to your health if you stop smoking:   You decrease respiratory symptoms such as coughing, wheezing, and shortness of breath.   You reduce your risk for cancers of the lung, mouth, throat, kidney, bladder, pancreas, stomach, and cervix. If you already have cancer, you increase the benefits of chemotherapy. You also reduce your risk for cancer returning or a second cancer from developing.   You reduce your risk for heart disease, blood clots, heart attack, and stroke.   You reduce your risk for lung infections, and diseases such as pneumonia, asthma, chronic bronchitis, and emphysema.  Your circulation improves. More oxygen can be delivered to your body. If you have diabetes, you lower your risk for complications, such as kidney, artery, and eye diseases. You also lower your risk for nerve damage. Nerve damage can lead to amputations, poor vision, and blindness.  You improve your body's ability to heal and to fight infections.  For more information and support to stop smoking:   [place].gov  Phone: 9- 215 - 646-9408  Web Address: www.[employer]  Weight Management   Why it is important to manage your weight:  Being overweight increases your risk of health conditions such as heart disease, high blood pressure, type 2 diabetes, and certain types of cancer. It can also increase your risk for osteoarthritis, sleep apnea, and other respiratory problems. Aim for a slow, steady weight loss. Even a small amount of weight loss can lower your risk of health problems.  How to lose weight safely:  A safe and healthy way to lose weight is to eat fewer calories and get regular exercise. You can lose up about 1 pound a week by  decreasing the number of calories you eat by 500 calories each day.   Healthy meal plan for weight management:  A healthy meal plan includes a variety of foods, contains fewer calories, and helps you stay healthy. A healthy meal plan includes the following:  Eat whole-grain foods more often.  A healthy meal plan should contain fiber. Fiber is the part of grains, fruits, and vegetables that is not broken down by your body. Whole-grain foods are healthy and provide extra fiber in your diet. Some examples of whole-grain foods are whole-wheat breads and pastas, oatmeal, brown rice, and bulgur.  Eat a variety of vegetables every day.  Include dark, leafy greens such as spinach, kale, kevin greens, and mustard greens. Eat yellow and orange vegetables such as carrots, sweet potatoes, and winter squash.   Eat a variety of fruits every day.  Choose fresh or canned fruit (canned in its own juice or light syrup) instead of juice. Fruit juice has very little or no fiber.  Eat low-fat dairy foods.  Drink fat-free (skim) milk or 1% milk. Eat fat-free yogurt and low-fat cottage cheese. Try low-fat cheeses such as mozzarella and other reduced-fat cheeses.  Choose meat and other protein foods that are low in fat.  Choose beans or other legumes such as split peas or lentils. Choose fish, skinless poultry (chicken or turkey), or lean cuts of red meat (beef or pork). Before you cook meat or poultry, cut off any visible fat.   Use less fat and oil.  Try baking foods instead of frying them. Add less fat, such as margarine, sour cream, regular salad dressing and mayonnaise to foods. Eat fewer high-fat foods. Some examples of high-fat foods include french fries, doughnuts, ice cream, and cakes.  Eat fewer sweets.  Limit foods and drinks that are high in sugar. This includes candy, cookies, regular soda, and sweetened drinks.  Exercise:  Exercise at least 30 minutes per day on most days of the week. Some examples of exercise include  walking, biking, dancing, and swimming. You can also fit in more physical activity by taking the stairs instead of the elevator or parking farther away from stores. Ask your healthcare provider about the best exercise plan for you.   Narcotic (Opioid) Safety    Use narcotics safely:  Take prescribed narcotics exactly as directed  Do not give narcotics to others or take narcotics that belong to someone else  Do not mix narcotics without medicines or alcohol  Do not drive or operate heavy machinery after you take the narcotic  Monitor for side effects and notify your healthcare provider if you experienced side effects such as nausea, sleepiness, itching, or trouble thinking clearly.    Manage constipation:    Constipation is the most common side effect of narcotic medicine. Constipation is when you have hard, dry bowel movements, or you go longer than usual between bowel movements. Tell your healthcare provider about all changes in your bowel movements while you are taking narcotics. He or she may recommend laxative medicine to help you have a bowel movement. He or she may also change the kind of narcotic you are taking, or change when you take it. The following are more ways you can prevent or relieve constipation:    Drink liquids as directed.  You may need to drink extra liquids to help soften and move your bowels. Ask how much liquid to drink each day and which liquids are best for you.  Eat high-fiber foods.  This may help decrease constipation by adding bulk to your bowel movements. High-fiber foods include fruits, vegetables, whole-grain breads and cereals, and beans. Your healthcare provider or dietitian can help you create a high-fiber meal plan. Your provider may also recommend a fiber supplement if you cannot get enough fiber from food.  Exercise regularly.  Regular physical activity can help stimulate your intestines. Walking is a good exercise to prevent or relieve constipation. Ask which exercises are  best for you.  Schedule a time each day to have a bowel movement.  This may help train your body to have regular bowel movements. Bend forward while you are on the toilet to help move the bowel movement out. Sit on the toilet for at least 10 minutes, even if you do not have a bowel movement.    Store narcotics safely:   Store narcotics where others cannot easily get them.  Keep them in a locked cabinet or secure area. Do not  keep them in a purse or other bag you carry with you. A person may be looking for something else and find the narcotics.  Make sure narcotics are stored out of the reach of children.  A child can easily overdose on narcotics. Narcotics may look like candy to a small child.    The best way to dispose of narcotics:      The laws vary by country and area. In the [place], the best way is to return the narcotics through a take-back program. This program is offered by the [place] Drug Enforcement Agency (FRANK). The following are options for using the program:  Take the narcotics to a [employer]ANK collection site.  The site is often a law enforcement center. Call your local law enforcement center for scheduled take-back days in your area. You will be given information on where to go if the collection site is in a different location.  Take the narcotics to an approved pharmacy or hospital.  A pharmacy or hospital may be set up as a collection site. You will need to ask if it is a [employer]ANK collection site if you were not directed there. A pharmacy or doctor's office may not be able to take back narcotics unless it is a FRANK site.  Use a mail-back system.  This means you are given containers to put the narcotics into. You will then mail them in the containers.  Use a take-back drop box.  This is a place to leave the narcotics at any time. People and animals will not be able to get into the box. Your local law enforcement agency can tell you where to find a drop box in your area.    Other ways to manage pain:   Ask your  healthcare provider about non-narcotic medicines to control pain.  Nonprescription medicines include NSAIDs (such as ibuprofen) and acetaminophen. Prescription medicines include muscle relaxers, antidepressants, and steroids.  Pain may be managed without any medicines.  Some ways to relieve pain include massage, aromatherapy, or meditation. Physical or occupational therapy may also help.    For more information:   Drug Enforcement Administration  [address] [place] [address]  Phone: 1- 960 - 368-2517  Web Address: https://www.[employer]iversion.[facility].gov/drug_disposal/     Food and Drug Administration  [address] [place] [address]  Phone: 0- 748 - 715-5366  Web Address: http://www.fda.gov     © Copyright [employer] 2018 Information is for End User's use only and may not be sold, redistributed or otherwise used for commercial purposes. All illustrations and images included in CareNotes® are the copyrighted property of [employer]M., Inc. or [facility]

## 2024-11-12 NOTE — ASSESSMENT & PLAN NOTE
Will change to lipitor for better control    Orders:    atorvastatin (LIPITOR) 40 mg tablet; Take 1 tablet (40 mg total) by mouth daily

## 2024-11-14 ENCOUNTER — VBI (OUTPATIENT)
Dept: ADMINISTRATIVE | Facility: OTHER | Age: 59
End: 2024-11-14

## 2024-11-14 NOTE — TELEPHONE ENCOUNTER
11/14/24 10:27 AM     Chart reviewed for blood pressure was/were submitted to the patient's insurance.     Kaelyn Quinones   [facility]   110

## 2024-11-14 NOTE — TELEPHONE ENCOUNTER
11/14/24 10:22 AM     Chart reviewed for Hemoglobin A1c and Microalbumin Creatinine Urine Ratio OR Albumin Creatinine Urine Ratio  was/were submitted to the patient's insurance.     Kaelyn Quinones   [facility]

## 2024-11-26 ENCOUNTER — CLINICAL SUPPORT (OUTPATIENT)
Dept: FAMILY MEDICINE CLINIC | Facility: CLINIC | Age: 59
End: 2024-11-26
Payer: COMMERCIAL

## 2024-11-26 ENCOUNTER — TELEPHONE (OUTPATIENT)
Dept: FAMILY MEDICINE CLINIC | Facility: CLINIC | Age: 59
End: 2024-11-26

## 2024-11-26 DIAGNOSIS — Z23 ENCOUNTER FOR IMMUNIZATION: Primary | ICD-10-CM

## 2024-11-26 DIAGNOSIS — E78.5 DYSLIPIDEMIA: ICD-10-CM

## 2024-11-26 PROCEDURE — 90677 PCV20 VACCINE IM: CPT

## 2024-11-26 PROCEDURE — 90750 HZV VACC RECOMBINANT IM: CPT

## 2024-11-26 PROCEDURE — 90471 IMMUNIZATION ADMIN: CPT

## 2024-11-26 PROCEDURE — G0009 ADMIN PNEUMOCOCCAL VACCINE: HCPCS

## 2024-11-26 RX ORDER — ATORVASTATIN CALCIUM 40 MG/1
40 TABLET, FILM COATED ORAL DAILY
Qty: 90 TABLET | Refills: 3 | Status: SHIPPED | OUTPATIENT
Start: 2024-11-26

## 2024-11-26 NOTE — TELEPHONE ENCOUNTER
Patient was in for a nurse visit today & requested another order for CT scan lung screening.  I printed out the order & emailed it to Shilpa.  He missed his appt for this on Tue.

## 2024-12-14 DIAGNOSIS — E78.1 HIGH TRIGLYCERIDES: ICD-10-CM

## 2024-12-15 RX ORDER — FENOFIBRATE 160 MG/1
160 TABLET ORAL DAILY
Qty: 90 TABLET | Refills: 1 | Status: SHIPPED | OUTPATIENT
Start: 2024-12-15

## 2025-01-02 DIAGNOSIS — F33.2 SEVERE EPISODE OF RECURRENT MAJOR DEPRESSIVE DISORDER, WITHOUT PSYCHOTIC FEATURES (HCC): ICD-10-CM

## 2025-01-02 RX ORDER — SERTRALINE HYDROCHLORIDE 100 MG/1
150 TABLET, FILM COATED ORAL DAILY
Qty: 135 TABLET | Refills: 3 | Status: SHIPPED | OUTPATIENT
Start: 2025-01-02

## 2025-01-13 ENCOUNTER — VBI (OUTPATIENT)
Dept: ADMINISTRATIVE | Facility: OTHER | Age: 60
End: 2025-01-13

## 2025-01-13 NOTE — TELEPHONE ENCOUNTER
01/13/25 12:35 PM     Chart reviewed for  ; nothing is submitted to the patient's insurance at this time.     Kaelyn Quinones   [facility]

## 2025-04-26 DIAGNOSIS — E11.65 TYPE 2 DIABETES MELLITUS WITH HYPERGLYCEMIA, WITHOUT LONG-TERM CURRENT USE OF INSULIN (HCC): ICD-10-CM

## 2025-04-28 RX ORDER — GLIMEPIRIDE 1 MG/1
1 TABLET ORAL
Qty: 90 TABLET | Refills: 1 | Status: SHIPPED | OUTPATIENT
Start: 2025-04-28 | End: 2025-10-25

## 2025-05-03 DIAGNOSIS — I10 ESSENTIAL HYPERTENSION: ICD-10-CM

## 2025-05-04 RX ORDER — AMLODIPINE BESYLATE 5 MG/1
5 TABLET ORAL DAILY
Qty: 90 TABLET | Refills: 3 | Status: SHIPPED | OUTPATIENT
Start: 2025-05-04

## 2025-05-10 DIAGNOSIS — E78.1 HIGH TRIGLYCERIDES: ICD-10-CM

## 2025-05-11 RX ORDER — FENOFIBRATE 160 MG/1
160 TABLET ORAL DAILY
Qty: 90 TABLET | Refills: 1 | Status: SHIPPED | OUTPATIENT
Start: 2025-05-11

## 2025-08-01 ENCOUNTER — VBI (OUTPATIENT)
Dept: ADMINISTRATIVE | Facility: OTHER | Age: 60
End: 2025-08-01

## 2025-08-01 DIAGNOSIS — E11.65 TYPE 2 DIABETES MELLITUS WITH HYPERGLYCEMIA, WITHOUT LONG-TERM CURRENT USE OF INSULIN (HCC): ICD-10-CM

## 2025-08-04 RX ORDER — METFORMIN HYDROCHLORIDE 500 MG/1
500 TABLET, EXTENDED RELEASE ORAL 2 TIMES DAILY WITH MEALS
Qty: 180 TABLET | Refills: 0 | Status: SHIPPED | OUTPATIENT
Start: 2025-08-04 | End: 2025-08-08

## 2025-08-06 DIAGNOSIS — E11.65 TYPE 2 DIABETES MELLITUS WITH HYPERGLYCEMIA, WITHOUT LONG-TERM CURRENT USE OF INSULIN (HCC): ICD-10-CM

## 2025-08-06 DIAGNOSIS — E78.5 DYSLIPIDEMIA: ICD-10-CM

## 2025-08-07 ENCOUNTER — VBI (OUTPATIENT)
Dept: ADMINISTRATIVE | Facility: OTHER | Age: 60
End: 2025-08-07

## 2025-08-08 RX ORDER — ATORVASTATIN CALCIUM 40 MG/1
40 TABLET, FILM COATED ORAL DAILY
Qty: 90 TABLET | Refills: 3 | Status: SHIPPED | OUTPATIENT
Start: 2025-08-08

## 2025-08-08 RX ORDER — METFORMIN HYDROCHLORIDE 500 MG/1
500 TABLET, EXTENDED RELEASE ORAL 2 TIMES DAILY WITH MEALS
Qty: 180 TABLET | Refills: 3 | Status: SHIPPED | OUTPATIENT
Start: 2025-08-08